# Patient Record
Sex: FEMALE | Race: WHITE | HISPANIC OR LATINO | Employment: OTHER | URBAN - METROPOLITAN AREA
[De-identification: names, ages, dates, MRNs, and addresses within clinical notes are randomized per-mention and may not be internally consistent; named-entity substitution may affect disease eponyms.]

---

## 2017-01-02 ENCOUNTER — APPOINTMENT (OUTPATIENT)
Dept: URGENT CARE | Age: 42
End: 2017-01-02
Payer: MEDICARE

## 2017-01-02 PROCEDURE — G0463 HOSPITAL OUTPT CLINIC VISIT: HCPCS

## 2017-01-02 PROCEDURE — 99213 OFFICE O/P EST LOW 20 MIN: CPT

## 2017-01-11 ENCOUNTER — ALLSCRIPTS OFFICE VISIT (OUTPATIENT)
Dept: OTHER | Facility: OTHER | Age: 42
End: 2017-01-11

## 2017-01-16 ENCOUNTER — APPOINTMENT (OUTPATIENT)
Dept: LAB | Facility: HOSPITAL | Age: 42
End: 2017-01-16
Payer: MEDICARE

## 2017-01-16 ENCOUNTER — HOSPITAL ENCOUNTER (OUTPATIENT)
Dept: NEUROLOGY | Facility: CLINIC | Age: 42
Discharge: HOME | End: 2017-01-16
Payer: MEDICARE

## 2017-01-16 ENCOUNTER — TRANSCRIBE ORDERS (OUTPATIENT)
Dept: LAB | Facility: HOSPITAL | Age: 42
End: 2017-01-16

## 2017-01-16 DIAGNOSIS — R53.0 NEOPLASTIC MALIGNANT RELATED FATIGUE: ICD-10-CM

## 2017-01-16 DIAGNOSIS — M54.12 BRACHIAL NEURITIS OR RADICULITIS NOS: ICD-10-CM

## 2017-01-16 DIAGNOSIS — E78.5 HYPERLIPIDEMIA, UNSPECIFIED HYPERLIPIDEMIA TYPE: ICD-10-CM

## 2017-01-16 DIAGNOSIS — E03.9 UNSPECIFIED HYPOTHYROIDISM: ICD-10-CM

## 2017-01-16 DIAGNOSIS — I10 ESSENTIAL HYPERTENSION, MALIGNANT: ICD-10-CM

## 2017-01-16 DIAGNOSIS — G56.03 BILATERAL CARPAL TUNNEL SYNDROME: ICD-10-CM

## 2017-01-16 DIAGNOSIS — R53.1 ASTHENIA: ICD-10-CM

## 2017-01-16 DIAGNOSIS — I10 ESSENTIAL HYPERTENSION, MALIGNANT: Primary | ICD-10-CM

## 2017-01-16 LAB
ALBUMIN SERPL BCP-MCNC: 3.3 G/DL (ref 3.5–5)
ALP SERPL-CCNC: 83 U/L (ref 46–116)
ALT SERPL W P-5'-P-CCNC: 43 U/L (ref 12–78)
ANION GAP SERPL CALCULATED.3IONS-SCNC: 7 MMOL/L (ref 4–13)
AST SERPL W P-5'-P-CCNC: 25 U/L (ref 5–45)
BASOPHILS # BLD AUTO: 0.01 THOUSANDS/ΜL (ref 0–0.1)
BASOPHILS NFR BLD AUTO: 0 % (ref 0–1)
BILIRUB SERPL-MCNC: 0.12 MG/DL (ref 0.2–1)
BUN SERPL-MCNC: 10 MG/DL (ref 5–25)
CALCIUM SERPL-MCNC: 8.2 MG/DL (ref 8.3–10.1)
CHLORIDE SERPL-SCNC: 109 MMOL/L (ref 100–108)
CHOLEST SERPL-MCNC: 167 MG/DL (ref 50–200)
CO2 SERPL-SCNC: 24 MMOL/L (ref 21–32)
CREAT SERPL-MCNC: 0.77 MG/DL (ref 0.6–1.3)
CRP SERPL QL: 7.4 MG/L
EOSINOPHIL # BLD AUTO: 0.12 THOUSAND/ΜL (ref 0–0.61)
EOSINOPHIL NFR BLD AUTO: 2 % (ref 0–6)
ERYTHROCYTE [DISTWIDTH] IN BLOOD BY AUTOMATED COUNT: 13.5 % (ref 11.6–15.1)
GFR SERPL CREATININE-BSD FRML MDRD: >60 ML/MIN/1.73SQ M
GLUCOSE SERPL-MCNC: 131 MG/DL (ref 65–140)
HCT VFR BLD AUTO: 36.3 % (ref 34.8–46.1)
HDLC SERPL-MCNC: 48 MG/DL (ref 40–60)
HGB BLD-MCNC: 12.2 G/DL (ref 11.5–15.4)
LDLC SERPL CALC-MCNC: 79 MG/DL (ref 0–100)
LYMPHOCYTES # BLD AUTO: 1.81 THOUSANDS/ΜL (ref 0.6–4.47)
LYMPHOCYTES NFR BLD AUTO: 26 % (ref 14–44)
MCH RBC QN AUTO: 29.3 PG (ref 26.8–34.3)
MCHC RBC AUTO-ENTMCNC: 33.6 G/DL (ref 31.4–37.4)
MCV RBC AUTO: 87 FL (ref 82–98)
MONOCYTES # BLD AUTO: 0.46 THOUSAND/ΜL (ref 0.17–1.22)
MONOCYTES NFR BLD AUTO: 7 % (ref 4–12)
NEUTROPHILS # BLD AUTO: 4.44 THOUSANDS/ΜL (ref 1.85–7.62)
NEUTS SEG NFR BLD AUTO: 65 % (ref 43–75)
NRBC BLD AUTO-RTO: 0 /100 WBCS
PLATELET # BLD AUTO: 275 THOUSANDS/UL (ref 149–390)
PMV BLD AUTO: 10.4 FL (ref 8.9–12.7)
POTASSIUM SERPL-SCNC: 3.8 MMOL/L (ref 3.5–5.3)
PROT SERPL-MCNC: 7.6 G/DL (ref 6.4–8.2)
RBC # BLD AUTO: 4.16 MILLION/UL (ref 3.81–5.12)
SODIUM SERPL-SCNC: 140 MMOL/L (ref 136–145)
T3FREE SERPL-MCNC: 2.38 PG/ML (ref 2.3–4.2)
T4 FREE SERPL-MCNC: 0.74 NG/DL (ref 0.76–1.46)
TRIGL SERPL-MCNC: 201 MG/DL
TSH SERPL DL<=0.05 MIU/L-ACNC: 3.02 UIU/ML (ref 0.36–3.74)
WBC # BLD AUTO: 6.86 THOUSAND/UL (ref 4.31–10.16)

## 2017-01-16 PROCEDURE — 84481 FREE ASSAY (FT-3): CPT

## 2017-01-16 PROCEDURE — 80061 LIPID PANEL: CPT

## 2017-01-16 PROCEDURE — 86618 LYME DISEASE ANTIBODY: CPT

## 2017-01-16 PROCEDURE — 86140 C-REACTIVE PROTEIN: CPT

## 2017-01-16 PROCEDURE — 95913 NRV CNDJ TEST 13/> STUDIES: CPT

## 2017-01-16 PROCEDURE — 36415 COLL VENOUS BLD VENIPUNCTURE: CPT

## 2017-01-16 PROCEDURE — 80053 COMPREHEN METABOLIC PANEL: CPT

## 2017-01-16 PROCEDURE — 95886 MUSC TEST DONE W/N TEST COMP: CPT

## 2017-01-16 PROCEDURE — 84439 ASSAY OF FREE THYROXINE: CPT

## 2017-01-16 PROCEDURE — 84443 ASSAY THYROID STIM HORMONE: CPT

## 2017-01-16 PROCEDURE — 85025 COMPLETE CBC W/AUTO DIFF WBC: CPT

## 2017-01-17 LAB
B BURGDOR IGG SER IA-ACNC: 0.23
B BURGDOR IGM SER IA-ACNC: 0.46

## 2017-01-20 ENCOUNTER — ALLSCRIPTS OFFICE VISIT (OUTPATIENT)
Dept: OTHER | Facility: OTHER | Age: 42
End: 2017-01-20

## 2017-02-01 ENCOUNTER — ALLSCRIPTS OFFICE VISIT (OUTPATIENT)
Dept: OTHER | Facility: OTHER | Age: 42
End: 2017-02-01

## 2017-02-02 ENCOUNTER — TRANSCRIBE ORDERS (OUTPATIENT)
Dept: LAB | Facility: HOSPITAL | Age: 42
End: 2017-02-02

## 2017-02-02 ENCOUNTER — APPOINTMENT (OUTPATIENT)
Dept: LAB | Facility: HOSPITAL | Age: 42
End: 2017-02-02
Payer: MEDICARE

## 2017-02-02 DIAGNOSIS — M35.01 KERATOCONJUNCTIVITIS SICCA (HCC): ICD-10-CM

## 2017-02-02 DIAGNOSIS — M35.01 KERATOCONJUNCTIVITIS SICCA (HCC): Primary | ICD-10-CM

## 2017-02-02 DIAGNOSIS — Z79.899 ENCOUNTER FOR LONG-TERM (CURRENT) USE OF OTHER MEDICATIONS: ICD-10-CM

## 2017-02-02 DIAGNOSIS — M79.7 RHEUMATISM, UNSPECIFIED AND FIBROSITIS: ICD-10-CM

## 2017-02-02 DIAGNOSIS — L93.1 SUBACUTE CUTANEOUS LUPUS ERYTHEMATOSUS: ICD-10-CM

## 2017-02-02 DIAGNOSIS — M79.0 RHEUMATISM, UNSPECIFIED AND FIBROSITIS: ICD-10-CM

## 2017-02-02 LAB
ANION GAP SERPL CALCULATED.3IONS-SCNC: 9 MMOL/L (ref 4–13)
BUN SERPL-MCNC: 10 MG/DL (ref 5–25)
C3 SERPL-MCNC: 157 MG/DL (ref 90–180)
C4 SERPL-MCNC: 33 MG/DL (ref 10–40)
CA-I BLD-SCNC: 1.18 MMOL/L (ref 1.12–1.32)
CALCIUM SERPL-MCNC: 9.1 MG/DL (ref 8.3–10.1)
CHLORIDE SERPL-SCNC: 106 MMOL/L (ref 100–108)
CK MB SERPL-MCNC: 1.1 % (ref 0–2.5)
CK MB SERPL-MCNC: 2.1 NG/ML (ref 0–5)
CK SERPL-CCNC: 192 U/L (ref 26–192)
CO2 SERPL-SCNC: 25 MMOL/L (ref 21–32)
CREAT SERPL-MCNC: 0.65 MG/DL (ref 0.6–1.3)
CRP SERPL QL: 9.6 MG/L
ERYTHROCYTE [SEDIMENTATION RATE] IN BLOOD: 33 MM/HOUR (ref 0–20)
GFR SERPL CREATININE-BSD FRML MDRD: >60 ML/MIN/1.73SQ M
GLUCOSE SERPL-MCNC: 95 MG/DL (ref 65–140)
POTASSIUM SERPL-SCNC: 3.6 MMOL/L (ref 3.5–5.3)
SODIUM SERPL-SCNC: 140 MMOL/L (ref 136–145)

## 2017-02-02 PROCEDURE — 83516 IMMUNOASSAY NONANTIBODY: CPT

## 2017-02-02 PROCEDURE — 86225 DNA ANTIBODY NATIVE: CPT

## 2017-02-02 PROCEDURE — 86430 RHEUMATOID FACTOR TEST QUAL: CPT

## 2017-02-02 PROCEDURE — 82595 ASSAY OF CRYOGLOBULIN: CPT

## 2017-02-02 PROCEDURE — 86200 CCP ANTIBODY: CPT

## 2017-02-02 PROCEDURE — 86376 MICROSOMAL ANTIBODY EACH: CPT

## 2017-02-02 PROCEDURE — 80048 BASIC METABOLIC PNL TOTAL CA: CPT

## 2017-02-02 PROCEDURE — 36415 COLL VENOUS BLD VENIPUNCTURE: CPT

## 2017-02-02 PROCEDURE — 82553 CREATINE MB FRACTION: CPT

## 2017-02-02 PROCEDURE — 86235 NUCLEAR ANTIGEN ANTIBODY: CPT

## 2017-02-02 PROCEDURE — 85652 RBC SED RATE AUTOMATED: CPT

## 2017-02-02 PROCEDURE — 86140 C-REACTIVE PROTEIN: CPT

## 2017-02-02 PROCEDURE — 85732 THROMBOPLASTIN TIME PARTIAL: CPT

## 2017-02-02 PROCEDURE — 82330 ASSAY OF CALCIUM: CPT

## 2017-02-02 PROCEDURE — 86146 BETA-2 GLYCOPROTEIN ANTIBODY: CPT

## 2017-02-02 PROCEDURE — 86160 COMPLEMENT ANTIGEN: CPT

## 2017-02-02 PROCEDURE — 86147 CARDIOLIPIN ANTIBODY EA IG: CPT

## 2017-02-02 PROCEDURE — 86800 THYROGLOBULIN ANTIBODY: CPT

## 2017-02-02 PROCEDURE — 85670 THROMBIN TIME PLASMA: CPT

## 2017-02-02 PROCEDURE — 86038 ANTINUCLEAR ANTIBODIES: CPT

## 2017-02-02 PROCEDURE — 85705 THROMBOPLASTIN INHIBITION: CPT

## 2017-02-02 PROCEDURE — 85613 RUSSELL VIPER VENOM DILUTED: CPT

## 2017-02-02 PROCEDURE — 82550 ASSAY OF CK (CPK): CPT

## 2017-02-03 LAB
CARDIOLIPIN IGA SER IA-ACNC: <9 APL U/ML (ref 0–11)
CARDIOLIPIN IGG SER IA-ACNC: <9 GPL U/ML (ref 0–14)
CARDIOLIPIN IGM SER IA-ACNC: <9 MPL U/ML (ref 0–12)
CENTROMERE B AB SER-ACNC: <0.2 AI (ref 0–0.9)
DSDNA AB SER-ACNC: 2 IU/ML (ref 0–9)
ENA RNP AB SER-ACNC: <0.2 AI (ref 0–0.9)
ENA SCL70 AB SER-ACNC: <0.2 AI (ref 0–0.9)
ENA SM AB SER-ACNC: <0.2 AI (ref 0–0.9)
ENA SS-A AB SER-ACNC: <0.2 AI (ref 0–0.9)
ENA SS-B AB SER-ACNC: <0.2 AI (ref 0–0.9)
RHEUMATOID FACT SER QL LA: NEGATIVE
RYE IGE QN: NEGATIVE
THYROGLOB AB SERPL-ACNC: <1 IU/ML (ref 0–0.9)
THYROPEROXIDASE AB SERPL-ACNC: 15 IU/ML (ref 0–34)
TTG IGA SER-ACNC: <2 U/ML (ref 0–3)
TTG IGG SER-ACNC: 3 U/ML (ref 0–5)

## 2017-02-04 LAB
B2 GLYCOPROT1 IGA SER-ACNC: <9 GPI IGA UNITS (ref 0–25)
B2 GLYCOPROT1 IGG SER-ACNC: <9 GPI IGG UNITS (ref 0–20)
B2 GLYCOPROT1 IGM SER-ACNC: <9 GPI IGM UNITS (ref 0–32)
CCP IGA+IGG SERPL IA-ACNC: 6 UNITS (ref 0–19)

## 2017-02-05 LAB
APTT SCREEN TO CONFIRM RATIO: 1.07 RATIO (ref 0–1.4)
CONFIRM APTT/NORMAL: 42.9 SEC (ref 0–55)
LA PPP-IMP: NORMAL
SCREEN APTT: 35.8 SEC (ref 0–40.6)
SCREEN DRVVT: 38.8 SEC (ref 0–44)
THROMBIN TIME: 16.8 SEC (ref 0–20.9)

## 2017-02-07 LAB — CRYOGLOB SER QL 1D COLD INC: POSITIVE

## 2017-02-10 ENCOUNTER — ALLSCRIPTS OFFICE VISIT (OUTPATIENT)
Dept: OTHER | Facility: OTHER | Age: 42
End: 2017-02-10

## 2017-02-24 ENCOUNTER — ALLSCRIPTS OFFICE VISIT (OUTPATIENT)
Dept: OTHER | Facility: OTHER | Age: 42
End: 2017-02-24

## 2017-03-07 ENCOUNTER — OFFICE VISIT (OUTPATIENT)
Dept: URGENT CARE | Age: 42
End: 2017-03-07
Payer: MEDICARE

## 2017-03-07 PROCEDURE — 99214 OFFICE O/P EST MOD 30 MIN: CPT | Performed by: FAMILY MEDICINE

## 2017-03-07 PROCEDURE — G0463 HOSPITAL OUTPT CLINIC VISIT: HCPCS | Performed by: FAMILY MEDICINE

## 2017-03-07 PROCEDURE — 99203 OFFICE O/P NEW LOW 30 MIN: CPT | Performed by: FAMILY MEDICINE

## 2017-03-10 ENCOUNTER — ALLSCRIPTS OFFICE VISIT (OUTPATIENT)
Dept: OTHER | Facility: OTHER | Age: 42
End: 2017-03-10

## 2017-03-10 ENCOUNTER — APPOINTMENT (OUTPATIENT)
Dept: LAB | Facility: HOSPITAL | Age: 42
End: 2017-03-10
Payer: MEDICARE

## 2017-03-10 DIAGNOSIS — N30.10 CHRONIC INTERSTITIAL CYSTITIS WITHOUT HEMATURIA: ICD-10-CM

## 2017-03-10 PROCEDURE — 88112 CYTOPATH CELL ENHANCE TECH: CPT

## 2017-03-14 ENCOUNTER — GENERIC CONVERSION - ENCOUNTER (OUTPATIENT)
Dept: OTHER | Facility: OTHER | Age: 42
End: 2017-03-14

## 2017-03-24 ENCOUNTER — ALLSCRIPTS OFFICE VISIT (OUTPATIENT)
Dept: OTHER | Facility: OTHER | Age: 42
End: 2017-03-24

## 2017-04-07 ENCOUNTER — ALLSCRIPTS OFFICE VISIT (OUTPATIENT)
Dept: OTHER | Facility: OTHER | Age: 42
End: 2017-04-07

## 2017-04-21 ENCOUNTER — ALLSCRIPTS OFFICE VISIT (OUTPATIENT)
Dept: OTHER | Facility: OTHER | Age: 42
End: 2017-04-21

## 2017-05-05 ENCOUNTER — ALLSCRIPTS OFFICE VISIT (OUTPATIENT)
Dept: OTHER | Facility: OTHER | Age: 42
End: 2017-05-05

## 2017-05-07 ENCOUNTER — OFFICE VISIT (OUTPATIENT)
Dept: URGENT CARE | Age: 42
End: 2017-05-07
Payer: MEDICARE

## 2017-05-07 ENCOUNTER — APPOINTMENT (OUTPATIENT)
Dept: LAB | Age: 42
End: 2017-05-07
Attending: PHYSICIAN ASSISTANT
Payer: MEDICARE

## 2017-05-07 ENCOUNTER — TRANSCRIBE ORDERS (OUTPATIENT)
Dept: URGENT CARE | Age: 42
End: 2017-05-07

## 2017-05-07 DIAGNOSIS — J02.9 ACUTE PHARYNGITIS: ICD-10-CM

## 2017-05-07 PROCEDURE — 87070 CULTURE OTHR SPECIMN AEROBIC: CPT

## 2017-05-07 PROCEDURE — 87147 CULTURE TYPE IMMUNOLOGIC: CPT

## 2017-05-07 PROCEDURE — 87430 STREP A AG IA: CPT | Performed by: FAMILY MEDICINE

## 2017-05-07 PROCEDURE — G0463 HOSPITAL OUTPT CLINIC VISIT: HCPCS

## 2017-05-07 PROCEDURE — 99213 OFFICE O/P EST LOW 20 MIN: CPT

## 2017-05-09 LAB — BACTERIA THROAT CULT: NORMAL

## 2017-05-21 ENCOUNTER — HOSPITAL ENCOUNTER (EMERGENCY)
Facility: HOSPITAL | Age: 42
Discharge: HOME | End: 2017-05-21
Attending: EMERGENCY MEDICINE
Payer: MEDICARE

## 2017-05-21 VITALS
WEIGHT: 195 LBS | BODY MASS INDEX: 32.49 KG/M2 | TEMPERATURE: 98.2 F | RESPIRATION RATE: 16 BRPM | HEIGHT: 65 IN | DIASTOLIC BLOOD PRESSURE: 81 MMHG | HEART RATE: 86 BPM | SYSTOLIC BLOOD PRESSURE: 141 MMHG | OXYGEN SATURATION: 100 %

## 2017-05-21 DIAGNOSIS — G43.909 MIGRAINE: Primary | ICD-10-CM

## 2017-05-21 PROCEDURE — 96365 THER/PROPH/DIAG IV INF INIT: CPT

## 2017-05-21 PROCEDURE — 96375 TX/PRO/DX INJ NEW DRUG ADDON: CPT

## 2017-05-21 PROCEDURE — 96361 HYDRATE IV INFUSION ADD-ON: CPT

## 2017-05-21 PROCEDURE — 99283 EMERGENCY DEPT VISIT LOW MDM: CPT

## 2017-05-21 RX ORDER — MAGNESIUM SULFATE HEPTAHYDRATE 40 MG/ML
2 INJECTION, SOLUTION INTRAVENOUS ONCE
Status: COMPLETED | OUTPATIENT
Start: 2017-05-21 | End: 2017-05-21

## 2017-05-21 RX ORDER — METOCLOPRAMIDE HYDROCHLORIDE 5 MG/ML
10 INJECTION INTRAMUSCULAR; INTRAVENOUS ONCE
Status: COMPLETED | OUTPATIENT
Start: 2017-05-21 | End: 2017-05-21

## 2017-05-21 RX ORDER — KETOROLAC TROMETHAMINE 30 MG/ML
30 INJECTION, SOLUTION INTRAMUSCULAR; INTRAVENOUS ONCE
Status: COMPLETED | OUTPATIENT
Start: 2017-05-21 | End: 2017-05-21

## 2017-05-21 RX ORDER — LORAZEPAM 2 MG/ML
0.5 INJECTION INTRAMUSCULAR ONCE
Status: COMPLETED | OUTPATIENT
Start: 2017-05-21 | End: 2017-05-21

## 2017-05-21 RX ORDER — PROCHLORPERAZINE MALEATE 5 MG/1
5 TABLET ORAL EVERY 6 HOURS PRN
Status: DISCONTINUED | OUTPATIENT
Start: 2017-05-21 | End: 2017-05-21 | Stop reason: HOSPADM

## 2017-05-21 RX ORDER — DIPHENHYDRAMINE HYDROCHLORIDE 50 MG/ML
50 INJECTION INTRAMUSCULAR; INTRAVENOUS ONCE
Status: COMPLETED | OUTPATIENT
Start: 2017-05-21 | End: 2017-05-21

## 2017-05-21 RX ORDER — DIVALPROEX SODIUM 125 MG/1
1000 CAPSULE, COATED PELLETS ORAL ONCE
Status: DISCONTINUED | OUTPATIENT
Start: 2017-05-21 | End: 2017-05-21

## 2017-05-21 RX ADMIN — DIPHENHYDRAMINE HYDROCHLORIDE 50 MG: 50 INJECTION, SOLUTION INTRAMUSCULAR; INTRAVENOUS at 10:45

## 2017-05-21 RX ADMIN — PROCHLORPERAZINE MALEATE 5 MG: 5 TABLET, FILM COATED ORAL at 12:08

## 2017-05-21 RX ADMIN — SODIUM CHLORIDE 1000 ML: 0.9 INJECTION, SOLUTION INTRAVENOUS at 10:40

## 2017-05-21 RX ADMIN — DEXAMETHASONE SODIUM PHOSPHATE 8 MG: 10 INJECTION, SOLUTION INTRAMUSCULAR; INTRAVENOUS at 11:54

## 2017-05-21 RX ADMIN — LORAZEPAM 0.5 MG: 2 INJECTION INTRAMUSCULAR; INTRAVENOUS at 11:53

## 2017-05-21 RX ADMIN — MAGNESIUM SULFATE HEPTAHYDRATE 2 G: 40 INJECTION, SOLUTION INTRAVENOUS at 12:42

## 2017-05-21 RX ADMIN — METOCLOPRAMIDE 10 MG: 5 INJECTION, SOLUTION INTRAMUSCULAR; INTRAVENOUS at 10:43

## 2017-05-21 RX ADMIN — KETOROLAC TROMETHAMINE 30 MG: 30 INJECTION, SOLUTION INTRAMUSCULAR at 10:44

## 2017-05-23 ENCOUNTER — ALLSCRIPTS OFFICE VISIT (OUTPATIENT)
Dept: OTHER | Facility: OTHER | Age: 42
End: 2017-05-23

## 2017-05-26 ENCOUNTER — GENERIC CONVERSION - ENCOUNTER (OUTPATIENT)
Dept: OTHER | Facility: OTHER | Age: 42
End: 2017-05-26

## 2017-05-28 ENCOUNTER — APPOINTMENT (EMERGENCY)
Dept: RADIOLOGY | Facility: HOSPITAL | Age: 42
End: 2017-05-28
Payer: COMMERCIAL

## 2017-05-28 ENCOUNTER — HOSPITAL ENCOUNTER (EMERGENCY)
Facility: HOSPITAL | Age: 42
Discharge: HOME | End: 2017-05-28
Attending: EMERGENCY MEDICINE | Admitting: EMERGENCY MEDICINE
Payer: COMMERCIAL

## 2017-05-28 VITALS
TEMPERATURE: 98 F | SYSTOLIC BLOOD PRESSURE: 153 MMHG | BODY MASS INDEX: 31.95 KG/M2 | OXYGEN SATURATION: 96 % | WEIGHT: 192 LBS | HEART RATE: 90 BPM | DIASTOLIC BLOOD PRESSURE: 93 MMHG | RESPIRATION RATE: 18 BRPM

## 2017-05-28 DIAGNOSIS — M62.830 BACK SPASM: ICD-10-CM

## 2017-05-28 DIAGNOSIS — M25.562 KNEE PAIN, LEFT: ICD-10-CM
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
## 2017-05-28 PROCEDURE — 72125 CT NECK SPINE W/O DYE: CPT

## 2017-05-28 PROCEDURE — 96372 THER/PROPH/DIAG INJ SC/IM: CPT

## 2017-05-28 PROCEDURE — 73564 X-RAY EXAM KNEE 4 OR MORE: CPT

## 2017-05-28 PROCEDURE — 99284 EMERGENCY DEPT VISIT MOD MDM: CPT

## 2017-05-28 RX ORDER — DIAZEPAM 2 MG/1
2 TABLET ORAL ONCE
Status: COMPLETED | OUTPATIENT
Start: 2017-05-28 | End: 2017-05-28

## 2017-05-28 RX ORDER — KETOROLAC TROMETHAMINE 30 MG/ML
15 INJECTION, SOLUTION INTRAMUSCULAR; INTRAVENOUS ONCE
Status: COMPLETED | OUTPATIENT
Start: 2017-05-28 | End: 2017-05-28

## 2017-05-28 RX ORDER — LEVOTHYROXINE SODIUM 88 UG/1
1 CAPSULE ORAL DAILY
COMMUNITY
End: 2019-10-18 | Stop reason: SDUPTHER

## 2017-05-28 RX ADMIN — DIAZEPAM 2 MG: 2 TABLET ORAL at 18:46

## 2017-05-28 RX ADMIN — KETOROLAC TROMETHAMINE 15 MG: 30 INJECTION, SOLUTION INTRAMUSCULAR at 18:48

## 2017-06-09 ENCOUNTER — LAB REQUISITION (OUTPATIENT)
Dept: LAB | Facility: HOSPITAL | Age: 42
End: 2017-06-09
Payer: MEDICARE

## 2017-06-09 ENCOUNTER — ALLSCRIPTS OFFICE VISIT (OUTPATIENT)
Dept: OTHER | Facility: OTHER | Age: 42
End: 2017-06-09

## 2017-06-09 DIAGNOSIS — B37.3 CANDIDIASIS OF VULVA AND VAGINA: ICD-10-CM

## 2017-06-09 DIAGNOSIS — N89.8 OTHER SPECIFIED NONINFLAMMATORY DISORDER OF VAGINA: ICD-10-CM

## 2017-06-09 PROCEDURE — 87070 CULTURE OTHR SPECIMN AEROBIC: CPT | Performed by: FAMILY MEDICINE

## 2017-06-11 LAB — BACTERIA GENITAL AEROBE CULT: NORMAL

## 2017-06-16 ENCOUNTER — ALLSCRIPTS OFFICE VISIT (OUTPATIENT)
Dept: OTHER | Facility: OTHER | Age: 42
End: 2017-06-16

## 2017-06-30 ENCOUNTER — TRANSCRIBE ORDERS (OUTPATIENT)
Dept: LAB | Facility: HOSPITAL | Age: 42
End: 2017-06-30

## 2017-06-30 ENCOUNTER — APPOINTMENT (OUTPATIENT)
Dept: LAB | Facility: HOSPITAL | Age: 42
End: 2017-06-30
Attending: OBSTETRICS & GYNECOLOGY
Payer: MEDICARE

## 2017-06-30 ENCOUNTER — ALLSCRIPTS OFFICE VISIT (OUTPATIENT)
Dept: OTHER | Facility: OTHER | Age: 42
End: 2017-06-30

## 2017-06-30 DIAGNOSIS — Z79.899 ENCOUNTER FOR LONG-TERM (CURRENT) USE OF OTHER MEDICATIONS: ICD-10-CM

## 2017-06-30 DIAGNOSIS — M35.9 DIFFUSE DISEASE OF CONNECTIVE TISSUE (HCC): ICD-10-CM

## 2017-06-30 DIAGNOSIS — M32.8 OTHER FORMS OF SYSTEMIC LUPUS ERYTHEMATOSUS, UNSPECIFIED ORGAN INVOLVEMENT STATUS (HCC): Primary | ICD-10-CM

## 2017-06-30 DIAGNOSIS — Z13.1 SCREENING FOR DIABETES MELLITUS: ICD-10-CM

## 2017-06-30 DIAGNOSIS — Z13.1 SCREENING FOR DIABETES MELLITUS: Primary | ICD-10-CM

## 2017-06-30 DIAGNOSIS — M32.8 OTHER FORMS OF SYSTEMIC LUPUS ERYTHEMATOSUS, UNSPECIFIED ORGAN INVOLVEMENT STATUS (HCC): ICD-10-CM

## 2017-06-30 LAB
ALBUMIN SERPL BCP-MCNC: 3.2 G/DL (ref 3.5–5)
ALP SERPL-CCNC: 72 U/L (ref 46–116)
ALT SERPL W P-5'-P-CCNC: 40 U/L (ref 12–78)
ANION GAP SERPL CALCULATED.3IONS-SCNC: 3 MMOL/L (ref 4–13)
AST SERPL W P-5'-P-CCNC: 17 U/L (ref 5–45)
BACTERIA UR QL AUTO: ABNORMAL /HPF
BASOPHILS # BLD AUTO: 0.02 THOUSANDS/ΜL (ref 0–0.1)
BASOPHILS NFR BLD AUTO: 0 % (ref 0–1)
BILIRUB SERPL-MCNC: 0.24 MG/DL (ref 0.2–1)
BILIRUB UR QL STRIP: NEGATIVE
BUN SERPL-MCNC: 12 MG/DL (ref 5–25)
CALCIUM SERPL-MCNC: 8.7 MG/DL (ref 8.3–10.1)
CHLORIDE SERPL-SCNC: 109 MMOL/L (ref 100–108)
CLARITY UR: CLEAR
CO2 SERPL-SCNC: 26 MMOL/L (ref 21–32)
COLOR UR: YELLOW
CREAT SERPL-MCNC: 0.63 MG/DL (ref 0.6–1.3)
CRP SERPL QL: 6.7 MG/L
EOSINOPHIL # BLD AUTO: 0.13 THOUSAND/ΜL (ref 0–0.61)
EOSINOPHIL NFR BLD AUTO: 2 % (ref 0–6)
ERYTHROCYTE [DISTWIDTH] IN BLOOD BY AUTOMATED COUNT: 13.3 % (ref 11.6–15.1)
ERYTHROCYTE [SEDIMENTATION RATE] IN BLOOD: 23 MM/HOUR (ref 0–20)
GFR SERPL CREATININE-BSD FRML MDRD: >60 ML/MIN/1.73SQ M
GLUCOSE P FAST SERPL-MCNC: 96 MG/DL (ref 65–99)
GLUCOSE UR STRIP-MCNC: NEGATIVE MG/DL
HCT VFR BLD AUTO: 36.4 % (ref 34.8–46.1)
HGB BLD-MCNC: 12.1 G/DL (ref 11.5–15.4)
HGB UR QL STRIP.AUTO: ABNORMAL
HYALINE CASTS #/AREA URNS LPF: ABNORMAL /LPF
KETONES UR STRIP-MCNC: NEGATIVE MG/DL
LEUKOCYTE ESTERASE UR QL STRIP: NEGATIVE
LYMPHOCYTES # BLD AUTO: 2.3 THOUSANDS/ΜL (ref 0.6–4.47)
LYMPHOCYTES NFR BLD AUTO: 32 % (ref 14–44)
MCH RBC QN AUTO: 28.7 PG (ref 26.8–34.3)
MCHC RBC AUTO-ENTMCNC: 33.2 G/DL (ref 31.4–37.4)
MCV RBC AUTO: 87 FL (ref 82–98)
MONOCYTES # BLD AUTO: 0.43 THOUSAND/ΜL (ref 0.17–1.22)
MONOCYTES NFR BLD AUTO: 6 % (ref 4–12)
NEUTROPHILS # BLD AUTO: 4.33 THOUSANDS/ΜL (ref 1.85–7.62)
NEUTS SEG NFR BLD AUTO: 60 % (ref 43–75)
NITRITE UR QL STRIP: NEGATIVE
NON-SQ EPI CELLS URNS QL MICRO: ABNORMAL /HPF
NRBC BLD AUTO-RTO: 0 /100 WBCS
PH UR STRIP.AUTO: 5.5 [PH] (ref 4.5–8)
PLATELET # BLD AUTO: 221 THOUSANDS/UL (ref 149–390)
PMV BLD AUTO: 10.4 FL (ref 8.9–12.7)
POTASSIUM SERPL-SCNC: 3.9 MMOL/L (ref 3.5–5.3)
PROT SERPL-MCNC: 7.1 G/DL (ref 6.4–8.2)
PROT UR STRIP-MCNC: NEGATIVE MG/DL
RBC # BLD AUTO: 4.21 MILLION/UL (ref 3.81–5.12)
RBC #/AREA URNS AUTO: ABNORMAL /HPF
SODIUM SERPL-SCNC: 138 MMOL/L (ref 136–145)
SP GR UR STRIP.AUTO: 1.02 (ref 1–1.03)
UROBILINOGEN UR QL STRIP.AUTO: 0.2 E.U./DL
WBC # BLD AUTO: 7.22 THOUSAND/UL (ref 4.31–10.16)
WBC #/AREA URNS AUTO: ABNORMAL /HPF

## 2017-06-30 PROCEDURE — 36415 COLL VENOUS BLD VENIPUNCTURE: CPT

## 2017-06-30 PROCEDURE — 85652 RBC SED RATE AUTOMATED: CPT

## 2017-06-30 PROCEDURE — 86140 C-REACTIVE PROTEIN: CPT

## 2017-06-30 PROCEDURE — 85025 COMPLETE CBC W/AUTO DIFF WBC: CPT

## 2017-06-30 PROCEDURE — 80053 COMPREHEN METABOLIC PANEL: CPT

## 2017-06-30 PROCEDURE — 81001 URINALYSIS AUTO W/SCOPE: CPT | Performed by: INTERNAL MEDICINE

## 2017-08-04 ENCOUNTER — ALLSCRIPTS OFFICE VISIT (OUTPATIENT)
Dept: OTHER | Facility: OTHER | Age: 42
End: 2017-08-04

## 2017-08-18 ENCOUNTER — ALLSCRIPTS OFFICE VISIT (OUTPATIENT)
Dept: OTHER | Facility: OTHER | Age: 42
End: 2017-08-18

## 2017-08-30 ENCOUNTER — ALLSCRIPTS OFFICE VISIT (OUTPATIENT)
Dept: OTHER | Facility: OTHER | Age: 42
End: 2017-08-30

## 2017-09-17 ENCOUNTER — OFFICE VISIT (OUTPATIENT)
Dept: URGENT CARE | Age: 42
End: 2017-09-17
Payer: MEDICARE

## 2017-09-17 PROCEDURE — 99213 OFFICE O/P EST LOW 20 MIN: CPT

## 2017-09-17 PROCEDURE — G0463 HOSPITAL OUTPT CLINIC VISIT: HCPCS

## 2017-10-05 ENCOUNTER — GENERIC CONVERSION - ENCOUNTER (OUTPATIENT)
Dept: OTHER | Facility: OTHER | Age: 42
End: 2017-10-05

## 2017-10-19 ENCOUNTER — GENERIC CONVERSION - ENCOUNTER (OUTPATIENT)
Dept: OTHER | Facility: OTHER | Age: 42
End: 2017-10-19

## 2017-10-25 ENCOUNTER — ALLSCRIPTS OFFICE VISIT (OUTPATIENT)
Dept: OTHER | Facility: OTHER | Age: 42
End: 2017-10-25

## 2017-10-25 NOTE — PROGRESS NOTES
Chief Complaint  She is here today for botox injections      Current Meds   1  Albuterol Sulfate (2 5 MG/3ML) 0 083% Inhalation Nebulization Solution; Therapy: (Recorded:11Jun2013) to Recorded   2  Allopurinol 100 MG Oral Tablet; Take 1 tablet daily; Therapy: (Recorded:10Jan2017) to Recorded   3  Azithromycin 250 MG Oral Tablet; TAKE 2 TABLETS ON DAY 1 THEN TAKE 1 TABLET A   DAY FOR 4 DAYS; Therapy: 78WAU6875 to (Last WL:03OYE4263)  Requested for: 66CYP5926 Ordered   4  Calcium 500 + D 500-125 MG-UNIT Oral Tablet; TAKE 1 TABLET DAILY; Therapy: (Recorded:10Jan2017) to Recorded   5  Cetirizine HCl - 10 MG Oral Tablet; TAKE 1 TABLET DAILY; Therapy: (Recorded:10Jan2017) to Recorded   6  Daily Multiple Vitamins Oral Tablet; TAKE 1 TABLET DAILY; Therapy: (Recorded:10Jan2017) to Recorded   7  Elmiron 100 MG Oral Capsule; TAKE 1 CAPSULE 3 TIMES DAILY BEFORE MEALS; Therapy: 34APX5935 to (Florentin Tomas)  Requested for: 42VEG0400; Last   QP:71VTJ9972 Ordered   8  Fluconazole 150 MG Oral Tablet; TABS PO X 1; Therapy: 07Apr2017 to (Emigdio Kaur)  Requested for: 38EID7444; Last   TX:68VTN2154 Ordered   9  Fluconazole 150 MG Oral Tablet; TAKE 1 TABLET NOW AND AGAIN IN 3 DAYS; Therapy: 24CIM9604 to (Last Rx:38Ryu3776)  Requested for: 01Jun2017 Ordered   10  Fluconazole 150 MG Oral Tablet; TAKE 1 TABLET Weekly; Therapy: 16ZAI1339 to (Evaluate:24Jun2017)  Requested for: 45Lly7481; Last    Rx:16Jun2017 Ordered   11  Ketorolac Tromethamine 10 MG Oral Tablet; 1 po tid x 3 days; Therapy: 06MYV2712 to (Last Rx:68Iiy6005)  Requested for: 19Myo8836 Ordered   12  Levothyroxine Sodium 88 MCG Oral Tablet; Therapy: (Recorded:10Mar2017) to Recorded   13  Lisinopril 20 MG Oral Tablet; take 1 tablet by mouth once daily; Therapy: 16KGC6635 to (Socorro Razo)  Requested for: 34BBF8879; Last    Rx:90Bwp7567 Ordered   14  Magnesium Gluconate 500 MG Oral Tablet; TAKE 1 TABLET DAILY;     Therapy: (Recorded:10Jan2017) to Recorded   15  Metoprolol Tartrate 100 MG Oral Tablet; Take 1 tablet twice daily; Therapy: (Recorded:10Jan2017) to Recorded   16  Montelukast Sodium 10 MG Oral Tablet; Take 1 tablet once daily as directed; Therapy: 03OIC0718 to (Kyra Collet)  Requested for: 12RGS2861; Last    Rx:99Fnt8816 Ordered   17  Omeprazole 40 MG Oral Capsule Delayed Release; take 1 capsule daily; Therapy: (Recorded:10Jan2017) to Recorded   18  Ondansetron 4 MG Oral Tablet Disintegrating; TAKE 1 TABLET Every 6 hours; Therapy: (Recorded:10Jan2017) to Recorded   19  Permethrin 5 % External Cream; MASSAGE INTO SKIN FROM HEAD TO SOLES OF    FEET  WASH OFF AFTER 8-14 HOURS  REPEAT IN 1 WEEK; Therapy: 71TQO1834 to (Last Rx:07Mar2017)  Requested for: 77LIO7472 Ordered   20  ProAir  (90 Base) MCG/ACT Inhalation Aerosol Solution; inhale 2 puffs every 4 to    6 hours if needed; Therapy: 99FSP1855 to (Evaluate:53Ako2584)  Requested for: 81Sty0730; Last    Rx:06Oct2013 Ordered   21  Prochlorperazine Maleate 10 MG Oral Tablet; 1 po q 6 to 8 hrs nausea/headache (Max 3    days per week); Therapy: 01RNS3526 to (Last Rx:08Zan6713)  Requested for: 28POU7528 Ordered   22  Topiramate 50 MG Oral Tablet; TAKE 3 TABLETS (150MG) BY MOUTH AT BEDTIME; Therapy: 39IKR8537 to (Evaluate:23Jun2017)  Requested for: 22Ocn3393; Last    Rx:13Tle2028; Status: ACTIVE - Renewal Denied Ordered   23  Vitamin D 1000 UNIT Oral Tablet; twice daily; Therapy: 33MQS7735 to Recorded    Allergies  1  Latex Gloves One Size MISC   2  Sulfa Drugs   3  Elavil TABS   4  Feminease Vaginal Cream   5  Sular TB24   6  Triptans   7  Tryptophan CAPS  8  Latex   9  Seasonal    Vitals   Recorded: 30Aug2017 03:08PM   Temperature 98 5 F   Heart Rate 80   Systolic 044   Diastolic 81     Procedure  Procedure: Headache botox injection  Indication: Chronic migraine headache  Risks, benefits and alternatives were discussed with the patient   We discussed possible complications, including infection,-- bleeding-- and-- allergic reaction  Written consent was obtained prior to the procedure  The site was prepped with an alcohol swab  Anesthesia: No anesthesia was needed  Procedure Note:   200 --Mml of Botox-A and    5 unit(s) was injected into the procerus muscle  5 unit(s) was injected into the  right  muscle  5 unit(s) was injected into the  left  muscle  10 unit(s) was injected into the  right frontalis muscle  10 unit(s) was injected into the  left frontalis muscle  20 unit(s) was injected into the  right temporalis muscle  20 unit(s) was injected into the  left temporalis muscle  15 unit(s) was injected into the  right occipitalis muscle  15 unit(s) was injected into the  left occipitalis muscle  10 unit(s) was injected into the  right cervical paraspinal muscle  10 unit(s) was injected into the  left cervical paraspinal muscle  15 unit(s) was injected into the  right trapezius muscle  15 unit(s) was injected into the  left trapezius muscle  A total of 155units were used  A total of 45units were discarded  Botox Lot:  Lot number: [id number] -- Expiration date: mar 2020  Patient Status:  the patient tolerated the procedure well  Complications:  there were no complications  100 units/ 2 cc saline x2      Assessment  1  Chronic migraine without aura (346 70) (G43 709)    Plan  Chronic migraine without aura    · Botox 100 UNIT Injection Solution Reconstituted   Rx By: Tavares Stevens; For: Chronic migraine without aura; Dose of 200 UNIT; Intramuscular; OLEG = N; Administered: 8/30/2017 3:23:00 PM   · Chemodenervation of muscles innervated by facial, trigeminal, c-spine, accessory  nerves - POC; Status:Need Information - Financial Authorization; Requested  for:[id number];    Perform: In Office; Due:38Hzw7735; Ordered; For:Chronic migraine without aura;  Ordered By:Pham Dozier;   · Follow-up visit in 3 months Evaluation and Treatment  Follow-up  Status: Complete   Done: 12XLP9497   Ordered; For: Chronic migraine without aura; Ordered By: Marielena Weston Performed:  Due: 72JZI7533; Last Updated By: Jerilyn Chappell; 8/31/2017 8:56:37 AM  Chronic migraine without aura, Classic migraine with aura    · Topiramate 50 MG Oral Tablet; TAKE 3 TABLETS (150MG) BY MOUTH AT  BEDTIME   Rx By: Marielena Weston; Dispense: 30 Days ; #:90 X 90 Tablet Bottle;  Refill: 3;For: Chronic migraine without aura, Classic migraine with aura; OLEG = N; Verified Transmission to [address]; Last Updated By: SystemHERCAMOSHOP; 8/30/2017 3:19:22 PM    Future Appointments    Date/Time Provider Specialty Site   10/25/2017 03:00 PM Sammie Aguirre, [facility] Neurology [address]   08/31/2017 03:30 PM [address] [place] [address], Physician Schedule  [facility]   09/21/2017 03:30 PM [address] [place] [address], Physician Schedule  [facility]   10/05/2017 03:30 PM [address] [place] [address], Physician Schedule   [facility]     Signatures   Electronically signed by : Key Logan MD; Aug 31 2017  9:21AM EST                       (Author)

## 2017-10-27 NOTE — PROGRESS NOTES
Assessment  1  Chronic migraine without aura (346 70) (G43 709)   2  Classic migraine with aura (346 00) (G43 109)   3  Right cervical radiculopathy (723 4) (M54 12)    Plan  Chronic migraine without aura    · Verapamil HCl - 40 MG Oral Tablet; 1 tablet a bedtime for 1 week and then 1 tablet  2x daily   Rx By: Mary Grace Reaves; Dispense: 90 Days ; #:180 Tablet; Refill: 3;For: Chronic migraine without aura; OLEG = N; Verified Transmission to [facility] #[id number] Last Updated By: System, [employer]; 10/25/2017 3:48:36 PM   · Chemodenervation of muscles innervated by facial, trigeminal, c-spine, accessory  nerves - POC; Status:Need Information - Financial Authorization; Requested  JYQ:[id number];    Perform: In Office; [phone]; Ordered; For:Chronic migraine without aura; Ordered By:June Rios;  Right cervical radiculopathy    · Follow-up visit in 6 months Evaluation and Treatment  Follow-up  Status: Complete   Done: 25Oct2017   Ordered; For: Right cervical radiculopathy; Ordered By: Mary Grace Reaves Performed:  Due: 62GQY0032; Last Updated By: Mini Dooley; 10/25/2017 3:48:26 PM    Discussion/Summary  Discussion Summary:   Pt reports that she is having migraines with weather changes or when it is hot outside  Verapamil will be started  She will take 40mg at bedtime for 1 week then 40mg 2x daily  Since starting botox, the patient reports greater than 7 days of migraine relief from baseline, correlated with headache diary, decreased abortive medication use and decreased ER visits  She will follow-up as scheduled for Botox and in 6 months  Headache [facility]:   The patient was counseled regarding;   Discussed side effects of all medications prescribed today to the patient in detail  Patient education was completed today and we also discussed precautions for rebound headaches  --    When patient has a moderate to severe headache, they should seek rest, initiate relaxation and apply cold compresses to the head     Also recommended to the patient :  1  Maintain regular sleep schedule -- 2  Limit over the counter medications  (No more than 3 times a week)  -- 3  Maintain headache diary  -- 4  Limit caffeine to 1-2 cups a day or less  -- 5  Avoid dietary trigger  (list given to the patient and reviewed with them)  -- 6  Patient is to have regular frequent meals to prevent headache onset  Chief Complaint  Chief Complaint Free Text Note Form: Patient presents for f/u migraine  History of Present Illness  HPI: We had the pleasure of evaluating Michelle Monsalve in neurological follow up today  As you know she is a 36year old right handed female  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  She has a PMH of palpitations and has had an ablation  She continues to see her cardiologist   starting botox, the patient reports greater than 7 days of migraine relief from baseline, correlated with headache diary, decreased abortive medication use and decreased ER visits  this decrease in migraines she still does get migraines with weather changes  She tried cyproheptadine but it caused significant dryness    family history of aneurysms â nohistory of migraine headaches - father and mother   is your current pain level â 8/10started at what age - 12 yoor injury prior to onset of headaches - noneoften do the headaches occur â mostly with weather changestime of the day do the headaches start - varieslong do the headaches last - it can last all dayyou ever headache free - rarelyare they located â face and frontal regionis the intensity of pain â 10/10 at max; 8/10 averagewarning prior to headache and how long do they last - noneyour usual headache - Throbbing, Poundingsymptoms: Decrease of appetite, nausea Photophobia, phonophobia, sensitivity to smell Problem with concentrationprefer to be alone and in a dark room, unable to workare worse if the patient: cough, sneeze, bending overtrigger: Fatigue, Stress/Tension, Weather change, lack of sleepyou current pregnant or planning on getting pregnant? Done with family planningtime of the year do headaches occur more frequently - with change of weatheryou seen someone else for headaches or pain - yesyou had trigger point injection performed and how often - no  you had Botox injection performed and how often - Yes  you had epidural injections or transforaminal injections performed - nomedications do you take or have you taken for your headaches? Lisinopril, Metoprolol, Topamax, cyclobenzaprine, labetalol, Seroquel, Tizanidine, carbamazepine, citalopram, duloxetine, venlafaxine, Lyrica, Propranolol, olanzapine- ineffective, cyproheptadineDexamethasone, Fioricet, Naproxen Promethazine, Kenalog, Percocet, tramadol, Toradol, narcoticsTreatments used in the past for headaches: None  PMH, PSH, SH, FH and ROS  Review of Systems  Neurological ROS:   HEENT: sinus problems-- and-- feeling congested  Cardiovascular:  no chest pain or pressure, no palpitations present, the heart rate was not rapid or irregular, no swelling in the arms or legs, no poor circulation  Respiratory:  no unusual or persistant cough, no shortness of breath with or without exertion  Gastrointestinal: nausea-- and-- vomiting  Genitourinary: incontinence,-- feelings of urinary urgency-- and-- increased frequency  Musculoskeletal: arthralgias,-- myalgias,-- head/neck/back pain-- and-- pain while walking  Integumentary  no masses, no rash, no skin lesions, no livedo reticularis  Psychiatric: anxiety,-- depression-- and-- mood swings  Endocrine  no unusual weight loss or gain, no excessive urination, no excessive thirst, no hair loss or gain, no hot or cold intolerance, no menstrual period change or irregularity, no loss of sexual ability or drive, no erection difficulty, no nipple discharge  Hematologic/Lymphatic:  no unusual bleeding, no tendency for easy bruising, no clotting skin or lumps  Neurological General: headache,-- increased sleepiness-- and-- snoring  Neurological Mental Status: confusion-- and-- memory problems  Neurological Cranial Nerves: vertigo or dizziness  Neurological Motor findings include: cramping(pre/post exercise)  Neurological Coordination:  no unsteadiness, no vertigo or dizziness, no clumsiness, no problems reaching for objects  Neurological Sensory: tingling--   no numbness, no pain, no tingling, does not fall when eyes closed or taking a shower  Neurological Gait:  no difficulty walking, not falling to one side, no sensation of being pushed, has not had falls  ROS Reviewed:   ROS reviewed  Active Problems  1  Acute conjunctivitis, left eye (372 00) (H10 32)   2  Acute Cutaneous Lupus Erythematosus (695 4)   3  Acute pharyngitis (462) (J02 9)   4  Allergic rhinitis (477 9) (J30 9)   5  Anemia, iron deficiency, inadequate dietary intake (280 1) (D50 8)   6  Asthma (493 90) (J45 909)   7  Asthmatic bronchitis with exacerbation (493 92) (J45 901)   8  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)   9  Bacterial vaginosis (616 10,041 9) (N76 0,B96 89)   10  Benign essential hypertension (401 1) (I10)   11  Benign paroxysmal positional vertigo (386 11) (H81 10)   12  Bipolar Disorder (296 00)   13  Candida vaginitis (112 1) (B37 3)   14  Chest pain (786 50) (R07 9)   15  Chronic migraine without aura (346 70) (G43 709)   16  Classic migraine with aura (346 00) (G43 109)   17  Deviated nasal septum (470) (J34 2)   18  Diabetes mellitus screening (V77 1) (Z13 1)   19  Diverticulitis of colon (562 11) (K57 32)   20  Esophageal reflux (530 81) (K21 9)   21  Exposure to scabies (V01 89) (Z20 89)   22  Fecal soiling (787 62) (R15 1)   23  Fibromyalgia (729 1) (M79 7)   24  Generalized anxiety disorder (300 02) (F41 1)   25  Interstitial cystitis (595 1) (N30 10)   26  Lateral epicondylitis of left elbow (726 32) (M77 12)   27   Lateral epicondylitis of right elbow (726 32) (M77 11)   28  Low back pain (724 2) (M54 5)   29  Medication overuse headache (339 3) (G44 40)   30  Need for prophylactic vaccination and inoculation against influenza (V04 81) (Z23)   31  Numbness and tingling (782 0) (R20 0,R20 2)   32  Pain in elbow joint (719 42) (M25 529)   33  Palpitations (785 1) (R00 2)   34  Pelvic pain in female (625 9) (R10 2)   35  Recurrent candidiasis of vagina (112 1) (B37 3)   36  Right cervical radiculopathy (723 4) (M54 12)   37  Schizoaffective disorder, bipolar type (295 70) (F25 0)   38  Sore throat (462) (J02 9)   39  Vaginal candidiasis (112 1) (B37 3)   40  Vaginal discharge (623 5) (N89 8)   41  Vaginal dryness (625 8) (N89 8)   42  Vaginal irritation (623 9) (N89 8)   43  Vulvovaginitis (616 10) (N76 0)    Past Medical History  1  History of Accelerated essential hypertension (401 0) (I10)   2  Acute sinusitis (461 9) (J01 90)   3  Acute upper respiratory infection (465 9) (J06 9)   4  History of Acute venous embolism and thrombosis of deep vessels of distal lower   extremity (453 42) (I82 4Z9)   5  History of Diabetes During Pregnancy   6  Diarrhea (787 91) (R19 7)   7  History of Elderly multigravida with antepartum condition or complication (651 08)   (O09 529)   8  History of Encounter for removal of sutures (V58 32) (Z48 02)   9  History of Genitourinary infection, candidal (112 2) (B37 49)   10  History of blurred vision (V12 49) (Z86 69)   11  History of diarrhea (V12 79) (Z87 898)   12  History of headache (V13 89) (Z87 898)   13  History of herpes zoster (V12 09) (Z86 19)   14  History of herpes zoster (V12 09) (Z86 19)   15  History of nausea and vomiting (V12 79) (Z87 898)   16  History of pregnancy (V13 29)   17  Need for prophylactic vaccination and inoculation against influenza (V04 81) (Z23)   18  History of Pregnancy Complicated By Diabetes Mellitus - Antepartum Condition Or Prior    Complicated Delivery (107 72)    Surgical History  1   History of Cholecystectomy Laparoscopic   2  History of Hysterectomy   3  History of Hysteroscopy With Endometrial Ablation   4  History of Partial Colectomy   5  History of Tubal Ligation    Family History  Mother    1  Family history of Emphysema   2  Family history of Systemic Lupus Erythematosus  Father    3  Family history of Benign Essential Hypertension   4  Family history of Coronary Artery Disease (V17 49)   5  Family history of Diabetes Mellitus (V18 0)  Maternal Aunt    6  Family history of Breast Cancer (V16 3)    Social History   · Caffeine use (V49 89) (F15 90)   · Denied: History of Drug use   · Four children   · Never A Smoker   · Never Drank Alcohol   · Social alcohol use (Z78 9)    Current Meds   1  Albuterol Sulfate (2 5 MG/3ML) 0 083% Inhalation Nebulization Solution; Therapy: (Recorded:25Oct2017) to Recorded   2  Allopurinol 100 MG Oral Tablet; Take 1 tablet daily; Therapy: (Recorded:10Jan2017) to Recorded   3  Calcium 500 + D 500-125 MG-UNIT Oral Tablet; TAKE 1 TABLET DAILY; Therapy: (Recorded:10Jan2017) to Recorded   4  Cetirizine HCl - 10 MG Oral Tablet; TAKE 1 TABLET DAILY; Therapy: (Recorded:10Jan2017) to Recorded   5  Daily Multiple Vitamins Oral Tablet; TAKE 1 TABLET DAILY; Therapy: (Recorded:10Jan2017) to Recorded   6  Elmiron 100 MG Oral Capsule; TAKE 1 CAPSULE 3 TIMES DAILY BEFORE MEALS; Therapy: 50ZMW1898 to (Carley Carson)  Requested for: 49GRI7628; Last   XP:24FBX9942 Ordered   7  Fluconazole 150 MG Oral Tablet; TABS PO X 1; Therapy: 07Apr2017 to (Mariah Morris)  Requested for: 09XYE4875; Last   QH:62GWR0838 Ordered   8  Fluconazole 150 MG Oral Tablet; TAKE 1 TABLET NOW AND AGAIN IN 3 DAYS; Therapy: 35WKM8772 to (Last Rx:44Orb3540)  Requested for: 01Jun2017 Ordered   9  Fluconazole 150 MG Oral Tablet; TAKE 1 TABLET Weekly; Therapy: 00CRS4607 to (Evaluate:14Oct2017)  Requested for: 56OYF6572; Last   Rx:06Oct2017 Ordered   10   Ketorolac Tromethamine 10 MG Oral Tablet; 1 po tid x 3 days; Therapy: 58MAL1651 to (Last Rx:2017)  Requested for: 58Gfk7871 Ordered   11  Levothyroxine Sodium 88 MCG Oral Tablet; Therapy: (Recorded:2017) to Recorded   12  Lisinopril 20 MG Oral Tablet; take 1 tablet by mouth once daily; Therapy: 71HLA4283 to (Pedro March)  Requested for: 16WNW8945; Last    Rx:2014 Ordered   13  Magnesium Gluconate 500 MG Oral Tablet; TAKE 1 TABLET DAILY; Therapy: (Recorded:2017) to Recorded   14  Metoprolol Tartrate 100 MG Oral Tablet; Take 1 tablet twice daily; Therapy: (Recorded:2017) to Recorded   15  Montelukast Sodium 10 MG Oral Tablet; Take 1 tablet once daily as directed; Therapy: 61SOW9258 to (Pedro March)  Requested for: 36SYC9078; Last    Rx:2014 Ordered   16  Ofloxacin 0 3 % Ophthalmic Solution; Instill 1-2 eyedrops in both eyes 4 times a day for    7-10 days; Therapy: 79Avn4557 to (Last Rx:87Xuv6900)  Requested for: 92Vvn6793 Ordered   17  Omeprazole 40 MG Oral Capsule Delayed Release; take 1 capsule daily; Therapy: (Recorded:2017) to Recorded   18  Ondansetron 4 MG Oral Tablet Disintegrating; TAKE 1 TABLET Every 6 hours; Therapy: (VNCPBBO95FRF4936) to Recorded   19  Permethrin 5 % External Cream; MASSAGE INTO SKIN FROM HEAD TO SOLES OF    FEET  WASH OFF AFTER 8-14 HOURS  REPEAT IN 1 WEEK; Therapy: 37MRS6835 to (Last Rx:2017)  Requested for: 52OJJ8561 Ordered   20  ProAir  (90 Base) MCG/ACT Inhalation Aerosol Solution; inhale 2 puffs every 4 to    6 hours if needed; Therapy: 19QVU1267 to (Evaluate:47Hpt0056)  Requested for: 31Ruy9722; Last    Rx:2013 Ordered   21  Prochlorperazine Maleate 10 MG Oral Tablet; 1 po q 6 to 8 hrs nausea/headache (Max 3    days per week); Therapy: 29WFG4503 to (Last Rx:79Dsv4788)  Requested for: 10BTB7412 Ordered   22  Topiramate 50 MG Oral Tablet; TAKE 3 TABLETS (150MG) BY MOUTH AT BEDTIME;     Therapy: 26JFQ6076 to (Evaluate:25Ncf4928)  Requested for: 34Dyn0516; Last    Rx:04Tyv3092 Ordered   23  Vitamin D 1000 UNIT Oral Tablet; twice daily; Therapy: 24SZN8883 to Recorded    Allergies  1  Latex Gloves One Size MISC   2  Sulfa Drugs   3  Elavil TABS   4  Feminease Vaginal Cream   5  Sular TB24   6  Triptans   7  Tryptophan CAPS  8  Latex   9  Seasonal    Vitals  Signs   Recorded: 12ARO3068 03:35PM   Heart Rate: 68  Systolic: 866, LUE, Sitting  Diastolic: 60, LUE, Sitting  Height: 5 ft 5 in  Weight: 190 lb 8 oz  BMI Calculated: 31 7  BSA Calculated: 1 94  O2 Saturation: 97    Physical Exam    Constitutional   General appearance: No acute distress, well appearing and well nourished  Eyes   Ophthalmoscopic examination: Vision is grossly normal  Gross visual field testing by confrontation shows no abnormalities  EOMI in both eyes  Conjunctivae clear  Eyelids normal palpebral fissures equal  Orbits exhibit normal position  No discharge from the eyes  PERRL  Musculoskeletal   Gait and station: Normal gait, stance and balance  Muscle strength: Normal strength throughout  Muscle tone: No atrophy, abnormal movements, flaccidity, cogwheeling or spasticity  Involuntary movements: None observed  Neurologic   Orientation to person, place, and time: Normal     Language: Names objects, able to repeat phrases and speaks spontaneously  Language: The evaluation was normal    2nd cranial nerve: Normal     3rd, 4th, and 6th cranial nerves: Normal     5th cranial nerve: Normal     7th cranial nerve: Normal     8th cranial nerve: Normal     9th cranial nerve: Normal     11th cranial nerve: Normal     12th cranial nerve: Normal     Sensation: Normal     Reflexes: Normal     Coordination: Normal        Attending Note  Collaborating Physician Note: Collaborating Note: I agree with the Advanced Practitioner note        Future Appointments    Date/Time Provider Specialty Site   12/04/2017 10:00 AM Rani Cotton MD Neurology  [facility]   11/02/2017 09:30 AM [facility], Physician Schedule  [address]     Signatures   Electronically signed by : Tracie Hernandez, [facility]; Oct 25 2017  3:59PM EST                       (Author)    Electronically signed by : Tita Mcallister MD; Oct 26 2017  8:51AM EST                       (Co-participant)

## 2017-12-04 ENCOUNTER — ALLSCRIPTS OFFICE VISIT (OUTPATIENT)
Dept: OTHER | Facility: OTHER | Age: 42
End: 2017-12-04

## 2017-12-08 ENCOUNTER — GENERIC CONVERSION - ENCOUNTER (OUTPATIENT)
Dept: OTHER | Facility: OTHER | Age: 42
End: 2017-12-08

## 2017-12-18 ENCOUNTER — GENERIC CONVERSION - ENCOUNTER (OUTPATIENT)
Dept: OTHER | Facility: OTHER | Age: 42
End: 2017-12-18

## 2017-12-18 DIAGNOSIS — M54.2 CERVICALGIA: ICD-10-CM

## 2017-12-20 ENCOUNTER — GENERIC CONVERSION - ENCOUNTER (OUTPATIENT)
Dept: NEUROLOGY | Facility: CLINIC | Age: 42
End: 2017-12-20

## 2018-01-04 ENCOUNTER — GENERIC CONVERSION - ENCOUNTER (OUTPATIENT)
Dept: OTHER | Facility: OTHER | Age: 43
End: 2018-01-04

## 2018-01-04 ENCOUNTER — APPOINTMENT (OUTPATIENT)
Dept: PHYSICAL THERAPY | Age: 43
End: 2018-01-04
Payer: MEDICARE

## 2018-01-04 DIAGNOSIS — M54.2 CERVICALGIA: ICD-10-CM

## 2018-01-04 PROCEDURE — 97163 PT EVAL HIGH COMPLEX 45 MIN: CPT

## 2018-01-04 PROCEDURE — G8979 MOBILITY GOAL STATUS: HCPCS

## 2018-01-04 PROCEDURE — G8978 MOBILITY CURRENT STATUS: HCPCS

## 2018-01-09 NOTE — PROCEDURES
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      Procedure:  1  Comprehensive electrophysiologic evaluation with left atrial pacing and recording  2  Programmed stimulation with drug infusion  3  3-D electro-anatomic mapping with CARTO  4  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        Surgeon: [address] [place] [address] Drive -none    Specimens - none    Estimated blood loss-10 mL    Findings-none    Complications none    Anesthesia- modified by anesthesiologist, local lidocaine by myself      Details of the procedure    Sheaths used  All  access was obtained under ultrasound guidance  Right femoral vein- 8F changed to SRO, 7F, 5F  Left femoral vein- 5F, 6F, 5F        Catheters used  HRA catheters- Kwesi  His catheter - [employer]  RV catheter - Kwesi  Coronary sinus catheter - [employer] kerr - FJ curve  Ablation catheter- Easy Steer, Non irrigated 4 mm, DF curve      Imaging systems used  1  Fluoroscopy   Right anterior oblique views were used whenever we needed to determine between anterior and posterior  Left anterior oblique views were used whenever we needed to determine between right and left  2  Electrotomy mapping - 3D electro-anatomic mapping was done by the CARTO  System  This was also used whenever catheter was moved  The His bundle was marked        Anticoagulation:  Heparin was used for anticoagulation to keep ACT in the therapeutic range   at the end of ablation 20 of protamine was used        Details of the ablation  The patient had been seen about one and half years back and thereafter recently  She was having multiple episodes of tachycardia, palpitation, lightheadedness  Vagal maneuvers were unable to terminate the tachycardia    She was seen on the day of the procedure  Appropriate consent was taken, all questions were answered  Patient was brought to the electrophysiologic laboratory  Appropriate draping and dressing was done  Proper time out was done    Sheaths were placed as described above under ultrasound guidance  Catheters were placed as described above  Heparin was given          Step 1 : Details of the electrophysiologic study  Pre-procedure - Baseline (ms)  AH-70  HV-50  QRS-86  QT-350  MO-138  Basal CL-740      Ventricular stimulation:  Decremental -VA wenckebach -300  Extra-stimuli -VERP -600/240  All atrial activation is concentric with retrograde stimulation  Retrograde RBBB block causes His and A to move out suggesting all VA conduction is win      Atrial stimulation:  Decremental - AV wenckebach - 390  Extra -stimuli - AVN ERP-600/320  AERP-600/230  AH jump - 600/360  Echo - 600/340          Step 2: Tachycardia and maneuvers :  Tachcardia induced by straight pacing from proximal CS - burst at 270 ms, with isoprenaline at 4 mcg/min  CL - 300 ms  QRS -90 ms  VA interval- 0 ms      Ventricular entrainment  1  Number of fully paced V beats to advance A -5  2  Response when you stop -VAHV  3  PPI-TCL -157 , >115  4   SA-VA- 103, >85    All taken together suggestive of AVNRT      Ablation :  A nonirrigated catheter, 4 mm tip, was used for the ablation  Temperature was kept over 50°  Power was varied between 28 W and 48 W  At all time impedance was monitored closely  The catheter was taken to the ventricle, brought back to the tricuspid annulus  We were at a position between level II and level III, from the his catheter and the floor of CS  The catheter was gently brought back all the way to the floor of the CS  During this junctional beats were seen - cycle length about 500 ms, with conduction to both atrium and ventricle      Post procedure testing:  Testing done at baseline  Testing done with isoprenaline 4 mcg/m  Testing done during isoprenaline withdrawal  Testing done at 30 minutes      At no time:  - There was sustained slow pathway conduction  - Reentry beat  - Induction of tachycardia        Post Procedure Testing :  With isoprenaline- 4mcg/min  AVN wenckebach - 250  AVNERP-400/300/210  AERP - 400/210        Testing at 30 min post ablation  AVN wenckebach -340  AVNERP - 550/280,  550/450/400/260, 500/280  AERP -400/200      CARTO Image        End of procedure :  Baseline measurements were taken  All catheters were removed  Protamine was given  All sheaths were removed and pressure held for hemostasis            Summary of Procedure : The patient came in with recurrent narrow complex tachycardia  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

## 2018-01-09 NOTE — RESULT NOTES
Verified Results  * MRI LUMBAR SPINE WO CONTRAST [id number] 08:07PM Efe Zabala Order Number: [id number]    - Patient Instructions: To schedule this appointment, please contact Central Scheduling at [phone]  Test Name Result Flag Reference   MRI LUMBAR SPINE [address] (Report)     This is a summary report  The complete report is available in the patient's medical record  If you cannot access the medical record, please contact the sending organization for a detailed fax or copy  MRI LUMBAR SPINE WITHOUT CONTRAST     INDICATION: Low back pain  COMPARISON: None  TECHNIQUE: Sagittal T1, sagittal T2, sagittal inversion recovery, axial T1 and axial T2, coronal T2       IMAGE QUALITY: Diagnostic     FINDINGS:   Counting reference: Lumbosacral junction  For the purposes of this report, L4-5 is considered the level of the iliac crest      ALIGNMENT: Normal alignment of the lumbar spine  No compression fracture  No spondylolysis or spondylolisthesis  No scoliosis  MARROW SIGNAL: Marrow signal is within normal limits without signs of an infiltrative process  No signs of acute fracture or significant marrow edema pattern  Vertebral body heights are maintained  DISTAL CORD AND CONUS: Normal size and signal within the distal cord and conus  The conus ends at the L1-L2 level  PARASPINAL SOFT TISSUES: Paraspinal soft tissues are unremarkable  SACRUM: Normal signal within the sacrum  No evidence of insufficiency or stress fracture  LOWER THORACIC DISC SPACES: Normal disc height and signal  No disc herniation, canal stenosis or foraminal narrowing  LUMBAR DISC SPACES:        L1-L2: No significant spinal canal stenosis  No right and no left neural foraminal stenosis  L2-L3: Bulging of the annulus  No significant spinal canal stenosis  No right and no left neural foraminal stenosis  L3-L4: Circumferential disc bulge   There is a right foraminal protrusion with signs of annular fissuring and contacting the exiting nerve root  No significant spinal canal stenosis  No right and mild left neural foraminal stenosis  L4-L5: Bilateral facet arthropathy  Bulging of the annulus  No significant spinal canal stenosis  No right and no left neural foraminal stenosis  L5-S1: Bilateral facet arthropathy  No significant spinal canal stenosis  No right and no left neural foraminal stenosis  IMPRESSION:     Minimal lumbar spondylosis as detailed level by level  Most significant for a right foraminal protrusion at L3-L4 contacting the exiting right nerve root and resulting in mild right-sided neural foraminal stenosis  No significant spinal canal stenosis         Workstation performed: [id number]     Signed by:   Collin Fortune MD   9/15/16       Plan  Low back pain, Numbness and tingling    · EMG TWO EXTREMITIES WITH OR W/O RELATED PARASPINAL AREAS; Status:Hold  For - Scheduling; Requested NSS:[id number];   TWO : RLE  ONE : LLE

## 2018-01-10 ENCOUNTER — APPOINTMENT (OUTPATIENT)
Dept: PHYSICAL THERAPY | Age: 43
End: 2018-01-10
Payer: MEDICARE

## 2018-01-10 PROCEDURE — 97750 PHYSICAL PERFORMANCE TEST: CPT

## 2018-01-12 ENCOUNTER — GENERIC CONVERSION - ENCOUNTER (OUTPATIENT)
Dept: OTHER | Facility: OTHER | Age: 43
End: 2018-01-12

## 2018-01-12 ENCOUNTER — ALLSCRIPTS OFFICE VISIT (OUTPATIENT)
Dept: OTHER | Facility: OTHER | Age: 43
End: 2018-01-12

## 2018-01-12 VITALS
DIASTOLIC BLOOD PRESSURE: 74 MMHG | HEART RATE: 72 BPM | HEIGHT: 65 IN | SYSTOLIC BLOOD PRESSURE: 110 MMHG | BODY MASS INDEX: 33.06 KG/M2 | WEIGHT: 198.44 LBS

## 2018-01-12 VITALS
HEIGHT: 65 IN | DIASTOLIC BLOOD PRESSURE: 80 MMHG | SYSTOLIC BLOOD PRESSURE: 132 MMHG | WEIGHT: 190.8 LBS | BODY MASS INDEX: 31.79 KG/M2

## 2018-01-12 VITALS
DIASTOLIC BLOOD PRESSURE: 74 MMHG | HEART RATE: 76 BPM | SYSTOLIC BLOOD PRESSURE: 119 MMHG | BODY MASS INDEX: 33.01 KG/M2 | WEIGHT: 198.38 LBS

## 2018-01-12 VITALS
BODY MASS INDEX: 32.4 KG/M2 | WEIGHT: 194.5 LBS | DIASTOLIC BLOOD PRESSURE: 76 MMHG | SYSTOLIC BLOOD PRESSURE: 128 MMHG | HEIGHT: 65 IN

## 2018-01-12 VITALS — TEMPERATURE: 98.5 F | SYSTOLIC BLOOD PRESSURE: 133 MMHG | HEART RATE: 80 BPM | DIASTOLIC BLOOD PRESSURE: 81 MMHG

## 2018-01-13 VITALS
DIASTOLIC BLOOD PRESSURE: 96 MMHG | WEIGHT: 196.38 LBS | SYSTOLIC BLOOD PRESSURE: 161 MMHG | BODY MASS INDEX: 32.68 KG/M2 | HEART RATE: 77 BPM

## 2018-01-13 VITALS
SYSTOLIC BLOOD PRESSURE: 135 MMHG | DIASTOLIC BLOOD PRESSURE: 92 MMHG | HEART RATE: 73 BPM | WEIGHT: 197.5 LBS | BODY MASS INDEX: 32.87 KG/M2

## 2018-01-13 VITALS
BODY MASS INDEX: 32.7 KG/M2 | WEIGHT: 196.5 LBS | DIASTOLIC BLOOD PRESSURE: 103 MMHG | HEART RATE: 91 BPM | SYSTOLIC BLOOD PRESSURE: 106 MMHG

## 2018-01-13 VITALS
WEIGHT: 193 LBS | HEART RATE: 95 BPM | DIASTOLIC BLOOD PRESSURE: 96 MMHG | BODY MASS INDEX: 32.12 KG/M2 | SYSTOLIC BLOOD PRESSURE: 165 MMHG

## 2018-01-13 VITALS
DIASTOLIC BLOOD PRESSURE: 74 MMHG | HEART RATE: 76 BPM | WEIGHT: 195 LBS | RESPIRATION RATE: 16 BRPM | HEIGHT: 65 IN | BODY MASS INDEX: 32.49 KG/M2 | SYSTOLIC BLOOD PRESSURE: 112 MMHG

## 2018-01-13 VITALS
WEIGHT: 196.38 LBS | HEART RATE: 80 BPM | DIASTOLIC BLOOD PRESSURE: 90 MMHG | SYSTOLIC BLOOD PRESSURE: 152 MMHG | BODY MASS INDEX: 32.68 KG/M2

## 2018-01-13 VITALS — TEMPERATURE: 97.9 F | SYSTOLIC BLOOD PRESSURE: 146 MMHG | DIASTOLIC BLOOD PRESSURE: 83 MMHG

## 2018-01-13 VITALS
SYSTOLIC BLOOD PRESSURE: 122 MMHG | HEART RATE: 81 BPM | DIASTOLIC BLOOD PRESSURE: 85 MMHG | BODY MASS INDEX: 31.87 KG/M2 | WEIGHT: 191.5 LBS

## 2018-01-14 VITALS — TEMPERATURE: 98.4 F | DIASTOLIC BLOOD PRESSURE: 78 MMHG | SYSTOLIC BLOOD PRESSURE: 118 MMHG

## 2018-01-14 VITALS
HEART RATE: 80 BPM | WEIGHT: 198.38 LBS | SYSTOLIC BLOOD PRESSURE: 123 MMHG | BODY MASS INDEX: 33.01 KG/M2 | DIASTOLIC BLOOD PRESSURE: 82 MMHG

## 2018-01-14 VITALS
SYSTOLIC BLOOD PRESSURE: 150 MMHG | HEART RATE: 94 BPM | BODY MASS INDEX: 32.16 KG/M2 | WEIGHT: 193.25 LBS | DIASTOLIC BLOOD PRESSURE: 105 MMHG

## 2018-01-14 VITALS
DIASTOLIC BLOOD PRESSURE: 60 MMHG | HEIGHT: 65 IN | BODY MASS INDEX: 31.74 KG/M2 | WEIGHT: 190.5 LBS | OXYGEN SATURATION: 97 % | SYSTOLIC BLOOD PRESSURE: 122 MMHG | HEART RATE: 68 BPM

## 2018-01-15 VITALS
DIASTOLIC BLOOD PRESSURE: 102 MMHG | SYSTOLIC BLOOD PRESSURE: 160 MMHG | HEART RATE: 98 BPM | WEIGHT: 192.25 LBS | BODY MASS INDEX: 31.99 KG/M2

## 2018-01-16 NOTE — RESULT NOTES
Verified Results  (1) URINE CYTOLOGY [id number] 07:51PM Desiree Marking Order Number: [id number]     Test Name Result Flag Reference   LAB AP CASE REPORT (Report)     Non-gynecologic Cytology             Case: [id number]                  Authorizing Provider: Monica Gasca DO    Collected:      03/10/2017 1951        Ordering Location:   [address] [place] [address]   Received:      03/10/2017 [address] [place] [address] Rd Laboratory                              Pathologist:      Cheryle Marques DO                               Specimen:  Urine, Catheter   LAB AP CYTO FINAL DIAGNOSIS (Report)     A  Urine, Catheter, :  Negative for high grade urothelial carcinoma ([address]) - see comment  Predominantly benign squamous cells, rare benign urothelial cells,   crystals and rare red blood cells  Satisfactory for evaluation  Comment: The above diagnostic category is from the recently published   book, [employer] [place] [facility] [employer] Urinary Cytology, and is in keeping   with the ongoing effort for utilization of standardized diagnostic   terminology in urine cytology  *    *[employer] [place] [employer] [facility] Urinary Cytology  Chelo Sinha; 2016  Interpretation performed at [facility] [place]    Electronically signed by Cheryle Marques DO on 3/14/2017 at 7:21 AM   LAB OSITO GROSS DESCRIPTION      A  Urine, Catheter, : 5ml  Yellow, clear, received in [facility]to[facility]yt   LAB AP NONGYN ADDITIONAL INFORMATION (Report)     [employer] FDA approved ,  and ThinPrep Imaging System are   utilized with strict adherence to the 's instruction manual to   prepare gynecologic and non-gynecologic cytology specimens for the   production of ThinPrep slides as well as for gynecologic ThinPrep imaging  These processes have been validated by our laboratory and/or by the         These tests were developed and their performance characteristics   determined by [facility] or [place]  They may not be cleared or approved by the [place]  Food and   Drug Administration  The FDA has determined that such clearance or   approval is not necessary  These tests are used for clinical purposes  They should not be regarded as investigational or for research  This   laboratory has been approved by CLIA 88, designated as a high-complexity   laboratory and is qualified to perform these tests

## 2018-01-18 ENCOUNTER — APPOINTMENT (OUTPATIENT)
Dept: PHYSICAL THERAPY | Age: 43
End: 2018-01-18
Payer: MEDICARE

## 2018-01-18 PROCEDURE — 97110 THERAPEUTIC EXERCISES: CPT

## 2018-01-18 PROCEDURE — 97140 MANUAL THERAPY 1/> REGIONS: CPT

## 2018-01-18 PROCEDURE — 97750 PHYSICAL PERFORMANCE TEST: CPT

## 2018-01-18 NOTE — PROGRESS NOTES
Assessment   1  Exposure to scabies (V01 89) (Z20 89)    Plan  Exposure to scabies    · Start: Permethrin 5 % External Cream; MASSAGE INTO SKIN FROM HEAD TO SOLES OF  FEET  [address] OFF AFTER 8-14 HOURS  REPEAT IN 1 WEEK    Discussion/Summary  Discussion Summary:   Follow up with pcp  take meds as discussed  repeat in one week if necessary   Medication Side Effects Reviewed: Possible side effects of new medications were reviewed with the patient/guardian today  Understands and agrees with treatment plan: The treatment plan was reviewed with the patient/guardian  The patient/guardian understands and agrees with the treatment plan   Counseling Documentation With Imm: The patient, patient's family was counseled regarding instructions for management, patient and family education, importance of compliance with treatment  Follow Up Instructions: Follow Up with your Primary Care Provider in 1-2 days  If your symptoms worsen, go to the nearest [facility] Emergency Department  Chief Complaint   1  Rash  Chief Complaint Free Text Note Form: c/o rash x 1 month - bumps that come and go  Children Dx with scabies today - to use Permethrin cream  Itching on arms, torso, axilla and legs  History of Present Illness  HPI: Pt has had rash for about one month, children dx with scabies and are being treated  pt has areas noted of the rash on b/l wrist, waist, bra strap and hair line on back of neck  Hospital Based Practices Required Assessment:   Pain Assessment   the patient states they have pain  The pain is located in the rash  The patient describes the pain as itch  (on a scale of 0 to 10, the patient rates the pain at 10 )   Abuse And Domestic Violence Screen    Yes, the patient is safe at home  The patient states no one is hurting them  Depression And Suicide Screen  No, the patient has not had thoughts of hurting themself  No, the patient has not felt depressed in the past 7 days     Prefered Language is English  Primary Language is  English  Rash: Apple Mccann presents with complaints of rash  Associated symptoms include pruritus, but no skin blistering, no cracking, no pain, no skin redness, no skin scaling, no skin swelling and no fever  Review of Systems  Focused-Female:   Constitutional: as noted in HPI    ENT: no ear ache, no loss of hearing, no nosebleeds or nasal discharge, no sore throat or hoarseness  Cardiovascular: no complaints of slow or fast heart rate, no chest pain, no palpitations, no leg claudication or lower extremity edema  Musculoskeletal: as noted in HPI  Integumentary: no complaints of skin rash or lesion, no itching or dry skin, no skin wounds  Neurological: as noted in HPI  ROS Reviewed:   ROS reviewed  Active Problems   1  Acute Cutaneous Lupus Erythematosus (695 4)  2  Allergic rhinitis (477 9) (J30 9)  3  Anemia, iron deficiency, inadequate dietary intake (280 1) (D50 8)  4  Asthma (493 90) (J45 909)  5  Asthmatic bronchitis with exacerbation (493 92) (J45 901)  6  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)  7  Bacterial vaginosis (616 10,041 9) (N76 0,B96 89)  8  Benign essential hypertension (401 1) (I10)  9  Benign paroxysmal positional vertigo (386 11) (H81 10)  10  Bipolar Disorder (296 00)  11  Candida vaginitis (112 1) (B37 3)  12  Chest pain (786 50) (R07 9)  13  Chronic migraine without aura (346 70) (G43 709)  14  Classic migraine with aura (346 00) (G43 109)  15  Deviated nasal septum (470) (J34 2)  16  Diverticulitis of colon (562 11) (K57 32)  17  Esophageal reflux (530 81) (K21 9)  18  Fecal soiling (787 62) (R15 1)  19  Fibromyalgia (729 1) (M79 7)  20  Generalized anxiety disorder (300 02) (F41 1)  21  Interstitial cystitis (595 1) (N30 10)  22  Lateral epicondylitis of left elbow (726 32) (M77 12)  23  Lateral epicondylitis of right elbow (726 32) (M77 11)  24  Low back pain (724 2) (M54 5)  25  Medication overuse headache (339 3) (G44 40)  26  Need for prophylactic vaccination and inoculation against influenza (V04 81) (Z23)  27  Numbness and tingling (782 0) (R20 0,R20 2)  28  Pain in elbow joint (719 42) (M25 529)  29  Palpitations (785 1) (R00 2)  30  Pelvic pain in female (625 9) (R10 2)  31  Right cervical radiculopathy (723 4) (M54 12)  32  Schizoaffective disorder, bipolar type (295 70) (F25 0)  33  Vaginal candidiasis (112 1) (B37 3)  34  Vaginal discharge (623 5) (N89 8)  35  Vaginal dryness (625 8) (N89 8)  36  Vaginal irritation (623 9) (N89 8)  37  Vulvovaginitis (616 10) (N76 0)    Past Medical History   1  History of Accelerated essential hypertension (401 0) (I10)  2  Acute sinusitis (461 9) (J01 90)  3  Acute upper respiratory infection (465 9) (J06 9)  4  History of Acute venous embolism and thrombosis of deep vessels of distal lower   extremity (453 42) (I82 4Z9)  5  History of Diabetes During Pregnancy  6  Diarrhea (787 91) (R19 7)  7  History of Elderly multigravida with antepartum condition or complication (195 44)   (O09 529)  8  History of Encounter for removal of sutures (V58 32) (Z48 02)  9  History of Genitourinary infection, candidal (112 2) (B37 49)  10  History of blurred vision (V12 49) (Z86 69)  11  History of diarrhea (V12 79) (Z87 898)  12  History of headache (V13 89) (Z87 898)  13  History of herpes zoster (V12 09) (Z86 19)  14  History of herpes zoster (V12 09) (Z86 19)  15  History of nausea and vomiting (V12 79) (Z87 898)  16  History of pregnancy (V13 29)  17  Need for prophylactic vaccination and inoculation against influenza (V04 81) (Z23)  18  History of Pregnancy Complicated By Diabetes Mellitus - Antepartum Condition Or Prior    Complicated Delivery (463 30)  Active Problems And Past Medical History Reviewed: The active problems and past medical history were reviewed and updated today  Family History  Mother   1  Family history of Emphysema  2  Family history of Systemic Lupus Erythematosus  Father   3  Family history of Benign Essential Hypertension  4  Family history of Coronary Artery Disease (V17 49)  5  Family history of Diabetes Mellitus (V18 0)  Maternal Aunt   6  Family history of Breast Cancer (V16 3)  Family History Reviewed: The family history was reviewed and updated today  Social History    · Caffeine use (V49 89) (F15 90)   · Denied: History of Drug use   · Four children   · Never A Smoker   · Never Drank Alcohol   · Social alcohol use (Z78 9)  Social History Reviewed: The social history was reviewed and updated today  The social history was reviewed and is unchanged  Surgical History   1  History of Cholecystectomy Laparoscopic  2  History of Hysterectomy  3  History of Hysteroscopy With Endometrial Ablation  4  History of Partial Colectomy  5  History of Tubal Ligation  Surgical History Reviewed: The surgical history was reviewed and updated today  Current Meds  1  Albuterol Sulfate (2 5 MG/3ML) 0 083% Inhalation Nebulization Solution; Therapy: (Recorded:11Jun2013) to Recorded  2  Allopurinol 100 MG Oral Tablet; Take 1 tablet daily; Therapy: (Recorded:10Jan2017) to Recorded  3  Calcium 500 + D 500-125 MG-UNIT Oral Tablet; TAKE 1 TABLET DAILY; Therapy: (Recorded:10Jan2017) to Recorded  4  Cetirizine HCl - 10 MG Oral Tablet; TAKE 1 TABLET DAILY; Therapy: (Recorded:10Jan2017) to Recorded  5  Daily Multiple Vitamins Oral Tablet; TAKE 1 TABLET DAILY; Therapy: (Recorded:10Jan2017) to Recorded  6  Elmiron 100 MG Oral Capsule; TAKE 1 CAPSULE 3 TIMES DAILY BEFORE MEALS; Therapy: 98GGK7042 to (Janice Dang)  Requested for: 75RNG9831; Last   Rx:04Nov2016 Ordered  7  Ketorolac Tromethamine 10 MG Oral Tablet; 1 po tid x 3 days; Therapy: 07OYH4529 to (Last Rx:01Eer2850)  Requested for: 03Mpv3291 Ordered  8  Levothyroxine Sodium 100 MCG Oral Tablet; TAKE 1 TABLET DAILY; Therapy: (Recorded:10Jan2017) to Recorded  9   Lisinopril 20 MG Oral Tablet; take 1 tablet by mouth once daily; Therapy: 17BUW6976 to (Haroldo Mary)  Requested for: 09JFM2072; Last   Rx:45Waz4126 Ordered  10  Magnesium Gluconate 500 MG Oral Tablet; TAKE 1 TABLET DAILY; Therapy: (Recorded:10Jan2017) to Recorded  11  Metoprolol Tartrate 100 MG Oral Tablet; Take 1 tablet twice daily; Therapy: (Recorded:10Jan2017) to Recorded  12  Montelukast Sodium 10 MG Oral Tablet; Take 1 tablet once daily as directed; Therapy: 80VKS0381 to (Haroldo Mary)  Requested for: 98DSB5002; Last    Rx:30Sep2014 Ordered  13  Omeprazole 40 MG Oral Capsule Delayed Release; take 1 capsule daily; Therapy: (Recorded:10Jan2017) to Recorded  14  Ondansetron 4 MG Oral Tablet Dispersible; TAKE 1 TABLET Every 6 hours; Therapy: (Recorded:10Jan2017) to Recorded  15  ProAir  (90 Base) MCG/ACT Inhalation Aerosol Solution; inhale 2 puffs every 4 to    6 hours if needed; Therapy: 75UMM3406 to (Evaluate:64Nty1597)  Requested for: 66Bcz2876; Last    Rx:30Rlb5627 Ordered  16  Topiramate 50 MG Oral Tablet; TAKE 3 TABLETS (150MG) BY MOUTH AT BEDTIME; Therapy: 16UNX5559 to (Evaluate:23Jun2017)  Requested for: 31Eyf1227; Last    Rx:88Liq1280 Ordered  17  Vitamin D 1000 UNIT Oral Tablet; twice daily; Therapy: 97JBF3876 to Recorded  Medication List Reviewed: The medication list was reviewed and updated today  Allergies   1  Latex Gloves One Size MISC  2  Sulfa Drugs  3  Elavil TABS  4  Feminease Vaginal Cream  5  Sular TB24  6  Triptans  7  Tryptophan CAPS   8  Latex  9  Seasonal    Vitals  Signs   Recorded: 50SIC7327 08:14PM   Temperature: 98 2 F, Oral  Heart Rate: 91  Pulse Quality: Regular  Respiration: 18  Systolic: 155, RUE, Sitting  Diastolic: 74, RUE, Sitting  Height: 5 ft 5 in  Weight: 195 lb   BMI Calculated: 32 45  BSA Calculated: 1 96  Pain Scale: 10    Physical Exam    Constitutional   General appearance: No acute distress, well appearing and well nourished      Eyes   Conjunctiva and lids: No swelling, erythema or discharge  Pulmonary   Respiratory effort: No increased work of breathing or signs of respiratory distress  Auscultation of lungs: Clear to auscultation  Cardiovascular   Auscultation of heart: Normal rate and rhythm, normal S1 and S2, without murmurs  Musculoskeletal   Gait and station: Normal     Digits and nails: Normal without clubbing or cyanosis  Inspection/palpation of joints, bones, and muscles: Normal     Skin   Skin and subcutaneous tissue: Abnormal   rash noted, red papules, burrowing noted in the wrists b/l  Neurologic   Sensation: No sensory loss  Psychiatric   Orientation to person, place, and time: Normal     Mood and affect: Normal        Message  Return to work or school:   Shahida Schultz is under my professional care  She was seen in my office on 3/7/17   She is able to return to work on  3/9/17            Future Appointments    Date/Time Provider Specialty Site   05/04/2017 05:00 PM Aldo Zendejas MD Neurology [place] [address]   03/10/2017 09:30 AM [facility], Physician Schedule  [address]     Signatures   Electronically signed by :  MICHELLE Dempsey; Mar  7 2017  9:03PM EST                       (Author)    Electronically signed by : Sen Grover DO; Mar  9 2017  4:01PM EST                       (Co-author)

## 2018-01-18 NOTE — PROGRESS NOTES
Assessment    1  Acute pharyngitis (462) (J02 9)    Plan  Acute pharyngitis    · Azithromycin 250 MG Oral Tablet; TAKE 2 TABLETS ON DAY 1 THEN TAKE 1  TABLET A DAY FOR 4 DAYS  Sore throat    · Rapid StrepA- POC; Source:Throat; Status:Resulted - Requires Verification,Retrospective  By Protocol Authorization;   Done: 43BCK0613 50:67VV    Discussion/Summary  Discussion Summary:   1  acute pharyngitis-likely strep since family has had it  Rapid strep was negative  Will sent out throat culture  Start azithromycin and take as directed  Medication Side Effects Reviewed: Possible side effects of new medications were reviewed with the patient/guardian today  Understands and agrees with treatment plan: The treatment plan was reviewed with the patient/guardian  The patient/guardian understands and agrees with the treatment plan   Follow Up Instructions: Follow Up with your Primary Care Provider in 5-7 days  If your symptoms worsen, go to the nearest [place] [address] Emergency Department  Chief Complaint    1  Sore Throat  Chief Complaint Free Text Note Form: pt reports a sore throat for 3 days   (states her son is on his 2nd antibiotic for strep throat)      History of Present Illness  HPI: 40-year-old female with sore throat for the last 3 days  Patient states that her glands hurt in her and she is just not feeling well  Denies any runny stuffy nose or cough  Denies any fever or chills  Hospital Based Practices Required Assessment:   Pain Assessment   the patient states they have pain  The pain is located in the sore throat  The patient describes the pain as aching  (on a scale of 0 to 10, the patient rates the pain at 7 )   Abuse And Domestic Violence Screen    Yes, the patient is safe at home  The patient states no one is hurting them  Depression And Suicide Screen  No, the patient has not had thoughts of hurting themself  No, the patient has not felt depressed in the past 7 days     Prefered Language is English  Primary Language is  English  Sore Throat: JAVIER SKINNER presents with complaints of sore throat  Associated symptoms include dysphagia, swollen glands and myalgias, but no odynophagia, no nasal congestion, no postnasal drainage, no drooling, no stridor, no fever, no chills, no headache, no hoarseness, no neck stiffness, no ear pain, no facial pain, no nausea, no vomiting, no cough and no rash  Review of Systems  Focused-Female:   Constitutional: No fever, no chills, feels well, no tiredness, no recent weight gain or loss  ENT: as noted in HPI  Cardiovascular: no complaints of slow or fast heart rate, no chest pain, no palpitations, no leg claudication or lower extremity edema  Respiratory: no complaints of shortness of breath, no wheezing, no dyspnea on exertion, no orthopnea or PND  Breasts: no complaints of breast pain, breast lump or nipple discharge  Gastrointestinal: no complaints of abdominal pain, no constipation, no nausea or diarrhea, no vomiting, no bloody stools  Genitourinary: no complaints of dysuria, no incontinence, no pelvic pain, no dysmenorrhea, no vaginal discharge or abnormal vaginal bleeding  ROS Reviewed:   ROS reviewed  Active Problems    1  Acute Cutaneous Lupus Erythematosus (695 4)   2  Allergic rhinitis (477 9) (J30 9)   3  Anemia, iron deficiency, inadequate dietary intake (280 1) (D50 8)   4  Asthma (493 90) (J45 909)   5  Asthmatic bronchitis with exacerbation (493 92) (J45 901)   6  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)   7  Bacterial vaginosis (616 10,041 9) (N76 0,B96 89)   8  Benign essential hypertension (401 1) (I10)   9  Benign paroxysmal positional vertigo (386 11) (H81 10)   10  Bipolar Disorder (296 00)   11  Candida vaginitis (112 1) (B37 3)   12  Chest pain (786 50) (R07 9)   13  Chronic migraine without aura (346 70) (G43 709)   14  Classic migraine with aura (346 00) (G43 109)   15  Deviated nasal septum (470) (J34 2)   16  Diverticulitis of colon (562 11) (K57 32)   17  Esophageal reflux (530 81) (K21 9)   18  Exposure to scabies (V01 89) (Z20 89)   19  Fecal soiling (787 62) (R15 1)   20  Fibromyalgia (729 1) (M79 7)   21  Generalized anxiety disorder (300 02) (F41 1)   22  Interstitial cystitis (595 1) (N30 10)   23  Lateral epicondylitis of left elbow (726 32) (M77 12)   24  Lateral epicondylitis of right elbow (726 32) (M77 11)   25  Low back pain (724 2) (M54 5)   26  Medication overuse headache (339 3) (G44 40)   27  Need for prophylactic vaccination and inoculation against influenza (V04 81) (Z23)   28  Numbness and tingling (782 0) (R20 0,R20 2)   29  Pain in elbow joint (719 42) (M25 529)   30  Palpitations (785 1) (R00 2)   31  Pelvic pain in female (625 9) (R10 2)   32  Right cervical radiculopathy (723 4) (M54 12)   33  Schizoaffective disorder, bipolar type (295 70) (F25 0)   34  Vaginal candidiasis (112 1) (B37 3)   35  Vaginal discharge (623 5) (N89 8)   36  Vaginal dryness (625 8) (N89 8)   37  Vaginal irritation (623 9) (N89 8)   38  Vulvovaginitis (616 10) (N76 0)    Past Medical History    1  History of Accelerated essential hypertension (401 0) (I10)   2  Acute sinusitis (461 9) (J01 90)   3  Acute upper respiratory infection (465 9) (J06 9)   4  History of Acute venous embolism and thrombosis of deep vessels of distal lower   extremity (453 42) (I82 4Z9)   5  History of Diabetes During Pregnancy   6  Diarrhea (787 91) (R19 7)   7  History of Elderly multigravida with antepartum condition or complication (282 93)   (O09 529)   8  History of Encounter for removal of sutures (V58 32) (Z48 02)   9  History of Genitourinary infection, candidal (112 2) (B37 49)   10  History of blurred vision (V12 49) (Z86 69)   11  History of diarrhea (V12 79) (Z87 898)   12  History of headache (V13 89) (Z87 898)   13  History of herpes zoster (V12 09) (Z86 19)   14  History of herpes zoster (V12 09) (Z86 19)   15   History of nausea and vomiting (V12 79) (Z87 898)   16  History of pregnancy (V13 29)   17  Need for prophylactic vaccination and inoculation against influenza (V04 81) (Z23)   18  History of Pregnancy Complicated By Diabetes Mellitus - Antepartum Condition Or Prior    Complicated Delivery (324 39)  Active Problems And Past Medical History Reviewed: The active problems and past medical history were reviewed and updated today  Family History  Mother    1  Family history of Emphysema   2  Family history of Systemic Lupus Erythematosus  Father    3  Family history of Benign Essential Hypertension   4  Family history of Coronary Artery Disease (V17 49)   5  Family history of Diabetes Mellitus (V18 0)  Maternal Aunt    6  Family history of Breast Cancer (V16 3)  Family History Reviewed: The family history was reviewed and updated today  Social History    · Caffeine use (V49 89) (F15 90)   · Denied: History of Drug use   · Four children   · Never A Smoker   · Never Drank Alcohol   · Social alcohol use (Z78 9)  Social History Reviewed: The social history was reviewed and updated today  The social history was reviewed and is unchanged  Surgical History    1  History of Cholecystectomy Laparoscopic   2  History of Hysterectomy   3  History of Hysteroscopy With Endometrial Ablation   4  History of Partial Colectomy   5  History of Tubal Ligation  Surgical History Reviewed: The surgical history was reviewed and updated today  Current Meds   1  Albuterol Sulfate (2 5 MG/3ML) 0 083% Inhalation Nebulization Solution; Therapy: (Recorded:11Jun2013) to Recorded   2  Allopurinol 100 MG Oral Tablet; Take 1 tablet daily; Therapy: (Recorded:10Jan2017) to Recorded   3  Calcium 500 + D 500-125 MG-UNIT Oral Tablet; TAKE 1 TABLET DAILY; Therapy: (Recorded:10Jan2017) to Recorded   4  Cetirizine HCl - 10 MG Oral Tablet; TAKE 1 TABLET DAILY; Therapy: (Recorded:10Jan2017) to Recorded   5   Daily Multiple Vitamins Oral Tablet; TAKE 1 TABLET DAILY; Therapy: (Recorded:10Jan2017) to Recorded   6  Elmiron 100 MG Oral Capsule; TAKE 1 CAPSULE 3 TIMES DAILY BEFORE MEALS; Therapy: 72JWB8300 to (Shaheed Valenzuela)  Requested for: 42OKI7780; Last   IL:91SRR1997 Ordered   7  Fluconazole 150 MG Oral Tablet; TABS PO X 1; Therapy: 07Apr2017 to (Albin Cole)  Requested for: 58WKU2298; Last   OJ:15ITW3337 Ordered   8  Ketorolac Tromethamine 10 MG Oral Tablet; 1 po tid x 3 days; Therapy: 89TEH7244 to (Last Rx:43Bsm0261)  Requested for: 87Sde5689 Ordered   9  Levothyroxine Sodium 88 MCG Oral Tablet; Therapy: (Recorded:10Mar2017) to Recorded   10  Lisinopril 20 MG Oral Tablet; take 1 tablet by mouth once daily; Therapy: 95GVA3426 to (Emily Prophet)  Requested for: 47ZRQ4823; Last    Rx:96Anq8610 Ordered   11  Magnesium Gluconate 500 MG Oral Tablet; TAKE 1 TABLET DAILY; Therapy: (Recorded:10Jan2017) to Recorded   12  Metoprolol Tartrate 100 MG Oral Tablet; Take 1 tablet twice daily; Therapy: (Recorded:10Jan2017) to Recorded   13  Montelukast Sodium 10 MG Oral Tablet; Take 1 tablet once daily as directed; Therapy: 71KKB6826 to (Emily Prophet)  Requested for: 83DZA7580; Last    Rx:10Jej2968 Ordered   14  Omeprazole 40 MG Oral Capsule Delayed Release; take 1 capsule daily; Therapy: (Recorded:10Jan2017) to Recorded   15  Ondansetron 4 MG Oral Tablet Dispersible; TAKE 1 TABLET Every 6 hours; Therapy: (Recorded:10Jan2017) to Recorded   16  Permethrin 5 % External Cream; MASSAGE INTO SKIN FROM HEAD TO SOLES OF    FEET  WASH OFF AFTER 8-14 HOURS  REPEAT IN 1 WEEK; Therapy: 35YWC4547 to (Last Rx:07Mar2017)  Requested for: 54TRS1650 Ordered   17  ProAir  (90 Base) MCG/ACT Inhalation Aerosol Solution; inhale 2 puffs every 4 to    6 hours if needed; Therapy: 09PZL6354 to (Evaluate:30Nev9375)  Requested for: 46Xko9426; Last    Rx:06Oct2013 Ordered   18   Topiramate 50 MG Oral Tablet; TAKE 3 TABLETS (150MG) BY MOUTH AT BEDTIME; Therapy: 86VTC0891 to (Evaluate:23Jun2017)  Requested for: 10Sbd6501; Last    Rx:99Dae1663 Ordered   19  Vitamin D 1000 UNIT Oral Tablet; twice daily; Therapy: 85FJR9458 to Recorded  Medication List Reviewed: The medication list was reviewed and updated today  Allergies    1  Latex Gloves One Size MISC   2  Sulfa Drugs   3  Elavil TABS   4  Feminease Vaginal Cream   5  Sular TB24   6  Triptans   7  Tryptophan CAPS    8  Latex   9  Seasonal    Vitals  Signs   Recorded: 65ZZJ2278 09:03AM   Temperature: 97 2 F, Tympanic  Heart Rate: 92, L Radial  Pulse Quality: Regular, L Radial  Respiration Quality: Normal  Respiration: 18  Systolic: 542, LUE, Sitting  Diastolic: 80, LUE, Sitting  Height: 5 ft 5 in  Weight: 185 lb   BMI Calculated: 30 79  BSA Calculated: 1 91  O2 Saturation: 98, RA  LMP: hysterectomy 2015  Pain Scale: 7/10    Physical Exam    Constitutional   General appearance: No acute distress, well appearing and well nourished  Ears, Nose, Mouth, and Throat   External inspection of ears and nose: Normal     Otoscopic examination: Tympanic membranes translucent with normal light reflex  Canals patent without erythema  Nasal mucosa, septum, and turbinates: Normal without edema or erythema  Oropharynx: Abnormal   The posterior pharynx was erythematous, but did not have an exudate  Pulmonary   Respiratory effort: No increased work of breathing or signs of respiratory distress  Auscultation of lungs: Clear to auscultation  Cardiovascular   Auscultation of heart: Normal rate and rhythm, normal S1 and S2, without murmurs  Lymphatic   Palpation of lymph nodes in neck: Abnormal   bilateral anterior cervical node enlargement        Results/Data  Rapid Loralie Gold POC 36YLW2781 09:08AM [employer]     Test Name Result Flag Reference   Rapid Strep Negative                     Future Appointments    Date/Time Provider Specialty Site   05/23/2017 08:30 AM Genaro Melo MD Neurology [facility]  [place]   05/19/2017 09:45 AM [address] [facility], Physician Schedule  [address]     Signatures   Electronically signed by : Macie Archibald, [facility]; May  7 2017  9:31AM EST                       (Author)    Electronically signed by : Casey Acevedo DO; May  7 2017  9:31AM EST                       (Co-author)

## 2018-01-18 NOTE — MISCELLANEOUS
Message  Return to work or school:   Catalina Padron is under my professional care  She was seen in my office on 3/7/17   She is able to return to work on  3/9/17            Future Appointments    Signatures   Electronically signed by : MICHELLE Bullock; Mar  7 2017  9:03PM EST                       (Author)    Electronically signed by :  MICHELLE Bullock; Mar  7 2017  9:04PM EST

## 2018-01-22 VITALS — DIASTOLIC BLOOD PRESSURE: 66 MMHG | TEMPERATURE: 98.9 F | SYSTOLIC BLOOD PRESSURE: 132 MMHG

## 2018-01-22 VITALS
WEIGHT: 191.25 LBS | DIASTOLIC BLOOD PRESSURE: 93 MMHG | HEIGHT: 65 IN | BODY MASS INDEX: 31.86 KG/M2 | SYSTOLIC BLOOD PRESSURE: 158 MMHG

## 2018-01-22 VITALS
BODY MASS INDEX: 31.62 KG/M2 | WEIGHT: 190 LBS | HEART RATE: 80 BPM | DIASTOLIC BLOOD PRESSURE: 83 MMHG | SYSTOLIC BLOOD PRESSURE: 124 MMHG

## 2018-01-22 VITALS
WEIGHT: 193.13 LBS | DIASTOLIC BLOOD PRESSURE: 100 MMHG | BODY MASS INDEX: 32.14 KG/M2 | HEART RATE: 98 BPM | SYSTOLIC BLOOD PRESSURE: 148 MMHG

## 2018-01-23 ENCOUNTER — APPOINTMENT (OUTPATIENT)
Dept: PHYSICAL THERAPY | Age: 43
End: 2018-01-23
Payer: MEDICARE

## 2018-01-23 PROCEDURE — 97140 MANUAL THERAPY 1/> REGIONS: CPT

## 2018-01-23 PROCEDURE — 97530 THERAPEUTIC ACTIVITIES: CPT

## 2018-01-24 VITALS
SYSTOLIC BLOOD PRESSURE: 154 MMHG | DIASTOLIC BLOOD PRESSURE: 104 MMHG | WEIGHT: 190.25 LBS | HEART RATE: 85 BPM | BODY MASS INDEX: 31.66 KG/M2

## 2018-01-24 VITALS — BODY MASS INDEX: 31.78 KG/M2 | DIASTOLIC BLOOD PRESSURE: 87 MMHG | SYSTOLIC BLOOD PRESSURE: 140 MMHG | WEIGHT: 191 LBS

## 2018-01-24 VITALS
SYSTOLIC BLOOD PRESSURE: 123 MMHG | WEIGHT: 190.19 LBS | BODY MASS INDEX: 31.69 KG/M2 | DIASTOLIC BLOOD PRESSURE: 73 MMHG | HEIGHT: 65 IN | HEART RATE: 74 BPM

## 2018-01-24 NOTE — PROGRESS NOTES
Assessment   1  Interstitial cystitis (595 1) (N30 10)    Discussion/Summary   Discussion Summary:    41 yo F with IC  BI tolerated well  Advised to avoid acidic drinks  RTC in 2 weeks  Counseling Documentation With Imm: The patient was counseled regarding instructions for management,-- impressions  Chief Complaint   Chief Complaint Free Text Note Form: pt is here for BI      History of Present Illness   HPI: 35 yo F with Hx of Interstitial cystitis who presents for bladder instillation  Review of Systems    Female ROS: pelvic pain,-- dysuria-- and-- bladder pain  Active Problems   1  Acute conjunctivitis, left eye (372 00) (H10 32)   2  Acute Cutaneous Lupus Erythematosus (695 4)   3  Acute pharyngitis (462) (J02 9)   4  Allergic rhinitis (477 9) (J30 9)   5  Anemia, iron deficiency, inadequate dietary intake (280 1) (D50 8)   6  Asthma (493 90) (J45 909)   7  Asthmatic bronchitis with exacerbation (493 92) (J45 901)   8  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)   9  Bacterial vaginosis (616 10,041 9) (N76 0,B96 89)   10  Benign essential hypertension (401 1) (I10)   11  Benign paroxysmal positional vertigo (386 11) (H81 10)   12  Bipolar Disorder (296 00)   13  Candida vaginitis (112 1) (B37 3)   14  Cervicalgia (723 1) (M54 2)   15  Chest pain (786 50) (R07 9)   16  Chronic migraine without aura (346 70) (G43 709)   17  Classic migraine with aura (346 00) (G43 109)   18  Deviated nasal septum (470) (J34 2)   19  Diabetes mellitus screening (V77 1) (Z13 1)   20  Diverticulitis of colon (562 11) (K57 32)   21  Esophageal reflux (530 81) (K21 9)   22  Exposure to scabies (V01 89) (Z20 89)   23  Fecal soiling (787 62) (R15 1)   24  Fibromyalgia (729 1) (M79 7)   25  Generalized anxiety disorder (300 02) (F41 1)   26  Interstitial cystitis (595 1) (N30 10)   27  Lateral epicondylitis of left elbow (726 32) (M77 12)   28  Lateral epicondylitis of right elbow (726 32) (M70 11)   29   Low back pain (724 2) (M54 5)   30  Medication overuse headache (339 3) (G44 40)   31  Need for prophylactic vaccination and inoculation against influenza (V04 81) (Z23)   32  Numbness and tingling (782 0) (R20 0,R20 2)   33  Pain in elbow joint (719 42) (M25 529)   34  Palpitations (785 1) (R00 2)   35  Pelvic pain in female (625 9) (R10 2)   36  Recurrent candidiasis of vagina (112 1) (B37 3)   37  Right cervical radiculopathy (723 4) (M54 12)   38  Schizoaffective disorder, bipolar type (295 70) (F25 0)   39  Sore throat (462) (J02 9)   40  Vaginal candidiasis (112 1) (B37 3)   41  Vaginal discharge (623 5) (N89 8)   42  Vaginal dryness (625 8) (N89 8)   43  Vaginal irritation (623 9) (N89 8)   44  Vulvovaginitis (616 10) (N76 0)    Past Medical History   1  History of Accelerated essential hypertension (401 0) (I10)   2  Acute sinusitis (461 9) (J01 90)   3  Acute upper respiratory infection (465 9) (J06 9)   4  History of Acute venous embolism and thrombosis of deep vessels of distal lower     extremity (453 42) (I82 4Z9)   5  History of Diabetes During Pregnancy   6  Diarrhea (787 91) (R19 7)   7  History of Elderly multigravida with antepartum condition or complication (323 71)     (O09 529)   8  History of Encounter for removal of sutures (V58 32) (Z48 02)   9  History of Genitourinary infection, candidal (112 2) (B37 49)   10  History of blurred vision (V12 49) (Z86 69)   11  History of diarrhea (V12 79) (Z87 898)   12  History of headache (V13 89) (Z87 898)   13  History of herpes zoster (V12 09) (Z86 19)   14  History of herpes zoster (V12 09) (Z86 19)   15  History of nausea and vomiting (V12 79) (Z87 898)   16  History of pregnancy (V13 29)   17  Need for prophylactic vaccination and inoculation against influenza (V04 81) (Z23)   18  History of Pregnancy Complicated By Diabetes Mellitus - Antepartum Condition Or Prior      Complicated Delivery (827 01)  Active Problems And Past Medical History Reviewed:     The active problems and past medical history were reviewed and updated today  Surgical History   1  History of Cholecystectomy Laparoscopic   2  History of Hysterectomy   3  History of Hysteroscopy With Endometrial Ablation   4  History of Partial Colectomy   5  History of Tubal Ligation  Surgical History Reviewed: The surgical history was reviewed and updated today  Family History   Mother    1  Family history of Emphysema   2  Family history of Systemic Lupus Erythematosus  Father    3  Family history of Benign Essential Hypertension   4  Family history of Coronary Artery Disease (V17 49)   5  Family history of Diabetes Mellitus (V18 0)  Maternal Aunt    6  Family history of Breast Cancer (V16 3)  Family History Reviewed: The family history was reviewed and updated today  Social History    · Caffeine use (V49 89) (F15 90)   · Denied: History of Drug use   · Four children   · Never A Smoker   · Never Drank Alcohol   · Social alcohol use (Z78 9)  Social History Reviewed: The social history was reviewed and updated today  Current Meds    1  Albuterol Sulfate (2 5 MG/3ML) 0 083% Inhalation Nebulization Solution; Therapy: (Recorded:25Oct2017) to Recorded   2  Calcium 500 + D 500-125 MG-UNIT Oral Tablet; TAKE 1 TABLET DAILY; Therapy: (Recorded:10Jan2017) to Recorded   3  Cetirizine HCl - 10 MG Oral Tablet; TAKE 1 TABLET DAILY; Therapy: (Recorded:10Jan2017) to Recorded   4  Daily Multiple Vitamins Oral Tablet; TAKE 1 TABLET DAILY; Therapy: (Recorded:10Jan2017) to Recorded   5  Elmiron 100 MG Oral Capsule; TAKE 1 CAPSULE 3 TIMES DAILY BEFORE MEALS; Therapy: 85ODF5700 to (Hellen Leak)  Requested for: 39WZF7095; Last     XW:66NYI5920 Ordered   6  Levothyroxine Sodium 88 MCG Oral Tablet; Therapy: (Recorded:10Mar2017) to Recorded   7  Lisinopril 30 MG Oral Tablet; TAKE 1 TABLET DAILY;      Therapy: 48ZPN8383 to (Evaluate:38Ndq4890)  Requested for: 81ORO8469; Last     Rx:11Rst2499 Ordered   8  Metoprolol Tartrate 100 MG Oral Tablet; Take 1 tablet twice daily; Therapy: (Recorded:10Jan2017) to Recorded   9  Omeprazole 40 MG Oral Capsule Delayed Release; take 1 capsule daily; Therapy: (Recorded:10Jan2017) to Recorded   10  ProAir  (90 Base) MCG/ACT Inhalation Aerosol Solution; inhale 2 puffs every 4 to      6 hours if needed; Therapy: 70QJX3036 to (Evaluate:96Qmz5886)  Requested for: 34Mfq6164; Last      Rx:06Oct2013 Ordered   11  Prochlorperazine Maleate 10 MG Oral Tablet; 1 po q 6 to 8 hrs nausea/headache (Max 3      days per week); Therapy: 36PDI6726 to (Last Rx:72Qwo5157)  Requested for: 41FPA4569 Ordered   12  TiZANidine HCl - 2 MG Oral Tablet; one at bedtime for five days then two after that; Therapy: 07JJK4686 to (Last Rx:19Enh3879)  Requested for: 58VPD1112 Ordered   13  Topamax 50 MG Oral Tablet; TAKE 2 TABLETS  in and two tabs in pm;      Therapy: 79LSE1208 to (Last Rx:53Eie0558)  Requested for: 88TFE7403 Ordered   14  Topiramate 50 MG Oral Tablet; take 3 tablets at bedtime; Therapy: 27YCU0339 to )  Requested for: 97Vzl0847; Last      Rx:22Wou1284 Ordered   15  Verapamil HCl - 40 MG Oral Tablet; 1 tablet a bedtime for 1 week and then 1 tablet 2x      daily; Therapy: 31OMV4580 to [id number])  Requested for: 25Oct2017; Last      JJ:71TIP3640 Ordered   16  Vitamin D 1000 UNIT Oral Tablet; twice daily; Therapy: 01KFQ6857 to Recorded  Medication List Reviewed: The medication list was reviewed and updated today  Allergies   1  Latex Gloves One Size MISC   2  Sulfa Drugs   3  Elavil TABS   4  Feminease Vaginal Cream   5  Sular TB24   6  Triptans   7  Tryptophan CAPS  8  Latex   9   Seasonal    Vitals   Vital Signs    Recorded: 83YZS3431 46:32OD   Systolic 116   Diastolic 87   Weight 514 lb    BMI Calculated 31 78   BSA Calculated 1 94     Physical Exam        Constitutional      General appearance: No acute distress, well appearing and well nourished  Pulmonary      Respiratory effort: No increased work of breathing or signs of respiratory distress  Genitourinary      External genitalia: Normal and no lesions appreciated  Urethra: Normal        Urethral meatus: Normal        Anus, perineum, and rectum: Normal sphincter tone, no masses, and no prolapse  Skin      Skin and subcutaneous tissue: Normal skin turgor and no rashes  Psychiatric      Orientation to person, place, and time: Normal        Mood and affect: Normal        Procedure   Bladder instillation was performed  Pt was positioned supine in stirrups and the urethral meatus was prepped with Betadine  The catheter was inserted and urine drained  A solution of 2% Lidocaine (8cc), 8 4% Sodium Bicarbonate (3cc) and Heparin Sodium 10,000 u/cc (2cc) was instilled  The catheter was withdrawn and the patient was instructed not to void for at least one hour  Pt tolerated procedure well  Attending Note   Attending Note: I agree with the Resident's note  Signatures    Electronically signed by : CARMITA Tarango ; Jan 12 2018 10:25AM EST                       (Author)     Electronically signed by :  Regina Ruvalcaba MD; Jan 23 2018  4:37PM EST                       (Acknowledgement)

## 2018-01-25 ENCOUNTER — OFFICE VISIT (OUTPATIENT)
Dept: PHYSICAL THERAPY | Age: 43
End: 2018-01-25
Payer: MEDICARE

## 2018-01-25 DIAGNOSIS — R42 VERTIGO: Primary | ICD-10-CM

## 2018-01-25 PROCEDURE — 97140 MANUAL THERAPY 1/> REGIONS: CPT

## 2018-01-25 PROCEDURE — 97112 NEUROMUSCULAR REEDUCATION: CPT

## 2018-01-25 PROCEDURE — 97110 THERAPEUTIC EXERCISES: CPT

## 2018-01-25 NOTE — PROGRESS NOTES
Daily Note     Today's date: 2018  Patient name: Guy Brink  : 1975  MRN: [id number]   Referring provider: Dolly Teran MD   Dx:   Encounter Diagnosis   Name Primary?  Vertigo Yes                  Subjective: Pt  Without falls with ankle swaying today  T-mill did cause some dizziness  B/l cervical tiughtness      Objective: See treatment diary below      Assessment: Tolerated treatment well  Patient could benefit from continued PT      Plan: Continue per plan of care       Precautions: falls    Daily Treatment Diary       Precautions loss of balance    Specialty Daily Treatment Diary     Manual  18       KATHIA Levi 15 b/l cervical       Thoracic mobs                            Exercise Diary  18       TM eye head 10 min       squats 3 x 10       Eye head exer HEP       Tandem stance b/l 20sec x 5       Ankle swaying yes       biodex limits of stability X 3                                                                                                                           Modalities

## 2018-01-30 ENCOUNTER — APPOINTMENT (OUTPATIENT)
Dept: PHYSICAL THERAPY | Age: 43
End: 2018-01-30
Payer: MEDICARE

## 2018-02-01 ENCOUNTER — OFFICE VISIT (OUTPATIENT)
Dept: PHYSICAL THERAPY | Age: 43
End: 2018-02-01
Payer: MEDICARE

## 2018-02-01 DIAGNOSIS — R42 VERTIGO: Primary | ICD-10-CM

## 2018-02-01 PROCEDURE — 97140 MANUAL THERAPY 1/> REGIONS: CPT

## 2018-02-01 PROCEDURE — 97110 THERAPEUTIC EXERCISES: CPT

## 2018-02-01 PROCEDURE — 97530 THERAPEUTIC ACTIVITIES: CPT

## 2018-02-01 NOTE — PROGRESS NOTES
Daily Note     Today's date: 2018  Patient name: Mj Juarez  : 1975  MRN: [id number]  Referring provider: Ani Muñoz MD  Dx:   Encounter Diagnosis   Name Primary?  Vertigo Yes                  Subjective: Pt  Does not do eye head exercises daily  Continues with dizziness and difficulty with eye focus  Does not lose balance with eyes closed activities  Objective: See treatment diary below      Assessment: Tolerated treatment well  Patient exhibited good technique with therapeutic exercises      Plan: Continue per plan of care           Manual  18      SOR  15min      [place] 15 b/l cervical       Thoracic mobs                            Exercise Diary  18      TM eye head 10 min 10 min      squats 3 x 10 3 x 10      Eye head exer HEP HEP      Tandem stance b/l 20sec x 5 20sec x 5      Ankle swaying yes yes      biodex limits of stability X 3 NT                                                                                                                          Modalities  18      MHP  10 min

## 2018-02-05 ENCOUNTER — OFFICE VISIT (OUTPATIENT)
Dept: OBGYN CLINIC | Facility: CLINIC | Age: 43
End: 2018-02-05
Payer: MEDICARE

## 2018-02-05 VITALS
HEART RATE: 71 BPM | SYSTOLIC BLOOD PRESSURE: 123 MMHG | BODY MASS INDEX: 30.96 KG/M2 | WEIGHT: 185.8 LBS | HEIGHT: 65 IN | DIASTOLIC BLOOD PRESSURE: 83 MMHG

## 2018-02-05 DIAGNOSIS — B37.3 VAGINAL YEAST INFECTION: Primary | ICD-10-CM

## 2018-02-05 LAB
BV WHIFF TEST VAG QL: ABNORMAL
CLUE CELLS SPEC QL WET PREP: NEGATIVE
PH SMN: 5 [PH]
SL AMB POCT WET MOUNT: ABNORMAL
T VAGINALIS VAG QL WET PREP: ABNORMAL
YEAST VAG QL WET PREP: ABNORMAL

## 2018-02-05 PROCEDURE — 99213 OFFICE O/P EST LOW 20 MIN: CPT | Performed by: NURSE PRACTITIONER

## 2018-02-05 RX ORDER — FLUCONAZOLE 150 MG/1
TABLET ORAL
Qty: 2 TABLET | Refills: 0 | Status: SHIPPED | OUTPATIENT
Start: 2018-02-05 | End: 2018-02-08

## 2018-02-05 NOTE — PROGRESS NOTES
Assessment/Plan:      Diagnoses and all orders for this visit:    Vaginal yeast infection  -     fluconazole (DIFLUCAN) 150 mg tablet; take 1 pill once and repeat in 3 days      patient to return to office this Friday for her bladder instillation for her interstitial cystitis  Current symptoms to be reviewed at that time    Subjective:     Patient ID: Jena Burnham is a 36 y o  female [address] [facility] here with complaints of external irritation in her genitals  She denies any abnormal vaginal discharge  Symptoms started about 1 week ago and she reports that she had sex a couple days ago and that the symptoms worse  Patient is with the same partner times 10 years Patient has history of yeast infection and was last treated in November  Patient denies any new product use and states she is just very sensitive to everything  Patient also has a history of interstitial cystitis and receives bladder instillation she is due to return this Friday for a treatment  Patient has history of a hysterectomy for DUB  She reports feeling ovarian pain monthly similar to when she used to get her periods  Currently will feel some pain in her right and left lower quadrants  She declines cultures for Chlamydia and gonorrhea  HPI    Review of Systems   Genitourinary: Positive for pelvic pain  Negative for decreased urine volume, difficulty urinating, dyspareunia, flank pain, genital sores, urgency and vaginal discharge  Objective:     Physical Exam   Genitourinary: Vaginal discharge found  Genitourinary Comments: small white creamy discharge, very mild erythema on genitalia  uterus-surgically absent    Adnexa-no masses, mild tenderness with exam

## 2018-02-06 ENCOUNTER — OFFICE VISIT (OUTPATIENT)
Dept: PHYSICAL THERAPY | Age: 43
End: 2018-02-06
Payer: MEDICARE

## 2018-02-06 ENCOUNTER — APPOINTMENT (OUTPATIENT)
Dept: PHYSICAL THERAPY | Age: 43
End: 2018-02-06
Payer: MEDICARE

## 2018-02-06 DIAGNOSIS — R26.9 ABNORMALITY OF GAIT: Primary | ICD-10-CM

## 2018-02-06 DIAGNOSIS — R42 VERTIGO: ICD-10-CM

## 2018-02-06 PROCEDURE — G8979 MOBILITY GOAL STATUS: HCPCS

## 2018-02-06 PROCEDURE — 97750 PHYSICAL PERFORMANCE TEST: CPT

## 2018-02-06 PROCEDURE — 97140 MANUAL THERAPY 1/> REGIONS: CPT

## 2018-02-06 PROCEDURE — G8978 MOBILITY CURRENT STATUS: HCPCS

## 2018-02-06 NOTE — PROGRESS NOTES
PT Re-Evaluation     Today's date: 2018  Patient name: Zahida Doherty  : 1975  MRN: [id number]  Referring provider: Sheri Cedillo MD  Dx:   Encounter Diagnoses   Name Primary?  Abnormality of gait Yes    Vertigo                   Assessment  Impairments: pain with function  Functional limitations: decreased tolerance to close visual activities with + central processing deficits still noted, , decreased tolerance to quick visual changes  Assessment details: The pt still presents with central processing deficits with convergence at 12 inches unchanged  The pt has responded to performance of eye head exer improving gaze stabilization at 18 inches distance  PT does not wear her current glasses consistently for close activities    Recommend Visual assessment and training by Dr Amelia Vee at [facility] at this time  PT to continue with cervical rom and pain reduction and headache reduction in addition to balance training and recommend addition of low back assessment and treatment as this is affecting the pt's gait    Pt now to be treated once a week due to duties as home further compromising her ability to engage in treatment  Strong encouragement to perform home exer program 2 x a day to continue to make further progress recommended  Barriers to therapy: improving focus /attention to task noted but still deficit observed  Understanding of Dx/Px/POC: good   Prognosis: good    Goals  stg no loss of balance with quick turns with gait and visual changes   In two weeks   Consistent use of glasses with close visual tasks in two weeks   ltg achieve non antalgic gait in 4 weeks   Reduction of vertigo by 80 percent in 4 weeks     Plan  Patient would benefit from: skilled PT  Referral necessary: [facility] therapy interventions: manual therapy, massage, joint mobilization, balance, neuromuscular re-education, stretching, strengthening and therapeutic exercise  Frequency: 1x week  Duration in weeks: 4  Treatment plan discussed with: patient     Pt making steady progress with improved balance and some progress with eye head exercises  Headaches are reported as less severe but still occurring with same frequency  Recommend to continue PT with addition of treatment for new onset low back pain ( exacerbation noted end of last week - pt with antalgic gait noted today ) VoR and vor cancellation at 18 inch distance negative at 12" + for vertigo, fukuda step testing improved eyes open 4inch backward deviation , eyes closed 1 foot backward deviation  Sharpened romberg 30 sec, , all improved , biodex balance testing all improved since I eval and no falls  ( pt could not perform limits of stability testing 4 weeks ago due to visual disturbance - now able to perform   Subjective  " I do see that my balance has improved  "    Objective     Static Posture     Head  Forward      Active Range of Motion   Cervical/Thoracic Spine   Cervical    Subcranial retraction: WFL   Flexion: 3 (chin to sternal notch in cm) degrees   Extension: 16 degrees   Left lateral flexion: 10 degrees   Right lateral flexion: 10 degrees   Left rotation: 13 (chin to acromion) degrees with pain  Right rotation: 13 degrees     Additional Active Range of Motion Details  Significant improvement in left cervical rotation and sidebending noted, decrease myofascial restriction in suboccipital region and left upper trap and periscapular region,  Headaches reported  As less severe but still present at a level 6 at worst     Ambulation     Ambulation: Level Surfaces   Ambulation without assistive device: independent    Additional Level Surfaces Ambulation Details  Tug 8 seconds, tandem gait independent 10 feet,       Precautions: hx of migraines , new exacerbation of low back pain reported today     Daily Treatment Diary     Manual  2/6/18            Suboccipital release  15 min             Sphenoid and frontalis release 15 min             graston c spine 15min Exercise Diary  2/6/18            biodex balance testing  Perf 15 min             Ball pass activit             Eye head exer             squats             theraband sh ext and row standing                                                                                                                                                                                                                    Modalities  2/6/18            Mhprn c spine

## 2018-02-08 ENCOUNTER — APPOINTMENT (OUTPATIENT)
Dept: PHYSICAL THERAPY | Age: 43
End: 2018-02-08
Payer: MEDICARE

## 2018-02-15 ENCOUNTER — OFFICE VISIT (OUTPATIENT)
Dept: OBGYN CLINIC | Facility: CLINIC | Age: 43
End: 2018-02-15
Payer: MEDICARE

## 2018-02-15 VITALS — WEIGHT: 187 LBS | BODY MASS INDEX: 31.12 KG/M2 | DIASTOLIC BLOOD PRESSURE: 73 MMHG | SYSTOLIC BLOOD PRESSURE: 115 MMHG

## 2018-02-15 DIAGNOSIS — Z76.0 ENCOUNTER FOR MEDICATION REFILL: Primary | ICD-10-CM

## 2018-02-15 DIAGNOSIS — B37.3 VAGINAL CANDIDIASIS: ICD-10-CM

## 2018-02-15 DIAGNOSIS — N89.8 VAGINAL DRYNESS: ICD-10-CM

## 2018-02-15 DIAGNOSIS — N30.91 HEMORRHAGIC CYSTITIS: ICD-10-CM

## 2018-02-15 PROCEDURE — 99215 OFFICE O/P EST HI 40 MIN: CPT | Performed by: OBSTETRICS & GYNECOLOGY

## 2018-02-15 RX ORDER — FLUCONAZOLE 150 MG/1
150 TABLET ORAL ONCE
Qty: 1 TABLET | Refills: 0 | Status: SHIPPED | OUTPATIENT
Start: 2018-02-15 | End: 2018-02-15

## 2018-02-16 NOTE — PROGRESS NOTES
Bladder instillation     Date/Time 2/15/2018 4:30 PM     Performed by  Ramses Robles by Neysa Cushing       Consent: Verbal consent obtained  Risks and benefits: risks, benefits and alternatives were discussed  Consent given by: patient  Patient understanding: patient states understanding of the procedure being performed  Patient consent: the patient's understanding of the procedure matches consent given  Procedure consent: procedure consent matches procedure scheduled  Patient identity confirmed: verbally with patient  Time out: Immediately prior to procedure a "time out" was called to verify the correct patient, procedure, equipment, support staff and site/side marked as required  Preparation: Patient was prepped and draped in the usual sterile fashion  Site preparation: Betadine    Local anesthesia used: no     Anesthesia   Local anesthesia used: no     Sedation   Patient sedated: no      Specimen: no    Culture: no    Patient tolerance: Patient tolerated the procedure well with no immediate complications      Patient Transportation: confirmed    Comments: 30 mL of urine evacuated  Patient tolerated the procedure well with no complaints  Subjective    Karo Cardona is a 36 y o  female  Chief Complaint   Patient presents with    Procedure     Pt here for bladder install  Needs refills on Elmiron, a vaginal cream that starts with a "P", pt says she was prescribed this for her vaginal atrophy  Pt says she still has vaginal itching, burning, denies discharge  Took diflucan recently for yeast infection  Review of Systems   Constitutional: Negative  Eyes: Negative  Respiratory: Negative  Cardiovascular: Negative  Gastrointestinal: Negative  Genitourinary: Positive for difficulty urinating and vaginal discharge  Negative for hematuria, pelvic pain, vaginal bleeding and vaginal pain  C/o vaginal dryness   Neurological: Negative  Psychiatric/Behavioral: Negative  Objective    Vitals:    02/15/18 1617   BP: 115/73   BP Location: Right arm   Patient Position: Sitting   Weight: 84 8 kg (187 lb)       Physical Exam   Constitutional: She is oriented to person, place, and time  She appears well-developed and well-nourished  HENT:   Head: Normocephalic and atraumatic  Eyes: Right eye exhibits no discharge  Left eye exhibits no discharge  No scleral icterus  Cardiovascular: Normal rate and regular rhythm  Pulmonary/Chest: Effort normal and breath sounds normal    Abdominal: Soft  Bowel sounds are normal  There is no tenderness  Genitourinary: Vagina normal  No vaginal discharge found  Musculoskeletal: She exhibits no edema or deformity  Neurological: She is alert and oriented to person, place, and time  Skin: Skin is warm and dry  No pallor  Psychiatric: She has a normal mood and affect  Her behavior is normal        A/P      Hemorrhagic Cystitis  · Bladder instillation performed as documented above  · Return in 2 weeks for next instillation procedure    Vaginal Dryness  · Patient requests vaginal estrogen  · Advised patient that her last set of labs were too long ago and we will have to recheck her TSH, LH, [facility] prior to prescribing  · Patient is agreeable with this plan and lab requisitions were provided  · Patient will call next Thursday afternoon 2/22 while Dr Waldemar Graff is in office to discuss lab results and follow up  Vaginal Candidiasis  · Patient seen in office 2/5/18 and prescribed 2-pill regimen of fluconazole  · Patient states that her symptoms are not completely resolved  · Third dose of fluconazole 150 mg PO x1 tab given  · Patient instructed to follow up if symptoms persist or worsen  Medication Refill  · Patient requests Elmiron refilled  · Prescription with refills sent electronically to patient's preferred pharmacy      True Patch, DO  2/15/2018

## 2018-02-21 ENCOUNTER — APPOINTMENT (OUTPATIENT)
Dept: LAB | Facility: HOSPITAL | Age: 43
End: 2018-02-21
Payer: MEDICARE

## 2018-02-21 DIAGNOSIS — N89.8 VAGINAL DRYNESS: ICD-10-CM

## 2018-02-21 LAB
FSH SERPL-ACNC: 2.2 MIU/ML
LH SERPL-ACNC: 1.1 MIU/ML
TSH SERPL DL<=0.05 MIU/L-ACNC: 1.7 UIU/ML (ref 0.36–3.74)

## 2018-02-21 PROCEDURE — 36415 COLL VENOUS BLD VENIPUNCTURE: CPT

## 2018-02-21 PROCEDURE — 83001 ASSAY OF GONADOTROPIN (FSH): CPT

## 2018-02-21 PROCEDURE — 83002 ASSAY OF GONADOTROPIN (LH): CPT

## 2018-02-21 PROCEDURE — 84443 ASSAY THYROID STIM HORMONE: CPT

## 2018-02-22 ENCOUNTER — OFFICE VISIT (OUTPATIENT)
Dept: PHYSICAL THERAPY | Age: 43
End: 2018-02-22
Payer: MEDICARE

## 2018-02-22 DIAGNOSIS — R42 VERTIGO: Primary | ICD-10-CM

## 2018-02-22 DIAGNOSIS — R26.9 ABNORMALITY OF GAIT: ICD-10-CM

## 2018-02-22 PROCEDURE — G8978 MOBILITY CURRENT STATUS: HCPCS

## 2018-02-22 PROCEDURE — 97530 THERAPEUTIC ACTIVITIES: CPT

## 2018-02-22 PROCEDURE — G8979 MOBILITY GOAL STATUS: HCPCS

## 2018-02-22 PROCEDURE — 97140 MANUAL THERAPY 1/> REGIONS: CPT

## 2018-02-22 PROCEDURE — 97750 PHYSICAL PERFORMANCE TEST: CPT

## 2018-02-23 ENCOUNTER — DOCUMENTATION (OUTPATIENT)
Dept: NEUROLOGY | Facility: CLINIC | Age: 43
End: 2018-02-23

## 2018-02-23 NOTE — PROGRESS NOTES
PT Re-Evaluation     Today's date: 2018  Patient name: Melvi Jordan  : 1975  MRN: [id number]  Referring provider: Aldo Zendejas MD  Dx:   Encounter Diagnosis     ICD-10-CM    1  Vertigo R42    2  Abnormality of gait R26 9                   Assessment  Impairments: abnormal or restricted ROM, activity intolerance and impaired balance  Other impairment: + central processing deficits with + visual disturbance , ( pt with decreased follow through with home exer programming  and contact of vision specialist)    Assessment details: The pt has made steady progress with improved balance now able to perform tandem stance and sharpened romberg for 30 sec, fukuda step test eyes open 1 inch backward step still WFL's,  Eyes closed 2 inch deviation forward also WFL's,  No loss of balance with quick turns with gait  PT reports still having headaches but not as severe and less frequent  Pt requires redirection to task and does not consistently perform her home exer program   Pt still with convergence at 11 inches  Pt given Dr Leslye Kinney number Dr Rosendo Ferraro for [facility] vision specialist as pt does better with her eyeglasses on but does not wear them   PT believes with formal speciality test vision and processing deficits can be addressed by vision therapy  ( the pt has made progress with current eye head exer programming but requires more specific testing as pt does have hx of ADHD   R/o visual, cognitive deficits  And eyeglass prescription modification   Recommend to continue PT for 3-4 more weeks then d/c to home exer program    Understanding of Dx/Px/POC: good   Prognosis: good    Goals  stg   Pt able to tolerate 30 min aerobic exer in two weeks       [place] in home exer program in two weeks   ltg  Pt has contacted vision therapist and begun additional testing and training in 3-4 weeks   ( Dr Rosendo Ferraro - [facility] )  Reduction in vertigo by 80 percent in 4 weeks     Plan  Patient would benefit from: PT eval and skilled PT  Referral necessary: Yes  Planned therapy interventions: manual therapy, joint mobilization, neuromuscular re-education, stretching, therapeutic activities, home exercise program and therapeutic exercise  Other planned therapy interventions: vestibular rehabilitation, habituation exer, eye head exer,  possible fitting for new glasses due to visual disturbance  Frequency: 1x week  Duration in weeks: 4  Treatment plan discussed with: patient        Subjective Evaluation    Pain  Current pain ratin  At best pain ratin  At worst pain ratin  Location: headaches , low back pain   Quality: pulling, pressure, sharp and tight  Relieving factors: heat and ice  Progression: improved          Objective     Active Range of Motion   Cervical/Thoracic Spine   Cervical    Flexion: WFL  Extension: WFL  Left lateral flexion: WFL  Right lateral flexion: WFL  Left rotation: WF  Right rotation: [facility]    Additional Active Range of Motion Details  Bilateral upper trap tightness and suboccipital tightness decreased with graston technique and cervical stretching     Functional Assessment     Comments  Rolling - sidelying to sit slight light headedness intermittent, yaz hallpike no nystagmus noted    Pt with + visual disturbance and central processing deficits, HTT -          Precautions: none    Daily Treatment Diary     Manual              graston c spine 15 min             Muscle energy techniques right hip ext and left adduction 15 min             perf testing reeval 15 min             Joint mobs c spine                              Exercise Diary              squats 3/10            Ball pass activities  Back to back, overhead and trunk flexion, ricardo wheel, ball toss to self with walking and right left in hallway 30 min there act 1on 1            Trampoline ball toss 20 reps             Ball toss in hallway 100ft x 2            biodex balance test and training maze and limits of stability Modalities  2/22

## 2018-03-01 ENCOUNTER — OFFICE VISIT (OUTPATIENT)
Dept: PHYSICAL THERAPY | Age: 43
End: 2018-03-01
Payer: MEDICARE

## 2018-03-01 ENCOUNTER — APPOINTMENT (OUTPATIENT)
Dept: PHYSICAL THERAPY | Age: 43
End: 2018-03-01
Payer: MEDICARE

## 2018-03-01 DIAGNOSIS — R26.9 ABNORMALITY OF GAIT: Primary | ICD-10-CM

## 2018-03-01 PROCEDURE — 97140 MANUAL THERAPY 1/> REGIONS: CPT

## 2018-03-01 PROCEDURE — G8978 MOBILITY CURRENT STATUS: HCPCS

## 2018-03-01 PROCEDURE — 97530 THERAPEUTIC ACTIVITIES: CPT

## 2018-03-01 PROCEDURE — G8979 MOBILITY GOAL STATUS: HCPCS

## 2018-03-02 NOTE — PROGRESS NOTES
Daily Note     Today's date: 3/1/2018  Patient name: Ashia Olson  : 1975  MRN: [id number]  Referring provider: Esa Saldaña MD  Dx:   Encounter Diagnosis     ICD-10-CM    1  Abnormality of gait R26 9                   Subjective: "i'm still waiting to here back from the vision specialist to schedule an appt "      Objective: See treatment diary below  Manual  2/22  3/1                   graston c spine 15 min   15                   Muscle energy techniques right hip ext and left adduction 15 min                      perf testing reeval 15 min                      Joint mobs c spine    15                                                 Exercise Diary  2/22  3/1                   squats 3/10                     Ball pass activities  Back to back, overhead and trunk flexion, ricardo wheel, ball toss to self with walking and right left in hallway 30 min there act 1on 1                     Trampoline ball toss 20 reps                      Ball toss in hallway 100ft x 2                     biodex balance test and training maze and limits of stability    15 min                                                                                                                                                                                                                                                                                                                                                                                                 Modalities                                                                                                         Assessment: Tolerated treatment well  Patient would benefit from continued PT Pt to see Dr Jorgito Bernard next week   Pt to request notes be sent by   To Dr Ede Arreaga vision specialist        Plan: Progress treatment as tolerated

## 2018-03-05 ENCOUNTER — PROCEDURE VISIT (OUTPATIENT)
Dept: NEUROLOGY | Facility: CLINIC | Age: 43
End: 2018-03-05
Payer: MEDICARE

## 2018-03-05 VITALS — DIASTOLIC BLOOD PRESSURE: 83 MMHG | HEART RATE: 68 BPM | SYSTOLIC BLOOD PRESSURE: 145 MMHG | TEMPERATURE: 98.6 F

## 2018-03-05 DIAGNOSIS — G43.709 CHRONIC MIGRAINE WITHOUT AURA WITHOUT STATUS MIGRAINOSUS, NOT INTRACTABLE: Primary | ICD-10-CM

## 2018-03-05 PROCEDURE — 64615 CHEMODENERV MUSC MIGRAINE: CPT | Performed by: PSYCHIATRY & NEUROLOGY

## 2018-03-05 NOTE — PROGRESS NOTES
Chemodenervation  Date/Time: 3/5/2018 12:39 PM  Performed by: Elizabeth Montiel  Authorized by: William Palacio details:     Prepped With: Alcohol    Anesthesia (see MAR for exact dosages): Anesthesia method:  None  Procedure details:     Position:  Upright  Botox:     Botox Type:  Type A    Brand:  Botox    mL's of Botulinum Toxin:  200    Final Concentration per CC:  200 units    Needle Gauge:  30 G 2 5 inch  Procedures:     Botox Procedures: chronic headache      Indications: migraines    Injection Location:     Head / Face:  L superior cervical paraspinal, R superior cervical paraspinal, L , R , L frontalis, R frontalis, L medial occipitalis, R medial occipitalis, procerus, R temporalis, L temporalis, R superior trapezius and L superior trapezius    L  injection amount:  5 unit(s)    R  injection amount:  5 unit(s)    L lateral frontalis:  5 unit(s)    R lateral frontalis:  5 unit(s)    L medial frontalis:  5 unit(s)    R medial frontalis:  5 unit(s)    L temporalis injection amount:  20 unit(s)    R temporalis injection amount:  20 unit(s)    Procerus injection amount:  5 unit(s)    L medial occipitalis injection amount:  15 unit(s)    R medial occipitalis injection amount:  15 unit(s)    L superior cervical paraspinal injection amount:  10 unit(s)    R superior cervical paraspinal injection amount:  10 unit(s)    L superior trapezius injection amount:  15 unit(s)    R superior trapezius injection amount:  15 unit(s)  Total Units:     Total units used:  200    Total units discarded:  0  Post-procedure details:     Chemodenervation:  Chronic migraine    Patient tolerance of procedure: Tolerated well, no immediate complications  Comments:       5 units in the orbicularis oculi bilaterally and 35 units given in the frontalis , which was medically necessary

## 2018-03-06 ENCOUNTER — APPOINTMENT (OUTPATIENT)
Dept: PHYSICAL THERAPY | Age: 43
End: 2018-03-06
Payer: MEDICARE

## 2018-03-08 ENCOUNTER — OFFICE VISIT (OUTPATIENT)
Dept: PHYSICAL THERAPY | Age: 43
End: 2018-03-08
Payer: MEDICARE

## 2018-03-08 ENCOUNTER — APPOINTMENT (OUTPATIENT)
Dept: PHYSICAL THERAPY | Age: 43
End: 2018-03-08
Payer: MEDICARE

## 2018-03-14 ENCOUNTER — OFFICE VISIT (OUTPATIENT)
Dept: URGENT CARE | Age: 43
End: 2018-03-14
Payer: MEDICARE

## 2018-03-14 VITALS
SYSTOLIC BLOOD PRESSURE: 121 MMHG | OXYGEN SATURATION: 98 % | TEMPERATURE: 97.6 F | WEIGHT: 190 LBS | BODY MASS INDEX: 31.65 KG/M2 | RESPIRATION RATE: 20 BRPM | DIASTOLIC BLOOD PRESSURE: 68 MMHG | HEIGHT: 65 IN | HEART RATE: 73 BPM

## 2018-03-14 DIAGNOSIS — B37.3 VAGINAL CANDIDIASIS: ICD-10-CM

## 2018-03-14 DIAGNOSIS — J20.9 ACUTE BRONCHITIS, UNSPECIFIED ORGANISM: Primary | ICD-10-CM

## 2018-03-14 PROCEDURE — G0463 HOSPITAL OUTPT CLINIC VISIT: HCPCS | Performed by: PHYSICIAN ASSISTANT

## 2018-03-14 PROCEDURE — 99213 OFFICE O/P EST LOW 20 MIN: CPT | Performed by: PHYSICIAN ASSISTANT

## 2018-03-14 RX ORDER — FLUTICASONE PROPIONATE 110 UG/1
1 AEROSOL, METERED RESPIRATORY (INHALATION) AS NEEDED
COMMUNITY

## 2018-03-14 RX ORDER — AMLODIPINE BESYLATE 2.5 MG/1
TABLET ORAL
COMMUNITY
Start: 2018-02-23 | End: 2020-02-18

## 2018-03-14 RX ORDER — CYCLOSPORINE 0.5 MG/ML
1 EMULSION OPHTHALMIC DAILY
COMMUNITY
Start: 2018-02-06 | End: 2020-09-24

## 2018-03-14 RX ORDER — BUTALBITAL/ASPIRIN/CAFFEINE 50-325-40
CAPSULE ORAL
COMMUNITY
Start: 2018-02-23 | End: 2018-09-24

## 2018-03-14 RX ORDER — AZITHROMYCIN 250 MG/1
TABLET, FILM COATED ORAL
Qty: 6 TABLET | Refills: 0 | Status: SHIPPED | OUTPATIENT
Start: 2018-03-14 | End: 2018-03-19

## 2018-03-14 RX ORDER — FLUCONAZOLE 150 MG/1
150 TABLET ORAL ONCE
Qty: 2 TABLET | Refills: 0 | Status: SHIPPED | OUTPATIENT
Start: 2018-03-14 | End: 2018-03-14

## 2018-03-14 RX ORDER — CLONAZEPAM 1 MG/1
1 TABLET ORAL 2 TIMES DAILY
Refills: 0 | COMMUNITY
Start: 2018-02-11 | End: 2018-03-14

## 2018-03-14 NOTE — PATIENT INSTRUCTIONS
Take Marcela Zhang as directed  probiotics daily  Push fluids/rest  Continue using your flovent and albuterol as needed  Follow up with your PCP in 3-5 days  ER if worse  Acute Bronchitis   WHAT YOU NEED TO KNOW:   Acute bronchitis is swelling and irritation in the air passages of your lungs  This irritation may cause you to cough or have other breathing problems  Acute bronchitis often starts because of another illness, such as a cold or the flu  The illness spreads from your nose and throat to your windpipe and airways  Bronchitis is often called a chest cold  Acute bronchitis lasts about 3 to 6 weeks and is usually not a serious illness  Your cough can last for several weeks  DISCHARGE INSTRUCTIONS:   Return to the emergency department if:   · You cough up blood  · Your lips or fingernails turn blue  · You feel like you are not getting enough air when you breathe  Contact your healthcare provider if:   · You have a fever  · Your breathing problems do not go away or get worse  · Your cough does not get better within 4 weeks  · You have questions or concerns about your condition or care  Self-care:   · Get more rest   Rest helps your body to heal  Slowly start to do more each day  Rest when you feel it is needed  · Avoid irritants in the air  Avoid chemicals, fumes, and dust  Wear a face mask if you must work around dust or fumes  Stay inside on days when air pollution levels are high  If you have allergies, stay inside when pollen counts are high  Do not use aerosol products, such as spray-on deodorant, bug spray, and hair spray  · Do not smoke or be around others who smoke  Nicotine and other chemicals in cigarettes and cigars damages the cilia that move mucus out of your lungs  Ask your healthcare provider for information if you currently smoke and need help to quit  E-cigarettes or smokeless tobacco still contain nicotine  Talk to your healthcare provider before you use these products  · Drink liquids as directed  Liquids help keep your air passages moist and help you cough up mucus  You may need to drink more liquids when you have acute bronchitis  Ask how much liquid to drink each day and which liquids are best for you  · Use a humidifier or vaporizer  Use a cool mist humidifier or a vaporizer to increase air moisture in your home  This may make it easier for you to breathe and help decrease your cough  Decrease risk for acute bronchitis:   · Get the vaccinations you need  Ask your healthcare provider if you should get vaccinated against the flu or pneumonia  · Prevent the spread of germs  You can decrease your risk of acute bronchitis and other illnesses by doing the following:     [facility] your hands often with soap and water  Carry germ-killing hand lotion or gel with you  You can use the lotion or gel to clean your hands when soap and water are not available  ¨ Do not touch your eyes, nose, or mouth unless you have washed your hands first     ¨ Always cover your mouth when you cough to prevent the spread of germs  It is best to cough into a tissue or your shirt sleeve instead of into your hand  Ask those around you cover their mouths when they cough  ¨ Try to avoid people who have a cold or the flu  If you are sick, stay away from others as much as possible  Medicines: Your healthcare provider may  give you any of the following:  · Ibuprofen or acetaminophen  are medicines that help lower your fever  They are available without a doctor's order  Ask your healthcare provider which medicine is right for you  Ask how much to take and how often to take it  Follow directions  These medicines can cause stomach bleeding if not taken correctly  Ibuprofen can cause kidney damage  Do not take ibuprofen if you have kidney disease, an ulcer, or allergies to aspirin  Acetaminophen can cause liver damage  Do not take more than 4,000 milligrams in 24 hours       · Decongestants  help loosen mucus in your lungs and make it easier to cough up  This can help you breathe easier  · Cough suppressants  decrease your urge to cough  If your cough produces mucus, do not take a cough suppressant unless your healthcare provider tells you to  Your healthcare provider may suggest that you take a cough suppressant at night so you can rest     · Inhalers  may be given  Your healthcare provider may give you one or more inhalers to help you breathe easier and cough less  An inhaler gives your medicine to open your airways  Ask your healthcare provider to show you how to use your inhaler correctly  · Take your medicine as directed  Contact your healthcare provider if you think your medicine is not helping or if you have side effects  Tell him of her if you are allergic to any medicine  Keep a list of the medicines, vitamins, and herbs you take  Include the amounts, and when and why you take them  Bring the list or the pill bottles to follow-up visits  Carry your medicine list with you in case of an emergency  Follow up with your healthcare provider as directed:  Write down questions you have so you will remember to ask them during your follow-up visits  © 2017 [employer]  Information is for End User's use only and may not be sold, redistributed or otherwise used for commercial purposes  All illustrations and images included in CareNotes® are the copyrighted property of [employer]  or Sammy Crawley  The above information is an  only  It is not intended as medical advice for individual conditions or treatments  Talk to your doctor, nurse or pharmacist before following any medical regimen to see if it is safe and effective for you

## 2018-03-14 NOTE — PROGRESS NOTES
[facility]        NAME: Rohini Aranda is a 36 y o  female  : 1975    MRN: [id number]  DATE: 2018  TIME: 10:21 AM    Assessment and Plan   Acute bronchitis, unspecified organism [J20 9]  1  Acute bronchitis, unspecified organism  azithromycin (ZITHROMAX) 250 mg tablet   2  Vaginal candidiasis  fluconazole (DIFLUCAN) 150 mg tablet         Patient Instructions     Take Karel Arbour as directed  probiotics daily  Push fluids/rest  Continue using your flovent and albuterol as needed  Follow up with your PCP in 3-5 days  ER if worse  Chief Complaint     Chief Complaint   Patient presents with    Cold Like Symptoms     x 3-4 days         History of Present Illness       37 y/o asthmatic female c/o 4 days of a productive cough, wheezing and chest discomfort  Denies fevers  States her children all have similar symptoms and all have been treated w/ Zithromax  She has been using her flovent BID and using albuterol neb or inhaler prn  Pt also requests diflucan for yeast infection after taking abx  Review of Systems   Review of Systems   Constitutional: Negative  HENT: Negative  Respiratory: Positive for cough, chest tightness, shortness of breath and wheezing  Cardiovascular: Negative for chest pain  All other systems reviewed and are negative          Current Medications       Current Outpatient Prescriptions:     fluticasone (FLOVENT HFA) 110 MCG/ACT inhaler, Inhale 1 puff 2 (two) times a day, Disp: , Rfl:     amLODIPine (NORVASC) 2 5 mg tablet, , Disp: , Rfl:     azithromycin (ZITHROMAX) 250 mg tablet, Take two po simultaneously day one, then one po days 2-5, Disp: 6 tablet, Rfl: 0    butalbital-acetaminophen-caffeine-codeine (FIORICET WITH CODEINE) -80-30 MG per capsule, , Disp: , Rfl:     Cetirizine HCl (ZYRTEC ALLERGY) 10 MG CAPS, Take 10 mg by mouth daily  , Disp: , Rfl:     fluconazole (DIFLUCAN) 150 mg tablet, Take 1 tablet (150 mg total) by mouth once for 1 dose, Disp: 2 tablet, Rfl: 0    Levothyroxine Sodium 88 MCG CAPS, Take 1 capsule by mouth daily, Disp: , Rfl:     LISINOPRIL PO, Take 20 mg by mouth daily  , Disp: , Rfl:     METOPROLOL TARTRATE PO, 100 mg 2 (two) times a day  , Disp: , Rfl:     OMEPRAZOLE PO, Take 40 mg by mouth daily as needed  , Disp: , Rfl:     pentosan polysulfate (ELMIRON) 100 mg capsule, Take 1 capsule (100 mg total) by mouth 3 (three) times a day before meals, Disp: 90 capsule, Rfl: 2    RESTASIS 0 05 % ophthalmic emulsion, , Disp: , Rfl:     Topiramate (TOPAMAX PO), Take 150 mg by mouth daily at bedtime, Disp: , Rfl:     Current Allergies     Allergies as of 03/14/2018 - Reviewed 03/14/2018   Allergen Reaction Noted    Shellfish-derived products Anaphylaxis and Hives 02/05/2018    Triptans Shortness Of Breath and Palpitations 10/17/2012    Latex Swelling and Rash 07/26/2012    Sulfa antibiotics Swelling and Rash 10/05/2015            The following portions of the patient's history were reviewed and updated as appropriate: allergies, current medications, past family history, past medical history, past social history, past surgical history and problem list    Past Medical History:   Diagnosis Date    Bipolar 1 disorder ([facility] [address] )     Disease of thyroid gland     Fibromyalgia     Gout     Hyperlipidemia     Hypertension     Lupus     Migraine      Past Surgical History:   Procedure Laterality Date    HYSTERECTOMY             Objective   /68   Pulse 73   Temp 97 6 °F (36 4 °C) (Temporal)   Resp 20   Ht 5' 5" (1 651 m)   Wt 86 2 kg (190 lb)   SpO2 98%   BMI 31 62 kg/m²        Physical Exam     Physical Exam   Constitutional: She is oriented to person, place, and time  She appears well-developed and well-nourished  HENT:   Head: Normocephalic and atraumatic     Right Ear: Tympanic membrane and external ear normal    Left Ear: Tympanic membrane and external ear normal    Nose: Nose normal    Mouth/Throat: Oropharynx is clear and moist    Neck: Neck supple  Cardiovascular: Normal rate and regular rhythm  Pulmonary/Chest: Breath sounds normal  She has no wheezes  Wet cough noted on exam   Neurological: She is alert and oriented to person, place, and time  Psychiatric: She has a normal mood and affect  Her behavior is normal    Nursing note and vitals reviewed

## 2018-03-15 ENCOUNTER — APPOINTMENT (OUTPATIENT)
Dept: PHYSICAL THERAPY | Age: 43
End: 2018-03-15
Payer: MEDICARE

## 2018-03-18 ENCOUNTER — OFFICE VISIT (OUTPATIENT)
Dept: URGENT CARE | Age: 43
End: 2018-03-18
Payer: MEDICARE

## 2018-03-18 VITALS
DIASTOLIC BLOOD PRESSURE: 68 MMHG | WEIGHT: 190 LBS | TEMPERATURE: 98.2 F | RESPIRATION RATE: 16 BRPM | HEIGHT: 65 IN | SYSTOLIC BLOOD PRESSURE: 110 MMHG | BODY MASS INDEX: 31.65 KG/M2 | OXYGEN SATURATION: 98 % | HEART RATE: 80 BPM

## 2018-03-18 DIAGNOSIS — J20.9 ACUTE BRONCHITIS, UNSPECIFIED ORGANISM: Primary | ICD-10-CM

## 2018-03-18 PROCEDURE — G0463 HOSPITAL OUTPT CLINIC VISIT: HCPCS | Performed by: PHYSICIAN ASSISTANT

## 2018-03-18 PROCEDURE — 99213 OFFICE O/P EST LOW 20 MIN: CPT | Performed by: PHYSICIAN ASSISTANT

## 2018-03-18 RX ORDER — PREDNISONE 50 MG/1
50 TABLET ORAL DAILY
Qty: 5 TABLET | Refills: 0 | Status: SHIPPED | OUTPATIENT
Start: 2018-03-18 | End: 2018-03-23

## 2018-03-18 NOTE — PROGRESS NOTES
[facility]        NAME: Karo Cardona is a 36 y o  female  : 1975    MRN: [id number]  DATE: 2018  TIME: 5:08 PM    Assessment and Plan   Acute bronchitis, unspecified organism [J20 9]  1  Acute bronchitis, unspecified organism  predniSONE 50 mg tablet    dextromethorphan-guaifenesin (MUCINEX DM)  MG per 12 hr tablet         Patient Instructions       Follow up with PCP in 3-5 days  Proceed to  ER if symptoms worsen  Chief Complaint     Chief Complaint   Patient presents with    Headache     sinuses   finished Zpak         History of Present Illness       Cough   This is a new problem  Episode onset: 1 week  The problem has been unchanged  The problem occurs every few minutes  The cough is non-productive  Associated symptoms include nasal congestion and rhinorrhea  Pertinent negatives include no chest pain, chills, ear congestion, ear pain, fever, headaches, heartburn, hemoptysis, myalgias, postnasal drip, rash, sore throat, shortness of breath, sweats, weight loss or wheezing  Nothing aggravates the symptoms  She has tried a beta-agonist inhaler (z-pack) for the symptoms  The treatment provided mild relief  Her past medical history is significant for asthma  There is no history of bronchiectasis, bronchitis, COPD, emphysema, environmental allergies or pneumonia  Review of Systems   Review of Systems   Constitutional: Negative for chills, fever and weight loss  HENT: Positive for rhinorrhea  Negative for ear pain, postnasal drip and sore throat  Respiratory: Positive for cough  Negative for apnea, hemoptysis, choking, chest tightness, shortness of breath, wheezing and stridor  Cardiovascular: Negative for chest pain  Gastrointestinal: Negative for heartburn  Musculoskeletal: Negative for myalgias  Skin: Negative for rash  Allergic/Immunologic: Negative for environmental allergies  Neurological: Negative for headaches           Current Medications Current Outpatient Prescriptions:     amLODIPine (NORVASC) 2 5 mg tablet, , Disp: , Rfl:     azithromycin (ZITHROMAX) 250 mg tablet, Take two po simultaneously day one, then one po days 2-5, Disp: 6 tablet, Rfl: 0    butalbital-acetaminophen-caffeine-codeine (FIORICET WITH CODEINE) -24-30 MG per capsule, , Disp: , Rfl:     Cetirizine HCl (ZYRTEC ALLERGY) 10 MG CAPS, Take 10 mg by mouth daily  , Disp: , Rfl:     dextromethorphan-guaifenesin (MUCINEX DM)  MG per 12 hr tablet, Take 1 tablet by mouth every 12 (twelve) hours for 10 days, Disp: 20 tablet, Rfl: 0    fluticasone (FLOVENT HFA) 110 MCG/ACT inhaler, Inhale 1 puff 2 (two) times a day, Disp: , Rfl:     Levothyroxine Sodium 88 MCG CAPS, Take 1 capsule by mouth daily, Disp: , Rfl:     LISINOPRIL PO, Take 20 mg by mouth daily  , Disp: , Rfl:     METOPROLOL TARTRATE PO, 100 mg 2 (two) times a day  , Disp: , Rfl:     OMEPRAZOLE PO, Take 40 mg by mouth daily as needed  , Disp: , Rfl:     pentosan polysulfate (ELMIRON) 100 mg capsule, Take 1 capsule (100 mg total) by mouth 3 (three) times a day before meals, Disp: 90 capsule, Rfl: 2    predniSONE 50 mg tablet, Take 1 tablet (50 mg total) by mouth daily for 5 days, Disp: 5 tablet, Rfl: 0    RESTASIS 0 05 % ophthalmic emulsion, , Disp: , Rfl:     Topiramate (TOPAMAX PO), Take 150 mg by mouth daily at bedtime, Disp: , Rfl:     Current Allergies     Allergies as of 03/18/2018 - Reviewed 03/18/2018   Allergen Reaction Noted    Shellfish-derived products Anaphylaxis and Hives 02/05/2018    Triptans Shortness Of Breath and Palpitations 10/17/2012    Latex Swelling and Rash 07/26/2012    Sulfa antibiotics Swelling and Rash 10/05/2015            The following portions of the patient's history were reviewed and updated as appropriate: allergies, current medications, past family history, past medical history, past social history, past surgical history and problem list      Past Medical History: Diagnosis Date    Bipolar 1 disorder ([address] )     Disease of thyroid gland     Fibromyalgia     Gout     Hyperlipidemia     Hypertension     Lupus     Migraine        Past Surgical History:   Procedure Laterality Date    HYSTERECTOMY         No family history on file  Medications have been verified  Objective   /68 (BP Location: Right arm, Patient Position: Sitting, Cuff Size: Standard)   Pulse 80   Temp 98 2 °F (36 8 °C) (Temporal)   Resp 16   Ht 5' 5" (1 651 m)   Wt 86 2 kg (190 lb)   SpO2 98%   BMI 31 62 kg/m²        Physical Exam     Physical Exam   Constitutional: She appears well-developed and well-nourished  HENT:   Head: Normocephalic  Right Ear: Hearing, tympanic membrane, external ear and ear canal normal    Left Ear: Hearing, tympanic membrane, external ear and ear canal normal    Nose: Mucosal edema and rhinorrhea present  Mouth/Throat: Posterior oropharyngeal erythema present  No oropharyngeal exudate or posterior oropharyngeal edema  Cardiovascular: Normal rate, regular rhythm, normal heart sounds and intact distal pulses  Exam reveals no gallop and no friction rub  No murmur heard  Pulmonary/Chest: Effort normal  No respiratory distress  She has wheezes (scattered)  She has no rales  She exhibits no tenderness  Abdominal: Soft  Bowel sounds are normal  She exhibits no distension and no mass  There is no tenderness  There is no rebound and no guarding

## 2018-04-06 ENCOUNTER — OFFICE VISIT (OUTPATIENT)
Dept: OBGYN CLINIC | Facility: CLINIC | Age: 43
End: 2018-04-06
Payer: MEDICARE

## 2018-04-06 VITALS
SYSTOLIC BLOOD PRESSURE: 121 MMHG | WEIGHT: 189.4 LBS | HEART RATE: 80 BPM | DIASTOLIC BLOOD PRESSURE: 88 MMHG | BODY MASS INDEX: 31.52 KG/M2

## 2018-04-06 DIAGNOSIS — N30.10 IC (INTERSTITIAL CYSTITIS): ICD-10-CM

## 2018-04-06 DIAGNOSIS — B37.9 YEAST INFECTION: Primary | ICD-10-CM

## 2018-04-06 PROCEDURE — 99214 OFFICE O/P EST MOD 30 MIN: CPT | Performed by: OBSTETRICS & GYNECOLOGY

## 2018-04-06 RX ORDER — FLUCONAZOLE 150 MG/1
150 TABLET ORAL
Qty: 1 TABLET | Refills: 0 | Status: SHIPPED | OUTPATIENT
Start: 2018-04-06 | End: 2018-04-11

## 2018-04-06 NOTE — PROGRESS NOTES
Assessment/Plan:   1  Bladder instillation  -> Secondary to interstitial cystitis, patient will need to obtain another cycle and 2 weeks   -> Patient tolerated procedure well    2  Yeast infection  -> Patient was started on fluconazole Q 48 hours for 3 doses    Return to the office in 2 weeks     Subjective:     Patient ID: Annabelle Sanchez is a 36 y o  female  HPI  39-year-old female presents to the office for a bladder instillation today  The patient states that she just was taking azithromycin for an acute bronchitis  She states that she has had vaginal discharge and feels that could be another yeast infection secondary to the antibiotics  The patient denies any other complaints at this time  Review of Systems   Cardiovascular: Negative  Gastrointestinal: Negative  Genitourinary: Positive for vaginal discharge and vaginal pain  Musculoskeletal: Negative  Skin: Negative  Psychiatric/Behavioral: Negative  All other systems reviewed and are negative  Objective:     Physical Exam   Constitutional: She is oriented to person, place, and time  She appears well-developed and well-nourished  HENT:   Head: Normocephalic  Abdominal: Soft  Bowel sounds are normal  She exhibits no distension  There is no tenderness  There is no rebound and no guarding  Musculoskeletal: Normal range of motion  Neurological: She is alert and oriented to person, place, and time  Skin: Skin is warm and dry  Psychiatric: She has a normal mood and affect  Vaginal exam:   KOH not obtained given that the patient had gross white thick discharge  Presenting like her normal presentation of a yeast infection   Procedure:  Bladder instillation:  Patient tolerated procedure well doctor form for form the whole procedure

## 2018-04-12 DIAGNOSIS — G43.709 CHRONIC MIGRAINE WITHOUT AURA WITHOUT STATUS MIGRAINOSUS, NOT INTRACTABLE: Primary | ICD-10-CM

## 2018-04-12 RX ORDER — TOPIRAMATE 50 MG/1
TABLET, FILM COATED ORAL
Qty: 120 TABLET | Refills: 1 | Status: SHIPPED | OUTPATIENT
Start: 2018-04-12 | End: 2018-05-14 | Stop reason: ALTCHOICE

## 2018-04-18 DIAGNOSIS — M54.2 CERVICALGIA: Primary | ICD-10-CM

## 2018-04-18 RX ORDER — TIZANIDINE 2 MG/1
TABLET ORAL
Qty: 60 TABLET | Refills: 1 | Status: SHIPPED | OUTPATIENT
Start: 2018-04-18 | End: 2018-06-22

## 2018-04-19 ENCOUNTER — OFFICE VISIT (OUTPATIENT)
Dept: OBGYN CLINIC | Facility: CLINIC | Age: 43
End: 2018-04-19
Payer: MEDICARE

## 2018-04-19 VITALS
BODY MASS INDEX: 31.56 KG/M2 | HEIGHT: 65 IN | DIASTOLIC BLOOD PRESSURE: 108 MMHG | SYSTOLIC BLOOD PRESSURE: 158 MMHG | HEART RATE: 88 BPM | WEIGHT: 189.4 LBS

## 2018-04-19 DIAGNOSIS — B37.3 VAGINAL CANDIDIASIS: Primary | ICD-10-CM

## 2018-04-19 PROCEDURE — 99213 OFFICE O/P EST LOW 20 MIN: CPT | Performed by: OBSTETRICS & GYNECOLOGY

## 2018-04-19 RX ORDER — FLUCONAZOLE 150 MG/1
150 TABLET ORAL
Qty: 3 TABLET | Refills: 0 | Status: SHIPPED | OUTPATIENT
Start: 2018-04-19 | End: 2018-04-24

## 2018-04-20 PROBLEM — B37.31 VAGINAL CANDIDIASIS: Status: ACTIVE | Noted: 2018-04-20

## 2018-04-20 PROBLEM — B37.3 VAGINAL CANDIDIASIS: Status: ACTIVE | Noted: 2018-04-20

## 2018-04-20 PROBLEM — N30.10 INTERSTITIAL CYSTITIS: Status: ACTIVE | Noted: 2018-04-20

## 2018-04-20 NOTE — PROGRESS NOTES
Bladder Instillation    Date/Time: 21918    Verbal consent obtained, risks, benefits and alternatives discussed, pt stated understanding of procedure, site prepared with betadine, no anesthesia, no sedation, pt drained 10cc of urine, tolerated procedure well, no complications      Assessment/Plan:    Interstitial Cystitis  - Bladder Instillation performed  - Return in 2 weeks for next instillation     Persistent Vaginal Dryness/Discharge with hx of vaginal candidiasis  - ordered diflucan to use for hx of yeast infection if she needed, however advised to use allergy special non-bacterial soap, advised not to use her antibacterial soap that she currently uses -- her symptoms likely 2/2 the soap she uses  -call if symptoms worsen      Subjective: bladder instillation       Patient ID: Ricky Payton is a 36 y o  female  Pt here for bladder instillation for her interstitial cystitis  Pt found some improvement with the last procedure  Pt also reports some persistent vaginal discharge and dryness, she reports she uses antibacterial soap in the genital area  Hx of yeast infection  Review of Systems   Constitutional: Negative  Respiratory: Negative  Objective:      BP (!) 158/108 (BP Location: Left arm, Patient Position: Sitting, Cuff Size: Standard)   Pulse 88   Ht 5' 5" (1 651 m)   Wt 85 9 kg (189 lb 6 4 oz)   BMI 31 52 kg/m²          Physical Exam   Constitutional: No distress  HENT:   Head: Normocephalic and atraumatic  Right Ear: External ear normal    Left Ear: External ear normal    Neck: Neck supple  Cardiovascular: Normal rate, regular rhythm, normal heart sounds and intact distal pulses  No murmur heard  Pulmonary/Chest: Effort normal and breath sounds normal  No respiratory distress  She has no wheezes  She has no rales  Abdominal: Soft  There is no tenderness  Musculoskeletal: She exhibits no edema, tenderness or deformity  Neurological: She is alert     Skin: Skin is warm and dry  She is not diaphoretic  Psychiatric: She has a normal mood and affect

## 2018-05-14 ENCOUNTER — OFFICE VISIT (OUTPATIENT)
Dept: NEUROLOGY | Facility: CLINIC | Age: 43
End: 2018-05-14
Payer: MEDICARE

## 2018-05-14 VITALS
WEIGHT: 186.3 LBS | DIASTOLIC BLOOD PRESSURE: 88 MMHG | SYSTOLIC BLOOD PRESSURE: 132 MMHG | HEIGHT: 65 IN | HEART RATE: 73 BPM | BODY MASS INDEX: 31.04 KG/M2

## 2018-05-14 DIAGNOSIS — G43.709 CHRONIC MIGRAINE WITHOUT AURA WITHOUT STATUS MIGRAINOSUS, NOT INTRACTABLE: Primary | ICD-10-CM

## 2018-05-14 PROCEDURE — 99214 OFFICE O/P EST MOD 30 MIN: CPT | Performed by: PSYCHIATRY & NEUROLOGY

## 2018-05-14 RX ORDER — PROPRANOLOL HYDROCHLORIDE 20 MG/1
160 TABLET ORAL DAILY
COMMUNITY
End: 2019-05-22 | Stop reason: SDUPTHER

## 2018-05-14 RX ORDER — VENLAFAXINE HYDROCHLORIDE 150 MG/1
150 CAPSULE, EXTENDED RELEASE ORAL DAILY
COMMUNITY
End: 2018-09-24

## 2018-05-14 RX ORDER — DIVALPROEX SODIUM 250 MG/1
TABLET, EXTENDED RELEASE ORAL
Qty: 60 TABLET | Refills: 6 | Status: SHIPPED | OUTPATIENT
Start: 2018-05-14 | End: 2018-07-20

## 2018-05-14 RX ORDER — TOPIRAMATE 100 MG/1
TABLET, FILM COATED ORAL
Qty: 90 TABLET | Refills: 6 | Status: SHIPPED | OUTPATIENT
Start: 2018-05-14 | End: 2018-09-27 | Stop reason: SDUPTHER

## 2018-05-14 NOTE — PROGRESS NOTES
Patient ID: Joo Pinto is a 36 y o  female  Assessment/Plan:     Problem List Items Addressed This Visit        Cardiovascular and Mediastinum    Chronic migraine without aura without status migrainosus, not intractable - Primary    Relevant Medications    venlafaxine (EFFEXOR XR) 150 mg 24 hr capsule    propranolol (INDERAL) 20 mg tablet    topiramate (TOPAMAX) 100 mg tablet    divalproex sodium (DEPAKOTE ER) 250 mg 24 hr tablet          Preventive therapy for headaches:  -  Continue venlafaxine 150 milligram once a day  -  Will increase her Topamax to 100 milligrams in the morning and 200 milligrams at bedtime now  -  Will add Depakote 250 milligrams for 3 days then 250 milligrams twice a day after that  -  Using tizanidine 2 milligrams daily for neck  -  Continue Botox for now   Abortive therapy:  -   She is still using Motrin and Tylenol at least 3 to 4 times a week  Goal is to decrease the frequency of abortive medication    Subjective:  HPI  We had the pleasure of evaluating Maranda Edgar in neurological follow up today  As you know she is a 36year old right handed female  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  She has a PMH of palpitations and has had an ablation  She continues to see her cardiologist     Chronic Migraine headaches:   PREVENTIVE used: Lisinopril, Metoprolol, Topamax, cyclobenzaprine, labetalol, Seroquel, Tizanidine, carbamazepine, citalopram, duloxetine, venlafaxine, Lyrica, Propranolol, olanzapine- ineffective, cyproheptadine  ABORTIVE used: Dexamethasone, Fioricet, Naproxen Promethazine, Kenalog, Percocet, tramadol, Toradol, narcotics  - Headache trigger: Fatigue, Stress/Tension, Weather change, lack of sleep  - Aura - none    Since starting botox, the patient reports greater than 7 days of migraine relief from baseline, correlated with headache diary, decreased abortive medication use and decreased ER visits      How often do the headaches occur - 3-4 a week and they are more responsive to motrin and tylenol as this will abort the headache  Has a headache of 6/10 now  What time of the day do the headaches start - either wakes up with them or states it is mid day  How long do the headaches last - all day  Where are they located - face and frontal region and also has a lot of neck pain with this  What is the intensity of pain - 10/10 at max  Describe your usual headache - Throbbing, Pounding  Associated symptoms:   - Decrease of appetite, nausea   - Photophobia, phonophobia, sensitivity to smell   - Problem with concentration  - prefer to be alone and in a dark room, unable to work       Reviewed ROS  The following portions of the patient's history were reviewed and updated as appropriate: allergies, current medications, past family history, past medical history, past social history, past surgical history and problem list          Objective:    Blood pressure 132/88, pulse 73, height 5' 5" (1 651 m), weight 84 5 kg (186 lb 4 8 oz), not currently breastfeeding  Physical Exam   Constitutional: She appears well-developed  Eyes: EOM are normal  Pupils are equal, round, and reactive to light  Neck: Normal range of motion  Neck supple  Cardiovascular: Normal rate  Pulmonary/Chest: Effort normal    Abdominal: Soft  Musculoskeletal: Normal range of motion  Neurological: She has normal strength and normal reflexes  Gait and coordination normal    Skin: Skin is warm  Psychiatric: She has a normal mood and affect  Her speech is normal        Neurological Exam    Mental Status  The patient is alert  Her speech is normal  Her language is fluent with no aphasia  She has normal attention span and concentration       Cranial Nerves    CN II: The patient's visual acuity and visual fields are normal   CN III, IV, VI: The patient's pupils are equally round and reactive to light and ocular movements are normal   CN V: The patient has normal facial sensation  CN VII:  The patient has symmetric facial movement  CN VIII:  The patient's hearing is normal   CN IX, X: The patient has symmetric palate movement and normal gag reflex  CN XI: The patient's shoulder shrug strength is normal   CN XII: The patient's tongue is midline without atrophy or fasciculations  Motor  The patient has normal muscle bulk throughout  Her overall muscle tone is normal throughout  Her strength is 5/5 throughout all four extremities  Sensory  The patient's sensation is normal in all four extremities  Reflexes  Deep tendon reflexes are 2+ and symmetric in all four extremities with downgoing toes bilaterally  Gait and Coordination  The patient has normal gait and station and normal casual, toe, heel, and tandem gait  She has normal coordination bilaterally  ROS:    Review of Systems   Constitutional: Negative  Negative for appetite change and fever  HENT: Positive for congestion, sinus pain and sinus pressure  Negative for hearing loss, tinnitus, trouble swallowing and voice change  Eyes: Positive for visual disturbance  Negative for photophobia and pain  Respiratory: Positive for shortness of breath  Cardiovascular: Negative  Negative for palpitations  Gastrointestinal: Positive for nausea  Negative for vomiting  Endocrine: Negative  Negative for cold intolerance and heat intolerance  Genitourinary: Negative  Negative for dysuria, frequency and urgency  Musculoskeletal: Positive for arthralgias, back pain, myalgias, neck pain and neck stiffness  Skin: Negative  Negative for rash  Neurological: Positive for headaches  Negative for dizziness, tremors, seizures, syncope, facial asymmetry, speech difficulty, weakness, light-headedness and numbness  Hematological: Negative  Does not bruise/bleed easily  Psychiatric/Behavioral: Positive for confusion  Negative for hallucinations and sleep disturbance

## 2018-05-14 NOTE — PATIENT INSTRUCTIONS
Preventive therapy for headaches:  -  Continue venlafaxine 150 milligram once a day  -  Will increase her Topamax to 100 milligrams in the morning and 200 milligrams at bedtime now  -  Will add Depakote 250 milligrams for 3 days then 250 milligrams twice a day after that  -  Using tizanidine 2 milligrams daily for neck  -  Continue Botox for now   Abortive therapy:  -   She is still using Motrin and Tylenol at least 3 to 4 times a week    Goal is to decrease the frequency of abortive medication

## 2018-05-17 ENCOUNTER — OFFICE VISIT (OUTPATIENT)
Dept: OBGYN CLINIC | Facility: CLINIC | Age: 43
End: 2018-05-17
Payer: MEDICARE

## 2018-05-17 VITALS
SYSTOLIC BLOOD PRESSURE: 119 MMHG | DIASTOLIC BLOOD PRESSURE: 79 MMHG | HEIGHT: 65 IN | WEIGHT: 187 LBS | BODY MASS INDEX: 31.16 KG/M2 | HEART RATE: 73 BPM

## 2018-05-17 DIAGNOSIS — N30.10 INTERSTITIAL CYSTITIS: ICD-10-CM

## 2018-05-17 PROCEDURE — 51700 IRRIGATION OF BLADDER: CPT | Performed by: OBSTETRICS & GYNECOLOGY

## 2018-05-17 NOTE — PROGRESS NOTES
The patient was asked to empty her bladder to the extent possible prior to disrobing  Sterile technique employed  Patient and procedure were  Identified and patient was prepped  Straight catheter placed  The bladder was drained of 10 cc of urine  A preparation of a mixture of heparin sodium 10,000u/cc (2cc), lidocaine 2% (8cc), 8 4% sodium bicarbonate (3cc) was instilled  The catheter was removed and the patient was instructed not to void for at least 1 hour  NOTE:  ** (EHR System may automatically enter that 1 vial BCG was used when it was not  Please see above written detail for instillation components  NO BCG USED)    Some portions of this record may have been generated with voice recognition software  There may be translation, syntax, or grammatical errors  Occasional wrong word or "sound-a-like" substitutions may have occurred due to the inherent limitations of the voice recognition software  Read the chart carefully and recognize, using context, where substations may have occurred   If you have any questions, please contact the dictating provider for clarification or correction, as needed

## 2018-06-01 ENCOUNTER — OFFICE VISIT (OUTPATIENT)
Dept: OBGYN CLINIC | Facility: CLINIC | Age: 43
End: 2018-06-01
Payer: MEDICARE

## 2018-06-01 VITALS
DIASTOLIC BLOOD PRESSURE: 91 MMHG | HEIGHT: 65 IN | HEART RATE: 69 BPM | BODY MASS INDEX: 30.56 KG/M2 | SYSTOLIC BLOOD PRESSURE: 145 MMHG | WEIGHT: 183.4 LBS

## 2018-06-01 DIAGNOSIS — N30.10 INTERSTITIAL CYSTITIS: Primary | ICD-10-CM

## 2018-06-01 PROCEDURE — 51700 IRRIGATION OF BLADDER: CPT | Performed by: OBSTETRICS & GYNECOLOGY

## 2018-06-01 NOTE — PROGRESS NOTES
The patient was asked to empty her bladder to the extent possible prior to disrobing  Sterile technique employed  Patient  and procedure were identified and patient was prepped  Straight catheter placed  The bladder was drained of 3 cc of urine  A preparation of a mixture of heparin sodium 10,000u/cc (2cc), lidocaine 2% (8cc), 8 4% sodium bicarbonate (3cc) was instilled  The catheter was removed and the patient was instructed not to void for at least 1 hour  NOTE: No BCG used  Pt desires to return in 1 week

## 2018-06-05 NOTE — PROGRESS NOTES
Chemodenervation  Date/Time: 6/6/2018 11:50 AM  Performed by: Jeanine Ramey  Authorized by: Brian Elias details:     Prepped With: Alcohol    Anesthesia (see MAR for exact dosages): Anesthesia method:  None  Procedure details:     Position:  Upright  Botox:     Botox Type:  Type A    Brand:  Botox    Final Concentration per CC:  200 units    Needle Gauge:  30 G 2 5 inch  Procedures:     Botox Procedures: chronic headache      Indications: migraines    Injection Location:     Head / Face:  L superior cervical paraspinal, R superior cervical paraspinal, L , R , L frontalis, R frontalis, L medial occipitalis, R medial occipitalis, procerus, R temporalis, L temporalis, R superior trapezius and L superior trapezius    L  injection amount:  5 unit(s)    R  injection amount:  5 unit(s)    L lateral frontalis:  5 unit(s)    R lateral frontalis:  5 unit(s)    L medial frontalis:  5 unit(s)    R medial frontalis:  5 unit(s)    L temporalis injection amount:  20 unit(s)    R temporalis injection amount:  20 unit(s)    Procerus injection amount:  5 unit(s)    L medial occipitalis injection amount:  15 unit(s)    R medial occipitalis injection amount:  15 unit(s)    L superior cervical paraspinal injection amount:  10 unit(s)    R superior cervical paraspinal injection amount:  10 unit(s)    L superior trapezius injection amount:  15 unit(s)    R superior trapezius injection amount:  15 unit(s)  Post-procedure details:     Chemodenervation:  Chronic migraine    Facial Nerve Location[de-identified]  Bilateral facial nerve    Patient tolerance of procedure:   Tolerated well, no immediate complications       5 units in the orbicularis oculi bilaterally     35 units given in the frontalis     which was medically necessary

## 2018-06-06 ENCOUNTER — PROCEDURE VISIT (OUTPATIENT)
Dept: NEUROLOGY | Facility: CLINIC | Age: 43
End: 2018-06-06
Payer: MEDICARE

## 2018-06-06 VITALS — TEMPERATURE: 98.8 F | SYSTOLIC BLOOD PRESSURE: 127 MMHG | HEART RATE: 71 BPM | DIASTOLIC BLOOD PRESSURE: 78 MMHG

## 2018-06-06 DIAGNOSIS — G43.709 CHRONIC MIGRAINE WITHOUT AURA WITHOUT STATUS MIGRAINOSUS, NOT INTRACTABLE: Primary | ICD-10-CM

## 2018-06-06 PROCEDURE — 64615 CHEMODENERV MUSC MIGRAINE: CPT | Performed by: PSYCHIATRY & NEUROLOGY

## 2018-06-06 RX ORDER — DEXAMETHASONE 2 MG/1
TABLET ORAL
Qty: 5 TABLET | Refills: 0 | Status: SHIPPED | OUTPATIENT
Start: 2018-06-06 | End: 2018-07-18 | Stop reason: SDUPTHER

## 2018-06-13 DIAGNOSIS — G43.709 CHRONIC MIGRAINE WITHOUT AURA WITHOUT STATUS MIGRAINOSUS, NOT INTRACTABLE: ICD-10-CM

## 2018-06-13 RX ORDER — TOPIRAMATE 50 MG/1
TABLET, FILM COATED ORAL
Qty: 120 TABLET | Refills: 1 | Status: SHIPPED | OUTPATIENT
Start: 2018-06-13 | End: 2018-06-22

## 2018-06-22 ENCOUNTER — OFFICE VISIT (OUTPATIENT)
Dept: OBGYN CLINIC | Facility: CLINIC | Age: 43
End: 2018-06-22
Payer: MEDICARE

## 2018-06-22 VITALS
SYSTOLIC BLOOD PRESSURE: 145 MMHG | HEART RATE: 78 BPM | BODY MASS INDEX: 30.79 KG/M2 | WEIGHT: 182.2 LBS | DIASTOLIC BLOOD PRESSURE: 95 MMHG

## 2018-06-22 DIAGNOSIS — N30.10 CHRONIC INTERSTITIAL CYSTITIS WITHOUT HEMATURIA: Primary | ICD-10-CM

## 2018-06-22 PROCEDURE — 51700 IRRIGATION OF BLADDER: CPT | Performed by: OBSTETRICS & GYNECOLOGY

## 2018-06-22 NOTE — PROGRESS NOTES
The patient was asked to empty her bladder to the extent possible prior to disrobing  Sterile technique employed  Patient  and procedure were  Identified and patient was prepped  Straight catheter placed  The bladder was drained of  of urine  A preparation of a mixture of heparin sodium 10,000u/cc (2cc), lidocaine 2% (8cc), 8 4% sodium bicarbonate (3cc) was instilled  The catheter was removed and the patient was instructed not to void for at least 1 hour  NOTE:  ** (EHR System may automatically enter that 1 vial BCG was used when it was not  Please see above written detail for instillation components  NO BCG USED)    Some portions of this record may have been generated with voice recognition software  There may be translation, syntax, or grammatical errors  Occasional wrong word or "sound-a-like" substitutions may have occurred due to the inherent limitations of the voice recognition software  Read the chart carefully and recognize, using context, where substations may have occurred   If you have any questions, please contact the dictating provider for clarification or correction, as needed

## 2018-06-22 NOTE — PATIENT INSTRUCTIONS
Follow up per routine  Please attempt not to void for 1 hour if possible  Please feel free to call us with any questions or concerns

## 2018-07-18 ENCOUNTER — TELEPHONE (OUTPATIENT)
Dept: NEUROLOGY | Facility: CLINIC | Age: 43
End: 2018-07-18

## 2018-07-18 DIAGNOSIS — G43.709 CHRONIC MIGRAINE WITHOUT AURA WITHOUT STATUS MIGRAINOSUS, NOT INTRACTABLE: ICD-10-CM

## 2018-07-18 NOTE — TELEPHONE ENCOUNTER
Patient states she's had a migraine for 2 days, and says depakote doesn't help  Pain 8/10      When did migraine start? 2 days ago  Pain scale: 8  Associated symptoms:nausea, photophobia  Precipitating factors: no particular thing  Alleviating factors: nothing  What medications have you tried for this migraine headache? unable to obtain relief with OTC medications   Informed patient that we do not recommend they take any triptan or OTC med more than 3 days in any 1 week due to medication over use headache and CVA risk with overuse; verbalized understanding     Current migraine medications are confirmed as:  topamax 100 mg TID  depakote 250 mg BID    Medications tried in the past? Patient requesting decadron

## 2018-07-19 RX ORDER — DEXAMETHASONE 2 MG/1
TABLET ORAL
Qty: 3 TABLET | Refills: 0 | Status: SHIPPED | OUTPATIENT
Start: 2018-07-19 | End: 2018-09-24

## 2018-07-20 ENCOUNTER — PROCEDURE VISIT (OUTPATIENT)
Dept: OBGYN CLINIC | Facility: CLINIC | Age: 43
End: 2018-07-20
Payer: MEDICARE

## 2018-07-20 VITALS
SYSTOLIC BLOOD PRESSURE: 130 MMHG | HEART RATE: 80 BPM | HEIGHT: 65 IN | BODY MASS INDEX: 30.06 KG/M2 | DIASTOLIC BLOOD PRESSURE: 80 MMHG | WEIGHT: 180.4 LBS

## 2018-07-20 DIAGNOSIS — N30.10 INTERSTITIAL CYSTITIS: ICD-10-CM

## 2018-07-20 DIAGNOSIS — B37.3 VAGINAL CANDIDIASIS: Primary | ICD-10-CM

## 2018-07-20 PROCEDURE — 99213 OFFICE O/P EST LOW 20 MIN: CPT | Performed by: OBSTETRICS & GYNECOLOGY

## 2018-07-20 RX ORDER — FLUCONAZOLE 150 MG/1
150 TABLET ORAL DAILY
Qty: 4 TABLET | Refills: 0 | Status: SHIPPED | OUTPATIENT
Start: 2018-07-20 | End: 2018-07-24

## 2018-07-20 NOTE — PROGRESS NOTES
Bladder instillation procedure: The patient was asked to empty her bladder to the extent possible prior to disrobing      Sterile technique employed  Patient  and procedure were  Identified and patient was prepped  Straight catheter placed  The bladder was drained of  of urine  A preparation of a mixture of heparin sodium 10,000u/cc (2cc), lidocaine 2% (8cc), 8 4% sodium bicarbonate (3cc) was instilled  The catheter was removed and the patient was instructed not to void for at least 1 hour  Procedure done by Dr Ayana Lara  Discussed with patient to schedule annual gyn visit  Patient was complaining of vaginal discharge  Prescribed Diflucan

## 2018-07-21 ENCOUNTER — HOSPITAL ENCOUNTER (EMERGENCY)
Facility: HOSPITAL | Age: 43
Discharge: HOME | End: 2018-07-22
Attending: EMERGENCY MEDICINE | Admitting: EMERGENCY MEDICINE
Payer: MEDICARE

## 2018-07-21 DIAGNOSIS — R11.0 NAUSEA: ICD-10-CM

## 2018-07-21 DIAGNOSIS — G43.909 MIGRAINE: Primary | ICD-10-CM

## 2018-07-21 PROCEDURE — 96365 THER/PROPH/DIAG IV INF INIT: CPT

## 2018-07-21 PROCEDURE — 96375 TX/PRO/DX INJ NEW DRUG ADDON: CPT

## 2018-07-21 RX ORDER — MAGNESIUM SULFATE HEPTAHYDRATE 40 MG/ML
2 INJECTION, SOLUTION INTRAVENOUS ONCE
Status: COMPLETED | OUTPATIENT
Start: 2018-07-21 | End: 2018-07-22

## 2018-07-21 RX ORDER — METOCLOPRAMIDE HYDROCHLORIDE 5 MG/ML
10 INJECTION INTRAMUSCULAR; INTRAVENOUS ONCE
Status: COMPLETED | OUTPATIENT
Start: 2018-07-21 | End: 2018-07-21

## 2018-07-21 RX ORDER — KETOROLAC TROMETHAMINE 30 MG/ML
30 INJECTION, SOLUTION INTRAMUSCULAR; INTRAVENOUS ONCE
Status: COMPLETED | OUTPATIENT
Start: 2018-07-21 | End: 2018-07-21

## 2018-07-21 RX ORDER — DIPHENHYDRAMINE HYDROCHLORIDE 50 MG/ML
25 INJECTION INTRAMUSCULAR; INTRAVENOUS ONCE
Status: COMPLETED | OUTPATIENT
Start: 2018-07-21 | End: 2018-07-21

## 2018-07-21 RX ADMIN — METOCLOPRAMIDE 10 MG: 5 INJECTION, SOLUTION INTRAMUSCULAR; INTRAVENOUS at 23:38

## 2018-07-21 RX ADMIN — MAGNESIUM SULFATE HEPTAHYDRATE 2 G: 40 INJECTION, SOLUTION INTRAVENOUS at 23:44

## 2018-07-21 RX ADMIN — DIPHENHYDRAMINE HYDROCHLORIDE 25 MG: 50 INJECTION, SOLUTION INTRAMUSCULAR; INTRAVENOUS at 23:35

## 2018-07-21 RX ADMIN — KETOROLAC TROMETHAMINE 30 MG: 30 INJECTION, SOLUTION INTRAMUSCULAR at 23:36

## 2018-07-21 RX ADMIN — SODIUM CHLORIDE 1000 ML: 0.9 INJECTION, SOLUTION INTRAVENOUS at 23:15

## 2018-07-22 VITALS
SYSTOLIC BLOOD PRESSURE: 110 MMHG | TEMPERATURE: 98.4 F | DIASTOLIC BLOOD PRESSURE: 62 MMHG | OXYGEN SATURATION: 97 % | RESPIRATION RATE: 14 BRPM | HEART RATE: 88 BPM

## 2018-07-22 PROCEDURE — 99283 EMERGENCY DEPT VISIT LOW MDM: CPT

## 2018-07-22 RX ORDER — METOCLOPRAMIDE 10 MG/1
10 TABLET ORAL EVERY 6 HOURS
Qty: 12 TABLET | Refills: 0 | Status: SHIPPED | OUTPATIENT
Start: 2018-07-22 | End: 2018-07-25

## 2018-07-22 NOTE — ED PROVIDER NOTES
History  Chief Complaint   Patient presents with    Migraine     Pt w hx of migraines c/o migraine all day with nausea and vomiting  HPI  Patient is a 39year old female pmh HTN, BPPV, chronic migraines presenting with 3 days of headache  Patient states that she had gradual onset headache with photophobia, nausea, and vomiting starting three days ago  She took dexamethasone provided by her neurologist for abortive therapy which did not diminish her headache  She states that her headache acutely worsened last night and that she has not been able to tolerate anything orally since that time  She states that this headache feels similar to previous migraines  She has had accompanying dizziness worsened by head movement  She denies fever, chills, neck stiffness  Patient states that triptan medications cause her palpitations  Prior to Admission Medications   Prescriptions Last Dose Informant Patient Reported? Taking? Cetirizine HCl (ZYRTEC ALLERGY) 10 MG CAPS   Yes Yes   Sig: Take 10 mg by mouth daily     LISINOPRIL PO   Yes Yes   Sig: Take 20 mg by mouth daily     Levothyroxine Sodium 88 MCG CAPS   Yes Yes   Sig: Take 1 capsule by mouth daily   METOPROLOL TARTRATE PO   Yes Yes   Si mg 2 (two) times a day     Multiple Vitamins-Minerals (MULTIVITAMIN ADULT PO)  Self Yes Yes   Sig: Take 1 tablet by mouth daily   Probiotic Product (PROBIOTIC DAILY PO)  Self Yes Yes   Sig: Take 1 tablet by mouth daily   RESTASIS 0 05 % ophthalmic emulsion   Yes Yes   amLODIPine (NORVASC) 2 5 mg tablet   Yes Yes   butalbital-acetaminophen-caffeine-codeine (FIORICET WITH CODEINE) -26-30 MG per capsule   Yes Yes   dexamethasone (DECADRON) 2 mg tablet   No Yes   Sig: Take 1 tablet in am for 3 days for headache     fluconazole (DIFLUCAN) 150 mg tablet   No Yes   Sig: Take 1 tablet (150 mg total) by mouth daily for 4 days   fluticasone (FLOVENT HFA) 110 MCG/ACT inhaler   Yes Yes   Sig: Inhale 1 puff 2 (two) times a day pentosan polysulfate (ELMIRON) 100 mg capsule   No Yes   Sig: Take 1 capsule (100 mg total) by mouth 3 (three) times a day before meals   propranolol (INDERAL) 20 mg tablet   Yes Yes   Sig: Take 20 mg by mouth every 12 (twelve) hours   topiramate (TOPAMAX) 100 mg tablet   No Yes   Sig: One in am and two at bedtime daily   venlafaxine (EFFEXOR XR) 150 mg 24 hr capsule   Yes Yes   Sig: Take 150 mg by mouth daily      Facility-Administered Medications: None       Past Medical History:   Diagnosis Date    Bipolar 1 disorder ([facility] [address] )     Disease of thyroid gland     Fibromyalgia     Gout     Hyperlipidemia     Hypertension     Lupus     Migraine        Past Surgical History:   Procedure Laterality Date    HYSTERECTOMY         Family History   Problem Relation Age of Onset    Lupus Mother     Osteoporosis Mother     Hypotension Mother     Hypertension Father     Diabetes Father     Heart disease Father     Diabetes Sister     Schizophrenia Sister     Diabetes Maternal Grandmother     Rheum arthritis Maternal Grandmother     Hypertension Maternal Grandmother     Breast cancer Maternal Aunt      I have reviewed and agree with the history as documented  Social History   Substance Use Topics    Smoking status: Former Smoker    Smokeless tobacco: Never Used    Alcohol use No        Review of Systems   Constitutional: Negative for activity change, appetite change, chills, fatigue and fever  HENT: Negative for ear discharge, ear pain, facial swelling, hearing loss, sinus pressure and sore throat  Eyes: Positive for photophobia  Negative for pain and visual disturbance  Respiratory: Negative for cough, chest tightness, shortness of breath and wheezing  Cardiovascular: Negative for chest pain  Gastrointestinal: Positive for nausea and vomiting  Negative for abdominal pain, constipation and diarrhea  Genitourinary: Negative for dysuria and flank pain     Musculoskeletal: Negative for back pain, neck pain and neck stiffness  Skin: Negative for rash  Neurological: Positive for dizziness and headaches  Negative for seizures, syncope, speech difficulty, light-headedness and numbness  Physical Exam  ED Triage Vitals   Temperature Pulse Respirations Blood Pressure SpO2   07/21/18 2202 07/21/18 2202 07/21/18 2202 07/21/18 2202 07/21/18 2202   98 4 °F (36 9 °C) 85 14 147/74 99 %      Temp Source Heart Rate Source Patient Position - Orthostatic VS BP Location FiO2 (%)   07/21/18 2202 07/21/18 2202 -- 07/21/18 2202 --   Oral Monitor  Right arm       Pain Score       07/22/18 0030       5           Orthostatic Vital Signs  Vitals:    07/21/18 2202 07/22/18 0030   BP: 147/74 110/62   Pulse: 85 88       Physical Exam   Constitutional: She is oriented to person, place, and time  She appears well-developed and well-nourished  No distress  HENT:   Head: Normocephalic and atraumatic  Right Ear: External ear normal    Left Ear: External ear normal    Nose: Nose normal    Mouth/Throat: Oropharynx is clear and moist  No oropharyngeal exudate  Eyes: Pupils are equal, round, and reactive to light  Neck: Normal range of motion  Cardiovascular: Normal rate, regular rhythm and normal heart sounds  Exam reveals no gallop and no friction rub  No murmur heard  Pulmonary/Chest: Effort normal and breath sounds normal  No respiratory distress  She has no wheezes  She exhibits no tenderness  Abdominal: Soft  Bowel sounds are normal  She exhibits no distension and no mass  There is no tenderness  There is no guarding  Musculoskeletal: Normal range of motion  She exhibits no edema or deformity  Lymphadenopathy:     She has no cervical adenopathy  Neurological: She is alert and oriented to person, place, and time  She has normal strength  No cranial nerve deficit or sensory deficit  She displays a negative Romberg sign  Coordination and gait normal    Skin: Skin is warm and dry   Capillary refill takes less than 2 seconds  She is not diaphoretic  Psychiatric: She has a normal mood and affect  Nursing note and vitals reviewed  ED Medications  Medications   diphenhydrAMINE (BENADRYL) injection 25 mg (25 mg Intravenous Given 7/21/18 2335)   metoclopramide (REGLAN) injection 10 mg (10 mg Intravenous Given 7/21/18 2338)   ketorolac (TORADOL) injection 30 mg (30 mg Intravenous Given 7/21/18 2336)   magnesium sulfate 2 g/50 mL IVPB (premix) 2 g (0 g Intravenous Stopped 7/22/18 0044)   sodium chloride 0 9 % bolus 1,000 mL (0 mL Intravenous Stopped 7/22/18 0044)       Diagnostic Studies  Results Reviewed     None                 No orders to display         Procedures  Procedures      Phone Consults  ED Phone Contact    ED Course                               MDM  Number of Diagnoses or Management Options  Migraine:   Nausea:   Diagnosis management comments: 39year old female with pmh HTN and chronic migraines presenting with a headache for past 3 days  Headache is not sudden or maximal in onset, no red flags for [facility]  Patient afebrile, non-toxic, not concerning for meningitis  Patient's neurological exam was non-focal  Based on the patient's history and physical, I believe this headache is consistent with a patient's previous migraines  She was given a liter of fluid given her inability to tolerate fluids by mouth and given a migraine cocktail of benadryl, toradol, mag sulfate, and reglan  Patient stated that her headache was still present but improved and stated she was no longer nauseous  She was discharged to home with a prescription for reglan and return precautions       CritCare Time    Disposition  Final diagnoses:   Migraine   Nausea     Time reflects when diagnosis was documented in both MDM as applicable and the Disposition within this note     Time User Action Codes Description Comment    7/22/2018 12:47 AM Deandre Parsons [G43 [id number]] Migraine     7/22/2018 12:47 AM Deandre Parsons [R11 [id number]] Nausea       ED Disposition     ED Disposition Condition Comment    Discharge  Adamariscarmelita Ambriz discharge to home/self care      Condition at discharge: Good        Follow-up Information     Follow up With Specialties Details Why Contact Info Additional Information    Ana Maria Erazo MD Internal Medicine  As needed [phone]  [place]  [place] [address] Emergency Department Emergency Medicine  As needed [address]  [facility] ED, [address], [place], [address] [facility], [address]          Discharge Medication List as of 7/22/2018 12:55 AM      START taking these medications    Details   metoclopramide (REGLAN) 10 mg tablet Take 1 tablet (10 mg total) by mouth every 6 (six) hours for 3 days, Starting Sun 7/22/2018, Until Wed 7/25/2018, Normal         CONTINUE these medications which have NOT CHANGED    Details   amLODIPine (NORVASC) 2 5 mg tablet Starting Fri 2/23/2018, Historical Med      butalbital-acetaminophen-caffeine-codeine (FIORICET WITH CODEINE) -15-30 MG per capsule Starting Fri 2/23/2018, Historical Med      Cetirizine HCl (ZYRTEC ALLERGY) 10 MG CAPS Take 10 mg by mouth daily  , Until Discontinued, Historical Med      dexamethasone (DECADRON) 2 mg tablet Take 1 tablet in am for 3 days for headache , Normal      fluconazole (DIFLUCAN) 150 mg tablet Take 1 tablet (150 mg total) by mouth daily for 4 days, Starting Fri 7/20/2018, Until Tue 7/24/2018, Normal      fluticasone (FLOVENT HFA) 110 MCG/ACT inhaler Inhale 1 puff 2 (two) times a day, Historical Med      Levothyroxine Sodium 88 MCG CAPS Take 1 capsule by mouth daily, Until Discontinued, Historical Med      LISINOPRIL PO Take 20 mg by mouth daily  , Until Discontinued, Historical Med      METOPROLOL TARTRATE  mg 2 (two) times a day  , Until Discontinued, Historical Med      Multiple Vitamins-Minerals (MULTIVITAMIN ADULT PO) Take 1 tablet by mouth daily, Historical Med      pentosan polysulfate (ELMIRON) 100 mg capsule Take 1 capsule (100 mg total) by mouth 3 (three) times a day before meals, Starting Thu 2/15/2018, Normal      Probiotic Product (PROBIOTIC DAILY PO) Take 1 tablet by mouth daily, Historical Med      propranolol (INDERAL) 20 mg tablet Take 20 mg by mouth every 12 (twelve) hours, Historical Med      RESTASIS 0 05 % ophthalmic emulsion Starting Tue 2/6/2018, Historical Med      topiramate (TOPAMAX) 100 mg tablet One in am and two at bedtime daily, Normal      venlafaxine (EFFEXOR XR) 150 mg 24 hr capsule Take 150 mg by mouth daily, Historical Med           No discharge procedures on file  ED Provider  Attending physically available and evaluated Alden Herr I managed the patient along with the ED Attending      Electronically Signed by         Joshua Navas MD  07/22/18 7485

## 2018-07-23 ENCOUNTER — APPOINTMENT (EMERGENCY)
Dept: RADIOLOGY | Facility: HOSPITAL | Age: 43
End: 2018-07-23
Payer: MEDICARE

## 2018-07-23 ENCOUNTER — HOSPITAL ENCOUNTER (EMERGENCY)
Facility: HOSPITAL | Age: 43
Discharge: HOME | End: 2018-07-23
Attending: EMERGENCY MEDICINE
Payer: MEDICARE

## 2018-07-23 VITALS
DIASTOLIC BLOOD PRESSURE: 91 MMHG | SYSTOLIC BLOOD PRESSURE: 165 MMHG | HEART RATE: 92 BPM | RESPIRATION RATE: 18 BRPM | TEMPERATURE: 98.7 F | WEIGHT: 180.78 LBS | BODY MASS INDEX: 30.08 KG/M2 | OXYGEN SATURATION: 99 %

## 2018-07-23 DIAGNOSIS — G43.909 MIGRAINE HEADACHE: Primary | ICD-10-CM

## 2018-07-23 LAB
ALBUMIN SERPL BCP-MCNC: 3.6 G/DL (ref 3.5–5)
ALP SERPL-CCNC: 76 U/L (ref 46–116)
ALT SERPL W P-5'-P-CCNC: 38 U/L (ref 12–78)
ANION GAP SERPL CALCULATED.3IONS-SCNC: 6 MMOL/L (ref 4–13)
AST SERPL W P-5'-P-CCNC: 17 U/L (ref 5–45)
BASOPHILS # BLD AUTO: 0.02 THOUSANDS/ΜL (ref 0–0.1)
BASOPHILS NFR BLD AUTO: 0 % (ref 0–1)
BILIRUB SERPL-MCNC: 0.26 MG/DL (ref 0.2–1)
BUN SERPL-MCNC: 7 MG/DL (ref 5–25)
CALCIUM SERPL-MCNC: 8.8 MG/DL (ref 8.3–10.1)
CHLORIDE SERPL-SCNC: 107 MMOL/L (ref 100–108)
CO2 SERPL-SCNC: 28 MMOL/L (ref 21–32)
CREAT SERPL-MCNC: 0.62 MG/DL (ref 0.6–1.3)
EOSINOPHIL # BLD AUTO: 0.05 THOUSAND/ΜL (ref 0–0.61)
EOSINOPHIL NFR BLD AUTO: 1 % (ref 0–6)
ERYTHROCYTE [DISTWIDTH] IN BLOOD BY AUTOMATED COUNT: 12.3 % (ref 11.6–15.1)
GFR SERPL CREATININE-BSD FRML MDRD: 110 ML/MIN/1.73SQ M
GLUCOSE SERPL-MCNC: 99 MG/DL (ref 65–140)
HCT VFR BLD AUTO: 37.1 % (ref 34.8–46.1)
HGB BLD-MCNC: 12.3 G/DL (ref 11.5–15.4)
IMM GRANULOCYTES # BLD AUTO: 0.03 THOUSAND/UL (ref 0–0.2)
IMM GRANULOCYTES NFR BLD AUTO: 0 % (ref 0–2)
LYMPHOCYTES # BLD AUTO: 1.82 THOUSANDS/ΜL (ref 0.6–4.47)
LYMPHOCYTES NFR BLD AUTO: 22 % (ref 14–44)
MCH RBC QN AUTO: 29.2 PG (ref 26.8–34.3)
MCHC RBC AUTO-ENTMCNC: 33.2 G/DL (ref 31.4–37.4)
MCV RBC AUTO: 88 FL (ref 82–98)
MONOCYTES # BLD AUTO: 0.6 THOUSAND/ΜL (ref 0.17–1.22)
MONOCYTES NFR BLD AUTO: 7 % (ref 4–12)
NEUTROPHILS # BLD AUTO: 5.66 THOUSANDS/ΜL (ref 1.85–7.62)
NEUTS SEG NFR BLD AUTO: 70 % (ref 43–75)
NRBC BLD AUTO-RTO: 0 /100 WBCS
PLATELET # BLD AUTO: 227 THOUSANDS/UL (ref 149–390)
PMV BLD AUTO: 9.9 FL (ref 8.9–12.7)
POTASSIUM SERPL-SCNC: 3.4 MMOL/L (ref 3.5–5.3)
PROT SERPL-MCNC: 7.9 G/DL (ref 6.4–8.2)
RBC # BLD AUTO: 4.21 MILLION/UL (ref 3.81–5.12)
SODIUM SERPL-SCNC: 141 MMOL/L (ref 136–145)
WBC # BLD AUTO: 8.18 THOUSAND/UL (ref 4.31–10.16)

## 2018-07-23 PROCEDURE — 96372 THER/PROPH/DIAG INJ SC/IM: CPT

## 2018-07-23 PROCEDURE — 80053 COMPREHEN METABOLIC PANEL: CPT | Performed by: EMERGENCY MEDICINE

## 2018-07-23 PROCEDURE — 96375 TX/PRO/DX INJ NEW DRUG ADDON: CPT

## 2018-07-23 PROCEDURE — 70450 CT HEAD/BRAIN W/O DYE: CPT

## 2018-07-23 PROCEDURE — 96365 THER/PROPH/DIAG IV INF INIT: CPT

## 2018-07-23 PROCEDURE — 96361 HYDRATE IV INFUSION ADD-ON: CPT

## 2018-07-23 PROCEDURE — 36415 COLL VENOUS BLD VENIPUNCTURE: CPT | Performed by: EMERGENCY MEDICINE

## 2018-07-23 PROCEDURE — 99284 EMERGENCY DEPT VISIT MOD MDM: CPT

## 2018-07-23 PROCEDURE — 85025 COMPLETE CBC W/AUTO DIFF WBC: CPT | Performed by: EMERGENCY MEDICINE

## 2018-07-23 RX ORDER — HALOPERIDOL 5 MG/ML
5 INJECTION INTRAMUSCULAR ONCE
Status: COMPLETED | OUTPATIENT
Start: 2018-07-23 | End: 2018-07-23

## 2018-07-23 RX ORDER — KETOROLAC TROMETHAMINE 30 MG/ML
15 INJECTION, SOLUTION INTRAMUSCULAR; INTRAVENOUS ONCE
Status: COMPLETED | OUTPATIENT
Start: 2018-07-23 | End: 2018-07-23

## 2018-07-23 RX ORDER — MAGNESIUM SULFATE HEPTAHYDRATE 40 MG/ML
2 INJECTION, SOLUTION INTRAVENOUS ONCE
Status: COMPLETED | OUTPATIENT
Start: 2018-07-23 | End: 2018-07-23

## 2018-07-23 RX ORDER — METOCLOPRAMIDE HYDROCHLORIDE 5 MG/ML
10 INJECTION INTRAMUSCULAR; INTRAVENOUS ONCE
Status: COMPLETED | OUTPATIENT
Start: 2018-07-23 | End: 2018-07-23

## 2018-07-23 RX ORDER — DIHYDROERGOTAMINE MESYLATE 1 MG/ML
1 INJECTION, SOLUTION INTRAMUSCULAR; INTRAVENOUS; SUBCUTANEOUS ONCE
Status: DISCONTINUED | OUTPATIENT
Start: 2018-07-23 | End: 2018-07-24 | Stop reason: HOSPADM

## 2018-07-23 RX ORDER — ONDANSETRON 2 MG/ML
4 INJECTION INTRAMUSCULAR; INTRAVENOUS ONCE
Status: COMPLETED | OUTPATIENT
Start: 2018-07-23 | End: 2018-07-23

## 2018-07-23 RX ADMIN — MAGNESIUM SULFATE HEPTAHYDRATE 2 G: 40 INJECTION, SOLUTION INTRAVENOUS at 21:11

## 2018-07-23 RX ADMIN — SODIUM CHLORIDE 1000 ML: 0.9 INJECTION, SOLUTION INTRAVENOUS at 21:00

## 2018-07-23 RX ADMIN — ONDANSETRON 4 MG: 2 INJECTION, SOLUTION INTRAMUSCULAR; INTRAVENOUS at 23:00

## 2018-07-23 RX ADMIN — METOCLOPRAMIDE 10 MG: 5 INJECTION, SOLUTION INTRAMUSCULAR; INTRAVENOUS at 21:04

## 2018-07-23 RX ADMIN — HALOPERIDOL LACTATE 5 MG: 5 INJECTION INTRAMUSCULAR at 21:06

## 2018-07-23 RX ADMIN — KETOROLAC TROMETHAMINE 15 MG: 30 INJECTION, SOLUTION INTRAMUSCULAR at 21:03

## 2018-07-23 NOTE — TELEPHONE ENCOUNTER
Pt states she was in the ER over the weekend d/t migraines w/ photophobia, n/v  Still c/o headache, nauseas, "headache not going away"  Was given a cocktail in the ER, then discharged  Pt states she stopped taking topamax and depakote over a week ago bec it's "bothering my stomach"  Only takes reglan 10 mg for nausea  Decadron 2 mg x3 days did not helped  She states that she could not keep anything down, can only tolerate crackers and coconut water   Pls advise      [phone]

## 2018-07-23 NOTE — TELEPHONE ENCOUNTER
Patient told the emergency room she was still taking Topamax and Depakote  In addition, on a phone message from 07/18 to our office she stated she was still taking these medications  If she did not wean down these medications she needs to restart them as this can cause significant side effects and could be causing her headaches  However, if she cannot keep anything down she should proceed back to the emergency room

## 2018-07-23 NOTE — ED ATTENDING ATTESTATION
Lori Alcala MD, saw and evaluated the patient  I have discussed the patient with the resident/non-physician practitioner and agree with the resident's/non-physician practitioner's findings, Plan of Care, and MDM as documented in the resident's/non-physician practitioner's note, except where noted  All available labs and Radiology studies were reviewed  At this point I agree with the current assessment done in the Emergency Department  I have conducted an independent evaluation of this patient including a focused history and a physical exam     44-year-old female, history of migraine headache, presenting to the emergency department for evaluation of gradual onset headache which started 3 days ago that the patient states is similar to previous migraine headaches  Patient reports photophobia  Positive nausea  Ten systems reviewed negative except as noted in the history of present illness  The patient is resting comfortably on a stretcher in no acute respiratory distress  The patient appears nontoxic  HEENT reveals moist mucous membranes  Head is normocephalic and atraumatic  Conjunctiva and sclera are normal  Neck is nontender and supple with full range of motion to flexion, extension, lateral rotation  No meningismus appreciated  No masses are appreciated  Lungs are clear to auscultation bilaterally without any wheezes, rales or rhonchi  Heart is regular rate and rhythm without any murmurs, rubs or gallops  Abdomen is soft and nontender without any rebound or guarding  Extremities appear grossly normal without any significant arthropathy  Patient is awake, alert, and oriented x3  The patient has normal interaction  GCS is 15  Cranial nerves 2-12 are intact  Motor is 5 out of 5 bilateral upper lower extremities  Sensation intact  Gait is stable  44-year-old female presenting to the emergency department for evaluation of migraine headache  Patient will be medicated and reassessed

## 2018-07-24 NOTE — ED PROVIDER NOTES
History  Chief Complaint   Patient presents with    Headache     headache for 1 week, seen here on Saturday for same no relief at home, light and noise sensitivity, headache persists     This is a 19-year-old female past medical history of migraines presents for evaluation of intractable migraine x1 week  Patient states that she  Started with headache approximately 1 week ago, it started gradually and involved her entire head, felt like pressure  She states that the pressure is mostly centered behind her eyes  It is  Associated with photophobia and phonophobia  This is typical of her regular migraines  She all further denies any numbness, tingling, weakness, fevers, chills  She states that she called her neurologist, Dr Zander Gonzáles  And was given a prescription for dexamethasone for 3 days which she took  She also is chronically on Topamax for her migraines which she has been unable to take secondary to nausea  Otherwise she was using Tylenol Motrin without any relief  She was also seen in the emergency department 2 days ago for this headache and that time was given a migraine cocktail  She states that the headache did not get any better  However the nausea mildly improved  She states that at this point the headache is interfering with her life  Which is with prompted her to come back  She has had multiple evaluations and treatments for migraine including Depakote, Topamax, multiple Botox injections though continues to have multiple headache days a month  No recent CTs or MRIs  Of the brain though she has had those evaluations in the past   Of note but 2 weeks ago her daughter was diagnosed with enterovirus meningitis  Prior to Admission Medications   Prescriptions Last Dose Informant Patient Reported? Taking?    Cetirizine HCl (ZYRTEC ALLERGY) 10 MG CAPS   Yes Yes   Sig: Take 10 mg by mouth daily     LISINOPRIL PO   Yes Yes   Sig: Take 20 mg by mouth daily     Levothyroxine Sodium 88 MCG CAPS Yes Yes   Sig: Take 1 capsule by mouth daily   METOPROLOL TARTRATE PO   Yes Yes   Si mg 2 (two) times a day     Multiple Vitamins-Minerals (MULTIVITAMIN ADULT PO)  Self Yes Yes   Sig: Take 1 tablet by mouth daily   Probiotic Product (PROBIOTIC DAILY PO)  Self Yes Yes   Sig: Take 1 tablet by mouth daily   RESTASIS 0 05 % ophthalmic emulsion   Yes Yes   amLODIPine (NORVASC) 2 5 mg tablet   Yes Yes   butalbital-acetaminophen-caffeine-codeine (FIORICET WITH CODEINE) -83-30 MG per capsule   Yes Yes   dexamethasone (DECADRON) 2 mg tablet   No Yes   Sig: Take 1 tablet in am for 3 days for headache     fluconazole (DIFLUCAN) 150 mg tablet   No Yes   Sig: Take 1 tablet (150 mg total) by mouth daily for 4 days   fluticasone (FLOVENT HFA) 110 MCG/ACT inhaler   Yes Yes   Sig: Inhale 1 puff 2 (two) times a day   metoclopramide (REGLAN) 10 mg tablet   No Yes   Sig: Take 1 tablet (10 mg total) by mouth every 6 (six) hours for 3 days   pentosan polysulfate (ELMIRON) 100 mg capsule   No Yes   Sig: Take 1 capsule (100 mg total) by mouth 3 (three) times a day before meals   propranolol (INDERAL) 20 mg tablet   Yes Yes   Sig: Take 20 mg by mouth every 12 (twelve) hours   topiramate (TOPAMAX) 100 mg tablet   No Yes   Sig: One in am and two at bedtime daily   venlafaxine (EFFEXOR XR) 150 mg 24 hr capsule   Yes Yes   Sig: Take 150 mg by mouth daily      Facility-Administered Medications: None       Past Medical History:   Diagnosis Date    Bipolar 1 disorder ([facility] )     Disease of thyroid gland     Fibromyalgia     Gout     Hyperlipidemia     Hypertension     Lupus     Migraine        Past Surgical History:   Procedure Laterality Date    HYSTERECTOMY         Family History   Problem Relation Age of Onset    Lupus Mother     Osteoporosis Mother     Hypotension Mother     Hypertension Father     Diabetes Father     Heart disease Father     Diabetes Sister     Schizophrenia Sister     Diabetes Maternal Grandmother  Rheum arthritis Maternal Grandmother     Hypertension Maternal Grandmother     Breast cancer Maternal Aunt      I have reviewed and agree with the history as documented  Social History   Substance Use Topics    Smoking status: Former Smoker    Smokeless tobacco: Never Used    Alcohol use No        Review of Systems   Constitutional: Negative for appetite change and fever  HENT: Negative for rhinorrhea and sore throat  Eyes: Positive for photophobia  Negative for visual disturbance  Respiratory: Negative for cough, chest tightness and wheezing  Cardiovascular: Negative for chest pain, palpitations and leg swelling  Gastrointestinal: Negative for abdominal distention, abdominal pain, blood in stool, constipation and diarrhea  Genitourinary: Negative for dysuria, flank pain, frequency, hematuria and urgency  Musculoskeletal: Negative for back pain  Skin: Negative for rash  Neurological: Positive for headaches  Negative for dizziness and weakness  All other systems reviewed and are negative  Physical Exam  ED Triage Vitals   Temperature Pulse Respirations Blood Pressure SpO2   07/23/18 1854 07/23/18 1854 07/23/18 1854 07/23/18 1854 07/23/18 1854   98 7 °F (37 1 °C) 83 20 (!) 181/86 100 %      Temp Source Heart Rate Source Patient Position - Orthostatic VS BP Location FiO2 (%)   07/23/18 1854 07/23/18 1854 07/23/18 1854 07/23/18 1854 --   Tympanic Monitor Sitting Right arm       Pain Score       07/23/18 2010       Worst Possible Pain           Orthostatic Vital Signs  Vitals:    07/23/18 1854 07/23/18 2010 07/23/18 2152   BP: (!) 181/86 164/85 165/91   Pulse: 83 84 92   Patient Position - Orthostatic VS: Sitting Lying Lying       Physical Exam   Constitutional: She is oriented to person, place, and time  She appears well-developed and well-nourished  HENT:   Head: Normocephalic and atraumatic  Eyes: EOM are normal  Pupils are equal, round, and reactive to light     Neck: Normal range of motion  Neck supple  Cardiovascular: Normal rate and regular rhythm  Exam reveals no gallop and no friction rub  No murmur heard  Pulmonary/Chest: Effort normal  She has no wheezes  She has no rales  She exhibits no tenderness  Abdominal: Soft  She exhibits no distension and no mass  There is no rebound and no guarding  Neurological: She is alert and oriented to person, place, and time  No cranial nerve deficit  Nonfocal neurologic exam including normal gait, visual fields full by confrontation, no dysmetria no focal weakness   Skin: Skin is warm and dry  Psychiatric: She has a normal mood and affect  Nursing note and vitals reviewed        ED Medications  Medications   dihydroergotamine (DHE) injection 1 mg (1 mg Intravenous Not Given 7/23/18 2258)   valproate (DEPACON) 1,000 mg in sodium chloride 0 9 % 50 mL IVPB (1,000 mg Intravenous Not Given 7/23/18 2258)   sodium chloride 0 9 % bolus 1,000 mL (1,000 mL Intravenous Not Given 7/23/18 2258)   sodium chloride 0 9 % bolus 1,000 mL (0 mL Intravenous Stopped 7/23/18 2300)   metoclopramide (REGLAN) injection 10 mg (10 mg Intravenous Given 7/23/18 2104)   ketorolac (TORADOL) injection 15 mg (15 mg Intravenous Given 7/23/18 2103)   magnesium sulfate 2 g/50 mL IVPB (premix) 2 g (0 g Intravenous Stopped 7/23/18 2226)   haloperidol lactate (HALDOL) injection 5 mg (5 mg Intramuscular Given 7/23/18 2106)   ondansetron (ZOFRAN) injection 4 mg (4 mg Intravenous Given 7/23/18 2300)       Diagnostic Studies  Results Reviewed     Procedure Component Value Units Date/Time    Comprehensive metabolic panel [[id number]]  (Abnormal) Collected:  07/23/18 2059    Lab Status:  Final result Specimen:  Blood from Arm, Right Updated:  07/23/18 2126     Sodium 141 mmol/L      Potassium 3 4 (L) mmol/L      Chloride 107 mmol/L      CO2 28 mmol/L      Anion Gap 6 mmol/L      BUN 7 mg/dL      Creatinine 0 62 mg/dL      Glucose 99 mg/dL      Calcium 8 8 mg/dL      AST 17 U/L      ALT 38 U/L      Alkaline Phosphatase 76 U/L      Total Protein 7 9 g/dL      Albumin 3 6 g/dL      Total Bilirubin 0 26 mg/dL      eGFR 110 ml/min/1 73sq m     Narrative:         National Kidney Disease Education Program recommendations are as follows:  GFR calculation is accurate only with a steady state creatinine  Chronic Kidney disease less than 60 ml/min/1 73 sq  meters  Kidney failure less than 15 ml/min/1 73 sq  meters  CBC and differential [[id number]] Collected:  07/23/18 2059    Lab Status:  Final result Specimen:  Blood from Arm, Right Updated:  07/23/18 2110     WBC 8 18 Thousand/uL      RBC 4 21 Million/uL      Hemoglobin 12 3 g/dL      Hematocrit 37 1 %      MCV 88 fL      MCH 29 2 pg      MCHC 33 2 g/dL      RDW 12 3 %      MPV 9 9 fL      Platelets 358 Thousands/uL      nRBC 0 /100 WBCs      Neutrophils Relative 70 %      Immat GRANS % 0 %      Lymphocytes Relative 22 %      Monocytes Relative 7 %      Eosinophils Relative 1 %      Basophils Relative 0 %      Neutrophils Absolute 5 66 Thousands/µL      Immature Grans Absolute 0 03 Thousand/uL      Lymphocytes Absolute 1 82 Thousands/µL      Monocytes Absolute 0 60 Thousand/µL      Eosinophils Absolute 0 05 Thousand/µL      Basophils Absolute 0 02 Thousands/µL                  CT head without contrast   Final Result by Rylee Mariee MD (07/23 2230)      No acute intracranial abnormality                    Workstation performed: [id number]               Procedures  Procedures      Phone Consults  ED Phone Contact    ED Course  ED Course as of Jul 24 0004 Mon Jul 23, 2018 2222  On evaluation patient states that her headache is mildly improved, it is now 6 out of 10  but continues to be localized in her posterior head    2241  Spoke to Neurology, Dr Aaron Beltran,  will give patient 1 mg of DHE over 40 minutes, 1g  of Depakote, Zofran and patient will follow up Neurology for further care    [phone]  Patient updated, states she does not want to stay for further treatment at this time, will follow up with Dr Daysi Crockett  as an outpatient, will return if symptoms worsen                                [facility]  Number of Diagnoses or Management Options  Diagnosis management comments:  41-year-old female presents for evaluation of headache, intractable x1 week  Patient has received multiple headache medications and was evaluated by her neurologist without any relief  Will obtain CT head, lab work, will treat symptomatically and discussed with the patient he has ability of doing an LP for further evaluation    CritCare Time    Disposition  Final diagnoses:   Migraine headache     Time reflects when diagnosis was documented in both MDM as applicable and the Disposition within this note     Time User Action Codes Description Comment    7/23/2018 10:50 PM Gerardo Payton Add [G43 [id number]] Migraine headache       ED Disposition     ED Disposition Condition Comment    Discharge  Adamaris Dover discharge to home/self care      Condition at discharge: Stable        Follow-up Information     Follow up With Specialties Details Why [address] [facility] Emergency Department Emergency Medicine  If symptoms worsen [address]  [phone]  ED, [address], [place], [address] [facility], MD Rj Internal Medicine  As needed [id number]  [place]  [place] [address]       Tami Chang MD Neurology In 1 day  [facility] [address] [place] [address] Rd  [phone]             Discharge Medication List as of 7/23/2018 10:51 PM      CONTINUE these medications which have NOT CHANGED    Details   amLODIPine (NORVASC) 2 5 mg tablet Starting Fri 2/23/2018, Historical Med      butalbital-acetaminophen-caffeine-codeine (FIORICET WITH CODEINE) -59-30 MG per capsule Starting Fri 2/23/2018, Historical Med      Cetirizine HCl (ZYRTEC ALLERGY) 10 MG CAPS Take 10 mg by mouth daily  , Until Discontinued, Historical Med      dexamethasone (DECADRON) 2 mg tablet Take 1 tablet in am for 3 days for headache , Normal      fluconazole (DIFLUCAN) 150 mg tablet Take 1 tablet (150 mg total) by mouth daily for 4 days, Starting Fri 7/20/2018, Until Tue 7/24/2018, Normal      fluticasone (FLOVENT HFA) 110 MCG/ACT inhaler Inhale 1 puff 2 (two) times a day, Historical Med      Levothyroxine Sodium 88 MCG CAPS Take 1 capsule by mouth daily, Until Discontinued, Historical Med      LISINOPRIL PO Take 20 mg by mouth daily  , Until Discontinued, Historical Med      metoclopramide (REGLAN) 10 mg tablet Take 1 tablet (10 mg total) by mouth every 6 (six) hours for 3 days, Starting Sun 7/22/2018, Until Wed 7/25/2018, Normal      METOPROLOL TARTRATE  mg 2 (two) times a day  , Until Discontinued, Historical Med      Multiple Vitamins-Minerals (MULTIVITAMIN ADULT PO) Take 1 tablet by mouth daily, Historical Med      pentosan polysulfate (ELMIRON) 100 mg capsule Take 1 capsule (100 mg total) by mouth 3 (three) times a day before meals, Starting Thu 2/15/2018, Normal      Probiotic Product (PROBIOTIC DAILY PO) Take 1 tablet by mouth daily, Historical Med      propranolol (INDERAL) 20 mg tablet Take 20 mg by mouth every 12 (twelve) hours, Historical Med      RESTASIS 0 05 % ophthalmic emulsion Starting Tue 2/6/2018, Historical Med      topiramate (TOPAMAX) 100 mg tablet One in am and two at bedtime daily, Normal      venlafaxine (EFFEXOR XR) 150 mg 24 hr capsule Take 150 mg by mouth daily, Historical Med           No discharge procedures on file  ED Provider  Attending physically available and evaluated Malenacassie Shepherdnimesh DIAZ managed the patient along with the ED Attending      Electronically Signed by         Solo Tilley MD  07/24/18 1322

## 2018-07-24 NOTE — DISCHARGE INSTRUCTIONS
Please follow-up with Neurology in the next 2 days for further care, your case was discussed with Chelsea Ball  And she is aware of you being in the emergency department, please call the office in the morning to obtain a follow-up visit  If symptoms worsen please return to the emergency department  Migraine Headache   AMBULATORY CARE:   A migraine headache  is a severe headache  The pain can be so severe that it interferes with your daily activities  A migraine can last a few hours up to several days  The exact cause of migraines is not known  Common triggers for a migraine include the following:   · Stress, eye strain, oversleeping, or not getting enough sleep    · Hormone changes in women from birth control pills, pregnancy, menopause, or during a monthly period    · Skipping meals, going too long without eating, or not drinking enough liquids    · Certain foods or drinks such as chocolate, hard cheese, red wine, or drinks that contain caffeine    · Foods that contain gluten, nitrates, MSG, or artificial sweeteners    · Sunlight, bright or flashing lights, loud noises, smoke, or strong smells    · Heat, humidity, or changes in the weather  Common warning signs include the following:  Warning signs usually start 15 to 60 minutes before the headache:  · Visual changes (auras), such as blurred vision, temporary blind or bright spots, lines, or hallucinations    · Unusual tiredness or frequent yawning    · Tingling in an arm or leg  Seek care immediately if:   · You have a headache that seems different or much worse than your usual migraine headache  · You have a severe headache with a fever or a stiff neck  · You have new problems with speech, vision, balance, or movement  · You feel like you are going to faint, you become confused, or you have a seizure  Contact your healthcare provider or neurologist if:   · You have a fever  · Your migraines interfere with your daily activities       · Your medicines or treatments stop working  · You have questions about your condition or care  Treatment:  Migraines cannot be cured  The goal of treatment is to reduce your symptoms  Take medicine as soon as you feel a migraine begin  · Prescription pain medicine  may be given  Do not wait until the pain is severe before you take your medicine  · Migraine medicines  are used to help prevent a migraine or stop it once it starts  · Antinausea medicine  may be given to calm your stomach and to help prevent vomiting  This medicine can also help relieve pain  Manage your symptoms:   · Rest in a dark, quiet room  This will help decrease your pain  Sleep may also help relieve the pain  · Apply ice to decrease pain  Use an ice pack, or put crushed ice in a plastic bag  Cover the ice pack with a towel and place it on your head where it hurts for 15 to 20 minutes every hour  · Apply heat to decrease pain and muscle spasms  Use a small towel dampened with warm water or a heating pad, or sit in a warm bath  Apply heat on the area for 20 to 30 minutes every 2 hours  You may alternate heat and ice  · Keep a migraine record  Write down when your migraines start and stop  Include your symptoms and what you were doing when a migraine began  Record what you ate or drank for 24 hours before the migraine started  Keep track of what you did to treat your migraine and if it worked  Follow up with your healthcare provider as directed:  Bring your migraine record with you  Write down your questions so you remember to ask them during your visits  Prevent another migraine headache:   · Do not smoke  Nicotine and other chemicals in cigarettes and cigars can trigger a migraine and also cause lung damage  Ask your healthcare provider for information if you currently smoke and need help to quit  E-cigarettes or smokeless tobacco still contain nicotine  Talk to your healthcare provider before you use these products       · Do not drink alcohol  Alcohol can trigger a migraine  It can also interfere with the medicines used to treat your migraine  · Exercise regularly  Ask about the best exercise plan for you  · Manage stress  Stress may trigger a migraine  Learn new ways to relax, such as deep breathing  · Follow a sleep schedule  Go to bed and get up at the same time each day  · Eat a variety of healthy foods  Healthy foods include fruit, vegetables, whole-grain breads, low-fat dairy products, beans, lean meats, and fish  Do not have foods or drinks that trigger your migraines  © 2017 [facility] Information is for End User's use only and may not be sold, redistributed or otherwise used for commercial purposes  All illustrations and images included in CareNotes® are the copyrighted property of A D A M , Inc  or Sammy Crawley  The above information is an  only  It is not intended as medical advice for individual conditions or treatments  Talk to your doctor, nurse or pharmacist before following any medical regimen to see if it is safe and effective for you

## 2018-07-24 NOTE — TELEPHONE ENCOUNTER
Patient called back and states she was in the ER last night and saw Dr Vashti Fan   She wants to know if Dr Vashti Fan wants to see patient for a f/u sooner than her sched visit with Radha Lopez 9/12

## 2018-07-24 NOTE — ED NOTES
Patient transported to [facility] Jabari Morfin, [facility], RN  07/23/18 1753

## 2018-07-24 NOTE — TELEPHONE ENCOUNTER
Yes would love to see her sooner if possible  Is she doing better after the treatment last night?  chris

## 2018-07-25 NOTE — TELEPHONE ENCOUNTER
Called and Left a message on pt's answering machine for a call back  Dr Neeraj Matamoros, no open slots prior 9/12  Is there a date/time you'd like to offer?     Thanks

## 2018-07-25 NOTE — TELEPHONE ENCOUNTER
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

## 2018-07-30 NOTE — TELEPHONE ENCOUNTER
Pt never called back    Would you like me to try to call pt again? ? And ask if she wants to be admitted still ?   Please advise  Thanks  Link

## 2018-08-03 ENCOUNTER — OFFICE VISIT (OUTPATIENT)
Dept: OBGYN CLINIC | Facility: CLINIC | Age: 43
End: 2018-08-03
Payer: MEDICARE

## 2018-08-03 VITALS
HEART RATE: 74 BPM | SYSTOLIC BLOOD PRESSURE: 121 MMHG | WEIGHT: 187.4 LBS | DIASTOLIC BLOOD PRESSURE: 77 MMHG | HEIGHT: 65 IN | BODY MASS INDEX: 31.22 KG/M2

## 2018-08-03 DIAGNOSIS — E78.5 HYPERLIPIDEMIA, UNSPECIFIED HYPERLIPIDEMIA TYPE: ICD-10-CM

## 2018-08-03 DIAGNOSIS — Z12.39 SCREENING FOR BREAST CANCER: ICD-10-CM

## 2018-08-03 DIAGNOSIS — Z01.419 ENCOUNTER FOR GYNECOLOGICAL EXAMINATION WITHOUT ABNORMAL FINDING: Primary | ICD-10-CM

## 2018-08-03 DIAGNOSIS — E07.9 DISEASE OF THYROID GLAND: ICD-10-CM

## 2018-08-03 DIAGNOSIS — Z13.6 SCREENING FOR CARDIOVASCULAR CONDITION: ICD-10-CM

## 2018-08-03 PROCEDURE — G0101 CA SCREEN;PELVIC/BREAST EXAM: HCPCS | Performed by: OBSTETRICS & GYNECOLOGY

## 2018-08-03 NOTE — PROGRESS NOTES
Richard Yañez 1975 female MRN: [id number]    Family Medicine Annual GYN Visit    ASSESSMENT/PLAN  Problem List Items Addressed This Visit        Endocrine    Disease of thyroid gland    Relevant Orders    CBC and differential    Lipid panel    Comprehensive metabolic panel    HEMOGLOBIN A1C W/ EAG ESTIMATION    TSH, 3rd generation with T4 reflex       Other    Encounter for gynecological examination - Primary    Screening for breast cancer    Relevant Orders    Mammo screening bilateral w cad    Hyperlipidemia    Relevant Orders    Lipid panel    Comprehensive metabolic panel    HEMOGLOBIN A1C W/ EAG ESTIMATION      Other Visit Diagnoses     Screening for cardiovascular condition        Relevant Orders    Lipid panel    Comprehensive metabolic panel    HEMOGLOBIN A1C W/ EAG ESTIMATION          F/U in 1 year for annual GYN exam      Future Appointments  Date Time Provider Ernestina Hernandez   9/12/2018 11:00 AM Kristi Hill PA-C [facility]   11/14/2018 9:45 AM Kristi Hill PA-C [facility]          SUBJECTIVE  CC: annual GYN exam    HPI:  Richard Yañez is a 39 y o  female who presents for annual GYN exam   H/O hysterectomy before for AUB (?), and abnormal PAP smear before hysterectomy, unsure about last PAP smear and when the last PAP smear was taken  Denies VB , abd pain, and fatigue  Still anxious and nervous (?), f/u w/ her Psychiatrist   Denies ST  Gynecologic History  OB History     No data available        No LMP recorded  Patient has had a hysterectomy  Contraception: none  Last Pap: unsure  Results were: abnormal, per pt, taken before hysterectomy, and unable to provide more information  Last mammogram: none  Review of Systems   Constitutional: Negative for fatigue  Genitourinary: Negative for pelvic pain and vaginal bleeding  Psychiatric/Behavioral: Negative for suicidal ideas  The patient is nervous/anxious          Historical Information   The patient history was reviewed as follows:    Past Medical History:   Diagnosis Date    Bipolar 1 disorder ([facility] [address] )     Disease of thyroid gland     Fibromyalgia     Gout     Hyperlipidemia     Hypertension     Lupus     Migraine      Past Surgical History:   Procedure Laterality Date    HYSTERECTOMY       Family History   Problem Relation Age of Onset    Lupus Mother     Osteoporosis Mother     Hypotension Mother     Hypertension Father     Diabetes Father     Heart disease Father     Diabetes Sister     Schizophrenia Sister     Diabetes Maternal Grandmother     Rheum arthritis Maternal Grandmother     Hypertension Maternal Grandmother     Breast cancer Maternal Aunt       Social History       Medications:     Current Outpatient Prescriptions:     amLODIPine (NORVASC) 2 5 mg tablet, , Disp: , Rfl:     butalbital-acetaminophen-caffeine-codeine (FIORICET WITH CODEINE) -91-30 MG per capsule, , Disp: , Rfl:     Cetirizine HCl (ZYRTEC ALLERGY) 10 MG CAPS, Take 10 mg by mouth daily  , Disp: , Rfl:     fluticasone (FLOVENT HFA) 110 MCG/ACT inhaler, Inhale 1 puff 2 (two) times a day, Disp: , Rfl:     Levothyroxine Sodium 88 MCG CAPS, Take 1 capsule by mouth daily, Disp: , Rfl:     LISINOPRIL PO, Take 30 mg by mouth daily  , Disp: , Rfl:     METOPROLOL TARTRATE PO, 100 mg 2 (two) times a day  , Disp: , Rfl:     Multiple Vitamins-Minerals (MULTIVITAMIN ADULT PO), Take 1 tablet by mouth daily, Disp: , Rfl:     pentosan polysulfate (ELMIRON) 100 mg capsule, Take 1 capsule (100 mg total) by mouth 3 (three) times a day before meals, Disp: 90 capsule, Rfl: 2    Probiotic Product (PROBIOTIC DAILY PO), Take 1 tablet by mouth daily, Disp: , Rfl:     propranolol (INDERAL) 20 mg tablet, Take 20 mg by mouth every 12 (twelve) hours, Disp: , Rfl:     RESTASIS 0 05 % ophthalmic emulsion, , Disp: , Rfl:     dexamethasone (DECADRON) 2 mg tablet, Take 1 tablet in am for 3 days for headache , Disp: 3 tablet, Rfl: 0   topiramate (TOPAMAX) 100 mg tablet, One in am and two at bedtime daily, Disp: 90 tablet, Rfl: 6    venlafaxine (EFFEXOR XR) 150 mg 24 hr capsule, Take 150 mg by mouth daily, Disp: , Rfl:     Allergies   Allergen Reactions    Shellfish-Derived Products Anaphylaxis and Hives    Triptans Shortness Of Breath and Palpitations    Latex Swelling and Rash     Other reaction(s): Hives/Skin Rash    Sulfa Antibiotics Swelling and Rash     Other reaction(s): Hives/Skin Rash    Monistat [Miconazole] Swelling       OBJECTIVE  Vitals:   Vitals:    08/03/18 1013   BP: 121/77   BP Location: Right arm   Patient Position: Sitting   Cuff Size: Standard   Pulse: 74   Weight: 85 kg (187 lb 6 4 oz)   Height: 5' 5" (1 651 m)         Physical Exam   Constitutional: She appears well-developed and well-nourished  obese   Neck: Normal range of motion  Neck supple  No thyromegaly present  Cardiovascular: Normal rate, regular rhythm and normal heart sounds  No murmur heard  Pulmonary/Chest: Effort normal and breath sounds normal  No respiratory distress  She has no wheezes  She has no rales  Abdominal: Soft  Bowel sounds are normal  She exhibits no distension (obese waist)  There is no tenderness  There is no rebound and no guarding  Genitourinary:   Genitourinary Comments: Hysterectomy, no cervix, scant clear discharge, no lesions or polyps  Not perform PAP smear  Manual exam: no lesions noticed    Breast exam: no mass or lesions, no discharge, no lymph nodes noticed   Musculoskeletal: Normal range of motion  She exhibits no edema  Lymphadenopathy:     She has no cervical adenopathy  Neurological: She is alert  Skin: Skin is warm  No rash noted  No erythema  Psychiatric:   Nervous/ anxious      The attending, Dr Shailesh Samuels, performed physical exam and presented in the room during the visit      Rocío Gregorio MD, PGY-2  [facility]   8/3/2018

## 2018-08-10 DIAGNOSIS — G43.709 CHRONIC MIGRAINE WITHOUT AURA WITHOUT STATUS MIGRAINOSUS, NOT INTRACTABLE: ICD-10-CM

## 2018-08-10 RX ORDER — TOPIRAMATE 50 MG/1
TABLET, FILM COATED ORAL
Qty: 120 TABLET | Refills: 1 | Status: SHIPPED | OUTPATIENT
Start: 2018-08-10 | End: 2018-09-27

## 2018-08-15 NOTE — ED ATTENDING ATTESTATION
Yenni Horner MD, saw and evaluated the patient  I have discussed the patient with the resident/non-physician practitioner and agree with the resident's/non-physician practitioner's findings, Plan of Care, and MDM as documented in the resident's/non-physician practitioner's note, except where noted  All available labs and Radiology studies were reviewed  At this point I agree with the current assessment done in the Emergency Department  I have conducted an independent evaluation of this patient a history and physical is as follows:    Patient presents with 1 week of HA  Patient has hx of migraines and sees neurology  Pateint was seen in ED several days ago for same symptoms  Patient has also seen neurology during this time and was started on Decadron  No improvement in symtpoms  EXam: AAOX3, mild distress ue to HA, RRR, CTA, S/NT/ND, no motor/sensory deficits  A/P: HA  Hx of migraines  Will check labs, CT head, and treat migraine   Will discuss with patient's neurologist     [address] [place] [address] Time    Procedures

## 2018-09-10 ENCOUNTER — OFFICE VISIT (OUTPATIENT)
Dept: OBGYN CLINIC | Facility: CLINIC | Age: 43
End: 2018-09-10
Payer: MEDICARE

## 2018-09-10 VITALS
WEIGHT: 205 LBS | HEART RATE: 73 BPM | SYSTOLIC BLOOD PRESSURE: 133 MMHG | DIASTOLIC BLOOD PRESSURE: 82 MMHG | HEIGHT: 65 IN | BODY MASS INDEX: 34.16 KG/M2

## 2018-09-10 DIAGNOSIS — B37.3 YEAST INFECTION OF THE VAGINA: Primary | ICD-10-CM

## 2018-09-10 PROBLEM — B37.31 YEAST INFECTION OF THE VAGINA: Status: ACTIVE | Noted: 2018-09-10

## 2018-09-10 PROCEDURE — 99213 OFFICE O/P EST LOW 20 MIN: CPT | Performed by: NURSE PRACTITIONER

## 2018-09-10 PROCEDURE — 87210 SMEAR WET MOUNT SALINE/INK: CPT | Performed by: NURSE PRACTITIONER

## 2018-09-10 RX ORDER — FLUCONAZOLE 150 MG/1
150 TABLET ORAL
Qty: 3 TABLET | Refills: 0 | Status: SHIPPED | OUTPATIENT
Start: 2018-09-10 | End: 2018-09-17

## 2018-09-10 NOTE — PATIENT INSTRUCTIONS
Patient to make follow-up appointment Dr Jerry Dakin for her interstitial cystitis  Review for patient to call if her yeast infection symptoms are not improved or resolved

## 2018-09-10 NOTE — PROGRESS NOTES
Assessment/Plan:       Diagnoses and all orders for this visit:    Yeast infection of the vagina  -     fluconazole (DIFLUCAN) 150 mg tablet; Take 1 tablet (150 mg total) by mouth every 3 (three) days for 3 doses  -     POCT wet mount          Subjective:      Patient ID: Ronnell Ho is a 39 y o  female  HPI 40 yo  here with c/o vaginal and labial irritation, has had symptoms since yesterday  She is on antibiotics for 2 root canals for 10 days  Patient has same partner times 10 years, declines cultures for Chlamydia and gonorrhea  Last sexual encounter was 1 week ago  Patient has history of yeast infection  Patient also has history of interstitial cystitis and has received bladder instillations in past, she states she has been busy is not return to office for her instillations but states she will schedule appointment  Patient has history of hysterectomy for DUB  Patient had her annual gyn exam in 2018  Patient needs reprint of mammogram order and labs that were ordered at her gyn exam     The following portions of the patient's history were reviewed and updated as appropriate: allergies, current medications, past family history, past medical history, past social history, past surgical history and problem list     Review of Systems   Constitutional: Negative  Respiratory: Negative  Cardiovascular: Negative  Genitourinary: Positive for vaginal discharge  Negative for difficulty urinating, dysuria and pelvic pain  Objective:      /82 (BP Location: Right arm, Patient Position: Sitting, Cuff Size: Large)   Pulse 73   Ht 5' 5" (1 651 m)   Wt 93 kg (205 lb)   LMP  (LMP Unknown)   BMI 34 11 kg/m²          Physical Exam   Constitutional: She appears well-nourished  Cardiovascular: Normal rate, regular rhythm and normal heart sounds  Pulmonary/Chest: Effort normal and breath sounds normal    Abdominal: Soft  There is no tenderness     Psychiatric: She has a normal mood and affect   Her behavior is normal      external genitalia-positive erythema, no lesions  Vagina-thick white discharge  Cervix-surgically absent  Uterus surgically absent  Adnexa-no masses, nontender    Wet mount/KOH positive for yeast, negative for BV ,negative for trich

## 2018-09-11 ENCOUNTER — APPOINTMENT (OUTPATIENT)
Dept: LAB | Facility: HOSPITAL | Age: 43
End: 2018-09-11
Payer: MEDICARE

## 2018-09-11 DIAGNOSIS — E07.9 DISEASE OF THYROID GLAND: ICD-10-CM

## 2018-09-11 DIAGNOSIS — Z13.6 SCREENING FOR CARDIOVASCULAR CONDITION: ICD-10-CM

## 2018-09-11 DIAGNOSIS — E78.5 HYPERLIPIDEMIA, UNSPECIFIED HYPERLIPIDEMIA TYPE: ICD-10-CM

## 2018-09-11 LAB
ALBUMIN SERPL BCP-MCNC: 3.3 G/DL (ref 3.5–5)
ALP SERPL-CCNC: 59 U/L (ref 46–116)
ALT SERPL W P-5'-P-CCNC: 27 U/L (ref 12–78)
ANION GAP SERPL CALCULATED.3IONS-SCNC: 7 MMOL/L (ref 4–13)
AST SERPL W P-5'-P-CCNC: 15 U/L (ref 5–45)
BASOPHILS # BLD AUTO: 0.03 THOUSANDS/ΜL (ref 0–0.1)
BASOPHILS NFR BLD AUTO: 1 % (ref 0–1)
BILIRUB SERPL-MCNC: 0.37 MG/DL (ref 0.2–1)
BUN SERPL-MCNC: 14 MG/DL (ref 5–25)
CALCIUM SERPL-MCNC: 8.3 MG/DL (ref 8.3–10.1)
CHLORIDE SERPL-SCNC: 106 MMOL/L (ref 100–108)
CHOLEST SERPL-MCNC: 131 MG/DL (ref 50–200)
CO2 SERPL-SCNC: 24 MMOL/L (ref 21–32)
CREAT SERPL-MCNC: 0.66 MG/DL (ref 0.6–1.3)
EOSINOPHIL # BLD AUTO: 0.13 THOUSAND/ΜL (ref 0–0.61)
EOSINOPHIL NFR BLD AUTO: 2 % (ref 0–6)
ERYTHROCYTE [DISTWIDTH] IN BLOOD BY AUTOMATED COUNT: 12.5 % (ref 11.6–15.1)
EST. AVERAGE GLUCOSE BLD GHB EST-MCNC: 108 MG/DL
GFR SERPL CREATININE-BSD FRML MDRD: 108 ML/MIN/1.73SQ M
GLUCOSE P FAST SERPL-MCNC: 104 MG/DL (ref 65–99)
HBA1C MFR BLD: 5.4 % (ref 4.2–6.3)
HCT VFR BLD AUTO: 34.8 % (ref 34.8–46.1)
HDLC SERPL-MCNC: 45 MG/DL (ref 40–60)
HGB BLD-MCNC: 11.3 G/DL (ref 11.5–15.4)
IMM GRANULOCYTES # BLD AUTO: 0.03 THOUSAND/UL (ref 0–0.2)
IMM GRANULOCYTES NFR BLD AUTO: 1 % (ref 0–2)
LDLC SERPL CALC-MCNC: 73 MG/DL (ref 0–100)
LYMPHOCYTES # BLD AUTO: 1.47 THOUSANDS/ΜL (ref 0.6–4.47)
LYMPHOCYTES NFR BLD AUTO: 25 % (ref 14–44)
MCH RBC QN AUTO: 28.9 PG (ref 26.8–34.3)
MCHC RBC AUTO-ENTMCNC: 32.5 G/DL (ref 31.4–37.4)
MCV RBC AUTO: 89 FL (ref 82–98)
MONOCYTES # BLD AUTO: 0.4 THOUSAND/ΜL (ref 0.17–1.22)
MONOCYTES NFR BLD AUTO: 7 % (ref 4–12)
NEUTROPHILS # BLD AUTO: 3.82 THOUSANDS/ΜL (ref 1.85–7.62)
NEUTS SEG NFR BLD AUTO: 64 % (ref 43–75)
NONHDLC SERPL-MCNC: 86 MG/DL
NRBC BLD AUTO-RTO: 0 /100 WBCS
PLATELET # BLD AUTO: 241 THOUSANDS/UL (ref 149–390)
PMV BLD AUTO: 10.5 FL (ref 8.9–12.7)
POTASSIUM SERPL-SCNC: 3.7 MMOL/L (ref 3.5–5.3)
PROT SERPL-MCNC: 7.4 G/DL (ref 6.4–8.2)
RBC # BLD AUTO: 3.91 MILLION/UL (ref 3.81–5.12)
SODIUM SERPL-SCNC: 137 MMOL/L (ref 136–145)
TRIGL SERPL-MCNC: 65 MG/DL
TSH SERPL DL<=0.05 MIU/L-ACNC: 2.61 UIU/ML (ref 0.36–3.74)
WBC # BLD AUTO: 5.88 THOUSAND/UL (ref 4.31–10.16)

## 2018-09-11 PROCEDURE — 80061 LIPID PANEL: CPT

## 2018-09-11 PROCEDURE — 36415 COLL VENOUS BLD VENIPUNCTURE: CPT

## 2018-09-11 PROCEDURE — 80053 COMPREHEN METABOLIC PANEL: CPT

## 2018-09-11 PROCEDURE — 83036 HEMOGLOBIN GLYCOSYLATED A1C: CPT

## 2018-09-11 PROCEDURE — 84443 ASSAY THYROID STIM HORMONE: CPT

## 2018-09-11 PROCEDURE — 85025 COMPLETE CBC W/AUTO DIFF WBC: CPT

## 2018-09-24 ENCOUNTER — OFFICE VISIT (OUTPATIENT)
Dept: OBGYN CLINIC | Facility: CLINIC | Age: 43
End: 2018-09-24
Payer: MEDICARE

## 2018-09-24 VITALS
HEIGHT: 65 IN | BODY MASS INDEX: 34.09 KG/M2 | SYSTOLIC BLOOD PRESSURE: 160 MMHG | HEART RATE: 90 BPM | WEIGHT: 204.6 LBS | DIASTOLIC BLOOD PRESSURE: 90 MMHG

## 2018-09-24 DIAGNOSIS — N89.8 VAGINAL DISCHARGE: Primary | ICD-10-CM

## 2018-09-24 LAB
BV WHIFF TEST VAG QL: NORMAL
CLUE CELLS SPEC QL WET PREP: NEGATIVE
PH SMN: 4 [PH]
SL AMB POCT WET MOUNT: NORMAL
T VAGINALIS VAG QL WET PREP: NORMAL
YEAST VAG QL WET PREP: NORMAL

## 2018-09-24 PROCEDURE — 87070 CULTURE OTHR SPECIMN AEROBIC: CPT | Performed by: NURSE PRACTITIONER

## 2018-09-24 PROCEDURE — 87210 SMEAR WET MOUNT SALINE/INK: CPT | Performed by: NURSE PRACTITIONER

## 2018-09-24 PROCEDURE — 99213 OFFICE O/P EST LOW 20 MIN: CPT | Performed by: NURSE PRACTITIONER

## 2018-09-24 RX ORDER — IBUPROFEN 800 MG/1
800 TABLET ORAL AS NEEDED
COMMUNITY
Start: 2018-09-06

## 2018-09-24 RX ORDER — METOPROLOL TARTRATE 100 MG/1
TABLET ORAL
COMMUNITY
Start: 2018-08-31 | End: 2018-09-24

## 2018-09-24 RX ORDER — AMOXICILLIN AND CLAVULANATE POTASSIUM 875; 125 MG/1; MG/1
TABLET, FILM COATED ORAL
COMMUNITY
Start: 2018-09-06 | End: 2020-02-18

## 2018-09-24 RX ORDER — FLUCONAZOLE 150 MG/1
150 TABLET ORAL ONCE
Qty: 1 TABLET | Refills: 0 | Status: SHIPPED | OUTPATIENT
Start: 2018-09-24 | End: 2018-09-24

## 2018-09-24 NOTE — PROGRESS NOTES
Assessment/Plan:     await  comprehensive culture   patient is still having symptoms but states are improving, will treat with fluconazole 150 mg 1 pill once with no refill    patient to keep appointment Dr Judith Dooley this week   recommended for patient to go home and take her blood pressure medication ASAP     Diagnoses and all orders for this visit:    Vaginal discharge  -     POCT wet mount  -     Genital Comprehensive Culture    Other orders  -     amoxicillin-clavulanate (AUGMENTIN) 875-125 mg per tablet;   -     ibuprofen (MOTRIN) 800 mg tablet;   -     Discontinue: metoprolol tartrate (LOPRESSOR) 100 mg tablet;           Subjective:      Patient ID: Hilda Medellin is a 39 y o  female  HPI 27-year-old  here with complaints of vaginal discharge and itching  Patient was seen 09/10/2018 at that time she was treated for yeast infection because she was on antibiotics for 2  root canals on the previous 10 days  Patient reports her symptoms have improved but still has some discharge and itching  Patient reports she was in [facility] since last visit and self-treated with a Diflucan that was over-the-counter  Patient reports she is to start amoxicillin again for her dental issues  Patient has history of yeast infection , she also has history of interstitial cystitis and has received bladder instillations in the past   She has not had instillations recently because she has been busy and at last visit she said she would make an appointment  She states she does take a daily probiotic, she is unable to eat yogurt  The patient has history of hysterectomy for DUB and she had her annual gyn exam in 2018   patient's blood pressure is elevated,  She states she did not take her medication today and also she is upset with her son  Recheck blood pressure 160/90 at end of visit        The following portions of the patient's history were reviewed and updated as appropriate: allergies, current medications, past family history, past medical history, past social history, past surgical history and problem list     Review of Systems   Respiratory: Negative  Cardiovascular: Negative  Gastrointestinal: Negative  Genitourinary: Positive for vaginal discharge  Negative for frequency  Skin: Negative  Neurological: Positive for headaches  Psychiatric/Behavioral: Negative  Objective:      BP (!) 180/106 (BP Location: Right arm, Patient Position: Sitting, Cuff Size: Large)   Pulse 90   Ht 5' 5" (1 651 m)   Wt 92 8 kg (204 lb 9 6 oz)   LMP  (LMP Unknown)   BMI 34 05 kg/m²          Physical Exam   Constitutional: She appears well-nourished  Cardiovascular: Normal rate, regular rhythm and normal heart sounds  Pulmonary/Chest: Effort normal and breath sounds normal    Abdominal: Soft  There is no tenderness  External genitalia- left labia minora very small area of erythema, no lesions   vagina- small a white discharge   cervix surgically absent   uterus surgically absent   adnexa- no masses nontender     Wet mount KOH- negative for yeast negative for BV negative for trich    PH 4 0

## 2018-09-24 NOTE — PATIENT INSTRUCTIONS
Recommended to go home and take her blood pressure medication ASAP, call her PCP as needed    Await comprehensive culture results   patient is still having symptoms but states have been improving   fluconazole 150 mg 1 pill once, no refills   keep appointment this week with Dr Jerry Dakin

## 2018-09-24 NOTE — PROGRESS NOTES
Chemodenervation  Date/Time: 9/27/2018 10:35 AM  Performed by: Demian Babcock by: Savanna Blend details:     Preparation: Patient was prepped and draped in usual sterile fashion      Prepped With: Alcohol    Anesthesia (see MAR for exact dosages): Anesthesia method:  None  Procedure details:     Position:  Upright  Botox:     Botox Type:  Type A    Brand:  Botox    mL's of Botulinum Toxin:  200    Final Concentration per CC:  200 units    Needle Gauge:  30 G 2 5 inch  Procedures:     Botox Procedures: chronic headache      Indications: migraines    Injection Location:     Head / Face:  L superior cervical paraspinal, R superior cervical paraspinal, L , R , L frontalis, R frontalis, procerus, R medial occipitalis, L medial occipitalis, L temporalis, R temporalis, L superior trapezius and R superior trapezius    L  injection amount:  5 unit(s)    R  injection amount:  5 unit(s)    L medial frontalis:  10 unit(s)    R medial frontalis:  10 unit(s)    L temporalis injection amount:  20 unit(s)    R temporalis injection amount:  20 unit(s)    Procerus injection amount:  5 unit(s)    L medial occipitalis injection amount:  15 unit(s)    R medial occipitalis injection amount:  15 unit(s)    L superior cervical paraspinal injection amount:  10 unit(s)    R superior cervical paraspinal injection amount:  10 unit(s)    L superior trapezius injection amount:  15 unit(s)    R superior trapezius injection amount:  15 unit(s)  Total Units:     Total units used:  200    Total units discarded:  0  Post-procedure details:     Chemodenervation:  Chronic migraine    Facial Nerve Location[de-identified]  Bilateral facial nerve    Patient tolerance of procedure:   Tolerated well, no immediate complications  Comments:      5 units in the orbicularis oculi bilaterally  35 units given in the frontalis  All medically necessary

## 2018-09-26 LAB — BACTERIA GENITAL AEROBE CULT: NORMAL

## 2018-09-27 ENCOUNTER — PROCEDURE VISIT (OUTPATIENT)
Dept: NEUROLOGY | Facility: CLINIC | Age: 43
End: 2018-09-27
Payer: MEDICARE

## 2018-09-27 VITALS — SYSTOLIC BLOOD PRESSURE: 164 MMHG | TEMPERATURE: 98.6 F | DIASTOLIC BLOOD PRESSURE: 86 MMHG | HEART RATE: 82 BPM

## 2018-09-27 DIAGNOSIS — G43.709 CHRONIC MIGRAINE WITHOUT AURA WITHOUT STATUS MIGRAINOSUS, NOT INTRACTABLE: Primary | ICD-10-CM

## 2018-09-27 PROCEDURE — 64615 CHEMODENERV MUSC MIGRAINE: CPT | Performed by: PHYSICIAN ASSISTANT

## 2018-09-27 RX ORDER — TOPIRAMATE 100 MG/1
TABLET, FILM COATED ORAL
Qty: 270 TABLET | Refills: 1 | Status: SHIPPED | OUTPATIENT
Start: 2018-09-27 | End: 2019-03-04

## 2018-09-28 ENCOUNTER — OFFICE VISIT (OUTPATIENT)
Dept: OBGYN CLINIC | Facility: CLINIC | Age: 43
End: 2018-09-28
Payer: MEDICARE

## 2018-09-28 VITALS
HEIGHT: 65 IN | WEIGHT: 205 LBS | SYSTOLIC BLOOD PRESSURE: 133 MMHG | BODY MASS INDEX: 34.16 KG/M2 | HEART RATE: 65 BPM | DIASTOLIC BLOOD PRESSURE: 84 MMHG

## 2018-09-28 DIAGNOSIS — N30.10 INTERSTITIAL CYSTITIS: Primary | ICD-10-CM

## 2018-09-28 PROCEDURE — 51700 IRRIGATION OF BLADDER: CPT | Performed by: OBSTETRICS & GYNECOLOGY

## 2018-09-28 NOTE — PROGRESS NOTES
Procedures Only Visit  The urethra was cleaned with Betadine x3 and a straight cath placed  Bladder drained of urine  Instillation of 20,000 U of heparin in 11cc of buffered lidocaine was instilled  Catheter was removed without complications  /84 (BP Location: Left arm, Patient Position: Sitting, Cuff Size: Standard)   Pulse 65   Ht 5' 5" (1 651 m)   Wt 93 kg (205 lb)   LMP  (LMP Unknown)   BMI 34 11 kg/m²     A/P:   Interstitial Cystitis  -Bladder instillation as above    Recurrent Vaginal Candidiasis  Pt has had recurrent infections, repeated Diflucan    -ID curbsided by Dr Randell Rivera, will consider referral to ID, appreciate their recs

## 2018-10-12 DIAGNOSIS — G43.709 CHRONIC MIGRAINE WITHOUT AURA WITHOUT STATUS MIGRAINOSUS, NOT INTRACTABLE: ICD-10-CM

## 2018-10-12 RX ORDER — TOPIRAMATE 50 MG/1
TABLET, FILM COATED ORAL
Qty: 120 TABLET | Refills: 1 | Status: SHIPPED | OUTPATIENT
Start: 2018-10-12 | End: 2018-12-09 | Stop reason: SDUPTHER

## 2018-10-22 ENCOUNTER — TELEPHONE (OUTPATIENT)
Dept: NEUROLOGY | Facility: CLINIC | Age: 43
End: 2018-10-22

## 2018-12-09 DIAGNOSIS — G43.709 CHRONIC MIGRAINE WITHOUT AURA WITHOUT STATUS MIGRAINOSUS, NOT INTRACTABLE: ICD-10-CM

## 2018-12-10 RX ORDER — TOPIRAMATE 50 MG/1
TABLET, FILM COATED ORAL
Qty: 120 TABLET | Refills: 1 | Status: SHIPPED | OUTPATIENT
Start: 2018-12-10 | End: 2019-02-07 | Stop reason: SDUPTHER

## 2019-01-02 RX ORDER — SUMATRIPTAN 50 MG/1
TABLET, FILM COATED ORAL
COMMUNITY
Start: 2018-12-07 | End: 2019-03-04

## 2019-01-02 RX ORDER — CLONAZEPAM 1 MG/1
1 TABLET ORAL 2 TIMES DAILY
Refills: 0 | COMMUNITY
Start: 2018-12-15

## 2019-01-03 ENCOUNTER — PROCEDURE VISIT (OUTPATIENT)
Dept: NEUROLOGY | Facility: CLINIC | Age: 44
End: 2019-01-03
Payer: MEDICARE

## 2019-01-03 VITALS — SYSTOLIC BLOOD PRESSURE: 104 MMHG | DIASTOLIC BLOOD PRESSURE: 62 MMHG | HEART RATE: 76 BPM | TEMPERATURE: 98.1 F

## 2019-01-03 DIAGNOSIS — G43.709 CHRONIC MIGRAINE WITHOUT AURA WITHOUT STATUS MIGRAINOSUS, NOT INTRACTABLE: Primary | ICD-10-CM

## 2019-01-03 PROCEDURE — 64615 CHEMODENERV MUSC MIGRAINE: CPT | Performed by: PHYSICIAN ASSISTANT

## 2019-01-03 NOTE — PROGRESS NOTES
Chemodenervation  Date/Time: 1/3/2019 10:22 AM  Performed by: Michael Stokes  Authorized by: Darin Loya details:     Preparation: Patient was prepped and draped in usual sterile fashion      Prepped With: Alcohol    Anesthesia (see MAR for exact dosages): Anesthesia method:  None  Procedure details:     Position:  Upright  Botox:     Botox Type:  Type A    Brand:  Botox    mL's of Botulinum Toxin:  200    Final Concentration per CC:  200 units    Needle Gauge:  30 G 2 5 inch  Procedures:     Botox Procedures: chronic headache      Indications: migraines    Injection Location:     Head / Face:  L superior cervical paraspinal, R superior cervical paraspinal, L , R , L frontalis, R frontalis, procerus, R medial occipitalis, L medial occipitalis, L temporalis, R temporalis, L superior trapezius and R superior trapezius    L  injection amount:  5 unit(s)    R  injection amount:  5 unit(s)    L medial frontalis:  10 unit(s)    R medial frontalis:  10 unit(s)    L temporalis injection amount:  20 unit(s)    R temporalis injection amount:  20 unit(s)    Procerus injection amount:  5 unit(s)    L medial occipitalis injection amount:  15 unit(s)    R medial occipitalis injection amount:  15 unit(s)    L superior cervical paraspinal injection amount:  10 unit(s)    R superior cervical paraspinal injection amount:  10 unit(s)    L superior trapezius injection amount:  15 unit(s)    R superior trapezius injection amount:  15 unit(s)  Total Units:     Total units used:  200    Total units discarded:  0  Post-procedure details:     Chemodenervation:  Chronic migraine    Facial Nerve Location[de-identified]  Bilateral facial nerve    Patient tolerance of procedure:   Tolerated well, no immediate complications  Comments:      5 units in the orbicularis oculi bilaterally  35 units given in the frontalis  All medically necessary

## 2019-01-24 ENCOUNTER — TRANSCRIBE ORDERS (OUTPATIENT)
Dept: LAB | Facility: CLINIC | Age: 44
End: 2019-01-24

## 2019-01-24 ENCOUNTER — APPOINTMENT (OUTPATIENT)
Dept: LAB | Facility: CLINIC | Age: 44
End: 2019-01-24
Payer: MEDICARE

## 2019-01-24 DIAGNOSIS — F31.9 DEPRESSED BIPOLAR I DISORDER (HCC): ICD-10-CM

## 2019-01-24 DIAGNOSIS — F31.9 DEPRESSED BIPOLAR I DISORDER (HCC): Primary | ICD-10-CM

## 2019-01-24 LAB
ALBUMIN SERPL BCP-MCNC: 3.5 G/DL (ref 3.5–5)
ALP SERPL-CCNC: 92 U/L (ref 46–116)
ALT SERPL W P-5'-P-CCNC: 68 U/L (ref 12–78)
AST SERPL W P-5'-P-CCNC: 25 U/L (ref 5–45)
BASOPHILS # BLD AUTO: 0.03 THOUSANDS/ΜL (ref 0–0.1)
BASOPHILS NFR BLD AUTO: 1 % (ref 0–1)
BILIRUB DIRECT SERPL-MCNC: 0.06 MG/DL (ref 0–0.2)
BILIRUB SERPL-MCNC: 0.2 MG/DL (ref 0.2–1)
CHOLEST SERPL-MCNC: 141 MG/DL (ref 50–200)
EOSINOPHIL # BLD AUTO: 0.14 THOUSAND/ΜL (ref 0–0.61)
EOSINOPHIL NFR BLD AUTO: 2 % (ref 0–6)
ERYTHROCYTE [DISTWIDTH] IN BLOOD BY AUTOMATED COUNT: 12.2 % (ref 11.6–15.1)
EST. AVERAGE GLUCOSE BLD GHB EST-MCNC: 126 MG/DL
GLUCOSE P FAST SERPL-MCNC: 135 MG/DL (ref 65–99)
HBA1C MFR BLD: 6 % (ref 4.2–6.3)
HCT VFR BLD AUTO: 40.7 % (ref 34.8–46.1)
HDLC SERPL-MCNC: 42 MG/DL (ref 40–60)
HGB BLD-MCNC: 13.4 G/DL (ref 11.5–15.4)
IMM GRANULOCYTES # BLD AUTO: 0.02 THOUSAND/UL (ref 0–0.2)
IMM GRANULOCYTES NFR BLD AUTO: 0 % (ref 0–2)
LDLC SERPL CALC-MCNC: 87 MG/DL (ref 0–100)
LYMPHOCYTES # BLD AUTO: 1.49 THOUSANDS/ΜL (ref 0.6–4.47)
LYMPHOCYTES NFR BLD AUTO: 24 % (ref 14–44)
MCH RBC QN AUTO: 28.6 PG (ref 26.8–34.3)
MCHC RBC AUTO-ENTMCNC: 32.9 G/DL (ref 31.4–37.4)
MCV RBC AUTO: 87 FL (ref 82–98)
MONOCYTES # BLD AUTO: 0.5 THOUSAND/ΜL (ref 0.17–1.22)
MONOCYTES NFR BLD AUTO: 8 % (ref 4–12)
NEUTROPHILS # BLD AUTO: 4.09 THOUSANDS/ΜL (ref 1.85–7.62)
NEUTS SEG NFR BLD AUTO: 65 % (ref 43–75)
NONHDLC SERPL-MCNC: 99 MG/DL
NRBC BLD AUTO-RTO: 0 /100 WBCS
PLATELET # BLD AUTO: 314 THOUSANDS/UL (ref 149–390)
PMV BLD AUTO: 10 FL (ref 8.9–12.7)
PROLACTIN SERPL-MCNC: 10.1 NG/ML
PROT SERPL-MCNC: 7.9 G/DL (ref 6.4–8.2)
RBC # BLD AUTO: 4.69 MILLION/UL (ref 3.81–5.12)
T3 SERPL-MCNC: 0.8 NG/ML (ref 0.6–1.8)
T4 SERPL-MCNC: 10.2 UG/DL (ref 4.7–13.3)
TRIGL SERPL-MCNC: 61 MG/DL
TSH SERPL DL<=0.05 MIU/L-ACNC: 0.45 UIU/ML (ref 0.36–3.74)
WBC # BLD AUTO: 6.27 THOUSAND/UL (ref 4.31–10.16)

## 2019-01-24 PROCEDURE — 84443 ASSAY THYROID STIM HORMONE: CPT

## 2019-01-24 PROCEDURE — 83036 HEMOGLOBIN GLYCOSYLATED A1C: CPT

## 2019-01-24 PROCEDURE — 80061 LIPID PANEL: CPT

## 2019-01-24 PROCEDURE — 84480 ASSAY TRIIODOTHYRONINE (T3): CPT

## 2019-01-24 PROCEDURE — 84436 ASSAY OF TOTAL THYROXINE: CPT

## 2019-01-24 PROCEDURE — 85025 COMPLETE CBC W/AUTO DIFF WBC: CPT

## 2019-01-24 PROCEDURE — 36415 COLL VENOUS BLD VENIPUNCTURE: CPT

## 2019-01-24 PROCEDURE — 84146 ASSAY OF PROLACTIN: CPT

## 2019-01-24 PROCEDURE — 80076 HEPATIC FUNCTION PANEL: CPT

## 2019-01-24 PROCEDURE — 82947 ASSAY GLUCOSE BLOOD QUANT: CPT

## 2019-02-07 DIAGNOSIS — G43.709 CHRONIC MIGRAINE WITHOUT AURA WITHOUT STATUS MIGRAINOSUS, NOT INTRACTABLE: ICD-10-CM

## 2019-02-07 RX ORDER — TOPIRAMATE 50 MG/1
TABLET, FILM COATED ORAL
Qty: 120 TABLET | Refills: 1 | Status: SHIPPED | OUTPATIENT
Start: 2019-02-07 | End: 2019-03-04

## 2019-02-11 ENCOUNTER — TELEPHONE (OUTPATIENT)
Dept: OBGYN CLINIC | Facility: CLINIC | Age: 44
End: 2019-02-11

## 2019-02-11 NOTE — TELEPHONE ENCOUNTER
Friendly reminder regarding incomplete mammogram   Message left for patient with number to central scheduling

## 2019-02-20 ENCOUNTER — HOSPITAL ENCOUNTER (OUTPATIENT)
Dept: RADIOLOGY | Facility: HOSPITAL | Age: 44
Discharge: HOME | End: 2019-02-20
Payer: MEDICARE

## 2019-02-20 VITALS — WEIGHT: 205 LBS | BODY MASS INDEX: 34.16 KG/M2 | HEIGHT: 65 IN

## 2019-02-20 DIAGNOSIS — Z12.39 SCREENING FOR BREAST CANCER: ICD-10-CM

## 2019-02-20 PROCEDURE — 77067 SCR MAMMO BI INCL CAD: CPT

## 2019-02-25 ENCOUNTER — OFFICE VISIT (OUTPATIENT)
Dept: OBGYN CLINIC | Facility: CLINIC | Age: 44
End: 2019-02-25

## 2019-02-25 VITALS
BODY MASS INDEX: 35.99 KG/M2 | SYSTOLIC BLOOD PRESSURE: 114 MMHG | HEIGHT: 65 IN | DIASTOLIC BLOOD PRESSURE: 78 MMHG | HEART RATE: 75 BPM | WEIGHT: 216 LBS

## 2019-02-25 DIAGNOSIS — N89.8 VAGINAL ODOR: ICD-10-CM

## 2019-02-25 DIAGNOSIS — R10.2 PELVIC PAIN: ICD-10-CM

## 2019-02-25 DIAGNOSIS — N30.10 INTERSTITIAL CYSTITIS: ICD-10-CM

## 2019-02-25 DIAGNOSIS — R31.9 HEMATURIA, UNSPECIFIED TYPE: Primary | ICD-10-CM

## 2019-02-25 LAB
BV WHIFF TEST VAG QL: NORMAL
CLUE CELLS SPEC QL WET PREP: NEGATIVE
PH SMN: 4 [PH]
SL AMB  POCT GLUCOSE, UA: NEGATIVE
SL AMB LEUKOCYTE ESTERASE,UA: NEGATIVE
SL AMB POCT BILIRUBIN,UA: NEGATIVE
SL AMB POCT BLOOD,UA: ABNORMAL
SL AMB POCT CLARITY,UA: ABNORMAL
SL AMB POCT COLOR,UA: YELLOW
SL AMB POCT KETONES,UA: NEGATIVE
SL AMB POCT NITRITE,UA: NEGATIVE
SL AMB POCT PH,UA: 6
SL AMB POCT SPECIFIC GRAVITY,UA: 1.02
SL AMB POCT URINE PROTEIN: NEGATIVE
SL AMB POCT UROBILINOGEN: 0.2
SL AMB POCT WET MOUNT: NORMAL
T VAGINALIS VAG QL WET PREP: NORMAL
YEAST VAG QL WET PREP: NORMAL

## 2019-02-25 PROCEDURE — 99213 OFFICE O/P EST LOW 20 MIN: CPT | Performed by: NURSE PRACTITIONER

## 2019-02-25 PROCEDURE — 87086 URINE CULTURE/COLONY COUNT: CPT | Performed by: NURSE PRACTITIONER

## 2019-02-25 PROCEDURE — 81002 URINALYSIS NONAUTO W/O SCOPE: CPT | Performed by: NURSE PRACTITIONER

## 2019-02-25 PROCEDURE — 87210 SMEAR WET MOUNT SALINE/INK: CPT | Performed by: NURSE PRACTITIONER

## 2019-02-25 RX ORDER — NITROFURANTOIN 25; 75 MG/1; MG/1
100 CAPSULE ORAL 2 TIMES DAILY
Qty: 14 CAPSULE | Refills: 0 | Status: SHIPPED | OUTPATIENT
Start: 2019-02-25 | End: 2019-03-04

## 2019-02-25 NOTE — PROGRESS NOTES
Assessment/Plan:    Patient reassured wet mount KOH all negative  Will treat for UTI- Nitrofurantoin 100 mg b i d  X7 days sent to pharmacy  I will call results of urine culture to patient  Referral to Urogynecology for interstitial cystitis given the patient  Annual gyn exam due 2019     Diagnoses and all orders for this visit:    Hematuria, unspecified type  -     POCT urine dip  -     Urine culture  -     nitrofurantoin (MACROBID) 100 mg capsule; Take 1 capsule (100 mg total) by mouth 2 (two) times a day for 7 days    Vaginal odor  -     POCT wet mount    Pelvic pain  -     POCT urine dip  -     Urine culture    Interstitial cystitis  -     Ambulatory referral to Urogynecology; Future          Subjective:      Patient ID: Servando Garcia is a 39 y o  female  HPI 69-year-old  here with complaints of vaginal odor,  and clear discharge for the past 1-2 days  Also reports discomfort in the vagina  Also reports urinary urgency,  Frequency  since 2018  She reports pelvic pain in midline area  Declines cultures for GC/CT  Patient with history IC,  her last bladder instillation was 2018  She reports she is too busy to get her instillation but she would like to restart  Patient with history of hysterectomy for DUB  Patient's last annual was 2018    The following portions of the patient's history were reviewed and updated as appropriate: allergies, current medications, past family history, past medical history, past social history, past surgical history and problem list     Review of Systems   Respiratory: Negative  Cardiovascular: Negative  Genitourinary: Positive for dysuria, frequency, pelvic pain, urgency and vaginal discharge  Negative for flank pain           Objective:      /78 (BP Location: Left arm, Patient Position: Sitting, Cuff Size: Adult)   Pulse 75   Ht 5' 5" (1 651 m)   Wt 98 kg (216 lb)   LMP  (LMP Unknown)   BMI 35 94 kg/m²          Physical Exam Cardiovascular: Normal rate, regular rhythm and normal heart sounds  Pulmonary/Chest: Effort normal and breath sounds normal    Abdominal: There is tenderness  Suprapubically   Skin: Skin is warm and dry       external genitalia-no erythema no lesions  Vagina-no discharge  Cervix-surgically absent  Uterus-surgically absent  Adnexa-no masses nontender    Wet mount KOH-all negative    Urine dip with positive for blood

## 2019-02-25 NOTE — PATIENT INSTRUCTIONS
Referral for urogynecology for interstitial cystitis given to patient, patient to schedule appointment  I will call results for urine C&S to patient, reviewed to take nitrofurantoin 100 mg b i d  X7 days

## 2019-02-26 LAB — BACTERIA UR CULT: NORMAL

## 2019-02-28 ENCOUNTER — TELEPHONE (OUTPATIENT)
Dept: OBGYN CLINIC | Facility: CLINIC | Age: 44
End: 2019-02-28

## 2019-03-01 NOTE — PROGRESS NOTES
[facility]  PATIENT:  Mj Juarez  MRN:  [id number]  :  1975  DATE OF SERVICE:  3/4/2019      Assessment/Plan:     Chronic migraine without aura without status migrainosus, not intractable  Preventive therapy for headaches:  -  Continue venlafaxine 150 milligram once a day  -  Will increase her Topamax to 100 milligrams in the morning and 200 milligrams at bedtime now  -  Continue Botox for now   Abortive therapy:  -   She is still using Motrin and Tylenol at least 3 to 4 times a week  Goal is to decrease the frequency of abortive medication      For 5 days  Decadron 2 mg in am  Depakote 250 mg 2 tabs for 3 nights and 1 tab for 2 night  No motrin, tylenol, ibuprofen    Call if your headaches return and we may restart the depakote at bedtime    Toradol 60 mg and Compazine 10 mg IM given today for migraine of 7/10    Other specified anxiety disorders  Continue to work with psychiatrist to decrease anxiety  May need to increase or adjust medications  Problem List Items Addressed This Visit        Cardiovascular and Mediastinum    Chronic migraine without aura without status migrainosus, not intractable     Preventive therapy for headaches:  -  Continue venlafaxine 150 milligram once a day  -  Will increase her Topamax to 100 milligrams in the morning and 200 milligrams at bedtime now  -  Continue Botox for now   Abortive therapy:  -   She is still using Motrin and Tylenol at least 3 to 4 times a week    Goal is to decrease the frequency of abortive medication      For 5 days  Decadron 2 mg in am  Depakote 250 mg 2 tabs for 3 nights and 1 tab for 2 night  No motrin, tylenol, ibuprofen    Call if your headaches return and we may restart the depakote at bedtime    Toradol 60 mg and Compazine 10 mg IM given today for migraine of 7/10         Relevant Medications    topiramate (TOPAMAX) 100 mg tablet    ketorolac (TORADOL) 60 mg/2 mL IM injection 60 mg    prochlorperazine edisylate (COMPAZINE) injection 10 mg    dexamethasone (DECADRON) 2 mg tablet    divalproex sodium (DEPAKOTE ER) 250 mg 24 hr tablet       Other    Other specified anxiety disorders - Primary     Continue to work with psychiatrist to decrease anxiety  May need to increase or adjust medications  Relevant Medications    prochlorperazine edisylate (COMPAZINE) injection 10 mg              History of Present Illness: We had the pleasure of evaluating Adamaris Pedroza in neurological follow up  today for headaches  As you know,  she is a 39 y o  right handedfemale  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 none   Headache are worse if the patient: cough, sneeze, bending over  Aura - none     Since starting botox, the patient reports greater than 7 days of migraine relief from baseline, correlated with headache diary, decreased abortive medication use and decreased ER visits  Any family history of migraines? Yes, mother and father  Any family history of aneurysms? No    Current pain: 7/10  How often do the headaches occur - 3-4 a week and they are more responsive to motrin and tylenol as this will abort the headache     What time of the day do the headaches start - either wakes up with them or states it is mid day  How long do the headaches last - all day  Where are they located - face and frontal region and also has a lot of neck pain with this  What is the intensity of pain - 10/10 at max  Describe your usual headache - Throbbing, Pounding  Associated symptoms:   -       Decrease of appetite, nausea   -       Photophobia, phonophobia, sensitivity to smell   -       Problem with concentration  -       prefer to be alone and in a dark room, unable to work    Number of days missed per month because of headaches:  Work (or school) days: 0  Social or Family activities: a lot    What time of the year do headaches occur more frequently? Change of weather  Have you seen someone else for headaches or pain? Yes  Have you had trigger point injection performed and how often? No  Have you had Botox injection performed and how often? Yes   Have you had epidural injections or transforaminal injections performed? No    Have you used CBD or THC for your headaches and how often? No  Are you current pregnant or planning on getting pregnant? No, done with family planning  Have you ever had any Brain imaging? yes     7/23/2018 CT head  No acute abnormalities     I personally reviewed these images        Past Medical History:   Diagnosis Date    Bipolar 1 disorder ([facility] [address] )     Disease of thyroid gland     Fibromyalgia     Gout     Hyperlipidemia     Hypertension     Lupus     Migraine        Patient Active Problem List   Diagnosis    Palpitations    AVNRT (AV win re-entry tachycardia) (HCC)    Chronic migraine without aura without status migrainosus, not intractable    Interstitial cystitis    Vaginal candidiasis    Encounter for gynecological examination    Screening for breast cancer    Hyperlipidemia    Disease of thyroid gland    Yeast infection of the vagina    Vaginal discharge    Other specified anxiety disorders       Medications:      Current Outpatient Medications   Medication Sig Dispense Refill    Cetirizine HCl (ZYRTEC ALLERGY) 10 MG CAPS Take 10 mg by mouth daily        clonazePAM (KlonoPIN) 1 mg tablet 1 mg daily   0    fluticasone (FLOVENT HFA) 110 MCG/ACT inhaler Inhale 1 puff as needed       hydroxychloroquine (PLAQUENIL) 200 mg tablet       LATUDA 80 MG tablet Take 80 mg by mouth daily  0    Levothyroxine Sodium 88 MCG CAPS Take 1 capsule by mouth daily      LISINOPRIL PO Take 10 mg by mouth daily       Multiple Vitamins-Minerals (MULTIVITAMIN ADULT PO) Take 1 tablet by mouth daily      omeprazole (PriLOSEC) 20 mg delayed release capsule 1 capsule as needed       oxyCODONE (ROXICODONE) 15 mg immediate release tablet as needed       PROAIR  (90 Base) MCG/ACT inhaler as needed       propranolol (INDERAL) 20 mg tablet Take 160 mg by mouth daily       valACYclovir (VALTREX) 1,000 mg tablet 1 tablet as needed       venlafaxine (EFFEXOR-XR) 150 mg 24 hr capsule 150 mg 2 (two) times a day  0    amLODIPine (NORVASC) 2 5 mg tablet       amLODIPine (NORVASC) 5 mg tablet       amoxicillin-clavulanate (AUGMENTIN) 875-125 mg per tablet       dexamethasone (DECADRON) 2 mg tablet Take 1 tablet (2 mg total) by mouth daily with breakfast 5 tablet 0    divalproex sodium (DEPAKOTE ER) 250 mg 24 hr tablet Take 1 tablet (250 mg total) by mouth daily at bedtime 2 tablets at bedtime for 3 days then 1 tablet at bedtime for 2 days 8 tablet 0    ibuprofen (MOTRIN) 800 mg tablet       METOPROLOL TARTRATE  mg 2 (two) times a day        nitrofurantoin (MACROBID) 100 mg capsule Take 1 capsule (100 mg total) by mouth 2 (two) times a day for 7 days (Patient not taking: Reported on 3/4/2019) 14 capsule 0    Probiotic Product (PROBIOTIC DAILY PO) Take 1 tablet by mouth daily      RESTASIS 0 05 % ophthalmic emulsion       topiramate (TOPAMAX) 100 mg tablet One in am and two at bedtime daily 270 tablet 1     Current Facility-Administered Medications   Medication Dose Route Frequency Provider Last Rate Last Dose    ketorolac (TORADOL) 60 mg/2 mL IM injection 60 mg  60 mg Intramuscular Once Guerline Bryant PA-C  prochlorperazine edisylate (COMPAZINE) injection 10 mg  10 mg Intramuscular Once Raj Damon PA-C            Allergies: Allergies   Allergen Reactions    Pregabalin      Other reaction(s): blurred vision    Shellfish-Derived Products Anaphylaxis and Hives    Sumatriptan      Other reaction(s):  Altered Heart Rate    Triptans Shortness Of Breath and Palpitations    Latex Swelling and Rash     Other reaction(s): Hives/Skin Rash  Other reaction(s): Hives/Skin Rash    Sulfa Antibiotics Swelling and Rash     Other reaction(s): Hives/Skin Rash  Other reaction(s): Hives/Skin Rash    Monistat [Miconazole] Swelling       Family History:     Family History   Problem Relation Age of Onset    Lupus Mother     Osteoporosis Mother     Hypotension Mother     Hypertension Father     Diabetes Father     Heart disease Father     Diabetes Sister     Schizophrenia Sister     Diabetes Maternal Grandmother     Rheum arthritis Maternal Grandmother     Hypertension Maternal Grandmother     Breast cancer Maternal Aunt 36       Social History:     Social History     Socioeconomic History    Marital status: Single     Spouse name: Not on file    Number of children: Not on file    Years of education: Not on file    Highest education level: Not on file   Occupational History    Not on file   Social Needs    Financial resource strain: Not on file    Food insecurity:     Worry: Not on file     Inability: Not on file    Transportation needs:     Medical: Not on file     Non-medical: Not on file   Tobacco Use    Smoking status: Former Smoker    Smokeless tobacco: Never Used    Tobacco comment: quit at 24years old   Substance and Sexual Activity    Alcohol use: No    Drug use: No    Sexual activity: Yes     Partners: Male     Birth control/protection: Female Sterilization   Lifestyle    Physical activity:     Days per week: Not on file     Minutes per session: Not on file    Stress: Not on file Relationships    Social connections:     Talks on phone: Not on file     Gets together: Not on file     Attends Orthodoxy service: Not on file     Active member of club or organization: Not on file     Attends meetings of clubs or organizations: Not on file     Relationship status: Not on file    Intimate partner violence:     Fear of current or ex partner: Not on file     Emotionally abused: Not on file     Physically abused: Not on file     Forced sexual activity: Not on file   Other Topics Concern    Not on file   Social History Narrative    Not on file         Objective:   Physical Exam:                                                                   Vitals:            /72 (BP Location: Left arm, Patient Position: Sitting, Cuff Size: Extra-Large)   Pulse 84   Ht 5' 5" (1 651 m)   Wt 98 kg (216 lb)   LMP  (LMP Unknown)   BMI 35 94 kg/m²   BP Readings from Last 3 Encounters:   03/04/19 126/72   02/25/19 114/78   01/03/19 104/62     Pulse Readings from Last 3 Encounters:   03/04/19 84   02/25/19 75   01/03/19 76                  CONSTITUTIONAL: Well developed, well nourished, well groomed  No dysmorphic features  Eyes:  PERRLA, EOM normal      Neck:  Normal ROM, neck supple  HEENT:  Normocephalic atraumatic  No meningismus  Oropharynx is clear and moist  No oral mucosal lesions  Chest:  Respirations regular and unlabored  Cardiovascular:  Distal extremities warm without palpable edema or tenderness, no observed significant swelling  Musculoskeletal:  Full range of motion  (see below under neurologic exam for evaluation of motor function and gait)   Skin:  warm and dry   Psychiatric:  Normal behavior and appropriate affect      SKULL AND SPINE  ROM: Full range of motion  Tenderness: No focal tenderness    MENTAL STATUS  Orientation: Alert and oriented x 3  Fund of knowledge: Intact  CRANIAL NERVES  II: PERRL  Visual fields full  III, IV, VI: Extraocular movements intact  No nystagmus  V: Facial sensation normal V1-V3  VII: Facial movements normal and symmetric  VIII: Intact hearing bilaterally  IX, X: Palate elevation normal and symmetric  XI: Intact trapezius, SCM strength  XII: Tongue protrudes to the midline    MOTOR (Upper and lower extremities)   Bulk/tone/abnormal movement: Normal muscle bulk and tone  Strength: Strength 5/5 throughout  COORDINATION   Station/Gait: Normal baseline gait  Normal tandem gait  SENSORY  Romberg sign absent  Reflexes:  deep tendon reflexes are 2+/4 throughout, flexor response bilaterally         Review of Systems:   Review of Systems   Constitutional: Negative  HENT: Negative  Eyes: Negative  Respiratory: Negative  Cardiovascular: Negative  Gastrointestinal: Negative  Endocrine: Negative  Genitourinary: Negative  Musculoskeletal: Positive for neck pain  Skin: Negative  Allergic/Immunologic: Negative  Neurological: Positive for headaches (pressure )  Hematological: Negative  Psychiatric/Behavioral: The patient is nervous/anxious  I personally reviewed and updated the ROS that was entered by the medical assistant       I have spent 25 minutes with Patient  today in which greater than 50% of this time was spent in counseling/coordination of care regarding Prognosis, Risks and benefits of tx options, Intructions for management, Importance of tx compliance and Risk factor reductions        Author:  Bridger Bernal PA-C 3/4/2019 10:54 AM

## 2019-03-04 ENCOUNTER — OFFICE VISIT (OUTPATIENT)
Dept: NEUROLOGY | Facility: CLINIC | Age: 44
End: 2019-03-04
Payer: MEDICARE

## 2019-03-04 VITALS
HEART RATE: 84 BPM | SYSTOLIC BLOOD PRESSURE: 126 MMHG | DIASTOLIC BLOOD PRESSURE: 72 MMHG | BODY MASS INDEX: 35.99 KG/M2 | WEIGHT: 216 LBS | HEIGHT: 65 IN

## 2019-03-04 DIAGNOSIS — F41.8 OTHER SPECIFIED ANXIETY DISORDERS: Primary | ICD-10-CM

## 2019-03-04 DIAGNOSIS — G43.709 CHRONIC MIGRAINE WITHOUT AURA WITHOUT STATUS MIGRAINOSUS, NOT INTRACTABLE: ICD-10-CM

## 2019-03-04 PROCEDURE — 99214 OFFICE O/P EST MOD 30 MIN: CPT | Performed by: PHYSICIAN ASSISTANT

## 2019-03-04 PROCEDURE — 96372 THER/PROPH/DIAG INJ SC/IM: CPT | Performed by: PHYSICIAN ASSISTANT

## 2019-03-04 RX ORDER — DIVALPROEX SODIUM 250 MG/1
250 TABLET, EXTENDED RELEASE ORAL
Qty: 8 TABLET | Refills: 0 | Status: SHIPPED | OUTPATIENT
Start: 2019-03-04 | End: 2019-07-16 | Stop reason: SDUPTHER

## 2019-03-04 RX ORDER — TOPIRAMATE 100 MG/1
TABLET, FILM COATED ORAL
Qty: 270 TABLET | Refills: 1 | Status: SHIPPED | OUTPATIENT
Start: 2019-03-04 | End: 2019-08-28 | Stop reason: SDUPTHER

## 2019-03-04 RX ORDER — KETOROLAC TROMETHAMINE 30 MG/ML
60 INJECTION, SOLUTION INTRAMUSCULAR; INTRAVENOUS ONCE
Status: COMPLETED | OUTPATIENT
Start: 2019-03-04 | End: 2019-03-04

## 2019-03-04 RX ORDER — VALACYCLOVIR HYDROCHLORIDE 1 G/1
1 TABLET, FILM COATED ORAL AS NEEDED
COMMUNITY
Start: 2015-05-07

## 2019-03-04 RX ORDER — OMEPRAZOLE 20 MG/1
1 CAPSULE, DELAYED RELEASE ORAL AS NEEDED
COMMUNITY
Start: 2015-05-07 | End: 2020-09-24

## 2019-03-04 RX ORDER — DEXAMETHASONE 2 MG/1
2 TABLET ORAL
Qty: 5 TABLET | Refills: 0 | Status: SHIPPED | OUTPATIENT
Start: 2019-03-04 | End: 2019-07-16 | Stop reason: SDUPTHER

## 2019-03-04 RX ADMIN — KETOROLAC TROMETHAMINE 60 MG: 30 INJECTION, SOLUTION INTRAMUSCULAR; INTRAVENOUS at 11:04

## 2019-03-04 NOTE — PATIENT INSTRUCTIONS
Preventive therapy for headaches:  -  Continue venlafaxine 150 milligram once a day  -  Will increase her Topamax to 100 milligrams in the morning and 200 milligrams at bedtime now  -  Continue Botox for now   Abortive therapy:  -   She is still using Motrin and Tylenol at least 3 to 4 times a week    Goal is to decrease the frequency of abortive medication      For 5 days  Decadron 2 mg in am  Depakote 250 mg 2 tabs for 3 nights and 1 tab for 2 night  No motrin, tylenol, ibuprofen    Call if your headaches return and we may restart the depakote at bedtime

## 2019-03-04 NOTE — ASSESSMENT & PLAN NOTE
Preventive therapy for headaches:  -  Continue venlafaxine 150 milligram once a day  -  Will increase her Topamax to 100 milligrams in the morning and 200 milligrams at bedtime now  -  Continue Botox for now   Abortive therapy:  -   She is still using Motrin and Tylenol at least 3 to 4 times a week    Goal is to decrease the frequency of abortive medication      For 5 days  Decadron 2 mg in am  Depakote 250 mg 2 tabs for 3 nights and 1 tab for 2 night  No motrin, tylenol, ibuprofen    Call if your headaches return and we may restart the depakote at bedtime    Toradol 60 mg and Compazine 10 mg IM given today for migraine of 7/10

## 2019-04-01 ENCOUNTER — HOSPITAL ENCOUNTER (EMERGENCY)
Facility: HOSPITAL | Age: 44
Discharge: HOME | End: 2019-04-01
Attending: EMERGENCY MEDICINE | Admitting: EMERGENCY MEDICINE
Payer: MEDICARE

## 2019-04-01 ENCOUNTER — APPOINTMENT (EMERGENCY)
Dept: RADIOLOGY | Facility: HOSPITAL | Age: 44
End: 2019-04-01
Payer: MEDICARE

## 2019-04-01 VITALS
DIASTOLIC BLOOD PRESSURE: 83 MMHG | WEIGHT: 217.6 LBS | TEMPERATURE: 98.3 F | BODY MASS INDEX: 36.25 KG/M2 | HEART RATE: 85 BPM | SYSTOLIC BLOOD PRESSURE: 141 MMHG | RESPIRATION RATE: 14 BRPM | OXYGEN SATURATION: 97 % | HEIGHT: 65 IN

## 2019-04-01 DIAGNOSIS — R11.0 NAUSEA: ICD-10-CM

## 2019-04-01 DIAGNOSIS — N20.0 KIDNEY STONE: Primary | ICD-10-CM

## 2019-04-01 DIAGNOSIS — R10.9 ABDOMINAL PAIN: ICD-10-CM

## 2019-04-01 LAB
ALBUMIN SERPL BCP-MCNC: 3.2 G/DL (ref 3.5–5)
ALP SERPL-CCNC: 61 U/L (ref 46–116)
ALT SERPL W P-5'-P-CCNC: 23 U/L (ref 12–78)
ANION GAP SERPL CALCULATED.3IONS-SCNC: 2 MMOL/L (ref 4–13)
AST SERPL W P-5'-P-CCNC: 12 U/L (ref 5–45)
BACTERIA UR QL AUTO: ABNORMAL /HPF
BASOPHILS # BLD AUTO: 0.03 THOUSANDS/ΜL (ref 0–0.1)
BASOPHILS NFR BLD AUTO: 0 % (ref 0–1)
BILIRUB SERPL-MCNC: 0.23 MG/DL (ref 0.2–1)
BILIRUB UR QL STRIP: NEGATIVE
BUN SERPL-MCNC: 10 MG/DL (ref 5–25)
CALCIUM SERPL-MCNC: 8.5 MG/DL (ref 8.3–10.1)
CHLORIDE SERPL-SCNC: 110 MMOL/L (ref 100–108)
CLARITY UR: CLEAR
CO2 SERPL-SCNC: 26 MMOL/L (ref 21–32)
COLOR UR: YELLOW
COLOR, POC: NORMAL
CREAT SERPL-MCNC: 0.79 MG/DL (ref 0.6–1.3)
EOSINOPHIL # BLD AUTO: 0.14 THOUSAND/ΜL (ref 0–0.61)
EOSINOPHIL NFR BLD AUTO: 2 % (ref 0–6)
ERYTHROCYTE [DISTWIDTH] IN BLOOD BY AUTOMATED COUNT: 13.2 % (ref 11.6–15.1)
GFR SERPL CREATININE-BSD FRML MDRD: 91 ML/MIN/1.73SQ M
GLUCOSE SERPL-MCNC: 124 MG/DL (ref 65–140)
GLUCOSE UR STRIP-MCNC: NEGATIVE MG/DL
HCT VFR BLD AUTO: 36.3 % (ref 34.8–46.1)
HGB BLD-MCNC: 11.8 G/DL (ref 11.5–15.4)
HGB UR QL STRIP.AUTO: ABNORMAL
HYALINE CASTS #/AREA URNS LPF: ABNORMAL /LPF
IMM GRANULOCYTES # BLD AUTO: 0.06 THOUSAND/UL (ref 0–0.2)
IMM GRANULOCYTES NFR BLD AUTO: 1 % (ref 0–2)
KETONES UR STRIP-MCNC: NEGATIVE MG/DL
LEUKOCYTE ESTERASE UR QL STRIP: NEGATIVE
LIPASE SERPL-CCNC: 142 U/L (ref 73–393)
LYMPHOCYTES # BLD AUTO: 2.13 THOUSANDS/ΜL (ref 0.6–4.47)
LYMPHOCYTES NFR BLD AUTO: 25 % (ref 14–44)
MCH RBC QN AUTO: 28.6 PG (ref 26.8–34.3)
MCHC RBC AUTO-ENTMCNC: 32.5 G/DL (ref 31.4–37.4)
MCV RBC AUTO: 88 FL (ref 82–98)
MONOCYTES # BLD AUTO: 0.54 THOUSAND/ΜL (ref 0.17–1.22)
MONOCYTES NFR BLD AUTO: 6 % (ref 4–12)
NEUTROPHILS # BLD AUTO: 5.51 THOUSANDS/ΜL (ref 1.85–7.62)
NEUTS SEG NFR BLD AUTO: 66 % (ref 43–75)
NITRITE UR QL STRIP: NEGATIVE
NON-SQ EPI CELLS URNS QL MICRO: ABNORMAL /HPF
NRBC BLD AUTO-RTO: 0 /100 WBCS
PH UR STRIP.AUTO: 5.5 [PH] (ref 4.5–8)
PLATELET # BLD AUTO: 236 THOUSANDS/UL (ref 149–390)
PMV BLD AUTO: 10.3 FL (ref 8.9–12.7)
POTASSIUM SERPL-SCNC: 4.4 MMOL/L (ref 3.5–5.3)
PROT SERPL-MCNC: 7.1 G/DL (ref 6.4–8.2)
PROT UR STRIP-MCNC: NEGATIVE MG/DL
RBC # BLD AUTO: 4.13 MILLION/UL (ref 3.81–5.12)
RBC #/AREA URNS AUTO: ABNORMAL /HPF
SODIUM SERPL-SCNC: 138 MMOL/L (ref 136–145)
SP GR UR STRIP.AUTO: 1.01 (ref 1–1.03)
UROBILINOGEN UR QL STRIP.AUTO: 0.2 E.U./DL
WBC # BLD AUTO: 8.41 THOUSAND/UL (ref 4.31–10.16)
WBC #/AREA URNS AUTO: ABNORMAL /HPF

## 2019-04-01 PROCEDURE — 96374 THER/PROPH/DIAG INJ IV PUSH: CPT

## 2019-04-01 PROCEDURE — 83690 ASSAY OF LIPASE: CPT | Performed by: EMERGENCY MEDICINE

## 2019-04-01 PROCEDURE — 80053 COMPREHEN METABOLIC PANEL: CPT | Performed by: EMERGENCY MEDICINE

## 2019-04-01 PROCEDURE — 36415 COLL VENOUS BLD VENIPUNCTURE: CPT | Performed by: EMERGENCY MEDICINE

## 2019-04-01 PROCEDURE — 96375 TX/PRO/DX INJ NEW DRUG ADDON: CPT

## 2019-04-01 PROCEDURE — 81001 URINALYSIS AUTO W/SCOPE: CPT

## 2019-04-01 PROCEDURE — 99284 EMERGENCY DEPT VISIT MOD MDM: CPT

## 2019-04-01 PROCEDURE — 74177 CT ABD & PELVIS W/CONTRAST: CPT

## 2019-04-01 PROCEDURE — 99284 EMERGENCY DEPT VISIT MOD MDM: CPT | Performed by: EMERGENCY MEDICINE

## 2019-04-01 PROCEDURE — 85025 COMPLETE CBC W/AUTO DIFF WBC: CPT | Performed by: EMERGENCY MEDICINE

## 2019-04-01 RX ORDER — ONDANSETRON 2 MG/ML
4 INJECTION INTRAMUSCULAR; INTRAVENOUS ONCE
Status: COMPLETED | OUTPATIENT
Start: 2019-04-01 | End: 2019-04-01

## 2019-04-01 RX ORDER — OXYCODONE HYDROCHLORIDE 5 MG/1
5 TABLET ORAL EVERY 4 HOURS PRN
Qty: 12 TABLET | Refills: 0 | Status: SHIPPED | OUTPATIENT
Start: 2019-04-01 | End: 2019-04-04

## 2019-04-01 RX ORDER — ONDANSETRON 4 MG/1
4 TABLET, ORALLY DISINTEGRATING ORAL EVERY 6 HOURS PRN
Qty: 20 TABLET | Refills: 0 | Status: SHIPPED | OUTPATIENT
Start: 2019-04-01 | End: 2022-02-05

## 2019-04-01 RX ORDER — KETOROLAC TROMETHAMINE 30 MG/ML
15 INJECTION, SOLUTION INTRAMUSCULAR; INTRAVENOUS ONCE
Status: COMPLETED | OUTPATIENT
Start: 2019-04-01 | End: 2019-04-01

## 2019-04-01 RX ORDER — MORPHINE SULFATE 10 MG/ML
6 INJECTION, SOLUTION INTRAMUSCULAR; INTRAVENOUS ONCE
Status: COMPLETED | OUTPATIENT
Start: 2019-04-01 | End: 2019-04-01

## 2019-04-01 RX ORDER — IBUPROFEN 600 MG/1
600 TABLET ORAL EVERY 6 HOURS PRN
Qty: 30 TABLET | Refills: 0 | Status: SHIPPED | OUTPATIENT
Start: 2019-04-01

## 2019-04-01 RX ADMIN — IOHEXOL 100 ML: 350 INJECTION, SOLUTION INTRAVENOUS at 05:18

## 2019-04-01 RX ADMIN — MORPHINE SULFATE 6 MG: 10 INJECTION INTRAVENOUS at 05:51

## 2019-04-01 RX ADMIN — KETOROLAC TROMETHAMINE 15 MG: 30 INJECTION, SOLUTION INTRAMUSCULAR; INTRAVENOUS at 04:42

## 2019-04-01 RX ADMIN — ONDANSETRON 4 MG: 2 INJECTION INTRAMUSCULAR; INTRAVENOUS at 04:42

## 2019-04-05 ENCOUNTER — PROCEDURE VISIT (OUTPATIENT)
Dept: NEUROLOGY | Facility: CLINIC | Age: 44
End: 2019-04-05
Payer: MEDICARE

## 2019-04-05 VITALS — DIASTOLIC BLOOD PRESSURE: 78 MMHG | SYSTOLIC BLOOD PRESSURE: 136 MMHG | TEMPERATURE: 98 F

## 2019-04-05 DIAGNOSIS — G43.709 CHRONIC MIGRAINE WITHOUT AURA WITHOUT STATUS MIGRAINOSUS, NOT INTRACTABLE: Primary | ICD-10-CM

## 2019-04-05 PROCEDURE — 64615 CHEMODENERV MUSC MIGRAINE: CPT | Performed by: PHYSICIAN ASSISTANT

## 2019-04-14 DIAGNOSIS — G43.709 CHRONIC MIGRAINE WITHOUT AURA WITHOUT STATUS MIGRAINOSUS, NOT INTRACTABLE: ICD-10-CM

## 2019-04-15 RX ORDER — TOPIRAMATE 50 MG/1
TABLET, FILM COATED ORAL
Qty: 120 TABLET | Refills: 1 | Status: SHIPPED | OUTPATIENT
Start: 2019-04-15 | End: 2019-06-26 | Stop reason: SDUPTHER

## 2019-05-20 RX ORDER — TAMSULOSIN HYDROCHLORIDE 0.4 MG/1
CAPSULE ORAL
COMMUNITY
Start: 2019-04-03 | End: 2020-09-24

## 2019-05-20 RX ORDER — PROPRANOLOL HCL 60 MG
60 CAPSULE, EXTENDED RELEASE 24HR ORAL
COMMUNITY
Start: 2019-03-31 | End: 2022-03-07

## 2019-05-20 RX ORDER — LEVOTHYROXINE SODIUM 88 UG/1
88 TABLET ORAL DAILY
COMMUNITY
Start: 2019-03-31

## 2019-05-22 ENCOUNTER — OFFICE VISIT (OUTPATIENT)
Dept: NEUROLOGY | Facility: CLINIC | Age: 44
End: 2019-05-22
Payer: MEDICARE

## 2019-05-22 VITALS
HEART RATE: 77 BPM | SYSTOLIC BLOOD PRESSURE: 155 MMHG | HEIGHT: 65 IN | DIASTOLIC BLOOD PRESSURE: 82 MMHG | WEIGHT: 212.9 LBS | BODY MASS INDEX: 35.47 KG/M2

## 2019-05-22 DIAGNOSIS — F41.8 OTHER SPECIFIED ANXIETY DISORDERS: ICD-10-CM

## 2019-05-22 DIAGNOSIS — G43.709 CHRONIC MIGRAINE WITHOUT AURA WITHOUT STATUS MIGRAINOSUS, NOT INTRACTABLE: Primary | ICD-10-CM

## 2019-05-22 PROCEDURE — 99214 OFFICE O/P EST MOD 30 MIN: CPT | Performed by: PHYSICIAN ASSISTANT

## 2019-05-22 RX ORDER — OMEPRAZOLE 40 MG/1
40 CAPSULE, DELAYED RELEASE ORAL AS NEEDED
Refills: 0 | COMMUNITY
Start: 2019-05-08

## 2019-05-22 RX ORDER — ARIPIPRAZOLE 5 MG/1
5 TABLET ORAL DAILY
Refills: 0 | COMMUNITY
Start: 2019-04-29 | End: 2020-02-18

## 2019-05-22 RX ORDER — PROPRANOLOL HYDROCHLORIDE 160 MG/1
160 CAPSULE, EXTENDED RELEASE ORAL SEE ADMIN INSTRUCTIONS
Refills: 0 | COMMUNITY
Start: 2019-04-16

## 2019-05-22 RX ORDER — CYPROHEPTADINE HYDROCHLORIDE 4 MG/1
4 TABLET ORAL
Qty: 30 TABLET | Refills: 1 | Status: SHIPPED | OUTPATIENT
Start: 2019-05-22 | End: 2020-02-18 | Stop reason: SDUPTHER

## 2019-06-25 ENCOUNTER — DOCUMENTATION (OUTPATIENT)
Dept: NEUROLOGY | Facility: CLINIC | Age: 44
End: 2019-06-25

## 2019-06-26 DIAGNOSIS — G43.709 CHRONIC MIGRAINE WITHOUT AURA WITHOUT STATUS MIGRAINOSUS, NOT INTRACTABLE: ICD-10-CM

## 2019-06-26 RX ORDER — TOPIRAMATE 50 MG/1
TABLET, FILM COATED ORAL
Qty: 120 TABLET | Refills: 1 | Status: SHIPPED | OUTPATIENT
Start: 2019-06-26 | End: 2020-02-18

## 2019-07-16 ENCOUNTER — PROCEDURE VISIT (OUTPATIENT)
Dept: NEUROLOGY | Facility: CLINIC | Age: 44
End: 2019-07-16
Payer: MEDICARE

## 2019-07-16 VITALS — SYSTOLIC BLOOD PRESSURE: 171 MMHG | DIASTOLIC BLOOD PRESSURE: 84 MMHG | TEMPERATURE: 98.9 F | HEART RATE: 83 BPM

## 2019-07-16 DIAGNOSIS — G43.709 CHRONIC MIGRAINE WITHOUT AURA WITHOUT STATUS MIGRAINOSUS, NOT INTRACTABLE: Primary | ICD-10-CM

## 2019-07-16 PROCEDURE — 64615 CHEMODENERV MUSC MIGRAINE: CPT | Performed by: PHYSICIAN ASSISTANT

## 2019-07-16 RX ORDER — DIVALPROEX SODIUM 250 MG/1
250 TABLET, EXTENDED RELEASE ORAL
Qty: 8 TABLET | Refills: 0 | Status: SHIPPED | OUTPATIENT
Start: 2019-07-16 | End: 2019-12-11 | Stop reason: SDUPTHER

## 2019-07-16 RX ORDER — DEXAMETHASONE 2 MG/1
2 TABLET ORAL
Qty: 5 TABLET | Refills: 0 | Status: SHIPPED | OUTPATIENT
Start: 2019-07-16 | End: 2019-12-11 | Stop reason: SDUPTHER

## 2019-07-16 NOTE — PROGRESS NOTES
Chemodenervation  Date/Time: 7/16/2019 10:50 AM  Performed by: Sobeida Murrell PA-C  Authorized by: Sobeida Murrell PA-C     Pre-procedure details:     Prepped With: Alcohol    Anesthesia (see MAR for exact dosages): Anesthesia method:  None  Procedure details:     Position:  Upright  Botox:     Botox Type:  Type A    Brand:  Botox    mL's of Botulinum Toxin:  200    Final Concentration per CC:  200 units    Needle Gauge:  30 G 2 5 inch  Procedures:     Botox Procedures: chronic headache      Indications: migraines    Injection Location:     Head / Face:  L superior cervical paraspinal, R superior cervical paraspinal, L , R , L frontalis, R frontalis, L medial occipitalis, R medial occipitalis, procerus, R temporalis, L temporalis, R superior trapezius and L superior trapezius    L  injection amount:  5 unit(s)    R  injection amount:  5 unit(s)    L lateral frontalis:  5 unit(s)    R lateral frontalis:  5 unit(s)    L medial frontalis:  5 unit(s)    R medial frontalis:  5 unit(s)    L temporalis injection amount:  20 unit(s)    R temporalis injection amount:  20 unit(s)    Procerus injection amount:  5 unit(s)    L medial occipitalis injection amount:  15 unit(s)    R medial occipitalis injection amount:  15 unit(s)    L superior cervical paraspinal injection amount:  10 unit(s)    R superior cervical paraspinal injection amount:  10 unit(s)    L superior trapezius injection amount:  15 unit(s)    R superior trapezius injection amount:  15 unit(s)  Total Units:     Total units used:  200    Total units discarded:  0  Post-procedure details:     Chemodenervation:  Chronic migraine    Facial Nerve Location[de-identified]  Bilateral facial nerve    Patient tolerance of procedure:   Tolerated well, no immediate complications  Comments:      5 units orbicularis oculi bilaterally  35 units frontalis  All medically necessary

## 2019-08-15 ENCOUNTER — OFFICE VISIT (OUTPATIENT)
Dept: OBGYN CLINIC | Facility: CLINIC | Age: 44
End: 2019-08-15

## 2019-08-15 VITALS
SYSTOLIC BLOOD PRESSURE: 118 MMHG | DIASTOLIC BLOOD PRESSURE: 76 MMHG | WEIGHT: 206 LBS | BODY MASS INDEX: 34.28 KG/M2 | HEART RATE: 75 BPM

## 2019-08-15 DIAGNOSIS — N89.8 VAGINAL DRYNESS: Primary | ICD-10-CM

## 2019-08-15 PROCEDURE — 99213 OFFICE O/P EST LOW 20 MIN: CPT | Performed by: OBSTETRICS & GYNECOLOGY

## 2019-08-16 NOTE — PROGRESS NOTES
Assessment/Plan:    No problem-specific Assessment & Plan notes found for this encounter  Diagnoses and all orders for this visit:    Vaginal dryness  Comments:  advised patient to buy replens vaginal mosturizer OTC          Subjective:      Patient ID: Selvin Gaston is a 38 y o  female  All Garcia is a 54-year-old  female with a past medical history of thyroid disease, hypertension, migraine, bipolar disorder, vaginal candidiasis  Who presents today complaining no vaginal and vulvar irritation  Patient believes she has some sort of infection  She denies any noticeable discharge, or any foul smell  Patient says she has been treated in the past for candidiasis is repeated  Patient endorses dyspareunia and vaginal dryness  The following portions of the patient's history were reviewed and updated as appropriate: problem list     Review of Systems   Constitutional: Negative  Respiratory: Negative  Cardiovascular: Negative  Genitourinary: Positive for dyspareunia  Negative for dysuria, genital sores, pelvic pain, vaginal bleeding, vaginal discharge and vaginal pain  Objective:      /76 (BP Location: Left arm, Patient Position: Sitting, Cuff Size: Adult)   Pulse 75   Wt 93 4 kg (206 lb)   LMP  (LMP Unknown)   BMI 34 28 kg/m²          Physical Exam   Constitutional: She appears well-nourished  Mildly distressed   Cardiovascular: Normal rate, regular rhythm and normal heart sounds  Exam reveals no gallop and no friction rub  No murmur heard  Pulmonary/Chest: Effort normal and breath sounds normal  She has no wheezes  She has no rales  She exhibits no tenderness  Genitourinary: Vagina normal  No vaginal discharge found  Genitourinary Comments: Dry vagina noted on speculum examination, no erythema noted   Vitals reviewed

## 2019-08-22 ENCOUNTER — OFFICE VISIT (OUTPATIENT)
Dept: OBGYN CLINIC | Facility: CLINIC | Age: 44
End: 2019-08-22

## 2019-08-22 VITALS
BODY MASS INDEX: 34.28 KG/M2 | HEART RATE: 75 BPM | SYSTOLIC BLOOD PRESSURE: 106 MMHG | WEIGHT: 206 LBS | DIASTOLIC BLOOD PRESSURE: 73 MMHG

## 2019-08-22 DIAGNOSIS — N89.8 VAGINAL DRYNESS: Primary | ICD-10-CM

## 2019-08-22 PROCEDURE — 99213 OFFICE O/P EST LOW 20 MIN: CPT | Performed by: OBSTETRICS & GYNECOLOGY

## 2019-08-22 NOTE — PROGRESS NOTES
Subjective:   Subjective       Mortimer Hammers is a 38 y o  female who presents with concern for vaginal dryness  Pt was evaluated one week ago by a family medicine resident and was told that he would not prescribe her any vaginal estrogens because she would need to see a gynecologist for that  Pt was very upset  Pt reports vaginal dryness for several years  She was evaluated by Dr Chris Mix years ago and received Premarin cream  She reports that the applicator caused irritation, so she did not use premarin consistently and eventually stopped taking it  She would like more cream, but a different kind than Premarin  Menstrual History:  OB History        4    Para   4    Term   4            AB        Living   4       SAB        TAB        Ectopic        Multiple        Live Births   4                  The following portions of the patient's history were reviewed and updated as appropriate: allergies, current medications, past family history, past medical history, past social history, past surgical history and problem list     Review of Systems  Pertinent items are noted in HPI  Objective     /73 (BP Location: Left arm, Patient Position: Sitting, Cuff Size: Adult)   Pulse 75   Wt 93 4 kg (206 lb)   LMP  (LMP Unknown)   BMI 34 28 kg/m²     Gen: AaOx3, NAD, pleasant, cooperative  Card: RRR, no murmurs, rubs, or gallops  Pulm: CTAB, no wheezes, rales, or rhonchi  Good inspiratory effort  Abd: Soft, nontender, nondistended  No guarding or rebound tenderness  : External genitalia grossly normal in appearance  External genitalia did not appear particularly atrophic  Vaginal epithelium pink  Small amount of vaginal discharge visible within vault  Extremities: No edema, nontender, Negative Ace's bilaterally    Assessment/Plan  Radha Foss is a 36yo P4 presenting with concern for vaginal dryness      Vaginal dryness:  - Pt previously recommended to use barrier creams; has not tried  - Recommended either barrier creams or Premarin  Discussed with pt to NOT use the applicator provided with the Premarin packaging  Recommended using the tip of her finger to apply a blueberry sized amount of cream to the inside of the vagina before bed  Discussed can use three times per week for 2 weeks, then to go to twice per week   Pt in agreement  - Discussed the theoretical increase in thromboembolic events with estrogen medications  - Pt to return as needed    Discussed with Dr Navneet Hairston DO  OB/GYN, PGY4  8/22/2019, 3:01 PM

## 2019-08-27 ENCOUNTER — OFFICE VISIT (OUTPATIENT)
Dept: OBGYN CLINIC | Facility: CLINIC | Age: 44
End: 2019-08-27

## 2019-08-27 VITALS
HEIGHT: 65 IN | DIASTOLIC BLOOD PRESSURE: 89 MMHG | HEART RATE: 83 BPM | BODY MASS INDEX: 33.99 KG/M2 | WEIGHT: 204 LBS | SYSTOLIC BLOOD PRESSURE: 137 MMHG

## 2019-08-27 DIAGNOSIS — B37.3 YEAST INFECTION OF THE VAGINA: ICD-10-CM

## 2019-08-27 DIAGNOSIS — R35.0 URINE FREQUENCY: ICD-10-CM

## 2019-08-27 DIAGNOSIS — N89.8 VAGINAL DISCHARGE: Primary | ICD-10-CM

## 2019-08-27 DIAGNOSIS — R10.2 PELVIC PAIN: ICD-10-CM

## 2019-08-27 LAB
BV WHIFF TEST VAG QL: ABNORMAL
CLUE CELLS SPEC QL WET PREP: NEGATIVE
PH SMN: 4 [PH]
SL AMB  POCT GLUCOSE, UA: NEGATIVE
SL AMB LEUKOCYTE ESTERASE,UA: NEGATIVE
SL AMB POCT BILIRUBIN,UA: NEGATIVE
SL AMB POCT BLOOD,UA: ABNORMAL
SL AMB POCT CLARITY,UA: CLEAR
SL AMB POCT COLOR,UA: YELLOW
SL AMB POCT KETONES,UA: NEGATIVE
SL AMB POCT NITRITE,UA: NEGATIVE
SL AMB POCT PH,UA: 6
SL AMB POCT SPECIFIC GRAVITY,UA: 1.03
SL AMB POCT URINE PROTEIN: NEGATIVE
SL AMB POCT UROBILINOGEN: NEGATIVE
SL AMB POCT WET MOUNT: ABNORMAL
T VAGINALIS VAG QL WET PREP: ABNORMAL
YEAST VAG QL WET PREP: ABNORMAL

## 2019-08-27 PROCEDURE — 99213 OFFICE O/P EST LOW 20 MIN: CPT | Performed by: NURSE PRACTITIONER

## 2019-08-27 PROCEDURE — 81002 URINALYSIS NONAUTO W/O SCOPE: CPT | Performed by: NURSE PRACTITIONER

## 2019-08-27 PROCEDURE — 87086 URINE CULTURE/COLONY COUNT: CPT | Performed by: NURSE PRACTITIONER

## 2019-08-27 PROCEDURE — 87210 SMEAR WET MOUNT SALINE/INK: CPT | Performed by: NURSE PRACTITIONER

## 2019-08-27 RX ORDER — FLUCONAZOLE 150 MG/1
TABLET ORAL
Qty: 2 TABLET | Refills: 0 | Status: SHIPPED | OUTPATIENT
Start: 2019-08-27 | End: 2019-08-30

## 2019-08-27 RX ORDER — NITROFURANTOIN 25; 75 MG/1; MG/1
100 CAPSULE ORAL 2 TIMES DAILY
Qty: 14 CAPSULE | Refills: 0 | Status: SHIPPED | OUTPATIENT
Start: 2019-08-27 | End: 2019-09-03

## 2019-08-27 NOTE — PROGRESS NOTES
Assessment/Plan:       Diagnoses and all orders for this visit:    Vaginal discharge  -     POCT wet mount  -     fluconazole (DIFLUCAN) 150 mg tablet; One pill and repeat in 3 days    Pelvic pain  -     POCT urine dip  -     Urine culture  Pt to call urogyn for IC for follow up  Urine frequency  -     POCT urine dip  -     nitrofurantoin (MACROBID) 100 mg capsule; Take 1 capsule (100 mg total) by mouth 2 (two) times a day for 7 days  Call if symptoms not improved  Yeast infection of the vagina  -     fluconazole (DIFLUCAN) 150 mg tablet; One pill and repeat in 3 days  Call if symptoms not improved        Subjective:      Patient ID: Sebastián Alexander is a 38 y o  female  HPI  16-year-old  004 here with thick white vaginal dsch, pelvic pain with urination and pressure, and urgency, pt  was recently seen on 2019 for vaginal dryness and prescribed premarin cream, but she did not start yet  Pt reports she feel like she has a UTI and not her IC pain  Pt also reports vaginal itching after using replens  Pt with hx of yeast infections  Patient with history of thyroid disease, hypertension, migraine, bipolar disease, vaginal yeast,  And IC  Her last bladder instillation was 2018  Pt was referred to urogyn for her IC but she did not make appt  Yet  Patient with history of hysterectomy for DUB  The following portions of the patient's history were reviewed and updated as appropriate: allergies, current medications, past family history, past medical history, past social history, past surgical history and problem list     Review of Systems   Constitutional: Negative  Respiratory: Negative  Cardiovascular: Negative  Gastrointestinal: Negative  Genitourinary: Positive for dysuria, frequency, pelvic pain, urgency and vaginal discharge  Negative for hematuria  Neurological: Negative  Psychiatric/Behavioral: Negative            Objective:      LMP  (LMP Unknown)          Physical Exam Constitutional: She appears well-nourished  Cardiovascular: Normal rate, regular rhythm and normal heart sounds  Pulmonary/Chest: Effort normal and breath sounds normal    Abdominal: Soft  There is tenderness  Tender suprapubic area   Skin: Skin is warm and dry  Psychiatric: She has a normal mood and affect   Her behavior is normal      external genitalia- slight erythema  Vagina- moderate amount thick curdy dsch  Cervix- surgically absent  Uterus- surgically absent  Adnexa- no masses, NT    Urine dip - moderate blood      Wet mount KOH- positive for yeast negative for BV negative for trich

## 2019-08-28 ENCOUNTER — OFFICE VISIT (OUTPATIENT)
Dept: DERMATOLOGY | Facility: CLINIC | Age: 44
End: 2019-08-28
Payer: MEDICARE

## 2019-08-28 VITALS — HEIGHT: 65 IN | BODY MASS INDEX: 34.19 KG/M2 | WEIGHT: 205.2 LBS

## 2019-08-28 DIAGNOSIS — L82.0 INFLAMED SEBORRHEIC KERATOSIS: Primary | ICD-10-CM

## 2019-08-28 DIAGNOSIS — G43.709 CHRONIC MIGRAINE WITHOUT AURA WITHOUT STATUS MIGRAINOSUS, NOT INTRACTABLE: ICD-10-CM

## 2019-08-28 DIAGNOSIS — L73.8 SEBACEOUS HYPERPLASIA OF FACE: ICD-10-CM

## 2019-08-28 LAB — BACTERIA UR CULT: NORMAL

## 2019-08-28 PROCEDURE — 17110 DESTRUCTION B9 LES UP TO 14: CPT | Performed by: DERMATOLOGY

## 2019-08-28 PROCEDURE — 99204 OFFICE O/P NEW MOD 45 MIN: CPT | Performed by: DERMATOLOGY

## 2019-08-28 RX ORDER — TOPIRAMATE 100 MG/1
TABLET, FILM COATED ORAL
Qty: 270 TABLET | Refills: 3 | Status: SHIPPED | OUTPATIENT
Start: 2019-08-28 | End: 2020-06-02 | Stop reason: SDUPTHER

## 2019-08-28 NOTE — PATIENT INSTRUCTIONS
SEBORRHEIC KERATOSIS; NON-INFLAMED    Seborrheic Keratosis  A seborrheic keratosis is a harmless warty spot that appears during adult life as a common sign of skin aging  Seborrheic keratoses can arise on any area of skin, covered or uncovered, with the usual exception of the palms and soles  They do not arise from mucous membranes  Seborrheic keratoses can have highly variable appearance  Seborrheic keratoses are extremely common  It has been estimated that over 90% of adults over the age of 61 years have one or more of them  They occur in males and females of all races, typically beginning to erupt in the 35s or 45s  They are uncommon under the age of 21 years  The precise cause of seborrhoeic keratoses is not known  Seborrhoeic keratoses are considered degenerative in nature  As time goes by, seborrheic keratoses tend to become more numerous  Some people inherit a tendency to develop a very large number of them; some people may have hundreds of them  The name "seborrheic keratosis" is misleading, because these lesions are not limited to a seborrhoeic distribution (scalp, mid-face, chest, upper back), nor are they formed from sebaceous glands, nor are they associated with sebum -- which is greasy  Seborrheic keratosis may also be called "SK," "Seb K," "basal cell papilloma," "senile wart," or "barnacle "      Researchers have noted:   Eruptive seborrhoeic keratoses can follow sunburn or dermatitis   Skin friction may be the reason they appear in body folds   Viral cause (e g , human papillomavirus) seems unlikely   Stable and clonal mutations or activation of FRFR3, PIK3CA, ORLY, AKT1 and EGFR genes are found in seborrhoeic keratoses   Seborrhoeic keratosis can arise from solar lentigo   FRFR3 mutations also arise in solar lentigines   These mutations are associated with increased age and location on the head and neck, suggesting a role of ultraviolet radiation in these lesions   Seborrheic keratoses do not harbour tumour suppressor gene mutations   Epidermal growth factor receptor inhibitors, which are used to treat some cancers, often result in an increase in verrucal (warty) keratoses  There is no easy way to remove multiple lesions on a single occasion  Unless a specific lesion is "inflamed" and is causing pain or stinging/burning or is bleeding, most insurance companies do not authorize treatment  SEBACEOUS HYPERPLASIA    Sebaceous Hyperplasia  Sebaceous hyperplasia is a common, benign condition of enlarged oil secreting (sebaceous) glands commonly found on the forehead and cheeks of middle-aged and elderly patients  They normally appear as small yellow bumps up to 3mm in diameter that can be single or multiple  The bumps on the face often display a centrall dell  Occasionally, these bumps can occur on the chest, areola, mouth, and genitals  Rarely, they can grow to take a giant form, or be arranged linearly  Causes of sebaceous hyperplasia  Sebaceous hyperplasic is a form of benign hair follicle tumor and can often be confused with basal cell carcinoma  It can be more prevalent in immunosuppressed patients such as those undergoing organ transplantation  In the rare Sawyer-Paterson syndrome, sebaceous hyperplasia occurs in association with internal cancers  Lesions of sebaceous hyperplasia are benign, with no known potential for malignant transformation, but they may be associated with nonmelanoma skin cancer in transplantation patients  How we do diagnose sebaceous hyperplasia? Your dermatologist may take a closer look at the bumps with a device called a dermatoscope  Common features include a central hair follicle surrounded by yellowish lobules with prominent blood vessels  What is the treatment of sebaceous hyperplasia?    Since sebaceous hyperplasia is benign with no known potential for transformation into cancer, treatment is mostly for cosmetic reasons or if the lesions become irritated   Options include   - Light electrocautery or laser vaporization  - Oral isotrentinoin is effective for extensive or disfiguring spots, but do not prevent recurrence   - Antiandrogens may be used in females to decrease the size and improve overall appearance of bumps

## 2019-08-28 NOTE — PATIENT INSTRUCTIONS
Please call urogyn for IC follow up  Will call results of urine culture  Call if symptoms not improved

## 2019-08-28 NOTE — TELEPHONE ENCOUNTER
Medication refill check list    Correct patient? yes   Correct medication name, dose, and pill size? yes   Correct provider? yes   Last and Next appt  scheduled? Yes, last date 5/22/2019 & next date 9/20/2019   Right pharmacy listed? yes   Correct quantity for 30 or 90 days? yes   Is the patient out of refills? When was it last prescribed? Yes, last date 6/26/2019   Directions match what the patient says they are taking?  yes   Enough refills? (none for controlled substances, 1 year for routine medications) yes

## 2019-08-28 NOTE — PROGRESS NOTES
[address] [facility] Note     Patient Name: Bethel Schwarz  Encounter Date: 8/28/2019    Today's Chief Concerns:  Patrick Dasha Concern #1:  Warts on legs   Concern #2:  Skin tags   Concern #3:  Spots on face    Past Medical History:  Have you ever had or currently have any of the following medical conditions or treatments? · HIV/AIDS: No  · Hepatitis B: No  · Hepatitis C: No   · Diabetes: No  · Tuberculosis: No  · Biologic Therapy/Chemotherapy: No  · Organ or Bone Marrow Transplantation: No  · Radiation Treatment: No  · Cancer (If Yes, which types)- No      Have you ever had any of the following skin conditions? · Melanoma? (If Yes, please provide more detail)- No  · Basal Cell Carcinoma: No  · Squamous Cell Carcinoma: No  · Sebaceous Cell Carcinoma: No  · Merkel Cell Carcinoma: No  · Angiosarcoma: No  · Blistering Sunburns: YES  · Eczema: YES  · Psoriasis: No    Social History:    What is your current Smoking Status? Non smoker    What is/was your primary occupation? "Nothing"    What are your hobbies/past-times? Kids    Family history:  Do any of your "first degree relatives" (parent, brother, sister, or child) have any of the following conditions? · Melanoma? (If Yes, which relatives?) No  · Eczema: YES  · Asthma: YES  · Hay Fever/Seasonal Allergies: YES  · Psoriasis: YES  · Arthritis: YES, daughter  · Thyroid Problems: YES  · Lupus/Connective Tissue Disease: YES, mother  · Diabetes: YES, father  · Stroke: YES, father  · Blood Clots: No  · IBD/Crohn's/Ulcerative Colitis: No  · Vitiligo: No  · Scarring/Keloids: YES  · Severe Acne: YES  · Pancreatic Cancer: No  · Other known Skin Condition? If Yes, what condition and which relatives?   YES, alopecia in family     Current Medications:    Current Outpatient Medications:     ARIPiprazole (ABILIFY) 5 mg tablet, Take 5 mg by mouth daily, Disp: , Rfl: 0    Cetirizine HCl (ZYRTEC ALLERGY) 10 MG CAPS, Take 10 mg by mouth daily  , Disp: , Rfl:     clonazePAM (KlonoPIN) 1 mg tablet, 1 mg daily , Disp: , Rfl: 0    conjugated estrogens (PREMARIN) vaginal cream, Insert 0 5 g into the vagina 3 (three) times a week, Disp: 30 g, Rfl: 1    cyproheptadine (PERIACTIN) 4 mg tablet, Take 1 tablet (4 mg total) by mouth daily at bedtime as needed for allergies, Disp: 30 tablet, Rfl: 1    fluconazole (DIFLUCAN) 150 mg tablet, One pill and repeat in 3 days, Disp: 2 tablet, Rfl: 0    fluticasone (FLOVENT HFA) 110 MCG/ACT inhaler, Inhale 1 puff as needed , Disp: , Rfl:     levothyroxine 88 mcg tablet, , Disp: , Rfl:     LISINOPRIL PO, Take 10 mg by mouth daily , Disp: , Rfl:     omeprazole (PriLOSEC) 20 mg delayed release capsule, 1 capsule as needed , Disp: , Rfl:     omeprazole (PriLOSEC) 40 MG capsule, Take 40 mg by mouth daily at bedtime, Disp: , Rfl: 0    ondansetron (ZOFRAN-ODT) 4 mg disintegrating tablet, Take 1 tablet (4 mg total) by mouth every 6 (six) hours as needed for nausea, Disp: 20 tablet, Rfl: 0    oxyCODONE (ROXICODONE) 15 mg immediate release tablet, as needed , Disp: , Rfl:     PROAIR  (90 Base) MCG/ACT inhaler, as needed , Disp: , Rfl:     Probiotic Product (PROBIOTIC DAILY PO), Take 1 tablet by mouth daily, Disp: , Rfl:     propranolol (INDERAL LA) 60 mg 24 hr capsule, 60 mg 3 (three) times a day , Disp: , Rfl:     RESTASIS 0 05 % ophthalmic emulsion, , Disp: , Rfl:     tamsulosin (FLOMAX) 0 4 mg, , Disp: , Rfl:     valACYclovir (VALTREX) 1,000 mg tablet, 1 tablet as needed , Disp: , Rfl:     venlafaxine (EFFEXOR-XR) 150 mg 24 hr capsule, 150 mg 2 (two) times a day, Disp: , Rfl: 0    amLODIPine (NORVASC) 2 5 mg tablet, , Disp: , Rfl:     amLODIPine (NORVASC) 5 mg tablet, , Disp: , Rfl:     amoxicillin-clavulanate (AUGMENTIN) 875-125 mg per tablet, , Disp: , Rfl:     dexamethasone (DECADRON) 2 mg tablet, Take 1 tablet (2 mg total) by mouth daily with breakfast (Patient not taking: Reported on 8/28/2019), Disp: 5 tablet, Rfl: 0   divalproex sodium (DEPAKOTE ER) 250 mg 24 hr tablet, Take 1 tablet (250 mg total) by mouth daily at bedtime 2 tablets at bedtime for 3 days then 1 tablet at bedtime for 2 days (Patient not taking: Reported on 8/28/2019), Disp: 8 tablet, Rfl: 0    hydrocortisone 2 5 % cream, Apply to times a day to face for 5 days in a row  Do not put on eyes  , Disp: 30 g, Rfl: 1    hydroxychloroquine (PLAQUENIL) 200 mg tablet, , Disp: , Rfl:     ibuprofen (MOTRIN) 600 mg tablet, Take 1 tablet (600 mg total) by mouth every 6 (six) hours as needed for moderate pain (Patient not taking: Reported on 8/28/2019), Disp: 30 tablet, Rfl: 0    ibuprofen (MOTRIN) 800 mg tablet, , Disp: , Rfl:     LATUDA 80 MG tablet, Take 80 mg by mouth daily, Disp: , Rfl: 0    Levothyroxine Sodium 88 MCG CAPS, Take 1 capsule by mouth daily, Disp: , Rfl:     METOPROLOL TARTRATE PO, 100 mg 2 (two) times a day  , Disp: , Rfl:     Multiple Vitamins-Minerals (MULTIVITAMIN ADULT PO), Take 1 tablet by mouth daily, Disp: , Rfl:     nitrofurantoin (MACROBID) 100 mg capsule, Take 1 capsule (100 mg total) by mouth 2 (two) times a day for 7 days (Patient not taking: Reported on 8/28/2019), Disp: 14 capsule, Rfl: 0    propranolol (INDERAL LA) 160 mg, , Disp: , Rfl: 0    topiramate (TOPAMAX) 100 mg tablet, One in am and two at bedtime daily, Disp: 270 tablet, Rfl: 3    topiramate (TOPAMAX) 50 MG tablet, take 2 tablets by mouth twice a day IN [facility] AND EVENING, Disp: 120 tablet, Rfl: 1    Specific Alerts:    Have you been seen by a [facility] D[facility]atologist in the last 3 years? No    Are you pregnant or planning to become pregnant? No    Are you currently or planning to be nursing or breast feeding? No    Allergies   Allergen Reactions    Other     Pregabalin      Other reaction(s): blurred vision    Shellfish-Derived Products Anaphylaxis and Hives    Sumatriptan      Other reaction(s):  Altered Heart Rate    Triptans Shortness Of Breath and Palpitations    Latex Swelling and Rash     Other reaction(s): Hives/Skin Rash  Other reaction(s): Hives/Skin Rash    Sulfa Antibiotics Swelling and Rash     Other reaction(s): Hives/Skin Rash  Other reaction(s): Hives/Skin Rash    Monistat [Miconazole] Swelling    Shrimp Extract Allergy Skin Test        May we call your Preferred Phone number to discuss your specific medical information? YES    May we leave a detailed message that includes your specific medical information? YES    Have you traveled outside of the [facility] in the past 3 months? No    Do you currently have a pacemaker or defibrillator? No    Do you have any artificial heart valves, joints, plates, screws, rods, stents, pins, etc? No   - If Yes, were any placed within the last 2 years? Do you require any medications prior to a surgical procedure? No   - If Yes, for which procedure? n/a   - If Yes, what medications to you require? n/a    Are you taking any medications that cause you to bleed more easily ("blood thinners") No    Have you ever experienced a rapid heartbeat with epinephrine? YES    Have you ever been treated with "gold" (gold sodium thiomalate) therapy? No    [facility] can help with wrinkles, "laugh lines," facial volume loss, "double chin," "love handles," age spots, and more  Are you interested in learning today about some of the skin enhancement procedures that we offer? (If Yes, please provide more detail) No    Review of Systems:  Have you recently had or currently have any of the following?     · Fever or chills: No  · Night Sweats: No  · Headaches: No  · Weight Gain: {No  · Weight Loss: No  · Blurry Vision: No  · Nausea: No  · Vomiting: No  · Diarrhea: No  · Blood in Stool: No  · Abdominal Pain: No  · Itchy Skin: No  · Painful Joints: No  · Swollen Joints: No  · Muscle Pain: No  · Irregular Mole: YES  · Sun Burn: No  · Dry Skin: No  · Skin Color Changes: No  · Scar or Keloid: No  · Cold Sores/Fever Blisters: No  · Bacterial Infections/MRSA: No  · Anxiety: YES  · Depression: YES  · Suicidal or Homicidal Thoughts: No      PHYSICAL EXAM:      Was a chaperone (Derm Clinical Assistant) present for the entirety of the Physical Exam? YES    Did the Dermatology Team specifically ask and  the patient on the importance of a Full Skin Exam to be sure that nothing is missed clinically?  YES    Did the patient request or accept a Full Skin Exam?  YES    Did the patient specifically refuse to have the areas "under-the-bra" examined by the Dermatologist? No    Did the patient specifically refuse to have the areas "under-the-underwear" examined by the Dermatologist? No      CONSTITUTIONAL:   Vitals:    08/28/19 0954   Weight: 93 1 kg (205 lb 3 2 oz)   Height: 5' 5" (1 651 m)         PSYCH: Normal mood and affect  EYES: Normal conjunctiva  ENT: Normal lips and oral mucosa  CARDIOVASCULAR: No edema  RESPIRATORY: Normal respirations  HEME/LYMPH/IMMUNO:  No regional lymphadenopathy except as noted below in [address] (SKIN)  Hair, Scalp, Ears, Face Normal except as noted below in Assessment   Neck, Cervical Chain Nodes Normal except as noted below in Assessment   Right Arm/Hand/Fingers Normal except as noted below in Assessment   Left Arm/Hand/Fingers Normal except as noted below in Assessment   Chest/Breasts/Axillae Viewed areas Normal except as noted below in Assessment   Abdomen, Umbilicus Normal except as noted below in Assessment   Back/Spine Normal except as noted below in Assessment   Groin/Genitalia/Buttocks Viewed areas Normal except as noted below in Assessment   Right Leg, Foot, Toes Normal except as noted below in Assessment   Left Leg, Foot, Toes Normal except as noted below in Assessment        ASSESSMENT AND PLAN BY DIAGNOSIS:    History of Present Condition:     Duration:  How long has this been an issue for you?    o  Months   Location Affected:  Where on the body is this affecting you? o  Left lower leg, two in outer ankle one behind leg   Quality:  Is there any bleeding, pain, itch, burning/irritation, or redness associated with the skin lesion?    o  Irritation   Severity:  Describe any bleeding, pain, itch, burning/irritation, or redness on a scale of 1 to 10 (with 10 being the worst)  o  6   Timing:  Does this condition seem to be there pretty constantly or do you notice it more at specific times throughout the day?    o  Constantly there   Context:  Have you ever noticed that this condition seems to be associated with specific activities you do?    o  Denies   Modifying Factors:    o Anything that seems to make the condition worse?    -  Denies  o What have you tried to do to make the condition better? -  Have not tried anything yet   Associated Signs and Symptoms:  Does this skin lesion seem to be associated with any of the following:  o  DERM ASSOCIATED SIGNS AND SYMPTOMS: Itching and Scratching     1  SEBORRHEIC KERATOSIS; INFLAMED    Physical Exam:   Anatomic Location Affected:  Bilateral legs, back   Morphological Description:  Flat and raised, waxy, smooth to warty textured, yellow to brownish-grey to dark brown to blackish, discrete, "stuck-on" appearing papules   Pertinent Positives:   Pertinent Negatives: No regional lymphadenopathy    Additional History of Present Condition:  Patient reports new bumps on the skin  +itch, burn, pain, but no bleeding or ulceration  Present constantly; nothing seems to make it worse or better  No prior treatment  Assessment and Plan:  Based on a thorough discussion of this condition and the management approach to it (including a comprehensive discussion of the known risks, side effects and potential benefits of treatment), the patient (family) agrees to implement the following specific plan:   Pt has multiple areas of irritated seborrheic keratosis    Discussed treatment options  Going to treat with Cryotherapy  Seborrheic Keratosis  A seborrheic keratosis is a harmless warty spot that appears during adult life as a common sign of skin aging  Seborrheic keratoses can arise on any area of skin, covered or uncovered, with the usual exception of the palms and soles  They do not arise from mucous membranes  Seborrheic keratoses can have highly variable appearance  Seborrheic keratoses are extremely common  It has been estimated that over 90% of adults over the age of 61 years have one or more of them  They occur in males and females of all races, typically beginning to erupt in the 35s or 45s  They are uncommon under the age of 21 years  The precise cause of seborrhoeic keratoses is not known  Seborrhoeic keratoses are considered degenerative in nature  As time goes by, seborrheic keratoses tend to become more numerous  Some people inherit a tendency to develop a very large number of them; some people may have hundreds of them  The name "seborrheic keratosis" is misleading, because these lesions are not limited to a seborrhoeic distribution (scalp, mid-face, chest, upper back), nor are they formed from sebaceous glands, nor are they associated with sebum -- which is greasy  Seborrheic keratosis may also be called "SK," "Seb K," "basal cell papilloma," "senile wart," or "barnacle "      Researchers have noted:   Eruptive seborrhoeic keratoses can follow sunburn or dermatitis   Skin friction may be the reason they appear in body folds   Viral cause (e g , human papillomavirus) seems unlikely   Stable and clonal mutations or activation of FRFR3, PIK3CA, ORLY, AKT1 and EGFR genes are found in seborrhoeic keratoses   Seborrhoeic keratosis can arise from solar lentigo   FRFR3 mutations also arise in solar lentigines   These mutations are associated with increased age and location on the head and neck, suggesting a role of ultraviolet radiation in these lesions   Seborrheic keratoses do not harbour tumour suppressor gene mutations   Epidermal growth factor receptor inhibitors, which are used to treat some cancers, often result in an increase in verrucal (warty) keratoses  There is no easy way to remove multiple lesions on a single occasion  Unless a specific lesion is "inflamed" and is causing pain or stinging/burning or is bleeding, most insurance companies do not authorize treatment  PROCEDURE:  DESTRUCTION OF BENIGN LESIONS  After a thorough discussion of treatment options and risk/benefits/alternatives (including but not limited to local pain, scarring, dyspigmentation, blistering, and possible superinfection), verbal and written consent were obtained and the aforementioned lesions were treated on with cryotherapy using liquid nitrogen x 1 cycle for 5-10 seconds   TOTAL NUMBER of 6 lesions were treated today on the ANATOMIC LOCATION:  Bilateral insteps x2, bilateral axilla x2, Left lower abdomen x2   The patient tolerated the procedure well, and after-care instructions were provided  2  SEBACEOUS HYPERPLASIA    Physical Exam:   Anatomic Location Affected:  Left cheek   Morphological Description:  Fleshy yellowish papule with slight central dell   Pertinent Positives:   Pertinent Negatives: No regional lymphadenopathy    Additional History of Present Condition:  Is bothersome to patient    Assessment and Plan:  Based on a thorough discussion of this condition and the management approach to it (including a comprehensive discussion of the known risks, side effects and potential benefits of treatment), the patient (family) agrees to implement the following specific plan:   Discussed treatment options, prescribed Hydrocortisone 2 5% cream to be applied two times a day for 5 days      Sebaceous Hyperplasia  Sebaceous hyperplasia is a common, benign condition of enlarged oil secreting (sebaceous) glands commonly found on the forehead and cheeks of middle-aged and elderly patients  They normally appear as small yellow bumps up to 3mm in diameter that can be single or multiple  The bumps on the face often display a centrall dell  Occasionally, these bumps can occur on the chest, areola, mouth, and genitals  Rarely, they can grow to take a giant form, or be arranged linearly  Causes of sebaceous hyperplasia  Sebaceous hyperplasic is a form of benign hair follicle tumor and can often be confused with basal cell carcinoma  It can be more prevalent in immunosuppressed patients such as those undergoing organ transplantation  In the rare Sawyer-Orleans syndrome, sebaceous hyperplasia occurs in association with internal cancers  Lesions of sebaceous hyperplasia are benign, with no known potential for malignant transformation, but they may be associated with nonmelanoma skin cancer in transplantation patients  How we do diagnose sebaceous hyperplasia? Your dermatologist may take a closer look at the bumps with a device called a dermatoscope  Common features include a central hair follicle surrounded by yellowish lobules with prominent blood vessels  What is the treatment of sebaceous hyperplasia? Since sebaceous hyperplasia is benign with no known potential for transformation into cancer, treatment is mostly for cosmetic reasons or if the lesions become irritated  Options include   - Light electrocautery or laser vaporization  - Oral isotrentinoin is effective for extensive or disfiguring spots, but do not prevent recurrence   - Antiandrogens may be used in females to decrease the size and improve overall appearance of bumps     3  MALAR RASH CONSISTENT WITH PRIOR DIAGNOSIS OF LUPUS    Physical Exam:   Anatomic Location Affected:  Bilateral cheeks   Morphological Description:  Erythematous butterfly rash   Pertinent Positives:   Pertinent Negatives: Additional History of Present Condition:  History of lupus    Followed by internist   Patient does not want a referral to Rheumatology  Practices good sun protection  Assessment and Plan:  Based on a thorough discussion of this condition and the management approach to it (including a comprehensive discussion of the known risks, side effects and potential benefits of treatment), the patient (family) agrees to implement the following specific plan:   Slightly visible erythematous rash on face  Hydrocortisone 2 5% cream will help calm area down  Use a moisturizer + sunscreen "combo" product such as Neutrogena Daily Defense SPF 50+ or CeraVe AM at least three times a day          Scribe Attestation    I,:   Desirae Jimenez RN am acting as a scribe while in the presence of the attending physician :        I,:   Jarad Cordova MD personally performed the services described in this documentation    as scribed in my presence :

## 2019-08-29 ENCOUNTER — TELEPHONE (OUTPATIENT)
Dept: OBGYN CLINIC | Facility: CLINIC | Age: 44
End: 2019-08-29

## 2019-08-30 ENCOUNTER — TELEPHONE (OUTPATIENT)
Dept: DERMATOLOGY | Facility: CLINIC | Age: 44
End: 2019-08-30

## 2019-08-30 ENCOUNTER — TELEPHONE (OUTPATIENT)
Dept: OBGYN CLINIC | Facility: CLINIC | Age: 44
End: 2019-08-30

## 2019-08-30 NOTE — TELEPHONE ENCOUNTER
----- Message from Romie Dunne, [address] sent at 8/28/2019 10:15 PM EDT -----  Please call Omega Tierney and let her know that her urine culture was negative and symptoms are more from her IC     Thanks

## 2019-08-30 NOTE — TELEPHONE ENCOUNTER
Follow Up Call     Pt saw: Terri Old Were you prescribed any medications:Yes     o If YES: did you pick them up at the pharmacy? yes     Did we do a biopsy? No  o If YES: how does the biopsy site look? N/A     Would you recommend your family and friends to [facility]?  Yes     How satisfied were you with your visit on a scale of 1-10: 10      Patient states " she really liked him"    Patient to call the office if she has any questions or concerns

## 2019-09-27 ENCOUNTER — DOCUMENTATION (OUTPATIENT)
Dept: NEUROLOGY | Facility: CLINIC | Age: 44
End: 2019-09-27

## 2019-09-27 NOTE — PROGRESS NOTES
Type Date User Summary Attachment   General 09/25/2019 11:21  [address] coordination  -   Note    Botox- no authorization needed   Please use our stock      Thank you,     Fransisca Yan

## 2019-10-16 ENCOUNTER — TELEPHONE (OUTPATIENT)
Dept: NEUROLOGY | Facility: CLINIC | Age: 44
End: 2019-10-16

## 2019-10-16 NOTE — TELEPHONE ENCOUNTER
Called patient back and she stated she no longer would like to do Botox  Would rather try Aimovig  Scheduled follow up with Lyndsey Jackson on Friday 10/18/19 at 230 pm in [facility]  Patient confirmed

## 2019-10-17 RX ORDER — PHENTERMINE HYDROCHLORIDE 30 MG/1
30 CAPSULE ORAL DAILY
Refills: 0 | COMMUNITY
Start: 2019-08-03 | End: 2020-02-18

## 2019-10-17 NOTE — PROGRESS NOTES
[facility]  PATIENT:  Christine Cuadra  MRN:  [id number]  :  1975  DATE OF SERVICE:  10/18/2019      Assessment/Plan:     Chronic migraine without aura without status migrainosus, not intractable   Preventive therapy for headaches:  -  Continue venlafaxine 150 milligram once a day  -  Continue Topamax to 100 milligrams in the morning and 200 milligrams at bedtime now  -  Start Aimovig 140 every 30 days  - Use cyproheptadine 4 mg at needed at bedtime for weather and sinus headaches   Abortive therapy:  -   She is still using Motrin and Tylenol at least 3 to 4 times a week   Goal is to decrease the frequency of abortive medication      Full incontinence of feces  Will proceed with MRI of lumbar spine as patient has full incontinence of feces  Referral to colon and rectal surgery for further evaluation           Problem List Items Addressed This Visit        Cardiovascular and Mediastinum    Chronic migraine without aura without status migrainosus, not intractable      Preventive therapy for headaches:  -  Continue venlafaxine 150 milligram once a day  -  Continue Topamax to 100 milligrams in the morning and 200 milligrams at bedtime now  -  Start Aimovig 140 every 30 days  - Use cyproheptadine 4 mg at needed at bedtime for weather and sinus headaches   Abortive therapy:  -   She is still using Motrin and Tylenol at least 3 to 4 times a week   Goal is to decrease the frequency of abortive medication           Relevant Medications    Erenumab-aooe 140 MG/ML SOAJ       Other    Full incontinence of feces - Primary     Will proceed with MRI of lumbar spine as patient has full incontinence of feces  Referral to colon and rectal surgery for further evaluation           Relevant Orders    Ambulatory referral to Colorectal Surgery    MRI lumbar spine with and without contrast    BUN    Creatinine, serum              History of Present Illness:    We had the pleasure of evaluating Adamaris HUERTAS Tyron Radha in neurological follow up  today for headaches  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  She has a PMH of palpitations and has had an ablation  She continues to see her cardiologist     Patient states that she fell down a flight of stairs in early 2000s  Went to hospital, unsure if went via ambulance  Patient states she was out of work for about a month  States had significant pain, bruising and was limping  Went to PT, chiropractor after this  She states went back to work as   Thinks her issues with bowel incontinence began a few months after the fall  First instance happened at work where she thought that she was going to pass gas and soiled herself  Also began to have incontinence during intercourse  For the past 2-3 years has had very little control  States she doesn't go out unless she hasn't eaten for 24 hours  She can drink fluids but if she eats she needs a toilet  Patient varies from constipation to "explosion "  Patient has seen Dr Maricruz Medeiros for gastroenterology but was over 6 years ago  Patient states she is unable to stop it once starts and has a full bowel movement amount    As teenager had anal warts and went to the OR 2 times for removal and "burned them"  Patient had a colon resection for diverticulitis in 2001    Was not diverted        QTc 12/23/2016 456 ms    Chronic Migraine headaches:   PREVENTIVE   Lisinopril, Metoprolol, Topamax, cyclobenzaprine, labetalol, Seroquel, Tizanidine, carbamazepine, citalopram, duloxetine, venlafaxine, Lyrica, Propranolol, olanzapine- ineffective, cyproheptadine, Botox  ABORTIVE:   Dexamethasone, Fioricet, Naproxen Promethazine, Kenalog, Percocet, tramadol, Toradol, narcotics  Headache trigger: Fatigue, Stress/Tension, Weather change, lack of sleep  Alternative therapies used in the past for headaches? none   Headache are worse if the patient: cough, sneeze, bending over  Aura - none     Any family history of migraines? Yes, mother and father  Any family history of aneurysms? No     Current pain: 8/10  How often do the headaches occur   almost daily  What time of the day do the headaches start  either wakes up with them or states it is mid day  How long do the headaches last  all day  Where are they located  face and frontal region and also has a lot of neck pain with this  What is the intensity of pain  10/10 at max  Describe your usual headache - Throbbing, Pounding  Associated symptoms:   -       Decrease of appetite, nausea   -       Photophobia, phonophobia, sensitivity to smell   -       Problem with concentration  -       prefer to be alone and in a dark room, unable to work     Number of days missed per month because of headaches:  Work (or school) days: NA  Social or Family activities: a lot     What time of the year do headaches occur more frequently?   Change of weather  Have you seen someone else for headaches or pain? Yes  Have you had trigger point injection performed and how often? No  Have you had Botox injection performed and how often? Yes   Have you had epidural injections or transforaminal injections performed? No     Have you used CBD or THC for your headaches and how often? No  Are you current pregnant or planning on getting pregnant? No, done with family planning  Have you ever had any Brain imaging? yes      7/23/2018 CT head  No acute abnormalities      I personally reviewed these images    - Reviewed old notes from physician seen in the past- see above HPI for summary of previous encounters       Past Medical History:   Diagnosis Date    Bipolar 1 disorder ([facility] )     Disease of thyroid gland     Fibromyalgia     Gout     Hyperlipidemia     Hypertension     Lupus ([facility] )     Migraine        Patient Active Problem List   Diagnosis    Palpitations    AVNRT (AV win re-entry tachycardia) (HCC)    Chronic migraine without aura without status migrainosus, not intractable    Interstitial cystitis    Vaginal candidiasis    Encounter for gynecological examination    Screening for breast cancer    Hyperlipidemia    Disease of thyroid gland    Yeast infection of the vagina    Vaginal discharge    Other specified anxiety disorders    Vaginal dryness    Pelvic pain    Urine frequency    Full incontinence of feces       Medications:      Current Outpatient Medications   Medication Sig Dispense Refill    Cetirizine HCl (ZYRTEC ALLERGY) 10 MG CAPS Take 10 mg by mouth daily        fluticasone (FLOVENT HFA) 110 MCG/ACT inhaler Inhale 1 puff as needed       hydrocortisone 2 5 % cream Apply to times a day to face for 5 days in a row  Do not put on eyes  (Patient taking differently: Apply 1 application topically as needed Apply to times a day to face for 5 days in a row  Do not put on eyes  ) 30 g 1    ibuprofen (MOTRIN) 600 mg tablet Take 1 tablet (600 mg total) by mouth every 6 (six) hours as needed for moderate pain 30 tablet 0    ibuprofen (MOTRIN) 800 mg tablet Take 800 mg by mouth as needed       levothyroxine 88 mcg tablet Take 88 mcg by mouth daily       LISINOPRIL PO Take 20 mg by mouth daily       Multiple Vitamins-Minerals (MULTIVITAMIN ADULT PO) Take 1 tablet by mouth daily      omeprazole (PriLOSEC) 20 mg delayed release capsule Take 1 capsule by mouth as needed       omeprazole (PriLOSEC) 40 MG capsule Take 40 mg by mouth as needed   0    ondansetron (ZOFRAN-ODT) 4 mg disintegrating tablet Take 1 tablet (4 mg total) by mouth every 6 (six) hours as needed for nausea 20 tablet 0    oxyCODONE (ROXICODONE) 15 mg immediate release tablet Take 15 mg by mouth as needed       phentermine 30 MG capsule Take 30 mg by mouth daily  0    PROAIR  (90 Base) MCG/ACT inhaler Inhale 1-2 puffs as needed       Probiotic Product (PROBIOTIC DAILY PO) Take 1 tablet by mouth daily      propranolol (INDERAL LA) 160 mg Take 160 mg by mouth daily at bedtime   0    propranolol (INDERAL LA) 60 mg 24 hr capsule Take 60 mg by mouth daily at bedtime       RESTASIS 0 05 % ophthalmic emulsion Administer 1 drop to both eyes daily       topiramate (TOPAMAX) 100 mg tablet One in am and two at bedtime daily (Patient taking differently: Take 100-200 mg by mouth 2 (two) times a day One in am and two at bedtime daily) 270 tablet 3    valACYclovir (VALTREX) 1,000 mg tablet 1 tablet as needed       amLODIPine (NORVASC) 2 5 mg tablet       amLODIPine (NORVASC) 5 mg tablet       amoxicillin-clavulanate (AUGMENTIN) 875-125 mg per tablet       ARIPiprazole (ABILIFY) 5 mg tablet Take 5 mg by mouth daily  0    clonazePAM (KlonoPIN) 1 mg tablet 1 mg daily   0    conjugated estrogens (PREMARIN) vaginal cream Insert 0 5 g into the vagina 3 (three) times a week (Patient not taking: Reported on 10/18/2019) 30 g 1    cyproheptadine (PERIACTIN) 4 mg tablet Take 1 tablet (4 mg total) by mouth daily at bedtime as needed for allergies (Patient not taking: Reported on 10/18/2019) 30 tablet 1    dexamethasone (DECADRON) 2 mg tablet Take 1 tablet (2 mg total) by mouth daily with breakfast (Patient not taking: Reported on 8/28/2019) 5 tablet 0    divalproex sodium (DEPAKOTE ER) 250 mg 24 hr tablet Take 1 tablet (250 mg total) by mouth daily at bedtime 2 tablets at bedtime for 3 days then 1 tablet at bedtime for 2 days (Patient not taking: Reported on 8/28/2019) 8 tablet 0    Erenumab-aooe 140 MG/ML SOAJ Inject 140 mg under the skin every 30 (thirty) days 1 pen 11    hydroxychloroquine (PLAQUENIL) 200 mg tablet       LATUDA 80 MG tablet Take 80 mg by mouth daily  0    METOPROLOL TARTRATE  mg 2 (two) times a day        tamsulosin (FLOMAX) 0 4 mg       topiramate (TOPAMAX) 50 MG tablet take 2 tablets by mouth twice a day IN THE [facility] AND EVENING (Patient not taking: Reported on 10/18/2019) 120 tablet 1    venlafaxine (EFFEXOR-XR) 150 mg 24 hr capsule 150 mg 2 (two) times a day  0     No current facility-administered medications for this visit  Allergies: Allergies   Allergen Reactions    Other     Pregabalin      Other reaction(s): blurred vision    Shellfish-Derived Products Anaphylaxis and Hives    Sumatriptan      Other reaction(s):  Altered Heart Rate    Triptans Shortness Of Breath and Palpitations    Latex Swelling and Rash     Other reaction(s): Hives/Skin Rash  Other reaction(s): Hives/Skin Rash    Sulfa Antibiotics Swelling and Rash     Other reaction(s): Hives/Skin Rash  Other reaction(s): Hives/Skin Rash    Monistat [Miconazole] Swelling    Shrimp Extract Allergy Skin Test        Family History:     Family History   Problem Relation Age of Onset    Lupus Mother     Osteoporosis Mother     Hypotension Mother     Hypertension Father     Diabetes Father     Heart disease Father     Diabetes Sister     Schizophrenia Sister     Diabetes Maternal Grandmother     Rheum arthritis Maternal Grandmother     Hypertension Maternal Grandmother     Breast cancer Maternal Aunt 36       Social History:     Social History     Socioeconomic History    Marital status: Single     Spouse name: Not on file    Number of children: Not on file    Years of education: Not on file    Highest education level: Not on file   Occupational History    Not on file   Social Needs    Financial resource strain: Not on file    Food insecurity:     Worry: Not on file     Inability: Not on file    Transportation needs:     Medical: Not on file     Non-medical: Not on file   Tobacco Use    Smoking status: Former Smoker    Smokeless tobacco: Never Used    Tobacco comment: quit at 24years old   Substance and Sexual Activity    Alcohol use: No    Drug use: No    Sexual activity: Yes     Partners: Male     Birth control/protection: Female Sterilization   Lifestyle    Physical activity:     Days per week: Not on file     Minutes per session: Not on file    Stress: Not on file   Relationships  Social connections:     Talks on phone: Not on file     Gets together: Not on file     Attends Jainism service: Not on file     Active member of club or organization: Not on file     Attends meetings of clubs or organizations: Not on file     Relationship status: Not on file    Intimate partner violence:     Fear of current or ex partner: Not on file     Emotionally abused: Not on file     Physically abused: Not on file     Forced sexual activity: Not on file   Other Topics Concern    Not on file   Social History Narrative    Not on file         Objective:   Physical Exam:                                                                   Vitals:            /86 (BP Location: Left arm, Patient Position: Sitting, Cuff Size: Standard)   Pulse 77   Ht 5' 5" (1 651 m)   Wt 88 5 kg (195 lb)   LMP  (LMP Unknown)   BMI 32 45 kg/m²   BP Readings from Last 3 Encounters:   10/18/19 144/86   08/27/19 137/89   08/22/19 106/73     Pulse Readings from Last 3 Encounters:   10/18/19 77   08/27/19 83   08/22/19 75                CONSTITUTIONAL: Well developed, well nourished, well groomed  No dysmorphic features  Eyes:  PERRLA, EOM normal      Neck:  Normal ROM, neck supple  HEENT:  Normocephalic atraumatic  No meningismus  Oropharynx is clear and moist  No oral mucosal lesions  Chest:  Respirations regular and unlabored  Cardiovascular:  Distal extremities warm without palpable edema or tenderness, no observed significant swelling  Musculoskeletal:  Full range of motion  (see below under neurologic exam for evaluation of motor function and gait)   Skin:  warm and dry   Psychiatric:  Normal behavior and appropriate affect        SKULL AND SPINE  ROM: Full range of motion  Tenderness: No focal tenderness    MENTAL STATUS  Orientation: Alert and oriented x 3  Fund of knowledge: Intact  CRANIAL NERVES  II: PERRL  Visual fields full  III, IV, VI: Extraocular movements intact    No nystagmus  V: Facial sensation normal V1-V3  VII: Facial movements normal and symmetric  VIII: Intact hearing bilaterally  IX, X: Palate elevation normal and symmetric  XI: Intact trapezius, SCM strength  XII: Tongue protrudes to the midline    MOTOR (Upper and lower extremities)   Bulk/tone/abnormal movement: Normal muscle bulk and tone  Strength: Strength 5/5 throughout  COORDINATION   Station/Gait: Normal baseline gait  Normal tandem gait  Sensory:  vibratory and light touch equal and intact    Reflexes:  deep tendon reflexes are 2+/4 throughout, flexor response bilaterally         Review of Systems:   Review of Systems   Constitutional: Negative  HENT: Negative  Eyes: Positive for photophobia  Respiratory: Negative  Cardiovascular: Negative  Gastrointestinal: Negative  Endocrine: Negative  Genitourinary: Negative  Musculoskeletal: Positive for back pain  Skin: Negative  Allergic/Immunologic: Negative  Neurological: Positive for headaches  Hematological: Negative  Psychiatric/Behavioral: Positive for decreased concentration and sleep disturbance (Trouble falling and staying asleep )  The patient is nervous/anxious  I personally reviewed and updated the ROS that was entered by the medical assistant       I have spent 60 minutes with Patient  today in which greater than 50% of this time was spent in counseling/coordination of care regarding Prognosis, Risks and benefits of tx options, Intructions for management, Patient and family education, Importance of tx compliance and Risk factor reductions        Author:  Liz Valdez PA-C 10/18/2019 3:38 PM

## 2019-10-18 ENCOUNTER — OFFICE VISIT (OUTPATIENT)
Dept: NEUROLOGY | Facility: CLINIC | Age: 44
End: 2019-10-18
Payer: MEDICARE

## 2019-10-18 VITALS
DIASTOLIC BLOOD PRESSURE: 86 MMHG | HEART RATE: 77 BPM | HEIGHT: 65 IN | BODY MASS INDEX: 32.49 KG/M2 | WEIGHT: 195 LBS | SYSTOLIC BLOOD PRESSURE: 144 MMHG

## 2019-10-18 DIAGNOSIS — R15.9 FULL INCONTINENCE OF FECES: Primary | ICD-10-CM

## 2019-10-18 DIAGNOSIS — G43.709 CHRONIC MIGRAINE WITHOUT AURA WITHOUT STATUS MIGRAINOSUS, NOT INTRACTABLE: ICD-10-CM

## 2019-10-18 PROCEDURE — 99215 OFFICE O/P EST HI 40 MIN: CPT | Performed by: PHYSICIAN ASSISTANT

## 2019-10-18 NOTE — PATIENT INSTRUCTIONS
[email or web address]    Referral to colon and rectal surgery  MRI of lumbar spine    aimovg 1 pen every 30 days

## 2019-10-18 NOTE — ASSESSMENT & PLAN NOTE
Will proceed with MRI of lumbar spine as patient has full incontinence of feces      Referral to colon and rectal surgery for further evaluation

## 2019-10-18 NOTE — ASSESSMENT & PLAN NOTE
Preventive therapy for headaches:  -  Continue venlafaxine 150 milligram once a day  -  Continue Topamax to 100 milligrams in the morning and 200 milligrams at bedtime now  -  Start Aimovig 140 every 30 days  - Use cyproheptadine 4 mg at needed at bedtime for weather and sinus headaches   Abortive therapy:  -   She is still using Motrin and Tylenol at least 3 to 4 times a week   Goal is to decrease the frequency of abortive medication

## 2019-11-01 ENCOUNTER — TELEPHONE (OUTPATIENT)
Dept: NEUROLOGY | Facility: CLINIC | Age: 44
End: 2019-11-01

## 2019-11-01 NOTE — TELEPHONE ENCOUNTER
Pt calling in stating she was made aware that her pharmacy is stating that Aimovig needs a PA  Call to [facility]  Given the following information:     Madonna Flaco [id number]  [id number]  Group [id number]  ID [id number]    PA submitted on CMM, awaiting determination         Patient's call back number is [phone]

## 2019-11-01 NOTE — TELEPHONE ENCOUNTER
Pt called in to f/u regarding this  Made her aware that PA was submitted and typically there is up to a 72 business hour turn around time  Pt questioning why this was not done earlier, I did make her aware that this was completed as soon as we were notified  Will await determination

## 2019-11-05 NOTE — TELEPHONE ENCOUNTER
Fax received  [facility] approved through 2/1/20  Detailed message left for patient to make her aware of above  Informed to call office back if questions

## 2019-11-07 ENCOUNTER — APPOINTMENT (OUTPATIENT)
Dept: LAB | Facility: CLINIC | Age: 44
End: 2019-11-07
Payer: MEDICARE

## 2019-11-07 ENCOUNTER — TRANSCRIBE ORDERS (OUTPATIENT)
Dept: LAB | Facility: CLINIC | Age: 44
End: 2019-11-07

## 2019-11-07 DIAGNOSIS — R15.9 FULL INCONTINENCE OF FECES: ICD-10-CM

## 2019-11-07 LAB
BUN SERPL-MCNC: 11 MG/DL (ref 5–25)
CREAT SERPL-MCNC: 0.72 MG/DL (ref 0.6–1.3)
GFR SERPL CREATININE-BSD FRML MDRD: 101 ML/MIN/1.73SQ M

## 2019-11-07 PROCEDURE — 36415 COLL VENOUS BLD VENIPUNCTURE: CPT

## 2019-11-07 PROCEDURE — 82565 ASSAY OF CREATININE: CPT

## 2019-11-07 PROCEDURE — 84520 ASSAY OF UREA NITROGEN: CPT

## 2019-11-08 ENCOUNTER — HOSPITAL ENCOUNTER (OUTPATIENT)
Dept: RADIOLOGY | Age: 44
Discharge: HOME | End: 2019-11-08
Payer: MEDICARE

## 2019-11-08 DIAGNOSIS — R15.9 FULL INCONTINENCE OF FECES: ICD-10-CM

## 2019-11-08 PROCEDURE — 72158 MRI LUMBAR SPINE W/O & W/DYE: CPT

## 2019-11-08 PROCEDURE — A9585 GADOBUTROL INJECTION: HCPCS | Performed by: PHYSICIAN ASSISTANT

## 2019-11-08 RX ADMIN — GADOBUTROL 8 ML: 604.72 INJECTION INTRAVENOUS at 12:27

## 2019-11-19 ENCOUNTER — TRANSCRIBE ORDERS (OUTPATIENT)
Dept: LAB | Facility: CLINIC | Age: 44
End: 2019-11-19

## 2019-11-19 DIAGNOSIS — F31.30 BIPOLAR I DISORDER, MOST RECENT EPISODE DEPRESSED (HCC): Primary | ICD-10-CM

## 2019-11-20 ENCOUNTER — APPOINTMENT (OUTPATIENT)
Dept: LAB | Facility: CLINIC | Age: 44
End: 2019-11-20
Payer: MEDICARE

## 2019-11-20 DIAGNOSIS — F31.30 BIPOLAR I DISORDER, MOST RECENT EPISODE DEPRESSED (HCC): ICD-10-CM

## 2019-11-20 LAB
ALBUMIN SERPL BCP-MCNC: 3.5 G/DL (ref 3.5–5)
ALP SERPL-CCNC: 74 U/L (ref 46–116)
ALT SERPL W P-5'-P-CCNC: 44 U/L (ref 12–78)
ANION GAP SERPL CALCULATED.3IONS-SCNC: 10 MMOL/L (ref 4–13)
AST SERPL W P-5'-P-CCNC: 28 U/L (ref 5–45)
BACTERIA UR QL AUTO: ABNORMAL /HPF
BILIRUB SERPL-MCNC: 0.3 MG/DL (ref 0.2–1)
BILIRUB UR QL STRIP: NEGATIVE
BUN SERPL-MCNC: 15 MG/DL (ref 5–25)
CALCIUM SERPL-MCNC: 9.4 MG/DL (ref 8.3–10.1)
CHLORIDE SERPL-SCNC: 103 MMOL/L (ref 100–108)
CHOLEST SERPL-MCNC: 162 MG/DL (ref 50–200)
CLARITY UR: CLEAR
CO2 SERPL-SCNC: 26 MMOL/L (ref 21–32)
COLOR UR: YELLOW
CREAT SERPL-MCNC: 0.67 MG/DL (ref 0.6–1.3)
ERYTHROCYTE [DISTWIDTH] IN BLOOD BY AUTOMATED COUNT: 13.2 % (ref 11.6–15.1)
EST. AVERAGE GLUCOSE BLD GHB EST-MCNC: 114 MG/DL
FOLATE SERPL-MCNC: 9 NG/ML (ref 3.1–17.5)
GFR SERPL CREATININE-BSD FRML MDRD: 106 ML/MIN/1.73SQ M
GLUCOSE P FAST SERPL-MCNC: 100 MG/DL (ref 65–99)
GLUCOSE UR STRIP-MCNC: NEGATIVE MG/DL
HBA1C MFR BLD: 5.6 % (ref 4.2–6.3)
HCT VFR BLD AUTO: 38.1 % (ref 34.8–46.1)
HDLC SERPL-MCNC: 46 MG/DL
HGB BLD-MCNC: 12.3 G/DL (ref 11.5–15.4)
HGB UR QL STRIP.AUTO: ABNORMAL
KETONES UR STRIP-MCNC: NEGATIVE MG/DL
LDLC SERPL CALC-MCNC: 98 MG/DL (ref 0–100)
LEUKOCYTE ESTERASE UR QL STRIP: NEGATIVE
MCH RBC QN AUTO: 28.7 PG (ref 26.8–34.3)
MCHC RBC AUTO-ENTMCNC: 32.3 G/DL (ref 31.4–37.4)
MCV RBC AUTO: 89 FL (ref 82–98)
NITRITE UR QL STRIP: NEGATIVE
NON-SQ EPI CELLS URNS QL MICRO: ABNORMAL /HPF
NONHDLC SERPL-MCNC: 116 MG/DL
PH UR STRIP.AUTO: 5.5 [PH]
PLATELET # BLD AUTO: 279 THOUSANDS/UL (ref 149–390)
PMV BLD AUTO: 10 FL (ref 8.9–12.7)
POTASSIUM SERPL-SCNC: 4.3 MMOL/L (ref 3.5–5.3)
PROT SERPL-MCNC: 7.5 G/DL (ref 6.4–8.2)
PROT UR STRIP-MCNC: NEGATIVE MG/DL
RBC # BLD AUTO: 4.29 MILLION/UL (ref 3.81–5.12)
RBC #/AREA URNS AUTO: ABNORMAL /HPF
SODIUM SERPL-SCNC: 139 MMOL/L (ref 136–145)
SP GR UR STRIP.AUTO: <=1.005 (ref 1–1.03)
TRIGL SERPL-MCNC: 90 MG/DL
TSH SERPL DL<=0.05 MIU/L-ACNC: 1.04 UIU/ML (ref 0.36–3.74)
UROBILINOGEN UR QL STRIP.AUTO: 0.2 E.U./DL
VIT B12 SERPL-MCNC: 370 PG/ML (ref 100–900)
WBC # BLD AUTO: 7.79 THOUSAND/UL (ref 4.31–10.16)
WBC #/AREA URNS AUTO: ABNORMAL /HPF

## 2019-11-20 PROCEDURE — 85027 COMPLETE CBC AUTOMATED: CPT

## 2019-11-20 PROCEDURE — 80053 COMPREHEN METABOLIC PANEL: CPT

## 2019-11-20 PROCEDURE — 81001 URINALYSIS AUTO W/SCOPE: CPT | Performed by: PSYCHIATRY & NEUROLOGY

## 2019-11-20 PROCEDURE — 80061 LIPID PANEL: CPT

## 2019-11-20 PROCEDURE — 36415 COLL VENOUS BLD VENIPUNCTURE: CPT

## 2019-11-20 PROCEDURE — 80307 DRUG TEST PRSMV CHEM ANLYZR: CPT | Performed by: PSYCHIATRY & NEUROLOGY

## 2019-11-20 PROCEDURE — 82607 VITAMIN B-12: CPT

## 2019-11-20 PROCEDURE — 83036 HEMOGLOBIN GLYCOSYLATED A1C: CPT

## 2019-11-20 PROCEDURE — 84443 ASSAY THYROID STIM HORMONE: CPT

## 2019-11-20 PROCEDURE — 82746 ASSAY OF FOLIC ACID SERUM: CPT

## 2019-11-21 LAB
AMPHETAMINES UR QL SCN: NEGATIVE NG/ML
BARBITURATES UR QL SCN: NEGATIVE NG/ML
BENZODIAZ UR QL: NEGATIVE NG/ML
BZE UR QL: NEGATIVE NG/ML
CANNABINOIDS UR QL SCN: NEGATIVE NG/ML
METHADONE UR QL SCN: NEGATIVE NG/ML
OPIATES UR QL: NEGATIVE NG/ML
PCP UR QL: NEGATIVE NG/ML
PROPOXYPH UR QL SCN: NEGATIVE NG/ML

## 2019-12-11 ENCOUNTER — TELEPHONE (OUTPATIENT)
Dept: NEUROLOGY | Facility: CLINIC | Age: 44
End: 2019-12-11

## 2019-12-11 DIAGNOSIS — G43.709 CHRONIC MIGRAINE WITHOUT AURA WITHOUT STATUS MIGRAINOSUS, NOT INTRACTABLE: ICD-10-CM

## 2019-12-11 RX ORDER — DIVALPROEX SODIUM 250 MG/1
250 TABLET, EXTENDED RELEASE ORAL
Qty: 8 TABLET | Refills: 0 | Status: SHIPPED | OUTPATIENT
Start: 2019-12-11 | End: 2020-06-02 | Stop reason: SDUPTHER

## 2019-12-11 RX ORDER — DEXAMETHASONE 2 MG/1
2 TABLET ORAL
Qty: 5 TABLET | Refills: 0 | Status: SHIPPED | OUTPATIENT
Start: 2019-12-11 | End: 2020-06-02 | Stop reason: SDUPTHER

## 2019-12-11 RX ORDER — PROCHLORPERAZINE MALEATE 10 MG
10 TABLET ORAL EVERY 6 HOURS PRN
Qty: 10 TABLET | Refills: 0 | Status: SHIPPED | OUTPATIENT
Start: 2019-12-11 | End: 2020-02-18 | Stop reason: SDUPTHER

## 2019-12-11 NOTE — TELEPHONE ENCOUNTER
I sent 3 prescriptions to the [facility]  Prochlorperazine 10 mg q 8 hr x 3 doses then prn  Decadron 2 mg  X 5 days  Depakote 250 mg 2 tabs for 3 nights then 1 tab for 2 nights  Please let her know that the Aimovig can take up to 6 months to be effective  Only rx given 10/18/19  Thanks      ----- Message from [facility], RN sent at 12/11/2019 11:57 AM EST -----  Regarding: FW: Prescription Question  Contact: [phone]      ----- Message -----  From: Jesus Alberto Ramirez: 12/11/2019  11:51 AM EST  To: [facility]  Subject: Prescription Question                            Good morning Olguin Malvin  I haven't been feeling well for the past 3 days I've had a headache and is getting worse it's in the back of my head and it runs to my left ear  The injection is not working for me and I'm still taking over-the-counter stuff I just wanted to know if there was something you can order and send to the pharmacy    Thanks Gabriella Pettit

## 2020-01-07 ENCOUNTER — TELEPHONE (OUTPATIENT)
Dept: NEUROLOGY | Facility: CLINIC | Age: 45
End: 2020-01-07

## 2020-01-07 NOTE — TELEPHONE ENCOUNTER
Received fax from [facility] to renew [address]  Renewal submitted  Awaiting determination      Lua: Alayna Rocha

## 2020-02-13 RX ORDER — LISINOPRIL 10 MG/1
20 TABLET ORAL DAILY
COMMUNITY
Start: 2019-12-23

## 2020-02-13 RX ORDER — LURASIDONE HYDROCHLORIDE 40 MG/1
TABLET, FILM COATED ORAL
COMMUNITY
Start: 2019-12-30 | End: 2020-09-24

## 2020-02-13 RX ORDER — LURASIDONE HYDROCHLORIDE 20 MG/1
20 TABLET, FILM COATED ORAL EVERY MORNING
Refills: 0 | COMMUNITY
Start: 2019-12-04 | End: 2020-09-24

## 2020-02-13 RX ORDER — PHENTERMINE HYDROCHLORIDE 37.5 MG/1
TABLET ORAL
COMMUNITY
Start: 2019-12-23 | End: 2020-02-18

## 2020-02-13 RX ORDER — GABAPENTIN 100 MG/1
CAPSULE ORAL
Refills: 0 | COMMUNITY
Start: 2019-12-03 | End: 2021-03-05

## 2020-02-18 ENCOUNTER — OFFICE VISIT (OUTPATIENT)
Dept: NEUROLOGY | Facility: CLINIC | Age: 45
End: 2020-02-18
Payer: MEDICARE

## 2020-02-18 VITALS
HEIGHT: 65 IN | SYSTOLIC BLOOD PRESSURE: 145 MMHG | BODY MASS INDEX: 32.15 KG/M2 | DIASTOLIC BLOOD PRESSURE: 84 MMHG | HEART RATE: 82 BPM | WEIGHT: 193 LBS

## 2020-02-18 DIAGNOSIS — G43.709 CHRONIC MIGRAINE WITHOUT AURA WITHOUT STATUS MIGRAINOSUS, NOT INTRACTABLE: ICD-10-CM

## 2020-02-18 DIAGNOSIS — F41.8 OTHER SPECIFIED ANXIETY DISORDERS: Primary | ICD-10-CM

## 2020-02-18 PROCEDURE — 99215 OFFICE O/P EST HI 40 MIN: CPT | Performed by: PHYSICIAN ASSISTANT

## 2020-02-18 RX ORDER — DEXAMETHASONE 2 MG/1
2 TABLET ORAL
Qty: 5 TABLET | Refills: 0 | Status: SHIPPED | OUTPATIENT
Start: 2020-02-18 | End: 2021-03-05

## 2020-02-18 RX ORDER — ACETAMINOPHEN, ASPIRIN AND CAFFEINE 250; 250; 65 MG/1; MG/1; MG/1
2 TABLET, FILM COATED ORAL EVERY 6 HOURS PRN
COMMUNITY

## 2020-02-18 RX ORDER — VENLAFAXINE HYDROCHLORIDE 75 MG/1
75 CAPSULE, EXTENDED RELEASE ORAL DAILY
Qty: 30 CAPSULE | Refills: 0 | Status: SHIPPED | OUTPATIENT
Start: 2020-02-18 | End: 2020-09-24

## 2020-02-18 RX ORDER — ACETAMINOPHEN 325 MG/1
650 TABLET ORAL EVERY 6 HOURS PRN
COMMUNITY

## 2020-02-18 RX ORDER — VENLAFAXINE HYDROCHLORIDE 150 MG/1
150 CAPSULE, EXTENDED RELEASE ORAL DAILY
Qty: 30 CAPSULE | Refills: 3 | Status: SHIPPED | OUTPATIENT
Start: 2020-02-18 | End: 2021-04-22

## 2020-02-18 RX ORDER — CYPROHEPTADINE HYDROCHLORIDE 4 MG/1
4 TABLET ORAL
Qty: 30 TABLET | Refills: 1 | Status: SHIPPED | OUTPATIENT
Start: 2020-02-18 | End: 2021-03-05

## 2020-02-18 RX ORDER — PROCHLORPERAZINE MALEATE 10 MG
10 TABLET ORAL EVERY 6 HOURS PRN
Qty: 20 TABLET | Refills: 0 | Status: SHIPPED | OUTPATIENT
Start: 2020-02-18 | End: 2020-06-02 | Stop reason: SDUPTHER

## 2020-02-18 NOTE — ASSESSMENT & PLAN NOTE
Continue Aimovig 1 pen every 30 days  Start Venlafaxine 75 mg in am for 14 days then increase to 150 mg (same as before)  Start cyproheptadine 4 mg at bedtime    At onset of migraine, take prochlorperazine 10 mg  May repeat in 8 hours    Limit of 20 a month    For next 5 days take Decadron 2 mg in the am

## 2020-02-18 NOTE — PROGRESS NOTES
Review of Systems   Constitutional: Negative  HENT: Positive for tinnitus (at times with headache )  Phonophobia    Eyes: Positive for photophobia  Respiratory: Negative  Cardiovascular: Negative  Gastrointestinal: Positive for nausea and vomiting  Endocrine: Negative  Genitourinary: Negative  Musculoskeletal: Negative  Skin: Negative  Allergic/Immunologic: Negative  Neurological: Positive for headaches  Hematological: Negative  Psychiatric/Behavioral: Positive for sleep disturbance (difficulty falling and staying asleep )

## 2020-02-18 NOTE — PATIENT INSTRUCTIONS
Continue Aimovig 1 pen every 30 days  Start Venlafaxine 75 mg in am for 14 days then increase to 150 mg (same as before)  Start cyproheptadine 4 mg at bedtime    At onset of migraine, take prochlorperazine 10 mg  May repeat in 8 hours  Limit of 20 a month    For next 5 days take Decadron 2 mg in the am    Call for appointment for psychiatry and colon/rectal please :)    Neck pain:   - We discussed the role of neck pathology and poor posture, with straightening of the normal cervical lordosis, in headaches  We discussed how tightening of the neck muscles can irritate the nerves in the occipital region of her head and cause or worsen head pain  We also discussed and demonstrated neck strengthening and relaxation exercises, as well as giving written instructions on these exercises  - We talked about the importance of good posture for improving shoulder, neck, and head pain  The patient was given visualization exercises for correcting posture, which patient will practice at home  If this simple exercise does not help improve the posture, we will consider formal physical therapy in the future  Medication overuse headaches:   - We discussed medication overuse headache [facility]) and how to avoid it in the future  It was explained that all analgesics have the potential to cause medication overuse headache [facility]) and analgesic overuse can negate the effectiveness of headache preventive measures  After successful [address] treatment, preventive medications for an underlying primary headache disorder have a greater chance for success  Avoid medications with narcotics, barbiturates, or caffeine in them as these can cause rebound headaches after very few doses and can interfere with other headache medicine efficacy  Taking any analgesics for more than 2-3 days a week can cause medication overuse headache  Reproductive age women: Should take folic acid daily when taking anti-seizure drugs especially Depakote       [facility] Prescription Drug Monitoring Program report was reviewed and was appropriate      Headache management instructions  - When patient has a moderate to severe headache, they should seek rest, initiate relaxation and apply cold compresses to the head  - Maintain regular sleep schedule  Adults need at least 7-8 hours of uninterrupted a night  - Limit over the counter medications such as Tylenol, Ibuprofen, Aleve, Excedrin  (No more than 3 times a week)  - Maintain headache diary  We discussed an ABBY for a smart phone is "Migraine darrin"  - Limit caffeine to 1-2 cups 8 to 16 oz a day or less  - Avoid dietary trigger  (aged cheese, peanuts, MSG, aspartame and nitrates)  - Patient is to have regular frequent meals to prevent headache onset  - Please drink at least 64 ounces of water a day to help remain hydrated  Please call with any questions or concerns   Office number is [phone]

## 2020-02-18 NOTE — PROGRESS NOTES
[facility]  PATIENT:  Sammy Marie  MRN:  [id number]  :  1975  DATE OF SERVICE:  2020      Assessment/Plan:     Chronic migraine without aura without status migrainosus, not intractable  Continue Aimovig 1 pen every 30 days  Start Venlafaxine 75 mg in am for 14 days then increase to 150 mg (same as before)  Start cyproheptadine 4 mg at bedtime    At onset of migraine, take prochlorperazine 10 mg  May repeat in 8 hours  Limit of 20 a month    For next 5 days take Decadron 2 mg in the am    Other specified anxiety disorders  Follow up with psychiatry as soon as you can for medication adjustments         Problem List Items Addressed This Visit        Cardiovascular and Mediastinum    Chronic migraine without aura without status migrainosus, not intractable     Continue Aimovig 1 pen every 30 days  Start Venlafaxine 75 mg in am for 14 days then increase to 150 mg (same as before)  Start cyproheptadine 4 mg at bedtime    At onset of migraine, take prochlorperazine 10 mg  May repeat in 8 hours  Limit of 20 a month    For next 5 days take Decadron 2 mg in the am         Relevant Medications    gabapentin (NEURONTIN) 100 mg capsule    aspirin-acetaminophen-caffeine (EXCEDRIN MIGRAINE) 250-250-65 MG per tablet    acetaminophen (TYLENOL) 325 mg tablet    venlafaxine (EFFEXOR-XR) 75 mg 24 hr capsule    dexamethasone (DECADRON) 2 mg tablet    cyproheptadine (PERIACTIN) 4 mg tablet    venlafaxine (EFFEXOR-XR) 150 mg 24 hr capsule    prochlorperazine (COMPAZINE) 10 mg tablet       Other    Other specified anxiety disorders - Primary     Follow up with psychiatry as soon as you can for medication adjustments         Relevant Medications    LATUDA 20 MG tablet    LATUDA 40 MG tablet    venlafaxine (EFFEXOR-XR) 75 mg 24 hr capsule    venlafaxine (EFFEXOR-XR) 150 mg 24 hr capsule    prochlorperazine (COMPAZINE) 10 mg tablet            History of Present Illness:    We had the pleasure of evaluating Adamaris Vieyra in neurological follow up  today for headaches  As you know,  she is a 38 y o   right handed female  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  She has a PMH of palpitations and has had an ablation  She continues to see her cardiologist      Patient states that she fell down a flight of stairs in early 2000s  Went to hospital, unsure if went via ambulance  Patient states she was out of work for about a month  States had significant pain, bruising and was limping  Went to PT, chiropractor after this  She states went back to work as   Thinks her issues with bowel incontinence began a few months after the fall  First instance happened at work where she thought that she was going to pass gas and soiled herself  Also began to have incontinence during intercourse  For the past 2-3 years has had very little control  States she doesn't go out unless she hasn't eaten for 24 hours  She can drink fluids but if she eats she needs a toilet  Patient varies from constipation to "explosion "  Patient has seen Dr Jessee Ford for gastroenterology but was over 6 years ago  Patient states she is unable to stop it once starts and has a full bowel movement amount  Patient had a colon resection for diverticulitis in 2001  Was not diverted  Patient states her headaches worsened around December 2019 or January 2020  Her symptoms have worsened and having more nausea and vomiting  Her psychiatrist changed her medications in November 2019 and she stopped taking her medications in January 2020  Has missed some days of her Topamax since Wednesday as she didn't take her medications with her      She is taking OTC at least once a day, if not more     QTc 12/23/2016 456 ms     Chronic Migraine headaches:   PREVENTIVE   Lisinopril, Metoprolol, Topamax, cyclobenzaprine, labetalol, Seroquel, Tizanidine, carbamazepine, citalopram, duloxetine, venlafaxine, Lyrica, Propranolol, olanzapine- ineffective, cyproheptadine, Botox  Aimovig 12/4/2019  ABORTIVE:   Dexamethasone, Fioricet, Naproxen Promethazine, Kenalog, Percocet, tramadol, Toradol, narcotics  Headache trigger: Fatigue, Stress/Tension, Weather change, lack of sleep  Alternative therapies used in the past for headaches? none   Headache are worse if the patient: cough, sneeze, bending over  Aura - none     Any family history of migraines? Yes, mother and father  Any family history of aneurysms? No     Current pain: 7/10  How often do the headaches occur -  almost daily  What time of the day do the headaches start - either wakes up with them or states it is mid day  How long do the headaches last - all day  Where are they located - face and frontal region and also has a lot of neck pain with this     What is the intensity of pain - 7-10/10; gets to a 10/10 most days  Describe your usual headache - Throbbing, Pounding  Associated symptoms:   -       Decrease of appetite, nausea   -       Photophobia, phonophobia, sensitivity to smell   -       Problem with concentration  -       prefer to be alone and in a dark room, unable to work     Number of days missed per month because of headaches:  Work (or school) days: NA  Social or Family activities: a lot     What time of the year do headaches occur more frequently?   Change of weather  Have you seen someone else for headaches or pain? Yes  Have you had trigger point injection performed and how often? No  Have you had Botox injection performed and how often? Yes   Have you had epidural injections or transforaminal injections performed? No     Have you used CBD or THC for your headaches and how often? No  Are you current pregnant or planning on getting pregnant? No, done with family planning  Have you ever had any Brain imaging? yes      7/23/2018 CT head  No acute abnormalities      I personally reviewed these images    - Reviewed old notes from physician seen in the past- see above HPI for summary of previous encounters         Past Medical History:   Diagnosis Date    B[facility] 1 disorder ([facility] )     Disease of thyroid gland     Fibromyalgia     Gout     Hyperlipidemia     Hypertension     Lupus ([place] [address] )     Migraine        Patient Active Problem List   Diagnosis    Palpitations    AVNRT (AV win re-entry tachycardia) ([place] [address] )    Chronic migraine without aura without status migrainosus, not intractable    Interstitial cystitis    Vaginal candidiasis    Encounter for gynecological examination    Screening for breast cancer    Hyperlipidemia    Disease of thyroid gland    Yeast infection of the vagina    Vaginal discharge    Other specified anxiety disorders    Vaginal dryness    Pelvic pain    Urine frequency    Full incontinence of feces       Medications:      Current Outpatient Medications   Medication Sig Dispense Refill    acetaminophen (TYLENOL) 325 mg tablet Take 650 mg by mouth every 6 (six) hours as needed for mild pain      aspirin-acetaminophen-caffeine (EXCEDRIN MIGRAINE) 250-250-65 MG per tablet Take 2 tablets by mouth every 6 (six) hours as needed for headaches      Cetirizine HCl (ZYRTEC ALLERGY) 10 MG CAPS Take 10 mg by mouth daily        Erenumab-aooe 140 MG/ML SOAJ Inject 140 mg under the skin every 30 (thirty) days 1 pen 11    fluticasone (FLOVENT HFA) 110 MCG/ACT inhaler Inhale 1 puff as needed       hydroxychloroquine (PLAQUENIL) 200 mg tablet as needed       ibuprofen (MOTRIN) 800 mg tablet Take 800 mg by mouth as needed       levothyroxine 88 mcg tablet Take 88 mcg by mouth daily       LISINOPRIL PO Take 20 mg by mouth daily       Multiple Vitamins-Minerals (MULTIVITAMIN ADULT PO) Take 1 tablet by mouth daily      omeprazole (PriLOSEC) 40 MG capsule Take 40 mg by mouth as needed   0    oxyCODONE (ROXICODONE) 15 mg immediate release tablet Take 15 mg by mouth as needed       PROAIR  (90 Base) MCG/ACT inhaler Inhale 1-2 puffs as needed       Probiotic Product (PROBIOTIC DAILY PO) Take 1 tablet by mouth daily      propranolol (INDERAL LA) 160 mg Take 160 mg by mouth daily at bedtime   0    propranolol (INDERAL LA) 60 mg 24 hr capsule Take 60 mg by mouth daily at bedtime       RESTASIS 0 05 % ophthalmic emulsion Administer 1 drop to both eyes daily       topiramate (TOPAMAX) 100 mg tablet One in am and two at bedtime daily (Patient taking differently: Take 100-200 mg by mouth 2 (two) times a day One in am and two at bedtime daily) 270 tablet 3    valACYclovir (VALTREX) 1,000 mg tablet 1 tablet as needed       clonazePAM (KlonoPIN) 1 mg tablet 1 mg daily   0    conjugated estrogens (PREMARIN) vaginal cream Insert 0 5 g into the vagina 3 (three) times a week (Patient not taking: Reported on 10/18/2019) 30 g 1    cyproheptadine (PERIACTIN) 4 mg tablet Take 1 tablet (4 mg total) by mouth daily at bedtime as needed for allergies 30 tablet 1    dexamethasone (DECADRON) 2 mg tablet Take 1 tablet (2 mg total) by mouth daily with breakfast (Patient not taking: Reported on 2/18/2020) 5 tablet 0    dexamethasone (DECADRON) 2 mg tablet Take 1 tablet (2 mg total) by mouth daily with breakfast 5 tablet 0    divalproex sodium (DEPAKOTE ER) 250 mg 24 hr tablet Take 1 tablet (250 mg total) by mouth daily at bedtime 2 tablets at bedtime for 3 days then 1 tablet at bedtime for 2 days (Patient not taking: Reported on 2/18/2020) 8 tablet 0    gabapentin (NEURONTIN) 100 mg capsule   0    hydrocortisone 2 5 % cream Apply to times a day to face for 5 days in a row  Do not put on eyes   (Patient not taking: Reported on 2/18/2020) 30 g 1    ibuprofen (MOTRIN) 600 mg tablet Take 1 tablet (600 mg total) by mouth every 6 (six) hours as needed for moderate pain (Patient not taking: Reported on 2/18/2020) 30 tablet 0    LATUDA 20 MG tablet Take 20 mg by mouth every morning  0    LATUDA 40 MG tablet       LATUDA 80 MG tablet Take 80 mg by mouth daily  0    lisinopril (ZESTRIL) 10 mg tablet Take 20 mg by mouth daily      omeprazole (PriLOSEC) 20 mg delayed release capsule Take 1 capsule by mouth as needed       ondansetron (ZOFRAN-ODT) 4 mg disintegrating tablet Take 1 tablet (4 mg total) by mouth every 6 (six) hours as needed for nausea (Patient not taking: Reported on 2/18/2020) 20 tablet 0    prochlorperazine (COMPAZINE) 10 mg tablet Take 1 tablet (10 mg total) by mouth every 6 (six) hours as needed (migraine) 20 tablet 0    tamsulosin (FLOMAX) 0 4 mg       venlafaxine (EFFEXOR-XR) 150 mg 24 hr capsule Take 1 capsule (150 mg total) by mouth daily 30 capsule 3    venlafaxine (EFFEXOR-XR) 75 mg 24 hr capsule Take 1 capsule (75 mg total) by mouth daily 30 capsule 0     No current facility-administered medications for this visit  Allergies: Allergies   Allergen Reactions    Other     Pregabalin      Other reaction(s): blurred vision    Shellfish-Derived Products Anaphylaxis and Hives    Sumatriptan      Other reaction(s):  Altered Heart Rate    Triptans Shortness Of Breath and Palpitations    Latex Swelling and Rash     Other reaction(s): Hives/Skin Rash  Other reaction(s): Hives/Skin Rash    Sulfa Antibiotics Swelling and Rash     Other reaction(s): Hives/Skin Rash  Other reaction(s): Hives/Skin Rash    Monistat [Miconazole] Swelling    Shrimp Extract Allergy Skin Test        Family History:     Family History   Problem Relation Age of Onset    Lupus Mother     Osteoporosis Mother     Hypotension Mother     Hypertension Father     Diabetes Father     Heart disease Father     Diabetes Sister     Schizophrenia Sister     Diabetes Maternal Grandmother     Rheum arthritis Maternal Grandmother     Hypertension Maternal Grandmother     Breast cancer Maternal Aunt 36       Social History:     Social History     Socioeconomic History    Marital status: Single     Spouse name: Not on file    Number of children: Not on file    Years of education: Not on file    Highest education level: Not on file   Occupational History    Not on file   Social Needs    Financial resource strain: Not on file    Food insecurity:     Worry: Not on file     Inability: Not on file    Transportation needs:     Medical: Not on file     Non-medical: Not on file   Tobacco Use    Smoking status: Former Smoker    Smokeless tobacco: Never Used    Tobacco comment: quit at 24years old   Substance and Sexual Activity    Alcohol use: No    Drug use: No    Sexual activity: Yes     Partners: Male     Birth control/protection: Female Sterilization   Lifestyle    Physical activity:     Days per week: Not on file     Minutes per session: Not on file    Stress: Not on file   Relationships    Social connections:     Talks on phone: Not on file     Gets together: Not on file     Attends Worship service: Not on file     Active member of club or organization: Not on file     Attends meetings of clubs or organizations: Not on file     Relationship status: Not on file    Intimate partner violence:     Fear of current or ex partner: Not on file     Emotionally abused: Not on file     Physically abused: Not on file     Forced sexual activity: Not on file   Other Topics Concern    Not on file   Social History Narrative    Not on file         Objective:   Physical Exam:                                                                   Vitals:            /84 (BP Location: Left arm, Patient Position: Sitting, Cuff Size: Extra-Large)   Pulse 82   Ht 5' 5" (1 651 m)   Wt 87 5 kg (193 lb)   LMP  (LMP Unknown)   BMI 32 12 kg/m²   BP Readings from Last 3 Encounters:   02/18/20 145/84   10/18/19 144/86   08/27/19 137/89     Pulse Readings from Last 3 Encounters:   02/18/20 82   10/18/19 77   08/27/19 83                CONSTITUTIONAL: Well developed, well nourished, well groomed  No dysmorphic features  Eyes:  PERRLA, EOM normal      Neck:  Normal ROM, neck supple        HEENT: Normocephalic atraumatic  No meningismus  Oropharynx is clear and moist  No oral mucosal lesions  Chest:  Respirations regular and unlabored  Cardiovascular:  Distal extremities warm without palpable edema or tenderness, no observed significant swelling  Musculoskeletal:  Full range of motion  (see below under neurologic exam for evaluation of motor function and gait)   Skin:  warm and dry   Psychiatric:  Normal behavior and appropriate affect        SKULL AND SPINE  ROM: Full range of motion  Tenderness: No focal tenderness    MENTAL STATUS  Orientation: Alert and oriented x 3  Fund of knowledge: Intact  CRANIAL NERVES  II: PERRL  III, IV, VI: Extraocular movements intact  No nystagmus  V: Facial sensation normal V1-V3  VII: Facial movements normal and symmetric  VIII: Intact hearing bilaterally  IX, X: Palate elevation normal and symmetric  XI: Intact trapezius, SCM strength  XII: Tongue protrudes to the midline    MOTOR (Upper and lower extremities)   Bulk/tone/abnormal movement: Normal muscle bulk and tone  Strength: Strength 5/5 throughout  COORDINATION   Station/Gait: Normal baseline gait  Reflexes:  deep tendon reflexes are 2+/4 throughout       Review of Systems:   Review of Systems  Constitutional: Negative  HENT: Positive for tinnitus (at times with headache )  Phonophobia    Eyes: Positive for photophobia  Respiratory: Negative  Cardiovascular: Negative  Gastrointestinal: Positive for nausea and vomiting  Endocrine: Negative  Genitourinary: Negative  Musculoskeletal: Negative  Skin: Negative  Allergic/Immunologic: Negative  Neurological: Positive for headaches  Hematological: Negative  Psychiatric/Behavioral: Positive for sleep disturbance (difficulty falling and staying asleep )     I personally reviewed the ROS entered by the MA    Greater than 50% of the 45 minutes evaluation was a face-to-face discussion regarding  the pathophysiology of her current symptoms and further plan, as well as counseling, educating, and coordinating the patient's care including diagnostic results, impression, and recommendations, risks and benefits of treatment, instructions for disease self management, treatment instructions, follow up requirements and risk factors and risk reduction of disease    Author:  Jesus Knight PA-C 2/18/2020 12:16 PM

## 2020-02-29 ENCOUNTER — APPOINTMENT (EMERGENCY)
Dept: RADIOLOGY | Facility: HOSPITAL | Age: 45
End: 2020-02-29
Payer: MEDICARE

## 2020-02-29 ENCOUNTER — OFFICE VISIT (OUTPATIENT)
Dept: URGENT CARE | Age: 45
End: 2020-02-29
Payer: MEDICARE

## 2020-02-29 ENCOUNTER — HOSPITAL ENCOUNTER (EMERGENCY)
Facility: HOSPITAL | Age: 45
Discharge: HOME | End: 2020-02-29
Attending: EMERGENCY MEDICINE | Admitting: EMERGENCY MEDICINE
Payer: MEDICARE

## 2020-02-29 VITALS
TEMPERATURE: 97.4 F | HEIGHT: 65 IN | BODY MASS INDEX: 31.16 KG/M2 | HEART RATE: 76 BPM | WEIGHT: 187 LBS | SYSTOLIC BLOOD PRESSURE: 138 MMHG | RESPIRATION RATE: 16 BRPM | OXYGEN SATURATION: 98 % | DIASTOLIC BLOOD PRESSURE: 80 MMHG

## 2020-02-29 VITALS
HEART RATE: 66 BPM | DIASTOLIC BLOOD PRESSURE: 89 MMHG | RESPIRATION RATE: 13 BRPM | WEIGHT: 187 LBS | OXYGEN SATURATION: 98 % | BODY MASS INDEX: 31.12 KG/M2 | SYSTOLIC BLOOD PRESSURE: 148 MMHG | TEMPERATURE: 98 F

## 2020-02-29 DIAGNOSIS — R06.00 DYSPNEA, UNSPECIFIED TYPE: ICD-10-CM

## 2020-02-29 DIAGNOSIS — R05.9 COUGH: Primary | ICD-10-CM

## 2020-02-29 DIAGNOSIS — R05.9 COUGH: ICD-10-CM

## 2020-02-29 DIAGNOSIS — R07.81 PLEURITIC CHEST PAIN: ICD-10-CM

## 2020-02-29 DIAGNOSIS — R07.9 CHEST PAIN, UNSPECIFIED TYPE: Primary | ICD-10-CM

## 2020-02-29 LAB
ALBUMIN SERPL BCP-MCNC: 3.7 G/DL (ref 3.5–5)
ALP SERPL-CCNC: 83 U/L (ref 46–116)
ALT SERPL W P-5'-P-CCNC: 24 U/L (ref 12–78)
ANION GAP SERPL CALCULATED.3IONS-SCNC: 7 MMOL/L (ref 4–13)
AST SERPL W P-5'-P-CCNC: 10 U/L (ref 5–45)
BASOPHILS # BLD AUTO: 0.04 THOUSANDS/ΜL (ref 0–0.1)
BASOPHILS NFR BLD AUTO: 1 % (ref 0–1)
BILIRUB SERPL-MCNC: 0.37 MG/DL (ref 0.2–1)
BUN SERPL-MCNC: 16 MG/DL (ref 5–25)
CALCIUM SERPL-MCNC: 8.9 MG/DL (ref 8.3–10.1)
CHLORIDE SERPL-SCNC: 110 MMOL/L (ref 100–108)
CO2 SERPL-SCNC: 23 MMOL/L (ref 21–32)
CREAT SERPL-MCNC: 0.95 MG/DL (ref 0.6–1.3)
EOSINOPHIL # BLD AUTO: 0.27 THOUSAND/ΜL (ref 0–0.61)
EOSINOPHIL NFR BLD AUTO: 3 % (ref 0–6)
ERYTHROCYTE [DISTWIDTH] IN BLOOD BY AUTOMATED COUNT: 12.6 % (ref 11.6–15.1)
GFR SERPL CREATININE-BSD FRML MDRD: 73 ML/MIN/1.73SQ M
GLUCOSE SERPL-MCNC: 124 MG/DL (ref 65–140)
HCT VFR BLD AUTO: 39 % (ref 34.8–46.1)
HGB BLD-MCNC: 12.7 G/DL (ref 11.5–15.4)
IMM GRANULOCYTES # BLD AUTO: 0.03 THOUSAND/UL (ref 0–0.2)
IMM GRANULOCYTES NFR BLD AUTO: 0 % (ref 0–2)
LYMPHOCYTES # BLD AUTO: 2.32 THOUSANDS/ΜL (ref 0.6–4.47)
LYMPHOCYTES NFR BLD AUTO: 27 % (ref 14–44)
MCH RBC QN AUTO: 28.5 PG (ref 26.8–34.3)
MCHC RBC AUTO-ENTMCNC: 32.6 G/DL (ref 31.4–37.4)
MCV RBC AUTO: 88 FL (ref 82–98)
MONOCYTES # BLD AUTO: 0.8 THOUSAND/ΜL (ref 0.17–1.22)
MONOCYTES NFR BLD AUTO: 9 % (ref 4–12)
NEUTROPHILS # BLD AUTO: 5.04 THOUSANDS/ΜL (ref 1.85–7.62)
NEUTS SEG NFR BLD AUTO: 60 % (ref 43–75)
NRBC BLD AUTO-RTO: 0 /100 WBCS
PLATELET # BLD AUTO: 336 THOUSANDS/UL (ref 149–390)
PMV BLD AUTO: 9.9 FL (ref 8.9–12.7)
POTASSIUM SERPL-SCNC: 3.9 MMOL/L (ref 3.5–5.3)
PROT SERPL-MCNC: 8 G/DL (ref 6.4–8.2)
RBC # BLD AUTO: 4.45 MILLION/UL (ref 3.81–5.12)
SODIUM SERPL-SCNC: 140 MMOL/L (ref 136–145)
TROPONIN I SERPL-MCNC: <0.02 NG/ML
WBC # BLD AUTO: 8.5 THOUSAND/UL (ref 4.31–10.16)

## 2020-02-29 PROCEDURE — 71046 X-RAY EXAM CHEST 2 VIEWS: CPT

## 2020-02-29 PROCEDURE — 80053 COMPREHEN METABOLIC PANEL: CPT | Performed by: EMERGENCY MEDICINE

## 2020-02-29 PROCEDURE — 93005 ELECTROCARDIOGRAM TRACING: CPT

## 2020-02-29 PROCEDURE — 36415 COLL VENOUS BLD VENIPUNCTURE: CPT

## 2020-02-29 PROCEDURE — 84484 ASSAY OF TROPONIN QUANT: CPT | Performed by: EMERGENCY MEDICINE

## 2020-02-29 PROCEDURE — 99285 EMERGENCY DEPT VISIT HI MDM: CPT | Performed by: EMERGENCY MEDICINE

## 2020-02-29 PROCEDURE — 85025 COMPLETE CBC W/AUTO DIFF WBC: CPT | Performed by: EMERGENCY MEDICINE

## 2020-02-29 PROCEDURE — 99213 OFFICE O/P EST LOW 20 MIN: CPT | Performed by: PHYSICIAN ASSISTANT

## 2020-02-29 PROCEDURE — 93005 ELECTROCARDIOGRAM TRACING: CPT | Performed by: PHYSICIAN ASSISTANT

## 2020-02-29 PROCEDURE — 99285 EMERGENCY DEPT VISIT HI MDM: CPT

## 2020-02-29 PROCEDURE — G0463 HOSPITAL OUTPT CLINIC VISIT: HCPCS | Performed by: PHYSICIAN ASSISTANT

## 2020-02-29 RX ORDER — FLUTICASONE PROPIONATE 50 MCG
1 SPRAY, SUSPENSION (ML) NASAL DAILY
Qty: 16 G | Refills: 0 | Status: SHIPPED | OUTPATIENT
Start: 2020-02-29

## 2020-03-01 LAB
ATRIAL RATE: 69 BPM
ATRIAL RATE: 74 BPM
P AXIS: 63 DEGREES
P AXIS: 70 DEGREES
PR INTERVAL: 148 MS
PR INTERVAL: 148 MS
QRS AXIS: 66 DEGREES
QRS AXIS: 69 DEGREES
QRSD INTERVAL: 90 MS
QRSD INTERVAL: 90 MS
QT INTERVAL: 376 MS
QT INTERVAL: 402 MS
QTC INTERVAL: 417 MS
QTC INTERVAL: 430 MS
T WAVE AXIS: 51 DEGREES
T WAVE AXIS: 56 DEGREES
VENTRICULAR RATE: 69 BPM
VENTRICULAR RATE: 74 BPM

## 2020-03-01 PROCEDURE — 93010 ELECTROCARDIOGRAM REPORT: CPT | Performed by: INTERNAL MEDICINE

## 2020-03-01 NOTE — PROGRESS NOTES
[address]        NAME: Erik Bello is a 38 y o  female  : 1975    MRN: [id number]  DATE: 2020  TIME: 7:42 PM    Assessment and Plan   Chest pain, unspecified type [R07 9]  1  Chest pain, unspecified type  ECG 12 lead    Transfer to other facility    CANCELED: ECG 12 lead   2  Dyspnea, unspecified type  ECG 12 lead    Transfer to other facility    CANCELED: ECG 12 lead   3  Cough  Transfer to other facility     Patient refusing EKG  Would like to leave AMA  Patient came back in from the waiting room at 7:30pm and stated she changed her mind and would like to go to the ER  She would like to get EKG here in the Urgent Care and she would like to go via private vehicle to [facility] ER  EKG- no obvious ST elevations or depressions    Patient Instructions     Discussed concerns with patient at length  Patient is alert, oriented and capable of making own medical decisions  Patient is refusing EKG  Patient is declining ER transfer at this time  Patient understands the risks of leaving AMA/not going to the ED for further evaluation such as worsening condition, disability, or even death  Patient verbalizes good understanding and continues to decline EKG here in the Urgent Care and ER transfer  Patient refused to sign AMA form  Patient returned  Decided she would like EKG and transfer to ER  She is requesting to go via private vehicle to [facility] ER for further evaluation and treatment  Chief Complaint     Chief Complaint   Patient presents with    Cough     Began yesterday   Shortness of Breath         History of Present Illness       44-year-old female presents with mild intermittent phlegmy cough, nasal congestion, runny nose and shortness of breath since yesterday  Also notes yesterday she started with chest pain that is centralized, does not radiate anywhere  States sometimes the chest pain is with coughing, sometimes unrelated    Sometimes a sharp stabbing pain, sometimes achy pain  States that shortness of breath yesterday was only with coughing, now she feels short of breath even at rest   States he tried NyQuil with some relief of the cold-like symptoms but did not relieve the chest pain or shortness of breath  Denies any wheezing  States she did have a history of asthma, has not needed her inhalers  Also notes cardiac history of high blood pressure, high cholesterol and some irregular heartbeats in the past   Also notes a history of blood clots in the past, no current blood thinners  Denies any palpitations, jaw pain, nausea, vomiting, diarrhea, abdominal pain, headaches, dizziness or other complaints  Denies any recent travel or exposure to the corona virus      Review of Systems   Review of Systems   Constitutional: Negative for activity change, appetite change, chills, fatigue and fever  HENT: Positive for congestion, postnasal drip and rhinorrhea  Negative for ear pain, facial swelling, sinus pressure, sore throat, trouble swallowing and voice change  Eyes: Negative for discharge, itching and visual disturbance  Respiratory: Positive for cough and shortness of breath  Negative for chest tightness and wheezing  Cardiovascular: Positive for chest pain  Negative for palpitations  Gastrointestinal: Negative for abdominal pain, diarrhea, nausea and vomiting  Musculoskeletal: Negative for back pain and neck pain  Skin: Negative for rash  Neurological: Negative for dizziness, syncope, weakness, numbness and headaches  All other systems reviewed and are negative          Current Medications       Current Outpatient Medications:     acetaminophen (TYLENOL) 325 mg tablet, Take 650 mg by mouth every 6 (six) hours as needed for mild pain, Disp: , Rfl:     aspirin-acetaminophen-caffeine (EXCEDRIN MIGRAINE) 250-250-65 MG per tablet, Take 2 tablets by mouth every 6 (six) hours as needed for headaches, Disp: , Rfl:     Cetirizine HCl (ZYRTEC ALLERGY) 10 MG CAPS, Take 10 mg by mouth daily  , Disp: , Rfl:     clonazePAM (KlonoPIN) 1 mg tablet, 1 mg daily , Disp: , Rfl: 0    conjugated estrogens (PREMARIN) vaginal cream, Insert 0 5 g into the vagina 3 (three) times a week, Disp: 30 g, Rfl: 1    cyproheptadine (PERIACTIN) 4 mg tablet, Take 1 tablet (4 mg total) by mouth daily at bedtime as needed for allergies, Disp: 30 tablet, Rfl: 1    dexamethasone (DECADRON) 2 mg tablet, Take 1 tablet (2 mg total) by mouth daily with breakfast, Disp: 5 tablet, Rfl: 0    dexamethasone (DECADRON) 2 mg tablet, Take 1 tablet (2 mg total) by mouth daily with breakfast, Disp: 5 tablet, Rfl: 0    divalproex sodium (DEPAKOTE ER) 250 mg 24 hr tablet, Take 1 tablet (250 mg total) by mouth daily at bedtime 2 tablets at bedtime for 3 days then 1 tablet at bedtime for 2 days, Disp: 8 tablet, Rfl: 0    Erenumab-aooe 140 MG/ML SOAJ, Inject 140 mg under the skin every 30 (thirty) days, Disp: 1 pen, Rfl: 11    fluticasone (FLOVENT HFA) 110 MCG/ACT inhaler, Inhale 1 puff as needed , Disp: , Rfl:     gabapentin (NEURONTIN) 100 mg capsule, , Disp: , Rfl: 0    hydrocortisone 2 5 % cream, Apply to times a day to face for 5 days in a row  Do not put on eyes  , Disp: 30 g, Rfl: 1    hydroxychloroquine (PLAQUENIL) 200 mg tablet, as needed , Disp: , Rfl:     ibuprofen (MOTRIN) 600 mg tablet, Take 1 tablet (600 mg total) by mouth every 6 (six) hours as needed for moderate pain, Disp: 30 tablet, Rfl: 0    ibuprofen (MOTRIN) 800 mg tablet, Take 800 mg by mouth as needed , Disp: , Rfl:     LATUDA 20 MG tablet, Take 20 mg by mouth every morning, Disp: , Rfl: 0    LATUDA 40 MG tablet, , Disp: , Rfl:     LATUDA 80 MG tablet, Take 80 mg by mouth daily, Disp: , Rfl: 0    levothyroxine 88 mcg tablet, Take 88 mcg by mouth daily , Disp: , Rfl:     lisinopril (ZESTRIL) 10 mg tablet, Take 20 mg by mouth daily, Disp: , Rfl:     LISINOPRIL PO, Take 20 mg by mouth daily , Disp: , Rfl:    Multiple Vitamins-Minerals (MULTIVITAMIN ADULT PO), Take 1 tablet by mouth daily, Disp: , Rfl:     omeprazole (PriLOSEC) 20 mg delayed release capsule, Take 1 capsule by mouth as needed , Disp: , Rfl:     omeprazole (PriLOSEC) 40 MG capsule, Take 40 mg by mouth as needed , Disp: , Rfl: 0    ondansetron (ZOFRAN-ODT) 4 mg disintegrating tablet, Take 1 tablet (4 mg total) by mouth every 6 (six) hours as needed for nausea, Disp: 20 tablet, Rfl: 0    oxyCODONE (ROXICODONE) 15 mg immediate release tablet, Take 15 mg by mouth as needed , Disp: , Rfl:     PROAIR  (90 Base) MCG/ACT inhaler, Inhale 1-2 puffs as needed , Disp: , Rfl:     Probiotic Product (PROBIOTIC DAILY PO), Take 1 tablet by mouth daily, Disp: , Rfl:     prochlorperazine (COMPAZINE) 10 mg tablet, Take 1 tablet (10 mg total) by mouth every 6 (six) hours as needed (migraine), Disp: 20 tablet, Rfl: 0    propranolol (INDERAL LA) 160 mg, Take 160 mg by mouth daily at bedtime , Disp: , Rfl: 0    propranolol (INDERAL LA) 60 mg 24 hr capsule, Take 60 mg by mouth daily at bedtime , Disp: , Rfl:     RESTASIS 0 05 % ophthalmic emulsion, Administer 1 drop to both eyes daily , Disp: , Rfl:     tamsulosin (FLOMAX) 0 4 mg, , Disp: , Rfl:     topiramate (TOPAMAX) 100 mg tablet, One in am and two at bedtime daily (Patient taking differently: Take 100-200 mg by mouth 2 (two) times a day One in am and two at bedtime daily), Disp: 270 tablet, Rfl: 3    valACYclovir (VALTREX) 1,000 mg tablet, 1 tablet as needed , Disp: , Rfl:     venlafaxine (EFFEXOR-XR) 150 mg 24 hr capsule, Take 1 capsule (150 mg total) by mouth daily, Disp: 30 capsule, Rfl: 3    venlafaxine (EFFEXOR-XR) 75 mg 24 hr capsule, Take 1 capsule (75 mg total) by mouth daily, Disp: 30 capsule, Rfl: 0    Current Allergies     Allergies as of 02/29/2020 - Reviewed 02/29/2020   Allergen Reaction Noted    Other  05/13/2015    Pregabalin  05/13/2015    Shellfish-derived products Anaphylaxis and Hives 02/05/2018    Sumatriptan  05/13/2015    Triptans Shortness Of Breath and Palpitations 10/17/2012    Latex Swelling and Rash 07/26/2012    Sulfa antibiotics Swelling and Rash 05/13/2015    Monistat [miconazole] Swelling 04/06/2018    Shrimp extract allergy skin test  05/20/2019            The following portions of the patient's history were reviewed and updated as appropriate: allergies, current medications, past family history, past medical history, past social history, past surgical history and problem list      Past Medical History:   Diagnosis Date    Bipolar 1 disorder ([facility] [address] )     Disease of thyroid gland     Fibromyalgia     Gout     Hyperlipidemia     Hypertension     Lupus ([facility] [address] )     Migraine        Past Surgical History:   Procedure Laterality Date    GALLBLADDER SURGERY  2008    HYSTERECTOMY  2014    LAPAROSCOPIC COLON RESECTION  2001       Family History   Problem Relation Age of Onset    Lupus Mother     Osteoporosis Mother     Hypotension Mother     Hypertension Father     Diabetes Father     Heart disease Father     Diabetes Sister     Schizophrenia Sister     Diabetes Maternal Grandmother     Rheum arthritis Maternal Grandmother     Hypertension Maternal Grandmother     Breast cancer Maternal Aunt 40         Medications have been verified  Objective   /80 (BP Location: Right arm, Patient Position: Sitting, Cuff Size: Large)   Pulse 76   Temp (!) 97 4 °F (36 3 °C)   Resp 16   Ht 5' 5" (1 651 m)   Wt 84 8 kg (187 lb)   LMP  (LMP Unknown)   SpO2 98%   BMI 31 12 kg/m²        Physical Exam     Physical Exam   Constitutional: She is oriented to person, place, and time  She appears well-developed and well-nourished  No distress  HENT:   Head: Normocephalic and atraumatic     Right Ear: Tympanic membrane normal    Left Ear: Tympanic membrane normal    Mouth/Throat: Uvula is midline, oropharynx is clear and moist and mucous membranes are normal  No uvula swelling  Mild PND present   Airway patent, handling secretions  Eyes: Pupils are equal, round, and reactive to light  Neck: Normal range of motion  Neck supple  Cardiovascular: Normal rate, regular rhythm and normal heart sounds  Pulmonary/Chest: Effort normal and breath sounds normal  No respiratory distress  She has no wheezes  She exhibits no tenderness  Neurological: She is alert and oriented to person, place, and time  Skin: Capillary refill takes less than 2 seconds  Psychiatric: She has a normal mood and affect  Her behavior is normal    Nursing note and vitals reviewed

## 2020-03-01 NOTE — ED PROVIDER NOTES
History  Chief Complaint   Patient presents with    Chest Pain     pt is coughing, feels "winded" and believes the pain is from coughing  began yesterday  50-year-old female history of hypertension, hyperlipidemia, asthma who uses as a rescue inhaler very rarely presenting with 1 day history of dry nonproductive cough  Patient stated that she began with a cough yesterday morning as persisted and she also has some chest pain that is pleuritic in nature and occurs with the coughing fits  Patient stated she only has chest pain with coughing fits  She otherwise does not have any chest pain  Pain does not occur with exertion and does not improve with rest   Due to the continued cough throughout today she decided come in to get evaluated  She 1st went to urgent care who did not have any answers for her so she came to the emergency department  She denies that she does have chronic nasal congestion due to severe seasonal allergies  She denies any prior heart history  She denies any other symptoms  She denies any headache, vision changes, shortness of breath, abdominal pain, fever/chills, nausea/vomiting, myalgias, or any bladder or bowel changes  Prior to Admission Medications   Prescriptions Last Dose Informant Patient Reported? Taking?    Cetirizine HCl (ZYRTEC ALLERGY) 10 MG CAPS Past Month at Unknown time Self Yes Yes   Sig: Take 10 mg by mouth daily     Erenumab-aooe 140 MG/ML SOAJ Past Month at Unknown time Self No Yes   Sig: Inject 140 mg under the skin every 30 (thirty) days   LATUDA 20 MG tablet Not Taking at Unknown time Self Yes No   Sig: Take 20 mg by mouth every morning   LATUDA 40 MG tablet Not Taking at Unknown time Self Yes No   LATUDA 80 MG tablet 2/29/2020 at Unknown time Self Yes Yes   Sig: Take 80 mg by mouth daily   LISINOPRIL PO Not Taking at Unknown time Self Yes No   Sig: Take 20 mg by mouth daily    Multiple Vitamins-Minerals (MULTIVITAMIN ADULT PO) Not Taking at Unknown time Self Yes No   Sig: Take 1 tablet by mouth daily   PROAIR  (90 Base) MCG/ACT inhaler Past Month at Unknown time Self Yes Yes   Sig: Inhale 1-2 puffs as needed    Probiotic Product (PROBIOTIC DAILY PO) Past Month at Unknown time Self Yes Yes   Sig: Take 1 tablet by mouth daily   RESTASIS 0 05 % ophthalmic emulsion 2020 at Unknown time Self Yes Yes   Sig: Administer 1 drop to both eyes daily    acetaminophen (TYLENOL) 325 mg tablet 2020 at Unknown time Self Yes Yes   Sig: Take 650 mg by mouth every 6 (six) hours as needed for mild pain   aspirin-acetaminophen-caffeine (EXCEDRIN MIGRAINE) 250-250-65 MG per tablet 2020 at Unknown time Self Yes Yes   Sig: Take 2 tablets by mouth every 6 (six) hours as needed for headaches   clonazePAM (KlonoPIN) 1 mg tablet 2020 at Unknown time Self Yes Yes   Si mg daily    conjugated estrogens (PREMARIN) vaginal cream Past Week at Unknown time Self No Yes   Sig: Insert 0 5 g into the vagina 3 (three) times a week   cyproheptadine (PERIACTIN) 4 mg tablet Not Taking at Unknown time  No No   Sig: Take 1 tablet (4 mg total) by mouth daily at bedtime as needed for allergies   Patient not taking: Reported on 2020   dexamethasone (DECADRON) 2 mg tablet Not Taking at Unknown time Self No No   Sig: Take 1 tablet (2 mg total) by mouth daily with breakfast   Patient not taking: Reported on 2020   dexamethasone (DECADRON) 2 mg tablet Not Taking at Unknown time  No No   Sig: Take 1 tablet (2 mg total) by mouth daily with breakfast   Patient not taking: Reported on 2020   divalproex sodium (DEPAKOTE ER) 250 mg 24 hr tablet Not Taking at Unknown time Self No No   Sig: Take 1 tablet (250 mg total) by mouth daily at bedtime 2 tablets at bedtime for 3 days then 1 tablet at bedtime for 2 days   Patient not taking: Reported on 2020   fluticasone (FLOVENT HFA) 110 MCG/ACT inhaler Past Month at Unknown time Self Yes Yes   Sig: Inhale 1 puff as needed gabapentin (NEURONTIN) 100 mg capsule Not Taking at Unknown time Self Yes No   hydrocortisone 2 5 % cream Not Taking at Unknown time Self No No   Sig: Apply to times a day to face for 5 days in a row  Do not put on eyes     Patient not taking: Reported on 2/29/2020   hydroxychloroquine (PLAQUENIL) 200 mg tablet More than a month at Unknown time Self Yes No   Sig: as needed    ibuprofen (MOTRIN) 600 mg tablet Past Week at Unknown time Self No Yes   Sig: Take 1 tablet (600 mg total) by mouth every 6 (six) hours as needed for moderate pain   ibuprofen (MOTRIN) 800 mg tablet Past Week at Unknown time Self Yes Yes   Sig: Take 800 mg by mouth as needed    levothyroxine 88 mcg tablet 2/29/2020 at Unknown time Self Yes Yes   Sig: Take 88 mcg by mouth daily    lisinopril (ZESTRIL) 10 mg tablet 2/29/2020 at Unknown time Self Yes Yes   Sig: Take 20 mg by mouth daily   omeprazole (PriLOSEC) 20 mg delayed release capsule Not Taking at Unknown time Self Yes No   Sig: Take 1 capsule by mouth as needed    omeprazole (PriLOSEC) 40 MG capsule Not Taking at Unknown time Self Yes No   Sig: Take 40 mg by mouth as needed    ondansetron (ZOFRAN-ODT) 4 mg disintegrating tablet Not Taking at Unknown time Self No No   Sig: Take 1 tablet (4 mg total) by mouth every 6 (six) hours as needed for nausea   Patient not taking: Reported on 2/29/2020   oxyCODONE (ROXICODONE) 15 mg immediate release tablet Past Month at Unknown time Self Yes Yes   Sig: Take 15 mg by mouth as needed    prochlorperazine (COMPAZINE) 10 mg tablet Past Month at Unknown time  No Yes   Sig: Take 1 tablet (10 mg total) by mouth every 6 (six) hours as needed (migraine)   propranolol (INDERAL LA) 160 mg 2/29/2020 at Unknown time Self Yes Yes   Sig: Take 160 mg by mouth daily at bedtime    propranolol (INDERAL LA) 60 mg 24 hr capsule 2/29/2020 at Unknown time Self Yes Yes   Sig: Take 60 mg by mouth daily at bedtime    tamsulosin (FLOMAX) 0 4 mg Past Month at Unknown time Self Yes Yes   topiramate (TOPAMAX) 100 mg tablet 2020 at Unknown time Self No Yes   Sig: One in am and two at bedtime daily   Patient taking differently: Take 100-200 mg by mouth 2 (two) times a day One in am and two at bedtime daily   valACYclovir (VALTREX) 1,000 mg tablet Past Month at Unknown time Self Yes Yes   Si tablet as needed    venlafaxine (EFFEXOR-XR) 150 mg 24 hr capsule 2020 at Unknown time  No Yes   Sig: Take 1 capsule (150 mg total) by mouth daily   venlafaxine (EFFEXOR-XR) 75 mg 24 hr capsule Not Taking at Unknown time  No No   Sig: Take 1 capsule (75 mg total) by mouth daily   Patient not taking: Reported on 2020      Facility-Administered Medications: None       Past Medical History:   Diagnosis Date    Asthma     Bipolar 1 disorder ([place] [address] )     Disease of thyroid gland     Fibromyalgia     Gout     Hyperlipidemia     Hypertension     Lupus ([place] [address] )     Migraine        Past Surgical History:   Procedure Laterality Date    GALLBLADDER SURGERY  2008    HYSTERECTOMY      LAPAROSCOPIC COLON RESECTION         Family History   Problem Relation Age of Onset    Lupus Mother     Osteoporosis Mother     Hypotension Mother     Hypertension Father     Diabetes Father     Heart disease Father     Diabetes Sister     Schizophrenia Sister     Diabetes Maternal Grandmother     Rheum arthritis Maternal Grandmother     Hypertension Maternal Grandmother     Breast cancer Maternal Aunt 40     I have reviewed and agree with the history as documented      E-Cigarette/Vaping    E-Cigarette Use Never User      E-Cigarette/Vaping Substances    Nicotine No     THC No     CBD No     Flavoring No     Other No     Unknown No      Social History     Tobacco Use    Smoking status: Former Smoker    Smokeless tobacco: Never Used    Tobacco comment: quit at 24years old   Substance Use Topics    Alcohol use: No    Drug use: No        Review of Systems   Constitutional: Negative for appetite change, chills, diaphoresis, fever and unexpected weight change  HENT: Negative for congestion and rhinorrhea  Eyes: Negative for photophobia and visual disturbance  Respiratory: Positive for cough  Negative for chest tightness and shortness of breath  Cardiovascular: Positive for chest pain  Negative for palpitations and leg swelling  Gastrointestinal: Negative for abdominal distention, abdominal pain, blood in stool, constipation, diarrhea, nausea and vomiting  Genitourinary: Negative for dysuria and hematuria  Musculoskeletal: Negative for back pain, joint swelling, neck pain and neck stiffness  Skin: Negative for color change, pallor, rash and wound  Neurological: Negative for dizziness, syncope, weakness, light-headedness and headaches  Psychiatric/Behavioral: Negative for agitation  All other systems reviewed and are negative  Physical Exam  ED Triage Vitals [02/29/20 2131]   Temperature Pulse Respirations Blood Pressure SpO2   98 °F (36 7 °C) 75 16 147/84 100 %      Temp Source Heart Rate Source Patient Position - Orthostatic VS BP Location FiO2 (%)   Oral Monitor Sitting Right arm --      Pain Score       5             Orthostatic Vital Signs  Vitals:    02/29/20 2131 02/29/20 2200   BP: 147/84 148/89   Pulse: 75 66   Patient Position - Orthostatic VS: Sitting Sitting       Physical Exam   Constitutional: She is oriented to person, place, and time  She appears well-developed and well-nourished  HENT:   Head: Normocephalic and atraumatic  Nose: Nose normal    Mouth/Throat: Oropharynx is clear and moist    Eyes: Pupils are equal, round, and reactive to light  EOM are normal  Right eye exhibits no discharge  Left eye exhibits no discharge  Neck: Normal range of motion  Neck supple  No JVD present  No tracheal deviation present  Cardiovascular: Normal rate, regular rhythm, normal heart sounds and intact distal pulses  Exam reveals no gallop and no friction rub  No murmur heard  Pulmonary/Chest: Effort normal and breath sounds normal  No stridor  No respiratory distress  She has no wheezes  She has no rales  She exhibits no tenderness  Abdominal: Soft  Bowel sounds are normal  She exhibits no distension  There is no tenderness  There is no rebound and no guarding  Musculoskeletal: Normal range of motion  She exhibits no edema, tenderness or deformity  Right lower leg: She exhibits no tenderness and no edema  Left lower leg: She exhibits no tenderness and no edema  No chest wall tenderness palpation   Lymphadenopathy:     She has no cervical adenopathy  Neurological: She is alert and oriented to person, place, and time  No cranial nerve deficit or sensory deficit  She exhibits normal muscle tone  Coordination normal    Skin: Skin is warm and dry  No rash noted  She is not diaphoretic  No erythema  No pallor  Psychiatric: She has a normal mood and affect  Her behavior is normal  Thought content normal    Nursing note and vitals reviewed        ED Medications  Medications - No data to display    Diagnostic Studies  Results Reviewed     Procedure Component Value Units Date/Time    Troponin I [[id number]]  (Normal) Collected:  02/29/20 2123    Lab Status:  Final result Specimen:  Blood from Arm, Right Updated:  02/29/20 2201     Troponin I <0 02 ng/mL     Comprehensive metabolic panel [[id number]]  (Abnormal) Collected:  02/29/20 2123    Lab Status:  Final result Specimen:  Blood from Arm, Right Updated:  02/29/20 2200     Sodium 140 mmol/L      Potassium 3 9 mmol/L      Chloride 110 mmol/L      CO2 23 mmol/L      ANION GAP 7 mmol/L      BUN 16 mg/dL      Creatinine 0 95 mg/dL      Glucose 124 mg/dL      Calcium 8 9 mg/dL      AST 10 U/L      ALT 24 U/L      Alkaline Phosphatase 83 U/L      Total Protein 8 0 g/dL      Albumin 3 7 g/dL      Total Bilirubin 0 37 mg/dL      eGFR 73 ml/min/1 73sq m     Narrative:       [place] guidelines for Chronic Kidney Disease (CKD):     Stage 1 with normal or high GFR (GFR > 90 mL/min/1 73 square meters)    Stage 2 Mild CKD (GFR = 60-89 mL/min/1 73 square meters)    Stage 3A Moderate CKD (GFR = 45-59 mL/min/1 73 square meters)    Stage 3B Moderate CKD (GFR = 30-44 mL/min/1 73 square meters)    Stage 4 Severe CKD (GFR = 15-29 mL/min/1 73 square meters)    Stage 5 End Stage CKD (GFR <15 mL/min/1 73 square meters)  Note: GFR calculation is accurate only with a steady state creatinine    CBC and differential [[id number]] Collected:  02/29/20 2123    Lab Status:  Final result Specimen:  Blood from Arm, Right Updated:  02/29/20 2140     WBC 8 50 Thousand/uL      RBC 4 45 Million/uL      Hemoglobin 12 7 g/dL      Hematocrit 39 0 %      MCV 88 fL      MCH 28 5 pg      MCHC 32 6 g/dL      RDW 12 6 %      MPV 9 9 fL      Platelets 768 Thousands/uL      nRBC 0 /100 WBCs      Neutrophils Relative 60 %      Immat GRANS % 0 %      Lymphocytes Relative 27 %      Monocytes Relative 9 %      Eosinophils Relative 3 %      Basophils Relative 1 %      Neutrophils Absolute 5 04 Thousands/µL      Immature Grans Absolute 0 03 Thousand/uL      Lymphocytes Absolute 2 32 Thousands/µL      Monocytes Absolute 0 80 Thousand/µL      Eosinophils Absolute 0 27 Thousand/µL      Basophils Absolute 0 04 Thousands/µL                  XR chest 2 views   ED Interpretation by Yvrose Giraldo MD (02/29 2228)   No acute cardiopulmonary process      Final Result by Mira Lay MD (03/01 1808)      No acute cardiopulmonary disease              Workstation performed: [id number]               Procedures  ECG 12 Lead Documentation Only  Date/Time: 2/29/2020 9:18 PM  Performed by: Yvrose Giraldo MD  Authorized by: Yvrose Giraldo MD     ECG reviewed by me, the ED Provider: yes    Patient location:  ED  Previous ECG:     Previous ECG:  Compared to current    Similarity:  No change    Comparison to cardiac monitor: Yes    Interpretation: Interpretation: normal    Rate:     ECG rate:  74    ECG rate assessment: normal    Rhythm:     Rhythm: sinus rhythm    Ectopy:     Ectopy: none    QRS:     QRS axis:  Normal    QRS intervals:  Normal  Conduction:     Conduction: normal    ST segments:     ST segments:  Normal  T waves:     T waves: normal            ED Course         HEART Risk Score      Most Recent Value   Heart Score Risk Calculator   History  0 Filed at: 02/29/2020 2146   ECG  0 Filed at: 02/29/2020 2146   Age  0 Filed at: 02/29/2020 2146   Risk Factors  1 Filed at: 02/29/2020 2146   Troponin  0 Filed at: 02/29/2020 2146   HEART Score  1 Filed at: 02/29/2020 2146                            MDM  Number of Diagnoses or Management Options  Cough:   Pleuritic chest pain:   Diagnosis management comments: 41-year-old female history of hypertension, hyperlipidemia, asthma who uses as a rescue inhaler very rarely presenting with 1 day history of dry nonproductive cough  Chest pain is likely pleuritic in nature and with a patient with no prior heart history that is not worsened with exertion improved with rest addition to a heart score of 1, very unlikely to be cardiac in origin  The patient with chronic severe seasonal allergies with a dry cough and tickle sensation in back of her throat, likely to be either related to allergies or viral   Plan to check EKG and chest x-ray  Basic labs and troponin x1  EKG no acute process  Basic labs within normal limits and troponin negative  CXR no acute process  Advised symptomatic treatment  Given prescription for flonase  Given instructions and return precuations  Advised PCP follow up  Discharged          Amount and/or Complexity of Data Reviewed  Clinical lab tests: ordered and reviewed  Tests in the radiology section of CPT®: ordered and reviewed  Tests in the medicine section of CPT®: ordered and reviewed  Review and summarize past medical records: yes  Independent visualization of images, tracings, or specimens: yes    Risk of Complications, Morbidity, and/or Mortality  Presenting problems: moderate  Diagnostic procedures: low  Management options: low    Patient Progress  Patient progress: stable        Disposition  Final diagnoses:   Cough   Pleuritic chest pain     Time reflects when diagnosis was documented in both MDM as applicable and the Disposition within this note     Time User Action Codes Description Comment    2/29/2020  9:58 PM Marveen Esther Add [R05] Cough     2/29/2020  9:58 PM Marveen Esther Add [R07 81] Pleuritic chest pain       ED Disposition     ED Disposition Condition Date/Time Comment    Discharge Stable Sat Feb 29, 2020 10:59 PM Chris Mendez discharge to home/self care              Follow-up Information     Follow up With Specialties Details Why Contact Info Additional Information    Leila Wiggins MD Internal Medicine Schedule an appointment as soon as possible for a visit   [address] Emergency Department Emergency Medicine Go to  If symptoms worsen [address]  [phone]  ED, [address] [place] [address], [phone]   [phone]          Discharge Medication List as of 2/29/2020 10:59 PM      START taking these medications    Details   fluticasone (FLONASE) 50 mcg/act nasal spray 1 spray into each nostril daily, Starting Sat 2/29/2020, Print         CONTINUE these medications which have NOT CHANGED    Details   acetaminophen (TYLENOL) 325 mg tablet Take 650 mg by mouth every 6 (six) hours as needed for mild pain, Historical Med      aspirin-acetaminophen-caffeine (EXCEDRIN MIGRAINE) 250-250-65 MG per tablet Take 2 tablets by mouth every 6 (six) hours as needed for headaches, Historical Med      Cetirizine HCl (ZYRTEC ALLERGY) 10 MG CAPS Take 10 mg by mouth daily  , Historical Med      clonazePAM (KlonoPIN) 1 mg tablet 1 mg daily , Starting Sat 12/15/2018, Historical Med      conjugated estrogens (PREMARIN) vaginal cream Insert 0 5 g into the vagina 3 (three) times a week, Starting Fri 8/23/2019, Print      Erenumab-aooe 140 MG/ML SOAJ Inject 140 mg under the skin every 30 (thirty) days, Starting Fri 10/18/2019, Normal      fluticasone (FLOVENT HFA) 110 MCG/ACT inhaler Inhale 1 puff as needed , Historical Med      !! ibuprofen (MOTRIN) 600 mg tablet Take 1 tablet (600 mg total) by mouth every 6 (six) hours as needed for moderate pain, Starting Mon 4/1/2019, Print      !! ibuprofen (MOTRIN) 800 mg tablet Take 800 mg by mouth as needed , Starting Thu 9/6/2018, Historical Med      !! LATUDA 80 MG tablet Take 80 mg by mouth daily, Starting Thu 10/18/2018, Historical Med      levothyroxine 88 mcg tablet Take 88 mcg by mouth daily , Starting Sun 3/31/2019, Historical Med      !! lisinopril (ZESTRIL) 10 mg tablet Take 20 mg by mouth daily, Starting Mon 12/23/2019, Historical Med      oxyCODONE (ROXICODONE) 15 mg immediate release tablet Take 15 mg by mouth as needed , Starting Fri 7/13/2018, Historical Med      PROAIR  (90 Base) MCG/ACT inhaler Inhale 1-2 puffs as needed , Starting Fri 11/9/2018, Historical Med      Probiotic Product (PROBIOTIC DAILY PO) Take 1 tablet by mouth daily, Historical Med      prochlorperazine (COMPAZINE) 10 mg tablet Take 1 tablet (10 mg total) by mouth every 6 (six) hours as needed (migraine), Starting Tue 2/18/2020, Normal      !! propranolol (INDERAL LA) 160 mg Take 160 mg by mouth daily at bedtime , Starting Tue 4/16/2019, Historical Med      !! propranolol (INDERAL LA) 60 mg 24 hr capsule Take 60 mg by mouth daily at bedtime , Starting Sun 3/31/2019, Historical Med      RESTASIS 0 05 % ophthalmic emulsion Administer 1 drop to both eyes daily , Starting Tue 2/6/2018, Historical Med      tamsulosin (FLOMAX) 0 4 mg Starting Wed 4/3/2019, Historical Med      topiramate (TOPAMAX) 100 mg tablet One in am and two at bedtime daily, Normal valACYclovir (VALTREX) 1,000 mg tablet 1 tablet as needed , Starting Thu 5/7/2015, Historical Med      !! venlafaxine (EFFEXOR-XR) 150 mg 24 hr capsule Take 1 capsule (150 mg total) by mouth daily, Starting Tue 2/18/2020, Normal      cyproheptadine (PERIACTIN) 4 mg tablet Take 1 tablet (4 mg total) by mouth daily at bedtime as needed for allergies, Starting Tue 2/18/2020, Normal      !! dexamethasone (DECADRON) 2 mg tablet Take 1 tablet (2 mg total) by mouth daily with breakfast, Starting Wed 12/11/2019, Normal      !! dexamethasone (DECADRON) 2 mg tablet Take 1 tablet (2 mg total) by mouth daily with breakfast, Starting Tue 2/18/2020, Normal      divalproex sodium (DEPAKOTE ER) 250 mg 24 hr tablet Take 1 tablet (250 mg total) by mouth daily at bedtime 2 tablets at bedtime for 3 days then 1 tablet at bedtime for 2 days, Starting Wed 12/11/2019, Normal      gabapentin (NEURONTIN) 100 mg capsule Starting Tue 12/3/2019, Historical Med      hydrocortisone 2 5 % cream Apply to times a day to face for 5 days in a row  Do not put on eyes  , Normal      hydroxychloroquine (PLAQUENIL) 200 mg tablet as needed , Starting Fri 6/15/2018, Historical Med      !! LATUDA 20 MG tablet Take 20 mg by mouth every morning, Starting Wed 12/4/2019, Historical Med      !! LATUDA 40 MG tablet Starting Mon 12/30/2019, Historical Med      !! LISINOPRIL PO Take 20 mg by mouth daily , Historical Med      Multiple Vitamins-Minerals (MULTIVITAMIN ADULT PO) Take 1 tablet by mouth daily, Historical Med      !! omeprazole (PriLOSEC) 20 mg delayed release capsule Take 1 capsule by mouth as needed , Starting Thu 5/7/2015, Historical Med      !! omeprazole (PriLOSEC) 40 MG capsule Take 40 mg by mouth as needed , Starting Wed 5/8/2019, Historical Med      ondansetron (ZOFRAN-ODT) 4 mg disintegrating tablet Take 1 tablet (4 mg total) by mouth every 6 (six) hours as needed for nausea, Starting Mon 4/1/2019, Print      !! venlafaxine (EFFEXOR-XR) 75 mg 24

## 2020-03-01 NOTE — PATIENT INSTRUCTIONS
Discussed concerns with patient at length  Patient is alert, oriented and capable of making own medical decisions  Patient is refusing EKG  Patient is declining ER transfer at this time  Patient understands the risks of leaving AMA/not going to the ED for further evaluation such as worsening condition, disability, or even death  Patient verbalizes good understanding and continues to decline  EKG here in the Urgent Care and ER transfer  Patient refused to sign AMA form  Patient returned  Decided she would like EKG and transfer to ER  She is requesting to go via private vehicle to [facility] ER for further evaluation and treatment

## 2020-03-01 NOTE — DISCHARGE INSTRUCTIONS
You came to the emergency room tonight for a persistent cough  We checked some blood work to check your heart in addition to an EKG and both were normal   We also got a chest x-ray to look for pneumonia but your x-ray was normal   Your cough is likely related to seasonal allergies or a viral illness  Both are managed symptomatically including nasal sprays which we prescribed and cough suppressants such as Delsym or Robitussin  You may also use over-the-counter allergy medicine as directed on the packaging  Please return for significantly worsening symptoms, severe chest pain, productive cough, fevers, or any other concerning signs or symptoms  Please follow-up with your primary care provider  Please refer to the following documents for additional instructions and return precautions

## 2020-03-07 NOTE — ED ATTENDING ATTESTATION
2/29/2020  IIvanna MD, saw and evaluated the patient  I have discussed the patient with the resident/non-physician practitioner and agree with the resident's/non-physician practitioner's findings, Plan of Care, and MDM as documented in the resident's/non-physician practitioner's note, except where noted  All available labs and Radiology studies were reviewed  I was present for key portions of any procedure(s) performed by the resident/non-physician practitioner and I was immediately available to provide assistance  At this point I agree with the current assessment done in the Emergency Department  I have conducted an independent evaluation of this patient a history and physical is as follows:   One day history of dry nonproductive cough nasal congestion  And mild pleuritic chest pain     Vitals reviewed    Well-appearing nontoxic no acute distress    TMs clear oropharynx clear nares clear bilaterally  Heart is regular rate rhythm lungs are clear to auscultation bilaterally      Abdomen soft nontender nondistended normal bowel sounds  Extremities no signs clubbing or edema no palpable cords    Check labs cardiac enzymes ECG chest x-ray    Patient discharged with Flonase follow-up PCP as outpatient return precautions given      ED Course         Critical Care Time  Procedures

## 2020-03-24 ENCOUNTER — TELEMEDICINE (OUTPATIENT)
Dept: NEUROLOGY | Facility: CLINIC | Age: 45
End: 2020-03-24
Payer: MEDICARE

## 2020-03-24 DIAGNOSIS — G44.86 CERVICOGENIC HEADACHE: ICD-10-CM

## 2020-03-24 DIAGNOSIS — G43.709 CHRONIC MIGRAINE WITHOUT AURA WITHOUT STATUS MIGRAINOSUS, NOT INTRACTABLE: Primary | ICD-10-CM

## 2020-03-24 DIAGNOSIS — M54.2 CERVICALGIA: ICD-10-CM

## 2020-03-24 PROCEDURE — 99442 PR PHYS/QHP TELEPHONE EVALUATION 11-20 MIN: CPT | Performed by: PHYSICIAN ASSISTANT

## 2020-03-24 NOTE — PROGRESS NOTES
Virtual Regular Visit    Problem List Items Addressed This Visit        Cardiovascular and Mediastinum    Chronic migraine without aura without status migrainosus, not intractable - Primary      Preventive therapy for headaches:  -  Continue venlafaxine 150 milligram once a day  -  Continue Topamax to 100 milligrams in the morning and 200 milligrams at bedtime now  - Continue Aimovig 140 every 30 days     Abortive therapy:  -   She is still using Motrin and Tylenol at least 3 to 4 times a week   Goal is to decrease the frequency of abortive medication  May use prochlorperazine 10 mg as needed for migraines            Other    Cervicogenic headache    Relevant Orders    Ambulatory referral to Physical Therapy    Cervicalgia    Relevant Orders    Ambulatory referral to Physical Therapy               Reason for visit is chronic migraine    Encounter provider Panda Stevenson PA-C    Provider located at [address] [address] [facility] [id number]      Recent Visits  No visits were found meeting these conditions  Showing recent visits within past 7 days and meeting all other requirements     Future Appointments  No visits were found meeting these conditions  Showing future appointments within next 150 days and meeting all other requirements        After connecting through [employer], the patient was identified by name and date of birth  Radha Guo was informed that this is a telemedicine visit and that the visit is being conducted through telephone which may not be secure and therefore, might not be HIPAA-compliant  My office door was closed  No one else was in the room  She acknowledged consent and understanding of privacy and security of the video platform  The patient has agreed to participate and understands they can discontinue the visit at any time      Subjective  Radha Guo is a 38 y o  female 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  She has a PMH of palpitations and has had an ablation      Patient states that she fell down a flight of stairs in early 2000s   Went to hospital, unsure if went via ambulance  Theadora Kruse states she was out of work for about a month   States had significant pain, bruising and was limping   Went to PT, chiropractor after this  Aris Winter states went back to work as   Yeyo Engels her issues with bowel incontinence began a few months after the fall   First instance happened at work where she thought that she was going to pass gas and soiled herself  [place] began to have incontinence during intercourse   For the past 2-3 years has had very little control   States she doesn't go out unless she hasn't eaten for 24 hours   She can drink fluids but if she eats she needs a toilet   Patient varies from constipation to "explosion  "  Patient has seen Dr Rosemarie Dumont for gastroenterology but was over 6 years ago  Theadora Kruse states she is unable to stop it once starts and has a full bowel movement amount  Patient had a colon resection for diverticulitis in 2001   Was not diverted        Patient states her headaches worsened around December 2019 or January 2020  Her symptoms have worsened and having more nausea and vomiting  Her psychiatrist changed her medications in November 2019 and she stopped taking her medications in January 2020  Has missed some days of her Topamax since Wednesday as she didn't take her medications with her      She is taking OTC at least once a day, if not more     QTc 12/23/2016 456 ms     Chronic Migraine headaches:   PREVENTIVE   Lisinopril, Metoprolol, Topamax, cyclobenzaprine, labetalol, Seroquel, Tizanidine, carbamazepine, citalopram, duloxetine, venlafaxine, Lyrica, Propranolol, olanzapine- ineffective, cyproheptadine, Botox  Aimovig 12/4/2019  ABORTIVE:   Dexamethasone, Fioricet, Naproxen Promethazine, Kenalog, Percocet, tramadol, Toradol, narcotics  Headache trigger: Fatigue, Stress/Tension, Weather change, lack of sleep  Alternative therapies used in the past for headaches? none   Headache are worse if the patient: cough, sneeze, bending over  Aura - none     Any family history of migraines? Yes, mother and father  Any family history of aneurysms? No     Current pain:  8/10  How often do the headaches occur -  3-4 a week  What time of the day do the headaches start - either wakes up with them or states it is mid day  How long do the headaches last - all day  Where are they located - face and frontal region and also has a lot of neck pain with this     What is the intensity of pain - 7-10/10; gets to a 10/10 most days  Describe your usual headache - Throbbing, Pounding  Associated symptoms:   -       Decrease of appetite, nausea   -       Photophobia, phonophobia, sensitivity to smell   -       Problem with concentration  -       prefer to be alone and in a dark room, unable to work     Number of days missed per month because of headaches:  Work (or school) days: NA  Social or Family activities: a lot     What time of the year do headaches occur more frequently?   Change of weather  Have you seen someone else for headaches or pain? Yes  Have you had trigger point injection performed and how often? No  Have you had Botox injection performed and how often? Yes   Have you had epidural injections or transforaminal injections performed? No     Have you used CBD or THC for your headaches and how often? No  Are you current pregnant or planning on getting pregnant? No, done with family planning  Have you ever had any Brain imaging? yes      7/23/2018 CT head  No acute abnormalities      I personally reviewed these images    - Reviewed old notes from physician seen in the past- see above HPI for summary of previous encounters         Past Medical History:   Diagnosis Date    Asthma     Bipolar 1 disorder ([facility] [place] [address] )     Disease of thyroid gland     Fibromyalgia     Gout     Hyperlipidemia     Hypertension     Lupus [facility] [address] [facility]     Migraine        Past Surgical History:   Procedure Laterality Date    GALLBLADDER SURGERY  2008    HYSTERECTOMY  2014    LAPAROSCOPIC COLON RESECTION  2001       Current Outpatient Medications   Medication Sig Dispense Refill    acetaminophen (TYLENOL) 325 mg tablet Take 650 mg by mouth every 6 (six) hours as needed for mild pain      aspirin-acetaminophen-caffeine (EXCEDRIN MIGRAINE) 250-250-65 MG per tablet Take 2 tablets by mouth every 6 (six) hours as needed for headaches      Cetirizine HCl (ZYRTEC ALLERGY) 10 MG CAPS Take 10 mg by mouth daily        clonazePAM (KlonoPIN) 1 mg tablet 1 mg daily   0    conjugated estrogens (PREMARIN) vaginal cream Insert 0 5 g into the vagina 3 (three) times a week 30 g 1    cyproheptadine (PERIACTIN) 4 mg tablet Take 1 tablet (4 mg total) by mouth daily at bedtime as needed for allergies (Patient not taking: Reported on 2/29/2020) 30 tablet 1    dexamethasone (DECADRON) 2 mg tablet Take 1 tablet (2 mg total) by mouth daily with breakfast (Patient not taking: Reported on 2/29/2020) 5 tablet 0    dexamethasone (DECADRON) 2 mg tablet Take 1 tablet (2 mg total) by mouth daily with breakfast (Patient not taking: Reported on 2/29/2020) 5 tablet 0    divalproex sodium (DEPAKOTE ER) 250 mg 24 hr tablet Take 1 tablet (250 mg total) by mouth daily at bedtime 2 tablets at bedtime for 3 days then 1 tablet at bedtime for 2 days (Patient not taking: Reported on 2/29/2020) 8 tablet 0    Erenumab-aooe 140 MG/ML SOAJ Inject 140 mg under the skin every 30 (thirty) days 1 pen 11    fluticasone (FLONASE) 50 mcg/act nasal spray 1 spray into each nostril daily 16 g 0    fluticasone (FLOVENT HFA) 110 MCG/ACT inhaler Inhale 1 puff as needed       gabapentin (NEURONTIN) 100 mg capsule   0    hydrocortisone 2 5 % cream Apply to times a day to face for 5 days in a row  Do not put on eyes   (Patient not taking: Reported on 2/29/2020) 30 g 1    hydroxychloroquine (PLAQUENIL) 200 mg tablet as needed       ibuprofen (MOTRIN) 600 mg tablet Take 1 tablet (600 mg total) by mouth every 6 (six) hours as needed for moderate pain 30 tablet 0    ibuprofen (MOTRIN) 800 mg tablet Take 800 mg by mouth as needed       LATUDA 20 MG tablet Take 20 mg by mouth every morning  0    LATUDA 40 MG tablet       LATUDA 80 MG tablet Take 80 mg by mouth daily  0    levothyroxine 88 mcg tablet Take 88 mcg by mouth daily       lisinopril (ZESTRIL) 10 mg tablet Take 20 mg by mouth daily      LISINOPRIL PO Take 20 mg by mouth daily       Multiple Vitamins-Minerals (MULTIVITAMIN ADULT PO) Take 1 tablet by mouth daily      omeprazole (PriLOSEC) 20 mg delayed release capsule Take 1 capsule by mouth as needed       omeprazole (PriLOSEC) 40 MG capsule Take 40 mg by mouth as needed   0    ondansetron (ZOFRAN-ODT) 4 mg disintegrating tablet Take 1 tablet (4 mg total) by mouth every 6 (six) hours as needed for nausea (Patient not taking: Reported on 2/29/2020) 20 tablet 0    oxyCODONE (ROXICODONE) 15 mg immediate release tablet Take 15 mg by mouth as needed       PROAIR  (90 Base) MCG/ACT inhaler Inhale 1-2 puffs as needed       Probiotic Product (PROBIOTIC DAILY PO) Take 1 tablet by mouth daily      prochlorperazine (COMPAZINE) 10 mg tablet Take 1 tablet (10 mg total) by mouth every 6 (six) hours as needed (migraine) 20 tablet 0    propranolol (INDERAL LA) 160 mg Take 160 mg by mouth daily at bedtime   0    propranolol (INDERAL LA) 60 mg 24 hr capsule Take 60 mg by mouth daily at bedtime       RESTASIS 0 05 % ophthalmic emulsion Administer 1 drop to both eyes daily       tamsulosin (FLOMAX) 0 4 mg       topiramate (TOPAMAX) 100 mg tablet One in am and two at bedtime daily (Patient taking differently: Take 100-200 mg by mouth 2 (two) times a day One in am and two at bedtime daily) 270 tablet 3    valACYclovir (VALTREX) 1,000 mg tablet 1 tablet as needed       venlafaxine (EFFEXOR-XR) 150 mg 24 hr capsule Take 1 capsule (150 mg total) by mouth daily 30 capsule 3    venlafaxine (EFFEXOR-XR) 75 mg 24 hr capsule Take 1 capsule (75 mg total) by mouth daily (Patient not taking: Reported on 2/29/2020) 30 capsule 0     No current facility-administered medications for this visit  Allergies   Allergen Reactions    Other     Pregabalin      Other reaction(s): blurred vision    Shellfish-Derived Products Anaphylaxis and Hives    Sumatriptan      Other reaction(s): Altered Heart Rate    Triptans Shortness Of Breath and Palpitations    Latex Swelling and Rash     Other reaction(s): Hives/Skin Rash  Other reaction(s): Hives/Skin Rash    Sulfa Antibiotics Swelling and Rash     Other reaction(s): Hives/Skin Rash  Other reaction(s): Hives/Skin Rash    Monistat [Miconazole] Swelling    Shrimp Extract Allergy Skin Test        Review of Systems   Constitutional: Negative  HENT: Positive for postnasal drip  Eyes: Negative  Respiratory: Positive for cough  Cardiovascular: Positive for chest pain  Gastrointestinal: Negative  Endocrine: Negative  Genitourinary: Negative  Musculoskeletal: Negative  Skin: Negative  Allergic/Immunologic: Negative  Neurological: Positive for headaches  Hematological: Negative  Psychiatric/Behavioral: Negative  I spent 20 minutes with the patient during this visit

## 2020-03-24 NOTE — ASSESSMENT & PLAN NOTE
Preventive therapy for headaches:  -  Continue venlafaxine 150 milligram once a day  -  Continue Topamax to 100 milligrams in the morning and 200 milligrams at bedtime now  - Continue Aimovig 140 every 30 days     Abortive therapy:  -   She is still using Motrin and Tylenol at least 3 to 4 times a week   Goal is to decrease the frequency of abortive medication  May use prochlorperazine 10 mg as needed for migraines

## 2020-04-06 ENCOUNTER — TELEMEDICINE (OUTPATIENT)
Dept: OBGYN CLINIC | Facility: CLINIC | Age: 45
End: 2020-04-06

## 2020-04-06 DIAGNOSIS — B37.3 VAGINAL YEAST INFECTION: Primary | ICD-10-CM

## 2020-04-06 PROBLEM — B37.31 VAGINAL YEAST INFECTION: Status: ACTIVE | Noted: 2020-04-06

## 2020-04-06 PROCEDURE — 99213 OFFICE O/P EST LOW 20 MIN: CPT | Performed by: NURSE PRACTITIONER

## 2020-04-06 RX ORDER — FLUCONAZOLE 150 MG/1
TABLET ORAL
Qty: 2 TABLET | Refills: 0 | Status: SHIPPED | OUTPATIENT
Start: 2020-04-06 | End: 2020-04-09

## 2020-05-28 ENCOUNTER — TELEPHONE (OUTPATIENT)
Dept: OBGYN CLINIC | Facility: CLINIC | Age: 45
End: 2020-05-28

## 2020-05-29 ENCOUNTER — OFFICE VISIT (OUTPATIENT)
Dept: OBGYN CLINIC | Facility: CLINIC | Age: 45
End: 2020-05-29

## 2020-05-29 VITALS
DIASTOLIC BLOOD PRESSURE: 70 MMHG | SYSTOLIC BLOOD PRESSURE: 109 MMHG | HEART RATE: 74 BPM | WEIGHT: 187 LBS | BODY MASS INDEX: 31.12 KG/M2

## 2020-05-29 DIAGNOSIS — R10.2 PELVIC PAIN: Primary | ICD-10-CM

## 2020-05-29 DIAGNOSIS — N30.10 IC (INTERSTITIAL CYSTITIS): ICD-10-CM

## 2020-05-29 DIAGNOSIS — B37.3 VAGINAL CANDIDIASIS: ICD-10-CM

## 2020-05-29 LAB
SL AMB  POCT GLUCOSE, UA: NEGATIVE
SL AMB LEUKOCYTE ESTERASE,UA: NEGATIVE
SL AMB POCT BILIRUBIN,UA: NEGATIVE
SL AMB POCT BLOOD,UA: ABNORMAL
SL AMB POCT CLARITY,UA: CLEAR
SL AMB POCT COLOR,UA: YELLOW
SL AMB POCT KETONES,UA: NEGATIVE
SL AMB POCT NITRITE,UA: NEGATIVE
SL AMB POCT PH,UA: 6
SL AMB POCT SPECIFIC GRAVITY,UA: 1.02
SL AMB POCT URINE PROTEIN: NEGATIVE
SL AMB POCT UROBILINOGEN: 0.2

## 2020-05-29 PROCEDURE — 99213 OFFICE O/P EST LOW 20 MIN: CPT | Performed by: OBSTETRICS & GYNECOLOGY

## 2020-05-29 PROCEDURE — 81002 URINALYSIS NONAUTO W/O SCOPE: CPT | Performed by: OBSTETRICS & GYNECOLOGY

## 2020-05-29 RX ORDER — FLUCONAZOLE 150 MG/1
150 TABLET ORAL ONCE
Qty: 2 TABLET | Refills: 0 | Status: SHIPPED | OUTPATIENT
Start: 2020-05-29 | End: 2020-05-29

## 2020-06-02 ENCOUNTER — OFFICE VISIT (OUTPATIENT)
Dept: NEUROLOGY | Facility: CLINIC | Age: 45
End: 2020-06-02
Payer: MEDICARE

## 2020-06-02 VITALS
WEIGHT: 186.8 LBS | TEMPERATURE: 98.9 F | HEART RATE: 72 BPM | BODY MASS INDEX: 31.12 KG/M2 | DIASTOLIC BLOOD PRESSURE: 80 MMHG | SYSTOLIC BLOOD PRESSURE: 118 MMHG | HEIGHT: 65 IN

## 2020-06-02 DIAGNOSIS — G43.709 CHRONIC MIGRAINE WITHOUT AURA WITHOUT STATUS MIGRAINOSUS, NOT INTRACTABLE: ICD-10-CM

## 2020-06-02 PROCEDURE — 99214 OFFICE O/P EST MOD 30 MIN: CPT | Performed by: PHYSICIAN ASSISTANT

## 2020-06-02 RX ORDER — TOPIRAMATE 100 MG/1
TABLET, FILM COATED ORAL
Qty: 270 TABLET | Refills: 3 | Status: SHIPPED | OUTPATIENT
Start: 2020-06-02 | End: 2021-03-05 | Stop reason: SDUPTHER

## 2020-06-02 RX ORDER — DIVALPROEX SODIUM 250 MG/1
500 TABLET, EXTENDED RELEASE ORAL
Qty: 90 TABLET | Refills: 0 | Status: SHIPPED | OUTPATIENT
Start: 2020-06-02 | End: 2021-03-05

## 2020-06-02 RX ORDER — PROCHLORPERAZINE MALEATE 10 MG
10 TABLET ORAL EVERY 6 HOURS PRN
Qty: 20 TABLET | Refills: 0 | Status: SHIPPED | OUTPATIENT
Start: 2020-06-02 | End: 2021-03-05 | Stop reason: SDUPTHER

## 2020-06-02 RX ORDER — DEXAMETHASONE 2 MG/1
2 TABLET ORAL
Qty: 5 TABLET | Refills: 0 | Status: SHIPPED | OUTPATIENT
Start: 2020-06-02 | End: 2021-03-05

## 2020-07-22 ENCOUNTER — TELEPHONE (OUTPATIENT)
Dept: OBGYN CLINIC | Facility: CLINIC | Age: 45
End: 2020-07-22

## 2020-07-22 DIAGNOSIS — N89.8 VAGINAL DRYNESS: ICD-10-CM

## 2020-07-22 NOTE — TELEPHONE ENCOUNTER
Called to inform patient of print out of her Premarin prescription is ready, would like to know if she wants it mailed to her or if she would like to come to the office to pick it up  Patient did not answer, left message to call office

## 2020-08-04 ENCOUNTER — ANNUAL EXAM (OUTPATIENT)
Dept: OBGYN CLINIC | Facility: CLINIC | Age: 45
End: 2020-08-04

## 2020-08-04 VITALS
WEIGHT: 184 LBS | HEIGHT: 65 IN | HEART RATE: 82 BPM | SYSTOLIC BLOOD PRESSURE: 147 MMHG | BODY MASS INDEX: 30.66 KG/M2 | DIASTOLIC BLOOD PRESSURE: 92 MMHG | TEMPERATURE: 98 F

## 2020-08-04 DIAGNOSIS — Z01.411 ENCOUNTER FOR GYNECOLOGICAL EXAMINATION WITH ABNORMAL FINDING: Primary | ICD-10-CM

## 2020-08-04 DIAGNOSIS — N89.8 VAGINAL DISCHARGE: ICD-10-CM

## 2020-08-04 DIAGNOSIS — N89.8 VAGINAL DRYNESS: ICD-10-CM

## 2020-08-04 DIAGNOSIS — B37.3 VAGINAL YEAST INFECTION: ICD-10-CM

## 2020-08-04 DIAGNOSIS — Z12.31 ENCOUNTER FOR SCREENING MAMMOGRAM FOR MALIGNANT NEOPLASM OF BREAST: ICD-10-CM

## 2020-08-04 LAB
BV WHIFF TEST VAG QL: ABNORMAL
CLUE CELLS SPEC QL WET PREP: ABNORMAL
PH SMN: 4 [PH]
SL AMB POCT WET MOUNT: ABNORMAL
T VAGINALIS VAG QL WET PREP: ABNORMAL
YEAST VAG QL WET PREP: ABNORMAL

## 2020-08-04 PROCEDURE — 87210 SMEAR WET MOUNT SALINE/INK: CPT | Performed by: NURSE PRACTITIONER

## 2020-08-04 PROCEDURE — G0101 CA SCREEN;PELVIC/BREAST EXAM: HCPCS | Performed by: NURSE PRACTITIONER

## 2020-08-04 RX ORDER — NIACIN 500 MG/1
500 TABLET, EXTENDED RELEASE ORAL DAILY
COMMUNITY
Start: 2020-07-17

## 2020-08-04 RX ORDER — PHENTERMINE HYDROCHLORIDE 37.5 MG/1
37.5 TABLET ORAL DAILY
COMMUNITY
Start: 2020-07-15 | End: 2021-03-05

## 2020-08-04 RX ORDER — FLUCONAZOLE 150 MG/1
TABLET ORAL
Qty: 2 TABLET | Refills: 0 | Status: SHIPPED | OUTPATIENT
Start: 2020-08-04 | End: 2020-08-07

## 2020-08-04 NOTE — PROGRESS NOTES
ASSESSMENT & PLAN: Linus Kunz is a 54 y o  [id number] with abnormal gynecologic exam  She has yeast infection  1   Routine well woman exam done today  2  Pap and HPV:  Not  Indicated history of hysterectomy in 2015  Current ASCCP Guidelines reviewed  3   Bilateral screening Mammogram ordered-Pt to schedule  4  The following were reviewed in today's visit: breast self exam, mammography screening ordered, STD testing, adequate intake of calcium and vitamin D, exercise, healthy diet and depression  Pt sees a therapist  Twice a month at the [address]  5  Fluconizole  150 mg 1 dose today and repeat in 3 days  Call if symptoms not improved  6   Vaginal dryness- prescription for Premarin renewed  7  IC-  Referral to Urogynecology,  Patient to do bladder history- back to be sent patient  Review to decrease use spicy foods, caffeine,  Sodas  CC:  Annual Gynecologic Examination    HPI: Linus Kunz is a 54 y o  [id number] who presents for annual gynecologic examination  She was treated for yeast infection 04/06/2020  She is given a prescription for Premarin which patient reports she lost ,will send through pharmacy today  She has hx of hysterectomy for DUB,  fibroids 02/25/2015  Pt with hx of IC, thyroid dz, HTN, migraine, bipolar dz,  Her last bladder instillation was 09/2018 has been referred to Urogyn for IC, has urinary frequency  But had difficulty making appointment,  Will assist patient with this today  Last mammogram was 02/20/2019-it was BI-RADS 2-  Patient is sees schedule screening mammogram  She has the following concerns:  She has vaginal itching, declines STI testing  Reports she has lipedema because of weight gain and placed on weight loss medicine by her PCP    BMI Counseling: Body mass index is 31 1 kg/m²  The BMI is above normal  Nutrition recommendations include 3-5 servings of fruits/vegetables daily and moderation in carbohydrate intake   Exercise recommendations include exercising 3-5 times per week  Health Maintenance:    She wears her seatbelt routinely  She does perform regular monthly self breast exams  She feels safe at home  Patient Active Problem List   Diagnosis    Palpitations    AVNRT (AV win re-entry tachycardia) ([facility] )    Chronic migraine without aura without status migrainosus, not intractable    Interstitial cystitis    Vaginal candidiasis    Encounter for gynecological examination    Screening for breast cancer    Hyperlipidemia    Disease of thyroid gland    Yeast infection of the vagina    Vaginal discharge    Other specified anxiety disorders    Vaginal dryness    Pelvic pain    Urine frequency    Full incontinence of feces    Cervicogenic headache    Cervicalgia    Vaginal yeast infection    IC (interstitial cystitis)       Past Medical History:   Diagnosis Date    Asthma     Bipolar 1 disorder ([facility] )     Disease of thyroid gland     Fibromyalgia     Gout     Hyperlipidemia     Hypertension     Lupus ([facility] )     Migraine        Past Surgical History:   Procedure Laterality Date    GALLBLADDER SURGERY      HYSTERECTOMY      LAPAROSCOPIC COLON RESECTION         Past OB/Gyn History:  OB History        4    Para   4    Term   4            AB        Living   4       SAB        TAB        Ectopic        Multiple        Live Births   4                  Pt does not have menstrual issues  History of hysterectomy  History of sexually transmitted infection: Yes  Declines testing  History of abnormal pap smears: Yes history of cryo when patient was age 20  Patient is currently sexually active  heterosexual  The current method of family planning is status post hysterectomy      Family History   Problem Relation Age of Onset    Lupus Mother     Osteoporosis Mother     Hypotension Mother     Hypertension Father     Diabetes Father     Heart disease Father     Diabetes Sister     Schizophrenia Sister     Hypertension Sister     Diabetes Maternal Grandmother     Rheum arthritis Maternal Grandmother     Hypertension Maternal Grandmother     Breast cancer Maternal Aunt 36       Social History:  Social History     Socioeconomic History    Marital status: Single     Spouse name: Not on file    Number of children: Not on file    Years of education: Not on file    Highest education level: Not on file   Occupational History    Not on file   Social Needs    Financial resource strain: Not on file    Food insecurity     Worry: Not on file     Inability: Not on file   [employer] needs     Medical: Not on file     Non-medical: Not on file   Tobacco Use    Smoking status: Former Smoker    Smokeless tobacco: Never Used    Tobacco comment: quit at 24years old   Substance and Sexual Activity    Alcohol use: No    Drug use: No    Sexual activity: Yes     Partners: Male     Birth control/protection: Female Sterilization   Lifestyle    Physical activity     Days per week: Not on file     Minutes per session: Not on file    Stress: Not on file   Relationships    Social connections     Talks on phone: Not on file     Gets together: Not on file     Attends Confucianism service: Not on file     Active member of club or organization: Not on file     Attends meetings of clubs or organizations: Not on file     Relationship status: Not on file    Intimate partner violence     Fear of current or ex partner: Not on file     Emotionally abused: Not on file     Physically abused: Not on file     Forced sexual activity: Not on file   Other Topics Concern    Not on file   Social History Narrative    Not on file     Presently lives with family  Patient is single    Patient is currently unemployed   Allergies   Allergen Reactions    Other     Pregabalin      Other reaction(s): blurred vision    Shellfish-Derived Products Anaphylaxis and Hives    Sumatriptan      Other reaction(s): Altered Heart Rate    Triptans Shortness Of Breath and Palpitations    Latex Swelling and Rash     Other reaction(s): Hives/Skin Rash  Other reaction(s): Hives/Skin Rash    Sulfa Antibiotics Swelling and Rash     Other reaction(s): Hives/Skin Rash  Other reaction(s): Hives/Skin Rash    Monistat [Miconazole] Swelling    Shrimp Extract Allergy Skin Test        Current Outpatient Medications:     acetaminophen (TYLENOL) 325 mg tablet, Take 650 mg by mouth every 6 (six) hours as needed for mild pain, Disp: , Rfl:     aspirin-acetaminophen-caffeine (EXCEDRIN MIGRAINE) 250-250-65 MG per tablet, Take 2 tablets by mouth every 6 (six) hours as needed for headaches, Disp: , Rfl:     Cetirizine HCl (ZYRTEC ALLERGY) 10 MG CAPS, Take 10 mg by mouth daily  , Disp: , Rfl:     clonazePAM (KlonoPIN) 1 mg tablet, 1 mg 2 (two) times a day as needed , Disp: , Rfl: 0    conjugated estrogens (PREMARIN) vaginal cream, Insert 0 5 g into the vagina 3 (three) times a week, Disp: 30 g, Rfl: 0    cyproheptadine (PERIACTIN) 4 mg tablet, Take 1 tablet (4 mg total) by mouth daily at bedtime as needed for allergies (Patient not taking: Reported on 2/29/2020), Disp: 30 tablet, Rfl: 1    dexamethasone (DECADRON) 2 mg tablet, Take 1 tablet (2 mg total) by mouth daily with breakfast (Patient not taking: Reported on 2/29/2020), Disp: 5 tablet, Rfl: 0    dexamethasone (DECADRON) 2 mg tablet, Take 1 tablet (2 mg total) by mouth daily with breakfast, Disp: 5 tablet, Rfl: 0    divalproex sodium (DEPAKOTE ER) 250 mg 24 hr tablet, Take 2 tablets (500 mg total) by mouth daily at bedtime, Disp: 90 tablet, Rfl: 0    Erenumab-aooe 140 MG/ML SOAJ, Inject 140 mg under the skin every 30 (thirty) days, Disp: 1 pen, Rfl: 11    fluticasone (FLONASE) 50 mcg/act nasal spray, 1 spray into each nostril daily, Disp: 16 g, Rfl: 0    fluticasone (FLOVENT HFA) 110 MCG/ACT inhaler, Inhale 1 puff as needed , Disp: , Rfl:     gabapentin (NEURONTIN) 100 mg capsule, , Disp: , Rfl: 0    hydrocortisone 2 5 % cream, Apply to times a day to face for 5 days in a row  Do not put on eyes   (Patient not taking: Reported on 2/29/2020), Disp: 30 g, Rfl: 1    hydroxychloroquine (PLAQUENIL) 200 mg tablet, as needed , Disp: , Rfl:     ibuprofen (MOTRIN) 600 mg tablet, Take 1 tablet (600 mg total) by mouth every 6 (six) hours as needed for moderate pain (Patient not taking: Reported on 4/6/2020), Disp: 30 tablet, Rfl: 0    ibuprofen (MOTRIN) 800 mg tablet, Take 800 mg by mouth as needed , Disp: , Rfl:     LATUDA 20 MG tablet, Take 20 mg by mouth every morning, Disp: , Rfl: 0    LATUDA 40 MG tablet, , Disp: , Rfl:     LATUDA 80 MG tablet, Take 80 mg by mouth daily, Disp: , Rfl: 0    levothyroxine 88 mcg tablet, Take 88 mcg by mouth daily , Disp: , Rfl:     lisinopril (ZESTRIL) 10 mg tablet, Take 20 mg by mouth daily, Disp: , Rfl:     Multiple Vitamins-Minerals (MULTIVITAMIN ADULT PO), Take 1 tablet by mouth daily, Disp: , Rfl:     omeprazole (PriLOSEC) 20 mg delayed release capsule, Take 1 capsule by mouth as needed , Disp: , Rfl:     omeprazole (PriLOSEC) 40 MG capsule, Take 40 mg by mouth as needed , Disp: , Rfl: 0    ondansetron (ZOFRAN-ODT) 4 mg disintegrating tablet, Take 1 tablet (4 mg total) by mouth every 6 (six) hours as needed for nausea (Patient not taking: Reported on 2/29/2020), Disp: 20 tablet, Rfl: 0    oxyCODONE (ROXICODONE) 15 mg immediate release tablet, Take 15 mg by mouth as needed , Disp: , Rfl:     PROAIR  (90 Base) MCG/ACT inhaler, Inhale 1-2 puffs as needed , Disp: , Rfl:     Probiotic Product (PROBIOTIC DAILY PO), Take 1 tablet by mouth daily, Disp: , Rfl:     prochlorperazine (COMPAZINE) 10 mg tablet, Take 1 tablet (10 mg total) by mouth every 6 (six) hours as needed (migraine), Disp: 20 tablet, Rfl: 0    propranolol (INDERAL LA) 160 mg, Take 160 mg by mouth daily at bedtime , Disp: , Rfl: 0    propranolol (INDERAL LA) 60 mg 24 hr capsule, Take 60 mg by mouth daily at bedtime , Disp: , Rfl:     RESTASIS 0 05 % ophthalmic emulsion, Administer 1 drop to both eyes daily , Disp: , Rfl:     tamsulosin (FLOMAX) 0 4 mg, , Disp: , Rfl:     topiramate (TOPAMAX) 100 mg tablet, One in am and two at bedtime daily, Disp: 270 tablet, Rfl: 3    valACYclovir (VALTREX) 1,000 mg tablet, 1 tablet as needed , Disp: , Rfl:     venlafaxine (EFFEXOR-XR) 150 mg 24 hr capsule, Take 1 capsule (150 mg total) by mouth daily (Patient taking differently: Take 150 mg by mouth 2 (two) times a day ), Disp: 30 capsule, Rfl: 3    venlafaxine (EFFEXOR-XR) 75 mg 24 hr capsule, Take 1 capsule (75 mg total) by mouth daily (Patient not taking: Reported on 2/29/2020), Disp: 30 capsule, Rfl: 0    Review of Systems:    Review of Systems  Constitutional :no fever, feels well, no tiredness, no recent weight gain or loss  ENT: no ear ache, no loss of hearing, no nosebleeds or nasal discharge, no sore throat or hoarseness  Cardiovascular: no complaints of slow or fast heart beat, no chest pain, no palpitations, no leg claudication or lower extremity edema  Respiratory: no complaints of shortness of shortness of breath, no ORELLANA  Breasts:no complaints of breast pain, breast lump, or nipple discharge  Gastrointestinal: no complaints of abdominal pain, constipation, nausea, vomiting, or diarrhea or bloody stools  Genitourinary : no complaints of dysuria, incontinence, pelvic pain, no dysmenorrhea, vaginal discharge or abnormal vaginal bleeding and as noted in HPI  Musculoskeletal: no complaints of arthralgia, no myalgia, no joint swelling or stiffness, no limb pain or swelling    Integumentary: no complaints of skin rash or lesion, itching or dry skin  Neurological: no complaints of headache, no confusion, no numbness or tingling, no dizziness or fainting    Objective      LMP  (LMP Unknown)     General:   appears stated age, cooperative, alert normal mood and affect   Neck: normal, supple,trachea midline, no masses   Heart: regular rate and rhythm, S1, S2 normal, no murmur, click, rub or gallop   Lungs: clear to auscultation bilaterally   Breasts: normal appearance, no masses or tenderness, Inspection negative, No nipple retraction or dimpling, No nipple discharge or bleeding, No axillary or supraclavicular adenopathy, Normal to palpation without dominant masses, Taught monthly breast self examination   Abdomen: soft, non-tender, without masses or organomegaly   Vulva: normal female genitalia, Bartholin's, Urethra, [place] normal   Vagina: vagina positive for white creamy dsch    Urethra: normal   Cervix: Absent  Uterus: uterus absent   Adnexa: no mass, fullness, tenderness   Lymphatic palpation of lymph nodes in neck, axilla, groin and/or other locations: no lymphadenopathy or masses noted   Skin normal skin turgor and no rashes     Psychiatric orientation to person, place, and time: normal  mood and affect: normal

## 2020-08-04 NOTE — PATIENT INSTRUCTIONS
Covid - 19 instructions    If you are having any of the following     Cough   Shortness of breath   Fever  If traveled internationally or to high risk [place] states  Or been in contact with someone that has     Please call:    [phone]  option 7    They will screen you and direct you to the nearest testing location     Should not come to the PCP or OB office without calling that number first          Keep appt with urogyn  Schedule [facility]G

## 2020-09-23 ENCOUNTER — TELEPHONE (OUTPATIENT)
Dept: OBGYN CLINIC | Facility: CLINIC | Age: 45
End: 2020-09-23

## 2020-09-24 ENCOUNTER — OFFICE VISIT (OUTPATIENT)
Dept: OBGYN CLINIC | Facility: CLINIC | Age: 45
End: 2020-09-24

## 2020-09-24 VITALS
HEART RATE: 77 BPM | TEMPERATURE: 98 F | SYSTOLIC BLOOD PRESSURE: 156 MMHG | DIASTOLIC BLOOD PRESSURE: 95 MMHG | BODY MASS INDEX: 31.75 KG/M2 | HEIGHT: 65 IN | WEIGHT: 190.6 LBS

## 2020-09-24 DIAGNOSIS — B37.3 YEAST INFECTION OF THE VAGINA: Primary | ICD-10-CM

## 2020-09-24 LAB
BV WHIFF TEST VAG QL: ABNORMAL
CLUE CELLS SPEC QL WET PREP: ABNORMAL
PH SMN: 4.5 [PH]
SL AMB POCT WET MOUNT: ABNORMAL
T VAGINALIS VAG QL WET PREP: ABNORMAL
YEAST VAG QL WET PREP: ABNORMAL

## 2020-09-24 PROCEDURE — 87210 SMEAR WET MOUNT SALINE/INK: CPT | Performed by: NURSE PRACTITIONER

## 2020-09-24 PROCEDURE — 99213 OFFICE O/P EST LOW 20 MIN: CPT | Performed by: NURSE PRACTITIONER

## 2020-09-24 RX ORDER — FLUCONAZOLE 150 MG/1
TABLET ORAL
Qty: 1 TABLET | Refills: 0 | Status: SHIPPED | OUTPATIENT
Start: 2020-09-24 | End: 2020-09-27

## 2020-09-24 NOTE — PROGRESS NOTES
Assessment/Plan:    No problem-specific Assessment & Plan notes found for this encounter  annual due 2021     Diagnoses and all orders for this visit:    Yeast infection of the vagina  -     POCT wet mount  -     fluconazole (DIFLUCAN) 150 mg tablet; Take 1 tablet today and repeat in 3 days  Reviewed prevention    Hx of IC has f/u with urogyn 2020        Subjective:      Patient ID: Ang Antoine is a 54 y o  female  HPI  26-year-old  004 with  yeast symptoms  Patient reports thick white curdy discharge and vaginal itching  Patient had her annual gyn exam 2020  Patient with history of hysterectomy in 2015 for DUB, fibroids     She was treated for yeast infection 2020, previously  She had a yeast infection 2020  She was to follow-up with urogynecology for her history of IC,  She was to complete packet  With bladder history and to decrease use of spicy foods,  Caffeine and sodas  She uses Premarin cream for vaginal dryness  The following portions of the patient's history were reviewed and updated as appropriate: allergies, current medications, past family history, past medical history, past social history, past surgical history and problem list     Review of Systems   Constitutional: Negative for chills and fever  HENT: Negative for congestion  Respiratory: Negative for cough and shortness of breath  Cardiovascular: Negative  Genitourinary: Positive for vaginal discharge  Negative for dysuria, frequency, pelvic pain and urgency  Objective:      /95 (BP Location: Right arm, Patient Position: Sitting, Cuff Size: Large)   Pulse 77   Temp 98 °F (36 7 °C) (Temporal)   Ht 5' 4 5" (1 638 m)   Wt 86 5 kg (190 lb 9 6 oz)   LMP  (LMP Unknown)   BMI 32 21 kg/m²          Physical Exam  Constitutional:       Appearance: Normal appearance  Cardiovascular:      Rate and Rhythm: Normal rate     Pulmonary:      Effort: Pulmonary effort is normal  Abdominal:      Palpations: Abdomen is soft  Tenderness: There is no abdominal tenderness  Skin:     General: Skin is warm and dry  Neurological:      Mental Status: She is oriented to person, place, and time     Psychiatric:         Mood and Affect: Mood normal          Behavior: Behavior normal        external genitalia- slight erythema labia majora  Vagina- small amount white dsc- pt reports she  Wiped out the discharge from her vagina prior to her exam   status post hysterectomy     mount KOH -positive for yeast buds, negative for BV negative for trich

## 2020-10-14 ENCOUNTER — TELEPHONE (OUTPATIENT)
Dept: NEUROLOGY | Facility: CLINIC | Age: 45
End: 2020-10-14

## 2020-10-25 DIAGNOSIS — G43.709 CHRONIC MIGRAINE WITHOUT AURA WITHOUT STATUS MIGRAINOSUS, NOT INTRACTABLE: ICD-10-CM

## 2020-10-26 RX ORDER — ERENUMAB-AOOE 140 MG/ML
INJECTION, SOLUTION SUBCUTANEOUS
Qty: 1 PEN | Refills: 11 | Status: SHIPPED | OUTPATIENT
Start: 2020-10-26 | End: 2021-03-05

## 2020-12-07 ENCOUNTER — TELEPHONE (OUTPATIENT)
Dept: NEUROLOGY | Facility: CLINIC | Age: 45
End: 2020-12-07

## 2020-12-10 ENCOUNTER — OFFICE VISIT (OUTPATIENT)
Dept: OBGYN CLINIC | Facility: CLINIC | Age: 45
End: 2020-12-10

## 2020-12-10 VITALS
WEIGHT: 195 LBS | HEART RATE: 78 BPM | BODY MASS INDEX: 32.49 KG/M2 | DIASTOLIC BLOOD PRESSURE: 79 MMHG | HEIGHT: 65 IN | SYSTOLIC BLOOD PRESSURE: 157 MMHG

## 2020-12-10 DIAGNOSIS — N30.10 IC (INTERSTITIAL CYSTITIS): ICD-10-CM

## 2020-12-10 DIAGNOSIS — N30.10 INTERSTITIAL CYSTITIS: Primary | ICD-10-CM

## 2020-12-10 PROCEDURE — 99215 OFFICE O/P EST HI 40 MIN: CPT | Performed by: STUDENT IN AN ORGANIZED HEALTH CARE EDUCATION/TRAINING PROGRAM

## 2021-01-21 ENCOUNTER — TRANSCRIBE ORDERS (OUTPATIENT)
Dept: ADMINISTRATIVE | Facility: HOSPITAL | Age: 46
End: 2021-01-21

## 2021-01-21 DIAGNOSIS — M54.12 RADICULOPATHY, CERVICAL REGION: Primary | ICD-10-CM

## 2021-01-28 ENCOUNTER — HOSPITAL ENCOUNTER (OUTPATIENT)
Dept: RADIOLOGY | Facility: HOSPITAL | Age: 46
Discharge: HOME | End: 2021-01-28
Attending: ANESTHESIOLOGY
Payer: MEDICARE

## 2021-01-28 DIAGNOSIS — M54.12 RADICULOPATHY, CERVICAL REGION: ICD-10-CM

## 2021-01-28 PROCEDURE — 72141 MRI NECK SPINE W/O DYE: CPT

## 2021-01-28 PROCEDURE — G1004 CDSM NDSC: HCPCS

## 2021-02-22 ENCOUNTER — TELEPHONE (OUTPATIENT)
Dept: NEUROLOGY | Facility: CLINIC | Age: 46
End: 2021-02-22

## 2021-02-22 NOTE — TELEPHONE ENCOUNTER
Called to remind patient of their upcoming appointment with Tessa Choudhary in [facility]  Voicemail left to advise patient to call back with any urgent symptoms or concerns  Patient also made aware that we will be calling back two days prior to appointment to complete the COVID screening

## 2021-03-03 ENCOUNTER — TELEPHONE (OUTPATIENT)
Dept: NEUROLOGY | Facility: CLINIC | Age: 46
End: 2021-03-03

## 2021-03-05 ENCOUNTER — OFFICE VISIT (OUTPATIENT)
Dept: NEUROLOGY | Facility: CLINIC | Age: 46
End: 2021-03-05
Payer: MEDICARE

## 2021-03-05 ENCOUNTER — TELEPHONE (OUTPATIENT)
Dept: NEUROLOGY | Facility: CLINIC | Age: 46
End: 2021-03-05

## 2021-03-05 VITALS
HEART RATE: 86 BPM | WEIGHT: 200 LBS | BODY MASS INDEX: 33.32 KG/M2 | SYSTOLIC BLOOD PRESSURE: 183 MMHG | HEIGHT: 65 IN | DIASTOLIC BLOOD PRESSURE: 108 MMHG

## 2021-03-05 DIAGNOSIS — R00.2 PALPITATIONS: Chronic | ICD-10-CM

## 2021-03-05 DIAGNOSIS — M54.2 CERVICALGIA: ICD-10-CM

## 2021-03-05 DIAGNOSIS — I47.1 AVNRT (AV NODAL RE-ENTRY TACHYCARDIA) (HCC): Chronic | ICD-10-CM

## 2021-03-05 DIAGNOSIS — G43.709 CHRONIC MIGRAINE WITHOUT AURA WITHOUT STATUS MIGRAINOSUS, NOT INTRACTABLE: Primary | ICD-10-CM

## 2021-03-05 DIAGNOSIS — F41.8 OTHER SPECIFIED ANXIETY DISORDERS: ICD-10-CM

## 2021-03-05 PROCEDURE — 99214 OFFICE O/P EST MOD 30 MIN: CPT | Performed by: PHYSICIAN ASSISTANT

## 2021-03-05 PROCEDURE — 96372 THER/PROPH/DIAG INJ SC/IM: CPT | Performed by: PHYSICIAN ASSISTANT

## 2021-03-05 RX ORDER — KETOROLAC TROMETHAMINE 30 MG/ML
60 INJECTION, SOLUTION INTRAMUSCULAR; INTRAVENOUS ONCE
Status: COMPLETED | OUTPATIENT
Start: 2021-03-05 | End: 2021-03-05

## 2021-03-05 RX ORDER — ARIPIPRAZOLE 15 MG/1
1 TABLET ORAL DAILY
COMMUNITY
Start: 2021-02-27

## 2021-03-05 RX ORDER — PROCHLORPERAZINE EDISYLATE 5 MG/ML
10 INJECTION INTRAMUSCULAR; INTRAVENOUS ONCE
Status: COMPLETED | OUTPATIENT
Start: 2021-03-05 | End: 2021-03-05

## 2021-03-05 RX ORDER — RIMEGEPANT SULFATE 75 MG/75MG
75 TABLET, ORALLY DISINTEGRATING ORAL AS NEEDED
Qty: 8 TABLET | Refills: 3 | Status: SHIPPED | OUTPATIENT
Start: 2021-03-05 | End: 2021-03-22 | Stop reason: SDUPTHER

## 2021-03-05 RX ORDER — MECLIZINE HCL 12.5 MG/1
12.5 TABLET ORAL AS NEEDED
COMMUNITY
Start: 2020-12-05

## 2021-03-05 RX ORDER — PROCHLORPERAZINE MALEATE 10 MG
10 TABLET ORAL EVERY 6 HOURS PRN
Qty: 20 TABLET | Refills: 3 | Status: SHIPPED | OUTPATIENT
Start: 2021-03-05 | End: 2021-03-22 | Stop reason: SDUPTHER

## 2021-03-05 RX ORDER — TOPIRAMATE 100 MG/1
TABLET, FILM COATED ORAL
Qty: 270 TABLET | Refills: 3 | Status: SHIPPED | OUTPATIENT
Start: 2021-03-05 | End: 2021-06-23 | Stop reason: SDUPTHER

## 2021-03-05 RX ORDER — BUPROPION HYDROCHLORIDE 75 MG/1
75 TABLET ORAL EVERY MORNING
COMMUNITY
Start: 2020-12-29

## 2021-03-05 RX ADMIN — KETOROLAC TROMETHAMINE 60 MG: 30 INJECTION, SOLUTION INTRAMUSCULAR; INTRAVENOUS at 11:42

## 2021-03-05 RX ADMIN — PROCHLORPERAZINE EDISYLATE 10 MG: 5 INJECTION INTRAMUSCULAR; INTRAVENOUS at 11:43

## 2021-03-05 NOTE — TELEPHONE ENCOUNTER
Patient has Medicare Part A and B- no authorization is required  Alessandra Dial,    Please reach out to the patient and schedule a New start Botox appointment with Hong Stanley  It will be our stock  Please let me know once the patient is scheduled so I can attach a referral     Please also let the patient know that since she has Medicare Part A and B she will be responsibly for the 20% that is not covered by Medicare which is about $275 00 per injection       Thank you    Baljit Weber

## 2021-03-05 NOTE — TELEPHONE ENCOUNTER
----- Message from Aspen Inman PA-C sent at 3/5/2021  9:47 AM EST -----  Please auth for 200 units of botox for CM, Dr Mar Harness    I can do when approved

## 2021-03-05 NOTE — PATIENT INSTRUCTIONS
Preventative: Will apply for Botox 200 units for migraines  Restart Topamax 1/2 tab bedtime for 3 nights 1/2 tab twice a day for 3 days 1/2 tab am and 1 tab bedtime for 3 days, then 1 tab twice a day for 3 days, 1 tab in am and 2 tabbedtime  Continue medications from psychiatry    At onset of migraine, take Nurtec 75 mg  Limit of 1 in 24 hours  If needed may use prochlorperazine 10 mg  May repeat in 8 hours  Limit of 20 a month      Neck pain:   - We discussed the role of neck pathology and poor posture, with straightening of the normal cervical lordosis, in headaches  We discussed how tightening of the neck muscles can irritate the nerves in the occipital region of her head and cause or worsen head pain  We also discussed and demonstrated neck strengthening and relaxation exercises, as well as giving written instructions on these exercises  - We talked about the importance of good posture for improving shoulder, neck, and head pain  The patient was given visualization exercises for correcting posture, which patient will practice at home  If this simple exercise does not help improve the posture, we will consider formal physical therapy in the future  Medication overuse headaches:   - We discussed medication overuse headache [facility]) and how to avoid it in the future  It was explained that all analgesics have the potential to cause medication overuse headache [facility]) and analgesic overuse can negate the effectiveness of headache preventive measures  After successful [address] treatment, preventive medications for an underlying primary headache disorder have a greater chance for success  Avoid medications with narcotics, barbiturates, or caffeine in them as these can cause rebound headaches after very few doses and can interfere with other headache medicine efficacy  Taking any analgesics for more than 2-3 days a week can cause medication overuse headache       Reproductive age women: Should take folic acid daily when taking anti-seizure drugs especially Depakote  NEW [facility] Prescription Drug Monitoring Program report was reviewed and was appropriate      Headache management instructions  - When patient has a moderate to severe headache, they should seek rest, initiate relaxation and apply cold compresses to the head  - Maintain regular sleep schedule  Adults need at least 7-8 hours of uninterrupted a night  - Limit over the counter medications such as Tylenol, Ibuprofen, Aleve, Excedrin  (No more than 3 times a week)  - Maintain headache diary  We discussed an ABBY for a smart phone is "Migraine darrin"  - Limit caffeine to 1-2 cups 8 to 16 oz a day or less  - Avoid dietary trigger  (aged cheese, peanuts, MSG, aspartame and nitrates)  - Patient is to have regular frequent meals to prevent headache onset  - Please drink at least 64 ounces of water a day to help remain hydrated  Please call with any questions or concerns   Office number is [phone]

## 2021-03-05 NOTE — PROGRESS NOTES
[facility]  PATIENT:  Mario Huerta  MRN:  [id number]  :  1975  DATE OF SERVICE:  3/5/2021      Assessment/Plan:     Chronic migraine without aura without status migrainosus, not intractable  Preventative: Will apply for Botox 200 units for migraines  Restart Topamax 1/2 tab bedtime for 3 nights 1/2 tab twice a day for 3 days 1/2 tab am and 1 tab bedtime for 3 days, then 1 tab twice a day for 3 days, 1 tab in am and 2 tabbedtime  Continue medications from psychiatry    At onset of migraine, take Nurtec 75 mg  Limit of 1 in 24 hours  If needed may use prochlorperazine 10 mg  May repeat in 8 hours  Limit of 20 a month      Unable to take triptans with as she has chest pain, SOB and palpitations with them         Problem List Items Addressed This Visit        Cardiovascular and Mediastinum    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)    Chronic migraine without aura without status migrainosus, not intractable - Primary     Preventative: Will apply for Botox 200 units for migraines  Restart Topamax 1/2 tab bedtime for 3 nights 1/2 tab twice a day for 3 days 1/2 tab am and 1 tab bedtime for 3 days, then 1 tab twice a day for 3 days, 1 tab in am and 2 tabbedtime  Continue medications from psychiatry    At onset of migraine, take Nurtec 75 mg  Limit of 1 in 24 hours  If needed may use prochlorperazine 10 mg  May repeat in 8 hours    Limit of 20 a month      Unable to take triptans with as she has chest pain, SOB and palpitations with them         Relevant Medications    buPROPion (WELLBUTRIN) 75 mg tablet    Rimegepant Sulfate (Nurtec) 75 MG TBDP    prochlorperazine (COMPAZINE) 10 mg tablet    topiramate (TOPAMAX) 100 mg tablet    ketorolac (TORADOL) 60 mg/2 mL IM injection 60 mg    prochlorperazine (COMPAZINE) injection 10 mg       Other    Palpitations (Chronic)    Other specified anxiety disorders    Relevant Medications    ARIPiprazole (ABILIFY) 15 mg tablet buPROPion (WELLBUTRIN) 75 mg tablet    prochlorperazine (COMPAZINE) 10 mg tablet    prochlorperazine (COMPAZINE) injection 10 mg    Cervicalgia              History of Present Illness: We had the pleasure of evaluating Adamaris Lowery in neurological follow up  today for headaches  As you know,  she is a 54 y o   right handed female  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  She has a PMH of palpitations and has had an ablation      Patient states that she fell down a flight of stairs in early 2000s   Went to hospital, unsure if went via ambulance  Obinna Anand states she was out of work for about a month   States had significant pain, bruising and was limping   Went to PT, chiropractor after this  Hugo Rodriges states went back to work as   Octaviano Moreno her issues with bowel incontinence began a few months after the fall   First instance happened at work where she thought that she was going to pass gas and soiled herself  Tejas Reyna began to have incontinence during intercourse   For the past 2-3 years has had very little control   States she doesn't go out unless she hasn't eaten for 24 hours   She can drink fluids but if she eats she needs a toilet   Patient varies from constipation to "explosion  "  Patient has seen Dr Genoveva Horowitz for gastroenterology but was over 6 years ago  Obinna Anand states she is unable to stop it once starts and has a full bowel movement amount   Patient had a colon resection for diverticulitis in 2001   Was not diverted        Patient states her headaches worsened around December 2019 or January 2020  Tiffanie Conteh symptoms have worsened and having more nausea and vomiting  Tiffanie Conteh psychiatrist changed her medications in November 2019 and she stopped taking her medications in January 2020       Interval updates as of 3/5/2021  Patient states her migraines are almost daily now  Used Aimovig from 12/2019 and stopped 1/2021  Was possibly helpful but now not at all    Has been worsening over the past 4-5 months with 15+ each month to now daily  Tylenol and Excedrin migraines is only slightly helpful  Doesn't feel much relief  Unable to take triptans to do chest pain, SOB and palpitations  QTc 12/23/2016 456 ms    What medications do you take or have you taken for your headaches?    Current Preventive:   Abilify, Wellbutrin  MVI  Propranolol  Current Abortive:   Fioricet   Prochlorperazine    Prior Preventive:   Lisinopril, labetalol  Topamax, carbamazepine, Lyrica, Depakote (weigh gain), Gabapentin (no help)  cyclobenzaprine, Tizanidine,  Seroquel, citalopram, duloxetine, venlafaxine, olanzapine- ineffective,   Cyproheptadine (too much dryness),   Botox  Aimovig 12/4/2019    Prior Abortive:   Dexamethasone,  Naproxen   Promethazine,   Kenalog,  Percocet, tramadol, Toradol, narcotics   Headache trigger: Fatigue, Stress/Tension, Weather change, lack of sleep  Alternative therapies used in the past for headaches? none   Headache are worse if the patient: cough, sneeze, bending over  Aura - none    Any family history of migraines? Yes, mother and father  Any family history of aneurysms? No     Current pain:  6-7/10  How often do the headaches occur -  almost daily >15 migraine days a month  What time of the day do the headaches start - either wakes up with them or states it is mid day  How long do the headaches last - all day, >4 hours  Where are they located - frontalis, retro orbital, temporalis and then neck pain   What is the intensity of pain - 7-10/10; average 6-7/10; gets to a 10/10 3 times a month  Describe your usual headache - Throbbing, Pounding, Pulsing  Associated symptoms:   -       Decrease of appetite, nausea, vomiting   -       Photophobia, phonophobia, sensitivity to smell   -       Problem with concentration  - Stiff or sore neck  -       prefer to be alone and in a dark room, unable to work     Number of days missed per month because of headaches:  Work (or school) days: NA  Social or Family activities: a lot     What time of the year do headaches occur more frequently?   Change of weather  Have you seen someone else for headaches or pain? Yes  Have you had trigger point injection performed and how often? No  Have you had Botox injection performed and how often? Yes   Have you had epidural injections or transforaminal injections performed? No     Have you used CBD or THC for your headaches and how often? No  Are you current pregnant or planning on getting pregnant? No, done with family planning  Have you ever had any Brain imaging? yes      7/23/2018 CT head  No acute abnormalities      I personally reviewed these images      Reviewed old notes from physician seen in the past- see above HPI for summary of previous encounters         Past Medical History:   Diagnosis Date    Asthma     Bipolar 1 disorder ([facility] )     Disease of thyroid gland     Fibromyalgia     Gout     Hyperlipidemia     Hypertension     Lupus ([place] [address] )     Migraine        Patient Active Problem List   Diagnosis    Palpitations    AVNRT (AV win re-entry tachycardia) ([place] [address] )    Chronic migraine without aura without status migrainosus, not intractable    Interstitial cystitis    Vaginal candidiasis    Encounter for gynecological examination    Screening for breast cancer    Hyperlipidemia    Disease of thyroid gland    Yeast infection of the vagina    Vaginal discharge    Other specified anxiety disorders    Vaginal dryness    Pelvic pain    Urine frequency    Full incontinence of feces    Cervicogenic headache    Cervicalgia    Vaginal yeast infection    IC (interstitial cystitis)       Medications:      Current Outpatient Medications   Medication Sig Dispense Refill    acetaminophen (TYLENOL) 325 mg tablet Take 650 mg by mouth every 6 (six) hours as needed for mild pain      ARIPiprazole (ABILIFY) 15 mg tablet Take 1 tablet by mouth daily      aspirin-acetaminophen-caffeine (EXCEDRIN MIGRAINE) 250-250-65 MG per tablet Take 2 tablets by mouth every 6 (six) hours as needed for headaches      buPROPion (WELLBUTRIN) 75 mg tablet Take 75 mg by mouth every morning      Cetirizine HCl (ZYRTEC ALLERGY) 10 MG CAPS Take 10 mg by mouth daily        clonazePAM (KlonoPIN) 1 mg tablet 1 mg 2 (two) times a day   0    fluticasone (FLONASE) 50 mcg/act nasal spray 1 spray into each nostril daily (Patient taking differently: 1 spray into each nostril daily as needed ) 16 g 0    fluticasone (FLOVENT HFA) 110 MCG/ACT inhaler Inhale 1 puff as needed       hydroxychloroquine (PLAQUENIL) 200 mg tablet as needed       levothyroxine 88 mcg tablet Take 88 mcg by mouth daily       lisinopril (ZESTRIL) 10 mg tablet Take 20 mg by mouth daily      meclizine (ANTIVERT) 12 5 MG tablet as needed      Multiple Vitamins-Minerals (MULTIVITAMIN ADULT PO) Take 1 tablet by mouth daily      niacin (NIASPAN) 500 mg CR tablet Take 500 mg by mouth daily take with food      omeprazole (PriLOSEC) 40 MG capsule Take 40 mg by mouth as needed   0    oxyCODONE (ROXICODONE) 15 mg immediate release tablet Take 15 mg by mouth as needed       PROAIR  (90 Base) MCG/ACT inhaler Inhale 1-2 puffs as needed       Probiotic Product (PROBIOTIC DAILY PO) Take 1 tablet by mouth daily      prochlorperazine (COMPAZINE) 10 mg tablet Take 1 tablet (10 mg total) by mouth every 6 (six) hours as needed (migraine) 20 tablet 3    propranolol (INDERAL LA) 160 mg Take 160 mg by mouth daily at bedtime   0    propranolol (INDERAL LA) 60 mg 24 hr capsule Take 60 mg by mouth daily at bedtime       topiramate (TOPAMAX) 100 mg tablet One in am and two at bedtime daily 270 tablet 3    valACYclovir (VALTREX) 1,000 mg tablet 1 tablet as needed       conjugated estrogens (PREMARIN) vaginal cream Insert 0 5 g into the vagina 3 (three) times a week 30 g 2    hydrocortisone 2 5 % cream Apply to times a day to face for 5 days in a row  Do not put on eyes   30 g 1    ibuprofen (MOTRIN) 600 mg tablet Take 1 tablet (600 mg total) by mouth every 6 (six) hours as needed for moderate pain (Patient not taking: Reported on 9/24/2020) 30 tablet 0    ibuprofen (MOTRIN) 800 mg tablet Take 800 mg by mouth as needed       ondansetron (ZOFRAN-ODT) 4 mg disintegrating tablet Take 1 tablet (4 mg total) by mouth every 6 (six) hours as needed for nausea (Patient not taking: Reported on 2/29/2020) 20 tablet 0    pentosan polysulfate (ELMIRON) 100 mg capsule Take 1 capsule (100 mg total) by mouth 3 (three) times a day before meals 90 capsule 3    Restasis 0 05 % ophthalmic emulsion instill 1 drop INTO AFFECTED EYE(S) every 12 hours      Rimegepant Sulfate (Nurtec) 75 MG TBDP Take 75 mg by mouth as needed (migraine) 8 tablet 3    venlafaxine (EFFEXOR-XR) 150 mg 24 hr capsule Take 1 capsule (150 mg total) by mouth daily (Patient taking differently: Take 150 mg by mouth 2 (two) times a day ) 30 capsule 3     Current Facility-Administered Medications   Medication Dose Route Frequency Provider Last Rate Last Admin    ketorolac (TORADOL) 60 mg/2 mL IM injection 60 mg  60 mg Intramuscular Once Kassie Andrade PA-C        prochlorperazine (COMPAZINE) injection 10 mg  10 mg Intramuscular Once Panda Stevenson PA-C            Allergies: Allergies   Allergen Reactions    Other     Pregabalin      Other reaction(s): blurred vision    Shellfish-Derived Products Anaphylaxis and Hives    Sumatriptan      Other reaction(s):  Altered Heart Rate    Triptans Shortness Of Breath and Palpitations    Latex Swelling and Rash     Other reaction(s): Hives/Skin Rash  Other reaction(s): Hives/Skin Rash    Sulfa Antibiotics Swelling and Rash     Other reaction(s): Hives/Skin Rash  Other reaction(s): Hives/Skin Rash    Monistat [Miconazole] Swelling    Shrimp Extract Allergy Skin Test        Family History:     Family History   Problem Relation Age of Onset    Lupus Mother     Osteoporosis Mother     Hypotension Mother     Hypertension Father     Diabetes Father     Heart disease Father     Diabetes Sister     Schizophrenia Sister     Hypertension Sister     Diabetes Maternal Grandmother     Rheum arthritis Maternal Grandmother     Hypertension Maternal Grandmother     Breast cancer Maternal Aunt 36       Social History:     Social History     Socioeconomic History    Marital status: Single     Spouse name: Not on file    Number of children: Not on file    Years of education: Not on file    Highest education level: Not on file   Occupational History    Not on file   Social Needs    Financial resource strain: Hard    Food insecurity     Worry: Sometimes true     Inability: Often true    Transportation needs     Medical: No     Non-medical: No   Tobacco Use    Smoking status: Former Smoker    Smokeless tobacco: Never Used    Tobacco comment: quit at 24years old   Substance and Sexual Activity    Alcohol use: No    Drug use: No    Sexual activity: Yes     Partners: Male     Birth control/protection: Female Sterilization   Lifestyle    Physical activity     Days per week: 0 days     Minutes per session: 0 min    Stress: Very much   Relationships    Social connections     Talks on phone: More than three times a week     Gets together: Once a week     Attends Gnosticism service: Never     Active member of club or organization: No     Attends meetings of clubs or organizations: Never     Relationship status: Never     Intimate partner violence     Fear of current or ex partner: No     Emotionally abused: No     Physically abused: No     Forced sexual activity: No   Other Topics Concern    Not on file   Social History Narrative    Not on file      I have reviewed the patient's medical, social and surgical history as well as medications in detail and updated the computerized patient record        Objective:   Physical Exam: Vitals:            BP (!) 183/108 (BP Location: Right arm, Patient Position: Sitting, Cuff Size: Adult)   Pulse 86   Ht 5' 4 5" (1 638 m)   Wt 90 7 kg (200 lb)   LMP  (LMP Unknown)   BMI 33 80 kg/m²   BP Readings from Last 3 Encounters:   03/05/21 (!) 183/108   12/10/20 157/79   09/24/20 156/95     Pulse Readings from Last 3 Encounters:   03/05/21 86   12/10/20 78   09/24/20 77          CONSTITUTIONAL: Well developed, well nourished, well groomed  No dysmorphic features  Eyes:  EOM normal      Neck:  Normal ROM, neck supple  HEENT:  Normocephalic atraumatic  Chest:  Respirations regular and unlabored  Psychiatric:  Normal behavior and appropriate affect      MENTAL STATUS  Orientation: Alert and oriented x 3  Fund of knowledge: Intact  MOTOR (Upper and lower extremities)   Bulk/tone/abnormal movement: Normal muscle bulk and tone  COORDINATION   Station/Gait: Normal baseline gait  Review of Systems:   Review of Systems  Constitutional: Positive for fatigue  Negative for appetite change and fever  HENT: Negative  Negative for hearing loss, tinnitus, trouble swallowing and voice change  Sound sensitivity   Sensitivity to smell   Eyes: Positive for photophobia, pain and visual disturbance (blurred vision / auras (floaters))  Respiratory: Negative  Negative for shortness of breath  Cardiovascular: Negative  Negative for palpitations  Gastrointestinal: Positive for nausea and vomiting (occasionally)  Endocrine: Negative  Negative for cold intolerance  Genitourinary: Negative  Negative for dysuria, frequency and urgency  Musculoskeletal: Positive for back pain, neck pain and neck stiffness  Negative for myalgias  Skin: Negative  Negative for rash  Neurological: Positive for weakness and headaches (average 15-20 migraines per month  Patient states she had a migraine today 7/10 scale)   Negative for dizziness, tremors, seizures, syncope, facial asymmetry, speech difficulty, light-headedness and numbness  Pain in temples   Hematological: Negative  Does not bruise/bleed easily  Psychiatric/Behavioral: Positive for decreased concentration and sleep disturbance  Negative for confusion and hallucinations  I personally reviewed the ROS entered by the MA    I spent 20 minutes in face-to-face discussion regarding  the pathophysiology of her current symptoms and further plan, as well as counseling, educating, and coordinating the patient's care including prognosis of diagnosis, diagnostic results, impression, and recommendations, risks and benefits of treatment, instructions for disease self management, treatment instructions and follow up requirements and spent 15 minutes non-face to face    Author:  Roque Ryan PA-C 3/5/2021 10:48 AM

## 2021-03-05 NOTE — ASSESSMENT & PLAN NOTE
Preventative: Will apply for Botox 200 units for migraines  Restart Topamax 1/2 tab bedtime for 3 nights 1/2 tab twice a day for 3 days 1/2 tab am and 1 tab bedtime for 3 days, then 1 tab twice a day for 3 days, 1 tab in am and 2 tabbedtime  Continue medications from psychiatry    At onset of migraine, take Nurtec 75 mg  Limit of 1 in 24 hours  If needed may use prochlorperazine 10 mg  May repeat in 8 hours    Limit of 20 a month      Unable to take triptans with as she has chest pain, SOB and palpitations with them

## 2021-03-05 NOTE — TELEPHONE ENCOUNTER
Called patient and I left a message to call back to schedule her New Start Botox appointment with Alphonse Rendon

## 2021-03-05 NOTE — TELEPHONE ENCOUNTER
[facility] calling in regarding below      aarp part d  ID [id number]  [facility] [place] [address]  [address]  [id number]  [address] [id number]  phone# [phone]      Key: [id number]  Of note, triptans are contraindicated d/t chest pain, SOB, palpitations when taking triptans  Submitted on ST  [facility]

## 2021-03-05 NOTE — PROGRESS NOTES
Review of Systems   Constitutional: Positive for fatigue  Negative for appetite change and fever  HENT: Negative  Negative for hearing loss, tinnitus, trouble swallowing and voice change  Sound sensitivity  Sensitivity to smell   Eyes: Positive for photophobia, pain and visual disturbance (blurred vision / auras (floaters))  Respiratory: Negative  Negative for shortness of breath  Cardiovascular: Negative  Negative for palpitations  Gastrointestinal: Positive for nausea and vomiting (occasionally)  Endocrine: Negative  Negative for cold intolerance  Genitourinary: Negative  Negative for dysuria, frequency and urgency  Musculoskeletal: Positive for back pain, neck pain and neck stiffness  Negative for myalgias  Skin: Negative  Negative for rash  Neurological: Positive for weakness and headaches (average 15-20 migraines per month  Patient states she had a migraine today 7/10 scale)  Negative for dizziness, tremors, seizures, syncope, facial asymmetry, speech difficulty, light-headedness and numbness  Pain in temples   Hematological: Negative  Does not bruise/bleed easily  Psychiatric/Behavioral: Positive for decreased concentration and sleep disturbance  Negative for confusion and hallucinations

## 2021-03-08 NOTE — TELEPHONE ENCOUNTER
Called patient and I left a message to call back to schedule her New Start Botox appointment with Rosario

## 2021-03-09 NOTE — TELEPHONE ENCOUNTER
Nurtec approved per [facility]  Request Reference Number: [id number]  NURTEC TAB 75MG ODT is approved through 12/31/2021      Left message for pharm making them aware of approval

## 2021-03-15 NOTE — TELEPHONE ENCOUNTER
Called patient and I scheduled her for this Wednesday 03/17/21 in the  with Zain Noyola  Patient was made aware of Medicare coverage for the Botox       Shannen Curtis

## 2021-03-15 NOTE — TELEPHONE ENCOUNTER
Called patient and I left a message to call back to schedule her New Start Botox appointment with Francia Jon

## 2021-03-17 NOTE — TELEPHONE ENCOUNTER
Noted thank you- a referral has been attached to the patient's upcoming Botox appointment with Ayad Wallace on 3/17/2021 in the [facility] location  Type Date User Summary Attachment   General 03/17/2021  7:26 AM Blair Chua care coordination  -   Note    Botox- no authorization needed   Please use our stock      Thank you,     Nurys Lauren

## 2021-03-18 ENCOUNTER — PROCEDURE VISIT (OUTPATIENT)
Dept: NEUROLOGY | Facility: CLINIC | Age: 46
End: 2021-03-18
Payer: MEDICARE

## 2021-03-18 VITALS — HEART RATE: 77 BPM | TEMPERATURE: 99.4 F | DIASTOLIC BLOOD PRESSURE: 76 MMHG | SYSTOLIC BLOOD PRESSURE: 118 MMHG

## 2021-03-18 DIAGNOSIS — G43.709 CHRONIC MIGRAINE WITHOUT AURA WITHOUT STATUS MIGRAINOSUS, NOT INTRACTABLE: Primary | ICD-10-CM

## 2021-03-18 PROCEDURE — 64615 CHEMODENERV MUSC MIGRAINE: CPT | Performed by: PHYSICIAN ASSISTANT

## 2021-03-18 NOTE — PROGRESS NOTES
Universal Protocol   Consent: Verbal consent obtained  Written consent obtained  Risks and benefits: risks, benefits and alternatives were discussed  Consent given by: patient  Time out: Immediately prior to procedure a "time out" was called to verify the correct patient, procedure, equipment, support staff and site/side marked as required  Patient understanding: patient states understanding of the procedure being performed  Patient consent: the patient's understanding of the procedure matches consent given  Procedure consent: procedure consent matches procedure scheduled  Relevant documents: relevant documents present and verified  Patient identity confirmed: verbally with patient        Chemodenervation     Date/Time 3/18/2021 11:29 AM     Performed by  Maria Victoria Mane PA-C     Authorized by Maria Victoria Mane PA-C        Pre-procedure details      Prepped With: Alcohol     Anesthesia  (see MAR for exact dosages):      Anesthesia method:  None   Procedure details     Position:  Upright   Botox     Botox Type:  Type A    Brand:  Botox    mL's of Botulinum Toxin:  155    Final Concentration per CC:  200 units    Needle Gauge:  30 G 2 5 inch   Procedures     Botox Procedures: chronic headache      Indications: migraines     Injection Location      Head / Face:  L superior cervical paraspinal, R superior cervical paraspinal, L , R , L frontalis, R frontalis, L medial occipitalis, R medial occipitalis, procerus, R temporalis, L temporalis, L superior trapezius and R superior trapezius    L  injection amount:  5 unit(s)    R  injection amount:  5 unit(s)    L lateral frontalis:  5 unit(s)    R lateral frontalis:  5 unit(s)    L medial frontalis:  5 unit(s)    R medial frontalis:  5 unit(s)    L temporalis injection amount:  20 unit(s)    R temporalis injection amount:  20 unit(s)    Procerus injection amount:  5 unit(s)    L medial occipitalis injection amount:  15 unit(s)    R medial occipitalis injection amount:  15 unit(s)    L superior cervical paraspinal injection amount:  10 unit(s)    R superior cervical paraspinal injection amount:  10 unit(s)    L superior trapezius injection amount:  15 unit(s)    R superior trapezius injection amount:  15 unit(s)   Total Units     Total units used:  155    Total units discarded:  45   Post-procedure details      Chemodenervation:  Chronic migraine    Facial Nerve Location[de-identified]  Bilateral facial nerve    Patient tolerance of procedure:   Tolerated well, no immediate complications

## 2021-03-22 DIAGNOSIS — G43.709 CHRONIC MIGRAINE WITHOUT AURA WITHOUT STATUS MIGRAINOSUS, NOT INTRACTABLE: ICD-10-CM

## 2021-03-22 RX ORDER — RIMEGEPANT SULFATE 75 MG/75MG
75 TABLET, ORALLY DISINTEGRATING ORAL AS NEEDED
Qty: 8 TABLET | Refills: 3 | Status: SHIPPED | OUTPATIENT
Start: 2021-03-22 | End: 2021-04-09 | Stop reason: SDUPTHER

## 2021-03-22 RX ORDER — PROCHLORPERAZINE MALEATE 10 MG
10 TABLET ORAL EVERY 6 HOURS PRN
Qty: 20 TABLET | Refills: 3 | Status: SHIPPED | OUTPATIENT
Start: 2021-03-22 | End: 2021-06-23 | Stop reason: SDUPTHER

## 2021-04-09 DIAGNOSIS — G43.709 CHRONIC MIGRAINE WITHOUT AURA WITHOUT STATUS MIGRAINOSUS, NOT INTRACTABLE: ICD-10-CM

## 2021-04-09 RX ORDER — RIMEGEPANT SULFATE 75 MG/75MG
75 TABLET, ORALLY DISINTEGRATING ORAL AS NEEDED
Qty: 8 TABLET | Refills: 3 | Status: SHIPPED | OUTPATIENT
Start: 2021-04-09 | End: 2021-06-23 | Stop reason: SDUPTHER

## 2021-04-20 ENCOUNTER — HOSPITAL ENCOUNTER (OUTPATIENT)
Dept: RADIOLOGY | Age: 46
Discharge: HOME | End: 2021-04-20
Payer: MEDICARE

## 2021-04-20 VITALS — BODY MASS INDEX: 34.16 KG/M2 | HEIGHT: 65 IN | WEIGHT: 205 LBS

## 2021-04-20 DIAGNOSIS — Z12.31 ENCOUNTER FOR SCREENING MAMMOGRAM FOR MALIGNANT NEOPLASM OF BREAST: ICD-10-CM

## 2021-04-20 PROCEDURE — 77067 SCR MAMMO BI INCL CAD: CPT

## 2021-04-20 PROCEDURE — 77063 BREAST TOMOSYNTHESIS BI: CPT

## 2021-04-22 ENCOUNTER — TELEMEDICINE (OUTPATIENT)
Dept: NEUROLOGY | Facility: CLINIC | Age: 46
End: 2021-04-22
Payer: MEDICARE

## 2021-04-22 DIAGNOSIS — M79.644 THUMB PAIN, RIGHT: ICD-10-CM

## 2021-04-22 DIAGNOSIS — I47.1 AVNRT (AV NODAL RE-ENTRY TACHYCARDIA) (HCC): Chronic | ICD-10-CM

## 2021-04-22 DIAGNOSIS — G43.709 CHRONIC MIGRAINE WITHOUT AURA WITHOUT STATUS MIGRAINOSUS, NOT INTRACTABLE: Primary | ICD-10-CM

## 2021-04-22 DIAGNOSIS — F41.8 OTHER SPECIFIED ANXIETY DISORDERS: ICD-10-CM

## 2021-04-22 PROCEDURE — 99214 OFFICE O/P EST MOD 30 MIN: CPT | Performed by: PHYSICIAN ASSISTANT

## 2021-04-22 NOTE — ASSESSMENT & PLAN NOTE
Preventative:  Botox 200 units every 3 months  Topamax 1 tab in am and 2 tabs bedtime  Continue medications from psychiatry    At onset of migraine, take Nurtec 75 mg  Limit of 1 in 24 hours  If needed may use prochlorperazine 10 mg  May repeat in 8 hours    Limit of 20 a month

## 2021-04-22 NOTE — PATIENT INSTRUCTIONS
Preventative:  Botox 200 units every 3 months  Topamax 1 tab in am and 2 tabs bedtime  Continue medications from psychiatry    At onset of migraine, take Nurtec 75 mg  Limit of 1 in 24 hours  If needed may use prochlorperazine 10 mg  May repeat in 8 hours  Limit of 20 a month      Neck pain:   - We discussed the role of neck pathology and poor posture, with straightening of the normal cervical lordosis, in headaches  We discussed how tightening of the neck muscles can irritate the nerves in the occipital region of her head and cause or worsen head pain  We also discussed and demonstrated neck strengthening and relaxation exercises, as well as giving written instructions on these exercises  - We talked about the importance of good posture for improving shoulder, neck, and head pain  The patient was given visualization exercises for correcting posture, which patient will practice at home  If this simple exercise does not help improve the posture, we will consider formal physical therapy in the future  Medication overuse headaches:   - We discussed medication overuse headache [facility]) and how to avoid it in the future  It was explained that all analgesics have the potential to cause medication overuse headache [facility]) and analgesic overuse can negate the effectiveness of headache preventive measures  After successful [address] treatment, preventive medications for an underlying primary headache disorder have a greater chance for success  Avoid medications with narcotics, barbiturates, or caffeine in them as these can cause rebound headaches after very few doses and can interfere with other headache medicine efficacy  Taking any analgesics for more than 2-3 days a week can cause medication overuse headache  Reproductive age women: Should take folic acid daily when taking anti-seizure drugs especially Depakote       [place] Prescription Drug Monitoring Program report was reviewed and was appropriate      Headache management instructions  - When patient has a moderate to severe headache, they should seek rest, initiate relaxation and apply cold compresses to the head  - Maintain regular sleep schedule  Adults need at least 7-8 hours of uninterrupted a night  - Limit over the counter medications such as Tylenol, Ibuprofen, Aleve, Excedrin  (No more than 3 times a week)  - Maintain headache diary  We discussed an ABBY for a smart phone is "Migraine darrin"  - Limit caffeine to 1-2 cups 8 to 16 oz a day or less  - Avoid dietary trigger  (aged cheese, peanuts, MSG, aspartame and nitrates)  - Patient is to have regular frequent meals to prevent headache onset  - Please drink at least 64 ounces of water a day to help remain hydrated  Please call with any questions or concerns   Office number is [phone]

## 2021-04-22 NOTE — PROGRESS NOTES
Virtual Regular Visit      Assessment/Plan:    Problem List Items Addressed This Visit        Cardiovascular and Mediastinum    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)    Chronic migraine without aura without status migrainosus, not intractable - Primary     Preventative:  Botox 200 units every 3 months  Topamax 1 tab in am and 2 tabs bedtime  Continue medications from psychiatry    At onset of migraine, take Nurtec 75 mg  Limit of 1 in 24 hours  If needed may use prochlorperazine 10 mg  May repeat in 8 hours  Limit of 20 a month            Other    Other specified anxiety disorders      Other Visit Diagnoses     Thumb pain, right        Relevant Orders    Ambulatory referral to Hand Surgery               Reason for visit is   Chief Complaint   Patient presents with    Virtual Regular Visit        Encounter provider Anil Mayer PA-C    Provider located at [address] [place] [address] [facility]  [phone]      Recent Visits  No visits were found meeting these conditions  Showing recent visits within past 7 days and meeting all other requirements     Today's Visits  Date Type Provider Dept   04/22/21 Telemedicine Anil Mayer PA-C  [facility]   Showing today's visits and meeting all other requirements     Future Appointments  No visits were found meeting these conditions  Showing future appointments within next 150 days and meeting all other requirements        The patient was identified by name and date of birth  Madonna Rocha was informed that this is a telemedicine visit and that the visit is being conducted through [employer] and patient was informed that this is a secure, HIPAA-compliant platform  She agrees to proceed     My office door was closed  No one else was in the room  She acknowledged consent and understanding of privacy and security of the video platform   The patient has agreed to participate and understands they can discontinue the visit at any time  Patient is aware this is a billable service  Subjective  Sabina Sousa is a 54 y o  female 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  She has a PMH of palpitations and has had an ablation      Patient states that she fell down a flight of stairs in early 2000s   Went to hospital, unsure if went via ambulance  Ovidio Wilson states she was out of work for about a month   States had significant pain, bruising and was limping   Went to PT, chiropractor after this  Tan Hebert states went back to work as   Rose Campos her issues with bowel incontinence began a few months after the fall   First instance happened at work where she thought that she was going to pass gas and soiled herself  Tanesha Better began to have incontinence during intercourse   For the past 2-3 years has had very little control   States she doesn't go out unless she hasn't eaten for 24 hours   She can drink fluids but if she eats she needs a toilet   Patient varies from constipation to "explosion  "  Patient has seen Dr Yesi Stevenson for gastroenterology but was over 6 years ago  [facility] states she is unable to stop it once starts and has a full bowel movement amount   Patient had a colon resection for diverticulitis in 2001   Was not diverted        Patient states her headaches worsened around December 2019 or January 2020  Angie Headley symptoms have worsened and having more nausea and vomiting  Angie Headley psychiatrist changed her medications in November 2019 and she stopped taking her medications in January 2020       Interval updates as of 4/22/2021  Patient states her migraines Are markedly improved since restarting Botox  Patient states the 1st month after Botox was a little challenging however, they are now down to 2 more mild migraines a week with 2 severe month  Patient states the Nurtec does provide significant relief for her       Unable to take triptans to do chest pain, SOB and palpitations  QTc 12/23/2016 456 ms     What medications do you take or have you taken for your headaches?    Current Preventive:   Abilify, Wellbutrin  MVI  Propranolol  Botox  Current Abortive:   Fioricet   Prochlorperazine  Nurtec     Prior Preventive:   Lisinopril, labetalol  Topamax, carbamazepine, Lyrica, Depakote (weigh gain), Gabapentin (no help)  cyclobenzaprine, Tizanidine,  Seroquel, citalopram, duloxetine, venlafaxine, olanzapine- ineffective,   Cyproheptadine (too much dryness),   Aimovig 12/4/2019     Prior Abortive:   Dexamethasone,  Naproxen   Promethazine,   Kenalog,  Percocet, tramadol, Toradol, narcotics   Headache trigger: Fatigue, Stress/Tension, Weather change, lack of sleep  Alternative therapies used in the past for headaches? none   Headache are worse if the patient: cough, sneeze, bending over  Aura - none     Any family history of migraines? Yes, mother and father  Any family history of aneurysms? No     Current pain:  0/10  How often do the headaches occur -  this month (apr 2021) has had 2 a week, severe 2 the entire month prior to restart of Botox was almost daily   What time of the day do the headaches start - either wakes up with them or states it is mid day  How long do the headaches last - all day,if takes Nurtec lasts 3-4 hours  Where are they located - frontalis, retro orbital, temporalis and then neck pain   What is the intensity of pain - 7-10/10; average 6-7/10; gets to a 10/10 3 times a month  Describe your usual headache - Throbbing, Pounding, Pulsing  Associated symptoms:   -       Decrease of appetite, nausea, vomiting   -       Photophobia, phonophobia, sensitivity to smell   -       Problem with concentration  -       Stiff or sore neck  -       prefer to be alone and in a dark room, unable to work     Number of days missed per month because of headaches:  Work (or school) days: NA  Social or Family activities: a lot     What time of the year do headaches occur more frequently?   Change of weather  Have you seen someone else for headaches or pain? Yes  Have you had trigger point injection performed and how often? No  Have you had Botox injection performed and how often? Yes   Have you had epidural injections or transforaminal injections performed? No     Have you used CBD or THC for your headaches and how often? No  Are you current pregnant or planning on getting pregnant? No, done with family planning  Have you ever had any Brain imaging? yes      7/23/2018 CT head  No acute abnormalities      I personally reviewed these images      Reviewed old notes from physician seen in the past- see above HPI for summary of previous encounters           Past Medical History:   Diagnosis Date    Asthma     Bipolar 1 disorder ([facility] [address] )     Disease of thyroid gland     Fibromyalgia     Gout     Hyperlipidemia     Hypertension     Lupus ([facility] [address] )     Migraine        Past Surgical History:   Procedure Laterality Date    GALLBLADDER SURGERY  2008    HYSTERECTOMY  2014    LAPAROSCOPIC COLON RESECTION  2001       Current Outpatient Medications   Medication Sig Dispense Refill    acetaminophen (TYLENOL) 325 mg tablet Take 650 mg by mouth every 6 (six) hours as needed for mild pain      ARIPiprazole (ABILIFY) 15 mg tablet Take 1 tablet by mouth daily      aspirin-acetaminophen-caffeine (EXCEDRIN MIGRAINE) 250-250-65 MG per tablet Take 2 tablets by mouth every 6 (six) hours as needed for headaches      buPROPion (WELLBUTRIN) 75 mg tablet Take 75 mg by mouth every morning      Cetirizine HCl (ZYRTEC ALLERGY) 10 MG CAPS Take 10 mg by mouth daily        clonazePAM (KlonoPIN) 1 mg tablet 1 mg 2 (two) times a day   0    fluticasone (FLONASE) 50 mcg/act nasal spray 1 spray into each nostril daily (Patient taking differently: 1 spray into each nostril daily as needed ) 16 g 0    fluticasone (FLOVENT HFA) 110 MCG/ACT inhaler Inhale 1 puff as needed       hydroxychloroquine (PLAQUENIL) 200 mg tablet as needed       ibuprofen (MOTRIN) 600 mg tablet Take 1 tablet (600 mg total) by mouth every 6 (six) hours as needed for moderate pain 30 tablet 0    levothyroxine 88 mcg tablet Take 88 mcg by mouth daily       lisinopril (ZESTRIL) 10 mg tablet Take 20 mg by mouth daily      meclizine (ANTIVERT) 12 5 MG tablet as needed      Multiple Vitamins-Minerals (MULTIVITAMIN ADULT PO) Take 1 tablet by mouth daily      niacin (NIASPAN) 500 mg CR tablet Take 500 mg by mouth daily take with food      omeprazole (PriLOSEC) 40 MG capsule Take 40 mg by mouth as needed   0    oxyCODONE (ROXICODONE) 15 mg immediate release tablet Take 15 mg by mouth as needed       PROAIR  (90 Base) MCG/ACT inhaler Inhale 1-2 puffs as needed       Probiotic Product (PROBIOTIC DAILY PO) Take 1 tablet by mouth daily      prochlorperazine (COMPAZINE) 10 mg tablet Take 1 tablet (10 mg total) by mouth every 6 (six) hours as needed (migraine) 20 tablet 3    propranolol (INDERAL LA) 160 mg Take 160 mg by mouth daily at bedtime   0    propranolol (INDERAL LA) 60 mg 24 hr capsule Take 60 mg by mouth daily at bedtime       Rimegepant Sulfate (Nurtec) 75 MG TBDP Take 75 mg by mouth as needed (migraine) 8 tablet 3    topiramate (TOPAMAX) 100 mg tablet One in am and two at bedtime daily 270 tablet 3    conjugated estrogens (PREMARIN) vaginal cream Insert 0 5 g into the vagina 3 (three) times a week 30 g 2    hydrocortisone 2 5 % cream Apply to times a day to face for 5 days in a row  Do not put on eyes   30 g 1    ibuprofen (MOTRIN) 800 mg tablet Take 800 mg by mouth as needed       ondansetron (ZOFRAN-ODT) 4 mg disintegrating tablet Take 1 tablet (4 mg total) by mouth every 6 (six) hours as needed for nausea (Patient not taking: Reported on 2/29/2020) 20 tablet 0    pentosan polysulfate (ELMIRON) 100 mg capsule Take 1 capsule (100 mg total) by mouth 3 (three) times a day before meals 90 capsule 3    Restasis 0 05 % ophthalmic emulsion instill 1 drop INTO AFFECTED EYE(S) every 12 hours      valACYclovir (VALTREX) 1,000 mg tablet 1 tablet as needed        Current Facility-Administered Medications   Medication Dose Route Frequency Provider Last Rate Last Admin    Botulinum Toxin Type A SOLR 200 Units  200 Units Injection Q3 Months Luz Marina Herron PA-C   200 Units at 03/18/21 1154        Allergies   Allergen Reactions    Other     Pregabalin      Other reaction(s): blurred vision    Shellfish-Derived Products - Food Allergy Anaphylaxis and Hives    Sumatriptan      Other reaction(s): Altered Heart Rate    Triptans Shortness Of Breath and Palpitations    Latex Swelling and Rash     Other reaction(s): Hives/Skin Rash  Other reaction(s): Hives/Skin Rash    Sulfa Antibiotics Swelling and Rash     Other reaction(s): Hives/Skin Rash  Other reaction(s): Hives/Skin Rash    Monistat [Miconazole] Swelling    Shrimp Extract Allergy Skin Test - Food Allergy     I have reviewed the patient's medical, social and surgical history as well as medications in detail and updated the computerized patient record  Review of Systems   Constitutional: Negative  HENT: Negative  Eyes: Negative  Respiratory: Negative  Cardiovascular: Negative  Gastrointestinal: Negative  Endocrine: Negative  Genitourinary: Negative  Musculoskeletal: Negative  Skin: Negative  Allergic/Immunologic: Negative  Neurological: Positive for headaches  Hematological: Negative  Psychiatric/Behavioral: Negative  Video Exam    There were no vitals filed for this visit  Physical Exam   CONSTITUTIONAL: Well developed, well nourished, well groomed  No dysmorphic features  Eyes:  EOM normal      Neck:  Normal ROM, neck supple  HEENT:  Normocephalic atraumatic  Chest:  Respirations regular and unlabored      Psychiatric:  Normal behavior and appropriate affect      MENTAL STATUS  Orientation: Alert and oriented x 3  Fund of knowledge: Intact  MOTOR (Upper and lower extremities)   Bulk/tone/abnormal movement: Normal muscle bulk and tone  I spent 15 minutes with patient today in which greater than 50% of the time was spent in counseling/coordination of care regarding as above and 15 minutes of non-face to face time      VIRTUAL VISIT DISCLAIMER    Gonzalo De La Garza acknowledges that she has consented to an online visit or consultation  She understands that the online visit is based solely on information provided by her, and that, in the absence of a face-to-face physical evaluation by the physician, the diagnosis she receives is both limited and provisional in terms of accuracy and completeness  This is not intended to replace a full medical face-to-face evaluation by the physician  Gonzalo Holli understands and accepts these terms

## 2021-04-28 ENCOUNTER — HOSPITAL ENCOUNTER (OUTPATIENT)
Dept: ULTRASOUND IMAGING | Facility: CLINIC | Age: 46
Discharge: HOME | End: 2021-04-28
Payer: MEDICARE

## 2021-04-28 VITALS — WEIGHT: 205 LBS | HEIGHT: 65 IN | BODY MASS INDEX: 34.16 KG/M2

## 2021-04-28 DIAGNOSIS — R92.8 ABNORMAL MAMMOGRAM: ICD-10-CM

## 2021-04-28 PROCEDURE — 76642 ULTRASOUND BREAST LIMITED: CPT

## 2021-04-30 ENCOUNTER — TELEPHONE (OUTPATIENT)
Dept: OBGYN CLINIC | Facility: CLINIC | Age: 46
End: 2021-04-30

## 2021-04-30 NOTE — TELEPHONE ENCOUNTER
Informed patient of breast u/s results  Advised patient to call office regarding any pain or changes  Patient agreeable

## 2021-05-10 ENCOUNTER — DOCUMENTATION (OUTPATIENT)
Dept: NEUROLOGY | Facility: CLINIC | Age: 46
End: 2021-05-10

## 2021-05-10 NOTE — PROGRESS NOTES
Patient is scheduled with Nika Hanks on 6/23/2021 in the [facility] 
Type Date User Summary Attachment   General 05/10/2021  8:04 AM Layton Car care coordination  -   Note    Botox- no authorization needed   Please use our stock      Thank you,     Jose Bravo
None

## 2021-05-26 ENCOUNTER — OFFICE VISIT (OUTPATIENT)
Dept: OBGYN CLINIC | Facility: HOSPITAL | Age: 46
End: 2021-05-26
Payer: MEDICARE

## 2021-05-26 ENCOUNTER — HOSPITAL ENCOUNTER (OUTPATIENT)
Dept: RADIOLOGY | Facility: HOSPITAL | Age: 46
Discharge: HOME | End: 2021-05-26
Attending: ORTHOPAEDIC SURGERY
Payer: MEDICARE

## 2021-05-26 VITALS
BODY MASS INDEX: 34.09 KG/M2 | HEART RATE: 68 BPM | SYSTOLIC BLOOD PRESSURE: 149 MMHG | DIASTOLIC BLOOD PRESSURE: 95 MMHG | HEIGHT: 65 IN | WEIGHT: 204.6 LBS

## 2021-05-26 DIAGNOSIS — M65.341 TRIGGER RING FINGER OF RIGHT HAND: ICD-10-CM

## 2021-05-26 DIAGNOSIS — M79.644 THUMB PAIN, RIGHT: ICD-10-CM

## 2021-05-26 DIAGNOSIS — M65.331 TRIGGER MIDDLE FINGER OF RIGHT HAND: ICD-10-CM

## 2021-05-26 DIAGNOSIS — M18.11 ARTHRITIS OF CARPOMETACARPAL (CMC) JOINT OF RIGHT THUMB: Primary | ICD-10-CM

## 2021-05-26 PROCEDURE — 99203 OFFICE O/P NEW LOW 30 MIN: CPT | Performed by: ORTHOPAEDIC SURGERY

## 2021-05-26 PROCEDURE — 20550 NJX 1 TENDON SHEATH/LIGAMENT: CPT | Performed by: ORTHOPAEDIC SURGERY

## 2021-05-26 PROCEDURE — 20600 DRAIN/INJ JOINT/BURSA W/O US: CPT | Performed by: ORTHOPAEDIC SURGERY

## 2021-05-26 PROCEDURE — 73140 X-RAY EXAM OF FINGER(S): CPT

## 2021-05-26 RX ORDER — LIDOCAINE HYDROCHLORIDE 10 MG/ML
1 INJECTION, SOLUTION INFILTRATION; PERINEURAL
Status: COMPLETED | OUTPATIENT
Start: 2021-05-26 | End: 2021-05-26

## 2021-05-26 RX ORDER — BETAMETHASONE SODIUM PHOSPHATE AND BETAMETHASONE ACETATE 3; 3 MG/ML; MG/ML
6 INJECTION, SUSPENSION INTRA-ARTICULAR; INTRALESIONAL; INTRAMUSCULAR; SOFT TISSUE
Status: COMPLETED | OUTPATIENT
Start: 2021-05-26 | End: 2021-05-26

## 2021-05-26 RX ORDER — LIDOCAINE HYDROCHLORIDE 10 MG/ML
0.5 INJECTION, SOLUTION INFILTRATION; PERINEURAL
Status: COMPLETED | OUTPATIENT
Start: 2021-05-26 | End: 2021-05-26

## 2021-05-26 RX ORDER — BETAMETHASONE SODIUM PHOSPHATE AND BETAMETHASONE ACETATE 3; 3 MG/ML; MG/ML
3 INJECTION, SUSPENSION INTRA-ARTICULAR; INTRALESIONAL; INTRAMUSCULAR; SOFT TISSUE
Status: COMPLETED | OUTPATIENT
Start: 2021-05-26 | End: 2021-05-26

## 2021-05-26 RX ADMIN — LIDOCAINE HYDROCHLORIDE 0.5 ML: 10 INJECTION, SOLUTION INFILTRATION; PERINEURAL at 12:09

## 2021-05-26 RX ADMIN — BETAMETHASONE SODIUM PHOSPHATE AND BETAMETHASONE ACETATE 6 MG: 3; 3 INJECTION, SUSPENSION INTRA-ARTICULAR; INTRALESIONAL; INTRAMUSCULAR; SOFT TISSUE at 12:09

## 2021-05-26 RX ADMIN — LIDOCAINE HYDROCHLORIDE 1 ML: 10 INJECTION, SOLUTION INFILTRATION; PERINEURAL at 12:09

## 2021-05-26 RX ADMIN — BETAMETHASONE SODIUM PHOSPHATE AND BETAMETHASONE ACETATE 3 MG: 3; 3 INJECTION, SUSPENSION INTRA-ARTICULAR; INTRALESIONAL; INTRAMUSCULAR; SOFT TISSUE at 12:09

## 2021-05-26 NOTE — PROGRESS NOTES
ASSESSMENT/PLAN:    Assessment:   Trigger Finger  right  long finger, ring finger and Thumb CMC Arthritis  right    Plan:   The patient will proceed with a right long and ring trigger finger steroid injection today this will be her first injection  The patient will proceed with right thumb cmc jt steroid injection as well as a comfort cool brace today  She has no formal restrictions  Follow Up:  6  week(s)    To Do Next Visit:       General Discussions:     CMC Arthritis: The anatomy and physiology of carpometacarpal joint arthritis was discussed with the patient today in the office  Deterioration of the articular cartilage eventually leads to hypermobility at the thumb [facility] joint, resulting in joint subluxation, osteophyte formation, cystic changes within the trapezium and base of the first metacarpal, as well as subchondral sclerosis  Eventually, pain, limited mobility, and compensatory hyperextension at the metacarpophalangeal joint may develop  While normal activity and usage of the thumb joint may provide a painful experience to the patient, this typically does not result in damage to the thumb or hand  Treatment options include resting thumb spica splints to decreased joint edema, pain, and inflammation  Therapy exercises to strengthen the thenar musculature may relieve pain, but do not alter the overall continued development of osteoarthritis  Oral medications, topical medications, corticosteroid injections may decrease pain and increase overall function  Eventually, approximately 5% of patients may require surgical intervention  Trigger FInger: The anatomy and physiology of trigger finger was discussed with the patient today in the office  Edema and increased contact pressure within the flexor tendons at the A1 pulley can cause pain, crepitation, and limitation of function    Treatment options include resting MP blocking splints to decrease edema, oral anti-inflammatory medications, home or formal therapy exercises, up to 2 steroid injections within the tendon sheath, or surgical release  While majority of patients do respond to conservative treatment, up to 20% may require surgical release  Operative Discussions:       _____________________________________________________  CHIEF COMPLAINT:  Chief Complaint   Patient presents with    Right Thumb - Pain         SUBJECTIVE:  Rina Neves is a 54 y o  female who presents with Pain  Moderate  Intermittant  Sharp and Aching to the right long finger, ring finger and thumb  She states that her thumb pain started about 6 months ago and her trigger fingers started about 1 5 years ago     Radiation: Yes to the  long finger, ring finger and thumb  Previous Treatments: activity modification without relief  Associated symptoms: No Complaints  Handedness: right  Work status: homemaker     PAST MEDICAL HISTORY:  Past Medical History:   Diagnosis Date    Asthma     Bipolar 1 disorder (RUST 75 )     Disease of thyroid gland     Fibromyalgia     Gout     Hyperlipidemia     Hypertension     Lupus (RUST 75 )     Migraine        PAST SURGICAL HISTORY:  Past Surgical History:   Procedure Laterality Date    GALLBLADDER SURGERY  2008    HYSTERECTOMY  2014    LAPAROSCOPIC COLON RESECTION  2001       FAMILY HISTORY:  Family History   Problem Relation Age of Onset    Lupus Mother     Osteoporosis Mother     Hypotension Mother     Hypertension Father     Diabetes Father     Heart disease Father     Diabetes Sister     Schizophrenia Sister     Hypertension Sister     Diabetes Maternal Grandmother     Rheum arthritis Maternal Grandmother     Hypertension Maternal Grandmother     Breast cancer Maternal Aunt 36    No Known Problems Maternal Aunt     No Known Problems Maternal Aunt     No Known Problems Maternal Aunt     No Known Problems Paternal Aunt        SOCIAL HISTORY:  Social History     Tobacco Use    Smoking status: Former Smoker    Smokeless tobacco: Never Used    Tobacco comment: quit at 24years old   Substance Use Topics    Alcohol use: No    Drug use: No       MEDICATIONS:    Current Outpatient Medications:     acetaminophen (TYLENOL) 325 mg tablet, Take 650 mg by mouth every 6 (six) hours as needed for mild pain, Disp: , Rfl:     ARIPiprazole (ABILIFY) 15 mg tablet, Take 1 tablet by mouth daily, Disp: , Rfl:     aspirin-acetaminophen-caffeine (EXCEDRIN MIGRAINE) 250-250-65 MG per tablet, Take 2 tablets by mouth every 6 (six) hours as needed for headaches, Disp: , Rfl:     buPROPion (WELLBUTRIN) 75 mg tablet, Take 75 mg by mouth every morning, Disp: , Rfl:     Cetirizine HCl (ZYRTEC ALLERGY) 10 MG CAPS, Take 10 mg by mouth daily  , Disp: , Rfl:     clonazePAM (KlonoPIN) 1 mg tablet, 1 mg 2 (two) times a day , Disp: , Rfl: 0    fluticasone (FLONASE) 50 mcg/act nasal spray, 1 spray into each nostril daily (Patient taking differently: 1 spray into each nostril daily as needed ), Disp: 16 g, Rfl: 0    fluticasone (FLOVENT HFA) 110 MCG/ACT inhaler, Inhale 1 puff as needed , Disp: , Rfl:     hydroxychloroquine (PLAQUENIL) 200 mg tablet, as needed , Disp: , Rfl:     ibuprofen (MOTRIN) 600 mg tablet, Take 1 tablet (600 mg total) by mouth every 6 (six) hours as needed for moderate pain, Disp: 30 tablet, Rfl: 0    ibuprofen (MOTRIN) 800 mg tablet, Take 800 mg by mouth as needed , Disp: , Rfl:     levothyroxine 88 mcg tablet, Take 88 mcg by mouth daily , Disp: , Rfl:     lisinopril (ZESTRIL) 10 mg tablet, Take 20 mg by mouth daily, Disp: , Rfl:     meclizine (ANTIVERT) 12 5 MG tablet, as needed, Disp: , Rfl:     Multiple Vitamins-Minerals (MULTIVITAMIN ADULT PO), Take 1 tablet by mouth daily, Disp: , Rfl:     niacin (NIASPAN) 500 mg CR tablet, Take 500 mg by mouth daily take with food, Disp: , Rfl:     omeprazole (PriLOSEC) 40 MG capsule, Take 40 mg by mouth as needed , Disp: , Rfl: 0    oxyCODONE (ROXICODONE) 15 mg immediate release tablet, Take 15 mg by mouth as needed , Disp: , Rfl:     PROAIR  (90 Base) MCG/ACT inhaler, Inhale 1-2 puffs as needed , Disp: , Rfl:     Probiotic Product (PROBIOTIC DAILY PO), Take 1 tablet by mouth daily, Disp: , Rfl:     prochlorperazine (COMPAZINE) 10 mg tablet, Take 1 tablet (10 mg total) by mouth every 6 (six) hours as needed (migraine), Disp: 20 tablet, Rfl: 3    propranolol (INDERAL LA) 160 mg, Take 160 mg by mouth daily at bedtime , Disp: , Rfl: 0    propranolol (INDERAL LA) 60 mg 24 hr capsule, Take 60 mg by mouth daily at bedtime , Disp: , Rfl:     Rimegepant Sulfate (Nurtec) 75 MG TBDP, Take 75 mg by mouth as needed (migraine), Disp: 8 tablet, Rfl: 3    topiramate (TOPAMAX) 100 mg tablet, One in am and two at bedtime daily, Disp: 270 tablet, Rfl: 3    valACYclovir (VALTREX) 1,000 mg tablet, 1 tablet as needed , Disp: , Rfl:     ondansetron (ZOFRAN-ODT) 4 mg disintegrating tablet, Take 1 tablet (4 mg total) by mouth every 6 (six) hours as needed for nausea (Patient not taking: Reported on 2/29/2020), Disp: 20 tablet, Rfl: 0    Current Facility-Administered Medications:     Botulinum Toxin Type A SOLR 200 Units, 200 Units, Injection, Q3 Months, Kassie Andrade PA-C, 200 Units at 03/18/21 1154    ALLERGIES:  Allergies   Allergen Reactions    Other     Pregabalin      Other reaction(s): blurred vision    Shellfish-Derived Products - Food Allergy Anaphylaxis and Hives    Sumatriptan      Other reaction(s): Altered Heart Rate    Triptans Shortness Of Breath and Palpitations    Latex Swelling and Rash     Other reaction(s): Hives/Skin Rash  Other reaction(s): Hives/Skin Rash    Sulfa Antibiotics Swelling and Rash     Other reaction(s): Hives/Skin Rash  Other reaction(s): Hives/Skin Rash    Monistat [Miconazole] Swelling    Shrimp Extract Allergy Skin Test - Food Allergy        REVIEW OF SYSTEMS:  Pertinent items are noted in HPI      LABS:  HgA1c:   Lab Results   Component Value Date HGBA1C 5 6 11/20/2019     BMP:   Lab Results   Component Value Date    GLUCOSE 109 11/11/2015    CALCIUM 8 9 02/29/2020     11/11/2015    K 3 9 02/29/2020    CO2 23 02/29/2020     (H) 02/29/2020    BUN 16 02/29/2020    CREATININE 0 95 02/29/2020         _____________________________________________________  PHYSICAL EXAMINATION:  Vital signs: /95   Pulse 68   Ht 5' 4 5" (1 638 m)   Wt 92 8 kg (204 lb 9 6 oz)   LMP  (LMP Unknown)   BMI 34 58 kg/m²   General: well developed and well nourished, alert, oriented times 3 and appears comfortable  Psychiatric: Normal  HEENT: Trachea Midline, No torticollis  Cardiovascular: No discernable arrhythmia  Pulmonary: No wheezing or stridor  Abdomen: No rebound or guarding  Extremities: No peripheral edema  Skin: No Erythema  Neurovascular: Sensation Intact to the Median, Ulnar, Radial Nerve, Motor Intact to the Median, Ulnar, Radial Nerve and Pulses Intact    MUSCULOSKELETAL EXAMINATION:  RIGHT SIDE:  CMC: No Instability, Positive tendnerness CMC and no MP jt hyperextension and Finger:  Tenderness to A1 pulley of long and ring, Crepitation long finger and Nodules  long finger, ring finger negative tinels at carpal tunnel,   No evidence of intrinsic tightness    _____________________________________________________  STUDIES REVIEWED:  Images were reviewed in PACS by Dr Merna Do and demonstrate: xray of right thumb on 5/26/2021 demonstrates stage 2 thumb cmc jt arthritis  PROCEDURES PERFORMED:  Hand/upper extremity injection: R long A1  Universal Protocol:  Consent: Verbal consent obtained    Risks and benefits: risks, benefits and alternatives were discussed  Consent given by: patient  Site marked: the operative site was marked  Supporting Documentation  Indications: tendon swelling   Procedure Details  Condition:trigger finger Location: long finger - R long A1   Medications administered: 1 mL lidocaine 1 %; 6 mg betamethasone acetate-betamethasone sodium phosphate 6 (3-3) mg/mL    Patient tolerance: patient tolerated the procedure well with no immediate complications  Dressing:  Sterile dressing applied     Hand/upper extremity injection: R ring A1  Universal Protocol:  Consent: Verbal consent obtained  Risks and benefits: risks, benefits and alternatives were discussed  Consent given by: patient  Site marked: the operative site was marked  Supporting Documentation  Indications: tendon swelling   Procedure Details  Condition:trigger finger Location: ring finger - R ring A1   Medications administered: 1 mL lidocaine 1 %; 6 mg betamethasone acetate-betamethasone sodium phosphate 6 (3-3) mg/mL    Patient tolerance: patient tolerated the procedure well with no immediate complications  Dressing:  Sterile dressing applied     Small joint arthrocentesis: R thumb CMC  [place] Protocol:  Consent: Verbal consent obtained    Risks and benefits: risks, benefits and alternatives were discussed  Consent given by: patient  Site marked: the operative site was marked  Supporting Documentation  Indications: pain   Procedure Details  Location: thumb - R thumb CMC  Medications administered: 3 mg betamethasone acetate-betamethasone sodium phosphate 6 (3-3) mg/mL; 0 5 mL lidocaine 1 %    Patient tolerance: patient tolerated the procedure well with no immediate complications  Dressing:  Sterile dressing applied            Scribe Attestation    I,:  Olga Castillo am acting as a scribe while in the presence of the attending physician :       I,:  Alexi Maciel MD personally performed the services described in this documentation    as scribed in my presence :

## 2021-05-27 ENCOUNTER — APPOINTMENT (EMERGENCY)
Dept: RADIOLOGY | Facility: HOSPITAL | Age: 46
End: 2021-05-27
Payer: MEDICARE

## 2021-05-27 ENCOUNTER — HOSPITAL ENCOUNTER (EMERGENCY)
Facility: HOSPITAL | Age: 46
Discharge: HOME | End: 2021-05-27
Attending: EMERGENCY MEDICINE | Admitting: EMERGENCY MEDICINE
Payer: MEDICARE

## 2021-05-27 VITALS
HEART RATE: 70 BPM | DIASTOLIC BLOOD PRESSURE: 72 MMHG | TEMPERATURE: 99.1 F | OXYGEN SATURATION: 98 % | SYSTOLIC BLOOD PRESSURE: 138 MMHG | RESPIRATION RATE: 16 BRPM

## 2021-05-27 DIAGNOSIS — R07.89 FEELING OF CHEST TIGHTNESS: Primary | ICD-10-CM

## 2021-05-27 DIAGNOSIS — G43.009 MIGRAINE WITHOUT AURA AND WITHOUT STATUS MIGRAINOSUS, NOT INTRACTABLE: ICD-10-CM

## 2021-05-27 LAB
ALBUMIN SERPL BCP-MCNC: 3.7 G/DL (ref 3.5–5)
ALP SERPL-CCNC: 64 U/L (ref 46–116)
ALT SERPL W P-5'-P-CCNC: 41 U/L (ref 12–78)
ANION GAP SERPL CALCULATED.3IONS-SCNC: 7 MMOL/L (ref 4–13)
AST SERPL W P-5'-P-CCNC: 23 U/L (ref 5–45)
BACTERIA UR QL AUTO: NORMAL /HPF
BASOPHILS # BLD AUTO: 0.02 THOUSANDS/ΜL (ref 0–0.1)
BASOPHILS NFR BLD AUTO: 0 % (ref 0–1)
BILIRUB SERPL-MCNC: 0.24 MG/DL (ref 0.2–1)
BILIRUB UR QL STRIP: NEGATIVE
BUN SERPL-MCNC: 14 MG/DL (ref 5–25)
CALCIUM SERPL-MCNC: 9.5 MG/DL (ref 8.3–10.1)
CHLORIDE SERPL-SCNC: 111 MMOL/L (ref 100–108)
CLARITY UR: CLEAR
CO2 SERPL-SCNC: 22 MMOL/L (ref 21–32)
COLOR UR: YELLOW
CREAT SERPL-MCNC: 0.92 MG/DL (ref 0.6–1.3)
D DIMER PPP FEU-MCNC: 0.33 UG/ML FEU
EOSINOPHIL # BLD AUTO: 0 THOUSAND/ΜL (ref 0–0.61)
EOSINOPHIL NFR BLD AUTO: 0 % (ref 0–6)
ERYTHROCYTE [DISTWIDTH] IN BLOOD BY AUTOMATED COUNT: 12.8 % (ref 11.6–15.1)
EXT PREG TEST URINE: NEGATIVE
EXT. CONTROL ED NAV: NORMAL
GFR SERPL CREATININE-BSD FRML MDRD: 75 ML/MIN/1.73SQ M
GLUCOSE SERPL-MCNC: 144 MG/DL (ref 65–140)
GLUCOSE UR STRIP-MCNC: NEGATIVE MG/DL
HCT VFR BLD AUTO: 39.3 % (ref 34.8–46.1)
HGB BLD-MCNC: 13 G/DL (ref 11.5–15.4)
HGB UR QL STRIP.AUTO: ABNORMAL
HYALINE CASTS #/AREA URNS LPF: NORMAL /LPF
IMM GRANULOCYTES # BLD AUTO: 0.12 THOUSAND/UL (ref 0–0.2)
IMM GRANULOCYTES NFR BLD AUTO: 1 % (ref 0–2)
KETONES UR STRIP-MCNC: NEGATIVE MG/DL
LEUKOCYTE ESTERASE UR QL STRIP: NEGATIVE
LYMPHOCYTES # BLD AUTO: 1.36 THOUSANDS/ΜL (ref 0.6–4.47)
LYMPHOCYTES NFR BLD AUTO: 8 % (ref 14–44)
MCH RBC QN AUTO: 29.6 PG (ref 26.8–34.3)
MCHC RBC AUTO-ENTMCNC: 33.1 G/DL (ref 31.4–37.4)
MCV RBC AUTO: 90 FL (ref 82–98)
MONOCYTES # BLD AUTO: 0.74 THOUSAND/ΜL (ref 0.17–1.22)
MONOCYTES NFR BLD AUTO: 4 % (ref 4–12)
NEUTROPHILS # BLD AUTO: 14.85 THOUSANDS/ΜL (ref 1.85–7.62)
NEUTS SEG NFR BLD AUTO: 87 % (ref 43–75)
NITRITE UR QL STRIP: NEGATIVE
NON-SQ EPI CELLS URNS QL MICRO: NORMAL /HPF
NRBC BLD AUTO-RTO: 0 /100 WBCS
PH UR STRIP.AUTO: 6 [PH] (ref 4.5–8)
PLATELET # BLD AUTO: 336 THOUSANDS/UL (ref 149–390)
PMV BLD AUTO: 9.8 FL (ref 8.9–12.7)
POTASSIUM SERPL-SCNC: 3.9 MMOL/L (ref 3.5–5.3)
PROT SERPL-MCNC: 8.3 G/DL (ref 6.4–8.2)
PROT UR STRIP-MCNC: NEGATIVE MG/DL
RBC # BLD AUTO: 4.39 MILLION/UL (ref 3.81–5.12)
RBC #/AREA URNS AUTO: NORMAL /HPF
SARS-COV-2 RNA RESP QL NAA+PROBE: NEGATIVE
SODIUM SERPL-SCNC: 140 MMOL/L (ref 136–145)
SP GR UR STRIP.AUTO: <=1.005 (ref 1–1.03)
TROPONIN I SERPL-MCNC: <0.02 NG/ML
UROBILINOGEN UR QL STRIP.AUTO: 0.2 E.U./DL
WBC # BLD AUTO: 17.09 THOUSAND/UL (ref 4.31–10.16)
WBC #/AREA URNS AUTO: NORMAL /HPF

## 2021-05-27 PROCEDURE — 99285 EMERGENCY DEPT VISIT HI MDM: CPT

## 2021-05-27 PROCEDURE — 96360 HYDRATION IV INFUSION INIT: CPT

## 2021-05-27 PROCEDURE — 99284 EMERGENCY DEPT VISIT MOD MDM: CPT | Performed by: PHYSICIAN ASSISTANT

## 2021-05-27 PROCEDURE — 85025 COMPLETE CBC W/AUTO DIFF WBC: CPT | Performed by: EMERGENCY MEDICINE

## 2021-05-27 PROCEDURE — 36415 COLL VENOUS BLD VENIPUNCTURE: CPT | Performed by: EMERGENCY MEDICINE

## 2021-05-27 PROCEDURE — 93005 ELECTROCARDIOGRAM TRACING: CPT

## 2021-05-27 PROCEDURE — U0003 INFECTIOUS AGENT DETECTION BY NUCLEIC ACID (DNA OR RNA); SEVERE ACUTE RESPIRATORY SYNDROME CORONAVIRUS 2 (SARS-COV-2) (CORONAVIRUS DISEASE [COVID-19]), AMPLIFIED PROBE TECHNIQUE, MAKING USE OF HIGH THROUGHPUT TECHNOLOGIES AS DESCRIBED BY CMS-2020-01-R: HCPCS | Performed by: PHYSICIAN ASSISTANT

## 2021-05-27 PROCEDURE — 81025 URINE PREGNANCY TEST: CPT | Performed by: PHYSICIAN ASSISTANT

## 2021-05-27 PROCEDURE — 85379 FIBRIN DEGRADATION QUANT: CPT | Performed by: PHYSICIAN ASSISTANT

## 2021-05-27 PROCEDURE — 94640 AIRWAY INHALATION TREATMENT: CPT

## 2021-05-27 PROCEDURE — 71045 X-RAY EXAM CHEST 1 VIEW: CPT

## 2021-05-27 PROCEDURE — 81001 URINALYSIS AUTO W/SCOPE: CPT

## 2021-05-27 PROCEDURE — 84484 ASSAY OF TROPONIN QUANT: CPT | Performed by: EMERGENCY MEDICINE

## 2021-05-27 PROCEDURE — 80053 COMPREHEN METABOLIC PANEL: CPT | Performed by: EMERGENCY MEDICINE

## 2021-05-27 PROCEDURE — 96372 THER/PROPH/DIAG INJ SC/IM: CPT

## 2021-05-27 PROCEDURE — U0005 INFEC AGEN DETEC AMPLI PROBE: HCPCS | Performed by: PHYSICIAN ASSISTANT

## 2021-05-27 RX ORDER — KETOROLAC TROMETHAMINE 30 MG/ML
15 INJECTION, SOLUTION INTRAMUSCULAR; INTRAVENOUS ONCE
Status: COMPLETED | OUTPATIENT
Start: 2021-05-27 | End: 2021-05-27

## 2021-05-27 RX ORDER — IPRATROPIUM BROMIDE AND ALBUTEROL SULFATE 2.5; .5 MG/3ML; MG/3ML
3 SOLUTION RESPIRATORY (INHALATION) ONCE
Status: COMPLETED | OUTPATIENT
Start: 2021-05-27 | End: 2021-05-27

## 2021-05-27 RX ORDER — ASPIRIN 81 MG/1
324 TABLET, CHEWABLE ORAL ONCE
Status: COMPLETED | OUTPATIENT
Start: 2021-05-27 | End: 2021-05-27

## 2021-05-27 RX ADMIN — SODIUM CHLORIDE 500 ML: 0.9 INJECTION, SOLUTION INTRAVENOUS at 11:49

## 2021-05-27 RX ADMIN — KETOROLAC TROMETHAMINE 15 MG: 30 INJECTION, SOLUTION INTRAMUSCULAR; INTRAVENOUS at 13:29

## 2021-05-27 RX ADMIN — ASPIRIN 324 MG: 81 TABLET, CHEWABLE ORAL at 11:49

## 2021-05-27 RX ADMIN — IPRATROPIUM BROMIDE AND ALBUTEROL SULFATE 3 ML: 2.5; .5 SOLUTION RESPIRATORY (INHALATION) at 13:30

## 2021-05-27 NOTE — Clinical Note
Laurie Janene was seen and treated in our emergency department on 5/27/2021  Diagnosis:     Asif Oneill  may return to work on return date  She may return on this date: 05/28/2021         If you have any questions or concerns, please don't hesitate to call        Cheli Prakash PA-C    ______________________________           _______________          _______________  Hospital Representative                              Date                                Time

## 2021-05-27 NOTE — ED PROVIDER NOTES
History  Chief Complaint   Patient presents with    Chest Pain     Pt has been having pain for 3 days, high BP too   Palpitations     "i had an ablation for palpitations and it feels like that"  Pt states no sleep last night   Headache     Pt states ongong front and side of temples  "I've been eating motrin and excederin for the past 3 days"     54 y o  female presents for evaluation of mostly constant chest tightness and discomfort that is worse at night x 3 days, notes she feels like she cant take a deep breath, symptoms are not worsened by anything and not improved by anyting  Notes she had a cardiac ablation approximately 6 years ago for SVT and at times discomfort is similar to that but does not feel her heart racing  She does not she has had a persistent HA that is not relieved by tylenol, motrin or excedrin  Does have history of migraines and this is consistent with her typical migraine syndrome  Denies N/V/D, SOB, radiation of pain, reproducible pain, dizziness, lightheadedness, syncope, fainting, photophobia  PMH includes HTN, Hyperlipidemia, AVNRT with ablation, provoked DVT, Lupus, asthma and migraines          Prior to Admission Medications   Prescriptions Last Dose Informant Patient Reported? Taking?    ARIPiprazole (ABILIFY) 15 mg tablet   Yes No   Sig: Take 1 tablet by mouth daily   Cetirizine HCl (ZYRTEC ALLERGY) 10 MG CAPS  Self Yes No   Sig: Take 10 mg by mouth daily     Multiple Vitamins-Minerals (MULTIVITAMIN ADULT PO)  Self Yes No   Sig: Take 1 tablet by mouth daily   PROAIR  (90 Base) MCG/ACT inhaler  Self Yes No   Sig: Inhale 1-2 puffs as needed    Probiotic Product (PROBIOTIC DAILY PO)  Self Yes No   Sig: Take 1 tablet by mouth daily   Rimegepant Sulfate (Nurtec) 75 MG TBDP   No No   Sig: Take 75 mg by mouth as needed (migraine)   acetaminophen (TYLENOL) 325 mg tablet  Self Yes No   Sig: Take 650 mg by mouth every 6 (six) hours as needed for mild pain aspirin-acetaminophen-caffeine (Marylouise Lat) 250-250-65 MG per tablet  Self Yes No   Sig: Take 2 tablets by mouth every 6 (six) hours as needed for headaches   buPROPion (WELLBUTRIN) 75 mg tablet   Yes No   Sig: Take 75 mg by mouth every morning   clonazePAM (KlonoPIN) 1 mg tablet  Self Yes No   Si mg 2 (two) times a day    fluticasone (FLONASE) 50 mcg/act nasal spray   No No   Si spray into each nostril daily   Patient taking differently: 1 spray into each nostril daily as needed    fluticasone (FLOVENT HFA) 110 MCG/ACT inhaler  Self Yes No   Sig: Inhale 1 puff as needed    hydroxychloroquine (PLAQUENIL) 200 mg tablet  Self Yes No   Sig: as needed    ibuprofen (MOTRIN) 600 mg tablet  Self No No   Sig: Take 1 tablet (600 mg total) by mouth every 6 (six) hours as needed for moderate pain   ibuprofen (MOTRIN) 800 mg tablet  Self Yes No   Sig: Take 800 mg by mouth as needed    levothyroxine 88 mcg tablet  Self Yes No   Sig: Take 88 mcg by mouth daily    lisinopril (ZESTRIL) 10 mg tablet  Self Yes No   Sig: Take 20 mg by mouth daily   meclizine (ANTIVERT) 12 5 MG tablet   Yes No   Sig: as needed   niacin (NIASPAN) 500 mg CR tablet   Yes No   Sig: Take 500 mg by mouth daily take with food   omeprazole (PriLOSEC) 40 MG capsule  Self Yes No   Sig: Take 40 mg by mouth as needed    ondansetron (ZOFRAN-ODT) 4 mg disintegrating tablet  Self No No   Sig: Take 1 tablet (4 mg total) by mouth every 6 (six) hours as needed for nausea   Patient not taking: Reported on 2020   oxyCODONE (ROXICODONE) 15 mg immediate release tablet  Self Yes No   Sig: Take 15 mg by mouth as needed    prochlorperazine (COMPAZINE) 10 mg tablet   No No   Sig: Take 1 tablet (10 mg total) by mouth every 6 (six) hours as needed (migraine)   propranolol (INDERAL LA) 160 mg  Self Yes No   Sig: Take 160 mg by mouth daily at bedtime    propranolol (INDERAL LA) 60 mg 24 hr capsule  Self Yes No   Sig: Take 60 mg by mouth daily at bedtime topiramate (TOPAMAX) 100 mg tablet   No No   Sig: One in am and two at bedtime daily   valACYclovir (VALTREX) 1,000 mg tablet  Self Yes No   Si tablet as needed       Facility-Administered Medications Last Administration Doses Remaining   Botulinum Toxin Type A SOLR 200 Units 3/18/2021 11:54 AM    betamethasone acetate-betamethasone sodium phosphate (CELESTONE) injection 6 mg 2021 12:09 PM 0   betamethasone acetate-betamethasone sodium phosphate (CELESTONE) injection 6 mg 2021 12:09 PM 0   betamethasone acetate-betamethasone sodium phosphate (CELESTONE) injection 3 mg 2021 12:09 PM 0   lidocaine (XYLOCAINE) 1 % injection 0 5 mL 2021 12:09 PM 0   lidocaine (XYLOCAINE) 1 % injection 1 mL 2021 12:09 PM 0   lidocaine (XYLOCAINE) 1 % injection 1 mL 2021 12:09 PM 0          Past Medical History:   Diagnosis Date    Asthma     [facility]     Disease of thyroid gland     Fibromyalgia     Gout     Hyperlipidemia     Hypertension     Lupus [facility]     Migraine        Past Surgical History:   Procedure Laterality Date    GALLBLADDER SURGERY  2008    HYSTERECTOMY  2014    LAPAROSCOPIC COLON RESECTION         Family History   Problem Relation Age of Onset    Lupus Mother     Osteoporosis Mother     Hypotension Mother     Hypertension Father     Diabetes Father     Heart disease Father     Diabetes Sister     Schizophrenia Sister     Hypertension Sister     Diabetes Maternal Grandmother     Rheum arthritis Maternal Grandmother     Hypertension Maternal Grandmother     Breast cancer Maternal Aunt 36    No Known Problems Maternal Aunt     No Known Problems Maternal Aunt     No Known Problems Maternal Aunt     No Known Problems Paternal Aunt      I have reviewed and agree with the history as documented      E-Cigarette/Vaping    E-Cigarette Use Never User      E-Cigarette/Vaping Substances    Nicotine No     THC No     CBD No     Flavoring No     Other No     Unknown No      Social History     Tobacco Use    Smoking status: Former Smoker    Smokeless tobacco: Never Used    Tobacco comment: quit at 24years old   Substance Use Topics    Alcohol use: No    Drug use: No       Review of Systems   Respiratory: Positive for chest tightness  Cardiovascular: Positive for chest pain  All other systems reviewed and are negative  Physical Exam  Physical Exam  Vitals signs and nursing note reviewed  Constitutional:       General: She is not in acute distress  Appearance: She is well-developed and normal weight  She is not ill-appearing or toxic-appearing  HENT:      Head: Normocephalic and atraumatic  Eyes:      Extraocular Movements: Extraocular movements intact  Pupils: Pupils are equal, round, and reactive to light  Neck:      Musculoskeletal: Normal range of motion and neck supple  Cardiovascular:      Rate and Rhythm: Normal rate and regular rhythm  Heart sounds: Normal heart sounds  Pulmonary:      Effort: Pulmonary effort is normal  No tachypnea, accessory muscle usage or respiratory distress  Breath sounds: Examination of the right-lower field reveals decreased breath sounds  Examination of the left-lower field reveals decreased breath sounds  Decreased breath sounds present  Abdominal:      Palpations: Abdomen is soft  Musculoskeletal: Normal range of motion  Skin:     General: Skin is warm and dry  Capillary Refill: Capillary refill takes less than 2 seconds  Neurological:      General: No focal deficit present  Mental Status: She is alert and oriented to person, place, and time     Psychiatric:         Mood and Affect: Mood normal          Behavior: Behavior normal          Vital Signs  ED Triage Vitals   Temperature Pulse Respirations Blood Pressure SpO2   05/27/21 1121 05/27/21 1118 05/27/21 1118 05/27/21 1118 05/27/21 1118   99 1 °F (37 3 °C) 83 18 (!) 176/91 97 %      Temp Source Heart Rate Source Patient Position - Orthostatic VS BP Location FiO2 (%)   05/27/21 1121 05/27/21 1118 05/27/21 1118 05/27/21 1118 --   Oral Monitor Sitting Right arm       Pain Score       05/27/21 1118       9           Vitals:    05/27/21 1118 05/27/21 1245 05/27/21 1300   BP: (!) 176/91 (!) 179/86 138/72   Pulse: 83 74 70   Patient Position - Orthostatic VS: Sitting           Visual Acuity      ED Medications  Medications   aspirin chewable tablet 324 mg (324 mg Oral Given 5/27/21 1149)   sodium chloride 0 9 % bolus 500 mL (0 mL Intravenous Stopped 5/27/21 1249)   ketorolac (TORADOL) injection 15 mg (15 mg Intramuscular Given 5/27/21 1329)   ipratropium-albuterol (DUO-NEB) 0 5-2 5 mg/3 mL inhalation solution 3 mL (3 mL Nebulization Given 5/27/21 1330)       Diagnostic Studies  Results Reviewed     Procedure Component Value Units Date/Time    Novel Coronavirus (Covid-19),PCR [facility] [[id number]]  (Normal) Collected: 05/27/21 1154    Lab Status: Final result Specimen: Nares from Nose Updated: 05/27/21 1353     SARS-CoV-2 Negative    Narrative:           D-dimer, quantitative [[id number]]  (Normal) Collected: 05/27/21 1126    Lab Status: Final result Specimen: Blood from Arm, Right Updated: 05/27/21 1343     D-Dimer, Quant 0 33 ug/ml FEU     Urine Microscopic [[id number]]  (Normal) Collected: 05/27/21 1246    Lab Status: Final result Specimen: Urine Updated: 05/27/21 1317     RBC, UA 2-4 /hpf      WBC, UA None Seen /hpf      Epithelial Cells None Seen /hpf      Bacteria, UA None Seen /hpf      Hyaline Casts, UA None Seen /lpf     POCT pregnancy, urine [[id number]]  (Normal) Resulted: 05/27/21 1250    Lab Status: Final result Updated: 05/27/21 1250     EXT PREG TEST UR (Ref: Negative) negative     Control valid    POCT urinalysis dipstick [[id number]]  (Normal) Resulted: 05/27/21 1250    Lab Status: Final result Specimen: Urine Updated: 05/27/21 1250    Urine Macroscopic, POC [[id number]]  (Abnormal) Collected: 05/27/21 1246    Lab Status: Final result Specimen: Urine Updated: 05/27/21 1248     Color, UA Yellow     Clarity, UA Clear     pH, UA 6 0     Leukocytes, UA Negative     Nitrite, UA Negative     Protein, UA Negative mg/dl      Glucose, UA Negative mg/dl      Ketones, UA Negative mg/dl      Urobilinogen, UA 0 2 E U /dl      Bilirubin, UA Negative     Blood, UA Small     Specific [place], UA <=1 005    Narrative:      CLINITEK RESULT    Troponin I [[id number]]  (Normal) Collected: 05/27/21 1126    Lab Status: Final result Specimen: Blood from Arm, Right Updated: 05/27/21 1214     Troponin I <0 02 ng/mL     Comprehensive metabolic panel [[id number]]  (Abnormal) Collected: 05/27/21 1126    Lab Status: Final result Specimen: Blood from Arm, Right Updated: 05/27/21 1213     Sodium 140 mmol/L      Potassium 3 9 mmol/L      Chloride 111 mmol/L      CO2 22 mmol/L      ANION GAP 7 mmol/L      BUN 14 mg/dL      Creatinine 0 92 mg/dL      Glucose 144 mg/dL      Calcium 9 5 mg/dL      AST 23 U/L      ALT 41 U/L      Alkaline Phosphatase 64 U/L      Total Protein 8 3 g/dL      Albumin 3 7 g/dL      Total Bilirubin 0 24 mg/dL      eGFR 75 ml/min/1 73sq m     Narrative:      [place] guidelines for Chronic Kidney Disease (CKD):     Stage 1 with normal or high GFR (GFR > 90 mL/min/1 73 square meters)    Stage 2 Mild CKD (GFR = 60-89 mL/min/1 73 square meters)    Stage 3A Moderate CKD (GFR = 45-59 mL/min/1 73 square meters)    Stage 3B Moderate CKD (GFR = 30-44 mL/min/1 73 square meters)    Stage 4 Severe CKD (GFR = 15-29 mL/min/1 73 square meters)    Stage 5 End Stage CKD (GFR <15 mL/min/1 73 square meters)  Note: GFR calculation is accurate only with a steady state creatinine    CBC and differential [[id number]]  (Abnormal) Collected: 05/27/21 1126    Lab Status: Final result Specimen: Blood from Arm, Right Updated: 05/27/21 1147     WBC 17 09 Thousand/uL      RBC 4 39 Million/uL      Hemoglobin 13 0 g/dL      Hematocrit 39 3 %      MCV 90 fL      MCH 29 6 pg      MCHC 33 1 g/dL      RDW 12 8 %      MPV 9 8 fL      Platelets 231 Thousands/uL      nRBC 0 /100 WBCs      Neutrophils Relative 87 %      Immat GRANS % 1 %      Lymphocytes Relative 8 %      Monocytes Relative 4 %      Eosinophils Relative 0 %      Basophils Relative 0 %      Neutrophils Absolute 14 85 Thousands/µL      Immature Grans Absolute 0 12 Thousand/uL      Lymphocytes Absolute 1 36 Thousands/µL      Monocytes Absolute 0 74 Thousand/µL      Eosinophils Absolute 0 00 Thousand/µL      Basophils Absolute 0 02 Thousands/µL                  XR chest 1 view portable   Final Result by Slick Rodgers DO (05/27 1533)      No acute cardiopulmonary disease                    Workstation performed: [id number]                    Procedures  Procedures         ED Course             HEART Risk Score      Most Recent Value   Heart Score Risk Calculator   History  0 Filed at: 05/27/2021 1353   ECG  1 Filed at: 05/27/2021 1353   Age  1 Filed at: 05/27/2021 1353   Risk Factors  2 Filed at: 05/27/2021 1353   Troponin  0 Filed at: 05/27/2021 1353   HEART Score  4 Filed at: 05/27/2021 1353              PERC Rule for PE      Most Recent Value   PERC Rule for PE   Age >=50  0 Filed at: 05/27/2021 1353   HR >=100  0 Filed at: 05/27/2021 1353   O2 Sat on room air < 95%  0 Filed at: 05/27/2021 1353   History of PE or DVT  1 Filed at: 05/27/2021 1353   Recent trauma or surgery  0 Filed at: 05/27/2021 1353   Hemoptysis  0 Filed at: 05/27/2021 1353   Exogenous estrogen  0 Filed at: 05/27/2021 1353   Unilateral leg swelling  0 Filed at: 05/27/2021 1353   [address]  Rule for PE Results  1 Filed at: 05/27/2021 1353                            MDM  Number of Diagnoses or Management Options  Feeling of chest tightness:   Migraine without aura and without status migrainosus, not intractable:   Diagnosis management comments: Pt  Is A&Ox3, vss afebrile, NAD nontoxic appearing,resting comfortably, PERRLA, EOMI, LS CTA but diminished/tight b/l bases, RRR abd soft NT/ND, +BSx4, cap refill is brisk, no LE edema,   See additional risk stratification scores  ECG NSR no ST elevation,   Initial troponin negative, D-dimer negative, CBC, CMP unremarkable  Will give 324 aspirin, toradol, and duoneb  Does not want admission, feels better and would like to go home  ACS unlikely   PT feeling better after duo-neb did discuss need to follow up with PCP and cardiology,  Strict return precautions discussed pt verbalized understanding will DC      Disposition  Final diagnoses:   Feeling of chest tightness   Migraine without aura and without status migrainosus, not intractable     Time reflects when diagnosis was documented in both MDM as applicable and the Disposition within this note     Time User Action Codes Description Comment    5/27/2021  2:25 PM Adaline Deacon Add [R07 [id number]] Feeling of chest tightness     5/27/2021  2:25 PM Adaline Deacon Add [G43 009] Migraine without aura and without status migrainosus, not intractable       ED Disposition     ED Disposition Condition Date/Time Comment    Discharge Stable Thu May 27, 2021  2:25 PM Alanna Whitman discharge to home/self care              Follow-up Information     Follow up With Specialties Details Why Contact Trini Brown MD Internal Medicine   [phone] [facility]            Discharge Medication List as of 5/27/2021  2:28 PM      CONTINUE these medications which have NOT CHANGED    Details   acetaminophen (TYLENOL) 325 mg tablet Take 650 mg by mouth every 6 (six) hours as needed for mild pain, Historical Med      ARIPiprazole (ABILIFY) 15 mg tablet Take 1 tablet by mouth daily, Starting Sat 2/27/2021, Historical Med      aspirin-acetaminophen-caffeine (EXCEDRIN MIGRAINE) 250-250-65 MG per tablet Take 2 tablets by mouth every 6 (six) hours as needed for headaches, Historical Med      buPROPion (WELLBUTRIN) 75 mg tablet Take 75 mg by mouth every morning, Starting Tue 12/29/2020, Historical Med      Cetirizine HCl (ZYRTEC ALLERGY) 10 MG CAPS Take 10 mg by mouth daily  , Historical Med      clonazePAM (KlonoPIN) 1 mg tablet 1 mg 2 (two) times a day , Starting Sat 12/15/2018, Historical Med      fluticasone (FLONASE) 50 mcg/act nasal spray 1 spray into each nostril daily, Starting Sat 2/29/2020, Print      fluticasone (FLOVENT HFA) 110 MCG/ACT inhaler Inhale 1 puff as needed , Historical Med      hydroxychloroquine (PLAQUENIL) 200 mg tablet as needed , Starting Fri 6/15/2018, Historical Med      !! ibuprofen (MOTRIN) 600 mg tablet Take 1 tablet (600 mg total) by mouth every 6 (six) hours as needed for moderate pain, Starting Mon 4/1/2019, Print      !! ibuprofen (MOTRIN) 800 mg tablet Take 800 mg by mouth as needed , Starting Thu 9/6/2018, Historical Med      levothyroxine 88 mcg tablet Take 88 mcg by mouth daily , Starting Sun 3/31/2019, Historical Med      lisinopril (ZESTRIL) 10 mg tablet Take 20 mg by mouth daily, Starting Mon 12/23/2019, Historical Med      meclizine (ANTIVERT) 12 5 MG tablet as needed, Starting Sat 12/5/2020, Historical Med      Multiple Vitamins-Minerals (MULTIVITAMIN ADULT PO) Take 1 tablet by mouth daily, Historical Med      niacin (NIASPAN) 500 mg CR tablet Take 500 mg by mouth daily take with food, Starting Fri 7/17/2020, Historical Med      omeprazole (PriLOSEC) 40 MG capsule Take 40 mg by mouth as needed , Starting Wed 5/8/2019, Historical Med      ondansetron (ZOFRAN-ODT) 4 mg disintegrating tablet Take 1 tablet (4 mg total) by mouth every 6 (six) hours as needed for nausea, Starting Mon 4/1/2019, Print      oxyCODONE (ROXICODONE) 15 mg immediate release tablet Take 15 mg by mouth as needed , Starting Fri 7/13/2018, Historical Med      PROAIR  (90 Base) MCG/ACT inhaler Inhale 1-2 puffs as needed , Starting Fri 11/9/2018, Historical Med      Probiotic Product (PROBIOTIC DAILY PO) Take 1 tablet by mouth daily, Historical Med      prochlorperazine (COMPAZINE) 10 mg tablet Take 1 tablet (10 mg total) by mouth every 6 (six) hours as needed (migraine), Starting Mon 3/22/2021, Normal      !! propranolol (INDERAL LA) 160 mg Take 160 mg by mouth daily at bedtime , Starting Tue 4/16/2019, Historical Med      !! propranolol (INDERAL LA) 60 mg 24 hr capsule Take 60 mg by mouth daily at bedtime , Starting Sun 3/31/2019, Historical Med      Rimegepant Sulfate (Nurtec) 75 MG TBDP Take 75 mg by mouth as needed (migraine), Starting Fri 4/9/2021, Normal      topiramate (TOPAMAX) 100 mg tablet One in am and two at bedtime daily, Normal      valACYclovir (VALTREX) 1,000 mg tablet 1 tablet as needed , Starting Thu 5/7/2015, Historical Med       !! - Potential duplicate medications found  Please discuss with provider  No discharge procedures on file      PDMP Review     None          ED Provider  Electronically Signed by           Tyler Mattson PA-C  05/27/21 0314

## 2021-05-27 NOTE — DISCHARGE INSTRUCTIONS
Make follow up appointment with PCP and cardiology for follow up  Return to ER if development of fever, chills, nausea, vomiting, shortness of breath, chest pain, worsening symptoms  Use albuterol inhaler as directed

## 2021-05-29 LAB
ATRIAL RATE: 74 BPM
P AXIS: 61 DEGREES
PR INTERVAL: 152 MS
QRS AXIS: 42 DEGREES
QRSD INTERVAL: 86 MS
QT INTERVAL: 366 MS
QTC INTERVAL: 406 MS
T WAVE AXIS: 46 DEGREES
VENTRICULAR RATE: 74 BPM

## 2021-05-29 PROCEDURE — 93010 ELECTROCARDIOGRAM REPORT: CPT | Performed by: INTERNAL MEDICINE

## 2021-06-23 ENCOUNTER — PROCEDURE VISIT (OUTPATIENT)
Dept: NEUROLOGY | Facility: CLINIC | Age: 46
End: 2021-06-23
Payer: MEDICARE

## 2021-06-23 VITALS — HEART RATE: 72 BPM | SYSTOLIC BLOOD PRESSURE: 139 MMHG | TEMPERATURE: 98.2 F | DIASTOLIC BLOOD PRESSURE: 81 MMHG

## 2021-06-23 DIAGNOSIS — G43.709 CHRONIC MIGRAINE WITHOUT AURA WITHOUT STATUS MIGRAINOSUS, NOT INTRACTABLE: Primary | ICD-10-CM

## 2021-06-23 PROCEDURE — 64615 CHEMODENERV MUSC MIGRAINE: CPT | Performed by: PHYSICIAN ASSISTANT

## 2021-06-23 RX ORDER — TOPIRAMATE 100 MG/1
TABLET, FILM COATED ORAL
Qty: 270 TABLET | Refills: 3 | Status: SHIPPED | OUTPATIENT
Start: 2021-06-23 | End: 2022-03-07 | Stop reason: SDUPTHER

## 2021-06-23 RX ORDER — PROCHLORPERAZINE MALEATE 10 MG
10 TABLET ORAL EVERY 6 HOURS PRN
Qty: 20 TABLET | Refills: 3 | Status: SHIPPED | OUTPATIENT
Start: 2021-06-23 | End: 2022-02-04 | Stop reason: SDUPTHER

## 2021-06-23 RX ORDER — RIMEGEPANT SULFATE 75 MG/75MG
75 TABLET, ORALLY DISINTEGRATING ORAL AS NEEDED
Qty: 8 TABLET | Refills: 3 | Status: SHIPPED | OUTPATIENT
Start: 2021-06-23 | End: 2022-03-07 | Stop reason: SDUPTHER

## 2021-06-23 NOTE — PROGRESS NOTES

## 2021-08-04 ENCOUNTER — PREPPED CHART (OUTPATIENT)
Dept: URBAN - METROPOLITAN AREA CLINIC 6 | Facility: CLINIC | Age: 46
End: 2021-08-04

## 2021-08-25 ENCOUNTER — OFFICE VISIT (OUTPATIENT)
Dept: OBGYN CLINIC | Facility: HOSPITAL | Age: 46
End: 2021-08-25
Payer: MEDICARE

## 2021-08-25 VITALS
HEIGHT: 65 IN | DIASTOLIC BLOOD PRESSURE: 92 MMHG | SYSTOLIC BLOOD PRESSURE: 166 MMHG | WEIGHT: 206.6 LBS | BODY MASS INDEX: 34.42 KG/M2 | HEART RATE: 79 BPM

## 2021-08-25 DIAGNOSIS — M18.11 ARTHRITIS OF CARPOMETACARPAL (CMC) JOINT OF RIGHT THUMB: ICD-10-CM

## 2021-08-25 DIAGNOSIS — M65.331 TRIGGER MIDDLE FINGER OF RIGHT HAND: Primary | ICD-10-CM

## 2021-08-25 DIAGNOSIS — M65.341 TRIGGER RING FINGER OF RIGHT HAND: ICD-10-CM

## 2021-08-25 PROCEDURE — 20600 DRAIN/INJ JOINT/BURSA W/O US: CPT | Performed by: ORTHOPAEDIC SURGERY

## 2021-08-25 PROCEDURE — 99214 OFFICE O/P EST MOD 30 MIN: CPT | Performed by: ORTHOPAEDIC SURGERY

## 2021-08-25 PROCEDURE — 20550 NJX 1 TENDON SHEATH/LIGAMENT: CPT | Performed by: ORTHOPAEDIC SURGERY

## 2021-08-25 RX ORDER — BETAMETHASONE SODIUM PHOSPHATE AND BETAMETHASONE ACETATE 3; 3 MG/ML; MG/ML
3 INJECTION, SUSPENSION INTRA-ARTICULAR; INTRALESIONAL; INTRAMUSCULAR; SOFT TISSUE
Status: COMPLETED | OUTPATIENT
Start: 2021-08-25 | End: 2021-08-25

## 2021-08-25 RX ORDER — BETAMETHASONE SODIUM PHOSPHATE AND BETAMETHASONE ACETATE 3; 3 MG/ML; MG/ML
6 INJECTION, SUSPENSION INTRA-ARTICULAR; INTRALESIONAL; INTRAMUSCULAR; SOFT TISSUE
Status: COMPLETED | OUTPATIENT
Start: 2021-08-25 | End: 2021-08-25

## 2021-08-25 RX ORDER — LIDOCAINE HYDROCHLORIDE 10 MG/ML
1 INJECTION, SOLUTION INFILTRATION; PERINEURAL
Status: COMPLETED | OUTPATIENT
Start: 2021-08-25 | End: 2021-08-25

## 2021-08-25 RX ORDER — LIDOCAINE HYDROCHLORIDE 10 MG/ML
0.5 INJECTION, SOLUTION INFILTRATION; PERINEURAL
Status: COMPLETED | OUTPATIENT
Start: 2021-08-25 | End: 2021-08-25

## 2021-08-25 RX ADMIN — BETAMETHASONE SODIUM PHOSPHATE AND BETAMETHASONE ACETATE 6 MG: 3; 3 INJECTION, SUSPENSION INTRA-ARTICULAR; INTRALESIONAL; INTRAMUSCULAR; SOFT TISSUE at 15:42

## 2021-08-25 RX ADMIN — LIDOCAINE HYDROCHLORIDE 0.5 ML: 10 INJECTION, SOLUTION INFILTRATION; PERINEURAL at 15:42

## 2021-08-25 RX ADMIN — LIDOCAINE HYDROCHLORIDE 1 ML: 10 INJECTION, SOLUTION INFILTRATION; PERINEURAL at 15:42

## 2021-08-25 RX ADMIN — BETAMETHASONE SODIUM PHOSPHATE AND BETAMETHASONE ACETATE 3 MG: 3; 3 INJECTION, SUSPENSION INTRA-ARTICULAR; INTRALESIONAL; INTRAMUSCULAR; SOFT TISSUE at 15:42

## 2021-08-25 NOTE — PROGRESS NOTES
ASSESSMENT/PLAN:    Assessment:   Trigger Finger  right  long finger, ring finger  Right thumb CMC arthritis    Plan:   steroid injections  -  2nd steroid injections provided to the right long and ring finger trigger fingers  -  CMC arthritis injection provided as well    Follow Up:  3  month(s)    To Do Next Visit:  Assess response to treatment      _____________________________________________________  CHIEF COMPLAINT:  Chief Complaint   Patient presents with    Right Hand - Follow-up     TF right long, ring, and thumb CMC         SUBJECTIVE:  Yumiko Sweet is a 51 y o  adult who presents for follow up regarding  Right long and ring finger trigger fingers and right thumb CMC arthritis  Patient got good relief from her thumb CMC injection and partial relief from her trigger finger injections  She states that the pain has returned in the thumb  She states that her fingers are getting more stiff and stuck in the mornings  She denies any other acute complaints      PAST MEDICAL HISTORY:  Past Medical History:   Diagnosis Date    Asthma     Bipolar 1 disorder ([facility] )     Disease of thyroid gland     Fibromyalgia     Gout     Hyperlipidemia     Hypertension     Lupus ([facility] )     Migraine        PAST SURGICAL HISTORY:  Past Surgical History:   Procedure Laterality Date    GALLBLADDER SURGERY  2008    HYSTERECTOMY  2014    LAPAROSCOPIC COLON RESECTION  2001       FAMILY HISTORY:  Family History   Problem Relation Age of Onset    Lupus Mother     Osteoporosis Mother     Hypotension Mother     Hypertension Father     Diabetes Father     Heart disease Father     Diabetes Sister     Schizophrenia Sister     Hypertension Sister     Diabetes Maternal Grandmother     Rheum arthritis Maternal Grandmother     Hypertension Maternal Grandmother     Breast cancer Maternal Aunt 36    No Known Problems Maternal Aunt     No Known Problems Maternal Aunt     No Known Problems Maternal Aunt     No Known Problems Paternal Aunt        SOCIAL HISTORY:  Social History     Tobacco Use    Smoking status: Former Smoker    Smokeless tobacco: Never Used    Tobacco comment: quit at 24years old   Vaping Use    Vaping Use: Never used   Substance Use Topics    Alcohol use: No    Drug use: No       MEDICATIONS:    Current Outpatient Medications:     acetaminophen (TYLENOL) 325 mg tablet, Take 650 mg by mouth every 6 (six) hours as needed for mild pain, Disp: , Rfl:     ARIPiprazole (ABILIFY) 15 mg tablet, Take 1 tablet by mouth daily, Disp: , Rfl:     aspirin-acetaminophen-caffeine (EXCEDRIN MIGRAINE) 250-250-65 MG per tablet, Take 2 tablets by mouth every 6 (six) hours as needed for headaches, Disp: , Rfl:     buPROPion (WELLBUTRIN) 75 mg tablet, Take 75 mg by mouth every morning, Disp: , Rfl:     Cetirizine HCl (ZYRTEC ALLERGY) 10 MG CAPS, Take 10 mg by mouth daily  , Disp: , Rfl:     clonazePAM (KlonoPIN) 1 mg tablet, 1 mg 2 (two) times a day , Disp: , Rfl: 0    fluticasone (FLONASE) 50 mcg/act nasal spray, 1 spray into each nostril daily (Patient taking differently: 1 spray into each nostril daily as needed ), Disp: 16 g, Rfl: 0    fluticasone (FLOVENT HFA) 110 MCG/ACT inhaler, Inhale 1 puff as needed , Disp: , Rfl:     hydroxychloroquine (PLAQUENIL) 200 mg tablet, as needed , Disp: , Rfl:     ibuprofen (MOTRIN) 600 mg tablet, Take 1 tablet (600 mg total) by mouth every 6 (six) hours as needed for moderate pain, Disp: 30 tablet, Rfl: 0    levothyroxine 88 mcg tablet, Take 88 mcg by mouth daily , Disp: , Rfl:     lisinopril (ZESTRIL) 10 mg tablet, Take 20 mg by mouth daily, Disp: , Rfl:     meclizine (ANTIVERT) 12 5 MG tablet, as needed, Disp: , Rfl:     Multiple Vitamins-Minerals (MULTIVITAMIN ADULT PO), Take 1 tablet by mouth daily, Disp: , Rfl:     niacin (NIASPAN) 500 mg CR tablet, Take 500 mg by mouth daily take with food, Disp: , Rfl:     omeprazole (PriLOSEC) 40 MG capsule, Take 40 mg by mouth as needed , Disp: , Rfl: 0    oxyCODONE (ROXICODONE) 15 mg immediate release tablet, Take 15 mg by mouth as needed , Disp: , Rfl:     PROAIR  (90 Base) MCG/ACT inhaler, Inhale 1-2 puffs as needed , Disp: , Rfl:     Probiotic Product (PROBIOTIC DAILY PO), Take 1 tablet by mouth daily, Disp: , Rfl:     prochlorperazine (COMPAZINE) 10 mg tablet, Take 1 tablet (10 mg total) by mouth every 6 (six) hours as needed (migraine), Disp: 20 tablet, Rfl: 3    propranolol (INDERAL LA) 160 mg, Take 160 mg by mouth daily at bedtime , Disp: , Rfl: 0    Rimegepant Sulfate (Nurtec) 75 MG TBDP, Take 75 mg by mouth as needed (migraine), Disp: 8 tablet, Rfl: 3    topiramate (TOPAMAX) 100 mg tablet, One in am and two at bedtime daily, Disp: 270 tablet, Rfl: 3    valACYclovir (VALTREX) 1,000 mg tablet, 1 tablet as needed , Disp: , Rfl:     ibuprofen (MOTRIN) 800 mg tablet, Take 800 mg by mouth as needed , Disp: , Rfl:     ondansetron (ZOFRAN-ODT) 4 mg disintegrating tablet, Take 1 tablet (4 mg total) by mouth every 6 (six) hours as needed for nausea (Patient not taking: Reported on 2/29/2020), Disp: 20 tablet, Rfl: 0    propranolol (INDERAL LA) 60 mg 24 hr capsule, Take 60 mg by mouth daily at bedtime , Disp: , Rfl:     Current Facility-Administered Medications:     Botulinum Toxin Type A SOLR 200 Units, 200 Units, Injection, Q3 Months, Kassie Andrade PA-C, 200 Units at 03/18/21 1154    ALLERGIES:  Allergies   Allergen Reactions    Other     Pregabalin      Other reaction(s): blurred vision    Shellfish-Derived Products - Food Allergy Anaphylaxis and Hives    Sumatriptan      Other reaction(s):  Altered Heart Rate    Triptans Shortness Of Breath and Palpitations    Latex Swelling and Rash     Other reaction(s): Hives/Skin Rash  Other reaction(s): Hives/Skin Rash    Sulfa Antibiotics Swelling and Rash     Other reaction(s): Hives/Skin Rash  Other reaction(s): Hives/Skin Rash    Monistat [Miconazole] Swelling    Shrimp Extract Allergy Skin Test - Food Allergy        REVIEW OF SYSTEMS:  Pertinent items are noted in HPI  A comprehensive review of systems was negative  LABS:  HgA1c:   Lab Results   Component Value Date    HGBA1C 5 6 11/20/2019     BMP:   Lab Results   Component Value Date    GLUCOSE 109 11/11/2015    CALCIUM 9 5 05/27/2021     11/11/2015    K 3 9 05/27/2021    CO2 22 05/27/2021     (H) 05/27/2021    BUN 14 05/27/2021    CREATININE 0 92 05/27/2021           _____________________________________________________  PHYSICAL EXAMINATION:  Vital signs: /92   Pulse 79   Ht 5' 4 5" (1 638 m)   Wt 93 7 kg (206 lb 9 6 oz)   LMP  (LMP Unknown)   BMI 34 92 kg/m²   General: well developed and well nourished, alert, oriented times 3 and appears comfortable  Psychiatric: Normal  HEENT: Trachea Midline, No torticollis  Cardiovascular: No discernable arrhythmia  Pulmonary: No wheezing or stridor  Abdomen: No rebound or guarding  Extremities: No peripheral edema  Skin: No masses, erythema, lacerations, fluctation, ulcerations  Neurovascular: Sensation Intact to the Median, Ulnar, Radial Nerve, Motor Intact to the Median, Ulnar, Radial Nerve and Pulses Intact    MUSCULOSKELETAL EXAMINATION:  RIGHT SIDE:  CMC: Positive grind, Positive tendnerness CMC and Positive Shoulder Sign and Finger:  Palpable clicking long finger, ring finger, Crepitation long finger, ring finger and Nodules  long finger, ring finger    _____________________________________________________  STUDIES REVIEWED:  No Studies to review      PROCEDURES PERFORMED:  Small joint arthrocentesis: R thumb CMC  [place] Protocol:  Consent: Verbal consent obtained  Consent given by: patient  Time out: Immediately prior to procedure a "time out" was called to verify the correct patient, procedure, equipment, support staff and site/side marked as required    Supporting Documentation  Indications: pain   Procedure Details  Location: thumb - R thumb [facility] [place] [facility]  Needle size: 25 G  Medications administered: 0 5 mL lidocaine 1 %; 3 mg betamethasone acetate-betamethasone sodium phosphate 6 (3-3) mg/mL    Patient tolerance: patient tolerated the procedure well with no immediate complications  Dressing:  Sterile dressing applied    Hand/upper extremity injection: R long A1  Universal Protocol:  Consent: Verbal consent obtained  Consent given by: patient  Time out: Immediately prior to procedure a "time out" was called to verify the correct patient, procedure, equipment, support staff and site/side marked as required  Supporting Documentation  Indications: pain and therapeutic   Procedure Details  Condition:trigger finger Location: long finger - R long A1   Preparation: Patient was prepped and draped in the usual sterile fashion  Needle size: 25 G  Ultrasound guidance: no  Approach: volar  Medications administered: 1 mL lidocaine 1 %; 6 mg betamethasone acetate-betamethasone sodium phosphate 6 (3-3) mg/mL    Patient tolerance: patient tolerated the procedure well with no immediate complications  Dressing:  Sterile dressing applied     Hand/upper extremity injection: R ring A1  Universal Protocol:  Consent: Verbal consent obtained  Consent given by: patient  Time out: Immediately prior to procedure a "time out" was called to verify the correct patient, procedure, equipment, support staff and site/side marked as required    Supporting Documentation  Indications: pain and therapeutic   Procedure Details  Condition:trigger finger Location: ring finger - R ring A1   Preparation: Patient was prepped and draped in the usual sterile fashion  Needle size: 25 G  Ultrasound guidance: no  Approach: volar  Medications administered: 1 mL lidocaine 1 %; 6 mg betamethasone acetate-betamethasone sodium phosphate 6 (3-3) mg/mL    Patient tolerance: patient tolerated the procedure well with no immediate complications  Dressing:  Sterile dressing applied              Scribe Attestation    I,: Junior Newton PA-C am acting as a scribe while in the presence of the attending physician :       I,:  Yanna Mane MD personally performed the services described in this documentation    as scribed in my presence :           Bridger Stone,:  Junior Newton PA-C am acting as a scribe while in the presence of the attending physician :       I,:  Yanna Mane MD personally performed the services described in this documentation    as scribed in my presence :

## 2021-09-17 ENCOUNTER — TELEPHONE (OUTPATIENT)
Dept: OBGYN CLINIC | Facility: HOSPITAL | Age: 46
End: 2021-09-17

## 2021-09-27 ENCOUNTER — PROCEDURE VISIT (OUTPATIENT)
Dept: NEUROLOGY | Facility: CLINIC | Age: 46
End: 2021-09-27
Payer: MEDICARE

## 2021-09-27 VITALS — HEART RATE: 69 BPM | SYSTOLIC BLOOD PRESSURE: 169 MMHG | TEMPERATURE: 97.6 F | DIASTOLIC BLOOD PRESSURE: 90 MMHG

## 2021-09-27 DIAGNOSIS — G43.709 CHRONIC MIGRAINE WITHOUT AURA WITHOUT STATUS MIGRAINOSUS, NOT INTRACTABLE: Primary | ICD-10-CM

## 2021-09-27 DIAGNOSIS — G43.009 MIGRAINE WITHOUT AURA AND WITHOUT STATUS MIGRAINOSUS, NOT INTRACTABLE: ICD-10-CM

## 2021-09-27 PROCEDURE — 64615 CHEMODENERV MUSC MIGRAINE: CPT | Performed by: PHYSICIAN ASSISTANT

## 2021-09-27 RX ORDER — RIMEGEPANT SULFATE 75 MG/75MG
75 TABLET, ORALLY DISINTEGRATING ORAL EVERY OTHER DAY
Qty: 16 TABLET | Refills: 11 | Status: SHIPPED | OUTPATIENT
Start: 2021-09-27 | End: 2022-03-07

## 2021-09-27 NOTE — PROGRESS NOTES

## 2021-12-03 ENCOUNTER — OFFICE VISIT (OUTPATIENT)
Dept: OBGYN CLINIC | Facility: HOSPITAL | Age: 46
End: 2021-12-03
Payer: MEDICARE

## 2021-12-03 ENCOUNTER — OFFICE VISIT (OUTPATIENT)
Dept: OCCUPATIONAL THERAPY | Facility: HOSPITAL | Age: 46
End: 2021-12-03
Payer: MEDICARE

## 2021-12-03 VITALS
HEART RATE: 82 BPM | SYSTOLIC BLOOD PRESSURE: 148 MMHG | DIASTOLIC BLOOD PRESSURE: 85 MMHG | BODY MASS INDEX: 34.16 KG/M2 | HEIGHT: 65 IN | WEIGHT: 205 LBS

## 2021-12-03 DIAGNOSIS — M18.0 OSTEOARTHRITIS OF CARPOMETACARPAL (CMC) JOINTS OF BOTH THUMBS, UNSPECIFIED OSTEOARTHRITIS TYPE: ICD-10-CM

## 2021-12-03 DIAGNOSIS — G56.03 CARPAL TUNNEL SYNDROME, BILATERAL: ICD-10-CM

## 2021-12-03 DIAGNOSIS — M19.049 CMC ARTHRITIS: Primary | ICD-10-CM

## 2021-12-03 DIAGNOSIS — G56.03 CARPAL TUNNEL SYNDROME, BILATERAL: Primary | ICD-10-CM

## 2021-12-03 PROCEDURE — L3913 HFO W/O JOINTS CF: HCPCS | Performed by: OCCUPATIONAL THERAPIST

## 2021-12-03 PROCEDURE — 99214 OFFICE O/P EST MOD 30 MIN: CPT | Performed by: ORTHOPAEDIC SURGERY

## 2021-12-06 ENCOUNTER — TELEPHONE (OUTPATIENT)
Dept: NEUROLOGY | Facility: CLINIC | Age: 46
End: 2021-12-06

## 2021-12-28 ENCOUNTER — TELEPHONE (OUTPATIENT)
Dept: NEUROLOGY | Facility: CLINIC | Age: 46
End: 2021-12-28

## 2022-01-03 ENCOUNTER — HOSPITAL ENCOUNTER (OUTPATIENT)
Dept: RADIOLOGY | Facility: HOSPITAL | Age: 47
Discharge: HOME | End: 2022-01-03
Attending: ORTHOPAEDIC SURGERY
Payer: MEDICARE

## 2022-01-03 ENCOUNTER — PROCEDURE VISIT (OUTPATIENT)
Dept: NEUROLOGY | Facility: CLINIC | Age: 47
End: 2022-01-03
Payer: MEDICARE

## 2022-01-03 VITALS
HEIGHT: 65 IN | TEMPERATURE: 97.6 F | SYSTOLIC BLOOD PRESSURE: 128 MMHG | BODY MASS INDEX: 34.16 KG/M2 | DIASTOLIC BLOOD PRESSURE: 67 MMHG | HEART RATE: 73 BPM | WEIGHT: 205 LBS

## 2022-01-03 DIAGNOSIS — G56.03 CARPAL TUNNEL SYNDROME, BILATERAL: ICD-10-CM

## 2022-01-03 DIAGNOSIS — G43.709 CHRONIC MIGRAINE WITHOUT AURA WITHOUT STATUS MIGRAINOSUS, NOT INTRACTABLE: Primary | ICD-10-CM

## 2022-01-03 PROCEDURE — 76882 US LMTD JT/FCL EVL NVASC XTR: CPT

## 2022-01-03 PROCEDURE — 64615 CHEMODENERV MUSC MIGRAINE: CPT | Performed by: PHYSICIAN ASSISTANT

## 2022-01-03 NOTE — PROGRESS NOTES

## 2022-02-04 DIAGNOSIS — G43.009 MIGRAINE WITHOUT AURA AND WITHOUT STATUS MIGRAINOSUS, NOT INTRACTABLE: ICD-10-CM

## 2022-02-04 DIAGNOSIS — G43.709 CHRONIC MIGRAINE WITHOUT AURA WITHOUT STATUS MIGRAINOSUS, NOT INTRACTABLE: ICD-10-CM

## 2022-02-04 RX ORDER — RIMEGEPANT SULFATE 75 MG/75MG
75 TABLET, ORALLY DISINTEGRATING ORAL EVERY OTHER DAY
Qty: 16 TABLET | Refills: 0 | Status: CANCELLED | OUTPATIENT
Start: 2022-02-04

## 2022-02-04 RX ORDER — PROCHLORPERAZINE MALEATE 10 MG
10 TABLET ORAL EVERY 6 HOURS PRN
Qty: 20 TABLET | Refills: 0 | Status: SHIPPED | OUTPATIENT
Start: 2022-02-04 | End: 2022-03-07 | Stop reason: SDUPTHER

## 2022-02-04 RX ORDER — TOPIRAMATE 100 MG/1
TABLET, FILM COATED ORAL
Qty: 270 TABLET | Refills: 0 | Status: CANCELLED | OUTPATIENT
Start: 2022-02-04

## 2022-02-04 NOTE — TELEPHONE ENCOUNTER
nurtec last sent on 9/27/21 with 11 refills and topamax sent on 6/23/21 for 90 day supply with 3 refills  Pt should have refills available     Compazine last prescribed on 6/23/21-due for refill    Please sign off    Hashtrack message making her aware that refills should be available for nurtec and topamax and that we were sending refill request for compazine

## 2022-02-05 ENCOUNTER — APPOINTMENT (EMERGENCY)
Dept: CT IMAGING | Facility: HOSPITAL | Age: 47
End: 2022-02-05
Payer: MEDICARE

## 2022-02-05 ENCOUNTER — HOSPITAL ENCOUNTER (EMERGENCY)
Facility: HOSPITAL | Age: 47
Discharge: HOME | End: 2022-02-05
Attending: EMERGENCY MEDICINE | Admitting: EMERGENCY MEDICINE
Payer: MEDICARE

## 2022-02-05 VITALS
HEART RATE: 82 BPM | DIASTOLIC BLOOD PRESSURE: 78 MMHG | SYSTOLIC BLOOD PRESSURE: 139 MMHG | RESPIRATION RATE: 14 BRPM | TEMPERATURE: 98.2 F | OXYGEN SATURATION: 98 %

## 2022-02-05 DIAGNOSIS — J02.0 STREP PHARYNGITIS: Primary | ICD-10-CM

## 2022-02-05 LAB
ALBUMIN SERPL BCP-MCNC: 4 G/DL (ref 3.5–5)
ALP SERPL-CCNC: 68 U/L (ref 46–116)
ALT SERPL W P-5'-P-CCNC: 42 U/L (ref 12–78)
ANION GAP SERPL CALCULATED.3IONS-SCNC: 8 MMOL/L (ref 4–13)
AST SERPL W P-5'-P-CCNC: 22 U/L (ref 5–45)
BASOPHILS # BLD AUTO: 0.02 THOUSANDS/ΜL (ref 0–0.1)
BASOPHILS NFR BLD AUTO: 0 % (ref 0–1)
BILIRUB SERPL-MCNC: 0.46 MG/DL (ref 0.2–1)
BUN SERPL-MCNC: 9 MG/DL (ref 5–25)
CALCIUM SERPL-MCNC: 10.1 MG/DL (ref 8.3–10.1)
CHLORIDE SERPL-SCNC: 101 MMOL/L (ref 100–108)
CO2 SERPL-SCNC: 28 MMOL/L (ref 21–32)
CREAT SERPL-MCNC: 0.92 MG/DL (ref 0.6–1.3)
EOSINOPHIL # BLD AUTO: 0.01 THOUSAND/ΜL (ref 0–0.61)
EOSINOPHIL NFR BLD AUTO: 0 % (ref 0–6)
ERYTHROCYTE [DISTWIDTH] IN BLOOD BY AUTOMATED COUNT: 12.8 % (ref 11.6–15.1)
FLUAV RNA RESP QL NAA+PROBE: NEGATIVE
FLUBV RNA RESP QL NAA+PROBE: NEGATIVE
GLUCOSE SERPL-MCNC: 121 MG/DL (ref 65–140)
HCT VFR BLD AUTO: 42.4 % (ref 36.5–46.1)
HGB BLD-MCNC: 14 G/DL (ref 12–15.4)
IMM GRANULOCYTES # BLD AUTO: 0.08 THOUSAND/UL (ref 0–0.2)
IMM GRANULOCYTES NFR BLD AUTO: 1 % (ref 0–2)
LIPASE SERPL-CCNC: 37 U/L (ref 73–393)
LYMPHOCYTES # BLD AUTO: 0.88 THOUSANDS/ΜL (ref 0.6–4.47)
LYMPHOCYTES NFR BLD AUTO: 8 % (ref 14–44)
MCH RBC QN AUTO: 29 PG (ref 26.8–34.3)
MCHC RBC AUTO-ENTMCNC: 33 G/DL (ref 31.4–37.4)
MCV RBC AUTO: 88 FL (ref 82–98)
MONOCYTES # BLD AUTO: 0.66 THOUSAND/ΜL (ref 0.17–1.22)
MONOCYTES NFR BLD AUTO: 6 % (ref 4–12)
NEUTROPHILS # BLD AUTO: 9.64 THOUSANDS/ΜL (ref 1.85–7.62)
NEUTS SEG NFR BLD AUTO: 85 % (ref 43–75)
NRBC BLD AUTO-RTO: 0 /100 WBCS
PLATELET # BLD AUTO: 266 THOUSANDS/UL (ref 149–390)
PMV BLD AUTO: 9.7 FL (ref 8.9–12.7)
POTASSIUM SERPL-SCNC: 4 MMOL/L (ref 3.5–5.3)
PROT SERPL-MCNC: 8.7 G/DL (ref 6.4–8.2)
RBC # BLD AUTO: 4.82 MILLION/UL (ref 3.88–5.12)
RSV RNA RESP QL NAA+PROBE: NEGATIVE
S PYO DNA THROAT QL NAA+PROBE: DETECTED
SARS-COV-2 RNA RESP QL NAA+PROBE: NEGATIVE
SODIUM SERPL-SCNC: 137 MMOL/L (ref 136–145)
WBC # BLD AUTO: 11.29 THOUSAND/UL (ref 4.31–10.16)

## 2022-02-05 PROCEDURE — 99284 EMERGENCY DEPT VISIT MOD MDM: CPT

## 2022-02-05 PROCEDURE — 85025 COMPLETE CBC W/AUTO DIFF WBC: CPT | Performed by: EMERGENCY MEDICINE

## 2022-02-05 PROCEDURE — 96361 HYDRATE IV INFUSION ADD-ON: CPT

## 2022-02-05 PROCEDURE — 80053 COMPREHEN METABOLIC PANEL: CPT | Performed by: EMERGENCY MEDICINE

## 2022-02-05 PROCEDURE — 99284 EMERGENCY DEPT VISIT MOD MDM: CPT | Performed by: EMERGENCY MEDICINE

## 2022-02-05 PROCEDURE — 36415 COLL VENOUS BLD VENIPUNCTURE: CPT | Performed by: EMERGENCY MEDICINE

## 2022-02-05 PROCEDURE — 96374 THER/PROPH/DIAG INJ IV PUSH: CPT

## 2022-02-05 PROCEDURE — 83690 ASSAY OF LIPASE: CPT | Performed by: EMERGENCY MEDICINE

## 2022-02-05 PROCEDURE — 0241U HB NFCT DS VIR RESP RNA 4 TRGT: CPT | Performed by: EMERGENCY MEDICINE

## 2022-02-05 PROCEDURE — 87651 STREP A DNA AMP PROBE: CPT | Performed by: EMERGENCY MEDICINE

## 2022-02-05 RX ORDER — ONDANSETRON 4 MG/1
4 TABLET, ORALLY DISINTEGRATING ORAL EVERY 8 HOURS PRN
Qty: 12 TABLET | Refills: 0 | Status: SHIPPED | OUTPATIENT
Start: 2022-02-05 | End: 2022-03-07

## 2022-02-05 RX ORDER — ONDANSETRON 2 MG/ML
4 INJECTION INTRAMUSCULAR; INTRAVENOUS ONCE
Status: COMPLETED | OUTPATIENT
Start: 2022-02-05 | End: 2022-02-05

## 2022-02-05 RX ORDER — KETOROLAC TROMETHAMINE 30 MG/ML
15 INJECTION, SOLUTION INTRAMUSCULAR; INTRAVENOUS ONCE
Status: COMPLETED | OUTPATIENT
Start: 2022-02-05 | End: 2022-02-05

## 2022-02-05 RX ORDER — AMOXICILLIN 250 MG/1
500 CAPSULE ORAL ONCE
Status: COMPLETED | OUTPATIENT
Start: 2022-02-05 | End: 2022-02-05

## 2022-02-05 RX ORDER — AMOXICILLIN 500 MG/1
500 CAPSULE ORAL EVERY 12 HOURS SCHEDULED
Qty: 20 CAPSULE | Refills: 0 | Status: SHIPPED | OUTPATIENT
Start: 2022-02-05 | End: 2022-02-15

## 2022-02-05 RX ADMIN — SODIUM CHLORIDE 1000 ML: 0.9 INJECTION, SOLUTION INTRAVENOUS at 14:33

## 2022-02-05 RX ADMIN — KETOROLAC TROMETHAMINE 15 MG: 30 INJECTION, SOLUTION INTRAMUSCULAR at 15:04

## 2022-02-05 RX ADMIN — AMOXICILLIN 500 MG: 250 CAPSULE ORAL at 15:57

## 2022-02-05 RX ADMIN — ONDANSETRON 4 MG: 2 INJECTION INTRAMUSCULAR; INTRAVENOUS at 14:33

## 2022-02-05 NOTE — ED NOTES
Pt requesting zofran RX to go home with  Dr Brent Zepeda made aware and agrees to send RX to pharmacy  Pt informed        Khris Boyd RN  02/05/22 2864

## 2022-02-05 NOTE — ED NOTES
Pt was asked to pee, she stated she peed already and that she had a hysterectomy  Rn advised        Carlos Thompson  02/05/22 1417

## 2022-02-06 NOTE — ED PROVIDER NOTES
History  Chief Complaint   Patient presents with    Nausea    Vomiting    Migraine    Sore Throat       Nausea  The primary symptoms include abdominal pain, nausea and vomiting  Primary symptoms do not include fever, diarrhea, dysuria or rash  The illness does not include chills  Vomiting  Associated symptoms: abdominal pain, headaches and sore throat    Associated symptoms: no chills, no cough, no diarrhea and no fever    Migraine  Associated symptoms: abdominal pain, headaches, nausea, sore throat and vomiting    Associated symptoms: no chest pain, no congestion, no cough, no diarrhea, no fever, no rash, no shortness of breath and no wheezing    Sore Throat  Associated symptoms: abdominal pain and headaches    Associated symptoms: no chest pain, no chills, no cough, no fever, no neck stiffness, no rash, no shortness of breath and no stridor      Patient is a 48-year-old female presenting to emergency department with nausea and vomiting  Complaining of lower abdominal pain  Left lower quadrant  Has had some diarrhea  No fevers  Sore throat  History of diverticulitis with resection  mdm Will check abdominal labs, CT, swab for covid flu and rapid strep  Fluid Zofran and toradol      Prior to Admission Medications   Prescriptions Last Dose Informant Patient Reported? Taking?    ARIPiprazole (ABILIFY) 15 mg tablet   Yes No   Sig: Take 1 tablet by mouth daily   Cetirizine HCl (ZYRTEC ALLERGY) 10 MG CAPS  Self Yes No   Sig: Take 10 mg by mouth daily     Multiple Vitamins-Minerals (MULTIVITAMIN ADULT PO)  Self Yes No   Sig: Take 1 tablet by mouth daily   PROAIR  (90 Base) MCG/ACT inhaler  Self Yes No   Sig: Inhale 1-2 puffs as needed    Probiotic Product (PROBIOTIC DAILY PO)  Self Yes No   Sig: Take 1 tablet by mouth daily   Rimegepant Sulfate (Nurtec) 75 MG TBDP   No No   Sig: Take 75 mg by mouth as needed (migraine)   Rimegepant Sulfate (Nurtec) 75 MG TBDP   No No   Sig: Take 75 mg by mouth every other day   acetaminophen (TYLENOL) 325 mg tablet  Self Yes No   Sig: Take 650 mg by mouth every 6 (six) hours as needed for mild pain   aspirin-acetaminophen-caffeine (EXCEDRIN MIGRAINE) 250-250-65 MG per tablet  Self Yes No   Sig: Take 2 tablets by mouth every 6 (six) hours as needed for headaches   buPROPion (WELLBUTRIN) 75 mg tablet   Yes No   Sig: Take 75 mg by mouth every morning   clonazePAM (KlonoPIN) 1 mg tablet  Self Yes No   Si mg 2 (two) times a day    fluticasone (FLONASE) 50 mcg/act nasal spray   No No   Si spray into each nostril daily   Patient taking differently: 1 spray into each nostril daily as needed    fluticasone (FLOVENT HFA) 110 MCG/ACT inhaler  Self Yes No   Sig: Inhale 1 puff as needed    hydroxychloroquine (PLAQUENIL) 200 mg tablet  Self Yes No   Sig: as needed    ibuprofen (MOTRIN) 600 mg tablet  Self No No   Sig: Take 1 tablet (600 mg total) by mouth every 6 (six) hours as needed for moderate pain   ibuprofen (MOTRIN) 800 mg tablet  Self Yes No   Sig: Take 800 mg by mouth as needed    levothyroxine 88 mcg tablet  Self Yes No   Sig: Take 88 mcg by mouth daily    lisinopril (ZESTRIL) 10 mg tablet  Self Yes No   Sig: Take 20 mg by mouth daily   meclizine (ANTIVERT) 12 5 MG tablet   Yes No   Sig: as needed   niacin (NIASPAN) 500 mg CR tablet   Yes No   Sig: Take 500 mg by mouth daily take with food   omeprazole (PriLOSEC) 40 MG capsule  Self Yes No   Sig: Take 40 mg by mouth as needed    oxyCODONE (ROXICODONE) 15 mg immediate release tablet  Self Yes No   Sig: Take 15 mg by mouth as needed    prochlorperazine (COMPAZINE) 10 mg tablet   No No   Sig: Take 1 tablet (10 mg total) by mouth every 6 (six) hours as needed (migraine)   propranolol (INDERAL LA) 160 mg  Self Yes No   Sig: Take 160 mg by mouth daily at bedtime    propranolol (INDERAL LA) 60 mg 24 hr capsule  Self Yes No   Sig: Take 60 mg by mouth daily at bedtime    topiramate (TOPAMAX) 100 mg tablet   No No   Sig: One in am and two at bedtime daily   valACYclovir (VALTREX) 1,000 mg tablet  Self Yes No   Si tablet as needed       Facility-Administered Medications Last Administration Doses Remaining   Botulinum Toxin Type A SOLR 200 Units 3/18/2021 11:54 AM           Past Medical History:   Diagnosis Date    Asthma     Bipolar 1 disorder (HCC)     Disease of thyroid gland     Fibromyalgia     Gout     Hyperlipidemia     Hypertension     Lupus ([address] )     Migraine        Past Surgical History:   Procedure Laterality Date    GALLBLADDER SURGERY  2008    HYSTERECTOMY      LAPAROSCOPIC COLON RESECTION         Family History   Problem Relation Age of Onset    Lupus Mother     Osteoporosis Mother     Hypotension Mother     Hypertension Father     Diabetes Father     Heart disease Father     Diabetes Sister     Schizophrenia Sister     Hypertension Sister     Diabetes Maternal Grandmother     Rheum arthritis Maternal Grandmother     Hypertension Maternal Grandmother     Breast cancer Maternal Aunt 36    No Known Problems Maternal Aunt     No Known Problems Maternal Aunt     No Known Problems Maternal Aunt     No Known Problems Paternal Aunt      I have reviewed and agree with the history as documented  E-Cigarette/Vaping    E-Cigarette Use Never User      E-Cigarette/Vaping Substances    Nicotine No     THC No     CBD No     Flavoring No     Other No     Unknown No      Social History     Tobacco Use    Smoking status: Former Smoker    Smokeless tobacco: Never Used    Tobacco comment: quit at 24years old   Vaping Use    Vaping Use: Never used   Substance Use Topics    Alcohol use: No    Drug use: No       Review of Systems   Constitutional: Negative for chills, diaphoresis and fever  HENT: Positive for sore throat  Negative for congestion  Respiratory: Negative for cough, shortness of breath, wheezing and stridor      Cardiovascular: Negative for chest pain, palpitations and leg swelling  Gastrointestinal: Positive for abdominal pain, nausea and vomiting  Negative for blood in stool and diarrhea  Genitourinary: Negative for dysuria, frequency and urgency  Musculoskeletal: Negative for neck pain and neck stiffness  Skin: Negative for pallor and rash  Neurological: Positive for headaches  Negative for dizziness, syncope, weakness and light-headedness  All other systems reviewed and are negative  Physical Exam  Physical Exam  Vitals reviewed  Constitutional:       Appearance: She is well-developed  HENT:      Head: Normocephalic and atraumatic  Mouth/Throat:      Pharynx: Posterior oropharyngeal erythema present  No uvula swelling  Tonsils: No tonsillar exudate or tonsillar abscesses  Eyes:      Pupils: Pupils are equal, round, and reactive to light  Cardiovascular:      Rate and Rhythm: Normal rate and regular rhythm  Heart sounds: Normal heart sounds  Pulmonary:      Effort: Pulmonary effort is normal  No respiratory distress  Breath sounds: Normal breath sounds  Abdominal:      General: Bowel sounds are normal       Palpations: Abdomen is soft  Tenderness: There is no abdominal tenderness  Musculoskeletal:      Cervical back: Normal range of motion and neck supple  Skin:     General: Skin is warm and dry  Capillary Refill: Capillary refill takes less than 2 seconds  Neurological:      General: No focal deficit present  Mental Status: She is alert and oriented to person, place, and time           Vital Signs  ED Triage Vitals [02/05/22 1348]   Temperature Pulse Respirations Blood Pressure SpO2   98 7 °F (37 1 °C) 82 18 162/92 97 %      Temp Source Heart Rate Source Patient Position - Orthostatic VS BP Location FiO2 (%)   Oral Monitor Sitting Right arm --      Pain Score       10 - Worst Possible Pain           Vitals:    02/05/22 1348 02/05/22 1609   BP: 162/92 139/78   Pulse: 82 82   Patient Position - Orthostatic VS: Sitting Sitting         Visual Acuity  Visual Acuity      Most Recent Value   L Pupil Size (mm) 3   R Pupil Size (mm) 3          ED Medications  Medications   sodium chloride 0 9 % bolus 1,000 mL (0 mL Intravenous Stopped 2/5/22 1557)   ondansetron (ZOFRAN) injection 4 mg (4 mg Intravenous Given 2/5/22 1433)   ketorolac (TORADOL) injection 15 mg (15 mg Intravenous Given 2/5/22 1504)   amoxicillin (AMOXIL) capsule 500 mg (500 mg Oral Given 2/5/22 1557)       Diagnostic Studies  Results Reviewed     Procedure Component Value Units Date/Time    COVID/FLU/RSV - 2 hour TAT [[id number]]  (Normal) Collected: 02/05/22 1429    Lab Status: Final result Specimen: Nares from Nose Updated: 02/05/22 1555     SARS-CoV-2 Negative     INFLUENZA A PCR Negative     INFLUENZA B PCR Negative     RSV PCR Negative    Narrative:      FOR PEDIATRIC PATIENTS - copy/paste COVID Guidelines URL to browser: [email or web address]    SARS-CoV-2 assay is a Nucleic Acid Amplification assay intended for the  qualitative detection of nucleic acid from SARS-CoV-2 in nasopharyngeal  swabs  Results are for the presumptive identification of SARS-CoV-2 RNA  Positive results are indicative of infection with SARS-CoV-2, the virus  causing COVID-19, but do not rule out bacterial infection or co-infection  with other viruses  Laboratories within the [place] and its  territories are required to report all positive results to the appropriate  public health authorities  Negative results do not preclude SARS-CoV-2  infection and should not be used as the sole basis for treatment or other  patient management decisions  Negative results must be combined with  clinical observations, patient history, and epidemiological information  This test has not been FDA cleared or approved  This test has been authorized by FDA under an Emergency Use Authorization  (EUA)   This test is only authorized for the duration of time the  declaration that circumstances exist justifying the authorization of the  emergency use of an in vitro diagnostic tests for detection of SARS-CoV-2  virus and/or diagnosis of COVID-19 infection under section 564(b)(1) of  the Act, 21 U  S C  319CGK-2(U)(1), unless the authorization is terminated  or revoked sooner  The test has been validated but independent review by FDA  and CLIA is pending  Test performed using [employer] Gene[employer]pert: This RT-PCR assay targets N2,  a region unique to SARS-CoV-2  A conserved region in the E-gene was chosen  for pan-Sarbecovirus detection which includes SARS-CoV-2  Strep A PCR [[id number]]  (Abnormal) Collected: 02/05/22 1429    Lab Status: Final result Specimen: Throat Updated: 02/05/22 1512     STREP A PCR Detected    Comprehensive metabolic panel [[id number]]  (Abnormal) Collected: 02/05/22 1429    Lab Status: Final result Specimen: Blood from Arm, Right Updated: 02/05/22 1502     Sodium 137 mmol/L      Potassium 4 0 mmol/L      Chloride 101 mmol/L      CO2 28 mmol/L      ANION GAP 8 mmol/L      BUN 9 mg/dL      Creatinine 0 92 mg/dL      Glucose 121 mg/dL      Calcium 10 1 mg/dL      AST 22 U/L      ALT 42 U/L      Alkaline Phosphatase 68 U/L      Total Protein 8 7 g/dL      Albumin 4 0 g/dL      Total Bilirubin 0 46 mg/dL      eGFR --    Narrative:      Notes:     1  eGFR calculation is only valid for adults 18 years and older  2  EGFR calculation cannot be performed for patients who are transgender, non-binary, or whose legal sex, sex at birth, and gender identity differ      Lipase [[id number]]  (Abnormal) Collected: 02/05/22 1429    Lab Status: Final result Specimen: Blood from Arm, Right Updated: 02/05/22 1502     Lipase 37 u/L     CBC and differential [[id number]]  (Abnormal) Collected: 02/05/22 1429    Lab Status: Final result Specimen: Blood from Arm, Right Updated: 02/05/22 1447     WBC 11 29 Thousand/uL      RBC 4 82 Million/uL      Hemoglobin 14 0 g/dL      Hematocrit 42 4 %      MCV 88 fL      MCH 29 0 pg      MCHC 33 0 g/dL      RDW 12 8 %      MPV 9 7 fL      Platelets 542 Thousands/uL      nRBC 0 /100 WBCs      Neutrophils Relative 85 %      Immat GRANS % 1 %      Lymphocytes Relative 8 %      Monocytes Relative 6 %      Eosinophils Relative 0 %      Basophils Relative 0 %      Neutrophils Absolute 9 64 Thousands/µL      Immature Grans Absolute 0 08 Thousand/uL      Lymphocytes Absolute 0 88 Thousands/µL      Monocytes Absolute 0 66 Thousand/µL      Eosinophils Absolute 0 01 Thousand/µL      Basophils Absolute 0 02 Thousands/µL                  No orders to display              Procedures  Procedures         ED Course  ED Course as of 02/06/22 1631   Sat Feb 05, 2022   1603 Patient refusing CT scan  States it is all just from strep  States has happened to her previously  Pain is different from her diverticulitis  Understands the risk of missing diverticulitis and that it could lead to severe infection in abdomen, abscess, surgery, multi organ failure                                             MDM    Disposition  Final diagnoses:   Strep pharyngitis     Time reflects when diagnosis was documented in both MDM as applicable and the Disposition within this note     Time User Action Codes Description Comment    2/5/2022  3:39 PM Marielena Smith Add [J02 0] Strep pharyngitis       ED Disposition     ED Disposition Condition Date/Time Comment    Discharge Stable Sat Feb 5, 2022  3:38 PM Trent Evener Deborha Dubin discharge to home/self care              Follow-up Information     Follow up With Specialties Details Why Contact Info Additional Information    Nik Nj MD Internal Medicine In 3 days Re-evaluation [id number]  [place]  [place] [address] Emergency Department Emergency Medicine  As needed, If symptoms worsen [address] [facility] Emergency Department, [address], [place], [place], [address]          Discharge Medication List as of 2/5/2022  3:40 PM      START taking these medications    Details   amoxicillin (AMOXIL) 500 mg capsule Take 1 capsule (500 mg total) by mouth every 12 (twelve) hours for 10 days, Starting Sat 2/5/2022, Until Tue 2/15/2022, Normal         CONTINUE these medications which have NOT CHANGED    Details   acetaminophen (TYLENOL) 325 mg tablet Take 650 mg by mouth every 6 (six) hours as needed for mild pain, Historical Med      ARIPiprazole (ABILIFY) 15 mg tablet Take 1 tablet by mouth daily, Starting Sat 2/27/2021, Historical Med      aspirin-acetaminophen-caffeine (EXCEDRIN MIGRAINE) 250-250-65 MG per tablet Take 2 tablets by mouth every 6 (six) hours as needed for headaches, Historical Med      buPROPion (WELLBUTRIN) 75 mg tablet Take 75 mg by mouth every morning, Starting Tue 12/29/2020, Historical Med      Cetirizine HCl (ZYRTEC ALLERGY) 10 MG CAPS Take 10 mg by mouth daily  , Historical Med      clonazePAM (KlonoPIN) 1 mg tablet 1 mg 2 (two) times a day , Starting Sat 12/15/2018, Historical Med      fluticasone (FLONASE) 50 mcg/act nasal spray 1 spray into each nostril daily, Starting Sat 2/29/2020, Print      fluticasone (FLOVENT HFA) 110 MCG/ACT inhaler Inhale 1 puff as needed , Historical Med      hydroxychloroquine (PLAQUENIL) 200 mg tablet as needed , Starting Fri 6/15/2018, Historical Med      !! ibuprofen (MOTRIN) 600 mg tablet Take 1 tablet (600 mg total) by mouth every 6 (six) hours as needed for moderate pain, Starting Mon 4/1/2019, Print      !! ibuprofen (MOTRIN) 800 mg tablet Take 800 mg by mouth as needed , Starting Thu 9/6/2018, Historical Med      levothyroxine 88 mcg tablet Take 88 mcg by mouth daily , Starting Sun 3/31/2019, Historical Med      lisinopril (ZESTRIL) 10 mg tablet Take 20 mg by mouth daily, Starting Mon 12/23/2019, Historical Med      meclizine (ANTIVERT) 12 5 MG tablet as needed, Starting Sat 12/5/2020, Historical Med      Multiple Vitamins-Minerals (MULTIVITAMIN ADULT PO) Take 1 tablet by mouth daily, Historical Med      niacin (NIASPAN) 500 mg CR tablet Take 500 mg by mouth daily take with food, Starting Fri 7/17/2020, Historical Med      omeprazole (PriLOSEC) 40 MG capsule Take 40 mg by mouth as needed , Starting Wed 5/8/2019, Historical Med      oxyCODONE (ROXICODONE) 15 mg immediate release tablet Take 15 mg by mouth as needed , Starting Fri 7/13/2018, Historical Med      PROAIR  (90 Base) MCG/ACT inhaler Inhale 1-2 puffs as needed , Starting Fri 11/9/2018, Historical Med      Probiotic Product (PROBIOTIC DAILY PO) Take 1 tablet by mouth daily, Historical Med      prochlorperazine (COMPAZINE) 10 mg tablet Take 1 tablet (10 mg total) by mouth every 6 (six) hours as needed (migraine), Starting Fri 2/4/2022, Normal      !! propranolol (INDERAL LA) 160 mg Take 160 mg by mouth daily at bedtime , Starting Tue 4/16/2019, Historical Med      !! propranolol (INDERAL LA) 60 mg 24 hr capsule Take 60 mg by mouth daily at bedtime , Starting Sun 3/31/2019, Historical Med      !! Rimegepant Sulfate (Nurtec) 75 MG TBDP Take 75 mg by mouth as needed (migraine), Starting Wed 6/23/2021, Normal      !! Rimegepant Sulfate (Nurtec) 75 MG TBDP Take 75 mg by mouth every other day, Starting Mon 9/27/2021, Normal      topiramate (TOPAMAX) 100 mg tablet One in am and two at bedtime daily, Normal      valACYclovir (VALTREX) 1,000 mg tablet 1 tablet as needed , Starting Thu 5/7/2015, Historical Med      ondansetron (ZOFRAN-ODT) 4 mg disintegrating tablet Take 1 tablet (4 mg total) by mouth every 6 (six) hours as needed for nausea, Starting Mon 4/1/2019, Print       !! - Potential duplicate medications found  Please discuss with provider  No discharge procedures on file      PDMP Review     None          ED Provider  Electronically Signed by           Albert Todd MD  02/06/22 [address], MD  02/06/22 2551

## 2022-03-07 ENCOUNTER — OFFICE VISIT (OUTPATIENT)
Dept: NEUROLOGY | Facility: CLINIC | Age: 47
End: 2022-03-07
Payer: MEDICARE

## 2022-03-07 ENCOUNTER — TELEPHONE (OUTPATIENT)
Dept: NEUROLOGY | Facility: CLINIC | Age: 47
End: 2022-03-07

## 2022-03-07 VITALS
HEART RATE: 81 BPM | WEIGHT: 211.5 LBS | SYSTOLIC BLOOD PRESSURE: 130 MMHG | TEMPERATURE: 97.6 F | BODY MASS INDEX: 35.24 KG/M2 | HEIGHT: 65 IN | DIASTOLIC BLOOD PRESSURE: 85 MMHG

## 2022-03-07 DIAGNOSIS — I47.1 AVNRT (AV NODAL RE-ENTRY TACHYCARDIA) (HCC): Chronic | ICD-10-CM

## 2022-03-07 DIAGNOSIS — G43.709 CHRONIC MIGRAINE WITHOUT AURA WITHOUT STATUS MIGRAINOSUS, NOT INTRACTABLE: Primary | ICD-10-CM

## 2022-03-07 PROCEDURE — 99215 OFFICE O/P EST HI 40 MIN: CPT | Performed by: PHYSICIAN ASSISTANT

## 2022-03-07 RX ORDER — TOPIRAMATE 100 MG/1
TABLET, FILM COATED ORAL
Qty: 270 TABLET | Refills: 3 | Status: SHIPPED | OUTPATIENT
Start: 2022-03-07

## 2022-03-07 RX ORDER — PROCHLORPERAZINE MALEATE 10 MG
10 TABLET ORAL EVERY 6 HOURS PRN
Qty: 20 TABLET | Refills: 6 | Status: SHIPPED | OUTPATIENT
Start: 2022-03-07

## 2022-03-07 RX ORDER — RIMEGEPANT SULFATE 75 MG/75MG
75 TABLET, ORALLY DISINTEGRATING ORAL AS NEEDED
Qty: 8 TABLET | Refills: 3 | Status: SHIPPED | OUTPATIENT
Start: 2022-03-07 | End: 2022-05-26 | Stop reason: SDUPTHER

## 2022-03-07 NOTE — PATIENT INSTRUCTIONS
Preventative:  Botox 200 units every 3 months  Topamax 1 tab in am and 2 tabs bedtime  Continue medications from psychiatry    At onset of migraine, take Nurtec 75 mg  Limit of 1 in 24 hours  If needed may use prochlorperazine 10 mg  May repeat in 8 hours  Limit of 20 a month      Neck pain:   - We discussed the role of neck pathology and poor posture, with straightening of the normal cervical lordosis, in headaches  We discussed how tightening of the neck muscles can irritate the nerves in the occipital region of her head and cause or worsen head pain  We also discussed and demonstrated neck strengthening and relaxation exercises, as well as giving written instructions on these exercises  - We talked about the importance of good posture for improving shoulder, neck, and head pain  The patient was given visualization exercises for correcting posture, which patient will practice at home  If this simple exercise does not help improve the posture, we will consider formal physical therapy in the future  Medication overuse headaches:   - We discussed medication overuse headache [facility]) and how to avoid it in the future  It was explained that all analgesics have the potential to cause medication overuse headache [facility]) and analgesic overuse can negate the effectiveness of headache preventive measures  After successful [address] treatment, preventive medications for an underlying primary headache disorder have a greater chance for success  Avoid medications with narcotics, barbiturates, or caffeine in them as these can cause rebound headaches after very few doses and can interfere with other headache medicine efficacy  Taking any analgesics for more than 2-3 days a week can cause medication overuse headache  Reproductive age women: Should take folic acid daily when taking anti-seizure drugs especially Depakote       [place] Prescription Drug Monitoring Program report was reviewed and was appropriate      Headache management instructions  - When patient has a moderate to severe headache, they should seek rest, initiate relaxation and apply cold compresses to the head  - Maintain regular sleep schedule  Adults need at least 7-8 hours of uninterrupted a night  - Limit over the counter medications such as Tylenol, Ibuprofen, Aleve, Excedrin  (No more than 3 times a week)  - Maintain headache diary  We discussed an ABBY for a smart phone is "Migraine darrin"  - Limit caffeine to 1-2 cups 8 to 16 oz a day or less  - Avoid dietary trigger  (aged cheese, peanuts, MSG, aspartame and nitrates)  - Patient is to have regular frequent meals to prevent headache onset  - Please drink at least 64 ounces of water a day to help remain hydrated  Please call with any questions or concerns   Office number is [phone]

## 2022-03-07 NOTE — PROGRESS NOTES
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)    Chronic migraine without aura without status migrainosus, not intractable - Primary     Preventative:  Botox 200 units every 3 months  Topamax 1 tab in am and 2 tabs bedtime  Continue medications from psychiatry    At onset of migraine, take Nurtec 75 mg  Limit of 1 in 24 hours  If needed may use prochlorperazine 10 mg  May repeat in 8 hours  Limit of 20 a month         Relevant Medications    prochlorperazine (COMPAZINE) 10 mg tablet    Rimegepant Sulfate (Nurtec) 75 MG TBDP    topiramate (TOPAMAX) 100 mg tablet              History of Present Illness: We had the pleasure of evaluating Adamaris Ibarra in neurological follow up  today for headaches  As you know,  she is a 51 y o   right handed adult  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   She has a PMH of palpitations and has had an ablation      Patient states that she fell down a flight of stairs in early    Went to hospital, unsure if went via ambulance  Carine Houston states she was out of work for about a month   States had significant pain, bruising and was limping   Went to PT, chiropractor after this  Michael Bernal states went back to work as   Osorio Luna her issues with bowel incontinence began a few months after the fall   First instance happened at work where she thought that she was going to pass gas and soiled herself  Toña Drake began to have incontinence during intercourse   For the past 2-3 years has had very little control   States she doesn't go out unless she hasn't eaten for 24 hours   She can drink fluids but if she eats she needs a toilet   Patient varies from constipation to "explosion  "  Patient has seen Dr Guadalupe Lemon for gastroenterology but was over 6 years ago  Lynnann Dakin states she is unable to stop it once starts and has a full bowel movement amount   Patient had a colon resection for diverticulitis in 2001   Was not diverted        Patient states her headaches worsened around December 2019 or January 2020  David Tucker symptoms have worsened and having more nausea and vomiting  David Tucker psychiatrist changed her medications in November 2019 and she stopped taking her medications in January 2020       Interval update as of 3/7/2022  Doing same as before  Botox is helpful for her  Interval updates as of 4/22/2021  Patient states her migraines Are markedly improved since restarting Botox  Patient states the 1st month after Botox was a little challenging however, they are now down to 2 more mild migraines a week with 2 severe month  Patient states the Nurtec does provide significant relief for her       Unable to take triptans to do chest pain, SOB and palpitations  QTc 12/23/2016 456 ms     What medications do you take or have you taken for your headaches?    Current Preventive:   Abilify, Wellbutrin  MVI  Propranolol  Botox  Current Abortive:   Fioricet   Prochlorperazine  Nurtec     Prior Preventive:   Lisinopril, labetalol  Topamax, carbamazepine, Lyrica, Depakote (weigh gain), Gabapentin (no help)  cyclobenzaprine, Tizanidine,  Seroquel, citalopram, duloxetine, venlafaxine, olanzapine- ineffective,   Cyproheptadine (too much dryness),   Aimovig 12/4/2019     Prior Abortive:   Dexamethasone,  Naproxen   Promethazine,   Kenalog,  Percocet, tramadol, Toradol, narcotics   Headache trigger: Fatigue, Stress/Tension, Weather change, lack of sleep  Alternative therapies used in the past for headaches? none   Headache are worse if the patient: cough, sneeze, bending over  Aura - none     Any family history of migraines? Yes, mother and father  Any family history of aneurysms? No     Current pain:  5/10  How often do the headaches occur - mild are 2 a week, severe 2 the entire month prior to restart of Botox was almost daily   What time of the day do the headaches start - wakes up with them  How long do the headaches last - all day,if takes Nurtec lasts 3-4 hours  Where are they located - frontalis, retro orbital, temporalis and then neck pain   What is the intensity of pain - 8-10/10; gets to a 10/10 1-2 times a month (if doesn't take her rescue medications early enough)  Describe your usual headache - Throbbing, Pounding, Pulsing, pressure    Associated symptoms:   -       Decrease of appetite, nausea, vomiting   -       Photophobia, phonophobia, sensitivity to smell   -       Problem with concentration  -       Stiff or sore neck  -       prefer to be alone and in a dark room, unable to work     Number of days missed per month because of headaches:  Work (or school) days: NA  Social or Family activities: occasionally     What time of the year do headaches occur more frequently?   Change of weather  Have you seen someone else for headaches or pain? Yes  Have you had trigger point injection performed and how often? No  Have you had Botox injection performed and how often? Yes   Have you had epidural injections or transforaminal injections performed? No     Have you used CBD or THC for your headaches and how often? No  Are you current pregnant or planning on getting pregnant? No, done with family planning  Have you ever had any Brain imaging? yes      7/23/2018 CT head  No acute abnormalities      I personally reviewed these images      Reviewed old notes from physician seen in the past- see above HPI for summary of previous encounters      With botox has had a reduction of at least 7 migraine days with less abortive medication, less ER visits which correlates to headache diary    Past Medical History:   Diagnosis Date    Asthma     Bipolar 1 disorder ([place] [address] )     Disease of thyroid gland     Fibromyalgia     Gout     Hyperlipidemia     Hypertension     Lupus ([place] [address] )     Migraine        Patient Active Problem List   Diagnosis    Palpitations    AVNRT (AV win re-entry tachycardia) ([place] [address] )    Chronic migraine without aura without status migrainosus, not intractable    Interstitial cystitis    Vaginal candidiasis    Encounter for gynecological examination    Screening for breast cancer    Hyperlipidemia    Disease of thyroid gland    Yeast infection of the vagina    Vaginal discharge    Other specified anxiety disorders    Vaginal dryness    Pelvic pain    Urine frequency    Full incontinence of feces    Cervicogenic headache    Cervicalgia    Vaginal yeast infection    IC (interstitial cystitis)       Medications:      Current Outpatient Medications   Medication Sig Dispense Refill    acetaminophen (TYLENOL) 325 mg tablet Take 650 mg by mouth every 6 (six) hours as needed for mild pain      ARIPiprazole (ABILIFY) 15 mg tablet Take 1 tablet by mouth daily      aspirin-acetaminophen-caffeine (EXCEDRIN MIGRAINE) 250-250-65 MG per tablet Take 2 tablets by mouth every 6 (six) hours as needed for headaches      buPROPion (WELLBUTRIN) 75 mg tablet Take 75 mg by mouth every morning      Cetirizine HCl (ZYRTEC ALLERGY) 10 MG CAPS Take 10 mg by mouth daily        clonazePAM (KlonoPIN) 1 mg tablet 1 mg 2 (two) times a day   0    fluticasone (FLONASE) 50 mcg/act nasal spray 1 spray into each nostril daily (Patient taking differently: 1 spray into each nostril daily as needed ) 16 g 0    fluticasone (FLOVENT HFA) 110 MCG/ACT inhaler Inhale 1 puff as needed       hydroxychloroquine (PLAQUENIL) 200 mg tablet Take 200 mg by mouth as needed During Summer time       ibuprofen (MOTRIN) 600 mg tablet Take 1 tablet (600 mg total) by mouth every 6 (six) hours as needed for moderate pain 30 tablet 0    levothyroxine 88 mcg tablet Take 88 mcg by mouth daily       lisinopril (ZESTRIL) 10 mg tablet Take 20 mg by mouth daily      meclizine (ANTIVERT) 12 5 MG tablet Take 12 5 mg by mouth as needed        Multiple Vitamins-Minerals (MULTIVITAMIN ADULT PO) Take 1 tablet by mouth daily      omeprazole (PriLOSEC) 40 MG capsule Take 40 mg by mouth as needed   0    oxyCODONE (ROXICODONE) 15 mg immediate release tablet Take 15 mg by mouth as needed       PROAIR  (90 Base) MCG/ACT inhaler Inhale 1-2 puffs as needed       prochlorperazine (COMPAZINE) 10 mg tablet Take 1 tablet (10 mg total) by mouth every 6 (six) hours as needed (migraine) 20 tablet 6    propranolol (INDERAL LA) 160 mg Take 160 mg by mouth 2 (two) times a day    0    Rimegepant Sulfate (Nurtec) 75 MG TBDP Take 75 mg by mouth as needed (migraine) 8 tablet 3    topiramate (TOPAMAX) 100 mg tablet One in am and two at bedtime daily 270 tablet 3    valACYclovir (VALTREX) 1,000 mg tablet 1 tablet as needed       ibuprofen (MOTRIN) 800 mg tablet Take 800 mg by mouth as needed  (Patient not taking: Reported on 3/7/2022 )      niacin (NIASPAN) 500 mg CR tablet Take 500 mg by mouth daily take with food (Patient not taking: Reported on 3/7/2022 )      ondansetron (ZOFRAN-ODT) 4 mg disintegrating tablet Take 1 tablet (4 mg total) by mouth every 8 (eight) hours as needed for nausea or vomiting for up to 4 days (Patient not taking: Reported on 3/7/2022 ) 12 tablet 0    Probiotic Product (PROBIOTIC DAILY PO) Take 1 tablet by mouth daily (Patient not taking: Reported on 3/7/2022 )       Current Facility-Administered Medications   Medication Dose Route Frequency Provider Last Rate Last Admin    Botulinum Toxin Type A SOLR 200 Units  200 Units Injection Q3 Months [place], [place]   200 Units at 03/18/21 1154        Allergies: Allergies   Allergen Reactions    Other     Pregabalin      Other reaction(s): blurred vision    Shellfish-Derived Products - Food Allergy Anaphylaxis and Hives    Sumatriptan      Other reaction(s):  Altered Heart Rate    Triptans Shortness Of Breath and Palpitations    Latex Swelling and Rash     Other reaction(s): Hives/Skin Rash  Other reaction(s): Hives/Skin Rash    Sulfa Antibiotics Swelling and Rash     Other reaction(s): Hives/Skin Rash  Other reaction(s): Hives/Skin Rash    Monistat [Miconazole] Swelling    Shrimp Extract Allergy Skin Test - Food Allergy        Family History:     Family History   Problem Relation Age of Onset    Lupus Mother     Osteoporosis Mother     Hypotension Mother     Hypertension Father     Diabetes Father     Heart disease Father     Diabetes Sister     Schizophrenia Sister     Hypertension Sister     Diabetes Maternal Grandmother     Rheum arthritis Maternal Grandmother     Hypertension Maternal Grandmother     Breast cancer Maternal Aunt 36    No Known Problems Maternal Aunt     No Known Problems Maternal Aunt     No Known Problems Maternal Aunt     No Known Problems Paternal Aunt        Social History:     Social History     Socioeconomic History    Marital status: Single     Spouse name: Not on file    Number of children: Not on file    Years of education: Not on file    Highest education level: Not on file   Occupational History    Not on file   Tobacco Use    Smoking status: Former Smoker    Smokeless tobacco: Never Used    Tobacco comment: quit at 24years old   Vaping Use    Vaping Use: Never used   Substance and Sexual Activity    Alcohol use: No    Drug use: No    Sexual activity: Yes     Partners: Male     Birth control/protection: Female Sterilization   Other Topics Concern    Not on file   Social History Narrative    Not on file     Social Determinants of Health     Financial Resource Strain: High Risk    Difficulty of Paying Living Expenses: Hard   Food Insecurity: Food Insecurity Present    Worried About Running Out of Food in the Last Year: Sometimes true    [place] of Food in the Last Year: Often true   Transportation Needs: No Transportation Needs    Lack of Transportation (Medical): No    Lack of Transportation (Non-Medical): No   Physical Activity: Not on file   Stress: Not on file   Social Connections: Not on file   Intimate Partner Violence: Not on file   Housing Stability: Not on file      I have reviewed the patient's medical, social and surgical history as well as medications in detail and updated the computerized patient record  Objective:   Physical Exam:                                                                   Vitals:            /85 (BP Location: Right arm, Patient Position: Sitting, Cuff Size: Standard)   Pulse 81   Temp 97 6 °F (36 4 °C) (Temporal)   Ht 5' 4 5" (1 638 m)   Wt 95 9 kg (211 lb 8 oz)   LMP  (LMP Unknown)   BMI 35 74 kg/m²   BP Readings from Last 3 Encounters:   03/07/22 130/85   02/05/22 139/78   01/03/22 128/67     Pulse Readings from Last 3 Encounters:   03/07/22 81   02/05/22 82   01/03/22 73          CONSTITUTIONAL: Well developed, well nourished, well groomed  No dysmorphic features  Eyes:  EOM normal      Neck:  Normal ROM, neck supple  HEENT:  Normocephalic atraumatic  Chest:  Respirations regular and unlabored  Psychiatric:  Normal behavior and appropriate affect      MENTAL STATUS  Orientation: Alert and oriented x 3  Fund of knowledge: Intact  MOTOR (Upper and lower extremities)   Bulk/tone/abnormal movement: Normal muscle bulk and tone  COORDINATION   Station/Gait: Normal baseline gait  Review of Systems:   Review of Systems  Constitutional: Negative  HENT: Negative  Eyes: Negative  Respiratory: Negative  Cardiovascular: Negative      Gastrointestinal: Negative  Endocrine: Negative  Genitourinary: Negative  Musculoskeletal: Negative  Skin: Negative  Allergic/Immunologic: Negative  Neurological: Positive for headaches  Hematological: Negative  Psychiatric/Behavioral: Negative  I personally reviewed the ROS entered by the MA    I spent 25 minutes in face-to-face discussion regarding  the pathophysiology of her current symptoms and further plan, as well as counseling, educating, and coordinating the patient's care including prognosis of diagnosis, diagnostic results, impression, and recommendations, risks and benefits of treatment, instructions for disease self management, treatment instructions and follow up requirements and spent 20 minutes non-face to face    Author:  Calvin Tracey PA-C 3/7/2022 1:14 PM

## 2022-03-13 ENCOUNTER — HOSPITAL ENCOUNTER (EMERGENCY)
Facility: HOSPITAL | Age: 47
Discharge: HOME | End: 2022-03-13
Attending: EMERGENCY MEDICINE | Admitting: EMERGENCY MEDICINE
Payer: MEDICARE

## 2022-03-13 VITALS
HEART RATE: 82 BPM | BODY MASS INDEX: 43.19 KG/M2 | DIASTOLIC BLOOD PRESSURE: 79 MMHG | TEMPERATURE: 98.3 F | OXYGEN SATURATION: 97 % | RESPIRATION RATE: 22 BRPM | SYSTOLIC BLOOD PRESSURE: 146 MMHG | HEIGHT: 60 IN | WEIGHT: 220 LBS

## 2022-03-13 DIAGNOSIS — R51.9 HEADACHE: Primary | ICD-10-CM

## 2022-03-13 DIAGNOSIS — R11.2 NAUSEA AND VOMITING: ICD-10-CM

## 2022-03-13 PROCEDURE — 96372 THER/PROPH/DIAG INJ SC/IM: CPT

## 2022-03-13 PROCEDURE — 99284 EMERGENCY DEPT VISIT MOD MDM: CPT

## 2022-03-13 PROCEDURE — 96376 TX/PRO/DX INJ SAME DRUG ADON: CPT

## 2022-03-13 PROCEDURE — 99284 EMERGENCY DEPT VISIT MOD MDM: CPT | Performed by: EMERGENCY MEDICINE

## 2022-03-13 PROCEDURE — 96374 THER/PROPH/DIAG INJ IV PUSH: CPT

## 2022-03-13 PROCEDURE — 96375 TX/PRO/DX INJ NEW DRUG ADDON: CPT

## 2022-03-13 RX ORDER — KETOROLAC TROMETHAMINE 30 MG/ML
15 INJECTION, SOLUTION INTRAMUSCULAR; INTRAVENOUS ONCE
Status: COMPLETED | OUTPATIENT
Start: 2022-03-13 | End: 2022-03-13

## 2022-03-13 RX ORDER — METOCLOPRAMIDE HYDROCHLORIDE 5 MG/ML
10 INJECTION INTRAMUSCULAR; INTRAVENOUS ONCE
Status: COMPLETED | OUTPATIENT
Start: 2022-03-13 | End: 2022-03-13

## 2022-03-13 RX ORDER — DEXAMETHASONE SODIUM PHOSPHATE 10 MG/ML
10 INJECTION, SOLUTION INTRAMUSCULAR; INTRAVENOUS ONCE
Status: COMPLETED | OUTPATIENT
Start: 2022-03-13 | End: 2022-03-13

## 2022-03-13 RX ORDER — ONDANSETRON 2 MG/ML
1 INJECTION INTRAMUSCULAR; INTRAVENOUS ONCE
Status: COMPLETED | OUTPATIENT
Start: 2022-03-13 | End: 2022-03-13

## 2022-03-13 RX ORDER — ACETAMINOPHEN 325 MG/1
975 TABLET ORAL ONCE
Status: DISCONTINUED | OUTPATIENT
Start: 2022-03-13 | End: 2022-03-13 | Stop reason: HOSPADM

## 2022-03-13 RX ADMIN — METOCLOPRAMIDE HYDROCHLORIDE 10 MG: 5 INJECTION INTRAMUSCULAR; INTRAVENOUS at 10:19

## 2022-03-13 RX ADMIN — DEXAMETHASONE SODIUM PHOSPHATE 10 MG: 10 INJECTION, SOLUTION INTRAMUSCULAR; INTRAVENOUS at 10:19

## 2022-03-13 RX ADMIN — METOCLOPRAMIDE HYDROCHLORIDE 10 MG: 5 INJECTION INTRAMUSCULAR; INTRAVENOUS at 12:43

## 2022-03-13 RX ADMIN — KETOROLAC TROMETHAMINE 15 MG: 30 INJECTION, SOLUTION INTRAMUSCULAR at 12:43

## 2022-03-13 RX ADMIN — KETOROLAC TROMETHAMINE 15 MG: 30 INJECTION, SOLUTION INTRAMUSCULAR at 10:19

## 2022-03-13 NOTE — ED ATTENDING ATTESTATION
3/13/2022  IImani DO, saw and evaluated the patient  I have discussed the patient with the resident/non-physician practitioner and agree with the resident's/non-physician practitioner's findings, Plan of Care, and MDM as documented in the resident's/non-physician practitioner's note, except where noted  All available labs and Radiology studies were reviewed  I was present for key portions of any procedure(s) performed by the resident/non-physician practitioner and I was immediately available to provide assistance  At this point I agree with the current assessment done in the Emergency Department  I have conducted an independent evaluation of this patient a history and physical is as follows:    Patient is a 79-year-old female with a history of migraine headaches, followed by Neurology, says she woke this morning and shortly afterwards had the onset of 1 or typical migraines which is predominantly occipital, progressively worse, photophobia, phonophobia, several episodes of nonbloody, nonbilious emesis  She tried to take her prochlorperazine and Nurtec abortive medication but vomited that up  There is no one else sick with similar headaches, no recent head or neck infections, no recent head or neck surgeries  Headache was not maximal in onset  No decreased urine output, no chest pain, shortness of breath    General:  Patient is well-appearing  Head:  Atraumatic  Eyes:  Conjunctiva pink, Extraocular muscle intact, PERRL  ENT:  Mucous membranes are moist  Neck:  Supple  Cardiac:  S1-S2, without murmurs  Lungs:  Clear to auscultation bilaterally  Abdomen:  Soft, nontender, normal bowel sounds, no CVA tenderness, no tympany, no rigidity, no guarding  Extremities:  Normal range of motion  Neurologic:  Awake, fluent speech, normal comprehension  AAOx3   Cranial nerves 2-12 are intact, strength is 5/5 in the bilateral upper & lower extremities, no slurred speech, no facial droop, no deficit on finger-to-nose testing, no pronator drift  Sensation to light touch is equal and symmetric throughout the whole body  Skin:  Pink warm and dry, no rash  Psychiatric:  Alert, cooperative            ED Course     On reassessment after analgesia treatment, patient was feeling better  No change the above findings  I do not believe that the patient has a serious cause of their headache which would require additional testing right now, or immediate treatment because well-appearing, normal neuro exam, similar to prior headaches  No significant headache risk factors  No focal deficits  Supportive care, importance of follow-up and return precautions were discussed with the patient, who expressed understanding              Critical Care Time  Procedures

## 2022-03-13 NOTE — ED PROVIDER NOTES
History  Chief Complaint   Patient presents with    Migraine     Patient presents with migraine and vomiting since waking up this morning around 0730  Hypertensive for EMS (SBP 200s) Patient denies any CP/SOB/fevers/chills  50 yo F w/ hx of chronic migraines presents w/ rapid onset headache since 0730 this morning  Pt woke up feeling well but within a few minutes of getting up experienced rapidly progressing occipital headache described as non-radiating, severe, pressure-like, and constant  Associated photophobia, phonophobia, and three episodes of NBNB vomiting  Attempted to take prescribed abortives but vomiting shortly after taking  Pt states this headache is similar to prior headaches in terms of location, quality, and associated symptoms  Denies recent infections, trauma, neck pain, neck stiffness, visual changes, or neuro deficits  No one else at home has headache  Follows with neurology for headaches  Prior to Admission Medications   Prescriptions Last Dose Informant Patient Reported? Taking?    ARIPiprazole (ABILIFY) 15 mg tablet   Yes No   Sig: Take 1 tablet by mouth daily   Cetirizine HCl (ZYRTEC ALLERGY) 10 MG CAPS  Self Yes No   Sig: Take 10 mg by mouth daily     Multiple Vitamins-Minerals (MULTIVITAMIN ADULT PO)  Self Yes No   Sig: Take 1 tablet by mouth daily   PROAIR  (90 Base) MCG/ACT inhaler  Self Yes No   Sig: Inhale 1-2 puffs as needed    Probiotic Product (PROBIOTIC DAILY PO)  Self Yes No   Sig: Take 1 tablet by mouth daily   Patient not taking: Reported on 3/7/2022    Rimegepant Sulfate (Nurtec) 75 MG TBDP   No No   Sig: Take 75 mg by mouth as needed (migraine)   acetaminophen (TYLENOL) 325 mg tablet  Self Yes No   Sig: Take 650 mg by mouth every 6 (six) hours as needed for mild pain   aspirin-acetaminophen-caffeine (EXCEDRIN MIGRAINE) 250-250-65 MG per tablet  Self Yes No   Sig: Take 2 tablets by mouth every 6 (six) hours as needed for headaches   buPROPion (WELLBUTRIN) 75 mg tablet   Yes No   Sig: Take 75 mg by mouth every morning   clonazePAM (KlonoPIN) 1 mg tablet  Self Yes No   Si mg 2 (two) times a day    fluticasone (FLONASE) 50 mcg/act nasal spray   No No   Si spray into each nostril daily   Patient taking differently: 1 spray into each nostril daily as needed    fluticasone (FLOVENT HFA) 110 MCG/ACT inhaler  Self Yes No   Sig: Inhale 1 puff as needed    hydroxychloroquine (PLAQUENIL) 200 mg tablet  Self Yes No   Sig: Take 200 mg by mouth as needed During Summer time    ibuprofen (MOTRIN) 600 mg tablet  Self No No   Sig: Take 1 tablet (600 mg total) by mouth every 6 (six) hours as needed for moderate pain   ibuprofen (MOTRIN) 800 mg tablet  Self Yes No   Sig: Take 800 mg by mouth as needed    Patient not taking: Reported on 3/7/2022    levothyroxine 88 mcg tablet  Self Yes No   Sig: Take 88 mcg by mouth daily    lisinopril (ZESTRIL) 10 mg tablet  Self Yes No   Sig: Take 20 mg by mouth daily   meclizine (ANTIVERT) 12 5 MG tablet   Yes No   Sig: Take 12 5 mg by mouth as needed     niacin (NIASPAN) 500 mg CR tablet   Yes No   Sig: Take 500 mg by mouth daily take with food   Patient not taking: Reported on 3/7/2022    omeprazole (PriLOSEC) 40 MG capsule  Self Yes No   Sig: Take 40 mg by mouth as needed    ondansetron (ZOFRAN-ODT) 4 mg disintegrating tablet   No No   Sig: Take 1 tablet (4 mg total) by mouth every 8 (eight) hours as needed for nausea or vomiting for up to 4 days   Patient not taking: Reported on 3/7/2022    oxyCODONE (ROXICODONE) 15 mg immediate release tablet  Self Yes No   Sig: Take 15 mg by mouth as needed    prochlorperazine (COMPAZINE) 10 mg tablet   No No   Sig: Take 1 tablet (10 mg total) by mouth every 6 (six) hours as needed (migraine)   propranolol (INDERAL LA) 160 mg  Self Yes No   Sig: Take 160 mg by mouth 2 (two) times a day     topiramate (TOPAMAX) 100 mg tablet   No No   Sig: One in am and two at bedtime daily   valACYclovir (VALTREX) 1,000 mg tablet  Self Yes No   Si tablet as needed       Facility-Administered Medications Last Administration Doses Remaining   Botulinum Toxin Type A SOLR 200 Units 3/18/2021 11:54 AM           Past Medical History:   Diagnosis Date    Asthma     Bipolar 1 disorder (HCC)     Disease of thyroid gland     Fibromyalgia     Gout     Hyperlipidemia     Hypertension     Lupus ([address] )     Migraine        Past Surgical History:   Procedure Laterality Date    GALLBLADDER SURGERY  2008    HYSTERECTOMY  2014    LAPAROSCOPIC COLON RESECTION  2001       Family History   Problem Relation Age of Onset    Lupus Mother     Osteoporosis Mother     Hypotension Mother     Hypertension Father     Diabetes Father     Heart disease Father     Diabetes Sister     Schizophrenia Sister     Hypertension Sister     Diabetes Maternal Grandmother     Rheum arthritis Maternal Grandmother     Hypertension Maternal Grandmother     Breast cancer Maternal Aunt 36    No Known Problems Maternal Aunt     No Known Problems Maternal Aunt     No Known Problems Maternal Aunt     No Known Problems Paternal Aunt      I have reviewed and agree with the history as documented  E-Cigarette/Vaping    E-Cigarette Use Never User      E-Cigarette/Vaping Substances    Nicotine No     THC No     CBD No     Flavoring No     Other No     Unknown No      Social History     Tobacco Use    Smoking status: Former Smoker    Smokeless tobacco: Never Used    Tobacco comment: quit at 24years old   Vaping Use    Vaping Use: Never used   Substance Use Topics    Alcohol use: No    Drug use: No        Review of Systems   All other systems reviewed and are negative        Physical Exam  ED Triage Vitals [22 0940]   Temperature Pulse Respirations Blood Pressure SpO2   98 3 °F (36 8 °C) 90 18 (!) 213/111 98 %      Temp Source Heart Rate Source Patient Position - Orthostatic VS BP Location FiO2 (%)   Oral Monitor Lying Left arm --      Pain Score       8             Orthostatic Vital Signs  Vitals:    03/13/22 0940 03/13/22 1206 03/13/22 1318 03/13/22 1334   BP: (!) 213/111 (!) 176/90 145/67 146/79   Pulse: 90 88 91 82   Patient Position - Orthostatic VS: Lying  Lying Lying       Physical Exam  Vitals and nursing note reviewed  Constitutional:       General: She is in acute distress  Appearance: Normal appearance  She is normal weight  She is not ill-appearing, toxic-appearing or diaphoretic  HENT:      Head: Normocephalic and atraumatic  Right Ear: External ear normal       Left Ear: External ear normal       Nose: Nose normal       Mouth/Throat:      Mouth: Mucous membranes are moist       Pharynx: Oropharynx is clear  Eyes:      General:         Right eye: No discharge  Left eye: No discharge  Extraocular Movements: Extraocular movements intact  Conjunctiva/sclera: Conjunctivae normal       Pupils: Pupils are equal, round, and reactive to light  Cardiovascular:      Rate and Rhythm: Normal rate and regular rhythm  Pulses: Normal pulses  Heart sounds: Normal heart sounds  No murmur heard  No friction rub  Pulmonary:      Effort: Pulmonary effort is normal  No respiratory distress  Breath sounds: Normal breath sounds  No wheezing or rales  Abdominal:      General: Abdomen is flat  Bowel sounds are normal  There is no distension  Palpations: Abdomen is soft  Tenderness: There is no abdominal tenderness  There is no guarding  Musculoskeletal:         General: Normal range of motion  Cervical back: Normal range of motion and neck supple  No rigidity or tenderness  Skin:     General: Skin is warm and dry  Capillary Refill: Capillary refill takes less than 2 seconds  Coloration: Skin is not pale  Neurological:      General: No focal deficit present  Mental Status: She is alert and oriented to person, place, and time  Cranial Nerves: No cranial nerve deficit  Sensory: No sensory deficit  Motor: No weakness  Coordination: Coordination normal    Psychiatric:         Mood and Affect: Mood normal          Behavior: Behavior normal          ED Medications  Medications   acetaminophen (TYLENOL) tablet 975 mg (975 mg Oral Not Given 3/13/22 1019)   ondansetron (FOR EMS ONLY) (ZOFRAN) 4 mg/2 mL injection 4 mg (0 mg Does not apply Given to EMS 3/13/22 0952)   ketorolac (TORADOL) injection 15 mg (15 mg Intravenous Given 3/13/22 1019)   dexamethasone (PF) (DECADRON) injection 10 mg (10 mg Intravenous Given 3/13/22 1019)   metoclopramide (REGLAN) injection 10 mg (10 mg Intravenous Given 3/13/22 1019)   metoclopramide (REGLAN) injection 10 mg (10 mg Intravenous Given 3/13/22 1243)   ketorolac (TORADOL) injection 15 mg (15 mg Intramuscular Given 3/13/22 1243)       Diagnostic Studies  Results Reviewed     None                 No orders to display         Procedures  Procedures      ED Course  ED Course as of 03/13/22 1554   Sun Mar 13, 2022   1051 Pt reports mild improvement in headache  Nausea continues  Unable to take PO tylenol  1215 Pt reports headache 9/10  Nausea improved  Encouraged patient to take the PO tylenol at bedside if possible  Will re-dose toradol  1325 Pt reports improvement in sxs  Feels well enough to go home  SBIRT 20yo+      Most Recent Value   SBIRT (22 yo +)    In order to provide better care to our patients, we are screening all of our patients for alcohol and drug use  Would it be okay to ask you these screening questions? Yes Filed at: 03/13/2022 0951   Initial Alcohol Screen: US AUDIT-C     1  How often do you have a drink containing alcohol? 0 Filed at: 03/13/2022 0951   2  How many drinks containing alcohol do you have on a typical day you are drinking? 0 Filed at: 03/13/2022 0951   3a  Male UNDER 65: How often do you have five or more drinks on one occasion? 0 Filed at: 03/13/2022 0951   3b   FEMALE Any Age, or MALE 65+: How often do you have 4 or more drinks on one occassion? 0 Filed at: 03/13/2022 0951   Audit-C Score 0 Filed at: 03/13/2022 5516   FAUSTO: How many times in the past year have you    Used an illegal drug or used a prescription medication for non-medical reasons? Never Filed at: 03/13/2022 0951                MDM  Number of Diagnoses or Management Options  Headache: established and worsening  Nausea and vomiting: established and worsening  Diagnosis management comments: Impression: 50 yo F presents w/ hx of chronic migraines presents w/ severe headache similar in quality and location to prior headaches  No findings on exam  No recent head trauma, neck trauma, infections, physical exam findings, or blood thinners to suggest SAH, CO poisoning, or meningitis  Plan: symptomatic treatment, re-assess, repeat BP    Headache, nausea, and BP improved in department  Pt feels well enough to go home  Amount and/or Complexity of Data Reviewed  Review and summarize past medical records: yes    Risk of Complications, Morbidity, and/or Mortality  Presenting problems: low  Diagnostic procedures: minimal  Management options: low    Patient Progress  Patient progress: improved      Disposition  Final diagnoses:   Headache   Nausea and vomiting     Time reflects when diagnosis was documented in both MDM as applicable and the Disposition within this note     Time User Action Codes Description Comment    3/13/2022  1:25 PM Layla Ernie Add [R51 9] Headache     3/13/2022  1:25 PM Layla Ernie Add [R11 2] Nausea and vomiting       ED Disposition     ED Disposition Condition Date/Time Comment    Discharge Stable Sun Mar 13, 2022  1:25 PM Ilya Rogers discharge to home/self care              Follow-up Information     Follow up With Specialties Details Why Leia Ridley MD Internal Medicine Call  As needed [phone]  [phone]            Discharge Medication List as of 3/13/2022  1:27 PM      CONTINUE these medications which have NOT CHANGED    Details   acetaminophen (TYLENOL) 325 mg tablet Take 650 mg by mouth every 6 (six) hours as needed for mild pain, Historical Med      ARIPiprazole (ABILIFY) 15 mg tablet Take 1 tablet by mouth daily, Starting Sat 2/27/2021, Historical Med      aspirin-acetaminophen-caffeine (EXCEDRIN MIGRAINE) 250-250-65 MG per tablet Take 2 tablets by mouth every 6 (six) hours as needed for headaches, Historical Med      buPROPion (WELLBUTRIN) 75 mg tablet Take 75 mg by mouth every morning, Starting Tue 12/29/2020, Historical Med      Cetirizine HCl (ZYRTEC ALLERGY) 10 MG CAPS Take 10 mg by mouth daily  , Historical Med      clonazePAM (KlonoPIN) 1 mg tablet 1 mg 2 (two) times a day , Starting Sat 12/15/2018, Historical Med      fluticasone (FLONASE) 50 mcg/act nasal spray 1 spray into each nostril daily, Starting Sat 2/29/2020, Print      fluticasone (FLOVENT HFA) 110 MCG/ACT inhaler Inhale 1 puff as needed , Historical Med      hydroxychloroquine (PLAQUENIL) 200 mg tablet Take 200 mg by mouth as needed During Summer time , Starting Fri 6/15/2018, Historical Med      !! ibuprofen (MOTRIN) 600 mg tablet Take 1 tablet (600 mg total) by mouth every 6 (six) hours as needed for moderate pain, Starting Mon 4/1/2019, Print      !! ibuprofen (MOTRIN) 800 mg tablet Take 800 mg by mouth as needed , Starting Thu 9/6/2018, Historical Med      levothyroxine 88 mcg tablet Take 88 mcg by mouth daily , Starting Sun 3/31/2019, Historical Med      lisinopril (ZESTRIL) 10 mg tablet Take 20 mg by mouth daily, Starting Mon 12/23/2019, Historical Med      meclizine (ANTIVERT) 12 5 MG tablet Take 12 5 mg by mouth as needed  , Starting Sat 12/5/2020, Historical Med      Multiple Vitamins-Minerals (MULTIVITAMIN ADULT PO) Take 1 tablet by mouth daily, Historical Med      niacin (NIASPAN) 500 mg CR tablet Take 500 mg by mouth daily take with food, Starting Fri 7/17/2020, Historical Med      omeprazole (PriLOSEC) 40 MG capsule Take 40 mg by mouth as needed , Starting Wed 5/8/2019, Historical Med      ondansetron (ZOFRAN-ODT) 4 mg disintegrating tablet Take 1 tablet (4 mg total) by mouth every 8 (eight) hours as needed for nausea or vomiting for up to 4 days, Starting Sat 2/5/2022, Until Mon 3/7/2022 at 2359, Normal      oxyCODONE (ROXICODONE) 15 mg immediate release tablet Take 15 mg by mouth as needed , Starting Fri 7/13/2018, Historical Med      PROAIR  (90 Base) MCG/ACT inhaler Inhale 1-2 puffs as needed , Starting Fri 11/9/2018, Historical Med      Probiotic Product (PROBIOTIC DAILY PO) Take 1 tablet by mouth daily, Historical Med      prochlorperazine (COMPAZINE) 10 mg tablet Take 1 tablet (10 mg total) by mouth every 6 (six) hours as needed (migraine), Starting Mon 3/7/2022, Normal      propranolol (INDERAL LA) 160 mg Take 160 mg by mouth 2 (two) times a day  , Starting Tue 4/16/2019, Historical Med      Rimegepant Sulfate (Nurtec) 75 MG TBDP Take 75 mg by mouth as needed (migraine), Starting Mon 3/7/2022, Normal      topiramate (TOPAMAX) 100 mg tablet One in am and two at bedtime daily, Normal      valACYclovir (VALTREX) 1,000 mg tablet 1 tablet as needed , Starting Thu 5/7/2015, Historical Med       !! - Potential duplicate medications found  Please discuss with provider  No discharge procedures on file  PDMP Review     None           ED Provider  Attending physically available and evaluated Misty Claude I managed the patient along with the ED Attending      Electronically Signed by         Seda Ortiz MD  03/13/22 9701

## 2022-03-15 ENCOUNTER — TELEPHONE (OUTPATIENT)
Dept: NEUROLOGY | Facility: CLINIC | Age: 47
End: 2022-03-15

## 2022-03-15 ENCOUNTER — CLINICAL SUPPORT (OUTPATIENT)
Dept: NEUROLOGY | Facility: CLINIC | Age: 47
End: 2022-03-15
Payer: MEDICARE

## 2022-03-15 DIAGNOSIS — G43.709 CHRONIC MIGRAINE WITHOUT AURA WITHOUT STATUS MIGRAINOSUS, NOT INTRACTABLE: Primary | ICD-10-CM

## 2022-03-15 DIAGNOSIS — G43.111 INTRACTABLE MIGRAINE WITH AURA WITH STATUS MIGRAINOSUS: Primary | ICD-10-CM

## 2022-03-15 PROCEDURE — 96372 THER/PROPH/DIAG INJ SC/IM: CPT | Performed by: PSYCHIATRY & NEUROLOGY

## 2022-03-15 RX ORDER — KETOROLAC TROMETHAMINE 30 MG/ML
60 INJECTION, SOLUTION INTRAMUSCULAR; INTRAVENOUS ONCE
Status: COMPLETED | OUTPATIENT
Start: 2022-03-15 | End: 2022-03-15

## 2022-03-15 RX ORDER — DIVALPROEX SODIUM 500 MG/1
500 TABLET, EXTENDED RELEASE ORAL DAILY
Qty: 5 TABLET | Refills: 0 | Status: SHIPPED | OUTPATIENT
Start: 2022-03-15 | End: 2022-03-21

## 2022-03-15 RX ORDER — PROCHLORPERAZINE EDISYLATE 5 MG/ML
10 INJECTION INTRAMUSCULAR; INTRAVENOUS ONCE
Status: COMPLETED | OUTPATIENT
Start: 2022-03-15 | End: 2022-03-15

## 2022-03-15 RX ORDER — PROCHLORPERAZINE MALEATE 10 MG
10 TABLET ORAL EVERY 6 HOURS PRN
Qty: 10 TABLET | Refills: 0 | Status: SHIPPED | OUTPATIENT
Start: 2022-03-15

## 2022-03-15 RX ORDER — DEXAMETHASONE 2 MG/1
2 TABLET ORAL
Qty: 5 TABLET | Refills: 0 | Status: SHIPPED | OUTPATIENT
Start: 2022-03-15 | End: 2022-05-27

## 2022-03-15 RX ADMIN — KETOROLAC TROMETHAMINE 60 MG: 30 INJECTION, SOLUTION INTRAMUSCULAR; INTRAVENOUS at 14:24

## 2022-03-15 RX ADMIN — PROCHLORPERAZINE EDISYLATE 10 MG: 5 INJECTION INTRAMUSCULAR; INTRAVENOUS at 14:26

## 2022-03-15 NOTE — PROGRESS NOTES
Pt was given Ketorolac 60mg and Prochlorperazine 10mg injection in Left Deltoid  Pt tolerated both medications well with no side effects

## 2022-03-15 NOTE — TELEPHONE ENCOUNTER
Called and advised pt of all of the below  She verbalized clear understanding of all instructions  Also agreeable to get toradol and compazine inj  Appt scheduled today at 2 pm in [facility]      Pls enter order       thanks

## 2022-03-15 NOTE — TELEPHONE ENCOUNTER
pt called and states that she was in the ER sunday for a migraine    states that she is still in severe pain    I was then unable to hear pt, call very static and call disconnected    Left message for pt to call office back

## 2022-03-15 NOTE — TELEPHONE ENCOUNTER
May come to an office for additional Ketorolac and prochloreprazine injections    Decadron 2 mg for 5 days in am  Depakote 500 mg at bedtime for 5 nights  Take Prochlorperazine 10 mg every 8 hours for 3 doses then as needed    Sent to [facility]

## 2022-03-15 NOTE — TELEPHONE ENCOUNTER
----- Message from Charlie Powell sent at 3/14/2022  8:03 AM EDT -----  Regarding: ER yesterday   Rosita Iglesias thought I was doing better then yesterday morning I had to go to the ER  I woke up my head spun then started throwing up tried drinking Gatorade threw that up  Then the pressure of my head was so bad I had to call the ambulance and go to the hospital my blood pressure was 226/125 and the Headache didnt fully go away  I went to sleep with a headache felt like a sledgehammer hitting my head and I woke up with one again this morning   Please we have to reevaluate my onset meds or more testing like an MRI these headaches are becoming more frequent and more painful doesnt feel right even with my Blood pressure pills increased dont know whats going on and they limit it what they can do in the ER

## 2022-03-15 NOTE — TELEPHONE ENCOUNTER
Pt called c/o ongoing migraine  Took tylenol 3 tabs just now  She took Nurtec 1 tab this morning  It took the edge off this morning but did not take the migraine away completely  Now, it feel like someone hit her w/ a sledge hammer  When did migraine start? She woke up w/ a migraine on Sunday  At that time  her bp was "uzma high"  She took her bp meds then it went down  Her pcp was aware and increased her bp meds (propranolol and lisinopril)  States that her bp is under control now  Location/Description: It feels like someone hit her w/ a sledgehammer  Back of her head then radiates to the front     Pain scale: 10    Associated symptoms: slight nausea  Sunday, she was vomiting    Precipitating factors: unknown    What medications have you tried for this migraine headache? Prior Preventive:   Lisinopril, labetalol  Topamax, carbamazepine, Lyrica, Depakote (weigh gain), Gabapentin (no help)  cyclobenzaprine, Tizanidine,  Seroquel, citalopram, duloxetine, venlafaxine, olanzapine- ineffective,   Cyproheptadine (too much dryness),   [place] 12/4/2019     Prior Abortive:   Dexamethasone,  Naproxen   Promethazine,   Kenalog,  Percocet, tramadol, Toradol, narcotics     Current migraine medications are confirmed as:  Compazine 10 mg prn as ordered   Took 1 tab last night   topamax 100 mg 1 tab and 2 tabs at bedtime   Nurtec 75 mg prn as ordered     Medications tried in the past? Tried decadron and depakote and olanzapine {    Pls see ED visit on 3/13/22      [phone] ok to leave detailed message

## 2022-03-16 ENCOUNTER — OFFICE VISIT (OUTPATIENT)
Dept: OBGYN CLINIC | Facility: HOSPITAL | Age: 47
End: 2022-03-16
Payer: MEDICARE

## 2022-03-16 VITALS
SYSTOLIC BLOOD PRESSURE: 153 MMHG | BODY MASS INDEX: 41.43 KG/M2 | HEIGHT: 60 IN | DIASTOLIC BLOOD PRESSURE: 78 MMHG | WEIGHT: 211 LBS

## 2022-03-16 DIAGNOSIS — E66.01 OBESITY, MORBID (HCC): ICD-10-CM

## 2022-03-16 DIAGNOSIS — M18.11 ARTHRITIS OF CARPOMETACARPAL (CMC) JOINT OF RIGHT THUMB: ICD-10-CM

## 2022-03-16 DIAGNOSIS — G56.01 CARPAL TUNNEL SYNDROME ON RIGHT: Primary | ICD-10-CM

## 2022-03-16 PROCEDURE — 99214 OFFICE O/P EST MOD 30 MIN: CPT | Performed by: ORTHOPAEDIC SURGERY

## 2022-03-16 RX ORDER — CEFAZOLIN SODIUM 2 G/50ML
2000 SOLUTION INTRAVENOUS ONCE
Status: CANCELLED | OUTPATIENT
Start: 2022-03-16 | End: 2022-03-16

## 2022-03-16 NOTE — PROGRESS NOTES
ASSESSMENT/PLAN:    Assessment:   Right carpal tunnel syndrome  Right CMC joint arthritis    Plan:   Discussed with patient she qualifies for a right carpal tunnel syndrome for a right endoscopic carpal tunnel release  Patient states that she would like to address the thumb 1st    Due to the mild degenerative nature in her thumb, and her young age, would recommend a right thumb CMC arthroscopy to assess and clean out the thumb CMC joint  Follow Up: After Surgery    To Do Next Visit:  Sutures out    Operative Discussions:  Standard Consent: The risks and benefits of the procedure were explained to the patient, which include, but are not limited to: Bleeding, infection, recurrence, pain, scar, damage to tendons, damage to nerves, and damage to blood vessels, failure to give desired results and complications related to anesthesia  These risks, along with alternative conservative treatment options, and postoperative protocols were voiced back and understood by the patient  All questions were answered to the patient's satisfaction  The patient agrees to comply with a standard postoperative protocol, and is willing to proceed  Education was provided via written and auditory forms  There were no barriers to learning  Written handouts regarding wound care, incision and scar care, and general preoperative information was provided to the patient  Prior to surgery, the patient may be requested to stop all anti-inflammatory medications  Prophylactic aspirin, Plavix, and Coumadin may be allowed to be continued  Medications including vitamin E , ginkgo, and fish oil are requested to be stopped approximately one week prior to surgery  Hypertensive medications and beta blockers, if taken, should be continued  Reviewed risks and benefits of operative intervention  Reviewed operative and postoperative course  Reviewed with patient postoperative expectations    Patient would like to move for surgery at this time    _____________________________________________________  CHIEF COMPLAINT:  Chief Complaint   Patient presents with    Left Wrist - Follow-up, Carpal Tunnel    Right Wrist - Follow-up, Carpal Tunnel    Left Thumb - Follow-up    Right Thumb - Follow-up         SUBJECTIVE:  Jet Alexis is a 51 y o  adult who presents for follow up regarding numbness and tingling in the right hand as well as significant right thumb pain  Patient notes that the right thumb [facility] joint pain is the most problematic for her at this time  She states that it interferes with gripping activities  She has had steroid injections back in August, and does not have any significant improvement in her condition at this time  PAST MEDICAL HISTORY:  Past Medical History:   Diagnosis Date    Asthma     [facility] [address]     Disease of thyroid gland     Fibromyalgia     Gout     Hyperlipidemia     Hypertension     [facility]     Migraine        PAST SURGICAL HISTORY:  Past Surgical History:   Procedure Laterality Date    GALLBLADDER SURGERY  2008    HYSTERECTOMY  2014    LAPAROSCOPIC COLON RESECTION  2001       FAMILY HISTORY:  Family History   Problem Relation Age of Onset    Lupus Mother     Osteoporosis Mother     Hypotension Mother     Hypertension Father     Diabetes Father     Heart disease Father     Diabetes Sister     Schizophrenia Sister     Hypertension Sister     Diabetes Maternal Grandmother     Rheum arthritis Maternal Grandmother     Hypertension Maternal Grandmother     Breast cancer Maternal Aunt 36    No Known Problems Maternal Aunt     No Known Problems Maternal Aunt     No Known Problems Maternal Aunt     No Known Problems Paternal Aunt        SOCIAL HISTORY:  Social History     Tobacco Use    Smoking status: Former Smoker    Smokeless tobacco: Never Used    Tobacco comment: quit at 24years old   Vaping Use    Vaping Use: Never used   Substance Use Topics    Alcohol use:  No  Drug use: No       MEDICATIONS:    Current Outpatient Medications:     acetaminophen (TYLENOL) 325 mg tablet, Take 650 mg by mouth every 6 (six) hours as needed for mild pain, Disp: , Rfl:     ARIPiprazole (ABILIFY) 15 mg tablet, Take 1 tablet by mouth daily, Disp: , Rfl:     aspirin-acetaminophen-caffeine (EXCEDRIN MIGRAINE) 250-250-65 MG per tablet, Take 2 tablets by mouth every 6 (six) hours as needed for headaches, Disp: , Rfl:     buPROPion (WELLBUTRIN) 75 mg tablet, Take 75 mg by mouth every morning, Disp: , Rfl:     Cetirizine HCl (ZYRTEC ALLERGY) 10 MG CAPS, Take 10 mg by mouth daily  , Disp: , Rfl:     clonazePAM (KlonoPIN) 1 mg tablet, 1 mg 2 (two) times a day , Disp: , Rfl: 0    dexamethasone (DECADRON) 2 mg tablet, Take 1 tablet (2 mg total) by mouth daily with breakfast, Disp: 5 tablet, Rfl: 0    divalproex sodium (DEPAKOTE ER) 500 mg 24 hr tablet, Take 1 tablet (500 mg total) by mouth daily, Disp: 5 tablet, Rfl: 0    fluticasone (FLONASE) 50 mcg/act nasal spray, 1 spray into each nostril daily (Patient taking differently: 1 spray into each nostril daily as needed ), Disp: 16 g, Rfl: 0    fluticasone (FLOVENT HFA) 110 MCG/ACT inhaler, Inhale 1 puff as needed , Disp: , Rfl:     hydroxychloroquine (PLAQUENIL) 200 mg tablet, Take 200 mg by mouth as needed During Summer time , Disp: , Rfl:     ibuprofen (MOTRIN) 600 mg tablet, Take 1 tablet (600 mg total) by mouth every 6 (six) hours as needed for moderate pain, Disp: 30 tablet, Rfl: 0    ibuprofen (MOTRIN) 800 mg tablet, Take 800 mg by mouth as needed  (Patient not taking: Reported on 3/7/2022 ), Disp: , Rfl:     levothyroxine 88 mcg tablet, Take 88 mcg by mouth daily , Disp: , Rfl:     lisinopril (ZESTRIL) 10 mg tablet, Take 20 mg by mouth daily, Disp: , Rfl:     meclizine (ANTIVERT) 12 5 MG tablet, Take 12 5 mg by mouth as needed  , Disp: , Rfl:     Multiple Vitamins-Minerals (MULTIVITAMIN ADULT PO), Take 1 tablet by mouth daily, Disp: , Rfl:     niacin (NIASPAN) 500 mg CR tablet, Take 500 mg by mouth daily take with food (Patient not taking: Reported on 3/7/2022 ), Disp: , Rfl:     omeprazole (PriLOSEC) 40 MG capsule, Take 40 mg by mouth as needed , Disp: , Rfl: 0    ondansetron (ZOFRAN-ODT) 4 mg disintegrating tablet, Take 1 tablet (4 mg total) by mouth every 8 (eight) hours as needed for nausea or vomiting for up to 4 days (Patient not taking: Reported on 3/7/2022 ), Disp: 12 tablet, Rfl: 0    oxyCODONE (ROXICODONE) 15 mg immediate release tablet, Take 15 mg by mouth as needed , Disp: , Rfl:     PROAIR  (90 Base) MCG/ACT inhaler, Inhale 1-2 puffs as needed , Disp: , Rfl:     Probiotic Product (PROBIOTIC DAILY PO), Take 1 tablet by mouth daily (Patient not taking: Reported on 3/7/2022 ), Disp: , Rfl:     prochlorperazine (COMPAZINE) 10 mg tablet, Take 1 tablet (10 mg total) by mouth every 6 (six) hours as needed (migraine), Disp: 20 tablet, Rfl: 6    prochlorperazine (COMPAZINE) 10 mg tablet, Take 1 tablet (10 mg total) by mouth every 6 (six) hours as needed (migraine), Disp: 10 tablet, Rfl: 0    propranolol (INDERAL LA) 160 mg, Take 160 mg by mouth 2 (two) times a day  , Disp: , Rfl: 0    Rimegepant Sulfate (Nurtec) 75 MG TBDP, Take 75 mg by mouth as needed (migraine), Disp: 8 tablet, Rfl: 3    topiramate (TOPAMAX) 100 mg tablet, One in am and two at bedtime daily, Disp: 270 tablet, Rfl: 3    valACYclovir (VALTREX) 1,000 mg tablet, 1 tablet as needed , Disp: , Rfl:     Current Facility-Administered Medications:     Botulinum Toxin Type A SOLR 200 Units, 200 Units, Injection, Q3 Months, Kassie Andrade PA-C, 200 Units at 03/18/21 1154    ALLERGIES:  Allergies   Allergen Reactions    Other     Pregabalin      Other reaction(s): blurred vision    Shellfish-Derived Products - Food Allergy Anaphylaxis and Hives    Sumatriptan      Other reaction(s):  Altered Heart Rate    Triptans Shortness Of Breath and Palpitations    Latex Swelling and Rash     Other reaction(s): Hives/Skin Rash  Other reaction(s): Hives/Skin Rash    Sulfa Antibiotics Swelling and Rash     Other reaction(s): Hives/Skin Rash  Other reaction(s): Hives/Skin Rash    Monistat [Miconazole] Swelling    Shrimp Extract Allergy Skin Test - Food Allergy        REVIEW OF SYSTEMS:  Pertinent items are noted in HPI  A comprehensive review of systems was negative  LABS:  HgA1c:   Lab Results   Component Value Date    HGBA1C 5 6 11/20/2019     BMP:   Lab Results   Component Value Date    GLUCOSE 109 11/11/2015    CALCIUM 10 1 02/05/2022     11/11/2015    K 4 0 02/05/2022    CO2 28 02/05/2022     02/05/2022    BUN 9 02/05/2022    CREATININE 0 92 02/05/2022           _____________________________________________________  PHYSICAL EXAMINATION:  Vital signs: /78   Ht 5' (1 524 m)   Wt 95 7 kg (211 lb)   LMP  (LMP Unknown)   BMI 41 21 kg/m²   General: well developed and well nourished, alert, oriented times 3 and appears comfortable  Psychiatric: Normal  HEENT: Trachea Midline, No torticollis  Cardiovascular: No discernable arrhythmia  Pulmonary: No wheezing or stridor  Abdomen: No rebound or guarding  Extremities: No peripheral edema  Skin: No masses, erythema, lacerations, fluctation, ulcerations  Neurovascular: Sensation Intact to the Median, Ulnar, Radial Nerve, Motor Intact to the Median, Ulnar, Radial Nerve and Pulses Intact    MUSCULOSKELETAL EXAMINATION:  Right hand:   Tenderness over the right thumb CMC joint  No shoulder sign  Positive Tinel's right carpal tunnel apb 4-5    Left hand:  Mildly positive Tinel's over carpal tunnel apb 4+/5    _____________________________________________________  STUDIES REVIEWED:  Images were reviewed in PACS by Dr Luci Martinez and demonstrate: evidence of carpal tunnel in hand bilaterally  Patient measures a 13 8 (right) in the carpal tunnel and 11 5 (left) in the carpal tunnel    Right thumb x-ray imaging from May of 2021 demonstrate mild degenerative changes to the right thumb CMC joint      PROCEDURES PERFORMED:  Procedures  No Procedures performed today    Scribe Attestation    I,:  Gloria Ortiz PA-C am acting as a scribe while in the presence of the attending physician :       I,:  Esmer Palm MD personally performed the services described in this documentation    as scribed in my presence :

## 2022-03-19 DIAGNOSIS — G43.709 CHRONIC MIGRAINE WITHOUT AURA WITHOUT STATUS MIGRAINOSUS, NOT INTRACTABLE: ICD-10-CM

## 2022-03-21 RX ORDER — DIVALPROEX SODIUM 500 MG/1
TABLET, EXTENDED RELEASE ORAL
Qty: 5 TABLET | Refills: 0 | Status: SHIPPED | OUTPATIENT
Start: 2022-03-21 | End: 2022-05-27

## 2022-03-30 ENCOUNTER — APPOINTMENT (OUTPATIENT)
Dept: LAB | Facility: CLINIC | Age: 47
End: 2022-03-30
Payer: MEDICARE

## 2022-03-30 ENCOUNTER — HOSPITAL ENCOUNTER (OUTPATIENT)
Dept: RADIOLOGY | Facility: IMAGING CENTER | Age: 47
Discharge: HOME | End: 2022-03-30
Payer: MEDICARE

## 2022-03-30 DIAGNOSIS — G43.111 INTRACTABLE MIGRAINE WITH AURA WITH STATUS MIGRAINOSUS: ICD-10-CM

## 2022-03-30 LAB
BUN SERPL-MCNC: 13 MG/DL (ref 5–25)
CREAT SERPL-MCNC: 0.87 MG/DL (ref 0.6–1.3)

## 2022-03-30 PROCEDURE — 36415 COLL VENOUS BLD VENIPUNCTURE: CPT

## 2022-03-30 PROCEDURE — 70553 MRI BRAIN STEM W/O & W/DYE: CPT

## 2022-03-30 PROCEDURE — G1004 CDSM NDSC: HCPCS

## 2022-03-30 PROCEDURE — 84520 ASSAY OF UREA NITROGEN: CPT

## 2022-03-30 PROCEDURE — A9585 GADOBUTROL INJECTION: HCPCS | Performed by: PHYSICIAN ASSISTANT

## 2022-03-30 PROCEDURE — 82565 ASSAY OF CREATININE: CPT

## 2022-03-30 RX ADMIN — GADOBUTROL 9 ML: 604.72 INJECTION INTRAVENOUS at 10:49

## 2022-04-01 ENCOUNTER — OFFICE VISIT (OUTPATIENT)
Dept: URGENT CARE | Age: 47
End: 2022-04-01
Payer: MEDICARE

## 2022-04-01 VITALS
SYSTOLIC BLOOD PRESSURE: 157 MMHG | OXYGEN SATURATION: 96 % | DIASTOLIC BLOOD PRESSURE: 89 MMHG | HEART RATE: 62 BPM | RESPIRATION RATE: 18 BRPM | TEMPERATURE: 97.9 F

## 2022-04-01 DIAGNOSIS — J01.10 ACUTE NON-RECURRENT FRONTAL SINUSITIS: Primary | ICD-10-CM

## 2022-04-01 DIAGNOSIS — B37.0 ORAL CANDIDIASIS: ICD-10-CM

## 2022-04-01 PROCEDURE — 99213 OFFICE O/P EST LOW 20 MIN: CPT

## 2022-04-01 PROCEDURE — G0463 HOSPITAL OUTPT CLINIC VISIT: HCPCS

## 2022-04-01 RX ORDER — AMOXICILLIN AND CLAVULANATE POTASSIUM 875; 125 MG/1; MG/1
1 TABLET, FILM COATED ORAL EVERY 12 HOURS SCHEDULED
Qty: 20 TABLET | Refills: 0 | Status: SHIPPED | OUTPATIENT
Start: 2022-04-01 | End: 2022-04-11

## 2022-04-01 NOTE — PROGRESS NOTES
[facility]        NAME: Kellie Serrato is a 51 y o  adult  : 1975    MRN: [id number]  DATE: 2022  TIME: 3:16 PM    Assessment and Plan   Acute non-recurrent frontal sinusitis [J01 10]  1  Acute non-recurrent frontal sinusitis  amoxicillin-clavulanate (AUGMENTIN) 875-125 mg per tablet   2  Oral candidiasis  nystatin (MYCOSTATIN) 500,000 units/5 mL suspension         Patient Instructions     Antibiotics as discussed  Edmundo Carter as discussed for candidiasis  Please follow-up with your PCP for further control of hypertension  Follow up with PCP in 3-5 days  Proceed to  ER if symptoms worsen  Chief Complaint     Chief Complaint   Patient presents with    Headache    Nasal Congestion     sinus pressure         History of Present Illness       Patient presenting for evaluation of increasing sinus pressure over the past 5 days, as well as a raw tongue  Patient admits to nasal drainage  She has states she has a history of allergic rhinitis as well as asthma  She denies any recent sick contacts, and states that she has been using Zyrtec and Benadryl for her symptoms  States that she has been using her albuterol and fluticasone more frequently, as she is having increased chest tightness and asthma symptoms  She denies any recent fevers, chills, chest pain or shortness of breath  She denies any cough, congestion, abdominal pain, nausea, vomiting or diarrhea  Patient states that she is having pressure behind her eyes as well as visual changes  She states that her hypertensive patient is were recently adjusted  She states that at times her vision blacks out  She denies any loss his consciousness or frequent headaches  Patient states that she does not monitor her blood pressure at home  Review of Systems   Review of Systems   Constitutional: Negative for chills and fever  HENT: Positive for rhinorrhea, sinus pressure and sinus pain   Negative for congestion, ear pain, sneezing and sore throat  Raw tongue     Eyes: Negative for pain and visual disturbance  Respiratory: Positive for chest tightness  Negative for cough and shortness of breath  Cardiovascular: Negative for chest pain and palpitations  Gastrointestinal: Negative for abdominal pain and vomiting  Genitourinary: Negative for dysuria and hematuria  Musculoskeletal: Negative for arthralgias and back pain  Skin: Negative for color change and rash  Neurological: Negative for seizures and syncope  All other systems reviewed and are negative          Current Medications       Current Outpatient Medications:     acetaminophen (TYLENOL) 325 mg tablet, Take 650 mg by mouth every 6 (six) hours as needed for mild pain, Disp: , Rfl:     amoxicillin-clavulanate (AUGMENTIN) 875-125 mg per tablet, Take 1 tablet by mouth every 12 (twelve) hours for 10 days, Disp: 20 tablet, Rfl: 0    ARIPiprazole (ABILIFY) 15 mg tablet, Take 1 tablet by mouth daily, Disp: , Rfl:     aspirin-acetaminophen-caffeine (EXCEDRIN MIGRAINE) 250-250-65 MG per tablet, Take 2 tablets by mouth every 6 (six) hours as needed for headaches, Disp: , Rfl:     buPROPion (WELLBUTRIN) 75 mg tablet, Take 75 mg by mouth every morning, Disp: , Rfl:     Cetirizine HCl (ZYRTEC ALLERGY) 10 MG CAPS, Take 10 mg by mouth daily  , Disp: , Rfl:     clonazePAM (KlonoPIN) 1 mg tablet, 1 mg 2 (two) times a day , Disp: , Rfl: 0    dexamethasone (DECADRON) 2 mg tablet, Take 1 tablet (2 mg total) by mouth daily with breakfast, Disp: 5 tablet, Rfl: 0    divalproex sodium (DEPAKOTE ER) 500 mg 24 hr tablet, take 1 tablet by mouth once daily, Disp: 5 tablet, Rfl: 0    fluticasone (FLONASE) 50 mcg/act nasal spray, 1 spray into each nostril daily (Patient taking differently: 1 spray into each nostril daily as needed ), Disp: 16 g, Rfl: 0    fluticasone (FLOVENT HFA) 110 MCG/ACT inhaler, Inhale 1 puff as needed , Disp: , Rfl:     hydroxychloroquine (PLAQUENIL) 200 mg tablet, Take 200 mg by mouth as needed During Summer time , Disp: , Rfl:     ibuprofen (MOTRIN) 600 mg tablet, Take 1 tablet (600 mg total) by mouth every 6 (six) hours as needed for moderate pain, Disp: 30 tablet, Rfl: 0    ibuprofen (MOTRIN) 800 mg tablet, Take 800 mg by mouth as needed  (Patient not taking: Reported on 3/7/2022 ), Disp: , Rfl:     levothyroxine 88 mcg tablet, Take 88 mcg by mouth daily , Disp: , Rfl:     lisinopril (ZESTRIL) 10 mg tablet, Take 20 mg by mouth daily, Disp: , Rfl:     meclizine (ANTIVERT) 12 5 MG tablet, Take 12 5 mg by mouth as needed  , Disp: , Rfl:     Multiple Vitamins-Minerals (MULTIVITAMIN ADULT PO), Take 1 tablet by mouth daily, Disp: , Rfl:     niacin (NIASPAN) 500 mg CR tablet, Take 500 mg by mouth daily take with food (Patient not taking: Reported on 3/7/2022 ), Disp: , Rfl:     nystatin (MYCOSTATIN) 500,000 units/5 mL suspension, Apply 5 mL (500,000 Units total) to the mouth or throat 4 (four) times a day for 7 days, Disp: 140 mL, Rfl: 0    omeprazole (PriLOSEC) 40 MG capsule, Take 40 mg by mouth as needed , Disp: , Rfl: 0    ondansetron (ZOFRAN-ODT) 4 mg disintegrating tablet, Take 1 tablet (4 mg total) by mouth every 8 (eight) hours as needed for nausea or vomiting for up to 4 days (Patient not taking: Reported on 3/7/2022 ), Disp: 12 tablet, Rfl: 0    oxyCODONE (ROXICODONE) 15 mg immediate release tablet, Take 15 mg by mouth as needed , Disp: , Rfl:     PROAIR  (90 Base) MCG/ACT inhaler, Inhale 1-2 puffs as needed , Disp: , Rfl:     Probiotic Product (PROBIOTIC DAILY PO), Take 1 tablet by mouth daily (Patient not taking: Reported on 3/7/2022 ), Disp: , Rfl:     prochlorperazine (COMPAZINE) 10 mg tablet, Take 1 tablet (10 mg total) by mouth every 6 (six) hours as needed (migraine), Disp: 20 tablet, Rfl: 6    prochlorperazine (COMPAZINE) 10 mg tablet, Take 1 tablet (10 mg total) by mouth every 6 (six) hours as needed (migraine), Disp: 10 tablet, Rfl: 0    propranolol (INDERAL LA) 160 mg, Take 160 mg by mouth 2 (two) times a day  , Disp: , Rfl: 0    Rimegepant Sulfate (Nurtec) 75 MG TBDP, Take 75 mg by mouth as needed (migraine), Disp: 8 tablet, Rfl: 3    topiramate (TOPAMAX) 100 mg tablet, One in am and two at bedtime daily, Disp: 270 tablet, Rfl: 3    valACYclovir (VALTREX) 1,000 mg tablet, 1 tablet as needed , Disp: , Rfl:     Current Facility-Administered Medications:     Botulinum Toxin Type A SOLR 200 Units, 200 Units, Injection, Q3 Months, Kassie Andrade PA-C, 200 Units at 03/18/21 1154    Current Allergies     Allergies as of 04/01/2022 - Reviewed 04/01/2022   Allergen Reaction Noted    Other  05/13/2015    Pregabalin  05/13/2015    Shellfish-derived products - food allergy Anaphylaxis and Hives 02/05/2018    Sumatriptan  05/13/2015    Triptans Shortness Of Breath and Palpitations 10/17/2012    Latex Swelling and Rash 07/26/2012    Sulfa antibiotics Swelling and Rash 05/13/2015    Monistat [miconazole] Swelling 04/06/2018    Shrimp extract allergy skin test - food allergy  05/20/2019            The following portions of the patient's history were reviewed and updated as appropriate: allergies, current medications, past family history, past medical history, past social history, past surgical history and problem list      Past Medical History:   Diagnosis Date    Asthma     [facility] [address] [facility]     Disease of thyroid gland     Fibromyalgia     Gout     Hyperlipidemia     Hypertension     Lupus [facility] [address] [facility]     Migraine        Past Surgical History:   Procedure Laterality Date    GALLBLADDER SURGERY  2008    HYSTERECTOMY  2014    LAPAROSCOPIC COLON RESECTION  2001       Family History   Problem Relation Age of Onset    Lupus Mother     Osteoporosis Mother     Hypotension Mother     Hypertension Father     Diabetes Father     Heart disease Father     Diabetes Sister     Schizophrenia Sister     Hypertension Sister    Niko Flower Diabetes Maternal Grandmother     Rheum arthritis Maternal Grandmother     Hypertension Maternal Grandmother     Breast cancer Maternal Aunt 36    No Known Problems Maternal Aunt     No Known Problems Maternal Aunt     No Known Problems Maternal Aunt     No Known Problems Paternal Aunt          Medications have been verified  Objective   /89   Pulse 62   Temp 97 9 °F (36 6 °C) (Temporal)   Resp 18   LMP  (LMP Unknown)   SpO2 96%        Physical Exam     Physical Exam  Vitals and nursing note reviewed  Constitutional:       General: She is not in acute distress  Appearance: Normal appearance  She is not ill-appearing  HENT:      Head: Normocephalic and atraumatic  Right Ear: A middle ear effusion (Serous) is present  Tympanic membrane is not injected or bulging  Left Ear: A middle ear effusion (Serous) is present  Tympanic membrane is not injected or bulging  Nose: Congestion present  No rhinorrhea  Right Sinus: Frontal sinus tenderness present  No maxillary sinus tenderness  Left Sinus: Frontal sinus tenderness present  No maxillary sinus tenderness  Mouth/Throat:      Mouth: Mucous membranes are moist       Pharynx: Oropharynx is clear  Uvula midline  Posterior oropharyngeal erythema present  No pharyngeal swelling, oropharyngeal exudate or uvula swelling  Tonsils: No tonsillar exudate or tonsillar abscesses  0 on the right  0 on the left  Eyes:      Extraocular Movements: Extraocular movements intact  Conjunctiva/sclera: Conjunctivae normal       Pupils: Pupils are equal, round, and reactive to light  Cardiovascular:      Rate and Rhythm: Normal rate and regular rhythm  Pulses: Normal pulses  Heart sounds: Normal heart sounds  No murmur heard  No friction rub  No gallop  Pulmonary:      Effort: Pulmonary effort is normal  No respiratory distress  Breath sounds: Normal breath sounds  No stridor   No wheezing, rhonchi or rales    Chest:      Chest wall: No tenderness  Abdominal:      General: Bowel sounds are normal       Palpations: Abdomen is soft  Tenderness: There is no abdominal tenderness  Musculoskeletal:         General: Normal range of motion  Cervical back: Normal range of motion and neck supple  Skin:     General: Skin is warm and dry  Capillary Refill: Capillary refill takes less than 2 seconds  Neurological:      General: No focal deficit present  Mental Status: She is alert and oriented to person, place, and time     Psychiatric:         Mood and Affect: Mood normal          Behavior: Behavior normal

## 2022-04-01 NOTE — PATIENT INSTRUCTIONS
1  Drink plenty fluids  2   Take probiotics [i e  Yogurt, Acidophilus, Florastor (liquid)] daily  3   Over-the-counter medications as needed for symptomatic care  4    Advance activities as tolerated  5    Follow-up with your primary care physician in 3-4 days  6   Go to emergency room if symptoms are worsening  7   Use a humidifier at bedtime    Sinusitis   AMBULATORY CARE:   Sinusitis  is inflammation or infection of your sinuses  Sinusitis is most often caused by a virus  Acute sinusitis may last up to 12 weeks  Chronic sinusitis lasts longer than 12 weeks  Recurrent sinusitis means you have 4 or more infections in 1 year  Common signs and symptoms:   · Fever    · Pain, pressure, redness, or swelling around the forehead, cheeks, or eyes    · Thick yellow or green discharge from your nose    · Tenderness when you touch your face over your sinuses    · Dry cough that happens mostly at night or when you lie down    · Headache and face pain that is worse when you lean forward    · Tooth pain, or pain when you chew    Seek care immediately if:   · You have trouble breathing or wheezing that is getting worse  · You have a stiff neck, a fever, or a bad headache  · You cannot open your eye  · Your eyeball bulges out or you cannot move your eye  · You are more sleepy than normal, or you notice changes in your ability to think, move, or talk  · You have swelling of your forehead or scalp  Call your doctor if:   · You have vision changes, such as double vision  · Your eye and eyelid are red, swollen, and painful  · Your symptoms do not improve or go away after 10 days  · You have nausea and are vomiting  · Your nose is bleeding  · You have questions or concerns about your condition or care  Medicines: Your symptoms may go away on their own  Your healthcare provider may recommend watchful waiting for up to 10 days before starting antibiotics   You may need any of the following:  · Acetaminophen  decreases pain and fever  It is available without a doctor's order  Ask how much to take and how often to take it  Follow directions  Read the labels of all other medicines you are using to see if they also contain acetaminophen, or ask your doctor or pharmacist  Acetaminophen can cause liver damage if not taken correctly  Do not use more than 4 grams (4,000 milligrams) total of acetaminophen in one day  · NSAIDs , such as ibuprofen, help decrease swelling, pain, and fever  This medicine is available with or without a doctor's order  NSAIDs can cause stomach bleeding or kidney problems in certain people  If you take blood thinner medicine, always ask your healthcare provider if NSAIDs are safe for you  Always read the medicine label and follow directions  · Nasal steroid sprays  may help decrease inflammation in your nose and sinuses  · Decongestants  help reduce swelling and drain mucus in the nose and sinuses  They may help you breathe easier  · Antihistamines  help dry mucus in the nose and relieve sneezing  · Antibiotics  help treat or prevent a bacterial infection  Self-care:   · Rinse your sinuses as directed  Use a sinus rinse device to rinse your nasal passages with a saline (salt water) solution or distilled water  Do not use tap water  This will help thin the mucus in your nose and rinse away pollen and dirt  It will also help reduce swelling so you can breathe normally  · Use a humidifier  to increase air moisture in your home  This may make it easier for you to breathe and help decrease your cough  · Sleep with your head elevated  Place an extra pillow under your head before you go to sleep to help your sinuses drain  · Drink liquids as directed  Ask your healthcare provider how much liquid to drink each day and which liquids are best for you  Liquids will thin the mucus in your nose and help it drain   Avoid drinks that contain alcohol or caffeine  · Do not smoke, and avoid secondhand smoke  Nicotine and other chemicals in cigarettes and cigars can make your symptoms worse  Ask your healthcare provider for information if you currently smoke and need help to quit  E-cigarettes or smokeless tobacco still contain nicotine  Talk to your healthcare provider before you use these products  Prevent the spread of germs:   · Wash your hands often with soap and water  Wash your hands after you use the bathroom, change a child's diaper, or sneeze  Wash your hands before you prepare or eat food  · Stay away from people who are sick  Some germs spread easily and quickly through contact  Follow up with your doctor as directed: You may be referred to an ear, nose, and throat specialist  Write down your questions so you remember to ask them during your visits  © [employer] 2022 Information is for End User's use only and may not be sold, redistributed or otherwise used for commercial purposes  All illustrations and images included in CareNotes® are the copyrighted property of [employer]  or [employer]   The above information is an  only  It is not intended as medical advice for individual conditions or treatments  Talk to your doctor, nurse or pharmacist before following any medical regimen to see if it is safe and effective for you  Oral Candidiasis   AMBULATORY CARE:   Oral candidiasis , or thrush, is a fungal infection that affects the inside of your mouth  Seek care immediately if:   · You have trouble swallowing and your jaw and neck are stiff  · You are dizzy, thirsty, or have a dry mouth  · You are urinating little or not at all  · You cannot eat or drink because of the pain  Contact your healthcare provider if:   · You have a fever  · You have nausea, vomiting, or diarrhea  · Your signs and symptoms get worse, even after treatment      · You have questions or concerns about your condition or care  Common symptoms include the following:   · White or whitish-yellow patches in the mouth that look like milk curds    · Redness or bleeding under the patches    · Sore and painful mouth, with cracking or tearing on the corners    · Bright red tongue that may feel like it is burning    · Trouble swallowing and tasting    · Swelling under dentures    Treatment for oral candidiasis  includes antifungal medicine that helps kill the fungus that caused your oral candidiasis  This medicine may be a pill or a solution that you gargle  Remove dentures before you gargle  Prevent oral candidiasis:  Brush your teeth, gums, and tongue after you eat and before you go to sleep  Use a toothbrush with soft bristles  See your dentist for regular exams  Remove your dentures when you sleep, or at least 6 hours each day  Clean your dentures and soak them in denture   Let them air dry after soaking  Follow up with your doctor as directed:  Write down your questions so you remember to ask them during your visits  © Copyright [employer] 2022 Information is for End User's use only and may not be sold, redistributed or otherwise used for commercial purposes  All illustrations and images included in CareNotes® are the copyrighted property of [employer]  or Navya Salas   The above information is an  only  It is not intended as medical advice for individual conditions or treatments  Talk to your doctor, nurse or pharmacist before following any medical regimen to see if it is safe and effective for you

## 2022-04-05 ENCOUNTER — PROCEDURE VISIT (OUTPATIENT)
Dept: NEUROLOGY | Facility: CLINIC | Age: 47
End: 2022-04-05
Payer: MEDICARE

## 2022-04-05 VITALS — HEART RATE: 70 BPM | TEMPERATURE: 97.3 F | DIASTOLIC BLOOD PRESSURE: 78 MMHG | SYSTOLIC BLOOD PRESSURE: 155 MMHG

## 2022-04-05 DIAGNOSIS — G43.709 CHRONIC MIGRAINE WITHOUT AURA WITHOUT STATUS MIGRAINOSUS, NOT INTRACTABLE: Primary | ICD-10-CM

## 2022-04-05 DIAGNOSIS — E66.01 OBESITY, MORBID (HCC): ICD-10-CM

## 2022-04-05 DIAGNOSIS — I10 UNCONTROLLED HYPERTENSION: ICD-10-CM

## 2022-04-05 PROCEDURE — 64615 CHEMODENERV MUSC MIGRAINE: CPT | Performed by: PHYSICIAN ASSISTANT

## 2022-04-05 NOTE — PROGRESS NOTES

## 2022-04-20 ENCOUNTER — OFFICE VISIT (OUTPATIENT)
Dept: OBGYN CLINIC | Facility: CLINIC | Age: 47
End: 2022-04-20

## 2022-04-20 VITALS
DIASTOLIC BLOOD PRESSURE: 71 MMHG | BODY MASS INDEX: 39.46 KG/M2 | HEART RATE: 58 BPM | HEIGHT: 60 IN | WEIGHT: 201 LBS | SYSTOLIC BLOOD PRESSURE: 107 MMHG

## 2022-04-20 DIAGNOSIS — N89.8 VAGINAL DISCHARGE: Primary | ICD-10-CM

## 2022-04-20 DIAGNOSIS — B37.3 VAGINAL YEAST INFECTION: ICD-10-CM

## 2022-04-20 PROCEDURE — 87210 SMEAR WET MOUNT SALINE/INK: CPT | Performed by: NURSE PRACTITIONER

## 2022-04-20 PROCEDURE — 99213 OFFICE O/P EST LOW 20 MIN: CPT | Performed by: NURSE PRACTITIONER

## 2022-04-20 RX ORDER — FLUCONAZOLE 150 MG/1
TABLET ORAL
Qty: 2 TABLET | Refills: 0 | Status: SHIPPED | OUTPATIENT
Start: 2022-04-20 | End: 2022-04-23

## 2022-04-20 NOTE — PATIENT INSTRUCTIONS
Yeast Infection   AMBULATORY CARE:   A yeast infection , or vaginal candidiasis, is a common vaginal infection  A yeast infection is caused by a fungus, or yeast-like germ  Fungi are normally found in your vagina  Too many fungi can cause an infection  Common signs and symptoms:   · Thick, white, cheese-like discharge from your vagina    · Itching, swelling, and redness in your vagina    · Pain or burning when you urinate    · Pain during sexual intercourse    Call your doctor or gynecologist if:   · You have a fever and chills  · You develop abdominal or pelvic pain  · Your discharge is bloody and it is not your monthly period  · Your signs and symptoms get worse, even after treatment  · You have questions or concerns about your condition or care  Treatment for a yeast infection  includes medicines to treat the fungal infection and decrease inflammation  The medicine may be a pill, cream, ointment, or vaginal tablet or suppository  Keep your vagina healthy:   · Clean your genital area with mild soap and warm water each day  Do not get soap inside your vagina  Gently dry the area after washing  Do not use hot tubs  The heat and moisture from hot tubs can increase your risk for another yeast infection  · Always wipe from front to back  after you use the toilet  This prevents spreading bacteria from your rectal area into your vagina  · Do not wear tight-fitting clothes or undergarments  for long periods of time  Wear cotton underwear during the day  Cotton helps keep your genital area dry and does not hold in warmth or moisture  Do not wear underwear at night  · Do not douche  or use feminine hygiene sprays or bubble bath  Do not use pads or tampons that are scented, or colored or perfumed toilet paper  · Do not have sex until your symptoms go away  Have your partner wear a condom until you complete your course of medication      · Ask your healthcare provider about birth control options if necessary  Condoms have latex and diaphragms have gel that kills sperm  Both of these may irritate your genital area  Follow up with your doctor or gynecologist as directed:  Write down your questions so you remember to ask them during your visits  © Copyright [employer] 2022 Information is for End User's use only and may not be sold, redistributed or otherwise used for commercial purposes  All illustrations and images included in CareNotes® are the copyrighted property of [employer]  or Navya Thompson  The above information is an  only  It is not intended as medical advice for individual conditions or treatments  Talk to your doctor, nurse or pharmacist before following any medical regimen to see if it is safe and effective for you

## 2022-04-20 NOTE — PROGRESS NOTES
Assessment/Plan:    No problem-specific Assessment & Plan notes found for this encounter  Due for annual -1-2 weeks     Diagnoses and all orders for this visit:    Vaginal discharge  -     POCT wet mount    Vaginal yeast infection  -     fluconazole (DIFLUCAN) 150 mg tablet; Take 1 pill today and repeat in 3 days  Patient request of 2 pills  Follow-up at her annual gyn exam        Subjective:      Patient ID: James Jiménez is a 51 y o  adult  HPI  50 yo  004   Patient reports tenderness externally and internally  History of hysterectomy  Denies any itching  Does report some soreness in tenderness in the pelvic area  Denies any odor  Declines any STD testing   Recently treated for a sinus infection with a 3 weeks ago  Has been taking probiotics  Uses coconut oil for vaginal dryness  She saw urogynecology 12/10/2020 for IC , instructed on kegels and was to f/u in 3 months  The following portions of the patient's history were reviewed and updated as appropriate: allergies, current medications, past family history, past medical history, past social history, past surgical history and problem list     Review of Systems   Constitutional: Negative for chills and fever  Respiratory: Negative  Cardiovascular: Negative  Genitourinary: Positive for vaginal discharge  Tenderness in labia and vagina         Objective:      /71 (BP Location: Left arm, Patient Position: Sitting, Cuff Size: Adult)   Pulse 58   Ht 5' (1 524 m)   Wt 91 2 kg (201 lb)   LMP  (LMP Unknown)   BMI 39 26 kg/m²          Physical Exam  Constitutional:       Appearance: Normal appearance  Cardiovascular:      Rate and Rhythm: Normal rate  Pulmonary:      Effort: Pulmonary effort is normal    Abdominal:      Tenderness: There is no abdominal tenderness  Neurological:      Mental Status: She is oriented to person, place, and time     Psychiatric:         Mood and Affect: Mood normal        external genitalia-no lesions, very mild erythema  Vagina-mild erythema, small amount white discharge  Cervix-surgically absent  Uterus-surgically absent  Adnexa-no masses, nontender    Wet mount KOH-positive for yeast hyphae

## 2022-04-26 ENCOUNTER — HOSPITAL ENCOUNTER (OUTPATIENT)
Dept: RADIOLOGY | Age: 47
Discharge: HOME | End: 2022-04-26
Payer: MEDICARE

## 2022-04-26 VITALS — HEIGHT: 60 IN | WEIGHT: 201 LBS | BODY MASS INDEX: 39.46 KG/M2

## 2022-04-26 DIAGNOSIS — Z12.31 ENCOUNTER FOR SCREENING MAMMOGRAM FOR MALIGNANT NEOPLASM OF BREAST: ICD-10-CM

## 2022-04-26 PROCEDURE — 77063 BREAST TOMOSYNTHESIS BI: CPT

## 2022-04-26 PROCEDURE — 77067 SCR MAMMO BI INCL CAD: CPT

## 2022-04-28 ENCOUNTER — TELEPHONE (OUTPATIENT)
Dept: OBGYN CLINIC | Facility: CLINIC | Age: 47
End: 2022-04-28

## 2022-04-28 NOTE — TELEPHONE ENCOUNTER
Pt called if you can send the medication for the yeast infection to the pharmacy  She prefers two doses since that usually helps  Would like call back when sent   Thank you

## 2022-05-02 ENCOUNTER — TELEPHONE (OUTPATIENT)
Dept: OBGYN CLINIC | Facility: CLINIC | Age: 47
End: 2022-05-02

## 2022-05-02 NOTE — TELEPHONE ENCOUNTER
L/m to pt to inform pt that her results from Mammogram Result is benign  If pt has any questions pt may call office, number was provided  Also may be seen thru [facility]

## 2022-05-04 NOTE — PROGRESS NOTES
ASSESSMENT & PLAN: Michael Donato is a 51 y o  [id number] with normal gynecologic exam  Hx of hysterectomy    1  Routine well woman exam done today  2  Pap and HPV not indicated due to hysterectomy  Hx of abnormal pap in her teens and then follow up paps were all negative  Current ASCCP Guidelines reviewed  3   Mammogram ordered to be done 04/26/2023  4  The following were reviewed in today's visit: breast self exam, mammography screening ordered, adequate intake of calcium and vitamin D, exercise and healthy diet  5  Hx of IC- she does not want any treatment at this time but will call if she desires a referral      BMI Counseling: Body mass index is 39 06 kg/m²  The BMI is above normal  Nutrition recommendations include reducing portion sizes, 3-5 servings of fruits/vegetables daily, moderation in carbohydrate intake and increasing intake of lean protein  Exercise recommendations include exercising 3-5 times per week  Depression Screening Follow-up Plan: Patient's depression screening was positive with a PHQ-2 score of 1  Their PHQ-9 score was 5  CC:  Annual Gynecologic Examination    HPI: Michael Donato is a 51 y o  [id number] who presents for annual gynecologic examination  History of hysterectomy for DUB, fibroids 02/25/2015     Uses coconut oil for vaginal dryness  Has seen Urogynecology 12/10/2020 for IC, was also instructed in [facility] at that time and was to follow-up in 3 months  Her last mammogram was 04/26/2022 and was benign  Next  screening is due in 1 year  The history of IC, thyroid disease, hypertension, migraines, bipolar disease  History of bladder instillations  History of lipedema because of weight gain and placed on weight loss medicine by her PCP  Her goal to lose weight is 180 lbs, states she lost 20 lbs so far  She will be getting wrist surgery 5/31/2022  Health Maintenance:     She wears her seatbelt routinely  She does perform regular monthly self breast exams  She feels safe at home  Patient Active Problem List   Diagnosis    Palpitations    AVNRT (AV win re-entry tachycardia) ([place] [address] )    Chronic migraine without aura without status migrainosus, not intractable    Interstitial cystitis    Vaginal candidiasis    Encounter for gynecological examination    Screening for breast cancer    Hyperlipidemia    Disease of thyroid gland    Yeast infection of the vagina    Vaginal discharge    Other specified anxiety disorders    Vaginal dryness    Pelvic pain    Urine frequency    Full incontinence of feces    Cervicogenic headache    Cervicalgia    Vaginal yeast infection    IC (interstitial cystitis)    Obesity, morbid ([place] [address] )       Past Medical History:   Diagnosis Date    Asthma     Bipolar 1 disorder ([place] [address] )     Disease of thyroid gland     Fibromyalgia     Gout     Hyperlipidemia     Hypertension     Lupus ([place] [address] )     Migraine        Past Surgical History:   Procedure Laterality Date    GALLBLADDER SURGERY  2008    HYSTERECTOMY      LAPAROSCOPIC COLON RESECTION         Past OB/Gyn History:  OB History        4    Para   4    Term   4            AB        Living   4       SAB        IAB        Ectopic        Multiple        Live Births   4                  Pt does not have menstrual issues  hysterectomy  History of sexually transmitted infection: Yes  Declines testing  History of abnormal pap smears: Yes , see HPI   Patient is currently sexually active  heterosexual  The current method of family planning is status post hysterectomy      Family History   Problem Relation Age of Onset    Lupus Mother     Osteoporosis Mother     Hypotension Mother     Hypertension Father     Diabetes Father     Heart disease Father     Diabetes Sister     Schizophrenia Sister     Hypertension Sister     Diabetes Maternal Grandmother     Rheum arthritis Maternal Grandmother     Hypertension Maternal Grandmother     Breast cancer Maternal Aunt 40    No Known Problems Maternal Aunt     No Known Problems Maternal Aunt     No Known Problems Maternal Aunt     No Known Problems Paternal Aunt        Social History:  Social History     Socioeconomic History    Marital status: Single     Spouse name: Not on file    Number of children: Not on file    Years of education: Not on file    Highest education level: Not on file   Occupational History    Not on file   Tobacco Use    Smoking status: Former Smoker    Smokeless tobacco: Never Used    Tobacco comment: quit at 24years old   Vaping Use    Vaping Use: Never used   Substance and Sexual Activity    Alcohol use: No    Drug use: No    Sexual activity: Yes     Partners: Male     Birth control/protection: Female Sterilization   Other Topics Concern    Not on file   Social History Narrative    Not on file     Social Determinants of Health     Financial Resource Strain: High Risk    Difficulty of Paying Living Expenses: Hard   Food Insecurity: Food Insecurity Present    Worried About Running Out of Food in the Last Year: Sometimes true    [place] of Food in the Last Year: Often true   Transportation Needs: No Transportation Needs    Lack of Transportation (Medical): No    Lack of Transportation (Non-Medical): No   Physical Activity: Not on file   Stress: Not on file   Social Connections: Not on file   Intimate Partner Violence: Not on file   Housing Stability: Not on file     Presently lives with family  Patient is single  Patient is currently employed   Allergies   Allergen Reactions    Other     Pregabalin      Other reaction(s): blurred vision    Shellfish-Derived Products - Food Allergy Anaphylaxis and Hives    Sumatriptan      Other reaction(s):  Altered Heart Rate    Triptans Shortness Of Breath and Palpitations    Latex Swelling and Rash     Other reaction(s): Hives/Skin Rash  Other reaction(s): Hives/Skin Rash    Sulfa Antibiotics Swelling and Rash     Other reaction(s): Hives/Skin Rash  Other reaction(s): Hives/Skin Rash    Monistat [Miconazole] Swelling    Shrimp Extract Allergy Skin Test - Food Allergy        Current Outpatient Medications:     acetaminophen (TYLENOL) 325 mg tablet, Take 650 mg by mouth every 6 (six) hours as needed for mild pain, Disp: , Rfl:     ARIPiprazole (ABILIFY) 15 mg tablet, Take 1 tablet by mouth daily, Disp: , Rfl:     aspirin-acetaminophen-caffeine (EXCEDRIN MIGRAINE) 250-250-65 MG per tablet, Take 2 tablets by mouth every 6 (six) hours as needed for headaches, Disp: , Rfl:     buPROPion (WELLBUTRIN) 75 mg tablet, Take 75 mg by mouth every morning, Disp: , Rfl:     Cetirizine HCl (ZYRTEC ALLERGY) 10 MG CAPS, Take 10 mg by mouth daily  , Disp: , Rfl:     clonazePAM (KlonoPIN) 1 mg tablet, 1 mg 2 (two) times a day , Disp: , Rfl: 0    dexamethasone (DECADRON) 2 mg tablet, Take 1 tablet (2 mg total) by mouth daily with breakfast, Disp: 5 tablet, Rfl: 0    divalproex sodium (DEPAKOTE ER) 500 mg 24 hr tablet, take 1 tablet by mouth once daily, Disp: 5 tablet, Rfl: 0    fluticasone (FLONASE) 50 mcg/act nasal spray, 1 spray into each nostril daily (Patient taking differently: 1 spray into each nostril daily as needed ), Disp: 16 g, Rfl: 0    fluticasone (FLOVENT HFA) 110 MCG/ACT inhaler, Inhale 1 puff as needed , Disp: , Rfl:     hydroxychloroquine (PLAQUENIL) 200 mg tablet, Take 200 mg by mouth as needed During Summer time , Disp: , Rfl:     ibuprofen (MOTRIN) 600 mg tablet, Take 1 tablet (600 mg total) by mouth every 6 (six) hours as needed for moderate pain, Disp: 30 tablet, Rfl: 0    ibuprofen (MOTRIN) 800 mg tablet, Take 800 mg by mouth as needed  (Patient not taking: Reported on 3/7/2022 ), Disp: , Rfl:     levothyroxine 88 mcg tablet, Take 88 mcg by mouth daily , Disp: , Rfl:     lisinopril (ZESTRIL) 10 mg tablet, Take 20 mg by mouth daily, Disp: , Rfl:     meclizine (ANTIVERT) 12 5 MG tablet, Take 12 5 mg by mouth as needed  , Disp: , Rfl:     Multiple Vitamins-Minerals (MULTIVITAMIN ADULT PO), Take 1 tablet by mouth daily, Disp: , Rfl:     niacin (NIASPAN) 500 mg CR tablet, Take 500 mg by mouth daily take with food (Patient not taking: Reported on 3/7/2022 ), Disp: , Rfl:     omeprazole (PriLOSEC) 40 MG capsule, Take 40 mg by mouth as needed , Disp: , Rfl: 0    ondansetron (ZOFRAN-ODT) 4 mg disintegrating tablet, Take 1 tablet (4 mg total) by mouth every 8 (eight) hours as needed for nausea or vomiting for up to 4 days (Patient not taking: Reported on 3/7/2022 ), Disp: 12 tablet, Rfl: 0    oxyCODONE (ROXICODONE) 15 mg immediate release tablet, Take 15 mg by mouth as needed , Disp: , Rfl:     PROAIR  (90 Base) MCG/ACT inhaler, Inhale 1-2 puffs as needed , Disp: , Rfl:     Probiotic Product (PROBIOTIC DAILY PO), Take 1 tablet by mouth daily (Patient not taking: Reported on 3/7/2022 ), Disp: , Rfl:     prochlorperazine (COMPAZINE) 10 mg tablet, Take 1 tablet (10 mg total) by mouth every 6 (six) hours as needed (migraine), Disp: 20 tablet, Rfl: 6    prochlorperazine (COMPAZINE) 10 mg tablet, Take 1 tablet (10 mg total) by mouth every 6 (six) hours as needed (migraine), Disp: 10 tablet, Rfl: 0    propranolol (INDERAL LA) 160 mg, Take 160 mg by mouth 2 (two) times a day  , Disp: , Rfl: 0    Rimegepant Sulfate (Nurtec) 75 MG TBDP, Take 75 mg by mouth as needed (migraine), Disp: 8 tablet, Rfl: 3    topiramate (TOPAMAX) 100 mg tablet, One in am and two at bedtime daily, Disp: 270 tablet, Rfl: 3    valACYclovir (VALTREX) 1,000 mg tablet, 1 tablet as needed , Disp: , Rfl:     Current Facility-Administered Medications:     Botulinum Toxin Type A SOLR 200 Units, 200 Units, Injection, Q3 Months, Kassie Andrade PA-C, 200 Units at 03/18/21 1154    Review of Systems:    Review of Systems  Constitutional :no fever, feels well, no tiredness, no recent weight gain or loss  ENT: no ear ache, no loss of hearing, no nosebleeds or nasal discharge, no sore throat or hoarseness  Cardiovascular: no complaints of slow or fast heart beat, no chest pain, no palpitations, no leg claudication or lower extremity edema  Respiratory: no complaints of shortness of shortness of breath, no ORELLANA  Breasts:no complaints of breast pain, breast lump, or nipple discharge  Gastrointestinal: no complaints of abdominal pain, constipation, nausea, vomiting, or diarrhea or bloody stools  Genitourinary : no complaints of dysuria, incontinence, pelvic pain, no dysmenorrhea, vaginal discharge or abnormal vaginal bleeding and as noted in HPI  Musculoskeletal: no complaints of arthralgia, no myalgia, no joint swelling or stiffness, no limb pain or swelling  Integumentary: no complaints of skin rash or lesion, itching or dry skin  Neurological: no complaints of headache, no confusion, no numbness or tingling, no dizziness or fainting    Objective      LMP  (LMP Unknown)     General:   appears stated age, cooperative, alert normal mood and affect   Neck: normal, supple,trachea midline, no masses   Heart: regular rate and rhythm, S1, S2 normal, no murmur, click, rub or gallop   Lungs: clear to auscultation bilaterally   Breasts: normal appearance, no masses or tenderness, Inspection negative, No nipple retraction or dimpling, No nipple discharge or bleeding, No axillary or supraclavicular adenopathy, Normal to palpation without dominant masses, Taught monthly breast self examination   Abdomen: soft, non-tender, without masses or organomegaly   Vulva: normal female genitalia, Bartholin's, Urethra, [place] normal   Vagina: normal vagina, no discharge, exudate, lesion, or erythema   Urethra: normal   Cervix: Absent  Uterus: uterus absent   Adnexa: normal adnexa   Lymphatic palpation of lymph nodes in neck, axilla, groin and/or other locations: no lymphadenopathy or masses noted   Skin normal skin turgor and no rashes     Psychiatric orientation to person, place, and time: normal  mood and affect: normal

## 2022-05-05 ENCOUNTER — ANNUAL EXAM (OUTPATIENT)
Dept: OBGYN CLINIC | Facility: CLINIC | Age: 47
End: 2022-05-05

## 2022-05-05 VITALS
BODY MASS INDEX: 39.27 KG/M2 | DIASTOLIC BLOOD PRESSURE: 95 MMHG | HEART RATE: 69 BPM | HEIGHT: 60 IN | SYSTOLIC BLOOD PRESSURE: 155 MMHG | WEIGHT: 200 LBS

## 2022-05-05 DIAGNOSIS — Z01.419 ENCOUNTER FOR GYNECOLOGICAL EXAMINATION WITHOUT ABNORMAL FINDING: Primary | ICD-10-CM

## 2022-05-05 DIAGNOSIS — Z12.31 ENCOUNTER FOR SCREENING MAMMOGRAM FOR MALIGNANT NEOPLASM OF BREAST: ICD-10-CM

## 2022-05-05 DIAGNOSIS — Z90.710 HISTORY OF HYSTERECTOMY: ICD-10-CM

## 2022-05-05 PROCEDURE — G0101 CA SCREEN;PELVIC/BREAST EXAM: HCPCS | Performed by: NURSE PRACTITIONER

## 2022-05-19 ENCOUNTER — OFFICE VISIT (OUTPATIENT)
Dept: URGENT CARE | Age: 47
End: 2022-05-19
Payer: MEDICARE

## 2022-05-19 VITALS
RESPIRATION RATE: 18 BRPM | HEART RATE: 76 BPM | OXYGEN SATURATION: 99 % | TEMPERATURE: 97.4 F | DIASTOLIC BLOOD PRESSURE: 81 MMHG | SYSTOLIC BLOOD PRESSURE: 148 MMHG

## 2022-05-19 DIAGNOSIS — R05.9 COUGH: Primary | ICD-10-CM

## 2022-05-19 PROCEDURE — 99213 OFFICE O/P EST LOW 20 MIN: CPT

## 2022-05-19 PROCEDURE — G0463 HOSPITAL OUTPT CLINIC VISIT: HCPCS

## 2022-05-19 RX ORDER — PREDNISONE 20 MG/1
20 TABLET ORAL 2 TIMES DAILY WITH MEALS
Qty: 10 TABLET | Refills: 0 | Status: SHIPPED | OUTPATIENT
Start: 2022-05-19 | End: 2022-05-24

## 2022-05-19 NOTE — PROGRESS NOTES
[facility]        NAME: Isaias Gonzales is a 51 y o  adult  : 1975    MRN: [id number]  DATE: May 19, 2022  TIME: 11:09 AM    Assessment and Plan   Cough [R05 9]  1  Cough  predniSONE 20 mg tablet   Clinical presentation consistent with viral syndrome, patient deferring COVID/Flu cultures at this time  Continue symptom management with OTC MPAP/NSAIDs, humidifier and decongestants  Short course of prednisone ordered for control of underlying asthma symptoms  Patient to follow-up with primary care provider next week who manages her asthma  Patient Instructions   Report to emergency department if:  -Chest pain/SOB/wheezing uncontrolled by inhaler  -Intractable fever > 100 4   -Unable to tolerate P O  fluids or manage saliva  -Decreased urinary output    Follow up with PCP in 3-5 days  Proceed to  ER if symptoms worsen  Chief Complaint     Chief Complaint   Patient presents with    Cough    Nausea    Nasal Congestion    Shortness of Breath         History of Present Illness       Patient is a 58-year-old female with past medical history significant for hypertension, asthma, who presents for chief complaint of cough, congestion over the past 2-3 nights  She notes associated chest tightness for which she has been needing to take her p r n  albuterol inhaler, nausea, and shortness of breath with coughing spells  She also began noticing right ear pain today, and is concerned that her mucus is turning green  She has tried Claritin, Allegra D as well with little relief  She reports that her primary care provider manages her asthma, and that she has an appointment next week  She denies fever, vomiting, neck pain or stiffness, chest pain, dyspnea at rest  She took an at home COVID test which was negative  , and she is COVID vaccinated x3  Cough  Associated symptoms include ear pain, headaches, rhinorrhea, shortness of breath and wheezing   Pertinent negatives include no chest pain, eye redness, fever, myalgias, postnasal drip, rash or sore throat  Her past medical history is significant for environmental allergies  Nausea  Associated symptoms include congestion, coughing, headaches and nausea  Pertinent negatives include no arthralgias, chest pain, fatigue, fever, joint swelling, myalgias, neck pain, numbness, rash, sore throat, vomiting or weakness  Shortness of Breath  Associated symptoms include coughing, rhinorrhea and wheezing  Pertinent negatives include no chest pain, dizziness, fatigue, leg swelling, palpitations, sore throat or stridor  Review of Systems   Review of Systems   Constitutional: Negative  Negative for fatigue and fever  HENT: Positive for congestion, ear pain and rhinorrhea  Negative for postnasal drip, sinus pressure, sinus pain, sneezing and sore throat  Eyes: Negative for discharge and redness  Respiratory: Positive for cough, shortness of breath and wheezing  Negative for apnea, choking, chest tightness and stridor  Cardiovascular: Negative for chest pain, palpitations and leg swelling  Gastrointestinal: Positive for nausea  Negative for blood in stool, constipation, diarrhea and vomiting  Endocrine: Negative  Genitourinary: Negative  Negative for decreased urine volume, difficulty urinating and dysuria  Musculoskeletal: Negative for arthralgias, back pain, joint swelling, myalgias, neck pain and neck stiffness  Skin: Negative  Negative for color change and rash  Allergic/Immunologic: Positive for environmental allergies  Neurological: Positive for headaches  Negative for dizziness, syncope, weakness, light-headedness and numbness  Hematological: Negative  Negative for adenopathy  Psychiatric/Behavioral: Negative  Current Medications       Current Outpatient Medications:     predniSONE 20 mg tablet, Take 1 tablet (20 mg total) by mouth in the morning and 1 tablet (20 mg total) in the evening  Take with meals   Do all this for 5 days  , Disp: 10 tablet, Rfl: 0    acetaminophen (TYLENOL) 325 mg tablet, Take 650 mg by mouth every 6 (six) hours as needed for mild pain, Disp: , Rfl:     ARIPiprazole (ABILIFY) 15 mg tablet, Take 1 tablet by mouth daily, Disp: , Rfl:     aspirin-acetaminophen-caffeine (EXCEDRIN MIGRAINE) 250-250-65 MG per tablet, Take 2 tablets by mouth every 6 (six) hours as needed for headaches, Disp: , Rfl:     buPROPion (WELLBUTRIN) 75 mg tablet, Take 75 mg by mouth every morning, Disp: , Rfl:     Cetirizine HCl (ZYRTEC ALLERGY) 10 MG CAPS, Take 10 mg by mouth daily  , Disp: , Rfl:     clonazePAM (KlonoPIN) 1 mg tablet, 1 mg 2 (two) times a day , Disp: , Rfl: 0    dexamethasone (DECADRON) 2 mg tablet, Take 1 tablet (2 mg total) by mouth daily with breakfast (Patient not taking: Reported on 5/19/2022), Disp: 5 tablet, Rfl: 0    divalproex sodium (DEPAKOTE ER) 500 mg 24 hr tablet, take 1 tablet by mouth once daily, Disp: 5 tablet, Rfl: 0    fluticasone (FLONASE) 50 mcg/act nasal spray, 1 spray into each nostril daily (Patient taking differently: 1 spray into each nostril daily as needed ), Disp: 16 g, Rfl: 0    fluticasone (FLOVENT HFA) 110 MCG/ACT inhaler, Inhale 1 puff as needed , Disp: , Rfl:     hydroxychloroquine (PLAQUENIL) 200 mg tablet, Take 200 mg by mouth as needed During Summer time , Disp: , Rfl:     ibuprofen (MOTRIN) 600 mg tablet, Take 1 tablet (600 mg total) by mouth every 6 (six) hours as needed for moderate pain, Disp: 30 tablet, Rfl: 0    ibuprofen (MOTRIN) 800 mg tablet, Take 800 mg by mouth as needed  , Disp: , Rfl:     levothyroxine 88 mcg tablet, Take 88 mcg by mouth daily , Disp: , Rfl:     lisinopril (ZESTRIL) 10 mg tablet, Take 20 mg by mouth daily, Disp: , Rfl:     meclizine (ANTIVERT) 12 5 MG tablet, Take 12 5 mg by mouth as needed  , Disp: , Rfl:     Multiple Vitamins-Minerals (MULTIVITAMIN ADULT PO), Take 1 tablet by mouth daily, Disp: , Rfl:     niacin (NIASPAN) 500 mg CR tablet, Take 500 mg by mouth daily take with food , Disp: , Rfl:     omeprazole (PriLOSEC) 40 MG capsule, Take 40 mg by mouth as needed , Disp: , Rfl: 0    ondansetron (ZOFRAN-ODT) 4 mg disintegrating tablet, Take 1 tablet (4 mg total) by mouth every 8 (eight) hours as needed for nausea or vomiting for up to 4 days (Patient not taking: Reported on 3/7/2022 ), Disp: 12 tablet, Rfl: 0    oxyCODONE (ROXICODONE) 15 mg immediate release tablet, Take 15 mg by mouth as needed , Disp: , Rfl:     PROAIR  (90 Base) MCG/ACT inhaler, Inhale 1-2 puffs as needed , Disp: , Rfl:     Probiotic Product (PROBIOTIC DAILY PO), Take 1 tablet by mouth daily  , Disp: , Rfl:     prochlorperazine (COMPAZINE) 10 mg tablet, Take 1 tablet (10 mg total) by mouth every 6 (six) hours as needed (migraine), Disp: 20 tablet, Rfl: 6    prochlorperazine (COMPAZINE) 10 mg tablet, Take 1 tablet (10 mg total) by mouth every 6 (six) hours as needed (migraine), Disp: 10 tablet, Rfl: 0    propranolol (INDERAL LA) 160 mg, Take 160 mg by mouth 2 (two) times a day  , Disp: , Rfl: 0    Rimegepant Sulfate (Nurtec) 75 MG TBDP, Take 75 mg by mouth as needed (migraine), Disp: 8 tablet, Rfl: 3    topiramate (TOPAMAX) 100 mg tablet, One in am and two at bedtime daily, Disp: 270 tablet, Rfl: 3    valACYclovir (VALTREX) 1,000 mg tablet, 1 tablet as needed , Disp: , Rfl:     Current Facility-Administered Medications:     Botulinum Toxin Type A SOLR 200 Units, 200 Units, Injection, Q3 Months, Kassie Andrade PA-C, 200 Units at 03/18/21 1154    Current Allergies     Allergies as of 05/19/2022 - Reviewed 05/05/2022   Allergen Reaction Noted    Other  05/13/2015    Pregabalin  05/13/2015    Shellfish-derived products - food allergy Anaphylaxis and Hives 02/05/2018    Sumatriptan  05/13/2015    Triptans Shortness Of Breath and Palpitations 10/17/2012    Latex Swelling and Rash 07/26/2012    Sulfa antibiotics Swelling and Rash 05/13/2015    Monistat [miconazole] Swelling 04/06/2018    Shrimp extract allergy skin test - food allergy  05/20/2019            The following portions of the patient's history were reviewed and updated as appropriate: allergies, current medications, past family history, past medical history, past social history, past surgical history and problem list      Past Medical History:   Diagnosis Date    Asthma     Bipolar 1 disorder ([facility] [place] [address] )     Disease of thyroid gland     Fibromyalgia     Gout     Hyperlipidemia     Hypertension     [facility]     Migraine        Past Surgical History:   Procedure Laterality Date    GALLBLADDER SURGERY  2008    HYSTERECTOMY  2014    LAPAROSCOPIC COLON RESECTION  2001       Family History   Problem Relation Age of Onset    Lupus Mother     Osteoporosis Mother     Hypotension Mother     Hypertension Father     Diabetes Father     Heart disease Father     Diabetes Sister     Schizophrenia Sister     Hypertension Sister     Diabetes Maternal Grandmother     Rheum arthritis Maternal Grandmother     Hypertension Maternal Grandmother     Breast cancer Maternal Aunt 36    No Known Problems Maternal Aunt     No Known Problems Maternal Aunt     No Known Problems Maternal Aunt     No Known Problems Paternal Aunt          Medications have been verified  Objective   /81 (BP Location: Left arm, Patient Position: Sitting, Cuff Size: Large)   Pulse 76   Temp (!) 97 4 °F (36 3 °C) (Temporal)   Resp 18   LMP  (LMP Unknown)   SpO2 99%        Physical Exam     Physical Exam  Vitals and nursing note reviewed  Constitutional:       General: She is not in acute distress  Appearance: Normal appearance  She is not ill-appearing, toxic-appearing or diaphoretic  Interventions: She is not intubated  HENT:      Head: Normocephalic and atraumatic  Right Ear: Tympanic membrane, ear canal and external ear normal  There is no impacted cerumen        Left Ear: Tympanic membrane, ear canal and external ear normal  There is no impacted cerumen  Nose: Nose normal  No congestion or rhinorrhea  Mouth/Throat:      Mouth: Mucous membranes are moist       Pharynx: No pharyngeal swelling, oropharyngeal exudate, posterior oropharyngeal erythema or uvula swelling  Tonsils: No tonsillar exudate or tonsillar abscesses  1+ on the right  1+ on the left  Eyes:      Extraocular Movements: Extraocular movements intact  Conjunctiva/sclera: Conjunctivae normal       Pupils: Pupils are equal, round, and reactive to light  Neck:      Thyroid: No thyromegaly  Vascular: No hepatojugular reflux  Trachea: No tracheal deviation  Cardiovascular:      Rate and Rhythm: Normal rate and regular rhythm  Pulses: Normal pulses  Heart sounds: Normal heart sounds, S1 normal and S2 normal  No murmur heard  No friction rub  No gallop  Pulmonary:      Effort: Pulmonary effort is normal  No tachypnea, bradypnea, accessory muscle usage or respiratory distress  She is not intubated  Breath sounds: Normal breath sounds  No stridor  No decreased breath sounds, wheezing, rhonchi or rales  Abdominal:      General: Bowel sounds are normal       Palpations: Abdomen is soft  Tenderness: There is no abdominal tenderness  There is no guarding or rebound  Musculoskeletal:         General: Normal range of motion  Cervical back: Normal range of motion and neck supple  No rigidity or tenderness  Lymphadenopathy:      Cervical: No cervical adenopathy  Skin:     General: Skin is warm and dry  Capillary Refill: Capillary refill takes less than 2 seconds  Neurological:      General: No focal deficit present  Mental Status: She is alert and oriented to person, place, and time  Cranial Nerves: No cranial nerve deficit     Psychiatric:         Mood and Affect: Mood normal          Behavior: Behavior normal

## 2022-05-24 ENCOUNTER — TELEPHONE (OUTPATIENT)
Dept: OBGYN CLINIC | Facility: OTHER | Age: 47
End: 2022-05-24

## 2022-05-24 NOTE — TELEPHONE ENCOUNTER
Patient called in , she is asking to reschedule her Surgery with Dr Vicki Moseley on 05-31      C/b # [phone]

## 2022-05-26 ENCOUNTER — TELEPHONE (OUTPATIENT)
Dept: NEUROLOGY | Facility: CLINIC | Age: 47
End: 2022-05-26

## 2022-05-26 DIAGNOSIS — G43.709 CHRONIC MIGRAINE WITHOUT AURA WITHOUT STATUS MIGRAINOSUS, NOT INTRACTABLE: ICD-10-CM

## 2022-05-26 DIAGNOSIS — G43.009 MIGRAINE WITHOUT AURA AND WITHOUT STATUS MIGRAINOSUS, NOT INTRACTABLE: Primary | ICD-10-CM

## 2022-05-26 RX ORDER — RIMEGEPANT SULFATE 75 MG/75MG
75 TABLET, ORALLY DISINTEGRATING ORAL EVERY OTHER DAY
Qty: 16 TABLET | Refills: 11 | Status: SHIPPED | OUTPATIENT
Start: 2022-05-26 | End: 2023-05-08

## 2022-05-26 NOTE — TELEPHONE ENCOUNTER
Pt called back and states that she is at the pharm and they can't reverse fill from yesterday as they need an active script and we cancelled script when we sent new script today  I asked if I could call in a script and she states that I can not  They would need the previous script un-cancelled  I asked if she could call the insurance to see if they can help as dose was increased    She will call insurance and I said I will see if provider can un-cancel script form yesterday    I spoke to Edwar and she can not un-cancel script    Left message for pharm making htem aware that we can not un cancel script

## 2022-05-26 NOTE — TELEPHONE ENCOUNTER
Pt made aware of below  She states that she picked up 8 tabs of nurtec yesterday  Called pharm, she states that she can't fill at this time since she already picked up yesterday, she states that she can set it up to fill in 10-12 days for the 16 tabs  She states that she can't back out claim from yesterday and rebill for 16  I made pt aware of above  She states that the pharmacist yesterday told her that we could send a script with yesterday's date and they can fill for 16  I made her aware that we can't back date a prescription  She states that she will have to pay another co-pay then  She states that she is taking the 8 tabs back to pharmacy      She will call back if any issues

## 2022-05-26 NOTE — TELEPHONE ENCOUNTER
King Staples from [facility] left  statign that she was able to reverse claim for 8 tabs and was able to fill 16 tabs

## 2022-05-26 NOTE — TELEPHONE ENCOUNTER
Please let patient know she needs a sleep study because I think she could have untreated sleep apnea and needs to see a sleep specialist   This can help with her headaches and hypertension  I send a new prescription to [facility] for the EOD dosing of Nurtec #16  Can we also make sure that [facility] fills this?   She only received 8

## 2022-05-28 ENCOUNTER — APPOINTMENT (EMERGENCY)
Dept: NON INVASIVE DIAGNOSTICS | Facility: HOSPITAL | Age: 47
End: 2022-05-28
Payer: MEDICARE

## 2022-05-28 ENCOUNTER — HOSPITAL ENCOUNTER (EMERGENCY)
Facility: HOSPITAL | Age: 47
Discharge: HOME | End: 2022-05-28
Attending: EMERGENCY MEDICINE | Admitting: EMERGENCY MEDICINE
Payer: MEDICARE

## 2022-05-28 VITALS
RESPIRATION RATE: 16 BRPM | TEMPERATURE: 97.2 F | HEART RATE: 71 BPM | OXYGEN SATURATION: 100 % | SYSTOLIC BLOOD PRESSURE: 140 MMHG | DIASTOLIC BLOOD PRESSURE: 96 MMHG

## 2022-05-28 DIAGNOSIS — I82.452 ACUTE DEEP VEIN THROMBOSIS (DVT) OF LEFT PERONEAL VEIN (HCC): Primary | ICD-10-CM

## 2022-05-28 LAB — DEPRECATED AT III PPP: 115 % OF NORMAL (ref 92–136)

## 2022-05-28 PROCEDURE — 85300 ANTITHROMBIN III ACTIVITY: CPT | Performed by: EMERGENCY MEDICINE

## 2022-05-28 PROCEDURE — 86146 BETA-2 GLYCOPROTEIN ANTIBODY: CPT | Performed by: EMERGENCY MEDICINE

## 2022-05-28 PROCEDURE — 85303 CLOT INHIBIT PROT C ACTIVITY: CPT | Performed by: EMERGENCY MEDICINE

## 2022-05-28 PROCEDURE — 85705 THROMBOPLASTIN INHIBITION: CPT | Performed by: EMERGENCY MEDICINE

## 2022-05-28 PROCEDURE — 85732 THROMBOPLASTIN TIME PARTIAL: CPT | Performed by: EMERGENCY MEDICINE

## 2022-05-28 PROCEDURE — 96372 THER/PROPH/DIAG INJ SC/IM: CPT

## 2022-05-28 PROCEDURE — 85306 CLOT INHIBIT PROT S FREE: CPT | Performed by: EMERGENCY MEDICINE

## 2022-05-28 PROCEDURE — 93971 EXTREMITY STUDY: CPT

## 2022-05-28 PROCEDURE — 86147 CARDIOLIPIN ANTIBODY EA IG: CPT | Performed by: EMERGENCY MEDICINE

## 2022-05-28 PROCEDURE — 99284 EMERGENCY DEPT VISIT MOD MDM: CPT | Performed by: EMERGENCY MEDICINE

## 2022-05-28 PROCEDURE — 93971 EXTREMITY STUDY: CPT | Performed by: SURGERY

## 2022-05-28 PROCEDURE — 36415 COLL VENOUS BLD VENIPUNCTURE: CPT | Performed by: EMERGENCY MEDICINE

## 2022-05-28 PROCEDURE — 85613 RUSSELL VIPER VENOM DILUTED: CPT | Performed by: EMERGENCY MEDICINE

## 2022-05-28 PROCEDURE — 99284 EMERGENCY DEPT VISIT MOD MDM: CPT

## 2022-05-28 PROCEDURE — 85670 THROMBIN TIME PLASMA: CPT | Performed by: EMERGENCY MEDICINE

## 2022-05-28 PROCEDURE — 85305 CLOT INHIBIT PROT S TOTAL: CPT | Performed by: EMERGENCY MEDICINE

## 2022-05-28 RX ORDER — ACETAMINOPHEN 325 MG/1
650 TABLET ORAL ONCE
Status: COMPLETED | OUTPATIENT
Start: 2022-05-28 | End: 2022-05-28

## 2022-05-28 RX ORDER — KETOROLAC TROMETHAMINE 30 MG/ML
15 INJECTION, SOLUTION INTRAMUSCULAR; INTRAVENOUS ONCE
Status: COMPLETED | OUTPATIENT
Start: 2022-05-28 | End: 2022-05-28

## 2022-05-28 RX ADMIN — APIXABAN 10 MG: 5 TABLET, FILM COATED ORAL at 19:16

## 2022-05-28 RX ADMIN — ACETAMINOPHEN 650 MG: 325 TABLET, FILM COATED ORAL at 16:57

## 2022-05-28 RX ADMIN — KETOROLAC TROMETHAMINE 15 MG: 30 INJECTION, SOLUTION INTRAMUSCULAR at 16:57

## 2022-05-28 NOTE — ED PROVIDER NOTES
History  Chief Complaint   Patient presents with    Leg Pain     Left lower leg pain with h/o DVT and no apparent injury for 2 days     HPI    69-year-old female, remote history of a DVT that was unprovoked, presenting for evaluation of left calf pain that began 2 days ago  She states that this feels similar to her prior DVT  She is not on any anticoagulation at this time  Pain is worse since with ambulation  Is relieved by rest   Patient has taken Tylenol without relief for symptoms  Patient denies exogenous estrogen, recent hospitalization, recent trauma, recent surgery  Denies chest pain, shortness of breath, abdominal pain, nausea, vomiting, fever, chills, calf swelling  Prior to Admission Medications   Prescriptions Last Dose Informant Patient Reported? Taking?    ARIPiprazole (ABILIFY) 15 mg tablet   Yes No   Sig: Take 1 tablet by mouth daily   Cetirizine HCl 10 MG CAPS  Self Yes No   Sig: Take 10 mg by mouth daily     Multiple Vitamins-Minerals (MULTIVITAMIN ADULT PO)  Self Yes No   Sig: Take 1 tablet by mouth daily   PROAIR  (90 Base) MCG/ACT inhaler  Self Yes No   Sig: Inhale 1-2 puffs as needed    Probiotic Product (PROBIOTIC DAILY PO)  Self Yes No   Sig: Take 1 tablet by mouth daily     Rimegepant Sulfate (Nurtec) 75 MG TBDP   No No   Sig: Take 75 mg by mouth every other day   acetaminophen (TYLENOL) 325 mg tablet  Self Yes No   Sig: Take 650 mg by mouth every 6 (six) hours as needed for mild pain   aspirin-acetaminophen-caffeine (EXCEDRIN MIGRAINE) 250-250-65 MG per tablet  Self Yes No   Sig: Take 2 tablets by mouth every 6 (six) hours as needed for headaches   buPROPion (WELLBUTRIN) 75 mg tablet   Yes No   Sig: Take 75 mg by mouth every morning   clonazePAM (KlonoPIN) 1 mg tablet  Self Yes No   Si mg 2 (two) times a day    fluticasone (FLONASE) 50 mcg/act nasal spray   No No   Si spray into each nostril daily   Patient taking differently: 1 spray into each nostril daily as needed fluticasone (FLOVENT HFA) 110 MCG/ACT inhaler  Self Yes No   Sig: Inhale 1 puff as needed    hydroxychloroquine (PLAQUENIL) 200 mg tablet  Self Yes No   Sig: Take 200 mg by mouth as needed During Summer time    ibuprofen (MOTRIN) 600 mg tablet  Self No No   Sig: Take 1 tablet (600 mg total) by mouth every 6 (six) hours as needed for moderate pain   ibuprofen (MOTRIN) 800 mg tablet  Self Yes No   Sig: Take 800 mg by mouth as needed     levothyroxine 88 mcg tablet  Self Yes No   Sig: Take 88 mcg by mouth daily    lisinopril (ZESTRIL) 10 mg tablet  Self Yes No   Sig: Take 20 mg by mouth daily   meclizine (ANTIVERT) 12 5 MG tablet   Yes No   Sig: Take 12 5 mg by mouth as needed     niacin (NIASPAN) 500 mg CR tablet   Yes No   Sig: Take 500 mg by mouth daily take with food    omeprazole (PriLOSEC) 40 MG capsule  Self Yes No   Sig: Take 40 mg by mouth as needed    ondansetron (ZOFRAN-ODT) 4 mg disintegrating tablet   No No   Sig: Take 1 tablet (4 mg total) by mouth every 8 (eight) hours as needed for nausea or vomiting for up to 4 days   Patient not taking: Reported on 3/7/2022    oxyCODONE (ROXICODONE) 15 mg immediate release tablet  Self Yes No   Sig: Take 15 mg by mouth as needed    prochlorperazine (COMPAZINE) 10 mg tablet   No No   Sig: Take 1 tablet (10 mg total) by mouth every 6 (six) hours as needed (migraine)   prochlorperazine (COMPAZINE) 10 mg tablet   No No   Sig: Take 1 tablet (10 mg total) by mouth every 6 (six) hours as needed (migraine)   propranolol (INDERAL LA) 160 mg  Self Yes No   Sig: Take 160 mg by mouth see administration instructions 2 tabs in the cf=765ad and 1 tablet 160 mg at HS   topiramate (TOPAMAX) 100 mg tablet   No No   Sig: One in am and two at bedtime daily   valACYclovir (VALTREX) 1,000 mg tablet  Self Yes No   Si tablet as needed       Facility-Administered Medications Last Administration Doses Remaining   Botulinum Toxin Type A SOLR 200 Units 3/18/2021 11:54 AM           Past Medical History:   Diagnosis Date    Asthma     Bipolar 1 disorder ([facility] [address] )     Disease of thyroid gland     Fibromyalgia     Gout     Hyperlipidemia     Hypertension     Lupus (HCC)     Migraine     PONV (postoperative nausea and vomiting)        Past Surgical History:   Procedure Laterality Date    GALLBLADDER SURGERY  2008    HYSTERECTOMY  2014    LAPAROSCOPIC COLON RESECTION  2001       Family History   Problem Relation Age of Onset    Lupus Mother     Osteoporosis Mother     Hypotension Mother     Hypertension Father     Diabetes Father     Heart disease Father     Diabetes Sister     Schizophrenia Sister     Hypertension Sister     Diabetes Maternal Grandmother     Rheum arthritis Maternal Grandmother     Hypertension Maternal Grandmother     Breast cancer Maternal Aunt 36    No Known Problems Maternal Aunt     No Known Problems Maternal Aunt     No Known Problems Maternal Aunt     No Known Problems Paternal Aunt      I have reviewed and agree with the history as documented  E-Cigarette/Vaping    E-Cigarette Use Never User      E-Cigarette/Vaping Substances    Nicotine No     THC No     CBD No     Flavoring No     Other No     Unknown No      Social History     Tobacco Use    Smoking status: Former Smoker    Smokeless tobacco: Never Used    Tobacco comment: quit at 24years old   Vaping Use    Vaping Use: Never used   Substance Use Topics    Alcohol use: No    Drug use: No        Review of Systems   Constitutional: Negative for chills and fever  HENT: Negative for sore throat  Respiratory: Negative for cough and shortness of breath  Cardiovascular: Negative for chest pain, palpitations and leg swelling  Gastrointestinal: Negative for abdominal pain, diarrhea, nausea and vomiting  Genitourinary: Negative for dysuria and frequency  Musculoskeletal: Negative for myalgias  Skin: Negative for rash and wound     Neurological: Negative for dizziness, light-headedness and headaches  All other systems reviewed and are negative  Physical Exam  ED Triage Vitals [05/28/22 1511]   Temperature Pulse Respirations Blood Pressure SpO2   (!) 97 2 °F (36 2 °C) 71 16 140/96 100 %      Temp src Heart Rate Source Patient Position - Orthostatic VS BP Location FiO2 (%)   -- -- -- -- --      Pain Score       10 - Worst Possible Pain             Orthostatic Vital Signs  Vitals:    05/28/22 1511   BP: 140/96   Pulse: 71       Physical Exam  Vitals and nursing note reviewed  Constitutional:       General: She is not in acute distress  Appearance: Normal appearance  She is not ill-appearing or toxic-appearing  HENT:      Head: Normocephalic and atraumatic  Right Ear: External ear normal       Left Ear: External ear normal       Nose: Nose normal       Mouth/Throat:      Mouth: Mucous membranes are moist       Pharynx: Oropharynx is clear  Eyes:      General: No scleral icterus  Extraocular Movements: Extraocular movements intact  Cardiovascular:      Rate and Rhythm: Normal rate and regular rhythm  Pulses: Normal pulses  Pulmonary:      Effort: Pulmonary effort is normal  No respiratory distress  Breath sounds: Normal breath sounds  Abdominal:      Palpations: Abdomen is soft  Tenderness: There is no abdominal tenderness  Musculoskeletal:         General: No tenderness (No calf tenderness)  Normal range of motion  Cervical back: Normal range of motion and neck supple  Right lower leg: No edema  Left lower leg: No edema  Skin:     General: Skin is warm  Capillary Refill: Capillary refill takes less than 2 seconds  Neurological:      General: No focal deficit present  Mental Status: She is alert and oriented to person, place, and time           ED Medications  Medications   ketorolac (TORADOL) injection 15 mg (15 mg Intramuscular Given 5/28/22 5917)   acetaminophen (TYLENOL) tablet 650 mg (650 mg Oral Given 5/28/22 1657)   apixaban (ELIQUIS) tablet 10 mg (10 mg Oral Given 5/28/22 1916)       Diagnostic Studies  Results Reviewed     Procedure Component Value Units Date/Time    Antithrombin III Activity [[id number]]  (Normal) Collected: 05/28/22 1800    Lab Status: Final result Specimen: Blood from Arm, Left Updated: 05/28/22 1924     AntiThrombIN III Activity 115 % of Normal     Prothrombin gene mutation [[id number]] Collected: 05/28/22 1800    Lab Status: In process Specimen: Blood from Arm, Left Updated: 05/28/22 1811    Factor 5 leiden [[id number]] Collected: 05/28/22 1800    Lab Status: In process Specimen: Blood from Arm, Left Updated: 05/28/22 1811    Cardiolipin antibody [[id number]] Collected: 05/28/22 1800    Lab Status: In process Specimen: Blood from Arm, Left Updated: 05/28/22 1811    Beta-2 glycoprotein antibodies [[id number]] Collected: 05/28/22 1800    Lab Status: In process Specimen: Blood from Arm, Left Updated: 05/28/22 1811    Protein C activity [[id number]] Collected: 05/28/22 1800    Lab Status: In process Specimen: Blood from Arm, Left Updated: 05/28/22 1811    Protein S Antigen, Total & Free [[id number]] Collected: 05/28/22 1800    Lab Status: In process Specimen: Blood from Arm, Left Updated: 05/28/22 1811    Protein S activity [[id number]] Collected: 05/28/22 1800    Lab Status: In process Specimen: Blood from Arm, Left Updated: 05/28/22 1811    Lupus anticoagulant [[id number]] Collected: 05/28/22 1800    Lab Status: In process Specimen: Blood from Arm, Left Updated: 05/28/22 1811                 VAS lower limb venous duplex study, unilateral/limited    (Results Pending)         Procedures  Procedures      ED Course                             SBIRT 22yo+    Flowsheet Row Most Recent Value   SBIRT (25 yo +)    In order to provide better care to our patients, we are screening all of our patients for alcohol and drug use  Would it be okay to ask you these screening questions?  No Filed at: 05/28/2022 7692 MDM  Number of Diagnoses or Management Options  Acute deep vein thrombosis (DVT) of left peroneal vein (HCC)  Diagnosis management comments: 62-year-old female presenting for evaluation of left calf pain  Physical exam is unremarkable, patient has no calf swelling or calf tenderness  I suspect that this is musculoskeletal pain but given the patient's prior history of DVT will get a duplex ultrasound to assess  Symptomatic treatment  Dispo per workup  Patient's duplex ultrasound was positive for DVT per ultrasound tech in the peroneal vein  Thrombosis panel to be drawn, patient to be started on Eliquis  Patient was advised to follow-up with her primary care physician in regards to the thrombosis panel  Patient was advised to take Eliquis as prescribed and given return to ED precautions  All questions were answered at this time  I reviewed all testing with the patient: Duplex US, thrombosis panel  I gave oral return precautions for what to return for in addition to the written return precautions  The patient (and any family present: n/a) verbalized understanding of the discharge instructions and warnings that would necessitate return to the Emergency Department  I specifically highlighted areas of special concern regarding the written and verbal discharge instructions and return precautions  All questions were answered prior to discharge  Disposition  Final diagnoses:   Acute deep vein thrombosis (DVT) of left peroneal vein ([address] )     Time reflects when diagnosis was documented in both MDM as applicable and the Disposition within this note     Time User Action Codes Description Comment    5/28/2022  5:24 PM Sina Justin Add [I82 [id number]] Acute deep vein thrombosis (DVT) of left peroneal vein [facility])       ED Disposition     ED Disposition   Discharge    Condition   Stable    Date/Time   Sat May 28, 2022  7:19 PM    Comment   Ricci Gosselin Bartolo Brook discharge to home/self care  Follow-up Information     Follow up With Specialties Details Why Contact Info Additional Information    Sarah Pressley MD Internal Medicine  For Emergency Department Follow-up [id number]  [place]  [place] [address] Emergency Department Emergency Medicine  If symptoms worsen [address]  [address] [facility] [address] Department, [address] , [place], [place], [facility]          Discharge Medication List as of 5/28/2022  7:19 PM      START taking these medications    Details   apixaban (Eliquis) 5 mg Multiple Dosages:Starting Sat 5/28/2022, Until Fri 6/3/2022 at 2359, THEN Starting Sat 6/4/2022, Until Sun 6/26/2022 at 2359Take 2 tablets (10 mg total) by mouth 2 (two) times a day for 7 days, THEN 1 tablet (5 mg total) 2 (two) times a day for 23 day s , Normal         CONTINUE these medications which have NOT CHANGED    Details   acetaminophen (TYLENOL) 325 mg tablet Take 650 mg by mouth every 6 (six) hours as needed for mild pain, Historical Med      ARIPiprazole (ABILIFY) 15 mg tablet Take 1 tablet by mouth daily, Starting Sat 2/27/2021, Historical Med      aspirin-acetaminophen-caffeine (EXCEDRIN MIGRAINE) 250-250-65 MG per tablet Take 2 tablets by mouth every 6 (six) hours as needed for headaches, Historical Med      buPROPion (WELLBUTRIN) 75 mg tablet Take 75 mg by mouth every morning, Starting Tue 12/29/2020, Historical Med      Cetirizine HCl 10 MG CAPS Take 10 mg by mouth daily  , Historical Med      clonazePAM (KlonoPIN) 1 mg tablet 1 mg 2 (two) times a day , Starting Sat 12/15/2018, Historical Med      fluticasone (FLONASE) 50 mcg/act nasal spray 1 spray into each nostril daily, Starting Sat 2/29/2020, Print      fluticasone (FLOVENT HFA) 110 MCG/ACT inhaler Inhale 1 puff as needed , Historical Med      hydroxychloroquine (PLAQUENIL) 200 mg tablet Take 200 mg by mouth as needed During Summer time , Starting Fri 6/15/2018, Historical Med      !! ibuprofen (MOTRIN) 600 mg tablet Take 1 tablet (600 mg total) by mouth every 6 (six) hours as needed for moderate pain, Starting Mon 4/1/2019, Print      !! ibuprofen (MOTRIN) 800 mg tablet Take 800 mg by mouth as needed  , Starting Thu 9/6/2018, Historical Med      levothyroxine 88 mcg tablet Take 88 mcg by mouth daily , Starting Sun 3/31/2019, Historical Med      lisinopril (ZESTRIL) 10 mg tablet Take 20 mg by mouth daily, Starting Mon 12/23/2019, Historical Med      meclizine (ANTIVERT) 12 5 MG tablet Take 12 5 mg by mouth as needed  , Starting Sat 12/5/2020, Historical Med      Multiple Vitamins-Minerals (MULTIVITAMIN ADULT PO) Take 1 tablet by mouth daily, Historical Med      niacin (NIASPAN) 500 mg CR tablet Take 500 mg by mouth daily take with food , Starting Fri 7/17/2020, Historical Med      omeprazole (PriLOSEC) 40 MG capsule Take 40 mg by mouth as needed , Starting Wed 5/8/2019, Historical Med      ondansetron (ZOFRAN-ODT) 4 mg disintegrating tablet Take 1 tablet (4 mg total) by mouth every 8 (eight) hours as needed for nausea or vomiting for up to 4 days, Starting Sat 2/5/2022, Until Mon 3/7/2022 at 2359, Normal      oxyCODONE (ROXICODONE) 15 mg immediate release tablet Take 15 mg by mouth as needed , Starting Fri 7/13/2018, Historical Med      PROAIR  (90 Base) MCG/ACT inhaler Inhale 1-2 puffs as needed , Starting Fri 11/9/2018, Historical Med      Probiotic Product (PROBIOTIC DAILY PO) Take 1 tablet by mouth daily  , Historical Med      !! prochlorperazine (COMPAZINE) 10 mg tablet Take 1 tablet (10 mg total) by mouth every 6 (six) hours as needed (migraine), Starting Mon 3/7/2022, Normal      !! prochlorperazine (COMPAZINE) 10 mg tablet Take 1 tablet (10 mg total) by mouth every 6 (six) hours as needed (migraine), Starting Tue 3/15/2022, Normal      propranolol (INDERAL LA) 160 mg Take 160 mg by mouth see administration instructions 2 tabs in the nu=[id number] and 1 tablet 160 mg at HS, Starting Tue 4/16/2019, Historical Med      Rimegepant Sulfate (Nurtec) 75 MG TBDP Take 75 mg by mouth every other day, Starting Thu 5/26/2022, Normal      topiramate (TOPAMAX) 100 mg tablet One in am and two at bedtime daily, Normal      valACYclovir (VALTREX) 1,000 mg tablet 1 tablet as needed , Starting Thu 5/7/2015, Historical Med       !! - Potential duplicate medications found  Please discuss with provider  No discharge procedures on file  PDMP Review     None           ED Provider  Attending physically available and evaluated Lieutenant Spence  I managed the patient along with the ED Attending      Electronically Signed by         Christine Verma DO  05/28/22 1943

## 2022-05-28 NOTE — DISCHARGE INSTRUCTIONS
Follow up with your primary care physician regarding the results of your thrombosis panel  Take your eloquis as prescribed, return to the ED with any of the concerning symptoms we discussed

## 2022-05-28 NOTE — ED ATTENDING ATTESTATION
5/28/2022  IDontrell DO, saw and evaluated the patient  I have discussed the patient with the resident/non-physician practitioner and agree with the resident's/non-physician practitioner's findings, Plan of Care, and MDM as documented in the resident's/non-physician practitioner's note, except where noted  All available labs and Radiology studies were reviewed  I was present for key portions of any procedure(s) performed by the resident/non-physician practitioner and I was immediately available to provide assistance  At this point I agree with the current assessment done in the Emergency Department  I have conducted an independent evaluation of this patient a history and physical is as follows:    51 yof with remote hx of dvt, now with left calf pain  This pain started two days ago, feels similar to unprovoked dvt  Maybe thought to be related to ocps  Currently has pain in left calf  No swelling  NVI   Plan duplex    ED Course         Critical Care Time  Procedures

## 2022-05-30 LAB
PROT C AG ACT/NOR PPP IA: 116 % OF NORMAL (ref 60–150)
PROT S ACT/NOR PPP: 101 % (ref 71–108)

## 2022-05-31 LAB
B2 GLYCOPROT1 IGA SERPL IA-ACNC: 3.6
B2 GLYCOPROT1 IGG SERPL IA-ACNC: 1.4
B2 GLYCOPROT1 IGM SERPL IA-ACNC: <2.9
CARDIOLIPIN IGA SER IA-ACNC: 5.3
CARDIOLIPIN IGG SER IA-ACNC: 1.3
CARDIOLIPIN IGM SER IA-ACNC: <0.8

## 2022-06-01 LAB
APTT SCREEN TO CONFIRM RATIO: 1.08 RATIO (ref 0–1.34)
CONFIRM APTT/NORMAL: 35.2 SEC (ref 0–47.6)
LA PPP-IMP: NORMAL
PROT S ACT/NOR PPP: 85 % (ref 61–136)
PROT S PPP-ACNC: 114 % (ref 60–150)
SCREEN APTT: 35.5 SEC (ref 0–51.9)
SCREEN DRVVT: 42.2 SEC (ref 0–47)
THROMBIN TIME: 17.8 SEC (ref 0–23)

## 2022-07-05 ENCOUNTER — PROCEDURE VISIT (OUTPATIENT)
Dept: NEUROLOGY | Facility: CLINIC | Age: 47
End: 2022-07-05
Payer: MEDICARE

## 2022-07-05 VITALS — DIASTOLIC BLOOD PRESSURE: 77 MMHG | SYSTOLIC BLOOD PRESSURE: 140 MMHG | HEART RATE: 69 BPM | TEMPERATURE: 97.6 F

## 2022-07-05 DIAGNOSIS — G43.709 CHRONIC MIGRAINE WITHOUT AURA WITHOUT STATUS MIGRAINOSUS, NOT INTRACTABLE: Primary | ICD-10-CM

## 2022-07-05 PROCEDURE — 64615 CHEMODENERV MUSC MIGRAINE: CPT | Performed by: PHYSICIAN ASSISTANT

## 2022-07-05 NOTE — PROGRESS NOTES
Universal Protocol   Consent: Verbal consent obtained  Written consent obtained  Risks and benefits: risks, benefits and alternatives were discussed  Consent given by: patient  Time out: Immediately prior to procedure a "time out" was called to verify the correct patient, procedure, equipment, support staff and site/side marked as required  Patient understanding: patient states understanding of the procedure being performed  Patient consent: the patient's understanding of the procedure matches consent given  Procedure consent: procedure consent matches procedure scheduled  Relevant documents: relevant documents present and verified  Patient identity confirmed: verbally with patient        Chemodenervation     Date/Time 7/5/2022 8:43 AM     Performed by  Nate Nguyen PA-C     Authorized by Nate Nguyen PA-C        Pre-procedure details      Prepped With: Alcohol     Anesthesia  (see MAR for exact dosages):      Anesthesia method:  None   Procedure details     Position:  Upright   Botox     Botox Type:  Type A    Brand:  Botox    mL's of Botulinum Toxin:  200    Final Concentration per CC:  50 units    Needle Gauge:  30 G 2 5 inch   Procedures     Botox Procedures: chronic headache      Indications: migraines     Injection Location      Head / Face:  L superior cervical paraspinal, R superior cervical paraspinal, L , R , L frontalis, R frontalis, L medial occipitalis, R medial occipitalis, procerus, R temporalis, L temporalis, R superior trapezius and L superior trapezius    L  injection amount:  5 unit(s)    R  injection amount:  5 unit(s)    L lateral frontalis:  5 unit(s)    R lateral frontalis:  5 unit(s)    L medial frontalis:  5 unit(s)    R medial frontalis:  5 unit(s)    L temporalis injection amount:  20 unit(s)    R temporalis injection amount:  20 unit(s)    Procerus injection amount:  5 unit(s)    L medial occipitalis injection amount:  15 unit(s)    R medial occipitalis injection amount:  15 unit(s)    L superior cervical paraspinal injection amount:  10 unit(s)    R superior cervical paraspinal injection amount:  10 unit(s)    L superior trapezius injection amount:  15 unit(s)    R superior trapezius injection amount:  15 unit(s)   Total Units     Total units used:  200    Total units discarded:  0   Post-procedure details      Chemodenervation:  Chronic migraine    Facial Nerve Location[de-identified]  Bilateral facial nerve    Patient tolerance of procedure:   Tolerated well, no immediate complications   Comments      5 units orbicularis oculi bilaterally  10 units frontalis bilaterally  15 units occipitalis  All medically necessary

## 2022-09-12 ENCOUNTER — TELEPHONE (OUTPATIENT)
Dept: NON INVASIVE DIAGNOSTICS | Facility: CLINIC | Age: 47
End: 2022-09-12

## 2022-09-12 ENCOUNTER — OFFICE VISIT (OUTPATIENT)
Dept: CARDIOLOGY CLINIC | Facility: CLINIC | Age: 47
End: 2022-09-12
Payer: MEDICARE

## 2022-09-12 VITALS
SYSTOLIC BLOOD PRESSURE: 140 MMHG | HEART RATE: 74 BPM | DIASTOLIC BLOOD PRESSURE: 88 MMHG | BODY MASS INDEX: 33.63 KG/M2 | WEIGHT: 202.1 LBS | OXYGEN SATURATION: 98 %

## 2022-09-12 DIAGNOSIS — R07.9 CHEST PAIN, UNSPECIFIED TYPE: Primary | ICD-10-CM

## 2022-09-12 DIAGNOSIS — I47.1 AVNRT (AV NODAL RE-ENTRY TACHYCARDIA) (HCC): ICD-10-CM

## 2022-09-12 DIAGNOSIS — I10 HYPERTENSION, UNSPECIFIED TYPE: ICD-10-CM

## 2022-09-12 PROCEDURE — 99204 OFFICE O/P NEW MOD 45 MIN: CPT | Performed by: STUDENT IN AN ORGANIZED HEALTH CARE EDUCATION/TRAINING PROGRAM

## 2022-09-12 RX ORDER — LISINOPRIL 40 MG/1
40 TABLET ORAL DAILY
Qty: 30 TABLET | Refills: 3 | Status: SHIPPED | OUTPATIENT
Start: 2022-09-12

## 2022-09-12 RX ORDER — LISINOPRIL 30 MG/1
40 TABLET ORAL DAILY
COMMUNITY
Start: 2022-08-17 | End: 2022-09-12 | Stop reason: ALTCHOICE

## 2022-09-12 NOTE — PROGRESS NOTES
Outpatient Cardiology Consult Note - Misty Claude 49 y o  adult MRN: [id number]    @ Encounter: [id number]      Patient Active Problem List    Diagnosis Date Noted    History of hysterectomy 05/05/2022    Obesity, morbid ([facility] ) 03/16/2022    IC (interstitial cystitis) 05/29/2020    Vaginal yeast infection 04/06/2020    Cervicogenic headache 03/24/2020    Cervicalgia 03/24/2020    Full incontinence of feces 10/18/2019    Pelvic pain 08/27/2019    Urine frequency 08/27/2019    Vaginal dryness 08/15/2019    Other specified anxiety disorders 03/04/2019    Vaginal discharge 09/24/2018    Yeast infection of the vagina 09/10/2018    Encounter for gynecological examination 08/03/2018    Screening for breast cancer 08/03/2018    Hyperlipidemia 08/03/2018    Disease of thyroid gland 08/03/2018    Interstitial cystitis 04/20/2018    Vaginal candidiasis 04/20/2018    Chronic migraine without aura without status migrainosus, not intractable 03/05/2018    Palpitations 12/23/2016    AVNRT (AV win re-entry tachycardia) ([facility] ) 12/23/2016     Assessment:  49 y o  female Hx per chart p/w HTN  DVT, on AC  HTN  Avnrt, 2016 ablation  Lupus, skin level, on plaquenil for this but only take sin summer    TODAY's PLAN:  EST for her chest pain, has risk factors, if she canot tolerate exercise 2/2 joint pain will consider pharm stress but she thinks she can do it and has treadmill at home  She walks daily outside      [facility] 2weeeks for possible arrhythmias    Past echo reassuring, euvolemic on exam    Home BP log with sbp 140-150s consistently per pt, despite lisinopril increase to 30mg 1 month ago>>increase to 40mg today    Check BMP in 1-2 weeks for Cr and K with ACEi increases    Discussed therapeutic lifestyle changes, exercise, maintain good hydration, weight loss    Not taking NSAIDs    Studies - personally reviewed by me:    EKG:  Personally reviewed by me   2021: NSR, nl axis, no acute ischemic STT changes    TTE: 2015  LEFT VENTRICLE: Size was normal  Systolic function was normal  Ejection   TRANSTHORACIC ECHOCARDIOGRAM   Patient: Rc [address] [place] [address]   MR number: [id number]  fraction was estimated in the range of 55 % to 65 %  There were no regional   wall motion abnormalities  Wall thickness was mildly increased  DOPPLER: The   ratio of early ventricular filling to atrial contraction velocities was within   the normal range  The deceleration time of the early transmitral flow velocity   was increased  RIGHT VENTRICLE: The size was normal  Systolic function was normal  Wall   thickness was normal      HPI:   49 y o  female Hx per chart p/w HTN and chest pain  CPcan be exertional and at rest, substernal, squeezing, lasts 1 min or so, relieved by rest  She walks flat about 1 ile and up 1 flight stairs without overt issues, although sometimes chest squeezing as noted  +Sense of palpitatoins though different than before ablation  No new SOB/dizziness/syncope  No new leg swelling, PND, pillow orthopnea, unintentional weight changes, fatigue  No new fevers, chills, cough, nausea, vomiting, diarrhea, dysuria  No toxic habits  Father had cva and MIs in 42-51s    Past Medical History:   Diagnosis Date    Asthma     Bipolar 1 disorder ([facility] )     Disease of thyroid gland     Fibromyalgia     Gout     Hyperlipidemia     Hypertension     Lupus (HCC)     Migraine     PONV (postoperative nausea and vomiting)        ROS:  10 point ROS negative except as specified in HPI    Allergies   Allergen Reactions    Other     Pregabalin      Other reaction(s): blurred vision    Shellfish-Derived Products - Food Allergy Anaphylaxis and Hives    Sumatriptan      Other reaction(s):  Altered Heart Rate    Triptans Shortness Of Breath and Palpitations    Latex Swelling and Rash     Other reaction(s): Hives/Skin Rash  Other reaction(s): Hives/Skin Rash    Sulfa Antibiotics Swelling and Rash     Other reaction(s): Hives/Skin Rash  Other reaction(s): Hives/Skin Rash    Monistat [Miconazole] Swelling    Shrimp Extract Allergy Skin Test - Food Allergy        Current Outpatient Medications   Medication Instructions    acetaminophen (TYLENOL) 650 mg, Oral, Every 6 hours PRN    apixaban (Eliquis) 5 mg Take 2 tablets (10 mg total) by mouth 2 (two) times a day for 7 days, THEN 1 tablet (5 mg total) 2 (two) times a day for 23 days      ARIPiprazole (ABILIFY) 15 mg tablet 1 tablet, Oral, Daily    aspirin-acetaminophen-caffeine (EXCEDRIN MIGRAINE) 250-250-65 MG per tablet 2 tablets, Oral, Every 6 hours PRN    buPROPion (WELLBUTRIN) 75 mg, Oral, Every morning    Cetirizine HCl 10 mg, Oral, Daily    clonazePAM (KLONOPIN) 1 mg, 2 times daily    fluticasone (FLONASE) 50 mcg/act nasal spray 1 spray, Nasal, Daily    fluticasone (FLOVENT HFA) 110 MCG/ACT inhaler 1 puff, Inhalation, As needed    hydroxychloroquine (PLAQUENIL) 200 mg, Oral, As needed, During Summer time    ibuprofen (MOTRIN) 800 mg, As needed    ibuprofen (MOTRIN) 600 mg, Oral, Every 6 hours PRN    levothyroxine 88 mcg, Oral, Daily    lisinopril (ZESTRIL) 40 mg, Oral, Daily    meclizine (ANTIVERT) 12 5 mg, As needed    Multiple Vitamins-Minerals (MULTIVITAMIN ADULT PO) 1 tablet, Oral, Daily    niacin (NIASPAN) 500 mg, Daily    Nurtec 75 mg, Oral, Every other day    omeprazole (PRILOSEC) 40 mg, Oral, As needed    ondansetron (ZOFRAN-ODT) 4 mg, Oral, Every 8 hours PRN    oxyCODONE (ROXICODONE) 15 mg, Oral, As needed    PROAIR  (90 Base) MCG/ACT inhaler 1-2 puffs, Inhalation, As needed    Probiotic Product (PROBIOTIC DAILY PO) 1 tablet, Daily    prochlorperazine (COMPAZINE) 10 mg, Oral, Every 6 hours PRN    prochlorperazine (COMPAZINE) 10 mg, Oral, Every 6 hours PRN    propranolol (INDERAL LA) 160 mg, Oral, See admin instructions, 2 tabs in the [id number] and 1 tablet 160 mg at HS    topiramate (TOPAMAX) 100 mg tablet One in am and two at bedtime daily    valACYclovir (VALTREX) 1,000 mg tablet 1 tablet, As needed        Social History     Socioeconomic History    Marital status: Single     Spouse name: Not on file    Number of children: Not on file    Years of education: Not on file    Highest education level: Not on file   Occupational History    Not on file   Tobacco Use    Smoking status: Former Smoker    Smokeless tobacco: Never Used    Tobacco comment: quit at 24years old   Vaping Use    Vaping Use: Never used   Substance and Sexual Activity    Alcohol use: No    Drug use: No    Sexual activity: Yes     Partners: Male     Birth control/protection: Female Sterilization   Other Topics Concern    Not on file   Social History Narrative    Not on file     Social Determinants of Health     Financial Resource Strain: Medium Risk    Difficulty of Paying Living Expenses: Somewhat hard   Food Insecurity: Food Insecurity Present    Worried About Running Out of Food in the Last Year: Sometimes true    [place] of Food in the Last Year: Sometimes true   Transportation Needs: No Transportation Needs    Lack of Transportation (Medical): No    Lack of Transportation (Non-Medical): No   Physical Activity: Insufficiently Active    Days of Exercise per Week: 2 days    Minutes of Exercise per Session: 20 min   Stress: Stress Concern Present    Feeling of Stress :  To some extent   Social Connections: Socially Isolated    Frequency of Communication with Friends and Family: More than three times a week    Frequency of Social Gatherings with Friends and Family: More than three times a week    Attends Spiritism Services: Never    Active Member of Clubs or Organizations: No    Attends Club or Organization Meetings: Never    Marital Status: Never    Intimate Partner Violence: Not At Risk    Fear of Current or Ex-Partner: No    Emotionally Abused: No    Physically Abused: No    Sexually Abused: No   Housing Stability: Low Risk     Unable to Pay for Housing in the Last Year: No    Number of Places Lived in the Last Year: 1    Unstable Housing in the Last Year: No       Family History   Problem Relation Age of Onset    Lupus Mother     Osteoporosis Mother     Hypotension Mother     Hypertension Father     Diabetes Father     Heart disease Father     Diabetes Sister     Schizophrenia Sister     Hypertension Sister     Diabetes Maternal Grandmother     Rheum arthritis Maternal Grandmother     Hypertension Maternal Grandmother     Breast cancer Maternal Aunt 36    No Known Problems Maternal Aunt     No Known Problems Maternal Aunt     No Known Problems Maternal Aunt     No Known Problems Paternal Aunt        Physical Exam:  Vitals:    09/12/22 1019   BP: 140/88   BP Location: Right arm   Patient Position: Sitting   Cuff Size: Large   Pulse: 74   SpO2: 98%   Weight: 91 7 kg (202 lb 1 6 oz)     Constitutional: NAD, non toxic  Ears/nose/mouth/throat: atraumatic  CV: RRR, nl S1S2, no murmurs/rubs/gallups, no JVD, no HJR  Resp: Decreased breath sounds BL  GI: Soft, NTND  MSK: no swollen joints in exposed areas  Extr: No edema, WWP  Pysche: Normal affect  Neuro: appropriate in conversation  Skin: dry and intact in exposed areas    Labs & Results:    Lab Results   Component Value Date    WBC 11 29 (H) 02/05/2022    HGB 14 0 02/05/2022    HCT 42 4 02/05/2022    MCV 88 02/05/2022     02/05/2022     Lab Results   Component Value Date    SODIUM 137 02/05/2022    K 4 0 02/05/2022     02/05/2022    CO2 28 02/05/2022    BUN 13 03/30/2022    CREATININE 0 87 03/30/2022    GLUC 121 02/05/2022    CALCIUM 10 1 02/05/2022     No results found for: BNP   Lab Results   Component Value Date    CHOLESTEROL 162 11/20/2019    CHOLESTEROL 141 01/24/2019    CHOLESTEROL 131 09/11/2018     Lab Results   Component Value Date    HDL 46 11/20/2019    HDL 42 01/24/2019    HDL 45 09/11/2018     Lab Results   Component Value Date    TRIG 90 11/20/2019    TRIG 61 01/24/2019    TRIG 65 09/11/2018     Lab Results   Component Value Date    NONHDLC 116 11/20/2019    Ester 99 01/24/2019    Ester 86 09/11/2018       Counseling / Coordination of Care  Time spent today 47 minutes  Greater than 50% of total time was spent with the patient and / or family counseling and / or coordination of care  We discussed diagnoses, most recent studies and any changes in treatment  Thank you for the opportunity to participate in the care of this patient      Sonja Cox MD  Attending Physician  [facility] Trans[facility] Cardiology  [address] [place] [address]

## 2022-09-14 ENCOUNTER — TELEPHONE (OUTPATIENT)
Dept: CARDIOLOGY CLINIC | Facility: CLINIC | Age: 47
End: 2022-09-14

## 2022-09-14 ENCOUNTER — HOSPITAL ENCOUNTER (OUTPATIENT)
Dept: NON INVASIVE DIAGNOSTICS | Facility: CLINIC | Age: 47
Discharge: HOME | End: 2022-09-14
Payer: MEDICARE

## 2022-09-14 VITALS
HEIGHT: 65 IN | DIASTOLIC BLOOD PRESSURE: 90 MMHG | HEART RATE: 68 BPM | OXYGEN SATURATION: 98 % | WEIGHT: 202 LBS | SYSTOLIC BLOOD PRESSURE: 140 MMHG | BODY MASS INDEX: 33.66 KG/M2

## 2022-09-14 DIAGNOSIS — R07.9 CHEST PAIN, UNSPECIFIED TYPE: ICD-10-CM

## 2022-09-14 LAB
ARRHY DURING EX: NORMAL
CHEST PAIN STATEMENT: NORMAL
MAX DIASTOLIC BP: 100 MMHG
MAX HEART RATE: 127 BPM
MAX HR PERCENT: 73 %
MAX HR: 127 BPM
MAX PREDICTED HEART RATE: 172 BPM
MAX. SYSTOLIC BP: 200 MMHG
PROTOCOL NAME: NORMAL
RATE PRESSURE PRODUCT: NORMAL
REASON FOR TERMINATION: NORMAL
SL CV STRESS RECOVERY BP: NORMAL MMHG
SL CV STRESS RECOVERY HR: 81 BPM
SL CV STRESS RECOVERY O2 SAT: 100 %
SL CV STRESS STAGE REACHED: 3
STRESS ANGINA INDEX: 1
STRESS BASELINE BP: NORMAL MMHG
STRESS BASELINE HR: 68 BPM
STRESS DUKE TREADMILL SCORE: 4
STRESS O2 SAT REST: 98 %
STRESS PEAK HR: 127 BPM
STRESS POST ESTIMATED WORKLOAD: 10.1 METS
STRESS POST EXERCISE DUR MIN: 7 MIN
STRESS POST EXERCISE DUR SEC: 31 SEC
STRESS POST O2 SAT PEAK: 100 %
STRESS POST PEAK BP: 200 MMHG
STRESS ST DEPRESSION: 0 MM
TARGET HR FORMULA: NORMAL
TEST INDICATION: NORMAL
TIME IN EXERCISE PHASE: NORMAL

## 2022-09-14 PROCEDURE — 93018 CV STRESS TEST I&R ONLY: CPT | Performed by: INTERNAL MEDICINE

## 2022-09-14 PROCEDURE — 93016 CV STRESS TEST SUPVJ ONLY: CPT | Performed by: INTERNAL MEDICINE

## 2022-09-14 PROCEDURE — 93017 CV STRESS TEST TRACING ONLY: CPT

## 2022-09-14 NOTE — TELEPHONE ENCOUNTER
----- Message from Rachana Tee MD sent at 9/14/2022  1:51 PM EDT -----  Please inform the pt of overall acceptable test result w following comments:    - her BP trend is elevated, plan to c/w our very recent lisinopril increase per my note and she can follow up with her home BP cuff/log and her PCP for further med titration if BP remains high    - the stress test was overall acceptable except for somewhat blunted HR response to stress - likely due to her propranolol  Her current bblocker dosing may be too much and contributing to her symptoms   She should follow up with her prescribing physician for potential reduction/substitution of other meds without so much HR reduction    Thanks!    - Betty Estrada

## 2022-09-14 NOTE — RESULT ENCOUNTER NOTE
Please inform the pt of overall acceptable test result w following comments:    - her BP trend is elevated, plan to c/w our very recent lisinopril increase per my note and she can follow up with her home BP cuff/log and her PCP for further med titration if BP remains high    - the stress test was overall acceptable except for somewhat blunted HR response to stress - likely due to her propranolol  Her current bblocker dosing may be too much and contributing to her symptoms   She should follow up with her prescribing physician for potential reduction/substitution of other meds without so much HR reduction    Thanks!    - Tae Van

## 2022-09-15 ENCOUNTER — CONSULT (OUTPATIENT)
Dept: HEMATOLOGY ONCOLOGY | Facility: CLINIC | Age: 47
End: 2022-09-15
Payer: MEDICARE

## 2022-09-15 ENCOUNTER — DOCUMENTATION (OUTPATIENT)
Dept: CARDIOLOGY CLINIC | Facility: CLINIC | Age: 47
End: 2022-09-15

## 2022-09-15 VITALS
OXYGEN SATURATION: 98 % | RESPIRATION RATE: 16 BRPM | HEIGHT: 65 IN | HEART RATE: 72 BPM | BODY MASS INDEX: 33.32 KG/M2 | SYSTOLIC BLOOD PRESSURE: 168 MMHG | WEIGHT: 200 LBS | DIASTOLIC BLOOD PRESSURE: 100 MMHG | TEMPERATURE: 98.3 F

## 2022-09-15 DIAGNOSIS — I82.402 DEEP VEIN THROMBOSIS (DVT) OF LEFT LOWER EXTREMITY, UNSPECIFIED CHRONICITY, UNSPECIFIED VEIN (HCC): Primary | ICD-10-CM

## 2022-09-15 DIAGNOSIS — I82.890 ACUTE EMBOLISM AND THROMBOSIS OF OTHER SPECIFIED VEINS: ICD-10-CM

## 2022-09-15 PROCEDURE — 99204 OFFICE O/P NEW MOD 45 MIN: CPT | Performed by: NURSE PRACTITIONER

## 2022-09-15 NOTE — PROGRESS NOTES
[id number] [facility] [address] [place] [id number] [phone]  Hematology Ambulatory Consult  Gerardo Bhagat, 1975, [id number]  9/15/2022      Assessment and Plan   1  Deep vein thrombosis (DVT) of left lower extremity, unspecified chronicity, unspecified vein (HCC)  2  Acute embolism and thrombosis of other specified veins    Patient is a 58-year-old female with a history of gynecologic disorder, migraines, 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, hysterectomy, discoid lupus, hypertension and DVT on anticoagulation with Eliquis  Patient had a DVT 10 years ago while on birth control  She was on anticoagulation but does not recall for how long  In May 2022 she presented to the ER with pain in her left calf that felt similar to the prior DVT  She was found to have a DVT in the left peroneal vein, thrombosis panel was obtained and patient was started on Eliquis which she continues  She was referred to our office for further evaluation and management  Thrombosis panel on 05/28/2022 was negative for factor 5 Leiden, prothrombin gene mutation, lupus anticoagulant, and remainder was normal   Per chart review patient had a lupus anticoagulant in 2017 that was negative  We discussed confirming lupus anticoagulant since this most recent DVT  We discussed holding Eliquis for 4 days prior to repeat LA  I instructed her to take a baby aspirin 81 mg p o  once daily during those 4 days then resume once blood work is obtained  I would plan to see her shortly after to discuss results and anticoagulation plan  Given patient's history I recommend she continue on lifelong anticoagulation  Patient reports having pain in the right lower extremity that feels similar to left leg prior to finding DVT  We will further evaluate with a vascular study and I will call her with the results and recommendations    Otherwise we will plan to see her in 3 months, patient verbalized understanding and is in agreement with the plan  - VAS lower limb venous duplex study, unilateral/limited; Future  - Lupus anticoagulant; Future    Barrier(s) to care: None  The patient is    able to self care  [address] [facility]    Subjective     Chief Complaint   Patient presents with    Consult       Referring provider    González Joseph MD  [phone],  [address]    History of present illness:  Patient is a 79-year-old female with a history of gynecologic disorder, migraines, 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, hysterectomy, discoid lupus, hypertension and DVT on anticoagulation with Eliquis  Patient had a DVT 10 years ago while on birth control  She was on anticoagulation but does not recall for how long  In May 2022 she presented to the ER with pain in her left calf that felt similar to the prior DVT  She was found to have a DVT in the left peroneal vein, thrombosis panel was obtained and patient was started on Eliquis which she continues  She was referred to our office for further evaluation and management  09/15/22:  Clinically stable    Review of Systems   Constitutional: Negative for activity change, appetite change, fatigue, fever and unexpected weight change  Respiratory: Negative for cough and shortness of breath  Cardiovascular: Negative for chest pain and leg swelling  Gastrointestinal: Negative for abdominal pain, constipation, diarrhea and nausea  Endocrine: Negative for cold intolerance and heat intolerance  Musculoskeletal: Negative for arthralgias and myalgias  Pain in right lower extremity   Skin: Negative  Neurological: Negative for dizziness, weakness and headaches  Hematological: Negative for adenopathy  Does not bruise/bleed easily         Past Medical History:   Diagnosis Date    Asthma     Bipolar 1 disorder ([facility] )     Disease of thyroid gland     Fibromyalgia     Gout     Hyperlipidemia     Hypertension     Lupus (HCC)     Migraine     PONV (postoperative nausea and vomiting)      Past Surgical History:   Procedure Laterality Date    GALLBLADDER SURGERY  2008    HYSTERECTOMY  2014    LAPAROSCOPIC COLON RESECTION  2001     Family History   Problem Relation Age of Onset    Lupus Mother     Osteoporosis Mother     Hypotension Mother     Hypertension Father     Diabetes Father     Heart disease Father     Diabetes Sister     Schizophrenia Sister     Hypertension Sister     Diabetes Maternal Grandmother     Rheum arthritis Maternal Grandmother     Hypertension Maternal Grandmother     Breast cancer Maternal Aunt 36    No Known Problems Maternal Aunt     No Known Problems Maternal Aunt     No Known Problems Maternal Aunt     No Known Problems Paternal Aunt      Social History     Socioeconomic History    Marital status: Single     Spouse name: None    Number of children: None    Years of education: None    Highest education level: None   Occupational History    None   Tobacco Use    Smoking status: Former Smoker     Types: Cigarettes    Smokeless tobacco: Never Used    Tobacco comment: quit at 24years old   Vaping Use    Vaping Use: Never used   Substance and Sexual Activity    Alcohol use: No    Drug use: No    Sexual activity: Yes     Partners: Male     Birth control/protection: Female Sterilization   Other Topics Concern    None   Social History Narrative    None     Social Determinants of Health     Financial Resource Strain: Medium Risk    Difficulty of Paying Living Expenses: Somewhat hard   Food Insecurity: Food Insecurity Present    Worried About Running Out of Food in the Last Year: Sometimes true    Paloma of Food in the Last Year: Sometimes true   Transportation Needs: No Transportation Needs    Lack of Transportation (Medical):  No    Lack of Transportation (Non-Medical): No   Physical Activity: Insufficiently Active    Days of Exercise per Week: 2 days    Minutes of Exercise per Session: 20 min   Stress: Stress Concern Present    Feeling of Stress : To some extent   Social Connections: Socially Isolated    Frequency of Communication with Friends and Family: More than three times a week    Frequency of Social Gatherings with Friends and Family: More than three times a week    Attends Jew Services: Never    Active Member of Clubs or Organizations: No    Attends Club or Organization Meetings: Never    Marital Status: Never    Intimate Partner Violence: Not At Risk    Fear of Current or Ex-Partner: No    Emotionally Abused: No    Physically Abused: No    Sexually Abused: No   Housing Stability: [address] Unable to Pay for Housing in the Last Year: No    Number of [place] in the Last Year: 1    Unstable Housing in the Last Year: No       Current Outpatient Medications:     acetaminophen (TYLENOL) 325 mg tablet, Take 650 mg by mouth every 6 (six) hours as needed for mild pain, Disp: , Rfl:     apixaban (Eliquis) 5 mg, Take 2 tablets (10 mg total) by mouth 2 (two) times a day for 7 days, THEN 1 tablet (5 mg total) 2 (two) times a day for 23 days  , Disp: 74 tablet, Rfl: 0    ARIPiprazole (ABILIFY) 15 mg tablet, Take 1 tablet by mouth daily, Disp: , Rfl:     aspirin-acetaminophen-caffeine (EXCEDRIN MIGRAINE) 250-250-65 MG per tablet, Take 2 tablets by mouth every 6 (six) hours as needed for headaches, Disp: , Rfl:     buPROPion (WELLBUTRIN) 75 mg tablet, Take 75 mg by mouth every morning, Disp: , Rfl:     Cetirizine HCl 10 MG CAPS, Take 10 mg by mouth daily  , Disp: , Rfl:     fluticasone (FLONASE) 50 mcg/act nasal spray, 1 spray into each nostril daily (Patient taking differently: 1 spray into each nostril daily as needed), Disp: 16 g, Rfl: 0    fluticasone (FLOVENT HFA) 110 MCG/ACT inhaler, Inhale 1 puff as needed , Disp: , Rfl:   hydroxychloroquine (PLAQUENIL) 200 mg tablet, Take 200 mg by mouth as needed During Summer time , Disp: , Rfl:     levothyroxine 88 mcg tablet, Take 88 mcg by mouth daily , Disp: , Rfl:     lisinopril (ZESTRIL) 40 mg tablet, Take 1 tablet (40 mg total) by mouth daily, Disp: 30 tablet, Rfl: 3    meclizine (ANTIVERT) 12 5 MG tablet, Take 12 5 mg by mouth as needed, Disp: , Rfl:     Multiple Vitamins-Minerals (MULTIVITAMIN ADULT PO), Take 1 tablet by mouth daily, Disp: , Rfl:     omeprazole (PriLOSEC) 40 MG capsule, Take 40 mg by mouth as needed , Disp: , Rfl: 0    oxyCODONE (ROXICODONE) 15 mg immediate release tablet, Take 15 mg by mouth as needed , Disp: , Rfl:     PROAIR  (90 Base) MCG/ACT inhaler, Inhale 1-2 puffs as needed , Disp: , Rfl:     Probiotic Product (PROBIOTIC DAILY PO), Take 1 tablet by mouth daily, Disp: , Rfl:     prochlorperazine (COMPAZINE) 10 mg tablet, Take 1 tablet (10 mg total) by mouth every 6 (six) hours as needed (migraine), Disp: 10 tablet, Rfl: 0    propranolol (INDERAL LA) 160 mg, Take 160 mg by mouth see administration instructions 2 tabs in the [id number] and 1 tablet 160 mg at HS, Disp: , Rfl: 0    Rimegepant Sulfate (Nurtec) 75 MG TBDP, Take 75 mg by mouth every other day, Disp: 16 tablet, Rfl: 11    topiramate (TOPAMAX) 100 mg tablet, One in am and two at bedtime daily, Disp: 270 tablet, Rfl: 3    valACYclovir (VALTREX) 1,000 mg tablet, 1 tablet as needed , Disp: , Rfl:     Current Facility-Administered Medications:     Botulinum Toxin Type A SOLR 200 Units, 200 Units, Injection, Q3 Months, Kassie Andrade PA-C, 200 Units at 03/18/21 1154  Allergies   Allergen Reactions    Other     Pregabalin      Other reaction(s): blurred vision    Shellfish-Derived Products - Food Allergy Anaphylaxis and Hives    Sumatriptan      Other reaction(s):  Altered Heart Rate    Triptans Shortness Of Breath and Palpitations    Latex Swelling and Rash     Other reaction(s): Hives/Skin Rash  Other reaction(s): Hives/Skin Rash    Sulfa Antibiotics Swelling and Rash     Other reaction(s): Hives/Skin Rash  Other reaction(s): Hives/Skin Rash    Monistat [Miconazole] Swelling     Objective   /100 (BP Location: Right arm, Patient Position: Sitting, Cuff Size: Adult)   Pulse 72   Temp 98 3 °F (36 8 °C) (Temporal)   Resp 16   Ht 5' 5" (1 651 m)   Wt 90 7 kg (200 lb)   LMP  (LMP Unknown)   SpO2 98%   BMI 33 28 kg/m²   Physical Exam  Constitutional:       Appearance: Normal appearance  She is well-developed  HENT:      Head: Normocephalic and atraumatic  Eyes:      Conjunctiva/sclera: Conjunctivae normal       Pupils: Pupils are equal, round, and reactive to light  Cardiovascular:      Rate and Rhythm: Normal rate and regular rhythm  Heart sounds: Normal heart sounds  No murmur heard  Pulmonary:      Effort: No respiratory distress  Breath sounds: Normal breath sounds  Abdominal:      General: Bowel sounds are normal       Palpations: Abdomen is soft  Musculoskeletal:         General: Normal range of motion  Cervical back: Normal range of motion and neck supple  Lymphadenopathy:      Cervical: No cervical adenopathy  Skin:     General: Skin is warm and dry  Capillary Refill: Capillary refill takes less than 2 seconds  Neurological:      Mental Status: She is alert and oriented to person, place, and time  Psychiatric:         Behavior: Behavior normal        Please note: This report has been generated by a voice recognition software system  Therefore there may be syntax, spelling, and/or grammatical errors  Please call if you have any questions

## 2022-09-15 NOTE — TELEPHONE ENCOUNTER
Reginaldo Velázquez returned my call last evening at [phone]  I called her back this morning  Discussed findings on stress test    Patient unsure why she is taking Propranolol-- stated she "is not a doctor"  PCP orders medication  Advised to monitor bp's for further med titration-- verbally understood  She is requested records be faxed to PCP-- Dr Maximus Hunt who is located in [place]  Stated I could google his info for contact  I will fax, o/v note and stress results to his office  Patient has not yet received zio-- will await results

## 2022-09-18 NOTE — DISCHARGE INSTRUCTIONS
Call or leave a message for you neurologist tomorrow  Try to take a nap when you get home today  Avoid bright lights, loud noises, and screens 
PAST SURGICAL HISTORY:  History of      History of ectopic pregnancy     S/P cholecystectomy

## 2022-09-26 ENCOUNTER — HOSPITAL ENCOUNTER (OUTPATIENT)
Dept: VASCULAR ULTRASOUND | Facility: HOSPITAL | Age: 47
Discharge: HOME | End: 2022-09-26
Payer: MEDICARE

## 2022-09-26 DIAGNOSIS — I82.890 ACUTE EMBOLISM AND THROMBOSIS OF OTHER SPECIFIED VEINS: ICD-10-CM

## 2022-09-26 DIAGNOSIS — I82.402 DEEP VEIN THROMBOSIS (DVT) OF LEFT LOWER EXTREMITY, UNSPECIFIED CHRONICITY, UNSPECIFIED VEIN (HCC): ICD-10-CM

## 2022-09-26 PROCEDURE — 93970 EXTREMITY STUDY: CPT

## 2022-09-26 PROCEDURE — 93970 EXTREMITY STUDY: CPT | Performed by: SURGERY

## 2022-09-27 ENCOUNTER — OFFICE VISIT (OUTPATIENT)
Dept: SLEEP CENTER | Facility: CLINIC | Age: 47
End: 2022-09-27
Payer: MEDICARE

## 2022-09-27 VITALS
WEIGHT: 202 LBS | HEIGHT: 65 IN | DIASTOLIC BLOOD PRESSURE: 90 MMHG | OXYGEN SATURATION: 99 % | SYSTOLIC BLOOD PRESSURE: 122 MMHG | HEART RATE: 76 BPM | BODY MASS INDEX: 33.66 KG/M2

## 2022-09-27 DIAGNOSIS — I10 UNCONTROLLED HYPERTENSION: ICD-10-CM

## 2022-09-27 DIAGNOSIS — G47.33 OSA (OBSTRUCTIVE SLEEP APNEA): Primary | ICD-10-CM

## 2022-09-27 DIAGNOSIS — E66.01 OBESITY, MORBID (HCC): ICD-10-CM

## 2022-09-27 PROCEDURE — 99203 OFFICE O/P NEW LOW 30 MIN: CPT | Performed by: INTERNAL MEDICINE

## 2022-09-27 RX ORDER — ARIPIPRAZOLE 5 MG/1
5 TABLET ORAL EVERY MORNING
COMMUNITY
Start: 2022-09-26

## 2022-09-27 RX ORDER — BUPROPION HYDROCHLORIDE 150 MG/1
150 TABLET, EXTENDED RELEASE ORAL EVERY MORNING
COMMUNITY
Start: 2022-09-26

## 2022-09-27 NOTE — PROGRESS NOTES
Consultation - Sleep Center   Zahida Doherty : 1975  MRN: [id number]      Assessment:  The patient's primary complaint is difficulty maintaining sleep, which in turn causes excessive daytime sleepiness  She denies significant snoring or leg movements and polysomnography in  was unremarkable for CK or PLMD     Before I treat her sleep maintenance insomnia I will repeat polysomnography  Plan:  Polysomnography     The patient has sleep maintenance insomnia may be a result of nocturia with interstitial cystitis, chronic pain or psychophysiologic factors  Follow up: After testing    History of Present Illness:   49 y  o adult with a longstanding history of difficulty maintaining sleep  She has been treated for chronic migraine headaches  Headaches occasionally awaken her, but more frequently she gets up due to nocturia  She has a history of interstitial cystitis  When she awakens, she has a difficult time falling back to sleep  She has severe drowsiness during the day, but manages to stay awake  She denies hypnagogic hallucinations sleep paralysis or cataplexy  She does have vivid dreams during the night      Review of systems:  Nocturia, hot flashes palpitations, ankle swelling, GERD, headaches, restless legs, numbness, fatigue, irritability, mood change, joint pain, muscle aches, leg cramps, dyspnea on exertion, chest tightness, cough, throat clearing, excessive thirst and mouth dryness  I have reviewed and updated the review of systems as necessary    Historical Information      Family History: non-contributory    Social History     Socioeconomic History    Marital status: Single     Spouse name: Not on file    Number of children: Not on file    Years of education: Not on file    Highest education level: Not on file   Occupational History    Not on file   Tobacco Use    Smoking status: Former Smoker     Types: Cigarettes    Smokeless tobacco: Never Used    Tobacco comment: quit at 24years old   Vaping Use    Vaping Use: Never used   Substance and Sexual Activity    Alcohol use: No    Drug use: No    Sexual activity: Yes     Partners: Male     Birth control/protection: Female Sterilization   Other Topics Concern    Not on file   Social History Narrative    Not on file     Social Determinants of Health     Financial Resource Strain: Medium Risk    Difficulty of Paying Living Expenses: Somewhat hard   Food Insecurity: Food Insecurity Present    Worried About Running Out of Food in the Last Year: Sometimes true    [place] of Food in the Last Year: Sometimes true   Transportation Needs: No Transportation Needs    Lack of Transportation (Medical): No    Lack of Transportation (Non-Medical): No   Physical Activity: Insufficiently Active    Days of Exercise per Week: 2 days    Minutes of Exercise per Session: 20 min   Stress: Stress Concern Present    Feeling of Stress :  To some extent   Social Connections: Socially Isolated    Frequency of Communication with Friends and Family: More than three times a week    Frequency of Social Gatherings with Friends and Family: More than three times a week    Attends Religion Services: Never    Active Member of Clubs or Organizations: No    Attends Club or Organization Meetings: Never    Marital Status: Never    Intimate Partner Violence: Not At Risk    Fear of Current or Ex-Partner: No    Emotionally Abused: No    Physically Abused: No    Sexually Abused: No   Housing Stability: [address] Unable to Pay for Housing in the Last Year: No    Number of [place] in the Last Year: 1    Unstable Housing in the Last Year: No         Sleep Schedule: unremarkable    Snoring:  Mild    Witnessed Apnea:  No    Medications/Allergies:    Current Outpatient Medications:     acetaminophen (TYLENOL) 325 mg tablet, Take 650 mg by mouth every 6 (six) hours as needed for mild pain, Disp: , Rfl:     ARIPiprazole (ABILIFY) 5 mg tablet, Take 5 mg by mouth every morning, Disp: , Rfl:     aspirin-acetaminophen-caffeine (EXCEDRIN MIGRAINE) 250-250-65 MG per tablet, Take 2 tablets by mouth every 6 (six) hours as needed for headaches, Disp: , Rfl:     buPROPion (WELLBUTRIN SR) 150 mg 12 hr tablet, Take 150 mg by mouth every morning, Disp: , Rfl:     Cetirizine HCl 10 MG CAPS, Take 10 mg by mouth daily  , Disp: , Rfl:     fluticasone (FLONASE) 50 mcg/act nasal spray, 1 spray into each nostril daily (Patient taking differently: 1 spray into each nostril daily as needed), Disp: 16 g, Rfl: 0    fluticasone (FLOVENT HFA) 110 MCG/ACT inhaler, Inhale 1 puff as needed , Disp: , Rfl:     hydroxychloroquine (PLAQUENIL) 200 mg tablet, Take 200 mg by mouth as needed During Summer time , Disp: , Rfl:     levothyroxine 88 mcg tablet, Take 88 mcg by mouth daily , Disp: , Rfl:     lisinopril (ZESTRIL) 40 mg tablet, Take 1 tablet (40 mg total) by mouth daily, Disp: 30 tablet, Rfl: 3    meclizine (ANTIVERT) 12 5 MG tablet, Take 12 5 mg by mouth as needed, Disp: , Rfl:     Multiple Vitamins-Minerals (MULTIVITAMIN ADULT PO), Take 1 tablet by mouth daily, Disp: , Rfl:     omeprazole (PriLOSEC) 40 MG capsule, Take 40 mg by mouth as needed , Disp: , Rfl: 0    oxyCODONE (ROXICODONE) 15 mg immediate release tablet, Take 15 mg by mouth as needed , Disp: , Rfl:     PROAIR  (90 Base) MCG/ACT inhaler, Inhale 1-2 puffs as needed , Disp: , Rfl:     Probiotic Product (PROBIOTIC DAILY PO), Take 1 tablet by mouth daily, Disp: , Rfl:     prochlorperazine (COMPAZINE) 10 mg tablet, Take 1 tablet (10 mg total) by mouth every 6 (six) hours as needed (migraine), Disp: 10 tablet, Rfl: 0    propranolol (INDERAL LA) 160 mg, Take 160 mg by mouth see administration instructions 2 tabs in the [id number] and 1 tablet 160 mg at HS, Disp: , Rfl: 0    Rimegepant Sulfate (Nurtec) 75 MG TBDP, Take 75 mg by mouth every other day, Disp: 16 tablet, Rfl: 11    topiramate (TOPAMAX) 100 mg tablet, One in am and two at bedtime daily, Disp: 270 tablet, Rfl: 3    valACYclovir (VALTREX) 1,000 mg tablet, 1 tablet as needed , Disp: , Rfl:     apixaban (Eliquis) 5 mg, Take 2 tablets (10 mg total) by mouth 2 (two) times a day for 7 days, THEN 1 tablet (5 mg total) 2 (two) times a day for 23 days  , Disp: 74 tablet, Rfl: 0    Current Facility-Administered Medications:     Botulinum Toxin Type A SOLR 200 Units, 200 Units, Injection, Q3 Months, Kassie Andrade PA-C, 200 Units at 03/18/21 1154        No notes on file                  Objective:    Vital Signs:   Vitals:    09/27/22 1036   BP: 122/90   Pulse: 76   SpO2: 99%   Weight: 91 6 kg (202 lb)   Height: 5' 5" (1 651 m)     Neck Circumference: 15      Meally Sleepiness Scale: Total score: 11    Physical Exam:    General: Alert, appropriate, cooperative, overweight    Head: NC/AT, no retrognathia    Nose: No septal deviation, nares partially obstructed, mucosa normal    Throat: Airway diminished, tongue base thickened, no tonsils visualized    Neck: Normal, no thyromegaly or lymphadenopathy, no JVD    Heart: RR, normal S1 and S2, no murmurs    Chest: Clear bilaterally    Extremity: No clubbing, cyanosis, no edema    Skin: Warm, dry    Neuro: No motor abnormalities, cranial nerves appear intact    Sleep Study Results:   From 2009, no significant CK or PLMD      Counseling / Coordination of Care  A description of the counseling / coordination of care: We discussed sleep hygiene  Board Certified Sleep Specialist    Portions of the record may have been created with voice recognition software  Occasional wrong word or "sound a like" substitutions may have occurred due to the inherent limitations of voice recognition software  Read the chart carefully and recognize, using context, where substitutions have occurred

## 2022-10-06 ENCOUNTER — PROCEDURE VISIT (OUTPATIENT)
Dept: NEUROLOGY | Facility: CLINIC | Age: 47
End: 2022-10-06
Payer: MEDICARE

## 2022-10-06 VITALS — SYSTOLIC BLOOD PRESSURE: 128 MMHG | DIASTOLIC BLOOD PRESSURE: 86 MMHG | TEMPERATURE: 97.3 F | HEART RATE: 72 BPM

## 2022-10-06 DIAGNOSIS — G43.709 CHRONIC MIGRAINE WITHOUT AURA WITHOUT STATUS MIGRAINOSUS, NOT INTRACTABLE: Primary | ICD-10-CM

## 2022-10-06 PROCEDURE — 64615 CHEMODENERV MUSC MIGRAINE: CPT | Performed by: PHYSICIAN ASSISTANT

## 2022-10-06 NOTE — PROGRESS NOTES
Universal Protocol   Consent: Verbal consent obtained  Written consent obtained  Risks and benefits: risks, benefits and alternatives were discussed  Consent given by: patient  Time out: Immediately prior to procedure a "time out" was called to verify the correct patient, procedure, equipment, support staff and site/side marked as required  Patient understanding: patient states understanding of the procedure being performed  Patient consent: the patient's understanding of the procedure matches consent given  Procedure consent: procedure consent matches procedure scheduled  Relevant documents: relevant documents present and verified  Patient identity confirmed: verbally with patient        Chemodenervation     Date/Time 10/6/2022 12:09 PM     Performed by  Irena Randall PA-C     Authorized by Irena Randall PA-C        Pre-procedure details      Prepped With: Alcohol     Anesthesia  (see MAR for exact dosages):      Anesthesia method:  None   Procedure details     Position:  Upright   Botox     Botox Type:  Type A    Brand:  Botox    mL's of Botulinum Toxin:  200    Final Concentration per CC:  50 units    Needle Gauge:  30 G 2 5 inch   Procedures     Botox Procedures: chronic headache      Indications: migraines     Injection Location      Head / Face:  L superior cervical paraspinal, R superior cervical paraspinal, L , R , L frontalis, R frontalis, L medial occipitalis, R medial occipitalis, procerus, R temporalis, L temporalis, R superior trapezius and L superior trapezius    L  injection amount:  5 unit(s)    R  injection amount:  5 unit(s)    L lateral frontalis:  5 unit(s)    R lateral frontalis:  5 unit(s)    L medial frontalis:  5 unit(s)    R medial frontalis:  5 unit(s)    L temporalis injection amount:  20 unit(s)    R temporalis injection amount:  20 unit(s)    Procerus injection amount:  5 unit(s)    L medial occipitalis injection amount:  15 unit(s)    R medial occipitalis injection amount:  15 unit(s)    L superior cervical paraspinal injection amount:  10 unit(s)    R superior cervical paraspinal injection amount:  10 unit(s)    L superior trapezius injection amount:  15 unit(s)    R superior trapezius injection amount:  15 unit(s)   Total Units     Total units used:  200    Total units discarded:  0   Post-procedure details      Chemodenervation:  Chronic migraine    Facial Nerve Location[de-identified]  Bilateral facial nerve    Patient tolerance of procedure:   Tolerated well, no immediate complications   Comments      5 units orbicularis oculi bilaterally  10 units frontalis bilaterally  15 units occipitalis  All medically necessary

## 2022-10-17 ENCOUNTER — TELEPHONE (OUTPATIENT)
Dept: CARDIOLOGY CLINIC | Facility: CLINIC | Age: 47
End: 2022-10-17

## 2022-10-17 NOTE — TELEPHONE ENCOUNTER
We received a fax from [employer] stating the zio patch has not yet been returned  I called the patient and she said she is still wearing it, and will return it next week    She wears it until Sunday: 10/23/22

## 2022-10-26 ENCOUNTER — OFFICE VISIT (OUTPATIENT)
Dept: OBGYN CLINIC | Facility: CLINIC | Age: 47
End: 2022-10-26

## 2022-10-26 VITALS
WEIGHT: 206 LBS | HEIGHT: 65 IN | DIASTOLIC BLOOD PRESSURE: 89 MMHG | HEART RATE: 88 BPM | BODY MASS INDEX: 34.32 KG/M2 | SYSTOLIC BLOOD PRESSURE: 132 MMHG

## 2022-10-26 DIAGNOSIS — B37.31 VAGINAL YEAST INFECTION: Primary | ICD-10-CM

## 2022-10-26 LAB
BV WHIFF TEST VAG QL: ABNORMAL
CLUE CELLS SPEC QL WET PREP: ABNORMAL
PH SMN: 5 [PH]
SL AMB POCT WET MOUNT: ABNORMAL
T VAGINALIS VAG QL WET PREP: ABNORMAL
YEAST VAG QL WET PREP: ABNORMAL

## 2022-10-26 PROCEDURE — 87210 SMEAR WET MOUNT SALINE/INK: CPT | Performed by: NURSE PRACTITIONER

## 2022-10-26 PROCEDURE — 99213 OFFICE O/P EST LOW 20 MIN: CPT | Performed by: NURSE PRACTITIONER

## 2022-10-26 RX ORDER — FLUCONAZOLE 150 MG/1
TABLET ORAL
Qty: 2 TABLET | Refills: 0 | Status: SHIPPED | OUTPATIENT
Start: 2022-10-26 | End: 2022-10-29

## 2022-10-26 NOTE — PROGRESS NOTES
Assessment/Plan:    No problem-specific Assessment & Plan notes found for this encounter  Annual due 05/05/2023     Diagnoses and all orders for this visit:    Vaginal yeast infection  -     fluconazole (DIFLUCAN) 150 mg tablet; Take 1 pill today and repeat in 3 days  -     POCT wet mount- positive buds  Call if symptoms not improved  Subjective:      Patient ID: Joceline Doran is a 49 y o  adult  HPI 79-year-old patient here with possible yeast infection  Was on antibiotic for tooth extraction  Has itching and soreness  Has no vaginal dsch      History of hysterectomy, history of IC  Patient was last seen 05/05/2022 for her annual gyn exam   She has last  treated for yeast infection 04/20/2022  Uses coconut oil for vaginal dryness, works the best  Has used estrogen cream, replens- those products irritate her more  She states she wants to have bladder instillations for her IC, they used to help her, she desires to follow with Dr Moises Noonan for this  History of Botox injection for chronic headache    The following portions of the patient's history were reviewed and updated as appropriate: allergies, current medications, past family history, past medical history, past social history, past surgical history and problem list     Review of Systems   Constitutional: Negative for chills and fever  Respiratory: Negative  Cardiovascular: Negative  Genitourinary: Positive for frequency  Negative for dysuria, flank pain and vaginal discharge  Bladder irritation  itching         Objective:    Hx hysterectomy         Physical Exam  Constitutional:       Appearance: Normal appearance  Cardiovascular:      Rate and Rhythm: Normal rate  Pulmonary:      Effort: Pulmonary effort is normal    Genitourinary:         Comments: Very small skin tag  Skin:     General: Skin is warm and dry  Neurological:      Mental Status: She is oriented to person, place, and time         External genitalia-no erythema, 1 very small skin tag Rt labia majora  Vagina- small amount  white discharge  Cervix-surgically absent  Uterus-surgically absent  Adnexa-no masses nontender    Wet mount KOH-positive for buds

## 2022-10-31 ENCOUNTER — CLINICAL SUPPORT (OUTPATIENT)
Dept: CARDIOLOGY CLINIC | Facility: CLINIC | Age: 47
End: 2022-10-31

## 2022-10-31 DIAGNOSIS — I47.1 AVNRT (AV NODAL RE-ENTRY TACHYCARDIA) (HCC): ICD-10-CM

## 2022-11-03 DIAGNOSIS — I10 HYPERTENSION, UNSPECIFIED TYPE: ICD-10-CM

## 2022-11-06 RX ORDER — LISINOPRIL 40 MG/1
TABLET ORAL
Qty: 90 TABLET | Refills: 1 | Status: SHIPPED | OUTPATIENT
Start: 2022-11-06

## 2022-11-08 ENCOUNTER — TELEPHONE (OUTPATIENT)
Dept: CARDIOLOGY CLINIC | Facility: CLINIC | Age: 47
End: 2022-11-08

## 2022-11-08 NOTE — TELEPHONE ENCOUNTER
Called patient and left message   that her ziopatch was normal  Good news  ----- Message from Ellen Klein MD sent at 11/8/2022 12:39 PM EST -----  Please notify pt that her ziopatch was normal  Good news        Thanks!    - Ibeth Vega

## 2022-11-08 NOTE — TELEPHONE ENCOUNTER
ERROR  ----- Message from Vincent Wall MD sent at 11/8/2022 12:39 PM EST -----  Please notify pt that her ziopatch was normal  Good news        Thanks!    - Brionna Mccartney

## 2022-11-09 ENCOUNTER — PROCEDURE VISIT (OUTPATIENT)
Dept: OBGYN CLINIC | Facility: CLINIC | Age: 47
End: 2022-11-09

## 2022-11-09 VITALS
SYSTOLIC BLOOD PRESSURE: 155 MMHG | HEART RATE: 71 BPM | DIASTOLIC BLOOD PRESSURE: 91 MMHG | HEIGHT: 65 IN | BODY MASS INDEX: 34.28 KG/M2

## 2022-11-09 DIAGNOSIS — N90.89 SKIN TAG OF VULVA: Primary | ICD-10-CM

## 2022-11-10 NOTE — PROGRESS NOTES
Skin tag removal    Date/Time: 11/9/2022 7:37 PM  Performed by: MICHELLE Justice  Authorized by: MICHELLE Justice   Universal Protocol:  Consent: Verbal consent obtained  Written consent obtained  Risks and benefits: risks, benefits and alternatives were discussed (risk of pain and burning with TCA 80% applied to skin tag)  Consent given by: patient  Patient understanding: patient states understanding of the procedure being performed  Patient consent: the patient's understanding of the procedure matches consent given  Procedure consent: procedure consent matches procedure scheduled  Required blood products, implants, devices, and special equipment available: TCA 80% topical   Patient identity confirmed: verbally with patient        Lesion 6:      Procedure reviewed with pt  She has had procedure done in the past  Small amount of TCA 80% applied to 1 small skin tag only  on Rt vulva  Good whitening effect obtained  Pt tolerated procedure well  RTO in 2 wks if not resolved  Review to call with any concerns

## 2022-11-23 ENCOUNTER — OFFICE VISIT (OUTPATIENT)
Dept: OBGYN CLINIC | Facility: CLINIC | Age: 47
End: 2022-11-23

## 2022-11-23 VITALS
DIASTOLIC BLOOD PRESSURE: 99 MMHG | WEIGHT: 209 LBS | HEART RATE: 110 BPM | RESPIRATION RATE: 18 BRPM | SYSTOLIC BLOOD PRESSURE: 158 MMHG | HEIGHT: 65 IN | BODY MASS INDEX: 34.82 KG/M2

## 2022-11-23 DIAGNOSIS — B37.31 VAGINAL YEAST INFECTION: Primary | ICD-10-CM

## 2022-11-23 LAB
BV WHIFF TEST VAG QL: NEGATIVE
CLUE CELLS SPEC QL WET PREP: NEGATIVE
PH SMN: 5.5 [PH]
SL AMB POCT WET MOUNT: POSITIVE
T VAGINALIS VAG QL WET PREP: NEGATIVE
YEAST VAG QL WET PREP: ABNORMAL

## 2022-11-23 RX ORDER — DOXEPIN HYDROCHLORIDE 100 MG/1
100 CAPSULE ORAL
COMMUNITY
Start: 2022-11-15

## 2022-11-23 RX ORDER — AMOXICILLIN 500 MG/1
500 CAPSULE ORAL EVERY 6 HOURS
COMMUNITY
Start: 2022-10-05

## 2022-11-23 RX ORDER — PHENTERMINE HYDROCHLORIDE 37.5 MG/1
37.5 TABLET ORAL DAILY
COMMUNITY
Start: 2022-11-14

## 2022-11-23 RX ORDER — FLUCONAZOLE 150 MG/1
TABLET ORAL
Qty: 2 TABLET | Refills: 0 | Status: SHIPPED | OUTPATIENT
Start: 2022-11-23 | End: 2022-11-26

## 2022-11-23 RX ORDER — BUPROPION HYDROCHLORIDE 75 MG/1
75 TABLET ORAL EVERY MORNING
COMMUNITY
Start: 2022-10-08

## 2022-11-23 RX ORDER — IBUPROFEN 800 MG/1
800 TABLET ORAL EVERY 8 HOURS
COMMUNITY
Start: 2022-10-05

## 2022-11-23 NOTE — PROGRESS NOTES
Assessment/Plan:    No problem-specific Assessment & Plan notes found for this encounter  Annual due 05/05/2020     Diagnoses and all orders for this visit:    Vaginal yeast infection  -     POCT wet mount  -     fluconazole (DIFLUCAN) 150 mg tablet; Take 1 pill today and repeat in 3 days          Subjective:      Patient ID: Flor Louis is a 49 y o  adult  HPI patient here with possible yeast infection  She has itching but it is more external   History of being on antibiotics for a tooth extraction  She was last treated for yeast 10/26/2022 patient reports that did help her symptoms but still has some itching and wants to be checked  Uses coconut oil for vaginal dryness, has had irritation with using estrogen cream, history of hysterectomy    The following portions of the patient's history were reviewed and updated as appropriate: allergies, current medications, past family history, past medical history, past social history, past surgical history and problem list     Review of Systems   Constitutional: Negative for chills and fever  Respiratory: Negative  Cardiovascular: Negative  Genitourinary: Positive for vaginal discharge  Itching         Objective:      /99 (BP Location: Right arm, Patient Position: Sitting, Cuff Size: Adult)   Pulse (!) 110   Resp 18   Ht 5' 5" (1 651 m)   Wt 94 8 kg (209 lb)   LMP  (LMP Unknown)   BMI 34 78 kg/m²          Physical Exam  Constitutional:       Appearance: Normal appearance  Cardiovascular:      Rate and Rhythm: Normal rate  Pulmonary:      Effort: Pulmonary effort is normal    Abdominal:      Palpations: Abdomen is soft  Tenderness: There is no abdominal tenderness  Skin:     General: Skin is warm and dry  Psychiatric:         Mood and Affect: Mood normal          Behavior: Behavior normal          External genitalia-no lesions or erythema  Small Condyloma absent after TCA treatment recently     Vagina-normal white discharge  Cervix-surgically absent  Uterus-surgically absent  Adnexa-no masses nontender    Wet mount KOH-few hyphae

## 2023-01-04 ENCOUNTER — TELEPHONE (OUTPATIENT)
Dept: HEMATOLOGY ONCOLOGY | Facility: CLINIC | Age: 48
End: 2023-01-04

## 2023-01-04 ENCOUNTER — APPOINTMENT (OUTPATIENT)
Dept: LAB | Facility: CLINIC | Age: 48
End: 2023-01-04

## 2023-01-04 DIAGNOSIS — I82.402 DEEP VEIN THROMBOSIS (DVT) OF LEFT LOWER EXTREMITY, UNSPECIFIED CHRONICITY, UNSPECIFIED VEIN (HCC): ICD-10-CM

## 2023-01-04 DIAGNOSIS — I82.890 ACUTE EMBOLISM AND THROMBOSIS OF OTHER SPECIFIED VEINS: ICD-10-CM

## 2023-01-04 NOTE — TELEPHONE ENCOUNTER
CALL RETURN FORM   Reason for patient call? Patient calling to inform Prakash Barr she will keep the new scheduled appt 1/18/23 @ 3:30pm with [facility]  She also needs a new lab order sent to her chart asap  Patient's primary oncologist? Dr Precious Corral   Name of person the patient was calling for? Ronnie Dudley   Any additional information to add, if applicable? [phone]   Informed patient that the message will be forwarded to the team and someone will get back to them as soon as possible    Did you relay this information to the patient?  Yes

## 2023-01-04 NOTE — TELEPHONE ENCOUNTER
Returned call to patient explaining that her lab order was already in the system and she can report to any [facility]'s lab to have this completed  Patient voiced understanding   Lab test to be completed- (Lupus anticoagulant)

## 2023-01-04 NOTE — TELEPHONE ENCOUNTER
Left message for patient stating that per [facility] her appointment needs to be r/s'd until after her blood work is completed  Made new appointment for 1/18/23 at 3:30 PM  Instructed patient to have blood work completed as soon as possible since the test may take a week or longer to come back

## 2023-01-09 ENCOUNTER — TELEPHONE (OUTPATIENT)
Dept: NEUROLOGY | Facility: CLINIC | Age: 48
End: 2023-01-09

## 2023-01-09 NOTE — TELEPHONE ENCOUNTER
Received fax from [facility] PA is about to   Key [id number]  This key #     Per enc [id number]  2022    PA renewed on CM   (SFL: [id number])    Message from Plan  This medication or product was previously approved on [id number] from 2023 to 2023

## 2023-01-10 ENCOUNTER — HOSPITAL ENCOUNTER (OUTPATIENT)
Dept: SLEEP CENTER | Facility: CLINIC | Age: 48
Discharge: HOME | End: 2023-01-10

## 2023-01-10 DIAGNOSIS — G47.33 OSA (OBSTRUCTIVE SLEEP APNEA): ICD-10-CM

## 2023-01-12 ENCOUNTER — PROCEDURE VISIT (OUTPATIENT)
Dept: NEUROLOGY | Facility: CLINIC | Age: 48
End: 2023-01-12

## 2023-01-12 VITALS — SYSTOLIC BLOOD PRESSURE: 147 MMHG | TEMPERATURE: 97.9 F | HEART RATE: 70 BPM | DIASTOLIC BLOOD PRESSURE: 74 MMHG

## 2023-01-12 DIAGNOSIS — G43.709 CHRONIC MIGRAINE WITHOUT AURA WITHOUT STATUS MIGRAINOSUS, NOT INTRACTABLE: Primary | ICD-10-CM

## 2023-01-12 NOTE — PROGRESS NOTES
Universal Protocol   Consent: Verbal consent obtained  Written consent obtained  Risks and benefits: risks, benefits and alternatives were discussed  Consent given by: patient  Time out: Immediately prior to procedure a "time out" was called to verify the correct patient, procedure, equipment, support staff and site/side marked as required  Patient understanding: patient states understanding of the procedure being performed  Patient consent: the patient's understanding of the procedure matches consent given  Procedure consent: procedure consent matches procedure scheduled  Relevant documents: relevant documents present and verified  Patient identity confirmed: verbally with patient        Chemodenervation     Date/Time 1/12/2023 1:03 PM     Performed by  Calvin Tracey PA-C     Authorized by Calvin Tracey PA-C        Pre-procedure details      Prepped With: Alcohol     Anesthesia  (see MAR for exact dosages):      Anesthesia method:  None   Procedure details     Position:  Upright   Botox     Botox Type:  Type A    Brand:  Botox    mL's of Botulinum Toxin:  200    Final Concentration per CC:  50 units    Needle Gauge:  30 G 2 5 inch   Procedures     Botox Procedures: chronic headache      Indications: migraines     Injection Location      Head / Face:  L superior cervical paraspinal, R superior cervical paraspinal, L , R , L frontalis, R frontalis, L medial occipitalis, R medial occipitalis, procerus, R temporalis, L temporalis, R superior trapezius and L superior trapezius    L  injection amount:  5 unit(s)    R  injection amount:  5 unit(s)    L lateral frontalis:  5 unit(s)    R lateral frontalis:  5 unit(s)    L medial frontalis:  5 unit(s)    R medial frontalis:  5 unit(s)    L temporalis injection amount:  20 unit(s)    R temporalis injection amount:  20 unit(s)    Procerus injection amount:  5 unit(s)    L medial occipitalis injection amount:  15 unit(s)    R medial occipitalis injection amount:  15 unit(s)    L superior cervical paraspinal injection amount:  10 unit(s)    R superior cervical paraspinal injection amount:  10 unit(s)    L superior trapezius injection amount:  15 unit(s)    R superior trapezius injection amount:  15 unit(s)   Total Units     Total units used:  200    Total units discarded:  0   Post-procedure details      Chemodenervation:  Chronic migraine    Facial Nerve Location[de-identified]  Bilateral facial nerve    Patient tolerance of procedure:   Tolerated well, no immediate complications   Comments       5 units naris bilaterally  10 units frontalis bilaterally  15 units occipitalis  All medically necessary

## 2023-01-17 ENCOUNTER — TELEPHONE (OUTPATIENT)
Dept: HEMATOLOGY ONCOLOGY | Facility: CLINIC | Age: 48
End: 2023-01-17

## 2023-01-17 DIAGNOSIS — G47.33 OSA (OBSTRUCTIVE SLEEP APNEA): Primary | ICD-10-CM

## 2023-01-17 NOTE — TELEPHONE ENCOUNTER
----- Message from Martina Kang sent at 1/17/2023  7:52 AM EST -----  Regarding: FW: labs  Contact: [phone]    ----- Message -----  From: Tommy Ellis  Sent: 1/16/2023   8:10 PM EST  To: Hematology Oncology [facility] [place] [place] Clinical  Subject: labs                                             Good Evening   I have an appointment on Wednesday 1/18/23 that I need to change if you have something for Tuesday 1/17/23 anytime or 1/19/23 Thursday morning I’d appreciate it     Thank you  Stacey Rodriguez [phone]

## 2023-01-17 NOTE — TELEPHONE ENCOUNTER
Spoke with patient stating that we had a 1:30 available on Thursday 1/19/23 and she accepted appointment

## 2023-01-19 ENCOUNTER — OFFICE VISIT (OUTPATIENT)
Dept: HEMATOLOGY ONCOLOGY | Facility: CLINIC | Age: 48
End: 2023-01-19

## 2023-01-19 ENCOUNTER — TELEPHONE (OUTPATIENT)
Dept: SLEEP CENTER | Facility: CLINIC | Age: 48
End: 2023-01-19

## 2023-01-19 VITALS
HEIGHT: 65 IN | BODY MASS INDEX: 34.66 KG/M2 | DIASTOLIC BLOOD PRESSURE: 80 MMHG | TEMPERATURE: 98.2 F | HEART RATE: 70 BPM | SYSTOLIC BLOOD PRESSURE: 138 MMHG | RESPIRATION RATE: 18 BRPM | WEIGHT: 208 LBS | OXYGEN SATURATION: 97 %

## 2023-01-19 DIAGNOSIS — I82.4Z9 DEEP VEIN THROMBOSIS (DVT) OF DISTAL VEIN OF LOWER EXTREMITY, UNSPECIFIED CHRONICITY, UNSPECIFIED LATERALITY (HCC): Primary | ICD-10-CM

## 2023-01-19 RX ORDER — TELMISARTAN AND HYDROCHLORTHIAZIDE 80; 12.5 MG/1; MG/1
1 TABLET ORAL DAILY
COMMUNITY
Start: 2023-01-18

## 2023-01-19 RX ORDER — AMLODIPINE BESYLATE 5 MG/1
5 TABLET ORAL DAILY
COMMUNITY
Start: 2023-01-18

## 2023-01-19 NOTE — TELEPHONE ENCOUNTER
Called patient and advised sleep study resulted and shows mild CK  APAP ordered  Scheduled DME set up 2/8/23 in [place]  Rx for CPAP and clinical information sent to [facility] via [facility]  Compliance follow up is already scheduled 3/22/23

## 2023-01-19 NOTE — PROGRESS NOTES
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  She also has a history of DVT 10 years ago while on birth control  In May 2022 she presented to the ER with DVT in the left peroneal vein  Thrombosis was obtained which was negative  She was started on Eliquis  She had repeat lupus anticoagulant after holding Eliquis for 4 days  She was on baby aspirin 81 mg p o  once daily for 4 days prior to blood work  Lupus anticoagulant was negative indicating she does not need to transition to Coumadin  Given that patient has a history of previous DVT with subsequent DVT in 2022 I recommend she continue on lifelong anticoagulation  She does not need to be on Coumadin with a negative lupus anticoagulant  Coumadin could be considered if financially preferable  Patient states she is able to afford the Eliquis with co-pay  At this time she does not need to follow with our practice and can continue following with her family physician  We will therefore see her on a as needed basis  Patient verbalized understanding and is in agreement with plan  Barrier(s) to care: None  The patient is able to self care  [address] [facility]    Interval history: Clinically stable    Review of Systems   Constitutional: Negative for activity change, appetite change, fatigue, fever and unexpected weight change  Respiratory: Negative for cough and shortness of breath  Cardiovascular: Negative for chest pain and leg swelling  Gastrointestinal: Negative for abdominal pain, constipation, diarrhea and nausea  Endocrine: Negative for cold intolerance and heat intolerance  Musculoskeletal: Negative for arthralgias and myalgias  Skin: Negative  Neurological: Negative for dizziness, weakness and headaches  Hematological: Negative for adenopathy  Does not bruise/bleed easily  Past Medical History:   Diagnosis Date   • Asthma    • Bipolar 1 disorder ([facility] )    • Disease of thyroid gland    • Fibromyalgia    • Gout    • Hyperlipidemia    • Hypertension    • Lupus ([facility] )    • Migraine    • PONV (postoperative nausea and vomiting)      Past Surgical History:   Procedure Laterality Date   • GALLBLADDER SURGERY  2008   • HYSTERECTOMY  2014   • LAPAROSCOPIC COLON RESECTION  2001     Objective:  /80 (BP Location: Left arm, Patient Position: Sitting, Cuff Size: Adult)   Pulse 70   Temp 98 2 °F (36 8 °C) (Skin)   Resp 18   Ht 5' 5" (1 651 m)   Wt 94 3 kg (208 lb)   LMP  (LMP Unknown)   SpO2 97%   BMI 34 61 kg/m²    Physical Exam  Vitals reviewed  Constitutional:       Appearance: Normal appearance  She is well-developed  HENT:      Head: Normocephalic and atraumatic  Eyes:      Conjunctiva/sclera: Conjunctivae normal       Pupils: Pupils are equal, round, and reactive to light  Pulmonary:      Effort: Pulmonary effort is normal  No respiratory distress  Musculoskeletal:         General: Normal range of motion  Cervical back: Normal range of motion  Lymphadenopathy:      Cervical: No cervical adenopathy  Skin:     General: Skin is dry  Neurological:      Mental Status: She is alert and oriented to person, place, and time  Psychiatric:         Behavior: Behavior normal      Please note: This report has been generated by a voice recognition software system  Therefore there may be syntax, spelling, and/or grammatical errors  Please call if you have any questions

## 2023-01-23 LAB
DME PARACHUTE DELIVERY DATE EXPECTED: NORMAL
DME PARACHUTE DELIVERY DATE REQUESTED: NORMAL
DME PARACHUTE DELIVERY NOTE: NORMAL
DME PARACHUTE ITEM DESCRIPTION: NORMAL
DME PARACHUTE ORDER STATUS: NORMAL
DME PARACHUTE SUPPLIER NAME: NORMAL
DME PARACHUTE SUPPLIER PHONE: NORMAL

## 2023-01-30 ENCOUNTER — PROBLEM (OUTPATIENT)
Dept: URBAN - METROPOLITAN AREA CLINIC 6 | Facility: CLINIC | Age: 48
End: 2023-01-30

## 2023-01-30 DIAGNOSIS — H40.023: ICD-10-CM

## 2023-01-30 DIAGNOSIS — H04.123: ICD-10-CM

## 2023-01-30 PROCEDURE — 92014 COMPRE OPH EXAM EST PT 1/>: CPT

## 2023-01-30 ASSESSMENT — VISUAL ACUITY
OS_SC: 20/30
OD_OTHER: TESTED 1ST
OD_SC: 20/25

## 2023-01-30 ASSESSMENT — KERATOMETRY
OD_AXISANGLE_DEGREES: 8
OS_AXISANGLE2_DEGREES: 34
OS_AXISANGLE_DEGREES: 124
OD_K2POWER_DIOPTERS: 44.50
OS_K1POWER_DIOPTERS: 43.75
OD_AXISANGLE2_DEGREES: 98
OD_K1POWER_DIOPTERS: 44.25
OS_K2POWER_DIOPTERS: 44.25

## 2023-01-30 ASSESSMENT — TONOMETRY
OS_IOP_MMHG: 10
OD_IOP_MMHG: 12

## 2023-02-03 ENCOUNTER — OFFICE VISIT (OUTPATIENT)
Dept: OBGYN CLINIC | Facility: CLINIC | Age: 48
End: 2023-02-03

## 2023-02-03 VITALS
BODY MASS INDEX: 36.32 KG/M2 | SYSTOLIC BLOOD PRESSURE: 106 MMHG | HEIGHT: 65 IN | HEART RATE: 80 BPM | RESPIRATION RATE: 18 BRPM | WEIGHT: 218 LBS | DIASTOLIC BLOOD PRESSURE: 70 MMHG

## 2023-02-03 DIAGNOSIS — Z11.3 ENCOUNTER FOR SCREENING EXAMINATION FOR SEXUALLY TRANSMITTED DISEASE: ICD-10-CM

## 2023-02-03 DIAGNOSIS — B37.31 VAGINAL YEAST INFECTION: Primary | ICD-10-CM

## 2023-02-03 RX ORDER — FLUCONAZOLE 150 MG/1
150 TABLET ORAL DAILY
Qty: 2 TABLET | Refills: 0 | Status: SHIPPED | OUTPATIENT
Start: 2023-02-03 | End: 2023-02-05

## 2023-02-03 NOTE — PROGRESS NOTES
Family Medicine Follow-Up Office Visit  Emily Hairston 49 y o  adult   MRN: [id number] : 1975  ENCOUNTER: 2/3/2023 12:42 PM    Assessment and Plan   Vaginal yeast infection  Patient presenting with vaginal itchiness, burning - reports experiencing similar symptoms previously with her prior yeast infections  Said she became concerned after smelling some type of odor, but says it is possible that the odor is from her coconut oil mixing with discharge  Physical examination found white discharge, mild vulvar erythema with mild edema, and no foul odor could be appreciated  Lab obtained in office for gonorrhea/chlamydia, and wet mount showed NEGATIVE whiff test, pH 4 5, Clue Cells minimal, KOH slide - budding yeast present  Plan:  - Diflucan 150 mg today and again 3 days later  - Return to clinic if symptoms persisting or new symptom development      Chief Complaint     Chief Complaint   Patient presents with   • Vaginitis       History of Present Illness   Emily Hairston is a 49y o -year-old adult who presents today for possible yeast infection  She reports symptoms starting last weekend, and initially thought she was having another vaginal yeast infection  Reports at least 2 prior yeast infections in her past, but says she became concerned due to possible foul-smelling odor  She denies fish-like smell from the odor but does report using coconut oil for vaginal dryness, and thinks maybe the discharge with the coconut oil is causing an odor  She reports associated symptoms of vaginal irritation and itchiness, for which she says the itchiness was the first symptom to develop  She denies using any other medication to treat her symptoms  Denies any other associated symptoms  Review of Systems   Review of Systems   Constitutional: Negative  HENT: Negative  Eyes: Negative  Respiratory: Negative  Cardiovascular: Negative  Gastrointestinal: Negative      Genitourinary:        Vaginal itchiness, burning, and general irritation  Musculoskeletal: Negative  Neurological: Negative  Active Problem List     Patient Active Problem List   Diagnosis   • Palpitations   • AVNRT (AV win re-entry tachycardia) ([facility])   • Chronic migraine without aura without status migrainosus, not intractable   • Interstitial cystitis   • Vaginal candidiasis   • Encounter for gynecological examination   • Screening for breast cancer   • Hyperlipidemia   • Disease of thyroid gland   • Yeast infection of the vagina   • Vaginal discharge   • Other specified anxiety disorders   • Vaginal dryness   • Pelvic pain   • Urine frequency   • Full incontinence of feces   • Cervicogenic headache   • Cervicalgia   • Vaginal yeast infection   • IC (interstitial cystitis)   • Obesity, morbid ([facility])   • History of hysterectomy   • CK (obstructive sleep apnea)   • Deep vein thrombosis (DVT) of distal vein of lower extremity ([facility] [address] )   • Encounter for screening examination for sexually transmitted disease       Past Medical History, Past Surgical History, Family History, and Social History were reviewed and updated today as appropriate  Objective   /70 (BP Location: Right arm, Patient Position: Sitting, Cuff Size: Adult)   Pulse 80   Resp 18   Ht 5' 5" (1 651 m)   Wt 98 9 kg (218 lb)   LMP  (LMP Unknown)   BMI 36 28 kg/m²     Physical Exam  Exam conducted with a chaperone present  HENT:      Head: Normocephalic and atraumatic  Cardiovascular:      Rate and Rhythm: Normal rate and regular rhythm  Heart sounds: Normal heart sounds  No murmur heard  Pulmonary:      Effort: Pulmonary effort is normal       Breath sounds: Normal breath sounds  Abdominal:      General: Bowel sounds are normal       Tenderness: There is no right CVA tenderness or left CVA tenderness  Genitourinary:     General: Normal vulva  Pubic Area: No rash  Labia:         Right: No rash, tenderness, lesion or injury  Left: No rash, tenderness or lesion  Comments: White discharge present   Mild vulvar erythema with mild edema present   Musculoskeletal:      Right lower leg: No edema  Left lower leg: No edema  Skin:     General: Skin is warm  Capillary Refill: Capillary refill takes less than 2 seconds  Neurological:      General: No focal deficit present  Mental Status: She is alert     Psychiatric:         Mood and Affect: Mood normal          Behavior: Behavior normal          Pertinent Laboratory/Diagnostic Studies:  Lab Results   Component Value Date    GLUCOSE 109 11/11/2015    BUN 13 03/30/2022    CREATININE 0 87 03/30/2022    CALCIUM 10 1 02/05/2022     11/11/2015    K 4 0 02/05/2022    CO2 28 02/05/2022     02/05/2022     Lab Results   Component Value Date    ALT 42 02/05/2022    AST 22 02/05/2022    ALKPHOS 68 02/05/2022    BILITOT 0 2 02/28/2015       Lab Results   Component Value Date    WBC 11 29 (H) 02/05/2022    HGB 14 0 02/05/2022    HCT 42 4 02/05/2022    MCV 88 02/05/2022     02/05/2022       No results found for: TSH    Lab Results   Component Value Date    CHOL 161 09/30/2014     Lab Results   Component Value Date    TRIG 90 11/20/2019     Lab Results   Component Value Date    HDL 46 11/20/2019     Lab Results   Component Value Date    LDLCALC 98 11/20/2019     Lab Results   Component Value Date    HGBA1C 5 6 11/20/2019       Results for orders placed or performed in visit on 01/19/23   CPAP Package   Result Value Ref Range    Supplier Name [facility]/[address]     Supplier Phone Number [phone]     Order Status Ready For Delivery     Delivery Note DME set up 2/8/23 [facility]     Delivery Request Date 01/19/2023     Supplier Name 02/08/2023     Item Description CPAP Machine, Resmed S10 Auto-CPAP     Item Description       PAP Mask, Nasal, Fit Upon Setup, N/A, 1 per 3 months    Item Description PAP Mask Interface Cushion, Nasal, 2 per 1 month Item Description PAP Headgear, 1 per 6 months     Item Description PAP Humidifier, Heated     Item Description Disposable PAP Filter, 2 per 1 month     Item Description Non-Disposable PAP Filter, 1 per 6 months     Item Description PAP Machine Tubing, Heated, 1 per 3 months     Item Description PAP Monitoring Modem     Item Description Humidifier Water Chamber, 1 per 6 months        Orders Placed This Encounter   Procedures   • Chlamydia/GC amplified DNA by PCR           Current Medications     Current Outpatient Medications   Medication Sig Dispense Refill   • amLODIPine (NORVASC) 5 mg tablet Take 5 mg by mouth daily     • ARIPiprazole (ABILIFY) 5 mg tablet Take 5 mg by mouth every morning     • aspirin-acetaminophen-caffeine (EXCEDRIN MIGRAINE) 250-250-65 MG per tablet Take 2 tablets by mouth every 6 (six) hours as needed for headaches     • buPROPion (WELLBUTRIN SR) 150 mg 12 hr tablet Take 150 mg by mouth every morning     • Cetirizine HCl 10 MG CAPS Take 10 mg by mouth daily       • doxepin (SINEquan) 100 mg capsule Take 100 mg by mouth daily at bedtime     • fluconazole (DIFLUCAN) 150 mg tablet Take 1 tablet (150 mg total) by mouth daily for 2 doses Take one pill today, and then the second pill 3 days later   2 tablet 0   • fluticasone (FLONASE) 50 mcg/act nasal spray 1 spray into each nostril daily (Patient taking differently: 1 spray into each nostril daily as needed) 16 g 0   • fluticasone (FLOVENT HFA) 110 MCG/ACT inhaler Inhale 1 puff as needed      • hydroxychloroquine (PLAQUENIL) 200 mg tablet Take 200 mg by mouth as needed During Summer time      • levothyroxine 88 mcg tablet Take 88 mcg by mouth daily      • lisinopril (ZESTRIL) 40 mg tablet take 1 tablet by mouth once daily 90 tablet 1   • meclizine (ANTIVERT) 12 5 MG tablet Take 12 5 mg by mouth as needed     • Multiple Vitamins-Minerals (MULTIVITAMIN ADULT PO) Take 1 tablet by mouth daily     • omeprazole (PriLOSEC) 40 MG capsule Take 40 mg by mouth as needed  0   • oxyCODONE (ROXICODONE) 15 mg immediate release tablet Take 15 mg by mouth as needed     • PROAIR  (90 Base) MCG/ACT inhaler Inhale 1-2 puffs as needed      • prochlorperazine (COMPAZINE) 10 mg tablet Take 1 tablet (10 mg total) by mouth every 6 (six) hours as needed (migraine) 10 tablet 0   • Rimegepant Sulfate (Nurtec) 75 MG TBDP Take 75 mg by mouth every other day 16 tablet 11   • telmisartan-hydrochlorothiazide (MICARDIS HCT) 80-12 5 MG per tablet Take 1 tablet by mouth daily     • topiramate (TOPAMAX) 100 mg tablet One in am and two at bedtime daily 270 tablet 3   • valACYclovir (VALTREX) 1,000 mg tablet 1 tablet as needed      • acetaminophen (TYLENOL) 325 mg tablet Take 650 mg by mouth every 6 (six) hours as needed for mild pain (Patient not taking: Reported on 2/3/2023)     • apixaban (Eliquis) 5 mg Take 2 tablets (10 mg total) by mouth 2 (two) times a day for 7 days, THEN 1 tablet (5 mg total) 2 (two) times a day for 23 days  74 tablet 0   • propranolol (INDERAL LA) 160 mg Take 160 mg by mouth see administration instructions 2 tabs in the [id number] and 1 tablet 160 mg at HS (Patient not taking: Reported on 2/3/2023)  0     Current Facility-Administered Medications   Medication Dose Route Frequency Provider Last Rate Last Admin   • Botulinum Toxin Type A SOLR 200 Units  200 Units Injection Q3 Months Mirtha Sahni PA-C   200 Units at 03/18/21 1154       ALLERGIES:  Allergies   Allergen Reactions   • Pregabalin      Other reaction(s): blurred vision   • Shellfish-Derived Products - Food Allergy Anaphylaxis and Hives   • Sumatriptan      Other reaction(s):  Altered Heart Rate   • Triptans Shortness Of Breath and Palpitations   • Latex Swelling and Rash     Other reaction(s): Hives/Skin Rash  Other reaction(s): Hives/Skin Rash   • Sulfa Antibiotics Swelling and Rash     Other reaction(s): Hives/Skin Rash  Other reaction(s): Hives/Skin Rash   • Monistat [Miconazole] Swelling       Health Maintenance     Health Maintenance   Topic Date Due   • Hepatitis C Screening  Never done   • Medicare Annual Wellness Visit (AWV)  Never done   • COVID-19 Vaccine (1) Never done   • Hepatitis A Vaccine (1 of 2 - Risk 2-dose series) Never done   • Pneumococcal Vaccine: Pediatrics (0 to 5 Years) and At-Risk Patients (6 to 59 Years) (1 - PCV) Never done   • HIV Screening  Never done   • PT PLAN OF CARE  03/24/2018   • Colorectal Cancer Screening  Never done   • OT PLAN OF CARE  01/02/2022   • Influenza Vaccine (1) 09/01/2022   • Breast Cancer Screening: Mammogram  04/26/2023   • BMI: Followup Plan  05/05/2023   • Depression Screening  02/03/2024   • BMI: Adult  02/03/2024   • HIB Vaccine  Aged Out   • IPV Vaccine  Aged Out   • Meningococcal ACWY Vaccine  Aged Out   • HPV Vaccine  Aged Out     Immunization History   Administered Date(s) Administered   • INFLUENZA 10/17/2012   • Influenza Quadrivalent Preservative Free 3 years and older IM 09/30/2014   • Influenza, seasonal, injectable 10/17/2012, 11/22/2013         Jose A Pro  2/3/2023  12:42 PM    Parts of this note were dictated using [employer] [place] dictation software and may have sounds-like errors due to variation in pronunciation

## 2023-02-03 NOTE — ASSESSMENT & PLAN NOTE
Patient presenting with vaginal itchiness, burning - reports experiencing similar symptoms previously with her prior yeast infections  Said she became concerned after smelling some type of odor, but says it is possible that the odor is from her coconut oil mixing with discharge  Physical examination found white discharge, mild vulvar erythema with mild edema, and no foul odor could be appreciated  Lab obtained in office for gonorrhea/chlamydia, and wet mount showed NEGATIVE whiff test, pH 4 5, Clue Cells minimal, KOH slide - budding yeast present        Plan:  - Diflucan 150 mg today and again 3 days later  - Return to clinic if symptoms persisting or new symptom development

## 2023-02-04 LAB
C TRACH DNA SPEC QL NAA+PROBE: NEGATIVE
N GONORRHOEA DNA SPEC QL NAA+PROBE: NEGATIVE

## 2023-02-08 LAB
DME PARACHUTE DELIVERY DATE ACTUAL: NORMAL
DME PARACHUTE DELIVERY DATE EXPECTED: NORMAL
DME PARACHUTE DELIVERY DATE REQUESTED: NORMAL
DME PARACHUTE DELIVERY NOTE: NORMAL
DME PARACHUTE ITEM DESCRIPTION: NORMAL
DME PARACHUTE ORDER STATUS: NORMAL
DME PARACHUTE SUPPLIER NAME: NORMAL
DME PARACHUTE SUPPLIER PHONE: NORMAL

## 2023-02-16 ENCOUNTER — TELEPHONE (OUTPATIENT)
Dept: SLEEP CENTER | Facility: CLINIC | Age: 48
End: 2023-02-16

## 2023-03-07 ENCOUNTER — OFFICE VISIT (OUTPATIENT)
Dept: NEUROLOGY | Facility: CLINIC | Age: 48
End: 2023-03-07

## 2023-03-07 VITALS
HEART RATE: 83 BPM | WEIGHT: 220 LBS | BODY MASS INDEX: 36.65 KG/M2 | DIASTOLIC BLOOD PRESSURE: 70 MMHG | HEIGHT: 65 IN | SYSTOLIC BLOOD PRESSURE: 108 MMHG

## 2023-03-07 DIAGNOSIS — F41.8 OTHER SPECIFIED ANXIETY DISORDERS: ICD-10-CM

## 2023-03-07 DIAGNOSIS — G43.709 CHRONIC MIGRAINE WITHOUT AURA WITHOUT STATUS MIGRAINOSUS, NOT INTRACTABLE: ICD-10-CM

## 2023-03-07 DIAGNOSIS — G43.009 MIGRAINE WITHOUT AURA AND WITHOUT STATUS MIGRAINOSUS, NOT INTRACTABLE: Primary | ICD-10-CM

## 2023-03-07 DIAGNOSIS — G47.33 OSA (OBSTRUCTIVE SLEEP APNEA): ICD-10-CM

## 2023-03-07 RX ORDER — PROCHLORPERAZINE MALEATE 10 MG
10 TABLET ORAL EVERY 6 HOURS PRN
Qty: 20 TABLET | Refills: 3 | Status: SHIPPED | OUTPATIENT
Start: 2023-03-07

## 2023-03-07 NOTE — ASSESSMENT & PLAN NOTE
Preventative:  Botox 200 units every 3 months  Topamax 1 tab in am and 2 tabs bedtime  Continue medications from psychiatry    At onset of migraine, take Nurtec 75 mg  Limit of 1 in 24 hours  May use Reyvow 100 mg at onset of migraine  Limit of 1 in 24 hours  Do not drive within 8 hours of taking  If needed may use prochlorperazine 10 mg  May repeat in 8 hours    Limit of 20 a month

## 2023-03-07 NOTE — PROGRESS NOTES
[facility]  PATIENT:  Juliano Sy  MRN:  [id number]  :  1975  DATE OF SERVICE:  3/7/2023      Assessment/Plan:     Chronic migraine without aura without status migrainosus, not intractable  Preventative:  Botox 200 units every 3 months  Topamax 1 tab in am and 2 tabs bedtime  Continue medications from psychiatry    At onset of migraine, take Nurtec 75 mg  Limit of 1 in 24 hours  May use Reyvow 100 mg at onset of migraine  Limit of 1 in 24 hours  Do not drive within 8 hours of taking  If needed may use prochlorperazine 10 mg  May repeat in 8 hours  Limit of 20 a month    CK (obstructive sleep apnea)  Continue CPAP nightly    Other specified anxiety disorders  Continue to follow-up with psychiatry  Did recommend talking to psychiatrist about increasing bupropion and/or adding BuSpar as needed         Problem List Items Addressed This Visit        Respiratory    CK (obstructive sleep apnea)     Continue CPAP nightly            Cardiovascular and Mediastinum    Chronic migraine without aura without status migrainosus, not intractable     Preventative:  Botox 200 units every 3 months  Topamax 1 tab in am and 2 tabs bedtime  Continue medications from psychiatry    At onset of migraine, take Nurtec 75 mg  Limit of 1 in 24 hours  May use Reyvow 100 mg at onset of migraine  Limit of 1 in 24 hours  Do not drive within 8 hours of taking  If needed may use prochlorperazine 10 mg  May repeat in 8 hours  Limit of 20 a month         Relevant Medications    Lasmiditan Succinate (REYVOW) 100 MG tablet    prochlorperazine (COMPAZINE) 10 mg tablet       Other    Other specified anxiety disorders     Continue to follow-up with psychiatry    Did recommend talking to psychiatrist about increasing bupropion and/or adding BuSpar as needed         Relevant Medications    prochlorperazine (COMPAZINE) 10 mg tablet   Other Visit Diagnoses     Migraine without aura and without status migrainosus, not intractable    -  Primary    Relevant Medications    Lasmiditan Succinate (REYVOW) 100 MG tablet              History of Present Illness: We had the pleasure of evaluating Adamaris Brady Done in neurological follow up  today for headaches  As you know,  she is a 49 y o   right handed adult  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  She has a PMH of palpitations and has had an ablation      Patient states that she fell down a flight of stairs in early 2000s   Went to hospital, unsure if went via ambulance  Lenoor Tang states she was out of work for about a month   States had significant pain, bruising and was limping   Went to PT, chiropractor after this  Elex Vik states went back to work as   Murali Courtney her issues with bowel incontinence began a few months after the fall   First instance happened at work where she thought that she was going to pass gas and soiled herself  Jose Cruz Christine began to have incontinence during intercourse   For the past 2-3 years has had very little control   States she doesn't go out unless she hasn't eaten for 24 hours   She can drink fluids but if she eats she needs a toilet   Patient varies from constipation to "explosion  "  Patient has seen Dr Whit Griffin for gastroenterology but was over 6 years ago  Leonor Tang states she is unable to stop it once starts and has a full bowel movement amount   Patient had a colon resection for diverticulitis in 2001   Was not diverted        Patient states her headaches worsened around December 2019 or January 2020  Larua Mitchell symptoms have worsened and having more nausea and vomiting  Laura Mitchell psychiatrist changed her medications in November 2019 and she stopped taking her medications in January 2020       Interval update as of 3/7/2023  Patient is under a great deal of stress currently with her kids    Interval update as of 3/7/2022  Doing same as before    Botox is helpful for her        Interval updates as of 4/22/2021  Patient states her migraines Are markedly improved since restarting Botox   Patient states the 1st month after Botox was a little challenging however, they are now down to 2 more mild migraines a week with 2 severe month   Patient states the Nurtec does provide significant relief for her       Unable to take triptans to do chest pain, SOB and palpitations  QTc 12/23/2016 456 ms     What medications do you take or have you taken for your headaches? Current Preventive:   Abilify, Wellbutrin  MVI  Propranolol  Botox    Current Abortive:   Fioricet   Prochlorperazine  Nurtec     Prior Preventive:   Lisinopril, labetalol  Topamax, carbamazepine, Lyrica, Depakote (weigh gain), Gabapentin (no help)  cyclobenzaprine, Tizanidine,  Seroquel, citalopram, duloxetine, venlafaxine, olanzapine- ineffective,   Cyproheptadine (too much dryness),   Aimovig 12/4/2019     Prior Abortive:   Dexamethasone,  Naproxen   Promethazine,   Kenalog,  Percocet, tramadol, Toradol, narcotics   triptans--tachycardia, SOB, palpitations    Headache trigger: Fatigue, Stress/Tension, Weather change, lack of sleep  Alternative therapies used in the past for headaches? none   Headache are worse if the patient: cough, sneeze, bending over  Aura - none     Any family history of migraines? Yes, mother and father  Any family history of aneurysms? No     Current pain:  8/10  How often do the headaches occur -   mild are 2-3 a week,   severe 1-2 a week because of her stress    Otherwise only gets 2 a the entire month prior to restart of Botox was almost daily     What time of the day do the headaches start - wakes up with them  How long do the headaches last - all day,if takes Nurtec lasts 3-4 hours  Where are they located - frontalis, retro orbital, temporalis and then neck pain   What is the intensity of pain - 8-10/10; gets to a 10/10 1-2 times a month (if doesn't take her rescue medications early enough)  Describe your usual headache - Throbbing, Pounding, Pulsing, pressure     Associated symptoms:   -       Decrease of appetite, nausea, vomiting   -       Photophobia, phonophobia, sensitivity to smell   -       Problem with concentration  -       Stiff or sore neck  -       prefer to be alone and in a dark room, unable to work     Number of days missed per month because of headaches:  Work (or school) days: NA  Social or Family activities: occasionally     What time of the year do headaches occur more frequently?   Change of weather  Have you seen someone else for headaches or pain? Yes  Have you had trigger point injection performed and how often? No  Have you had Botox injection performed and how often? Yes   Have you had epidural injections or transforaminal injections performed? No     Have you used CBD or THC for your headaches and how often? No  Are you current pregnant or planning on getting pregnant? No, done with family planning  Have you ever had any Brain imaging? yes      7/23/2018 CT head  No acute abnormalities      I personally reviewed these images      Reviewed old notes from physician seen in the past- see above HPI for summary of previous encounters       With botox has had a reduction of at least 7 migraine days with less abortive medication, less ER visits which correlates to headache diary      Past Medical History:   Diagnosis Date   • Asthma    • Bipolar 1 disorder ([facility] )    • Cluster headache    • CTS (carpal tunnel syndrome)    • Difficulty walking    • Disease of thyroid gland    • Fibromyalgia    • Fibromyalgia, primary    • Gout    • Headache, tension-type    • Hyperlipidemia    • Hypertension    • Lupus ([facility] )    • Memory loss    • Migraine    • Mild sleep apnea    • Peripheral neuropathy    • PONV (postoperative nausea and vomiting)    • Vision loss        Patient Active Problem List   Diagnosis   • Palpitations   • AVNRT (AV win re-entry tachycardia) ([facility])   • Chronic migraine without aura without status migrainosus, not intractable   • Interstitial cystitis   • Vaginal candidiasis   • Encounter for gynecological examination   • Screening for breast cancer   • Hyperlipidemia   • Disease of thyroid gland   • Yeast infection of the vagina   • Vaginal discharge   • Other specified anxiety disorders   • Vaginal dryness   • Pelvic pain   • Urine frequency   • Full incontinence of feces   • Cervicogenic headache   • Cervicalgia   • Vaginal yeast infection   • IC (interstitial cystitis)   • Obesity, morbid (HCC)   • History of hysterectomy   • CK (obstructive sleep apnea)   • Deep vein thrombosis (DVT) of distal vein of lower extremity ([facility])   • Encounter for screening examination for sexually transmitted disease       Medications:      Current Outpatient Medications   Medication Sig Dispense Refill   • acetaminophen (TYLENOL) 325 mg tablet Take 650 mg by mouth every 6 (six) hours as needed for mild pain     • amLODIPine (NORVASC) 5 mg tablet Take 5 mg by mouth daily     • apixaban (Eliquis) 5 mg Take 2 tablets (10 mg total) by mouth 2 (two) times a day for 7 days, THEN 1 tablet (5 mg total) 2 (two) times a day for 23 days   (Patient taking differently: 5mg PO BID) 74 tablet 0   • ARIPiprazole (ABILIFY) 5 mg tablet Take 5 mg by mouth every morning     • aspirin-acetaminophen-caffeine (EXCEDRIN MIGRAINE) 250-250-65 MG per tablet Take 2 tablets by mouth every 6 (six) hours as needed for headaches     • buPROPion (WELLBUTRIN SR) 150 mg 12 hr tablet Take 150 mg by mouth every morning     • Cetirizine HCl 10 MG CAPS Take 10 mg by mouth daily       • doxepin (SINEquan) 100 mg capsule Take 100 mg by mouth daily at bedtime     • fluticasone (FLONASE) 50 mcg/act nasal spray 1 spray into each nostril daily (Patient taking differently: 1 spray into each nostril daily as needed) 16 g 0   • fluticasone (FLOVENT HFA) 110 MCG/ACT inhaler Inhale 1 puff as needed      • hydroxychloroquine (PLAQUENIL) 200 mg tablet Take 200 mg by mouth as needed During Summer time      • Lasmiditan Succinate (REYVOW) 100 MG tablet Take 1 tablet (100mg)  one time as needed for migraine  Do not use more than one dose per day, or more than 8 doses per month 8 tablet 3   • levothyroxine 88 mcg tablet Take 88 mcg by mouth daily      • lisinopril (ZESTRIL) 40 mg tablet take 1 tablet by mouth once daily 90 tablet 1   • meclizine (ANTIVERT) 12 5 MG tablet Take 12 5 mg by mouth as needed     • Multiple Vitamins-Minerals (MULTIVITAMIN ADULT PO) Take 1 tablet by mouth daily     • omeprazole (PriLOSEC) 40 MG capsule Take 40 mg by mouth as needed  0   • oxyCODONE (ROXICODONE) 15 mg immediate release tablet Take 15 mg by mouth as needed     • PROAIR  (90 Base) MCG/ACT inhaler Inhale 1-2 puffs as needed      • prochlorperazine (COMPAZINE) 10 mg tablet Take 1 tablet (10 mg total) by mouth every 6 (six) hours as needed (migraine) 20 tablet 3   • propranolol (INDERAL LA) 160 mg Take 160 mg by mouth 3 (three) times a day  0   • Rimegepant Sulfate (Nurtec) 75 MG TBDP Take 75 mg by mouth every other day 16 tablet 11   • telmisartan-hydrochlorothiazide (MICARDIS HCT) 80-12 5 MG per tablet Take 1 tablet by mouth daily     • topiramate (TOPAMAX) 100 mg tablet One in am and two at bedtime daily 270 tablet 3   • valACYclovir (VALTREX) 1,000 mg tablet 1 tablet as needed        Current Facility-Administered Medications   Medication Dose Route Frequency Provider Last Rate Last Admin   • Botulinum Toxin Type A SOLR 200 Units  200 Units Injection Q3 Months Gilberto Otero PA-C   200 Units at 03/18/21 1154        Allergies: Allergies   Allergen Reactions   • Pregabalin      Other reaction(s): blurred vision   • Shellfish-Derived Products - Food Allergy Anaphylaxis and Hives   • Sumatriptan      Other reaction(s):  Altered Heart Rate   • Triptans Shortness Of Breath and Palpitations   • Latex Swelling and Rash     Other reaction(s): Hives/Skin Rash  Other reaction(s): Hives/Skin Rash   • Sulfa Antibiotics Swelling and Rash Other reaction(s): Hives/Skin Rash  Other reaction(s): Hives/Skin Rash   • Monistat [Miconazole] Swelling       Family History:     Family History   Problem Relation Age of Onset   • Lupus Mother    • Osteoporosis Mother    • Hypotension Mother    • Hypertension Father    • Diabetes Father    • Heart disease Father    • Migraines Father    • Stroke Father    • Diabetes Sister    • Schizophrenia Sister    • Hypertension Sister    • Diabetes Maternal Grandmother    • Rheum arthritis Maternal Grandmother    • Hypertension Maternal Grandmother    • Neuropathy Maternal Grandmother    • Seizures Maternal Grandmother    • Breast cancer Maternal Aunt 40   • No Known Problems Maternal Aunt    • No Known Problems Maternal Aunt    • No Known Problems Maternal Aunt    • No Known Problems Paternal Aunt        Social History:     Social History     Socioeconomic History   • Marital status: Single     Spouse name: Not on file   • Number of children: Not on file   • Years of education: Not on file   • Highest education level: Not on file   Occupational History   • Not on file   Tobacco Use   • Smoking status: Former     Packs/day: 1 00     Years: 5 00     Pack years: 5 00     Types: Cigarettes   • Smokeless tobacco: Never   • Tobacco comments:     quit at 24years old   Vaping Use   • Vaping Use: Never used   Substance and Sexual Activity   • Alcohol use: No   • Drug use: No   • Sexual activity: Yes     Partners: Male     Birth control/protection: Female Sterilization   Other Topics Concern   • Not on file   Social History Narrative   • Not on file     Social Determinants of Health     Financial Resource Strain: Low Risk    • Difficulty of Paying Living Expenses: Not hard at all   Food Insecurity: No Food Insecurity   • Worried About Running Out of Food in the Last Year: Never true   • Ran Out of Food in the Last Year: Never true   Transportation Needs: No Transportation Needs   • Lack of Transportation (Medical):  No   • Lack of Transportation (Non-Medical): No   Physical Activity: Insufficiently Active   • Days of Exercise per Week: 2 days   • Minutes of Exercise per Session: 20 min   Stress: Stress Concern Present   • Feeling of Stress : To some extent   Social Connections: Socially Isolated   • Frequency of Communication with Friends and Family: More than three times a week   • Frequency of Social Gatherings with Friends and Family: More than three times a week   • Attends Anabaptist Services: Never   • Active Member of Clubs or Organizations: No   • Attends Club or Organization Meetings: Never   • Marital Status: Never    Intimate Partner Violence: Not At Risk   • Fear of Current or Ex-Partner: No   • Emotionally Abused: No   • Physically Abused: No   • Sexually Abused: No   Housing Stability: Low Risk    • Unable to Pay for Housing in the Last Year: No   • Number of [place] in the Last Year: 1   • Unstable Housing in the Last Year: No      I have reviewed the patient's medical, social and surgical history as well as medications in detail and updated the computerized patient record  Objective:   Physical Exam:                                                                   Vitals:            /70 (BP Location: Left arm, Patient Position: Sitting, Cuff Size: Standard)   Pulse 83   Ht 5' 5" (1 651 m)   Wt 99 8 kg (220 lb)   LMP  (LMP Unknown)   BMI 36 61 kg/m²   BP Readings from Last 3 Encounters:   03/07/23 108/70   02/03/23 106/70   01/19/23 138/80     Pulse Readings from Last 3 Encounters:   03/07/23 83   02/03/23 80   01/19/23 70          CONSTITUTIONAL: Well developed, well nourished, well groomed  No dysmorphic features  Eyes:  EOM normal      Neck:  Normal ROM, neck supple  HEENT:  Normocephalic atraumatic  Chest:  Respirations regular and unlabored      Psychiatric:  Normal behavior and appropriate affect      MENTAL STATUS  Orientation: Alert and oriented x 3  Fund of knowledge: Intact  MOTOR (Upper and lower extremities)   Bulk/tone/abnormal movement: Normal muscle bulk and tone  COORDINATION   Station/Gait: Normal baseline gait  Review of Systems:   Review of Systems  Constitutional: Negative  HENT: Negative  Eyes: Negative  Respiratory: Negative  Cardiovascular: Negative  Gastrointestinal: Negative  Endocrine: Negative  Genitourinary: Negative  Musculoskeletal: Negative  Skin: Negative  Allergic/Immunologic: Negative  Neurological: Positive for headaches  Hematological: Negative  Psychiatric/Behavioral: +for anxiety and depression    I personally reviewed the ROS entered by the MA      I have spent a total time of 40 minutes on 03/07/23 in caring for this patient including Risks and benefits of tx options, Patient and family education, Importance of tx compliance, Counseling / Coordination of care, Documenting in the medical record, Reviewing / ordering tests, medicine, procedures   and Obtaining or reviewing history          Author:  Yeyo Pride PA-C 3/7/2023 12:36 PM

## 2023-03-07 NOTE — ASSESSMENT & PLAN NOTE
Continue to follow-up with psychiatry    Did recommend talking to psychiatrist about increasing bupropion and/or adding BuSpar as needed

## 2023-03-07 NOTE — PATIENT INSTRUCTIONS
Preventative:  Botox 200 units every 3 months  Topamax 1 tab in am and 2 tabs bedtime  Continue medications from psychiatry    At onset of migraine, take Nurtec 75 mg  Limit of 1 in 24 hours  May use Reyvow 100 mg at onset of migraine  Limit of 1 in 24 hours  Do not drive within 8 hours of taking  If needed may use prochlorperazine 10 mg  May repeat in 8 hours  Limit of 20 a month    Talk to psychiatry about increasing wellbutrin and possible adding in Buspar as needed to help  Continue to use your CPAP as this will help over time    Conchita Coon )          Neck pain:   - We discussed the role of neck pathology and poor posture, with straightening of the normal cervical lordosis, in headaches  We discussed how tightening of the neck muscles can irritate the nerves in the occipital region of her head and cause or worsen head pain  We also discussed and demonstrated neck strengthening and relaxation exercises, as well as giving written instructions on these exercises  - We talked about the importance of good posture for improving shoulder, neck, and head pain  The patient was given visualization exercises for correcting posture, which patient will practice at home  If this simple exercise does not help improve the posture, we will consider formal physical therapy in the future  Medication overuse headaches:   - We discussed medication overuse headache [facility]) and how to avoid it in the future  It was explained that all analgesics have the potential to cause medication overuse headache [facility]) and analgesic overuse can negate the effectiveness of headache preventive measures  After successful [address] treatment, preventive medications for an underlying primary headache disorder have a greater chance for success  Avoid medications with narcotics, barbiturates, or caffeine in them as these can cause rebound headaches after very few doses and can interfere with other headache medicine efficacy   Taking any analgesics for more than 2-3 days a week can cause medication overuse headache  Reproductive age women: Should take folic acid daily when taking anti-seizure drugs especially Depakote  [place] Prescription Drug Monitoring Program report was reviewed and was appropriate      Headache management instructions  - When patient has a moderate to severe headache, they should seek rest, initiate relaxation and apply cold compresses to the head  - Maintain regular sleep schedule  Adults need at least 7-8 hours of uninterrupted a night  - Limit over the counter medications such as Tylenol, Ibuprofen, Aleve, Excedrin  (No more than 3 times a week)  - Maintain headache diary  We discussed an ABBY for a smart phone is "Migraine darrin"  - Limit caffeine to 1-2 cups 8 to 16 oz a day or less  - Avoid dietary trigger  (aged cheese, peanuts, MSG, aspartame and nitrates)  - Patient is to have regular frequent meals to prevent headache onset  - Please drink at least 64 ounces of water a day to help remain hydrated  Please call with any questions or concerns   Office number is [phone]

## 2023-03-08 ENCOUNTER — TELEPHONE (OUTPATIENT)
Dept: NEUROLOGY | Facility: CLINIC | Age: 48
End: 2023-03-08

## 2023-03-08 NOTE — TELEPHONE ENCOUNTER
Received fax from [facility] aid  Marlo requires PA    Key [id number]    PA initiated on CMM    Awaiting determination

## 2023-03-13 NOTE — TELEPHONE ENCOUNTER
Per CMM- [id number]   REYVOW TAB 100MG is approved through 12/31/2023     Approval letter placed in clerical bin to be scanned

## 2023-03-22 ENCOUNTER — TELEPHONE (OUTPATIENT)
Dept: SLEEP CENTER | Facility: CLINIC | Age: 48
End: 2023-03-22

## 2023-03-22 ENCOUNTER — OFFICE VISIT (OUTPATIENT)
Dept: SLEEP CENTER | Facility: CLINIC | Age: 48
End: 2023-03-22

## 2023-03-22 VITALS
HEIGHT: 65 IN | WEIGHT: 213.6 LBS | SYSTOLIC BLOOD PRESSURE: 98 MMHG | BODY MASS INDEX: 35.59 KG/M2 | DIASTOLIC BLOOD PRESSURE: 64 MMHG

## 2023-03-22 DIAGNOSIS — F51.04 PSYCHOPHYSIOLOGICAL INSOMNIA: ICD-10-CM

## 2023-03-22 DIAGNOSIS — G47.33 OSA (OBSTRUCTIVE SLEEP APNEA): Primary | ICD-10-CM

## 2023-03-22 RX ORDER — ZALEPLON 5 MG/1
5 CAPSULE ORAL
Qty: 30 CAPSULE | Refills: 5 | Status: SHIPPED | OUTPATIENT
Start: 2023-03-22

## 2023-03-22 RX ORDER — SEMAGLUTIDE 1.34 MG/ML
INJECTION, SOLUTION SUBCUTANEOUS
COMMUNITY
Start: 2023-03-15

## 2023-03-22 NOTE — PROGRESS NOTES
Progress Note - Sleep Center   Xochitl Guido FLA:[birth year] MRN: [id number]      Reason for Visit:    49 y  o adult with mild obstructive sleep apnea as well as sleep maintenance insomnia  Assessment:  The patient has been unable to tolerate CPAP  Her AHI of 5 6 does not require treatment in the absence of daytime sleepiness and use of CPAP has not improved her sleep quality  Her primary complaint is still sleep maintenance insomnia for which I will start short acting hypnotic in the middle of the night  Plan:  Start Sonata 5 mg at 2 to 4 AM as needed for sleep maintenance insomnia    Follow up:  6 months    History of Present Illness: The patient underwent recent polysomnography which demonstrates AHI = 5 6  No periodic limb movements were seen  The patient's primary complaint is sleep maintenance insomnia           Historical Information    Past Medical History:   Diagnosis Date   • Asthma    • Bipolar 1 disorder ([facility] )    • Cluster headache    • CTS (carpal tunnel syndrome)    • Difficulty walking    • Disease of thyroid gland    • Fibromyalgia    • Fibromyalgia, primary    • Gout    • Headache, tension-type    • Hyperlipidemia    • Hypertension    • Lupus ([facility] )    • Memory loss    • Migraine    • Mild sleep apnea    • Peripheral neuropathy    • PONV (postoperative nausea and vomiting)    • Vision loss          Past Surgical History:   Procedure Laterality Date   • GALLBLADDER SURGERY  2008   • HYSTERECTOMY  2014   • LAPAROSCOPIC COLON RESECTION  2001         Social History     Socioeconomic History   • Marital status: Single     Spouse name: None   • Number of children: None   • Years of education: None   • Highest education level: None   Occupational History   • None   Tobacco Use   • Smoking status: Former     Packs/day: 1 00     Years: 5 00     Pack years: 5 00     Types: Cigarettes   • Smokeless tobacco: Never   • Tobacco comments:     quit at 24years old   Vaping Use   • Vaping Use: Never used Substance and Sexual Activity   • Alcohol use: No   • Drug use: No   • Sexual activity: Yes     Partners: Male     Birth control/protection: Female Sterilization   Other Topics Concern   • None   Social History Narrative   • None     Social Determinants of Health     Financial Resource Strain: Low Risk    • Difficulty of Paying Living Expenses: Not hard at all   Food Insecurity: No Food Insecurity   • Worried About Running Out of Food in the Last Year: Never true   • Ran Out of Food in the Last Year: Never true   Transportation Needs: No Transportation Needs   • Lack of Transportation (Medical): No   • Lack of Transportation (Non-Medical): No   Physical Activity: Insufficiently Active   • Days of Exercise per Week: 2 days   • Minutes of Exercise per Session: 20 min   Stress: Stress Concern Present   • Feeling of Stress :  To some extent   Social Connections: Socially Isolated   • Frequency of Communication with Friends and Family: More than three times a week   • Frequency of Social Gatherings with Friends and Family: More than three times a week   • Attends [employer] Services: Never   • Active Member of Clubs or Organizations: No   • Attends Club or Organization Meetings: Never   • Marital Status: Never    Intimate Partner Violence: Not At Risk   • Fear of Current or Ex-Partner: No   • Emotionally Abused: No   • Physically Abused: No   • Sexually Abused: No   Housing Stability: Low Risk    • Unable to Pay for Housing in the Last Year: No   • Number of [place] in the Last Year: 1   • Unstable Housing in the Last Year: No           History   Alcohol use: Not on file       History   Smoking Status   • Not on file   Smokeless Tobacco   • Not on file       Family History:   Family History   Problem Relation Age of Onset   • Lupus Mother    • Osteoporosis Mother    • Hypotension Mother    • Hypertension Father    • Diabetes Father    • Heart disease Father    • Migraines Father    • Stroke Father    • Diabetes Sister    • Schizophrenia Sister    • Hypertension Sister    • Diabetes Maternal Grandmother    • Rheum arthritis Maternal Grandmother    • Hypertension Maternal Grandmother    • Neuropathy Maternal Grandmother    • Seizures Maternal Grandmother    • Breast cancer Maternal Aunt 40   • No Known Problems Maternal Aunt    • No Known Problems Maternal Aunt    • No Known Problems Maternal Aunt    • No Known Problems Paternal Aunt        Medications/Allergies:      Current Outpatient Medications:   •  acetaminophen (TYLENOL) 325 mg tablet, Take 650 mg by mouth if needed for mild pain, Disp: , Rfl:   •  apixaban (Eliquis) 5 mg, Take 2 tablets (10 mg total) by mouth 2 (two) times a day for 7 days, THEN 1 tablet (5 mg total) 2 (two) times a day for 23 days  (Patient taking differently: 5mg PO BID), Disp: 74 tablet, Rfl: 0  •  ARIPiprazole (ABILIFY) 5 mg tablet, Take 5 mg by mouth every morning, Disp: , Rfl:   •  aspirin-acetaminophen-caffeine (EXCEDRIN MIGRAINE) 250-250-65 MG per tablet, Take 2 tablets by mouth if needed for headaches, Disp: , Rfl:   •  buPROPion (WELLBUTRIN SR) 150 mg 12 hr tablet, Take 150 mg by mouth every morning, Disp: , Rfl:   •  Cetirizine HCl 10 MG CAPS, Take 10 mg by mouth daily  , Disp: , Rfl:   •  doxepin (SINEquan) 100 mg capsule, Take 100 mg by mouth daily at bedtime, Disp: , Rfl:   •  fluticasone (FLONASE) 50 mcg/act nasal spray, 1 spray into each nostril daily (Patient taking differently: 1 spray into each nostril daily as needed), Disp: 16 g, Rfl: 0  •  fluticasone (FLOVENT HFA) 110 MCG/ACT inhaler, Inhale 1 puff as needed , Disp: , Rfl:   •  hydroxychloroquine (PLAQUENIL) 200 mg tablet, Take 200 mg by mouth as needed During Summer time , Disp: , Rfl:   •  Lasmiditan Succinate (REYVOW) 100 MG tablet, Take 1 tablet (100mg)  one time as needed for migraine   Do not use more than one dose per day, or more than 8 doses per month, Disp: 8 tablet, Rfl: 3  •  levothyroxine 88 mcg tablet, Take 88 mcg by mouth daily , Disp: , Rfl:   •  lisinopril (ZESTRIL) 40 mg tablet, take 1 tablet by mouth once daily, Disp: 90 tablet, Rfl: 1  •  meclizine (ANTIVERT) 12 5 MG tablet, Take 12 5 mg by mouth as needed, Disp: , Rfl:   •  Multiple Vitamins-Minerals (MULTIVITAMIN ADULT PO), Take 1 tablet by mouth daily, Disp: , Rfl:   •  omeprazole (PriLOSEC) 40 MG capsule, Take 40 mg by mouth as needed, Disp: , Rfl: 0  •  oxyCODONE (ROXICODONE) 15 mg immediate release tablet, Take 15 mg by mouth as needed, Disp: , Rfl:   •  Ozempic, 0 25 or 0 5 MG/DOSE, 2 MG/1 5ML injection pen, inject 0 25 milligrams subcutaneously every week for 30 DAYS, Disp: , Rfl:   •  PROAIR  (90 Base) MCG/ACT inhaler, Inhale 1-2 puffs as needed , Disp: , Rfl:   •  prochlorperazine (COMPAZINE) 10 mg tablet, Take 1 tablet (10 mg total) by mouth every 6 (six) hours as needed (migraine) (Patient taking differently: Take 10 mg by mouth if needed (migraine)), Disp: 20 tablet, Rfl: 3  •  propranolol (INDERAL LA) 160 mg, Take 160 mg by mouth 3 (three) times a day, Disp: , Rfl: 0  •  Rimegepant Sulfate (Nurtec) 75 MG TBDP, Take 75 mg by mouth every other day, Disp: 16 tablet, Rfl: 11  •  telmisartan-hydrochlorothiazide (MICARDIS HCT) 80-12 5 MG per tablet, Take 1 tablet by mouth daily, Disp: , Rfl:   •  topiramate (TOPAMAX) 100 mg tablet, One in am and two at bedtime daily, Disp: 270 tablet, Rfl: 3  •  valACYclovir (VALTREX) 1,000 mg tablet, 1 tablet as needed , Disp: , Rfl:   •  amLODIPine (NORVASC) 5 mg tablet, Take 5 mg by mouth daily, Disp: , Rfl:     Current Facility-Administered Medications:   •  Botulinum Toxin Type A SOLR 200 Units, 200 Units, Injection, Q3 Months, Kassie Andrade PA-C, 200 Units at 03/18/21 1154      Objective    Vital Signs:   Vitals:    03/22/23 1155   BP: 98/64   Weight: 96 9 kg (213 lb 9 6 oz)   Height: 5' 5" (1 651 m)     [place] Sleepiness Scale:  Total score: 15    Physical Exam:    General: Alert, appropriate, cooperative, overweight    Head: NC/AT    Skin: Warm, dry    Neuro: No motor abnormalities, cranial nerves appear intact    Psych: Normal affect            CARMITA Costello    Board Certified Sleep Specialist

## 2023-04-04 PROBLEM — Z11.3 ENCOUNTER FOR SCREENING EXAMINATION FOR SEXUALLY TRANSMITTED DISEASE: Status: RESOLVED | Noted: 2023-02-03 | Resolved: 2023-04-04

## 2023-05-07 DIAGNOSIS — G43.009 MIGRAINE WITHOUT AURA AND WITHOUT STATUS MIGRAINOSUS, NOT INTRACTABLE: ICD-10-CM

## 2023-05-08 ENCOUNTER — FOLLOW UP (OUTPATIENT)
Dept: URBAN - METROPOLITAN AREA CLINIC 6 | Facility: CLINIC | Age: 48
End: 2023-05-08

## 2023-05-08 DIAGNOSIS — H40.023: ICD-10-CM

## 2023-05-08 DIAGNOSIS — Z79.899: ICD-10-CM

## 2023-05-08 PROCEDURE — 92250 FUNDUS PHOTOGRAPHY W/I&R: CPT

## 2023-05-08 PROCEDURE — 92012 INTRM OPH EXAM EST PATIENT: CPT

## 2023-05-08 PROCEDURE — 92133 CPTRZD OPH DX IMG PST SGM ON: CPT

## 2023-05-08 RX ORDER — RIMEGEPANT SULFATE 75 MG/75MG
TABLET, ORALLY DISINTEGRATING ORAL
Qty: 16 TABLET | Refills: 11 | Status: SHIPPED | OUTPATIENT
Start: 2023-05-08

## 2023-05-08 ASSESSMENT — VISUAL ACUITY
OS_SC: 20/25
OD_SC: 20/25

## 2023-05-08 ASSESSMENT — KERATOMETRY
OS_K1POWER_DIOPTERS: 43.75
OD_K1POWER_DIOPTERS: 44.25
OD_K2POWER_DIOPTERS: 44.50
OS_AXISANGLE2_DEGREES: 34
OS_AXISANGLE_DEGREES: 124
OS_K2POWER_DIOPTERS: 44.25
OD_AXISANGLE2_DEGREES: 98
OD_AXISANGLE_DEGREES: 8

## 2023-05-08 ASSESSMENT — TONOMETRY
OD_IOP_MMHG: 12
OS_IOP_MMHG: 11

## 2023-07-06 ENCOUNTER — HOSPITAL ENCOUNTER (OUTPATIENT)
Dept: RADIOLOGY | Age: 48
Discharge: HOME | End: 2023-07-06
Payer: MEDICARE

## 2023-07-06 VITALS — HEIGHT: 65 IN | WEIGHT: 220 LBS | BODY MASS INDEX: 36.65 KG/M2

## 2023-07-06 DIAGNOSIS — Z12.31 ENCOUNTER FOR SCREENING MAMMOGRAM FOR MALIGNANT NEOPLASM OF BREAST: ICD-10-CM

## 2023-07-06 PROCEDURE — 77067 SCR MAMMO BI INCL CAD: CPT

## 2023-07-06 PROCEDURE — 77063 BREAST TOMOSYNTHESIS BI: CPT

## 2023-07-10 ENCOUNTER — TELEPHONE (OUTPATIENT)
Dept: OBGYN CLINIC | Facility: CLINIC | Age: 48
End: 2023-07-10

## 2023-07-10 ENCOUNTER — TELEPHONE (OUTPATIENT)
Dept: NEUROLOGY | Facility: CLINIC | Age: 48
End: 2023-07-10

## 2023-07-10 NOTE — TELEPHONE ENCOUNTER
----- Message from Bibi Khan, [address] [facility] sent at 7/7/2023  7:51 AM EDT -----  Please inform Adamaris that her mammogram was benign, next screening due in 1 year.   Thanks

## 2023-07-10 NOTE — LETTER
May 19, 2022     Patient: Pj Bejarano   YOB: 1975   Date of Visit: 5/19/2022       To Whom it May Concern:    Usman Fong was seen in my clinic on 5/19/2022  She  May return on 5/23/22  If you have any questions or concerns, please don't hesitate to call           Sincerely,          MICHELLE Yeh        CC: No Recipients
74-year-old male, past medical history of hypertension, diabetes, high cholesterol, anxiety/depression, vertigo, presenting to the emergency room complaining of dizziness episode that occurred around 9 AM today. Patient is not found to have any focal neurological deficits on exam.  Will activate stroke code.  Will obtain imaging, send medical labs and discuss case with neurology.  Dispo pending medical work-up.

## 2023-07-20 ENCOUNTER — PROCEDURE VISIT (OUTPATIENT)
Dept: NEUROLOGY | Facility: CLINIC | Age: 48
End: 2023-07-20
Payer: MEDICARE

## 2023-07-20 VITALS — SYSTOLIC BLOOD PRESSURE: 112 MMHG | HEART RATE: 92 BPM | TEMPERATURE: 97.8 F | DIASTOLIC BLOOD PRESSURE: 68 MMHG

## 2023-07-20 DIAGNOSIS — G43.709 CHRONIC MIGRAINE WITHOUT AURA WITHOUT STATUS MIGRAINOSUS, NOT INTRACTABLE: Primary | ICD-10-CM

## 2023-07-20 PROCEDURE — 64615 CHEMODENERV MUSC MIGRAINE: CPT | Performed by: PHYSICIAN ASSISTANT

## 2023-07-20 NOTE — PROGRESS NOTES
Universal Protocol   Consent: Verbal consent obtained. Written consent obtained. Risks and benefits: risks, benefits and alternatives were discussed  Consent given by: patient  Time out: Immediately prior to procedure a "time out" was called to verify the correct patient, procedure, equipment, support staff and site/side marked as required. Patient understanding: patient states understanding of the procedure being performed  Patient consent: the patient's understanding of the procedure matches consent given  Procedure consent: procedure consent matches procedure scheduled  Relevant documents: relevant documents present and verified  Patient identity confirmed: verbally with patient        Chemodenervation     Date/Time 7/20/2023 3:30 PM     Performed by  Sukhwinder Bae PA-C   Authorized by Sukhwinder Bae PA-C       Pre-procedure details      Prepped With: Alcohol     Anesthesia  (see MAR for exact dosages):      Anesthesia method:  None   Procedure details     Position:  Upright   Botox     Botox Type:  Type A    Brand:  Botox    mL's of Botulinum Toxin:  200    Final Concentration per CC:  50 units    Needle Gauge:  30 G 2.5 inch   Procedures     Botox Procedures: chronic headache      Indications: migraines     Injection Location      Head / Face:  L superior cervical paraspinal, R superior cervical paraspinal, L , R , L frontalis, R frontalis, L medial occipitalis, R medial occipitalis, procerus, R temporalis, L temporalis, R superior trapezius and L superior trapezius    L  injection amount:  5 unit(s)    R  injection amount:  5 unit(s)    L lateral frontalis:  5 unit(s)    R lateral frontalis:  5 unit(s)    L medial frontalis:  5 unit(s)    R medial frontalis:  5 unit(s)    L temporalis injection amount:  20 unit(s)    R temporalis injection amount:  20 unit(s)    Procerus injection amount:  5 unit(s)    L medial occipitalis injection amount:  15 unit(s)    R medial occipitalis injection amount:  15 unit(s)    L superior cervical paraspinal injection amount:  10 unit(s)    R superior cervical paraspinal injection amount:  10 unit(s)    L superior trapezius injection amount:  15 unit(s)    R superior trapezius injection amount:  15 unit(s)   Total Units     Total units used:  200    Total units discarded:  0   Post-procedure details      Chemodenervation:  Chronic migraine    Facial Nerve Location[de-identified]  Bilateral facial nerve    Patient tolerance of procedure:   Tolerated well, no immediate complications   Comments      5 units naris bilaterally  10 units frontalis bilaterally  15 units occipitalis  All medically necessary

## 2023-08-07 DIAGNOSIS — G43.009 MIGRAINE WITHOUT AURA AND WITHOUT STATUS MIGRAINOSUS, NOT INTRACTABLE: ICD-10-CM

## 2023-08-07 RX ORDER — LASMIDITAN 100 MG/1
TABLET ORAL
Qty: 8 TABLET | Refills: 5 | Status: SHIPPED | OUTPATIENT
Start: 2023-08-07

## 2023-08-08 NOTE — TELEPHONE ENCOUNTER
Recd  8/7 taken off 8/8  Hi, I'm calling my name is Nasir Malloy is [birth year]. I need a prescription for the reyvow. So if you can call in the pharmacy, [facility] on [address]. Thank you.   shruthi [phone]    sent 8/7

## 2023-09-13 ENCOUNTER — TELEPHONE (OUTPATIENT)
Dept: NEUROLOGY | Facility: CLINIC | Age: 48
End: 2023-09-13

## 2023-09-13 ENCOUNTER — PATIENT MESSAGE (OUTPATIENT)
Dept: NEUROLOGY | Facility: CLINIC | Age: 48
End: 2023-09-13

## 2023-09-13 ENCOUNTER — CLINICAL SUPPORT (OUTPATIENT)
Dept: NEUROLOGY | Facility: CLINIC | Age: 48
End: 2023-09-13
Payer: MEDICARE

## 2023-09-13 DIAGNOSIS — G43.111 INTRACTABLE MIGRAINE WITH AURA WITH STATUS MIGRAINOSUS: Primary | ICD-10-CM

## 2023-09-13 PROCEDURE — 96372 THER/PROPH/DIAG INJ SC/IM: CPT | Performed by: STUDENT IN AN ORGANIZED HEALTH CARE EDUCATION/TRAINING PROGRAM

## 2023-09-13 RX ORDER — PROCHLORPERAZINE EDISYLATE 5 MG/ML
10 INJECTION INTRAMUSCULAR; INTRAVENOUS ONCE
Status: COMPLETED | OUTPATIENT
Start: 2023-09-13 | End: 2023-09-13

## 2023-09-13 RX ORDER — KETOROLAC TROMETHAMINE 30 MG/ML
60 INJECTION, SOLUTION INTRAMUSCULAR; INTRAVENOUS ONCE
Status: COMPLETED | OUTPATIENT
Start: 2023-09-13 | End: 2023-09-13

## 2023-09-13 RX ADMIN — KETOROLAC TROMETHAMINE 60 MG: 30 INJECTION, SOLUTION INTRAMUSCULAR; INTRAVENOUS at 13:17

## 2023-09-13 RX ADMIN — PROCHLORPERAZINE EDISYLATE 10 MG: 5 INJECTION INTRAMUSCULAR; INTRAVENOUS at 13:20

## 2023-09-13 NOTE — TELEPHONE ENCOUNTER
Patient called to report that they are having a horrible migraine and nothing is helping    Patient is requesting to come in to the office for a Toradol injection for the HA    Explained that  I would send a message to the medical team to reach out to the patient to get more information    Did suggest to go to the ED    Patient insisted that they   have given this to them at the office in the past    Patient did not want to talk due to the pain just asking to be seen today      # [phone]

## 2023-09-13 NOTE — PROGRESS NOTES
Pt tolerated Toradol injection in right dorsogluteal muscle and compazine into left dorsogluteal muscle well.

## 2023-10-05 ENCOUNTER — OFFICE VISIT (OUTPATIENT)
Dept: OBGYN CLINIC | Facility: CLINIC | Age: 48
End: 2023-10-05

## 2023-10-05 ENCOUNTER — TELEPHONE (OUTPATIENT)
Dept: OTHER | Facility: OTHER | Age: 48
End: 2023-10-05

## 2023-10-05 VITALS
HEART RATE: 82 BPM | HEIGHT: 65 IN | SYSTOLIC BLOOD PRESSURE: 105 MMHG | DIASTOLIC BLOOD PRESSURE: 64 MMHG | BODY MASS INDEX: 33.82 KG/M2 | WEIGHT: 203 LBS | RESPIRATION RATE: 18 BRPM

## 2023-10-05 DIAGNOSIS — R10.2 SUPRAPUBIC PAIN: Primary | ICD-10-CM

## 2023-10-05 DIAGNOSIS — N30.10 INTERSTITIAL CYSTITIS: ICD-10-CM

## 2023-10-05 LAB
SL AMB  POCT GLUCOSE, UA: ABNORMAL
SL AMB LEUKOCYTE ESTERASE,UA: ABNORMAL
SL AMB POCT BILIRUBIN,UA: ABNORMAL
SL AMB POCT BLOOD,UA: ABNORMAL
SL AMB POCT CLARITY,UA: CLEAR
SL AMB POCT COLOR,UA: ABNORMAL
SL AMB POCT KETONES,UA: ABNORMAL
SL AMB POCT NITRITE,UA: ABNORMAL
SL AMB POCT PH,UA: 5
SL AMB POCT SPECIFIC GRAVITY,UA: 1.01
SL AMB POCT URINE PROTEIN: ABNORMAL
SL AMB POCT UROBILINOGEN: 0.2

## 2023-10-05 PROCEDURE — 87086 URINE CULTURE/COLONY COUNT: CPT | Performed by: OBSTETRICS & GYNECOLOGY

## 2023-10-05 NOTE — TELEPHONE ENCOUNTER
Pt calling to request appointment as she is having severe bladder pain. Pt will call back after 0800 to check for availability of appointment.
Breath sounds clear and equal bilaterally.

## 2023-10-05 NOTE — PROGRESS NOTES
[address] [facility]etric[facility]ne[facility]    Problem List Items Addressed This Visit        Genitourinary    Interstitial cystitis    Relevant Orders    Ambulatory Referral to Urology   Other Visit Diagnoses     Suprapubic pain    -  Primary    Relevant Orders    POCT urine dip (Completed)    Urine culture          Subjective:   Bharath Aparicio is a 51 y.o. [id number] here to discuss recurrence of her her bladder pain. She has history of interstitial cystitis and was treated many years ago (>6) by Dr. Poppy Nieves and Dr. Gladys Tsai with bladder instillations that used to be done weekly-biweekly. She has not had bladder instillations in many years. She avoids bladder irritants and does timed voids but in the recent weeks her pain has recurred. She feels a constant pressure, worse with coughing or sneezing. She has trouble sleeping at night due to this. She is s/p laparoscopic hysterectomy 7-8 years ago. Objective:  Vitals: Blood pressure 105/64, pulse 82, resp. rate 18, height 5' 5" (1.651 m), weight 92.1 kg (203 lb), not currently breastfeeding. Body mass index is 33.78 kg/m². Physical Exam  Abdominal:      General: There is no distension. Palpations: Abdomen is soft. Tenderness: There is abdominal tenderness. There is guarding. There is no right CVA tenderness or left CVA tenderness. Comments: Tenderness with guarding in the suprapubic region and just to the left of the modline   Genitourinary:     Comments: Normal external female genitalia. Surgically absent cervix. Scant vaginal discharge. Mild apical vaginal relaxation  Neurological:      Mental Status: She is oriented to person, place, and time.    Psychiatric:         Behavior: Behavior normal.       Recent Results (from the past 24 hour(s))   POCT urine dip    Collection Time: 10/05/23  1:51 PM   Result Value Ref Range    LEUKOCYTE ESTERASE,UA neg     NITRITE,UA neg     SL AMB POCT UROBILINOGEN 0.2     POCT URINE PROTEIN neg      PH,UA 5.0 BLOOD,UA moderate ++     SPECIFIC GRAVITY,UA 1.010     KETONES,UA neg     BILIRUBIN,UA neg     GLUCOSE, UA neg      COLOR,UA dark yellow     CLARITY,UA clear        Patient discussed with Dr. Felton Crews. Genoveva Muñoz MD  PGY-IV, OB/GYN  10/5/2023

## 2023-10-07 LAB — BACTERIA UR CULT: NORMAL

## 2023-10-10 NOTE — RESULT ENCOUNTER NOTE
Negative urine culture. [employer] message sent. Genoveva Garg MD  PGY-IV, OB/GYN  10/10/2023, 11:00 AM

## 2023-10-19 ENCOUNTER — PROCEDURE VISIT (OUTPATIENT)
Dept: NEUROLOGY | Facility: CLINIC | Age: 48
End: 2023-10-19

## 2023-10-19 VITALS — HEART RATE: 83 BPM | SYSTOLIC BLOOD PRESSURE: 129 MMHG | DIASTOLIC BLOOD PRESSURE: 77 MMHG | TEMPERATURE: 98.5 F

## 2023-10-19 DIAGNOSIS — G43.709 CHRONIC MIGRAINE WITHOUT AURA WITHOUT STATUS MIGRAINOSUS, NOT INTRACTABLE: Primary | ICD-10-CM

## 2023-10-19 NOTE — PROGRESS NOTES
Universal Protocol   Consent: Verbal consent obtained. Written consent obtained. Risks and benefits: risks, benefits and alternatives were discussed  Consent given by: patient  Time out: Immediately prior to procedure a "time out" was called to verify the correct patient, procedure, equipment, support staff and site/side marked as required. Patient understanding: patient states understanding of the procedure being performed  Patient consent: the patient's understanding of the procedure matches consent given  Procedure consent: procedure consent matches procedure scheduled  Relevant documents: relevant documents present and verified  Patient identity confirmed: verbally with patient      Chemodenervation     Date/Time  10/19/2023 3:30 PM     Performed by  Cyndi Mcintyre PA-C   Authorized by  Cyndi Mcintyre PA-C     Pre-procedure details      Prepped With: Alcohol     Anesthesia  (see MAR for exact dosages):      Anesthesia method:  None   Procedure details      Position:  Upright   Botox      Botox Type:  Type A    Brand:  Botox    mL's of Botulinum Toxin:  200    Final Concentration per CC:  50 units    Needle Gauge:  30 G 2.5 inch   Procedures      Botox Procedures: chronic headache      Indications: migraines     Injection Location      Head / Face:  L superior cervical paraspinal, R superior cervical paraspinal, L , R , L frontalis, R frontalis, L medial occipitalis, R medial occipitalis, procerus, R temporalis, L temporalis, R superior trapezius and L superior trapezius    L  injection amount:  5 unit(s)    R  injection amount:  5 unit(s)    L lateral frontalis:  5 unit(s)    R lateral frontalis:  5 unit(s)    L medial frontalis:  5 unit(s)    R medial frontalis:  5 unit(s)    L temporalis injection amount:  20 unit(s)    R temporalis injection amount:  20 unit(s)    Procerus injection amount:  5 unit(s)    L medial occipitalis injection amount:  15 unit(s)    R medial occipitalis injection amount:  15 unit(s)    L superior cervical paraspinal injection amount:  10 unit(s)    R superior cervical paraspinal injection amount:  10 unit(s)    L superior trapezius injection amount:  15 unit(s)    R superior trapezius injection amount:  15 unit(s)   Total Units      Total units used:  200    Total units discarded:  0   Post-procedure details      Chemodenervation:  Chronic migraine    Facial Nerve Location[de-identified]  Bilateral facial nerve    Patient tolerance of procedure:   Tolerated well, no immediate complications   Comments       5 units naris bilaterally  10 units frontalis bilaterally  15 units occipitalis  All medically necessary

## 2023-11-08 ENCOUNTER — OFFICE VISIT (OUTPATIENT)
Dept: OBGYN CLINIC | Facility: HOSPITAL | Age: 48
End: 2023-11-08
Payer: MEDICARE

## 2023-11-08 VITALS
HEART RATE: 66 BPM | BODY MASS INDEX: 33.32 KG/M2 | HEIGHT: 65 IN | DIASTOLIC BLOOD PRESSURE: 81 MMHG | SYSTOLIC BLOOD PRESSURE: 124 MMHG | WEIGHT: 200 LBS

## 2023-11-08 DIAGNOSIS — G56.01 CARPAL TUNNEL SYNDROME OF RIGHT WRIST: Primary | ICD-10-CM

## 2023-11-08 DIAGNOSIS — M65.341 TRIGGER RING FINGER OF RIGHT HAND: ICD-10-CM

## 2023-11-08 DIAGNOSIS — M18.11 ARTHRITIS OF CARPOMETACARPAL (CMC) JOINT OF RIGHT THUMB: ICD-10-CM

## 2023-11-08 DIAGNOSIS — F51.04 PSYCHOPHYSIOLOGICAL INSOMNIA: ICD-10-CM

## 2023-11-08 PROCEDURE — 99214 OFFICE O/P EST MOD 30 MIN: CPT | Performed by: ORTHOPAEDIC SURGERY

## 2023-11-08 RX ORDER — TELMISARTAN 80 MG/1
80 TABLET ORAL DAILY
COMMUNITY
Start: 2023-10-11

## 2023-11-08 RX ORDER — IBUPROFEN 800 MG/1
800 TABLET ORAL EVERY 6 HOURS
COMMUNITY
Start: 2023-09-15

## 2023-11-08 RX ORDER — FLUCONAZOLE 150 MG/1
TABLET ORAL
COMMUNITY
Start: 2023-09-15

## 2023-11-08 RX ORDER — AMOXICILLIN AND CLAVULANATE POTASSIUM 500; 125 MG/1; MG/1
1 TABLET, FILM COATED ORAL EVERY 8 HOURS
COMMUNITY
Start: 2023-09-15

## 2023-11-08 RX ORDER — LIDOCAINE HYDROCHLORIDE AND EPINEPHRINE 10; 10 MG/ML; UG/ML
20 INJECTION, SOLUTION INFILTRATION; PERINEURAL ONCE
OUTPATIENT
Start: 2023-11-08 | End: 2023-11-08

## 2023-11-08 RX ORDER — SEMAGLUTIDE 0.68 MG/ML
INJECTION, SOLUTION SUBCUTANEOUS
COMMUNITY
Start: 2023-09-05

## 2023-11-08 NOTE — PROGRESS NOTES
ASSESSMENT/PLAN:    Assessment:   Trigger Finger right  ring finger  Right Carpal Tunnel Syndrome    Plan:   Trigger Finger Release  right  ring finger  Endoscopic Right Carpal Tunnel Release    Follow Up:  - return postop  _____________________________________________________  CHIEF COMPLAINT:  Chief Complaint   Patient presents with    Right Wrist - Follow-up, Carpal Tunnel     Discuss surgery    Right Thumb - Follow-up, [facility]     Discuss surgery      - Right ring trigger finger      SUBJECTIVE:  Sheryl Huertas is a 51 y.o. adult who presents for evaluation for trigger finger of her right ring finger. She is concerned her finger is constantly locked in a flexed position. She is aggravated by pain at night and unable to have restful sleep. She has discussed options for treatment in the past and is eager to pursue surgery. She was recently scheduled for surgery but it was cancelled due a blood clot found preoperatively. She is ready once again to move forward into operative workup. She also indicates numbness and tingling of her hand. She describes the numbness being distributed over her index and long finger. She would like operative treatment for this complaint as well.      PAST MEDICAL HISTORY:  Past Medical History:   Diagnosis Date    Asthma     Bipolar 1 disorder ([address])     Cluster headache     CTS (carpal tunnel syndrome)     Difficulty walking     Disease of thyroid gland     Fibromyalgia     Fibromyalgia, primary     Gout     Headache, tension-type     Hyperlipidemia     Hypertension     Lupus (HCC)     Memory loss     Migraine     Mild sleep apnea     Peripheral neuropathy     PONV (postoperative nausea and vomiting)     Vision loss        PAST SURGICAL HISTORY:  Past Surgical History:   Procedure Laterality Date    GALLBLADDER SURGERY  2008    HYSTERECTOMY  2014    LAPAROSCOPIC COLON RESECTION  2001       FAMILY HISTORY:  Family History   Problem Relation Age of Onset    Lupus Mother     Osteoporosis Mother Hypotension Mother     Hypertension Father     Diabetes Father     Heart disease Father     Migraines Father     Stroke Father     Diabetes Sister     Schizophrenia Sister     Hypertension Sister     Diabetes Maternal Grandmother     Rheum arthritis Maternal Grandmother     Hypertension Maternal Grandmother     Neuropathy Maternal Grandmother     Seizures Maternal Grandmother     Breast cancer Maternal Aunt 40    No Known Problems Maternal Aunt     No Known Problems Maternal Aunt     No Known Problems Maternal Aunt     No Known Problems Paternal Aunt        SOCIAL HISTORY:  Social History     Tobacco Use    Smoking status: Former     Packs/day: 1.00     Years: 5.00     Total pack years: 5.00     Types: Cigarettes    Smokeless tobacco: Never    Tobacco comments:     quit at 24years old   Vaping Use    Vaping Use: Never used   Substance Use Topics    Alcohol use: No    Drug use: No       MEDICATIONS:    Current Outpatient Medications:     amoxicillin-clavulanate (AUGMENTIN) 500-125 mg per tablet, Take 1 tablet by mouth every 8 (eight) hours, Disp: , Rfl:     fluconazole (DIFLUCAN) 150 mg tablet, take 1 tablet by mouth EVERY 3 DAY, Disp: , Rfl:     ibuprofen (MOTRIN) 800 mg tablet, Take 800 mg by mouth every 6 (six) hours, Disp: , Rfl:     Ozempic, 0.25 or 0.5 MG/DOSE, 2 MG/3ML injection pen, INJECT 0.5MG SUBCUTANEOUSLY WEEKLY FOR 30 DAYS, Disp: , Rfl:     telmisartan (MICARDIS) 80 MG tablet, Take 80 mg by mouth daily, Disp: , Rfl:     acetaminophen (TYLENOL) 325 mg tablet, Take 650 mg by mouth if needed for mild pain, Disp: , Rfl:     amLODIPine (NORVASC) 5 mg tablet, Take 5 mg by mouth daily, Disp: , Rfl:     apixaban (Eliquis) 5 mg, Take 2 tablets (10 mg total) by mouth 2 (two) times a day for 7 days, THEN 1 tablet (5 mg total) 2 (two) times a day for 23 days.  (Patient not taking: Reported on 10/5/2023), Disp: 74 tablet, Rfl: 0    ARIPiprazole (ABILIFY) 5 mg tablet, Take 5 mg by mouth every morning (Patient not taking: Reported on 10/5/2023), Disp: , Rfl:     aspirin-acetaminophen-caffeine (Marlou Elmira) 254-031-20 MG per tablet, Take 2 tablets by mouth if needed for headaches, Disp: , Rfl:     buPROPion (WELLBUTRIN SR) 150 mg 12 hr tablet, Take 150 mg by mouth every morning (Patient not taking: Reported on 10/5/2023), Disp: , Rfl:     Cetirizine HCl 10 MG CAPS, Take 10 mg by mouth daily  , Disp: , Rfl:     doxepin (SINEquan) 100 mg capsule, Take 100 mg by mouth daily at bedtime (Patient not taking: Reported on 10/5/2023), Disp: , Rfl:     fluticasone (FLONASE) 50 mcg/act nasal spray, 1 spray into each nostril daily (Patient not taking: Reported on 10/5/2023), Disp: 16 g, Rfl: 0    fluticasone (FLOVENT HFA) 110 MCG/ACT inhaler, Inhale 1 puff as needed , Disp: , Rfl:     hydroxychloroquine (PLAQUENIL) 200 mg tablet, Take 200 mg by mouth as needed During Summer time  (Patient not taking: Reported on 10/5/2023), Disp: , Rfl:     levothyroxine 88 mcg tablet, Take 88 mcg by mouth daily , Disp: , Rfl:     lisinopril (ZESTRIL) 40 mg tablet, take 1 tablet by mouth once daily, Disp: 90 tablet, Rfl: 1    meclizine (ANTIVERT) 12.5 MG tablet, Take 12.5 mg by mouth as needed, Disp: , Rfl:     Multiple Vitamins-Minerals (MULTIVITAMIN ADULT PO), Take 1 tablet by mouth daily, Disp: , Rfl:     Nurtec 75 MG TBDP, DISSOLVE 1 TABLET IN MOUTH EVERY OTHER DAY, Disp: 16 tablet, Rfl: 11    omeprazole (PriLOSEC) 40 MG capsule, Take 40 mg by mouth as needed (Patient not taking: Reported on 10/5/2023), Disp: , Rfl: 0    oxyCODONE (ROXICODONE) 15 mg immediate release tablet, Take 15 mg by mouth as needed, Disp: , Rfl:     Ozempic, 0.25 or 0.5 MG/DOSE, 2 MG/1.5ML injection pen, inject 0.25 milligrams subcutaneously every week for 30 DAYS, Disp: , Rfl:     PROAIR  (90 Base) MCG/ACT inhaler, Inhale 1-2 puffs as needed , Disp: , Rfl:     prochlorperazine (COMPAZINE) 10 mg tablet, Take 1 tablet (10 mg total) by mouth every 6 (six) hours as needed (migraine) (Patient taking differently: Take 10 mg by mouth if needed (migraine)), Disp: 20 tablet, Rfl: 3    propranolol (INDERAL LA) 160 mg, Take 160 mg by mouth 3 (three) times a day, Disp: , Rfl: 0    Reyvow 100 MG tablet, TAKE 1 TABLET (100MG) ONE TIME AS NEEDED FOR MIGRAINE. DO NOT USE MORE THAN ONE DOSEPER DAY OR MORE THAN 8 DOSES PER MONTH, Disp: 8 tablet, Rfl: 5    telmisartan-hydrochlorothiazide (MICARDIS HCT) 80-12.5 MG per tablet, Take 1 tablet by mouth daily, Disp: , Rfl:     topiramate (TOPAMAX) 100 mg tablet, One in am and two at bedtime daily (Patient not taking: Reported on 10/5/2023), Disp: 270 tablet, Rfl: 3    valACYclovir (VALTREX) 1,000 mg tablet, 1 tablet as needed , Disp: , Rfl:     zaleplon (SONATA) 5 MG capsule, Take 1 capsule (5 mg total) by mouth daily at bedtime, Disp: 30 capsule, Rfl: 5    Current Facility-Administered Medications: Botulinum Toxin Type A SOLR 200 Units, 200 Units, Injection, Q3 Months, Zahida Hamilton PA-C, 200 Units at 03/18/21 1154    ALLERGIES:  Allergies   Allergen Reactions    Pregabalin      Other reaction(s): blurred vision    Shellfish-Derived Products - Food Allergy Anaphylaxis and Hives    Sumatriptan      Other reaction(s): Altered Heart Rate    Triptans Shortness Of Breath and Palpitations    Latex Swelling and Rash     Other reaction(s): Hives/Skin Rash  Other reaction(s): Hives/Skin Rash    Sulfa Antibiotics Swelling and Rash     Other reaction(s): Hives/Skin Rash  Other reaction(s): Hives/Skin Rash    Monistat [Miconazole] Swelling       REVIEW OF SYSTEMS:  Pertinent items are noted in HPI. A comprehensive review of systems was negative.     LABS:  HgA1c:   Lab Results   Component Value Date    HGBA1C 5.6 11/20/2019     BMP:   Lab Results   Component Value Date    GLUCOSE 109 11/11/2015    CALCIUM 10.1 02/05/2022     11/11/2015    K 4.0 02/05/2022    CO2 28 02/05/2022     02/05/2022    BUN 13 03/30/2022    CREATININE 0.87 03/30/2022 _____________________________________________________  PHYSICAL EXAMINATION:  Vital signs: /81   Pulse 66   Ht 5' 5" (1.651 m)   Wt 90.7 kg (200 lb)   LMP  (LMP Unknown)   BMI 33.28 kg/m²   General: well developed and well nourished, alert, oriented times 3, and appears comfortable  Psychiatric: Normal  HEENT: Trachea Midline, No torticollis  Cardiovascular: No discernable arrhythmia  Pulmonary: No wheezing or stridor  Abdomen: No rebound or guarding  Extremities: No peripheral edema  Skin: No masses, erythema, lacerations, fluctation, ulcerations  Neurovascular: Sensation Intact to the Median, Ulnar, Radial Nerve, Motor Intact to the Median, Ulnar, Radial Nerve, and Pulses Intact    MUSCULOSKELETAL EXAMINATION:    Right Ring Finger  - appears in a locked flexed position  - passively correctable  - tenderness along the ring MCP joint    Right Hand:  - positive Tinel's exam  - numbness and tingling over index and long finger  - tenderness over median nerve     _____________________________________________________  STUDIES REVIEWED:  No Studies to review      PROCEDURES PERFORMED:  Procedures  No Procedures performed today

## 2023-11-09 RX ORDER — ZALEPLON 5 MG/1
5 CAPSULE ORAL
Qty: 30 CAPSULE | Refills: 2 | Status: SHIPPED | OUTPATIENT
Start: 2023-11-09

## 2023-11-09 NOTE — PROGRESS NOTES
ASSESSMENT/PLAN:    Assessment:   Right hand carpal tunnel syndrome  Right thumb CMC arthritis  Right ring finger trigger finger    Plan: At this point in time, lengthy discussion was had with the patient. She recently had a blood clot and is on blood thinners. We did discuss her 3 individual problems, decision was made at this point in time to undergo endoscopic carpal tunnel release and right ring finger trigger finger release under local medication. We will delay arthroscopy of the right thumb CMC joint until after blood thinners and she is deemed low risk for general anesthesia. She is agreeable to this. We will see her at the time of her choosing for right sided endoscopic carpal tunnel release and ring finger trigger finger release under local.    To Do Next Visit:  Sutures out    General Discussions:  [facility] Arthritis: The anatomy and physiology of carpometacarpal joint arthritis was discussed with the patient today in the office. Deterioration of the articular cartilage eventually leads to hypermobility at the thumb [facility] joint, resulting in joint subluxation, osteophyte formation, cystic changes within the trapezium and base of the first metacarpal, as well as subchondral sclerosis. Eventually, pain, limited mobility, and compensatory hyperextension at the metacarpophalangeal joint may develop. While normal activity and usage of the thumb joint may provide a painful experience to the patient, this typically does not result in damage to the thumb or hand. Treatment options include resting thumb spica splints to decreased joint edema, pain, and inflammation. Therapy exercises to strengthen the thenar musculature may relieve pain, but do not alter the overall continued development of osteoarthritis. Oral medications, topical medications, corticosteroid injections may decrease pain and increase overall function. Eventually, approximately 5% of patients may require surgical intervention.        Operative Discussions:  Endoscopic Carpal Tunnel Release: The anatomy and physiology of carpal tunnel syndrome was discussed with the patient today. Increase pressure localized under the transverse carpal ligament can cause pain, numbness, tingling, or dysesthesias within the median nerve distribution as well as feelings of fatigue, clumsiness, or awkwardness. These symptoms typically occur at night and worse in the morning upon waking. Eventually, untreated carpal tunnel syndrome can result in weakness and permanent loss of muscle within the thenar compartment of the hand. Treatment options were discussed with the patient. Conservative treatment includes nocturnal resting splints to keep the nerve in a neutral position, ergonomic changes within the work or home environment, activity modification, and tendon gliding exercises. Steroid injections within the carpal canal can help a majority of patients, however this is often self-limited in a majority of patients. Surgical intervention to divide the transverse carpal ligament typically results in a long-lasting relief of the patient's complaints, with the recurrence rate of less than 1%. The patient has elected to undergo an endoscopic carpal tunnel release. The 2 incision technique was discussed with the patient, which results in approximately a two-week less recovery time, and less wound complications. In the postoperative period, light activities are allowed immediately, driving is allowed when narcotic medication has stopped, and the bandages may be removed and incision may get wet after 2 days. Heavy activities (lifting more than approximately 10 pounds) will be allowed after follow up appointment in 1-2 weeks. While the pain and discomfort in the hands generally improves rapidly, the numbness and tingling as well as the strength will slowly improve over weeks to months depending on the severity of the carpal tunnel syndrome.   Pillar pain was discussed with the patient, which is typically a common but self-limiting condition. The risks of bleeding and infection from the surgery are less than 1%. Risk of recurrence is approximately 0.5%. The risks of nerve injury or nerve damage or damage to the blood vessels is approximately 1 in 1200. The patient has an understanding of the above mentioned discussion. The risks and benefits of the procedure were explained to the patient, which include, but are not limited to: Bleeding, infection, recurrence, pain, scar, damage to tendons, damage to nerves, and damage to blood vessels, failure to give desired results and complications related to anesthesia. These risks, along with alternative conservative treatment options, and postoperative protocols were voiced back and understood by the patient. All questions were answered to the patient's satisfaction. The patient agrees to comply with a standard postoperative protocol, and is willing to proceed. Education was provided via written and auditory forms. There were no barriers to learning. Written handouts regarding wound care, incision and scar care, and general preoperative information was provided to the patient. Prior to surgery, the patient may be requested to stop all anti-inflammatory medications. Prophylactic aspirin, Plavix, and Coumadin may be allowed to be continued. Medications including vitamin E., ginkgo, and fish oil are requested to be stopped approximately one week prior to surgery. Hypertensive medications and beta blockers, if taken, should be continued. Trigger Finger Release: The anatomy and physiology of trigger finger was discussed with the patient today in the office. Edema and increased contact pressure within the flexor tendons at the A1 pulley can cause pain, crepitation, and limitation of function.   Treatment options include resting MP blocking splints to decrease edema, oral anti-inflammatory medications, home or formal therapy exercises, up to 2 steroid injections or surgical release. While majority of patients do respond to conservative treatment, up to 20% may require surgical release. The patient has elected release of the trigger finger. The patient has elected to undergo a release of the A1 pulley (trigger finger). A small incision will be made over the palmar aspect of the hand, the tendon sheath holding the flexor tendons will be released. In the postoperative period, light activities are allowed immediately, driving is allowed when narcotic medication has stopped, and the incision may get wet after 2 days. Heavy activities (lifting more than approximately 10 pounds) will be allowed after the follow up appointment in 1-2 weeks. While the pain and discomfort within the wrist typically improves rapidly, some residual discomfort may be present for up to 6 weeks. The nodule that is typically palpable in the palmar aspect of the hand will not be removed, as this would necessitate removal of a portion of the flexor tendon, however the catching, clicking, and locking should resolve. Approximate success rate is 98%. The risks and benefits of the procedure were explained to the patient, which include, but are not limited to: Bleeding, infection, recurrence, pain, scar, damage to tendons, damage to nerves, and damage to blood vessels, need for future surgery and complications related to anesthesia. If bony work is done, risks also include malunion and nonunion. These risks, along with alternative conservative treatment options, and postoperative protocols were voiced back and understood by the patient. All questions were answered to the patient's satisfaction. The patient agrees to comply with a standard postoperative protocol, and is willing to proceed. Education was provided via written and auditory forms. There were no barriers to learning.  Written handouts regarding wound care, incision and scar care, and general preoperative information, as well as risks and benefits were provided to the patient.      _____________________________________________________  CHIEF COMPLAINT:  Chief Complaint   Patient presents with    Right Wrist - Follow-up, Carpal Tunnel     Discuss surgery    Right Thumb - Follow-up, CMC     Discuss surgery          SUBJECTIVE:  Leidy Yarbrough is a 51 y.o. adult who presents for follow-up regarding right-sided carpal tunnel syndrome, right ring trigger finger, and right thumb CMC arthritis. Since last visit, she does have a history of a blood clot and is now on blood thinners. She continues to report numbness and tingling into her right hand in the median nerve distribution made worse at night, worse with activities, problems with fine motor motion. She also reports a constant clicking and locking of her right hand ring finger requiring her other finger to release it. This is mild to moderate discomfort without radiation. She also has discomfort into her right thumb CMC joint which is moderate, aching, and made worse with activities.     PAST MEDICAL HISTORY:  Past Medical History:   Diagnosis Date    Asthma     Bipolar 1 disorder (HCC)     Cluster headache     CTS (carpal tunnel syndrome)     Difficulty walking     Disease of thyroid gland     Fibromyalgia     Fibromyalgia, primary     Gout     Headache, tension-type     Hyperlipidemia     Hypertension     Lupus (HCC)     Memory loss     Migraine     Mild sleep apnea     Peripheral neuropathy     PONV (postoperative nausea and vomiting)     Vision loss        PAST SURGICAL HISTORY:  Past Surgical History:   Procedure Laterality Date    GALLBLADDER SURGERY  2008    HYSTERECTOMY  2014    LAPAROSCOPIC COLON RESECTION  2001       FAMILY HISTORY:  Family History   Problem Relation Age of Onset    Lupus Mother     Osteoporosis Mother     Hypotension Mother     Hypertension Father     Diabetes Father     Heart disease Father     Migraines Father     Stroke Father     Diabetes Sister Schizophrenia Sister     Hypertension Sister     Diabetes Maternal Grandmother     Rheum arthritis Maternal Grandmother     Hypertension Maternal Grandmother     Neuropathy Maternal Grandmother     Seizures Maternal Grandmother     Breast cancer Maternal Aunt 40    No Known Problems Maternal Aunt     No Known Problems Maternal Aunt     No Known Problems Maternal Aunt     No Known Problems Paternal Aunt        SOCIAL HISTORY:  Social History     Tobacco Use    Smoking status: Former     Packs/day: 1.00     Years: 5.00     Total pack years: 5.00     Types: Cigarettes    Smokeless tobacco: Never    Tobacco comments:     quit at 24years old   Vaping Use    Vaping Use: Never used   Substance Use Topics    Alcohol use: No    Drug use: No       MEDICATIONS:    Current Outpatient Medications:     amoxicillin-clavulanate (AUGMENTIN) 500-125 mg per tablet, Take 1 tablet by mouth every 8 (eight) hours, Disp: , Rfl:     fluconazole (DIFLUCAN) 150 mg tablet, take 1 tablet by mouth EVERY 3 DAY, Disp: , Rfl:     ibuprofen (MOTRIN) 800 mg tablet, Take 800 mg by mouth every 6 (six) hours, Disp: , Rfl:     Ozempic, 0.25 or 0.5 MG/DOSE, 2 MG/3ML injection pen, INJECT 0.5MG SUBCUTANEOUSLY WEEKLY FOR 30 DAYS, Disp: , Rfl:     telmisartan (MICARDIS) 80 MG tablet, Take 80 mg by mouth daily, Disp: , Rfl:     acetaminophen (TYLENOL) 325 mg tablet, Take 650 mg by mouth if needed for mild pain, Disp: , Rfl:     amLODIPine (NORVASC) 5 mg tablet, Take 5 mg by mouth daily, Disp: , Rfl:     apixaban (Eliquis) 5 mg, Take 2 tablets (10 mg total) by mouth 2 (two) times a day for 7 days, THEN 1 tablet (5 mg total) 2 (two) times a day for 23 days.  (Patient not taking: Reported on 10/5/2023), Disp: 74 tablet, Rfl: 0    ARIPiprazole (ABILIFY) 5 mg tablet, Take 5 mg by mouth every morning (Patient not taking: Reported on 10/5/2023), Disp: , Rfl:     aspirin-acetaminophen-caffeine (EXCEDRIN MIGRAINE) 411-691-40 MG per tablet, Take 2 tablets by mouth if needed for headaches, Disp: , Rfl:     buPROPion (WELLBUTRIN SR) 150 mg 12 hr tablet, Take 150 mg by mouth every morning (Patient not taking: Reported on 10/5/2023), Disp: , Rfl:     Cetirizine HCl 10 MG CAPS, Take 10 mg by mouth daily  , Disp: , Rfl:     doxepin (SINEquan) 100 mg capsule, Take 100 mg by mouth daily at bedtime (Patient not taking: Reported on 10/5/2023), Disp: , Rfl:     fluticasone (FLONASE) 50 mcg/act nasal spray, 1 spray into each nostril daily (Patient not taking: Reported on 10/5/2023), Disp: 16 g, Rfl: 0    fluticasone (FLOVENT HFA) 110 MCG/ACT inhaler, Inhale 1 puff as needed , Disp: , Rfl:     hydroxychloroquine (PLAQUENIL) 200 mg tablet, Take 200 mg by mouth as needed During Summer time  (Patient not taking: Reported on 10/5/2023), Disp: , Rfl:     levothyroxine 88 mcg tablet, Take 88 mcg by mouth daily , Disp: , Rfl:     lisinopril (ZESTRIL) 40 mg tablet, take 1 tablet by mouth once daily, Disp: 90 tablet, Rfl: 1    meclizine (ANTIVERT) 12.5 MG tablet, Take 12.5 mg by mouth as needed, Disp: , Rfl:     Multiple Vitamins-Minerals (MULTIVITAMIN ADULT PO), Take 1 tablet by mouth daily, Disp: , Rfl:     Nurtec 75 MG TBDP, DISSOLVE 1 TABLET IN MOUTH EVERY OTHER DAY, Disp: 16 tablet, Rfl: 11    omeprazole (PriLOSEC) 40 MG capsule, Take 40 mg by mouth as needed (Patient not taking: Reported on 10/5/2023), Disp: , Rfl: 0    oxyCODONE (ROXICODONE) 15 mg immediate release tablet, Take 15 mg by mouth as needed, Disp: , Rfl:     Ozempic, 0.25 or 0.5 MG/DOSE, 2 MG/1.5ML injection pen, inject 0.25 milligrams subcutaneously every week for 30 DAYS, Disp: , Rfl:     PROAIR  (90 Base) MCG/ACT inhaler, Inhale 1-2 puffs as needed , Disp: , Rfl:     prochlorperazine (COMPAZINE) 10 mg tablet, Take 1 tablet (10 mg total) by mouth every 6 (six) hours as needed (migraine) (Patient taking differently: Take 10 mg by mouth if needed (migraine)), Disp: 20 tablet, Rfl: 3    propranolol (INDERAL LA) 160 mg, Take 160 mg by mouth 3 (three) times a day, Disp: , Rfl: 0    Reyvow 100 MG tablet, TAKE 1 TABLET (100MG) ONE TIME AS NEEDED FOR MIGRAINE. DO NOT USE MORE THAN ONE DOSEPER DAY OR MORE THAN 8 DOSES PER MONTH, Disp: 8 tablet, Rfl: 5    telmisartan-hydrochlorothiazide (MICARDIS HCT) 80-12.5 MG per tablet, Take 1 tablet by mouth daily, Disp: , Rfl:     topiramate (TOPAMAX) 100 mg tablet, One in am and two at bedtime daily (Patient not taking: Reported on 10/5/2023), Disp: 270 tablet, Rfl: 3    valACYclovir (VALTREX) 1,000 mg tablet, 1 tablet as needed , Disp: , Rfl:     zaleplon (SONATA) 5 MG capsule, Take 1 capsule (5 mg total) by mouth daily at bedtime, Disp: 30 capsule, Rfl: 5    Current Facility-Administered Medications: Botulinum Toxin Type A SOLR 200 Units, 200 Units, Injection, Q3 Months, Kiarra Martino PA-C, 200 Units at 03/18/21 1154    ALLERGIES:  Allergies   Allergen Reactions    Pregabalin      Other reaction(s): blurred vision    Shellfish-Derived Products - Food Allergy Anaphylaxis and Hives    Sumatriptan      Other reaction(s): Altered Heart Rate    Triptans Shortness Of Breath and Palpitations    Latex Swelling and Rash     Other reaction(s): Hives/Skin Rash  Other reaction(s): Hives/Skin Rash    Sulfa Antibiotics Swelling and Rash     Other reaction(s): Hives/Skin Rash  Other reaction(s): Hives/Skin Rash    Monistat [Miconazole] Swelling       REVIEW OF SYSTEMS:  Pertinent items are noted in HPI. A comprehensive review of systems was negative.     LABS:  HgA1c:   Lab Results   Component Value Date    HGBA1C 5.6 11/20/2019     BMP:   Lab Results   Component Value Date    GLUCOSE 109 11/11/2015    CALCIUM 10.1 02/05/2022     11/11/2015    K 4.0 02/05/2022    CO2 28 02/05/2022     02/05/2022    BUN 13 03/30/2022    CREATININE 0.87 03/30/2022       _____________________________________________________  PHYSICAL EXAMINATION:  Vital signs: /81   Pulse 66   Ht 5' 5" (1.651 m)   Wt 90.7 kg (200 lb)   LMP  (LMP Unknown)   BMI 33.28 kg/m²   General: well developed and well nourished, alert, oriented times 3, and appears comfortable  Psychiatric: Normal  HEENT: Trachea Midline, No torticollis  Cardiovascular: No discernable arrhythmia  Pulmonary: No wheezing or stridor  Abdomen: No rebound or guarding  Extremities: No peripheral edema  Skin: No masses, erythema, lacerations, fluctation, ulcerations  Neurovascular: Sensation Intact to the Median, Ulnar, Radial Nerve, Motor Intact to the Median, Ulnar, Radial Nerve, and Pulses Intact    MUSCULOSKELETAL EXAMINATION:  Extremities: Right hand demonstrates tenderness over the ring finger A1 pulley with a palpable nodule, full range of motion but active locking in the office today which is release of all. No extensor tendon subluxation. Remaining fingers without triggering. Negative de Quervain. Right carpal tunnel is positive Tinel's, carpal tunnel compression test, and Phalen's test with weakness of her abductor pollicis brevis noted is 4/5.  5/5 intrinsics, 5/5 anterior interosseous nerve. Tenderness over the right thumb CMC joint with positive shuck, grind, circumduction test.  Positive shoulder sign, no hyperextension of the metacarpophalangeal joint, no instability to the ulnar collateral ligament.     Full wrist flexion, full wrist extension with full strength, full elbow flexion extension with full strength.      _____________________________________________________  STUDIES REVIEWED:  No Studies to review      PROCEDURES PERFORMED:  Procedures  No Procedures performed today

## 2023-11-09 NOTE — H&P (VIEW-ONLY)
ASSESSMENT/PLAN:    Assessment:   Right hand carpal tunnel syndrome  Right thumb CMC arthritis  Right ring finger trigger finger    Plan: At this point in time, lengthy discussion was had with the patient. She recently had a blood clot and is on blood thinners. We did discuss her 3 individual problems, decision was made at this point in time to undergo endoscopic carpal tunnel release and right ring finger trigger finger release under local medication. We will delay arthroscopy of the right thumb CMC joint until after blood thinners and she is deemed low risk for general anesthesia. She is agreeable to this. We will see her at the time of her choosing for right sided endoscopic carpal tunnel release and ring finger trigger finger release under local.    To Do Next Visit:  Sutures out    General Discussions:  [facility] Arthritis: The anatomy and physiology of carpometacarpal joint arthritis was discussed with the patient today in the office. Deterioration of the articular cartilage eventually leads to hypermobility at the thumb [facility] joint, resulting in joint subluxation, osteophyte formation, cystic changes within the trapezium and base of the first metacarpal, as well as subchondral sclerosis. Eventually, pain, limited mobility, and compensatory hyperextension at the metacarpophalangeal joint may develop. While normal activity and usage of the thumb joint may provide a painful experience to the patient, this typically does not result in damage to the thumb or hand. Treatment options include resting thumb spica splints to decreased joint edema, pain, and inflammation. Therapy exercises to strengthen the thenar musculature may relieve pain, but do not alter the overall continued development of osteoarthritis. Oral medications, topical medications, corticosteroid injections may decrease pain and increase overall function. Eventually, approximately 5% of patients may require surgical intervention.        Operative Discussions:  Endoscopic Carpal Tunnel Release: The anatomy and physiology of carpal tunnel syndrome was discussed with the patient today. Increase pressure localized under the transverse carpal ligament can cause pain, numbness, tingling, or dysesthesias within the median nerve distribution as well as feelings of fatigue, clumsiness, or awkwardness. These symptoms typically occur at night and worse in the morning upon waking. Eventually, untreated carpal tunnel syndrome can result in weakness and permanent loss of muscle within the thenar compartment of the hand. Treatment options were discussed with the patient. Conservative treatment includes nocturnal resting splints to keep the nerve in a neutral position, ergonomic changes within the work or home environment, activity modification, and tendon gliding exercises. Steroid injections within the carpal canal can help a majority of patients, however this is often self-limited in a majority of patients. Surgical intervention to divide the transverse carpal ligament typically results in a long-lasting relief of the patient's complaints, with the recurrence rate of less than 1%. The patient has elected to undergo an endoscopic carpal tunnel release. The 2 incision technique was discussed with the patient, which results in approximately a two-week less recovery time, and less wound complications. In the postoperative period, light activities are allowed immediately, driving is allowed when narcotic medication has stopped, and the bandages may be removed and incision may get wet after 2 days. Heavy activities (lifting more than approximately 10 pounds) will be allowed after follow up appointment in 1-2 weeks. While the pain and discomfort in the hands generally improves rapidly, the numbness and tingling as well as the strength will slowly improve over weeks to months depending on the severity of the carpal tunnel syndrome.   Pillar pain was discussed with the patient, which is typically a common but self-limiting condition. The risks of bleeding and infection from the surgery are less than 1%. Risk of recurrence is approximately 0.5%. The risks of nerve injury or nerve damage or damage to the blood vessels is approximately 1 in 1200. The patient has an understanding of the above mentioned discussion. The risks and benefits of the procedure were explained to the patient, which include, but are not limited to: Bleeding, infection, recurrence, pain, scar, damage to tendons, damage to nerves, and damage to blood vessels, failure to give desired results and complications related to anesthesia. These risks, along with alternative conservative treatment options, and postoperative protocols were voiced back and understood by the patient. All questions were answered to the patient's satisfaction. The patient agrees to comply with a standard postoperative protocol, and is willing to proceed. Education was provided via written and auditory forms. There were no barriers to learning. Written handouts regarding wound care, incision and scar care, and general preoperative information was provided to the patient. Prior to surgery, the patient may be requested to stop all anti-inflammatory medications. Prophylactic aspirin, Plavix, and Coumadin may be allowed to be continued. Medications including vitamin E., ginkgo, and fish oil are requested to be stopped approximately one week prior to surgery. Hypertensive medications and beta blockers, if taken, should be continued. Trigger Finger Release: The anatomy and physiology of trigger finger was discussed with the patient today in the office. Edema and increased contact pressure within the flexor tendons at the A1 pulley can cause pain, crepitation, and limitation of function.   Treatment options include resting MP blocking splints to decrease edema, oral anti-inflammatory medications, home or formal therapy exercises, up to 2 steroid injections or surgical release. While majority of patients do respond to conservative treatment, up to 20% may require surgical release. The patient has elected release of the trigger finger. The patient has elected to undergo a release of the A1 pulley (trigger finger). A small incision will be made over the palmar aspect of the hand, the tendon sheath holding the flexor tendons will be released. In the postoperative period, light activities are allowed immediately, driving is allowed when narcotic medication has stopped, and the incision may get wet after 2 days. Heavy activities (lifting more than approximately 10 pounds) will be allowed after the follow up appointment in 1-2 weeks. While the pain and discomfort within the wrist typically improves rapidly, some residual discomfort may be present for up to 6 weeks. The nodule that is typically palpable in the palmar aspect of the hand will not be removed, as this would necessitate removal of a portion of the flexor tendon, however the catching, clicking, and locking should resolve. Approximate success rate is 98%. The risks and benefits of the procedure were explained to the patient, which include, but are not limited to: Bleeding, infection, recurrence, pain, scar, damage to tendons, damage to nerves, and damage to blood vessels, need for future surgery and complications related to anesthesia. If bony work is done, risks also include malunion and nonunion. These risks, along with alternative conservative treatment options, and postoperative protocols were voiced back and understood by the patient. All questions were answered to the patient's satisfaction. The patient agrees to comply with a standard postoperative protocol, and is willing to proceed. Education was provided via written and auditory forms. There were no barriers to learning.  Written handouts regarding wound care, incision and scar care, and general preoperative information, as well as risks and benefits were provided to the patient.      _____________________________________________________  CHIEF COMPLAINT:  Chief Complaint   Patient presents with    Right Wrist - Follow-up, Carpal Tunnel     Discuss surgery    Right Thumb - Follow-up, CMC     Discuss surgery          SUBJECTIVE:  Kenrick Flores is a 51 y.o. adult who presents for follow-up regarding right-sided carpal tunnel syndrome, right ring trigger finger, and right thumb CMC arthritis. Since last visit, she does have a history of a blood clot and is now on blood thinners. She continues to report numbness and tingling into her right hand in the median nerve distribution made worse at night, worse with activities, problems with fine motor motion. She also reports a constant clicking and locking of her right hand ring finger requiring her other finger to release it. This is mild to moderate discomfort without radiation. She also has discomfort into her right thumb CMC joint which is moderate, aching, and made worse with activities.     PAST MEDICAL HISTORY:  Past Medical History:   Diagnosis Date    Asthma     Bipolar 1 disorder (HCC)     Cluster headache     CTS (carpal tunnel syndrome)     Difficulty walking     Disease of thyroid gland     Fibromyalgia     Fibromyalgia, primary     Gout     Headache, tension-type     Hyperlipidemia     Hypertension     Lupus (HCC)     Memory loss     Migraine     Mild sleep apnea     Peripheral neuropathy     PONV (postoperative nausea and vomiting)     Vision loss        PAST SURGICAL HISTORY:  Past Surgical History:   Procedure Laterality Date    GALLBLADDER SURGERY  2008    HYSTERECTOMY  2014    LAPAROSCOPIC COLON RESECTION  2001       FAMILY HISTORY:  Family History   Problem Relation Age of Onset    Lupus Mother     Osteoporosis Mother     Hypotension Mother     Hypertension Father     Diabetes Father     Heart disease Father     Migraines Father     Stroke Father     Diabetes Sister Schizophrenia Sister     Hypertension Sister     Diabetes Maternal Grandmother     Rheum arthritis Maternal Grandmother     Hypertension Maternal Grandmother     Neuropathy Maternal Grandmother     Seizures Maternal Grandmother     Breast cancer Maternal Aunt 40    No Known Problems Maternal Aunt     No Known Problems Maternal Aunt     No Known Problems Maternal Aunt     No Known Problems Paternal Aunt        SOCIAL HISTORY:  Social History     Tobacco Use    Smoking status: Former     Packs/day: 1.00     Years: 5.00     Total pack years: 5.00     Types: Cigarettes    Smokeless tobacco: Never    Tobacco comments:     quit at 24years old   Vaping Use    Vaping Use: Never used   Substance Use Topics    Alcohol use: No    Drug use: No       MEDICATIONS:    Current Outpatient Medications:     amoxicillin-clavulanate (AUGMENTIN) 500-125 mg per tablet, Take 1 tablet by mouth every 8 (eight) hours, Disp: , Rfl:     fluconazole (DIFLUCAN) 150 mg tablet, take 1 tablet by mouth EVERY 3 DAY, Disp: , Rfl:     ibuprofen (MOTRIN) 800 mg tablet, Take 800 mg by mouth every 6 (six) hours, Disp: , Rfl:     Ozempic, 0.25 or 0.5 MG/DOSE, 2 MG/3ML injection pen, INJECT 0.5MG SUBCUTANEOUSLY WEEKLY FOR 30 DAYS, Disp: , Rfl:     telmisartan (MICARDIS) 80 MG tablet, Take 80 mg by mouth daily, Disp: , Rfl:     acetaminophen (TYLENOL) 325 mg tablet, Take 650 mg by mouth if needed for mild pain, Disp: , Rfl:     amLODIPine (NORVASC) 5 mg tablet, Take 5 mg by mouth daily, Disp: , Rfl:     apixaban (Eliquis) 5 mg, Take 2 tablets (10 mg total) by mouth 2 (two) times a day for 7 days, THEN 1 tablet (5 mg total) 2 (two) times a day for 23 days.  (Patient not taking: Reported on 10/5/2023), Disp: 74 tablet, Rfl: 0    ARIPiprazole (ABILIFY) 5 mg tablet, Take 5 mg by mouth every morning (Patient not taking: Reported on 10/5/2023), Disp: , Rfl:     aspirin-acetaminophen-caffeine (EXCEDRIN MIGRAINE) 008-766-81 MG per tablet, Take 2 tablets by mouth if needed for headaches, Disp: , Rfl:     buPROPion (WELLBUTRIN SR) 150 mg 12 hr tablet, Take 150 mg by mouth every morning (Patient not taking: Reported on 10/5/2023), Disp: , Rfl:     Cetirizine HCl 10 MG CAPS, Take 10 mg by mouth daily  , Disp: , Rfl:     doxepin (SINEquan) 100 mg capsule, Take 100 mg by mouth daily at bedtime (Patient not taking: Reported on 10/5/2023), Disp: , Rfl:     fluticasone (FLONASE) 50 mcg/act nasal spray, 1 spray into each nostril daily (Patient not taking: Reported on 10/5/2023), Disp: 16 g, Rfl: 0    fluticasone (FLOVENT HFA) 110 MCG/ACT inhaler, Inhale 1 puff as needed , Disp: , Rfl:     hydroxychloroquine (PLAQUENIL) 200 mg tablet, Take 200 mg by mouth as needed During Summer time  (Patient not taking: Reported on 10/5/2023), Disp: , Rfl:     levothyroxine 88 mcg tablet, Take 88 mcg by mouth daily , Disp: , Rfl:     lisinopril (ZESTRIL) 40 mg tablet, take 1 tablet by mouth once daily, Disp: 90 tablet, Rfl: 1    meclizine (ANTIVERT) 12.5 MG tablet, Take 12.5 mg by mouth as needed, Disp: , Rfl:     Multiple Vitamins-Minerals (MULTIVITAMIN ADULT PO), Take 1 tablet by mouth daily, Disp: , Rfl:     Nurtec 75 MG TBDP, DISSOLVE 1 TABLET IN MOUTH EVERY OTHER DAY, Disp: 16 tablet, Rfl: 11    omeprazole (PriLOSEC) 40 MG capsule, Take 40 mg by mouth as needed (Patient not taking: Reported on 10/5/2023), Disp: , Rfl: 0    oxyCODONE (ROXICODONE) 15 mg immediate release tablet, Take 15 mg by mouth as needed, Disp: , Rfl:     Ozempic, 0.25 or 0.5 MG/DOSE, 2 MG/1.5ML injection pen, inject 0.25 milligrams subcutaneously every week for 30 DAYS, Disp: , Rfl:     PROAIR  (90 Base) MCG/ACT inhaler, Inhale 1-2 puffs as needed , Disp: , Rfl:     prochlorperazine (COMPAZINE) 10 mg tablet, Take 1 tablet (10 mg total) by mouth every 6 (six) hours as needed (migraine) (Patient taking differently: Take 10 mg by mouth if needed (migraine)), Disp: 20 tablet, Rfl: 3    propranolol (INDERAL LA) 160 mg, Take 160 mg by mouth 3 (three) times a day, Disp: , Rfl: 0    Reyvow 100 MG tablet, TAKE 1 TABLET (100MG) ONE TIME AS NEEDED FOR MIGRAINE. DO NOT USE MORE THAN ONE DOSEPER DAY OR MORE THAN 8 DOSES PER MONTH, Disp: 8 tablet, Rfl: 5    telmisartan-hydrochlorothiazide (MICARDIS HCT) 80-12.5 MG per tablet, Take 1 tablet by mouth daily, Disp: , Rfl:     topiramate (TOPAMAX) 100 mg tablet, One in am and two at bedtime daily (Patient not taking: Reported on 10/5/2023), Disp: 270 tablet, Rfl: 3    valACYclovir (VALTREX) 1,000 mg tablet, 1 tablet as needed , Disp: , Rfl:     zaleplon (SONATA) 5 MG capsule, Take 1 capsule (5 mg total) by mouth daily at bedtime, Disp: 30 capsule, Rfl: 5    Current Facility-Administered Medications: Botulinum Toxin Type A SOLR 200 Units, 200 Units, Injection, Q3 Months, Radha Penny PA-C, 200 Units at 03/18/21 1154    ALLERGIES:  Allergies   Allergen Reactions    Pregabalin      Other reaction(s): blurred vision    Shellfish-Derived Products - Food Allergy Anaphylaxis and Hives    Sumatriptan      Other reaction(s): Altered Heart Rate    Triptans Shortness Of Breath and Palpitations    Latex Swelling and Rash     Other reaction(s): Hives/Skin Rash  Other reaction(s): Hives/Skin Rash    Sulfa Antibiotics Swelling and Rash     Other reaction(s): Hives/Skin Rash  Other reaction(s): Hives/Skin Rash    Monistat [Miconazole] Swelling       REVIEW OF SYSTEMS:  Pertinent items are noted in HPI. A comprehensive review of systems was negative.     LABS:  HgA1c:   Lab Results   Component Value Date    HGBA1C 5.6 11/20/2019     BMP:   Lab Results   Component Value Date    GLUCOSE 109 11/11/2015    CALCIUM 10.1 02/05/2022     11/11/2015    K 4.0 02/05/2022    CO2 28 02/05/2022     02/05/2022    BUN 13 03/30/2022    CREATININE 0.87 03/30/2022       _____________________________________________________  PHYSICAL EXAMINATION:  Vital signs: /81   Pulse 66   Ht 5' 5" (1.651 m)   Wt 90.7 kg (200 lb)   LMP  (LMP Unknown)   BMI 33.28 kg/m²   General: well developed and well nourished, alert, oriented times 3, and appears comfortable  Psychiatric: Normal  HEENT: Trachea Midline, No torticollis  Cardiovascular: No discernable arrhythmia  Pulmonary: No wheezing or stridor  Abdomen: No rebound or guarding  Extremities: No peripheral edema  Skin: No masses, erythema, lacerations, fluctation, ulcerations  Neurovascular: Sensation Intact to the Median, Ulnar, Radial Nerve, Motor Intact to the Median, Ulnar, Radial Nerve, and Pulses Intact    MUSCULOSKELETAL EXAMINATION:  Extremities: Right hand demonstrates tenderness over the ring finger A1 pulley with a palpable nodule, full range of motion but active locking in the office today which is release of all. No extensor tendon subluxation. Remaining fingers without triggering. Negative de Quervain. Right carpal tunnel is positive Tinel's, carpal tunnel compression test, and Phalen's test with weakness of her abductor pollicis brevis noted is 4/5.  5/5 intrinsics, 5/5 anterior interosseous nerve. Tenderness over the right thumb CMC joint with positive shuck, grind, circumduction test.  Positive shoulder sign, no hyperextension of the metacarpophalangeal joint, no instability to the ulnar collateral ligament.     Full wrist flexion, full wrist extension with full strength, full elbow flexion extension with full strength.      _____________________________________________________  STUDIES REVIEWED:  No Studies to review      PROCEDURES PERFORMED:  Procedures  No Procedures performed today

## 2023-12-06 ENCOUNTER — HOSPITAL ENCOUNTER (OUTPATIENT)
Facility: HOSPITAL | Age: 48
Setting detail: OUTPATIENT SURGERY
Discharge: HOME | End: 2023-12-06
Attending: ORTHOPAEDIC SURGERY | Admitting: ORTHOPAEDIC SURGERY
Payer: MEDICARE

## 2023-12-06 VITALS
BODY MASS INDEX: 33.32 KG/M2 | OXYGEN SATURATION: 97 % | SYSTOLIC BLOOD PRESSURE: 186 MMHG | HEIGHT: 65 IN | DIASTOLIC BLOOD PRESSURE: 106 MMHG | WEIGHT: 200 LBS | TEMPERATURE: 96.8 F | RESPIRATION RATE: 18 BRPM | HEART RATE: 76 BPM

## 2023-12-06 DIAGNOSIS — G56.01 CARPAL TUNNEL SYNDROME ON RIGHT: Primary | ICD-10-CM

## 2023-12-06 PROCEDURE — 26055 INCISE FINGER TENDON SHEATH: CPT | Performed by: ORTHOPAEDIC SURGERY

## 2023-12-06 PROCEDURE — 29848 WRIST ENDOSCOPY/SURGERY: CPT | Performed by: ORTHOPAEDIC SURGERY

## 2023-12-06 PROCEDURE — 26055 INCISE FINGER TENDON SHEATH: CPT | Performed by: PHYSICIAN ASSISTANT

## 2023-12-06 PROCEDURE — 29848 WRIST ENDOSCOPY/SURGERY: CPT | Performed by: PHYSICIAN ASSISTANT

## 2023-12-06 RX ORDER — LIDOCAINE HYDROCHLORIDE AND EPINEPHRINE 10; 10 MG/ML; UG/ML
20 INJECTION, SOLUTION INFILTRATION; PERINEURAL ONCE
Status: DISCONTINUED | OUTPATIENT
Start: 2023-12-06 | End: 2023-12-06 | Stop reason: HOSPADM

## 2023-12-06 RX ORDER — SENNOSIDES 8.6 MG
650 CAPSULE ORAL EVERY 8 HOURS PRN
Qty: 30 TABLET | Refills: 0 | Status: SHIPPED | OUTPATIENT
Start: 2023-12-06

## 2023-12-06 RX ORDER — ONDANSETRON 2 MG/ML
4 INJECTION INTRAMUSCULAR; INTRAVENOUS EVERY 6 HOURS PRN
Status: CANCELLED | OUTPATIENT
Start: 2023-12-06

## 2023-12-06 RX ORDER — ACETAMINOPHEN 325 MG/1
650 TABLET ORAL EVERY 6 HOURS PRN
Status: CANCELLED | OUTPATIENT
Start: 2023-12-06

## 2023-12-06 RX ORDER — MAGNESIUM HYDROXIDE 1200 MG/15ML
LIQUID ORAL AS NEEDED
Status: DISCONTINUED | OUTPATIENT
Start: 2023-12-06 | End: 2023-12-06 | Stop reason: HOSPADM

## 2023-12-06 RX ORDER — TRAMADOL HYDROCHLORIDE 50 MG/1
50 TABLET ORAL EVERY 6 HOURS PRN
Status: CANCELLED | OUTPATIENT
Start: 2023-12-06

## 2023-12-06 RX ADMIN — SODIUM BICARBONATE: 84 INJECTION, SOLUTION INTRAVENOUS at 08:07

## 2023-12-06 NOTE — LETTER
[facility]  [address] [place] [id number]  Dept: [phone]    December 6, 2023     Patient: Leidy Yarbrough   YOB: 1975   Date of Visit: 12/6/2023       To Whom it May Concern:    Inna Self is under my professional care. She was seen in the hospital from 12/6/2023 to 12/06/23. She will be out of work 12/15/2023. If you have any questions or concerns, please don't hesitate to call.          Sincerely,          Vickey Marcano PA-C

## 2023-12-06 NOTE — DISCHARGE INSTR - AVS FIRST PAGE
Post Operative Instructions    You have had surgery on your arm today, please read and follow the information below:  Elevate your hand above your elbow during the next 24-48 hours to help with swelling. Place your hand and arm over your head with motion at your shoulder three times a day. Do not apply any cream/ointment/oil to your incisions including antibiotics. Do not soak your hands in standing water (dishwater, tubs, Jacuzzi's, pools, etc.) until given permission (typically 2-3 weeks after injury)    Call the office if you notice any:  Increased numbness or tingling of your hand or fingers that is not relieved with elevation. Increasing pain that is not controlled with medication. Difficulty chewing, breathing, swallowing. Chest pains or shortness of breath. Fever over 101.4 degrees. Bandage: Remove bandage after 5 days. Motion: Move fingers into a fist 5 times a day, DO NOT move any splinted fingers. Weight bearing status: Avoid heavy lifting (>5 pounds) with the extremity that was operated on until follow up appointment. Normal activities of daily living are OK. Ice: Ice for 10 minutes every hour as needed for swelling x 24 hours. Sling: No sling necessary. Medications:   Tylenol Extended Release 650 mg every 8 hours  Oxycodone which you have at home for pain    After surgery, we would like you to take Tylenol 650 mg one tablet by mouth every 8 hours  (at breakfast, lunch and dinner) for 3-5 days after your surgery. Please take this medication EVERYDAY after surgery for 3-5 days, and not just as needed. Taking this medication after surgery will limit your need for prescription pain medication. Follow-up Appointment: 7-10 days. Please call the office if you have any questions or concerns regarding your post-operative care.

## 2023-12-06 NOTE — OP NOTE
OPERATIVE REPORT  PATIENT NAME: Jonathan Alexis  :  1975  MRN: [id number]  Pt Location: [facility]    SURGERY DATE: 23    Surgeon(s) and Role:     * Candice Connor MD - Primary    Pre-Op Diagnosis:  Carpal tunnel syndrome of right wrist [G56.01]  Trigger ring finger of right hand [M65.341]    Post-Op Diagnosis:  .Carpal tunnel syndrome of right wrist [G56.01]  Trigger ring finger of right hand [M65.341]    Procedure(s) (LRB):  Right endoscopic carpal tunnel release (Right)  Right ring trigger finger release (Right)    Specimen(s):  No specimens collected during this procedure. Estimated Blood Loss:   Minimal      Anesthesia Type:   Local    Operative Indications: The patient has a history of Carpal Tunnel Syndrome  right and Trigger Finger  right  ring finger that was recalcitrant to conservative management. The decision was made to bring the patient to the operating room for Endoscopic Carpal Tunnel Release  right and Trigger Finger Release  right  ring finger. Risks of the procedure were explained which include, but are not limited to bleeding; infection; damage to nerves, arteries,veins, tendons; scar; pain; need for reoperation; failure to give desired result; and risks of anaesthesia. All questions were answered to satisfaction and they were willing to proceed. Operative Findings:  Right carpal tunnel  Right ring finger trigger finger    Complications:   None    Procedure and Technique:  After the patient, site, and procedure were identified, the patient was brought into the operating room in a supine position. Local anaesthesia was adminstered in the preoperative holding area. A tourniquet was not used. The  right upper extremity was then prepped and drapped in a normal, sterile, orthopedic fashion.     After reconfirmation of the patient, site, and surgical procedure, which was agreed upon by the entire surgical team, attention was turned to the right wrist.  The sites of the proximal and distal incisions were marked. The addy of the proximal incision was placed horizontally at the midline of the wrist.  The distal incision addy was longitudinal extending distally from the point of intersection of the line between the long finger and ring finger and the line along the distal border of the fully abducted thumb. The proximal incision was performed. Subcutaneous tissues were dissected. Then the transverse volar antebrachial fascia was perforated with a scalpel. The edges of the skin incision where retracted and the forearm fascia was incised for approximately 1.5 cm proximally with care taken to identify and protect the median nerve. Retractors were used to inspect the transverse carpal ligament distally. A curved Bryson dissector was used to glide under the transverse carpal ligament and superficial to the median nerve with confirmation via the washboard feeling. Then the curved Bryson was pushed into the palm toward the distal incision site. When the location of the distal skin addy was adequate, the distal incision was made. Then with retraction of the skin, further dissection and perforation of the palmar fascia was performed with the use of tenotomy scissors. The curved Bryson was guided from proximal to distal out the distal incisions without any twisting to allow for dilation of the tract. The curved Bryson was removed, and the cannula for the camera was inserted along the same tract, making sure to keep the alignment post on the cannula perpendicular to the plane of the hand without twisting. Then while keeping the wrist in extension, and holding the cannula of the camera in place, the wrist was placed on the hyperextension board. The scope was inserted distally, and a cotton-tip applicator was used proximally to clean the tract as well as the scope. A curved cutting knife was introduced from proximal to distal while keeping visualization with the use of the camera.   Without twisting of the canula, the knife was used to cut the transverse carpal ligament completely, making sure there were no remnant fibers. Then after this was accomplished, the hand was removed from the extension block. Three maneuvers were used to confirm the full release of the transverse carpal ligament. First, the ease of twisting the trocar of the camera confirmed the release of the ligament. Second, the curved Bryson was introduced to make sure there were no remnant fibers that could be felt palmarly. Third, the scope was introduced again to visualize that the whole ligament was released proximally to distally. Additional confirmation of full release included retraction and inspection in the distal and proximal incisions to make sure there were no remnant fibers distally or proximally respectively. After the patient, site, and procedure were once again identified, attention was turned to the right ring finger. An incision was made over the flexor tendon sheath at the level of the A1 pulley. Dissection was carried out in-line with the flexor tendon sheath and the radial and ulnar digital artery and nerve were protected. The A1 pulley was identified at the base of the incision. Under direct visualization, the A1 pulley was divided along the midline in its entirety with care taken to avoid injury to the underlying tendon. The tendons were examined to ensure that no further catching, popping, clicking or locking occurred with motion of the finger. At the completion of the procedure, hemostasis was obtained with cautery and direct pressure. The wounds were copiously irrigated with sterile solution. The wounds were closed with Prolene. Sterile dressings were applied, including Xeroform, gauze, tweeners, webril, ACE. Please note, all sponge, needle, and instrument counts were correct prior to closure. Loupe magnification was utilized. The patient tolerated the procedure well.      I was present for all critical portions of the procedure., A qualified resident physician was not available. , and A physician assistant was required during the procedure for retraction, tissue handling, dissection and suturing.     Patient Disposition:  APU and hemodynamically stable    SIGNATURE: Jason Steele MD  DATE: 12/06/23  TIME: 9:36 AM

## 2023-12-06 NOTE — INTERVAL H&P NOTE
H&P reviewed. After examining the patient I find no changes in the patients condition since the H&P had been written.     Vitals:    12/06/23 0806   Pulse: 72   Resp: 16   Temp: (!) 97.2 °F (36.2 °C)   SpO2: 98%

## 2023-12-15 ENCOUNTER — TELEPHONE (OUTPATIENT)
Dept: OBGYN CLINIC | Facility: HOSPITAL | Age: 48
End: 2023-12-15

## 2023-12-15 ENCOUNTER — OFFICE VISIT (OUTPATIENT)
Dept: OBGYN CLINIC | Facility: HOSPITAL | Age: 48
End: 2023-12-15

## 2023-12-15 VITALS — BODY MASS INDEX: 33.32 KG/M2 | WEIGHT: 200 LBS | HEIGHT: 65 IN

## 2023-12-15 DIAGNOSIS — M65.341 TRIGGER RING FINGER OF RIGHT HAND: ICD-10-CM

## 2023-12-15 DIAGNOSIS — G56.01 CARPAL TUNNEL SYNDROME OF RIGHT WRIST: Primary | ICD-10-CM

## 2023-12-15 PROCEDURE — 99024 POSTOP FOLLOW-UP VISIT: CPT | Performed by: PHYSICIAN ASSISTANT

## 2023-12-15 NOTE — LETTER
December 15, 2023     Patient: Ashely Dempsey  YOB: 1975  Date of Visit: 12/15/2023      To Whom it May Concern:    Erin Bryant is under my professional care. Royb Cruz was seen in my office on 12/15/2023. Roby Cruz will be on work restrictions of no lifting great than 5 pounds. She will follow up in 6 weeks for re-evaluation. If you have any questions or concerns, please don't hesitate to call.          Sincerely,          Haritha Tomas PA-C        CC: No Recipients

## 2023-12-15 NOTE — PROGRESS NOTES
Assessment:   S/P Right endoscopic carpal tunnel release - Right and Right ring trigger finger release - Right on 12/6/2023    Plan:   Patient was advised to allow the incisions to finish healing up over the next few days  She was advised to use heat massage as demonstrated in the office  She was given a therapy prescription to work on range of motion due to stiffness  She was given a work note today  She will follow-up in 6 weeks for reevaluation    Follow Up:  6 weeks    To Do Next Visit:  Reevaluation      CHIEF COMPLAINT:  Chief Complaint   Patient presents with    Right Hand - Post-op, Suture / Staple Removal     Right ring TFR &ECTR- 12/6/23         SUBJECTIVE:  Radha Greene is a 51 y.o. adult who presents for follow up after Right endoscopic carpal tunnel release - Right and Right ring trigger finger release - Right on 12/6/2023.   Today patient has some discomfort noted in the hand when trying to make a full fist.       PHYSICAL EXAMINATION:  Vital signs: Ht 5' 5" (1.651 m)   Wt 90.7 kg (200 lb)   LMP  (LMP Unknown)   BMI 33.28 kg/m²   General: well developed and well nourished, alert, oriented times 3, and appears comfortable  Psychiatric: Normal    MUSCULOSKELETAL EXAMINATION:  Incision: Clean, dry, intact  Range of Motion: As expected and Limited due to stiffness  Neurovascular status: Neuro intact, good cap refill  Activity Restrictions: No restrictions  Done today: Sutures out      STUDIES REVIEWED:  No Studies to review      PROCEDURES PERFORMED:  Procedures  No Procedures performed today

## 2023-12-15 NOTE — TELEPHONE ENCOUNTER
Patient needs a 6wk f/up with Dr. Annabelle Celeste or Thang Grant. I offered a morning appointment on 1/26/23 in the morning but due to patient's employment, the earliest she can be here is 2:30pm Monday through Friday. Can you help with an appointment?     Thank you

## 2023-12-26 NOTE — PROGRESS NOTES
OT Evaluation     Today's date: 2023  Patient name: Adamaris Brian  : 1975  MRN: [id number]  Referring provider: Pham Dozier MD  Dx:   Encounter Diagnosis     ICD-10-CM    1. Carpal tunnel syndrome of right wrist  G56.01 Ambulatory Referral to PT/OT Hand Therapy      2. Trigger ring finger of right hand  M65.341 Ambulatory Referral to PT/OT Hand Therapy          Start Time: 1055  Stop Time: 1137  Total time in clinic (min): 42 minutes    Assessment  Assessment details: Pt presents for OT eval s/p R ECTR . Subjectively, she has some pain and weakness since surgery that continues to limit her ability to open jars, carry heavier objects, and perform certain work tasks. Objectively, she can make a full fist however lacks a few degrees of wrist motion. The main issue is the decreased  and pinch strength. OT recommending services 1x/week for 6 weeks. Pt understands/agrees with current POC.   Impairments: abnormal or restricted ROM, activity intolerance, impaired physical strength, lacks appropriate home exercise program and weight-bearing intolerance    Symptom irritability: moderateUnderstanding of Dx/Px/POC: good   Prognosis: good    Goals  Short term goals:  To be achieved within 2-3 visits from start of care on 23.   Patient will demonstrate independence with scar mobilization techniques and post-op wound care instructions.  Patient will demonstrate independence with all AROM and stretching HEPs.  Patient will verbalize understanding of activity limitations within post-op precautions for improved symptom management.   Patient will verbalize understanding of activity modifications to maximize functional use of R hand throughout normal routine.   Patient will tolerate therapeutic exercises/activities with reports of pain <2/10.      Long term goals:  To be achieved at time of discharge:   Patient will demonstrate improved AROM to WFL compared to uninvolved side.  Patient will begin to  report decreased pain with completion of ADLs (donning shoes/dressing, etc.).   Patient will begin to report improved completion of IADLs (cooking, washing dishes, cleaning, etc.) with implementation of recommended symptom management strategies.   Patient will begin to report improved participation/engagement in desired leisure occupations.   Patient will demonstrate improvements with strength and activity tolerance to WFL for increased readiness to return to work.   Patient will report readiness for discharge from OT services.              Plan  Planned modality interventions: thermotherapy: hydrocollator packs and ultrasound  Other planned modality interventions: IAS  Planned therapy interventions: IADL retraining, manual therapy, activity modification, fine motor coordination training, flexibility, functional ROM exercises, home exercise program, graded exercise, graded activity, therapeutic exercise, therapeutic activities, stretching, strengthening, patient education, orthotic fitting/training, joint mobilization, orthotic management and training, work reintegration, dressing changes, balance/weight bearing training and ADL retraining  Treatment plan discussed with: patient      Subjective Evaluation    History of Present Illness  Mechanism of injury: Adamaris is a 47 yo RHD female who presents to OT for eval s/p R endo CTR DOS 12/6. Symptoms had been ongoing for years.    Occupational Profile  ADLs: trouble opening jars    IADLs: pain with putting weight through finger tips.     School: n/a    Driving: does not normally drive with R hand. Having some problems gripping steering wheel.     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 Difficulty carrying trays.       Patient Goals  Patient goals for therapy: decreased edema, decreased pain, increased motion, increased strength, independence with ADLs/IADLs and return to work    Pain  Current pain  ratin  At best pain ratin  At worst pain ratin  Quality: radiating and throbbing  Progression: improved    Social Support  Lives with: spouse    Treatments  Current treatment: occupational therapy        Objective    Tissue Integrity: Pink, clean, dry, healing incisions. No hypersensitivity.     Sensation (Ten-Test )  Right Hand (thumb to small finger): 10/10, 10/10, 10/10, 10/10, 10/10  Left Hand (thumb to small finger): 10/10, 10/10, 10/10, 10/10, 10/10      Edema (circumferential) (cm):    Right Left   Wrist crease 15.4 15   Palm 20.5 18.7   MCPs 19.1 18.5       AROM (PROM) degrees    Wrist   Right Left   Flexion 60 65   Extension 64 75   Radial Dev. 20 20   Ulnar Dev. 35 35     /Pinch Strength  Dynamometer R UE  L UE comments   Position #2 (lbs) 13.7 41     Pinch Meter          Lateral 7 15      3 JAW ELAN 7 10      2-point 6  8.5            Precautions: ECTR and ring trigger release   1x/week 6 weeks    Manuals  eval            Scar mob edu                                                   Ther Ex             Wrist AROM x20            Prayer stretch 3x30s            TGEs x20            RADHA PIP/DIP x10            Digit ext stretch 3x30s            MNGs             Graded strengthening                                                                 Ther Activity             Activity tolerance             FMC/Dexterity                                                                              Orthotic fit                                       Modalities             HP 5                            Sensation and skin integrity assessed prior to, during, and following the application of moist heat pack. All assessments found sensation and integrity to be intact. Pt instructed to inform clinician if use of the modality became uncomfortable and/or too intense to tolerate at any time.

## 2023-12-27 ENCOUNTER — EVALUATION (OUTPATIENT)
Dept: OCCUPATIONAL THERAPY | Age: 48
End: 2023-12-27
Payer: MEDICARE

## 2023-12-27 DIAGNOSIS — M65.341 TRIGGER RING FINGER OF RIGHT HAND: ICD-10-CM

## 2023-12-27 DIAGNOSIS — G56.01 CARPAL TUNNEL SYNDROME OF RIGHT WRIST: Primary | ICD-10-CM

## 2023-12-27 PROCEDURE — 97110 THERAPEUTIC EXERCISES: CPT

## 2023-12-27 PROCEDURE — 97165 OT EVAL LOW COMPLEX 30 MIN: CPT

## 2023-12-27 NOTE — LETTER
2023    Pham Dozier MD  [address]  [address]  [facility] [address]    Patient: Adamaris Brian   YOB: 1975   Date of Visit: 2023     Encounter Diagnosis     ICD-10-CM    1. Carpal tunnel syndrome of right wrist  G56.01 Ambulatory Referral to PT/OT Hand Therapy      2. Trigger ring finger of right hand  M65.341 Ambulatory Referral to PT/OT Hand Therapy          Dear Dr. Dozier:    Thank you for your recent referral of Adamaris Brian. Please review the attached evaluation summary from Adamaris's recent visit.     Please verify that you agree with the plan of care by signing the attached order.     If you have any questions or concerns, please do not hesitate to call.     I sincerely appreciate the opportunity to share in the care of one of your patients and hope to have another opportunity to work with you in the near future.     Sincerely,    Jose Vazquez OT      Referring Provider:     I certify that I have read the below Plan of Care and certify the need for these services furnished under this plan of treatment while under my care.                    Pham Dozier MD  [address]  [facility] [address]  Via Fax: [phone]        OT Evaluation     Today's date: 2023  Patient name: Adamaris Brian  : 1975  MRN: [id number]  Referring provider: Pham Dozier MD  Dx:   Encounter Diagnosis     ICD-10-CM    1. Carpal tunnel syndrome of right wrist  G56.01 Ambulatory Referral to PT/OT Hand Therapy      2. Trigger ring finger of right hand  M65.341 Ambulatory Referral to PT/OT Hand Therapy          Start Time: 1055  Stop Time: 1137  Total time in clinic (min): 42 minutes    Assessment  Assessment details: Pt presents for OT eval s/p R ECTR . Subjectively, she has some pain and weakness since surgery that continues to limit her ability to open jars, carry heavier objects, and perform certain work tasks. Objectively, she can make a full  fist however lacks a few degrees of wrist motion. The main issue is the decreased  and pinch strength. OT recommending services 1x/week for 6 weeks. Pt understands/agrees with current POC.   Impairments: abnormal or restricted ROM, activity intolerance, impaired physical strength, lacks appropriate home exercise program and weight-bearing intolerance    Symptom irritability: moderateUnderstanding of Dx/Px/POC: good   Prognosis: good    Goals  Short term goals:  To be achieved within 2-3 visits from start of care on 12/27/23.   Patient will demonstrate independence with scar mobilization techniques and post-op wound care instructions.  Patient will demonstrate independence with all AROM and stretching HEPs.  Patient will verbalize understanding of activity limitations within post-op precautions for improved symptom management.   Patient will verbalize understanding of activity modifications to maximize functional use of R hand throughout normal routine.   Patient will tolerate therapeutic exercises/activities with reports of pain <2/10.      Long term goals:  To be achieved at time of discharge:   Patient will demonstrate improved AROM to WFL compared to uninvolved side.  Patient will begin to report decreased pain with completion of ADLs (donning shoes/dressing, etc.).   Patient will begin to report improved completion of IADLs (cooking, washing dishes, cleaning, etc.) with implementation of recommended symptom management strategies.   Patient will begin to report improved participation/engagement in desired leisure occupations.   Patient will demonstrate improvements with strength and activity tolerance to WFL for increased readiness to return to work.   Patient will report readiness for discharge from OT services.              Plan  Planned modality interventions: thermotherapy: hydrocollator packs and ultrasound  Other planned modality interventions: IASTM  Planned therapy interventions: IADL retraining,  manual therapy, activity modification, fine motor coordination training, flexibility, functional ROM exercises, home exercise program, graded exercise, graded activity, therapeutic exercise, therapeutic activities, stretching, strengthening, patient education, orthotic fitting/training, joint mobilization, orthotic management and training, work reintegration, dressing changes, balance/weight bearing training and ADL retraining  Treatment plan discussed with: patient      Subjective Evaluation    History of Present Illness  Mechanism of injury: Adamaris is a 49 yo RHD female who presents to OT for eval s/p R endo CTR DOS . Symptoms had been ongoing for years.    Occupational Profile  ADLs: trouble opening jars    IADLs: pain with putting weight through finger tips.     School: n/a    Driving: does not normally drive with R hand. Having some problems gripping steering wheel.     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 Difficulty carrying trays.       Patient Goals  Patient goals for therapy: decreased edema, decreased pain, increased motion, increased strength, independence with ADLs/IADLs and return to work    Pain  Current pain ratin  At best pain ratin  At worst pain ratin  Quality: radiating and throbbing  Progression: improved    Social Support  Lives with: spouse    Treatments  Current treatment: occupational therapy        Objective    Tissue Integrity: Pink, clean, dry, healing incisions. No hypersensitivity.     Sensation (Ten-Test )  Right Hand (thumb to small finger): 10/10, 10/10, 10/10, 10/10, 10/10  Left Hand (thumb to small finger): 10/10, 10/10, 10/10, 10/10, 10/10      Edema (circumferential) (cm):    Right Left   Wrist crease 15.4 15   Palm 20.5 18.7   MCPs 19.1 18.5       AROM (PROM) degrees    Wrist   Right Left   Flexion 60 65   Extension 64 75   Radial Dev. 20 20   Ulnar Dev. 35 35     /Pinch  Strength  Dynamometer R UE  L UE comments   Position #2 (lbs) 13.7 41     Pinch Meter          Lateral 7 15      3 JAW ELAN 7 10      2-point 6  8.5            Precautions: ECTR and ring trigger release 12/6  1x/week 6 weeks    Manuals 12/27 eval            Scar mob edu                                                   Ther Ex             Wrist AROM x20            Prayer stretch 3x30s            TGEs x20            RADHA PIP/DIP x10            Digit ext stretch 3x30s            MNGs             Graded strengthening                                                                 Ther Activity             Activity tolerance             FMC/Dexterity                                                                              Orthotic fit                                       Modalities             HP 5                            Sensation and skin integrity assessed prior to, during, and following the application of moist heat pack. All assessments found sensation and integrity to be intact. Pt instructed to inform clinician if use of the modality became uncomfortable and/or too intense to tolerate at any time.

## 2024-01-04 ENCOUNTER — OFFICE VISIT (OUTPATIENT)
Dept: OCCUPATIONAL THERAPY | Age: 49
End: 2024-01-04
Payer: MEDICARE

## 2024-01-04 DIAGNOSIS — M65.341 TRIGGER RING FINGER OF RIGHT HAND: ICD-10-CM

## 2024-01-04 DIAGNOSIS — G56.01 CARPAL TUNNEL SYNDROME OF RIGHT WRIST: Primary | ICD-10-CM

## 2024-01-04 PROCEDURE — 97140 MANUAL THERAPY 1/> REGIONS: CPT

## 2024-01-04 PROCEDURE — 97110 THERAPEUTIC EXERCISES: CPT

## 2024-01-04 NOTE — PROGRESS NOTES
"Daily Note     Today's date: 2024  Patient name: Adamaris Brian  : 1975  MRN: [id number]  Referring provider: Pham Dozier MD  Dx:   Encounter Diagnosis     ICD-10-CM    1. Carpal tunnel syndrome of right wrist  G56.01       2. 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 Pt demo good understanding of exercises today, requires cues for positioning and sequencing tendon glides. Discussed palmar desensitization with ball rolls. Patient would benefit from continued OT      Plan: Continue per plan of care.  Progress treatment as tolerated.       Precautions: ECTR and ring trigger release   1x/week 6 weeks    Manuals  eval            Scar mob edu 10           Desensitization with spike ball  2                                     Ther Ex             Wrist AROM x20 x20           Prayer stretch 3x30s 3x30s           TGEs x20 x20           RADHA PIP/DIP x10 X10 each           Digit ext stretch 3x30s 3x30s           MNGs  x15           Graded strengthening  Red flex bar 2x15 bend                                                               Ther Activity             Activity tolerance             FMC/Dexterity             Theraputty HEP                                                                 Orthotic fit                                       Modalities             HP 5 5                             "

## 2024-01-11 ENCOUNTER — OFFICE VISIT (OUTPATIENT)
Dept: OCCUPATIONAL THERAPY | Age: 49
End: 2024-01-11
Payer: MEDICARE

## 2024-01-11 DIAGNOSIS — M65.341 TRIGGER RING FINGER OF RIGHT HAND: ICD-10-CM

## 2024-01-11 DIAGNOSIS — G56.01 CARPAL TUNNEL SYNDROME OF RIGHT WRIST: Primary | ICD-10-CM

## 2024-01-11 PROCEDURE — 97110 THERAPEUTIC EXERCISES: CPT

## 2024-01-11 PROCEDURE — 97530 THERAPEUTIC ACTIVITIES: CPT

## 2024-01-11 PROCEDURE — 97140 MANUAL THERAPY 1/> REGIONS: CPT

## 2024-01-11 NOTE — PROGRESS NOTES
"Daily Note     Today's date: 2024  Patient name: Adamaris Brian  : 1975  MRN: [id number]  Referring provider: Edward Resendez PA-C  Dx:   Encounter Diagnosis     ICD-10-CM    1. Carpal tunnel syndrome of right wrist  G56.01       2. Trigger ring finger of right hand  M65.341           Start Time: 1430  Stop Time: 1528  Total time in clinic (min): 58 minutes    Subjective: \"I get a shooting pain sometimes.\"      Objective: See treatment diary below      Assessment: Tolerated treatment well. Pt tolerated exercises well. She continues with discomfort in her volar wrist and some pillar pain in the palm. This improves with doing wrist ROM with thumb in flexed position. Could be from tension on her soft-tissues or neural tension.  Reviewed nerve glides, stretches, and other symptom management strategies. Issued dycem for improved scar mobilization. Instructed on theraputty HEP. Patient would benefit from continued OT      Plan: Continue per plan of care.  Progress treatment as tolerated.       Precautions: ECTR and ring trigger release   1x/week 6 weeks    Manuals  eval           Scar mob edu 10 10          Desensitization with spike ball  2 2                                    Ther Ex             Wrist AROM x20 x20 x20          Prayer stretch 3x30s 3x30s 3x30s          TGEs x20 x20 x20          RADHA PIP/DIP x10 X10 each X10 each          Digit ext stretch 3x30s 3x30s 3x30s          MNGs  x15 x15          Graded strengthening  Red flex bar 2x15 bend Red flex bar 2x15 bend                                                              Ther Activity             Activity tolerance             FMC/Dexterity             Theraputty HEP   10m                                                              Orthotic fit                                       Modalities             HP 5 5 5                            "

## 2024-01-15 ENCOUNTER — OFFICE VISIT (OUTPATIENT)
Dept: OBGYN CLINIC | Facility: CLINIC | Age: 49
End: 2024-01-15
Payer: MEDICARE

## 2024-01-15 ENCOUNTER — APPOINTMENT (OUTPATIENT)
Dept: RADIOLOGY | Age: 49
End: 2024-01-15
Payer: MEDICARE

## 2024-01-15 VITALS
DIASTOLIC BLOOD PRESSURE: 89 MMHG | HEART RATE: 79 BPM | SYSTOLIC BLOOD PRESSURE: 150 MMHG | HEIGHT: 65 IN | BODY MASS INDEX: 33.32 KG/M2 | WEIGHT: 200 LBS

## 2024-01-15 DIAGNOSIS — M25.511 ACUTE PAIN OF RIGHT SHOULDER: Primary | ICD-10-CM

## 2024-01-15 DIAGNOSIS — S49.90XS: ICD-10-CM

## 2024-01-15 DIAGNOSIS — M25.511 ACUTE PAIN OF RIGHT SHOULDER: ICD-10-CM

## 2024-01-15 DIAGNOSIS — S43.51XA ACROMIOCLAVICULAR SPRAIN, RIGHT, INITIAL ENCOUNTER: ICD-10-CM

## 2024-01-15 PROCEDURE — 99213 OFFICE O/P EST LOW 20 MIN: CPT | Performed by: PHYSICIAN ASSISTANT

## 2024-01-15 PROCEDURE — 73030 X-RAY EXAM OF SHOULDER: CPT

## 2024-01-15 NOTE — PROGRESS NOTES
Orthopaedic Surgery - Office Note  Adamaris Brian (48 y.o. adult)   : 1975   MRN: [id number]  Encounter Date: 1/15/2024    Chief Complaint   Patient presents with    Right Shoulder - Pain         Assessment/Plan  Diagnoses and all orders for this visit:    Acute pain of right shoulder  -     XR shoulder 2+ vw right; Future  -     Ambulatory Referral to Orthopedic Surgery; Future    Acromioclavicular sprain, right, initial encounter  -     Ambulatory Referral to Orthopedic Surgery; Future    Acromioclavicular joint injury, sequela-cyst    The diagnosis as well as treatment options were reviewed with the patient in the office today.  I suspect she has developed an AC joint cyst after trauma without significant AC joint separation.  Aspiration cortisone treatment options were reviewed.  Pros and cons were discussed and shared decision-making was utilized.  She would like to hold off on any type of injection to see if it improves as she is having no difficulty with activities of daily living and only mild difficulty falling asleep at night.  Physical therapy would be felt to have limited benefit at this time as she has full range of motion and strength.  She will continue to monitor symptoms and return if they worsen otherwise she will return in 3 to 4 weeks for repeat evaluation with orthopedic shoulder surgeon.     Return for Recheck 3-4 weeks with ortho shoulder surgeon.        History of Present Illness  This is a previous patient here for right shoulder pain.  She reports about 3 to 4 weeks ago she had a fall while walking her dog landing on the top of the shoulder.  She had pain localized to AC joint for several weeks afterwards that gradually got better.  She reports she then began to develop and noticed a bump on the region of the AC joint that she wanted to get checked out.  She denies any paresthesias in the right upper extremity.  She denies any neck pain.  She has not had problems like this in the  "past.  She has not lost any motion of the right shoulder.  She has had no treatment.    Patient's status post right carpal tunnel and trigger finger release on 12/6/2023 with Dr. Richards.  She has a contributing medical history of fibromyalgia, lupus, and peripheral neuropathy.  She had a cervical neck MRI in 2021 showing moderate to severe degenerative changes C4-C7.    Review of Systems  Pertinent items are noted in HPI.  All other systems were reviewed and are negative.    Physical Exam  /89 (BP Location: Left arm, Patient Position: Sitting, Cuff Size: Standard) Comment: recent change in htn meds  Pulse 79   Ht 5' 5\" (1.651 m)   Wt 90.7 kg (200 lb)   LMP  (LMP Unknown)   BMI 33.28 kg/m²   Cons: Appears well.  No apparent distress.  Psych: Alert. Oriented x3.  Mood and affect normal.    Right shoulder has no skin breakdown lesion or signs of infection.  She does have what appears to be a small fluid cyst in the region of the AC joint.  This area is mildly tender to palpation.  She has full active and passive range of motion to the right shoulder without limitations.  She has 5 out of 5 rotator cuff strength in all planes without weakness or reproducible pain symptoms.  She has a negative impingement sign.  She is neurovascularly intact in the right upper extremity.  She has no shoulder instability with anterior and posterior compression glide testing.  She has a negative sulcus sign.  She is nontender at the clavicle.  Cervical neck range of motion is grossly within normal limits.      X-rays performed in the office today 3 views of right shoulder show no acute fractures or dislocations.  There is no significant step-off deformity seen at the right AC joint where there is mild degenerative changes.  She does have a suspected cystic change in the AC joint noted.  X-rays were reviewed from orthopedic standpoint will await official radiologist interpretation.          Studies Reviewed  MRI CERVICAL SPINE " WITHOUT CONTRAST     INDICATION: M54.12: Radiculopathy, cervical region.     COMPARISON:  CT 5/28/2017     TECHNIQUE:  Sagittal T1, sagittal T2, sagittal inversion recovery, axial T2, axial  2D merge     IMAGE QUALITY:  Diagnostic     FINDINGS:     ALIGNMENT:  Straightening of normal cervical lordosis.  Multilevel spondylosis and osteoarthritis.     MARROW SIGNAL:  Normal marrow signal is identified within the visualized bony structures.  No discrete marrow lesion.     CERVICAL AND VISUALIZED THORACIC CORD:  Normal signal within the visualized cord.  The cord is compressed at several levels, to include C4-C5 on the right, centrally and to the left at C5-C6, and slightly less so, centrally at C6-C7.  Cord signal is   maintained.     PREVERTEBRAL AND PARASPINAL SOFT TISSUES:  Normal.     VISUALIZED POSTERIOR FOSSA:  The visualized posterior fossa demonstrates no abnormal signal.     CERVICAL DISC SPACES:     C2-C3:  Normal.     C3-C4:  Normal.     C4-C5:  Circumferential bulge with broad-based right posterolateral protrusion.  AP dimension of the canal reduced to estimated 4-5 mm.  Moderately severe deformity of the cord to the right, where remote CT demonstrates anterolisthesis.     C5-C6:  Circumferential bulge with marginal osteophytes, these are asymmetric to the left.  AP dimension of the canal reduced to estimated 5-6 mm.  Cord is flattened, mild bilateral foraminal stenosis.     C6-C7:  Circumferential bulge with marginal osteophytes.  AP dimension of the sac reduced to estimated 6-7 mm.  Again the cord is flattened.  The foramen are patent.     C7-T1:  Moderately advanced bilateral facet arthrosis with mild degenerative anterolisthesis.  No significant stenosis.       UPPER THORACIC DISC SPACES:  Normal.     IMPRESSION:     Multilevel spondylosis and osteoarthritis with 3 level cord compression; decreasing in severity from C4-C5, C5-C6 and C6-C7.  Cord signal is maintained despite cord deformity.               Workstation performed: [id number]       Procedures  No procedures today.    Medical, Surgical, Family, and Social History  The patient's medical history, family history, and social history, were reviewed and updated as appropriate.    Past Medical History:   Diagnosis Date    Asthma     Bipolar 1 disorder (HCC)     Cluster headache     CTS (carpal tunnel syndrome)     Difficulty walking     Disease of thyroid gland     Fibromyalgia     Fibromyalgia, primary     Gout     Headache, tension-type     Hyperlipidemia     Hypertension     Lupus (HCC)     Memory loss     Migraine     Mild sleep apnea     Peripheral neuropathy     PONV (postoperative nausea and vomiting)     Vision loss        Past Surgical History:   Procedure Laterality Date    GALLBLADDER SURGERY  2008    HYSTERECTOMY  2014    LAPAROSCOPIC COLON RESECTION  2001    AL NDSC WRST SURG W/RLS TRANSVRS CARPL LIGM Right 12/6/2023    Procedure: Right endoscopic carpal tunnel release;  Surgeon: Alek Richards MD;  Location: [facility];  Service: Orthopedics    AL TENDON SHEATH INCISION Right 12/6/2023    Procedure: Right ring trigger finger release;  Surgeon: Alek Richards MD;  Location: [facility];  Service: Orthopedics       Family History   Problem Relation Age of Onset    Lupus Mother     Osteoporosis Mother     Hypotension Mother     Hypertension Father     Diabetes Father     Heart disease Father     Migraines Father     Stroke Father     Diabetes Sister     Schizophrenia Sister     Hypertension Sister     Diabetes Maternal Grandmother     Rheum arthritis Maternal Grandmother     Hypertension Maternal Grandmother     Neuropathy Maternal Grandmother     Seizures Maternal Grandmother     Breast cancer Maternal Aunt 40    No Known Problems Maternal Aunt     No Known Problems Maternal Aunt     No Known Problems Maternal Aunt     No Known Problems Paternal Aunt        Social History     Occupational History    Not on file   Tobacco Use    Smoking  status: Former     Current packs/day: 1.00     Average packs/day: 1 pack/day for 5.0 years (5.0 ttl pk-yrs)     Types: Cigarettes    Smokeless tobacco: Never    Tobacco comments:     quit at 17 years old   Vaping Use    Vaping status: Never Used   Substance and Sexual Activity    Alcohol use: No    Drug use: No    Sexual activity: Yes     Partners: Male     Birth control/protection: Female Sterilization       Allergies   Allergen Reactions    Pregabalin      Other reaction(s): blurred vision    Shellfish-Derived Products - Food Allergy Anaphylaxis and Hives    Sumatriptan      Other reaction(s): Altered Heart Rate    Triptans Shortness Of Breath and Palpitations    Latex Swelling and Rash     Other reaction(s): Hives/Skin Rash  Other reaction(s): Hives/Skin Rash    Sulfa Antibiotics Swelling and Rash     Other reaction(s): Hives/Skin Rash  Other reaction(s): Hives/Skin Rash    Monistat [Miconazole] Swelling         Current Outpatient Medications:     acetaminophen (TYLENOL) 325 mg tablet, Take 650 mg by mouth if needed for mild pain, Disp: , Rfl:     acetaminophen (TYLENOL) 650 mg CR tablet, Take 1 tablet (650 mg total) by mouth every 8 (eight) hours as needed for mild pain, Disp: 30 tablet, Rfl: 0    amLODIPine (NORVASC) 5 mg tablet, Take 5 mg by mouth daily, Disp: , Rfl:     amoxicillin-clavulanate (AUGMENTIN) 500-125 mg per tablet, Take 1 tablet by mouth every 8 (eight) hours, Disp: , Rfl:     apixaban (Eliquis) 5 mg, Take 2 tablets (10 mg total) by mouth 2 (two) times a day for 7 days, THEN 1 tablet (5 mg total) 2 (two) times a day for 23 days., Disp: 74 tablet, Rfl: 0    ARIPiprazole (ABILIFY) 5 mg tablet, Take 5 mg by mouth every morning (Patient not taking: Reported on 10/5/2023), Disp: , Rfl:     aspirin-acetaminophen-caffeine (EXCEDRIN MIGRAINE) 250-250-65 MG per tablet, Take 2 tablets by mouth if needed for headaches, Disp: , Rfl:     buPROPion (WELLBUTRIN SR) 150 mg 12 hr tablet, Take 150 mg by mouth  every morning (Patient not taking: Reported on 10/5/2023), Disp: , Rfl:     Cetirizine HCl 10 MG CAPS, Take 10 mg by mouth daily  , Disp: , Rfl:     doxepin (SINEquan) 100 mg capsule, Take 100 mg by mouth daily at bedtime (Patient not taking: Reported on 10/5/2023), Disp: , Rfl:     fluconazole (DIFLUCAN) 150 mg tablet, take 1 tablet by mouth EVERY 3 DAY, Disp: , Rfl:     fluticasone (FLONASE) 50 mcg/act nasal spray, 1 spray into each nostril daily, Disp: 16 g, Rfl: 0    fluticasone (FLOVENT HFA) 110 MCG/ACT inhaler, Inhale 1 puff as needed , Disp: , Rfl:     hydroxychloroquine (PLAQUENIL) 200 mg tablet, Take 200 mg by mouth as needed During Summer time  (Patient not taking: Reported on 10/5/2023), Disp: , Rfl:     ibuprofen (MOTRIN) 800 mg tablet, Take 800 mg by mouth every 6 (six) hours, Disp: , Rfl:     levothyroxine 88 mcg tablet, Take 88 mcg by mouth daily , Disp: , Rfl:     lisinopril (ZESTRIL) 40 mg tablet, take 1 tablet by mouth once daily, Disp: 90 tablet, Rfl: 1    meclizine (ANTIVERT) 12.5 MG tablet, Take 12.5 mg by mouth as needed, Disp: , Rfl:     Multiple Vitamins-Minerals (MULTIVITAMIN ADULT PO), Take 1 tablet by mouth daily, Disp: , Rfl:     Nurtec 75 MG TBDP, DISSOLVE 1 TABLET IN MOUTH EVERY OTHER DAY, Disp: 16 tablet, Rfl: 11    omeprazole (PriLOSEC) 40 MG capsule, Take 40 mg by mouth as needed (Patient not taking: Reported on 10/5/2023), Disp: , Rfl: 0    oxyCODONE (ROXICODONE) 15 mg immediate release tablet, Take 15 mg by mouth as needed, Disp: , Rfl:     Ozempic, 0.25 or 0.5 MG/DOSE, 2 MG/1.5ML injection pen, inject 0.25 milligrams subcutaneously every week for 30 DAYS, Disp: , Rfl:     Ozempic, 0.25 or 0.5 MG/DOSE, 2 MG/3ML injection pen, INJECT 0.5MG SUBCUTANEOUSLY WEEKLY FOR 30 DAYS, Disp: , Rfl:     PROAIR  (90 Base) MCG/ACT inhaler, Inhale 1-2 puffs as needed , Disp: , Rfl:     prochlorperazine (COMPAZINE) 10 mg tablet, Take 1 tablet (10 mg total) by mouth every 6 (six) hours as  needed (migraine) (Patient taking differently: Take 10 mg by mouth if needed (migraine)), Disp: 20 tablet, Rfl: 3    propranolol (INDERAL LA) 160 mg, Take 160 mg by mouth 3 (three) times a day, Disp: , Rfl: 0    Reyvow 100 MG tablet, TAKE 1 TABLET (100MG) ONE TIME AS NEEDED FOR MIGRAINE.DO NOT USE MORE THAN ONE DOSEPER DAY OR MORE THAN 8 DOSES PER MONTH, Disp: 8 tablet, Rfl: 5    telmisartan (MICARDIS) 80 MG tablet, Take 80 mg by mouth daily, Disp: , Rfl:     telmisartan-hydrochlorothiazide (MICARDIS HCT) 80-12.5 MG per tablet, Take 1 tablet by mouth daily, Disp: , Rfl:     topiramate (TOPAMAX) 100 mg tablet, One in am and two at bedtime daily (Patient not taking: Reported on 10/5/2023), Disp: 270 tablet, Rfl: 3    valACYclovir (VALTREX) 1,000 mg tablet, 1 tablet as needed , Disp: , Rfl:     zaleplon (SONATA) 5 MG capsule, take 1 capsule by mouth at bedtime, Disp: 30 capsule, Rfl: 2    Current Facility-Administered Medications:     Botulinum Toxin Type A SOLR 200 Units, 200 Units, Injection, Q3 Months, Kassie Andrade PA-C, 200 Units at 03/18/21 1154      Brett Daugherty PA-C

## 2024-01-18 ENCOUNTER — PROCEDURE VISIT (OUTPATIENT)
Dept: NEUROLOGY | Facility: CLINIC | Age: 49
End: 2024-01-18
Payer: MEDICARE

## 2024-01-18 VITALS — SYSTOLIC BLOOD PRESSURE: 131 MMHG | TEMPERATURE: 98.7 F | DIASTOLIC BLOOD PRESSURE: 74 MMHG | HEART RATE: 86 BPM

## 2024-01-18 DIAGNOSIS — G43.709 CHRONIC MIGRAINE WITHOUT AURA WITHOUT STATUS MIGRAINOSUS, NOT INTRACTABLE: Primary | ICD-10-CM

## 2024-01-18 PROCEDURE — 64615 CHEMODENERV MUSC MIGRAINE: CPT | Performed by: PHYSICIAN ASSISTANT

## 2024-01-18 NOTE — PROGRESS NOTES
"Universal Protocol   Consent: Verbal consent obtained. Written consent obtained.  Risks and benefits: risks, benefits and alternatives were discussed  Consent given by: patient  Time out: Immediately prior to procedure a \"time out\" was called to verify the correct patient, procedure, equipment, support staff and site/side marked as required.  Patient understanding: patient states understanding of the procedure being performed  Patient consent: the patient's understanding of the procedure matches consent given  Procedure consent: procedure consent matches procedure scheduled  Relevant documents: relevant documents present and verified  Patient identity confirmed: verbally with patient      Chemodenervation     Date/Time  1/18/2024 3:00 PM     Performed by  Kassie Andrade PA-C   Authorized by  Kassie Andrade PA-C     Pre-procedure details      Prepped With: Alcohol     Anesthesia  (see MAR for exact dosages):     Anesthesia method:  None   Procedure details      Position:  Upright   Botox      Botox Type:  Type A    Brand:  Botox    mL's of Botulinum Toxin:  200    Final Concentration per CC:  50 units    Needle Gauge:  30 G 2.5 inch   Procedures      Botox Procedures: chronic headache      Indications: migraines     Injection Location      Head / Face:  L superior cervical paraspinal, R superior cervical paraspinal, L , R , L frontalis, R frontalis, L medial occipitalis, R medial occipitalis, procerus, R temporalis, L temporalis, R superior trapezius and L superior trapezius    L  injection amount:  5 unit(s)    R  injection amount:  5 unit(s)    L lateral frontalis:  5 unit(s)    R lateral frontalis:  5 unit(s)    L medial frontalis:  5 unit(s)    R medial frontalis:  5 unit(s)    L temporalis injection amount:  20 unit(s)    R temporalis injection amount:  20 unit(s)    Procerus injection amount:  5 unit(s)    L medial occipitalis injection amount:  15 unit(s)    R medial " occipitalis injection amount:  15 unit(s)    L superior cervical paraspinal injection amount:  10 unit(s)    R superior cervical paraspinal injection amount:  10 unit(s)    L superior trapezius injection amount:  15 unit(s)    R superior trapezius injection amount:  15 unit(s)   Total Units      Total units used:  200    Total units discarded:  0   Post-procedure details      Chemodenervation:  Chronic migraine    Facial Nerve Location::  Bilateral facial nerve    Patient tolerance of procedure:  Tolerated well, no immediate complications   Comments        5 units naris bilaterally  20 units frontalis   15 units occipitalis  All medically necessary

## 2024-01-24 NOTE — PROGRESS NOTES
"Daily Note     Today's date: 2024  Patient name: Adamaris Brian  : 1975  MRN: [id number]  Referring provider: Edward Resendez PA-C  Dx:   Encounter Diagnosis     ICD-10-CM    1. Carpal tunnel syndrome of right wrist  G56.01       2. Trigger ring finger of right hand  M65.341           Start Time: 1430  Stop Time: 1515  Total time in clinic (min): 45 minutes    Subjective: \"My hand is still stiff.\"      Objective: See treatment diary below      Assessment: Tolerated treatment well. Requires min cues for positioning during tendon glides. She is progressing well over course of therapy. Continues with weakness with . Patient would benefit from continued OT      Plan: Continue per plan of care.  Progress treatment as tolerated.       Precautions: ECTR and ring trigger release   1x/week 6 weeks    Manuals  eval          Scar mob edu 10 10 10         Desensitization with spike ball  2 2                                    Ther Ex             Wrist AROM x20 x20 x20 x20         Prayer stretch 3x30s 3x30s 3x30s 3x30s         TGEs x20 x20 x20 x20         RADHA PIP/DIP x10 X10 each X10 each X10 each         Digit ext stretch 3x30s 3x30s 3x30s 3x30s         MNGs  x15 x15 x15         Graded strengthening  Red flex bar 2x15 bend Red flex bar 2x15 bend Red/green flex bar 2x15 bend                                                             Ther Activity             Activity tolerance    Pink putty press using PVC pipe         FMC/Dexterity             Theraputty HEP   10m                                                              Orthotic fit                                       Modalities             HP 5 5 5 5                           "

## 2024-01-25 ENCOUNTER — OFFICE VISIT (OUTPATIENT)
Dept: OCCUPATIONAL THERAPY | Age: 49
End: 2024-01-25
Payer: MEDICARE

## 2024-01-25 DIAGNOSIS — G56.01 CARPAL TUNNEL SYNDROME OF RIGHT WRIST: Primary | ICD-10-CM

## 2024-01-25 DIAGNOSIS — M65.341 TRIGGER RING FINGER OF RIGHT HAND: ICD-10-CM

## 2024-01-25 PROCEDURE — 97140 MANUAL THERAPY 1/> REGIONS: CPT

## 2024-01-25 PROCEDURE — 97530 THERAPEUTIC ACTIVITIES: CPT

## 2024-01-25 PROCEDURE — 97110 THERAPEUTIC EXERCISES: CPT

## 2024-01-31 DIAGNOSIS — G43.009 MIGRAINE WITHOUT AURA AND WITHOUT STATUS MIGRAINOSUS, NOT INTRACTABLE: ICD-10-CM

## 2024-02-01 ENCOUNTER — OFFICE VISIT (OUTPATIENT)
Dept: OCCUPATIONAL THERAPY | Age: 49
End: 2024-02-01
Payer: MEDICARE

## 2024-02-01 DIAGNOSIS — M65.341 TRIGGER RING FINGER OF RIGHT HAND: ICD-10-CM

## 2024-02-01 DIAGNOSIS — G56.01 CARPAL TUNNEL SYNDROME OF RIGHT WRIST: Primary | ICD-10-CM

## 2024-02-01 PROCEDURE — 97110 THERAPEUTIC EXERCISES: CPT

## 2024-02-01 PROCEDURE — 97140 MANUAL THERAPY 1/> REGIONS: CPT

## 2024-02-01 RX ORDER — LASMIDITAN 100 MG/1
TABLET ORAL
Qty: 8 TABLET | Refills: 5 | Status: SHIPPED | OUTPATIENT
Start: 2024-02-01

## 2024-02-01 NOTE — PROGRESS NOTES
"Daily Note     Today's date: 2024  Patient name: Adamaris Brian  : 1975  MRN: [id number]  Referring provider: Edward Resendez PA-C  Dx:   Encounter Diagnosis     ICD-10-CM    1. Carpal tunnel syndrome of right wrist  G56.01       2. Trigger ring finger of right hand  M65.341           Start Time: 1433  Stop Time: 1516  Total time in clinic (min): 43 minutes    Subjective: \"My palm has been hurting but I'm not doing anything to it.\"      Objective: See treatment diary below      Assessment: Tolerated treatment well. Pt reports some discomfort in her palm that does not correlate with any specific reason. Explained that pillar pain is a common symptom following CTR and that it takes time to resolve. Pt notes relief with desensitization techniques today. Patient would benefit from continued OT      Plan: Continue per plan of care.  Progress treatment as tolerated.       Precautions: ECTR and ring trigger release   1x/week 6 weeks    Manuals  eval         Scar mob edu 10 10 10 5        Desensitization with spike ball  2 2  5                                  Ther Ex             Wrist AROM x20 x20 x20 x20 x20        Prayer stretch 3x30s 3x30s 3x30s 3x30s 3x30s        TGEs x20 x20 x20 x20 x20        RADHA PIP/DIP x10 X10 each X10 each X10 each X20        Digit ext stretch 3x30s 3x30s 3x30s 3x30s 3x30s        MNGs  x15 x15 x15 x15        Graded strengthening  Red flex bar 2x15 bend Red flex bar 2x15 bend Red/green flex bar 2x15 bend Red/green flex bar 2x15 bend                                                            Ther Activity             Activity tolerance    Pink putty press using PVC pipe         FMC/Dexterity             Theraputty HEP   10m                                                              Orthotic fit                                       Modalities             HP 5 5 5 5 5                          "

## 2024-02-02 ENCOUNTER — OFFICE VISIT (OUTPATIENT)
Dept: OBGYN CLINIC | Facility: CLINIC | Age: 49
End: 2024-02-02
Payer: MEDICARE

## 2024-02-02 VITALS
DIASTOLIC BLOOD PRESSURE: 88 MMHG | BODY MASS INDEX: 32.99 KG/M2 | HEART RATE: 96 BPM | SYSTOLIC BLOOD PRESSURE: 131 MMHG | WEIGHT: 198 LBS | HEIGHT: 65 IN

## 2024-02-02 DIAGNOSIS — M25.511 ACUTE PAIN OF RIGHT SHOULDER: ICD-10-CM

## 2024-02-02 DIAGNOSIS — S43.51XA ACROMIOCLAVICULAR SPRAIN, RIGHT, INITIAL ENCOUNTER: ICD-10-CM

## 2024-02-02 PROCEDURE — 99213 OFFICE O/P EST LOW 20 MIN: CPT | Performed by: ORTHOPAEDIC SURGERY

## 2024-02-02 RX ORDER — ERGOCALCIFEROL 1.25 MG/1
50000 CAPSULE ORAL WEEKLY
COMMUNITY
Start: 2023-12-18

## 2024-02-02 RX ORDER — LIFITEGRAST 50 MG/ML
SOLUTION/ DROPS OPHTHALMIC
COMMUNITY
Start: 2023-12-06

## 2024-02-02 NOTE — PROGRESS NOTES
"CHIEF COMPLAIN/REASON FOR VISIT  Chief Complaint   Patient presents with    Right Shoulder - Pain       HISTORY OF PRESENT ILLNESS  Adamaris Brian is a RHD 48 y.o. adult who presents for evaluation of their right shoulder. Patient said over a month ago she fell on her right shoulder. She said she has been dealing with pain in the shoulder on and off. She reports worsening pain with overhead activities.    HPI from orthopedic provider, Brett Daugherty, on 1/15/24: \"This is a previous patient here for right shoulder pain.  She reports about 3 to 4 weeks ago she had a fall while walking her dog landing on the top of the shoulder.  She had pain localized to AC joint for several weeks afterwards that gradually got better.  She reports she then began to develop and noticed a bump on the region of the AC joint that she wanted to get checked out.  She denies any paresthesias in the right upper extremity.  She denies any neck pain.  She has not had problems like this in the past.  She has not lost any motion of the right shoulder.  She has had no treatment.     Patient's status post right carpal tunnel and trigger finger release on 12/6/2023 with Dr. Richards.  She has a contributing medical history of fibromyalgia, lupus, and peripheral neuropathy.  She had a cervical neck MRI in 2021 showing moderate to severe degenerative changes C4-C7.\"    REVIEW OF SYSTEMS  Review of systems was performed and, woutside that mentioned in the HPI, it was negative for symptomology related to the integumentary, hematologic, immunologic, allergic, neurologic, cardiovascular, respiratory, GI or  systems.     MEDICAL HISTORY  Patient Active Problem List   Diagnosis    Palpitations    AVNRT (AV win re-entry tachycardia)    Chronic migraine without aura without status migrainosus, not intractable    Interstitial cystitis    Vaginal candidiasis    Encounter for gynecological examination    Screening for breast cancer    Hyperlipidemia    Disease " of thyroid gland    Yeast infection of the vagina    Vaginal discharge    Other specified anxiety disorders    Vaginal dryness    Pelvic pain    Urine frequency    Full incontinence of feces    Cervicogenic headache    Cervicalgia    Vaginal yeast infection    IC (interstitial cystitis)    Obesity, morbid (HCC)    History of hysterectomy    CK (obstructive sleep apnea)    Deep vein thrombosis (DVT) of distal vein of lower extremity (HCC)       SURGICAL HISTORY  Past Surgical History:   Procedure Laterality Date    GALLBLADDER SURGERY  2008    HYSTERECTOMY  2014    LAPAROSCOPIC COLON RESECTION  2001    CO NDSC WRST SURG W/RLS TRANSVRS CARPL LIGM Right 12/6/2023    Procedure: Right endoscopic carpal tunnel release;  Surgeon: Alek Richards MD;  Location: [facility];  Service: Orthopedics    CO TENDON SHEATH INCISION Right 12/6/2023    Procedure: Right ring trigger finger release;  Surgeon: Alek Richards MD;  Location: [facility];  Service: Orthopedics       CURRENT MEDICATIONS    Current Outpatient Medications:     acetaminophen (TYLENOL) 325 mg tablet, Take 650 mg by mouth if needed for mild pain, Disp: , Rfl:     acetaminophen (TYLENOL) 650 mg CR tablet, Take 1 tablet (650 mg total) by mouth every 8 (eight) hours as needed for mild pain, Disp: 30 tablet, Rfl: 0    amLODIPine (NORVASC) 5 mg tablet, Take 5 mg by mouth daily, Disp: , Rfl:     amoxicillin-clavulanate (AUGMENTIN) 500-125 mg per tablet, Take 1 tablet by mouth every 8 (eight) hours, Disp: , Rfl:     apixaban (Eliquis) 5 mg, Take 2 tablets (10 mg total) by mouth 2 (two) times a day for 7 days, THEN 1 tablet (5 mg total) 2 (two) times a day for 23 days., Disp: 74 tablet, Rfl: 0    aspirin-acetaminophen-caffeine (EXCEDRIN MIGRAINE) 250-250-65 MG per tablet, Take 2 tablets by mouth if needed for headaches, Disp: , Rfl:     Cetirizine HCl 10 MG CAPS, Take 10 mg by mouth daily  , Disp: , Rfl:     ergocalciferol (VITAMIN D2) 50,000 units, Take 50,000  Units by mouth once a week, Disp: , Rfl:     fluconazole (DIFLUCAN) 150 mg tablet, take 1 tablet by mouth EVERY 3 DAY, Disp: , Rfl:     fluticasone (FLONASE) 50 mcg/act nasal spray, 1 spray into each nostril daily, Disp: 16 g, Rfl: 0    fluticasone (FLOVENT HFA) 110 MCG/ACT inhaler, Inhale 1 puff as needed , Disp: , Rfl:     ibuprofen (MOTRIN) 800 mg tablet, Take 800 mg by mouth every 6 (six) hours, Disp: , Rfl:     lasmiditan succinate (Reyvow) 100 mg tablet, TAKE 1 TABLET (100MG) ONE TIME AS NEEDED FOR MIGRAINE.DO NOT USE MORE THAN ONE DOSEPER DAY OR MORE THAN 8 DOSES PER MONTH, Disp: 8 tablet, Rfl: 5    levothyroxine 88 mcg tablet, Take 88 mcg by mouth daily , Disp: , Rfl:     lisinopril (ZESTRIL) 40 mg tablet, take 1 tablet by mouth once daily, Disp: 90 tablet, Rfl: 1    meclizine (ANTIVERT) 12.5 MG tablet, Take 12.5 mg by mouth as needed, Disp: , Rfl:     Multiple Vitamins-Minerals (MULTIVITAMIN ADULT PO), Take 1 tablet by mouth daily, Disp: , Rfl:     Nurtec 75 MG TBDP, DISSOLVE 1 TABLET IN MOUTH EVERY OTHER DAY, Disp: 16 tablet, Rfl: 11    oxyCODONE (ROXICODONE) 15 mg immediate release tablet, Take 15 mg by mouth as needed, Disp: , Rfl:     Ozempic, 0.25 or 0.5 MG/DOSE, 2 MG/1.5ML injection pen, inject 0.25 milligrams subcutaneously every week for 30 DAYS, Disp: , Rfl:     Ozempic, 0.25 or 0.5 MG/DOSE, 2 MG/3ML injection pen, INJECT 0.5MG SUBCUTANEOUSLY WEEKLY FOR 30 DAYS, Disp: , Rfl:     PROAIR  (90 Base) MCG/ACT inhaler, Inhale 1-2 puffs as needed , Disp: , Rfl:     prochlorperazine (COMPAZINE) 10 mg tablet, Take 1 tablet (10 mg total) by mouth every 6 (six) hours as needed (migraine) (Patient taking differently: Take 10 mg by mouth if needed (migraine)), Disp: 20 tablet, Rfl: 3    propranolol (INDERAL LA) 160 mg, Take 160 mg by mouth 3 (three) times a day, Disp: , Rfl: 0    telmisartan (MICARDIS) 80 MG tablet, Take 80 mg by mouth daily, Disp: , Rfl:     telmisartan-hydrochlorothiazide (MICARDIS  HCT) 80-12.5 MG per tablet, Take 1 tablet by mouth daily, Disp: , Rfl:     valACYclovir (VALTREX) 1,000 mg tablet, 1 tablet as needed , Disp: , Rfl:     Xiidra 5 % op solution, instill 1 drop into both eyes twice a day, Disp: , Rfl:     zaleplon (SONATA) 5 MG capsule, take 1 capsule by mouth at bedtime, Disp: 30 capsule, Rfl: 2    ARIPiprazole (ABILIFY) 5 mg tablet, Take 5 mg by mouth every morning (Patient not taking: Reported on 10/5/2023), Disp: , Rfl:     buPROPion (WELLBUTRIN SR) 150 mg 12 hr tablet, Take 150 mg by mouth every morning (Patient not taking: Reported on 10/5/2023), Disp: , Rfl:     doxepin (SINEquan) 100 mg capsule, Take 100 mg by mouth daily at bedtime (Patient not taking: Reported on 10/5/2023), Disp: , Rfl:     hydroxychloroquine (PLAQUENIL) 200 mg tablet, Take 200 mg by mouth as needed During Summer time  (Patient not taking: Reported on 10/5/2023), Disp: , Rfl:     omeprazole (PriLOSEC) 40 MG capsule, Take 40 mg by mouth as needed (Patient not taking: Reported on 10/5/2023), Disp: , Rfl: 0    topiramate (TOPAMAX) 100 mg tablet, One in am and two at bedtime daily (Patient not taking: Reported on 10/5/2023), Disp: 270 tablet, Rfl: 3    Current Facility-Administered Medications:     Botulinum Toxin Type A SOLR 200 Units, 200 Units, Injection, Q3 Months, Kassie Andrade PA-C, 200 Units at 03/18/21 1154    SOCIAL HISTORY  Social History     Socioeconomic History    Marital status: Single     Spouse name: Not on file    Number of children: Not on file    Years of education: Not on file    Highest education level: Not on file   Occupational History    Not on file   Tobacco Use    Smoking status: Former     Current packs/day: 1.00     Average packs/day: 1 pack/day for 5.0 years (5.0 ttl pk-yrs)     Types: Cigarettes    Smokeless tobacco: Never    Tobacco comments:     quit at 17 years old   Vaping Use    Vaping status: Never Used   Substance and Sexual Activity    Alcohol use: No    Drug use: No     Sexual activity: Yes     Partners: Male     Birth control/protection: Female Sterilization   Other Topics Concern    Not on file   Social History Narrative    Not on file     Social Determinants of Health     Financial Resource Strain: Low Risk  (2/3/2023)    Overall Financial Resource Strain (CARDIA)     Difficulty of Paying Living Expenses: Not hard at all   Food Insecurity: No Food Insecurity (2/3/2023)    Hunger Vital Sign     Worried About Running Out of Food in the Last Year: Never true     Ran Out of Food in the Last Year: Never true   Transportation Needs: No Transportation Needs (2/3/2023)    PRAPARE - Transportation     Lack of Transportation (Medical): No     Lack of Transportation (Non-Medical): No   Physical Activity: Insufficiently Active (5/5/2022)    Exercise Vital Sign     Days of Exercise per Week: 2 days     Minutes of Exercise per Session: 20 min   Stress: Stress Concern Present (5/5/2022)    [employer] [place] [employer] - Occupational Stress Questionnaire     Feeling of Stress : To some extent   Social Connections: Socially Isolated (5/5/2022)    Social Connection and Isolation Panel [NHANES]     Frequency of Communication with Friends and Family: More than three times a week     Frequency of Social Gatherings with Friends and Family: More than three times a week     Attends Faith Services: Never     Active Member of Clubs or Organizations: No     Attends Club or Organization Meetings: Never     Marital Status: Never    Intimate Partner Violence: Not At Risk (5/5/2022)    Humiliation, Afraid, Rape, and Kick questionnaire     Fear of Current or Ex-Partner: No     Emotionally Abused: No     Physically Abused: No     Sexually Abused: No   Housing Stability: Low Risk  (2/3/2023)    Housing Stability Vital Sign     Unable to Pay for Housing in the Last Year: No     Number of Places Lived in the Last Year: 1     Unstable Housing in the Last Year: No       Objective     VITAL  "SIGNS  /88 (BP Location: Left arm, Patient Position: Sitting, Cuff Size: Adult)   Pulse 96   Ht 5' 5\" (1.651 m) Comment: verbal  Wt 89.8 kg (198 lb)   LMP  (LMP Unknown)   BMI 32.95 kg/m²      PHYSICAL EXAM  General:   Well-appearing  No acute distress  Appears stated age    Right Shoulder  Negative tenderness to palpation over the acromioclavicular joint  Negative tenderness to palpation over the long head of the biceps tendon  Shoulder effusion none present  Shoulder abduction 180/180  Shoulder forward flexion 180/180  Shoulder internal rotation T10 / T7  Supraspinatus/ABD 5/5   Infraspinatus/ER 5/5   Negative Belly Press/SS  Positive Neer  Positive Galeano  Positive O'briens  Skin is intact with no erythema, warmth or drainage  Motor strength intact distally  Sensation to light touch is normal in the axillary, radial, ulnar, and median nerve distributions.  Fingers warm and perfused    RADIOGRAPHIC EXAMINATION/DIAGNOSTICS:  Procedure: XR shoulder 2+ vw right    Result Date: 1/15/2024  Narrative: RIGHT SHOULDER INDICATION:   Pain in right shoulder. COMPARISON:  There are no previous examinations available for comparison. VIEWS:  XR SHOULDER 2+ VW RIGHT FINDINGS: There is no acute fracture or dislocation. No significant degenerative changes. No lytic or blastic osseous lesion. Soft tissues are unremarkable.     Impression: No acute osseous abnormality. Electronically signed: 01/15/2024 04:58 PM Gallo Arroyo MD     ASSESSMENT/PLAN:  Right shoulder pain; AC joint sprain    Today, we discussed conservative treatment options including but are certainly not limited to: Rest, ice, compression, elevation, activity modification, anti-inflammatory medication, physical therapy, and/or injections.  We discussed benefits of each potential treatment option.  After discussion, patient was agreeable to trying Rest, Ice, Compression, Elevation, Activity Modification, and Anti-inflammatory Medication. She declined PT " script. Advised it may take up to 6-8 weeks to heal on its own. Discussion had with patient about whether to pursue MRI. After discussion, patient would prefer to hold off on MRI at this time. Encouraged patient to let us know if his pain worsens or changes their mind and we would be happy to order this. We will plan to follow up with the patient in 6 weeks.  They are understanding that if the pain should worsen or they develop new symptoms to please reach out to us sooner. Patient is understanding and agreeable to this plan.       Scribe Attestation      I,:  Davi Carver PA-C am acting as a scribe while in the presence of the attending physician.:       I,:  Luke Gamez MD personally performed the services described in this documentation    as scribed in my presence.:

## 2024-02-14 ENCOUNTER — OFFICE VISIT (OUTPATIENT)
Dept: NEUROLOGY | Facility: CLINIC | Age: 49
End: 2024-02-14
Payer: MEDICARE

## 2024-02-14 VITALS
WEIGHT: 199.3 LBS | DIASTOLIC BLOOD PRESSURE: 80 MMHG | HEIGHT: 65 IN | TEMPERATURE: 98.3 F | HEART RATE: 80 BPM | SYSTOLIC BLOOD PRESSURE: 140 MMHG | BODY MASS INDEX: 33.2 KG/M2

## 2024-02-14 DIAGNOSIS — G47.33 OSA (OBSTRUCTIVE SLEEP APNEA): ICD-10-CM

## 2024-02-14 DIAGNOSIS — G43.709 CHRONIC MIGRAINE WITHOUT AURA WITHOUT STATUS MIGRAINOSUS, NOT INTRACTABLE: ICD-10-CM

## 2024-02-14 DIAGNOSIS — G43.009 MIGRAINE WITHOUT AURA AND WITHOUT STATUS MIGRAINOSUS, NOT INTRACTABLE: Primary | ICD-10-CM

## 2024-02-14 PROCEDURE — 99214 OFFICE O/P EST MOD 30 MIN: CPT | Performed by: PHYSICIAN ASSISTANT

## 2024-02-14 RX ORDER — ATOGEPANT 60 MG/1
60 TABLET ORAL DAILY
Qty: 30 TABLET | Refills: 11 | Status: SHIPPED | OUTPATIENT
Start: 2024-02-14

## 2024-02-14 RX ORDER — BUPROPION HYDROCHLORIDE 150 MG/1
150 TABLET, EXTENDED RELEASE ORAL 2 TIMES DAILY
Qty: 180 TABLET | Refills: 3 | Status: SHIPPED | OUTPATIENT
Start: 2024-02-14

## 2024-02-14 NOTE — ASSESSMENT & PLAN NOTE
Preventative:  Botox 200 units every 3 months  Will see if we can get Qulipta 60 mg daily approved.  Continue Nurtec 75 mg every other day until then  Continue medications from psychiatry--I will start your wellbutrin 150 mg twice a day    At onset of migraine, take Nurtec 75 mg.  Limit of 1 in 24 hours  May use Reyvow 100 mg at onset of migraine.  Limit of 1 in 24 hours.  Do not drive within 8 hours of taking  If needed may use prochlorperazine 10 mg.  May repeat in 8 hours.  Limit of 20 a month

## 2024-02-14 NOTE — PROGRESS NOTES
Review of Systems   Constitutional: Negative.    HENT: Negative.     Eyes: Negative.    Respiratory: Negative.     Cardiovascular: Negative.    Gastrointestinal: Negative.    Endocrine: Negative.    Genitourinary: Negative.    Musculoskeletal: Negative.    Skin: Negative.    Allergic/Immunologic: Negative.    Neurological:  Positive for headaches.   Hematological: Negative.    Psychiatric/Behavioral: Negative.

## 2024-02-14 NOTE — PATIENT INSTRUCTIONS
Preventative:  Botox 200 units every 3 months  Will see if we can get Qulipta 60 mg daily approved.  Continue Nurtec 75 mg every other day until then  Continue medications from psychiatry--I will start your wellbutrin 150 mg twice a day    At onset of migraine, take Nurtec 75 mg.  Limit of 1 in 24 hours  May use Reyvow 100 mg at onset of migraine.  Limit of 1 in 24 hours.  Do not drive within 8 hours of taking  If needed may use prochlorperazine 10 mg.  May repeat in 8 hours.  Limit of 20 a month    Talk to psychiatry   Continue to use your CPAP as this will help over time.... :)          Neck pain:   - We discussed the role of neck pathology and poor posture, with straightening of the normal cervical lordosis, in headaches. We discussed how tightening of the neck muscles can irritate the nerves in the occipital region of her head and cause or worsen head pain. We also discussed and demonstrated neck strengthening and relaxation exercises, as well as giving written instructions on these exercises.     - We talked about the importance of good posture for improving shoulder, neck, and head pain. The patient was given visualization exercises for correcting posture, which patient will practice at home. If this simple exercise does not help improve the posture, we will consider formal physical therapy in the future.     Medication overuse headaches:   - We discussed medication overuse headache (MOH) and how to avoid it in the future. It was explained that all analgesics have the potential to cause medication overuse headache (MOH) and analgesic overuse can negate the effectiveness of headache preventive measures. After successful MOH treatment, preventive medications for an underlying primary headache disorder have a greater chance for success. Avoid medications with narcotics, barbiturates, or caffeine in them as these can cause rebound headaches after very few doses and can interfere with other headache medicine  "efficacy. Taking any analgesics for more than 2-3 days a week can cause medication overuse headache.     Reproductive age women: Should take folic acid daily when taking anti-seizure drugs especially Depakote.     [place] Prescription Drug Monitoring Program report was reviewed and was appropriate      Headache management instructions  - When patient has a moderate to severe headache, they should seek rest, initiate relaxation and apply cold compresses to the head.   - Maintain regular sleep schedule. Adults need at least 7-8 hours of uninterrupted a night.   - Limit over the counter medications such as Tylenol, Ibuprofen, Aleve, Excedrin. (No more than 3 times a week).  - Maintain headache diary.  We discussed an ABBY for a smart phone is \"Migraine darrin\"  - Limit caffeine to 1-2 cups 8 to 16 oz a day or less.  - Avoid dietary trigger. (aged cheese, peanuts, MSG, aspartame and nitrates).  - Patient is to have regular frequent meals to prevent headache onset.    - Please drink at least 64 ounces of water a day to help remain hydrated.    Please call with any questions or concerns. Office number is [phone]          "

## 2024-02-14 NOTE — PROGRESS NOTES
[facility]  PATIENT:  Adamaris Brian  MRN:  [id number]  :  1975  DATE OF SERVICE:  2024      Assessment/Plan:     Chronic migraine without aura without status migrainosus, not intractable  Preventative:  Botox 200 units every 3 months  Will see if we can get Qulipta 60 mg daily approved.  Continue Nurtec 75 mg every other day until then  Continue medications from psychiatry--I will start your wellbutrin 150 mg twice a day    At onset of migraine, take Nurtec 75 mg.  Limit of 1 in 24 hours  May use Reyvow 100 mg at onset of migraine.  Limit of 1 in 24 hours.  Do not drive within 8 hours of taking  If needed may use prochlorperazine 10 mg.  May repeat in 8 hours.  Limit of 20 a month    CK (obstructive sleep apnea)  Recommended CPAP use         Problem List Items Addressed This Visit        Respiratory    CK (obstructive sleep apnea)     Recommended CPAP use            Cardiovascular and Mediastinum    Chronic migraine without aura without status migrainosus, not intractable     Preventative:  Botox 200 units every 3 months  Will see if we can get Qulipta 60 mg daily approved.  Continue Nurtec 75 mg every other day until then  Continue medications from psychiatry--I will start your wellbutrin 150 mg twice a day    At onset of migraine, take Nurtec 75 mg.  Limit of 1 in 24 hours  May use Reyvow 100 mg at onset of migraine.  Limit of 1 in 24 hours.  Do not drive within 8 hours of taking  If needed may use prochlorperazine 10 mg.  May repeat in 8 hours.  Limit of 20 a month         Relevant Medications    Atogepant (Qulipta) 60 MG TABS    buPROPion (WELLBUTRIN SR) 150 mg 12 hr tablet   Other Visit Diagnoses     Migraine without aura and without status migrainosus, not intractable    -  Primary    Relevant Medications    Atogepant (Qulipta) 60 MG TABS    buPROPion (WELLBUTRIN SR) 150 mg 12 hr tablet              History of Present Illness:   We had the pleasure of evaluating Adamaris HUERTAS  "Brian in neurological follow up  today for headaches.  As you know,  she is a 48 y.o.  right handed adult. 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  Was astay at home mother of four children and one grandchild. She has a PMH of palpitations and has had an ablation.     Patient states that she fell down a flight of stairs in early 2000s.  Went to hospital, unsure if went via ambulance.  Patient states she was out of work for about a month.  States had significant pain, bruising and was limping.  Went to PT, chiropractor after this.  She states went back to work as .  Thinks her issues with bowel incontinence began a few months after the fall.  First instance happened at work where she thought that she was going to pass gas and soiled herself.  Also began to have incontinence during intercourse.  For the past 2-3 years has had very little control.  States she doesn't go out unless she hasn't eaten for 24 hours.  She can drink fluids but if she eats she needs a toilet.  Patient varies from constipation to \"explosion.\"  Patient has seen Dr Blanc for gastroenterology but was over 6 years ago.  Patient states she is unable to stop it once starts and has a full bowel movement amount.  Patient had a colon resection for diverticulitis in 2001.  Was not diverted.       Patient states her headaches worsened around December 2019 or January 2020.  Her symptoms have worsened and having more nausea and vomiting.  Her psychiatrist changed her medications in November 2019 and she stopped taking her medications in January 2020.      Interval update as of 3/7/2023  Patient is under a great deal of stress currently with her kids     Interval update as of 3/7/2022  Doing same as before.  Botox is helpful for her.       Interval updates as of 4/22/2021  Patient states her migraines Are markedly improved since restarting Botox.  Patient states the 1st month after Botox was a little challenging however, " they are now down to 2 more mild migraines a week with 2 severe month.  Patient states the Nurtec does provide significant relief for her.      Unable to take triptans to do chest pain, SOB and palpitations.  QTc 12/23/2016 456 ms     What medications do you take or have you taken for your headaches?   Has been out of her medications from psychiatry due to insurance change as of 2/14/2024    Current Preventive:   MVI  Propranolol, telmisartan  Eliquis  Nurtec  Botox     Current Abortive:   Fioricet   Reyvow  Prochlorperazine  Nurtec     Prior Preventive:   Lisinopril, labetalol, amlodipine  Topamax, carbamazepine, Lyrica, Depakote (weigh gain), Gabapentin (no help)  cyclobenzaprine, Tizanidine,  Seroquel, citalopram, duloxetine, venlafaxine, olanzapine- ineffective, Abilify, Wellbutrin, doxepin  Cyproheptadine (too much dryness),   Aimovig 12/4/2019     Prior Abortive:   Dexamethasone,  Naproxen   Promethazine,   Kenalog,  Percocet, tramadol, Toradol, narcotics   triptans--tachycardia, SOB, palpitations     Headache trigger: Fatigue, Stress/Tension, Weather change, lack of sleep  Alternative therapies used in the past for headaches? none   Headache are worse if the patient: cough, sneeze, bending over  Aura - none     Any family history of migraines? Yes, mother and father  Any family history of aneurysms? No     Current pain:  8/10  How often do the headaches occur -   mild are 3 a week,   severe 2 a the entire month prior to restart of Botox was almost daily      What time of the day do the headaches start - wakes up with them  How long do the headaches last - all day,if takes Nurtec lasts 3-4 hours  Where are they located - frontalis, retro orbital, temporalis and then neck pain   What is the intensity of pain - 8-10/10; gets to a 10/10 1-2 times a month (if doesn't take her rescue medications early enough)  Describe your usual headache - Throbbing, Pounding, Pulsing, pressure     Associated symptoms:   -        Decrease of appetite, nausea, vomiting   -       Photophobia, phonophobia, sensitivity to smell   -       Problem with concentration  -       Stiff or sore neck  -       prefer to be alone and in a dark room, unable to work     Number of days missed per month because of headaches:  Work (or school) days: 2-3 times this year  Social or Family activities: occasionally     What time of the year do headaches occur more frequently?   Change of weather  Have you seen someone else for headaches or pain? Yes  Have you had trigger point injection performed and how often? No  Have you had Botox injection performed and how often? Yes   Have you had epidural injections or transforaminal injections performed? No     Have you used CBD or THC for your headaches and how often? No  Are you current pregnant or planning on getting pregnant? No, done with family planning  Have you ever had any Brain imaging? yes      7/23/2018 CT head  No acute abnormalities      I personally reviewed these images.     Reviewed old notes from physician seen in the past- see above HPI for summary of previous encounters.      With botox has had a reduction of at least 7 migraine days with less abortive medication, less ER visits which correlates to headache diary      Past Medical History:   Diagnosis Date   • Asthma    • Bipolar 1 disorder (HCC)    • Cluster headache    • CTS (carpal tunnel syndrome)    • Difficulty walking    • Disease of thyroid gland    • Fibromyalgia    • Fibromyalgia, primary    • Gout    • Headache, tension-type    • Hyperlipidemia    • Hypertension    • Lupus (HCC)    • Memory loss    • Migraine    • Mild sleep apnea    • Peripheral neuropathy    • PONV (postoperative nausea and vomiting)    • Vision loss        Patient Active Problem List   Diagnosis   • Palpitations   • AVNRT (AV win re-entry tachycardia)   • Chronic migraine without aura without status migrainosus, not intractable   • Interstitial cystitis   • Vaginal  candidiasis   • Encounter for gynecological examination   • Screening for breast cancer   • Hyperlipidemia   • Disease of thyroid gland   • Yeast infection of the vagina   • Vaginal discharge   • Other specified anxiety disorders   • Vaginal dryness   • Pelvic pain   • Urine frequency   • Full incontinence of feces   • Cervicogenic headache   • Cervicalgia   • Vaginal yeast infection   • IC (interstitial cystitis)   • Obesity, morbid (HCC)   • History of hysterectomy   • CK (obstructive sleep apnea)   • Deep vein thrombosis (DVT) of distal vein of lower extremity (HCC)       Medications:      Current Outpatient Medications   Medication Sig Dispense Refill   • acetaminophen (TYLENOL) 325 mg tablet Take 650 mg by mouth if needed for mild pain     • acetaminophen (TYLENOL) 650 mg CR tablet Take 1 tablet (650 mg total) by mouth every 8 (eight) hours as needed for mild pain 30 tablet 0   • apixaban (Eliquis) 5 mg Take 2 tablets (10 mg total) by mouth 2 (two) times a day for 7 days, THEN 1 tablet (5 mg total) 2 (two) times a day for 23 days. (Patient taking differently: Take 2 tablets (10 mg total) by mouth 2 (two) times a day ) 74 tablet 0   • aspirin-acetaminophen-caffeine (EXCEDRIN MIGRAINE) 250-250-65 MG per tablet Take 2 tablets by mouth if needed for headaches     • Atogepant (Qulipta) 60 MG TABS Take 60 mg by mouth in the morning 30 tablet 11   • buPROPion (WELLBUTRIN SR) 150 mg 12 hr tablet Take 1 tablet (150 mg total) by mouth 2 (two) times a day 180 tablet 3   • Cetirizine HCl 10 MG CAPS Take 10 mg by mouth daily       • ergocalciferol (VITAMIN D2) 50,000 units Take 50,000 Units by mouth once a week     • fluticasone (FLONASE) 50 mcg/act nasal spray 1 spray into each nostril daily (Patient taking differently: 1 spray into each nostril if needed) 16 g 0   • fluticasone (FLOVENT HFA) 110 MCG/ACT inhaler Inhale 1 puff as needed      • hydroxychloroquine (PLAQUENIL) 200 mg tablet Take 200 mg by mouth as needed  During Summer time     • lasmiditan succinate (Reyvow) 100 mg tablet TAKE 1 TABLET (100MG) ONE TIME AS NEEDED FOR MIGRAINE.DO NOT USE MORE THAN ONE DOSEPER DAY OR MORE THAN 8 DOSES PER MONTH 8 tablet 5   • levothyroxine 88 mcg tablet Take 88 mcg by mouth daily      • Multiple Vitamins-Minerals (MULTIVITAMIN ADULT PO) Take 1 tablet by mouth daily     • Nurtec 75 MG TBDP DISSOLVE 1 TABLET IN MOUTH EVERY OTHER DAY 16 tablet 11   • oxyCODONE (ROXICODONE) 15 mg immediate release tablet Take 15 mg by mouth as needed     • PROAIR  (90 Base) MCG/ACT inhaler Inhale 1-2 puffs as needed      • propranolol (INDERAL LA) 160 mg Take 160 mg by mouth 3 (three) times a day  0   • telmisartan (MICARDIS) 80 MG tablet Take 80 mg by mouth daily     • valACYclovir (VALTREX) 1,000 mg tablet 1 tablet as needed      • Xiidra 5 % op solution instill 1 drop into both eyes twice a day     • zaleplon (SONATA) 5 MG capsule take 1 capsule by mouth at bedtime 30 capsule 2   • amLODIPine (NORVASC) 5 mg tablet Take 5 mg by mouth daily (Patient not taking: Reported on 2/14/2024)     • amoxicillin-clavulanate (AUGMENTIN) 500-125 mg per tablet Take 1 tablet by mouth every 8 (eight) hours (Patient not taking: Reported on 2/14/2024)     • ARIPiprazole (ABILIFY) 5 mg tablet Take 5 mg by mouth every morning (Patient not taking: Reported on 10/5/2023)     • doxepin (SINEquan) 100 mg capsule Take 100 mg by mouth daily at bedtime (Patient not taking: Reported on 10/5/2023)     • fluconazole (DIFLUCAN) 150 mg tablet take 1 tablet by mouth EVERY 3 DAY (Patient not taking: Reported on 2/14/2024)     • ibuprofen (MOTRIN) 800 mg tablet Take 800 mg by mouth every 6 (six) hours (Patient not taking: Reported on 2/14/2024)     • lisinopril (ZESTRIL) 40 mg tablet take 1 tablet by mouth once daily (Patient not taking: Reported on 2/14/2024) 90 tablet 1   • meclizine (ANTIVERT) 12.5 MG tablet Take 12.5 mg by mouth as needed (Patient not taking: Reported on  2/14/2024)     • omeprazole (PriLOSEC) 40 MG capsule Take 40 mg by mouth as needed (Patient not taking: Reported on 10/5/2023)  0   • Ozempic, 0.25 or 0.5 MG/DOSE, 2 MG/1.5ML injection pen inject 0.25 milligrams subcutaneously every week for 30 DAYS (Patient not taking: Reported on 2/14/2024)     • Ozempic, 0.25 or 0.5 MG/DOSE, 2 MG/3ML injection pen INJECT 0.5MG SUBCUTANEOUSLY WEEKLY FOR 30 DAYS (Patient not taking: Reported on 2/14/2024)     • prochlorperazine (COMPAZINE) 10 mg tablet Take 1 tablet (10 mg total) by mouth every 6 (six) hours as needed (migraine) (Patient not taking: Reported on 2/14/2024) 20 tablet 3   • telmisartan-hydrochlorothiazide (MICARDIS HCT) 80-12.5 MG per tablet Take 1 tablet by mouth daily (Patient not taking: Reported on 2/14/2024)     • topiramate (TOPAMAX) 100 mg tablet One in am and two at bedtime daily (Patient not taking: Reported on 10/5/2023) 270 tablet 3     Current Facility-Administered Medications   Medication Dose Route Frequency Provider Last Rate Last Admin   • Botulinum Toxin Type A SOLR 200 Units  200 Units Injection Q3 Months Kassie Andrade PA-C   200 Units at 03/18/21 1154        Allergies:      Allergies   Allergen Reactions   • Pregabalin      Other reaction(s): blurred vision   • Shellfish-Derived Products - Food Allergy Anaphylaxis and Hives   • Sumatriptan      Other reaction(s): Altered Heart Rate   • Triptans Shortness Of Breath and Palpitations   • Latex Swelling and Rash     Other reaction(s): Hives/Skin Rash  Other reaction(s): Hives/Skin Rash   • Sulfa Antibiotics Swelling and Rash     Other reaction(s): Hives/Skin Rash  Other reaction(s): Hives/Skin Rash   • Monistat [Miconazole] Swelling       Family History:     Family History   Problem Relation Age of Onset   • Lupus Mother    • Osteoporosis Mother    • Hypotension Mother    • Hypertension Father    • Diabetes Father    • Heart disease Father    • Migraines Father    • Stroke Father    • Diabetes Sister     • Schizophrenia Sister    • Hypertension Sister    • Diabetes Maternal Grandmother    • Rheum arthritis Maternal Grandmother    • Hypertension Maternal Grandmother    • Neuropathy Maternal Grandmother    • Seizures Maternal Grandmother    • Breast cancer Maternal Aunt 40   • No Known Problems Maternal Aunt    • No Known Problems Maternal Aunt    • No Known Problems Maternal Aunt    • No Known Problems Paternal Aunt        Social History:     Social History     Socioeconomic History   • Marital status: Single     Spouse name: Not on file   • Number of children: Not on file   • Years of education: Not on file   • Highest education level: Not on file   Occupational History   • Not on file   Tobacco Use   • Smoking status: Former     Current packs/day: 1.00     Average packs/day: 1 pack/day for 5.0 years (5.0 ttl pk-yrs)     Types: Cigarettes   • Smokeless tobacco: Never   • Tobacco comments:     quit at 17 years old   Vaping Use   • Vaping status: Never Used   Substance and Sexual Activity   • Alcohol use: No   • Drug use: No   • Sexual activity: Yes     Partners: Male     Birth control/protection: Female Sterilization   Other Topics Concern   • Not on file   Social History Narrative   • Not on file     Social Determinants of Health     Financial Resource Strain: Low Risk  (2/3/2023)    Overall Financial Resource Strain (CARDIA)    • Difficulty of Paying Living Expenses: Not hard at all   Food Insecurity: No Food Insecurity (2/3/2023)    Hunger Vital Sign    • Worried About Running Out of Food in the Last Year: Never true    • Ran Out of Food in the Last Year: Never true   Transportation Needs: No Transportation Needs (2/3/2023)    PRAPARE - Transportation    • Lack of Transportation (Medical): No    • Lack of Transportation (Non-Medical): No   Physical Activity: Insufficiently Active (5/5/2022)    Exercise Vital Sign    • Days of Exercise per Week: 2 days    • Minutes of Exercise per Session: 20 min   Stress: Stress  "Concern Present (5/5/2022)    [employer] - Occupational Stress Questionnaire    • Feeling of Stress : To some extent   Social Connections: Socially Isolated (5/5/2022)    Social Connection and Isolation Panel [NHANES]    • Frequency of Communication with Friends and Family: More than three times a week    • Frequency of Social Gatherings with Friends and Family: More than three times a week    • Attends Sikhism Services: Never    • Active Member of Clubs or Organizations: No    • Attends Club or Organization Meetings: Never    • Marital Status: Never    Intimate Partner Violence: Not At Risk (5/5/2022)    Humiliation, Afraid, Rape, and Kick questionnaire    • Fear of Current or Ex-Partner: No    • Emotionally Abused: No    • Physically Abused: No    • Sexually Abused: No   Housing Stability: Low Risk  (2/3/2023)    Housing Stability Vital Sign    • Unable to Pay for Housing in the Last Year: No    • Number of Places Lived in the Last Year: 1    • Unstable Housing in the Last Year: No      I have reviewed the patient's medical, social and surgical history as well as medications in detail and updated the computerized patient record.      Objective:   Physical Exam:                                                                   Vitals:            /80 (BP Location: Left arm, Patient Position: Sitting, Cuff Size: Standard)   Pulse 80   Temp 98.3 °F (36.8 °C) (Temporal)   Ht 5' 5\" (1.651 m)   Wt 90.4 kg (199 lb 4.8 oz)   LMP  (LMP Unknown)   BMI 33.17 kg/m²   BP Readings from Last 3 Encounters:   02/14/24 140/80   02/02/24 131/88   01/18/24 131/74     Pulse Readings from Last 3 Encounters:   02/14/24 80   02/02/24 96   01/18/24 86          CONSTITUTIONAL: Well developed, well nourished, well groomed. No dysmorphic features.     Eyes:  EOM normal      Neck:  Normal ROM, neck supple.      HEENT:  Normocephalic atraumatic.    Chest:  Respirations regular and unlabored.  "   Psychiatric:  Normal behavior and appropriate affect      MENTAL STATUS  Orientation: Alert and oriented x 3  Fund of knowledge: Intact.    MOTOR (Upper and lower extremities)   Bulk/tone/abnormal movement: Normal muscle bulk and tone.      COORDINATION   Station/Gait: Normal baseline gait.           Review of Systems:   Review of Systems  Constitutional: Negative.    HENT: Negative.     Eyes: Negative.    Respiratory: Negative.     Cardiovascular: Negative.    Gastrointestinal: Negative.    Endocrine: Negative.    Genitourinary: Negative.    Musculoskeletal: Negative.    Skin: Negative.    Allergic/Immunologic: Negative.    Neurological:  Positive for headaches.   Hematological: Negative.    Psychiatric/Behavioral: Negative.   I personally reviewed the ROS entered by the MA      I have spent a total time of 33 minutes on 02/14/24 in caring for this patient including Prognosis, Risks and benefits of tx options, Instructions for management, Patient and family education, Importance of tx compliance, Risk factor reductions, Impressions, Counseling / Coordination of care, Documenting in the medical record, Reviewing / ordering tests, medicine, procedures  , and Obtaining or reviewing history  .      Author:  Kassie Andrade PA-C 2/14/2024 3:56 PM

## 2024-02-15 ENCOUNTER — APPOINTMENT (OUTPATIENT)
Dept: OCCUPATIONAL THERAPY | Age: 49
End: 2024-02-15
Payer: MEDICARE

## 2024-02-20 ENCOUNTER — CLINICAL SUPPORT (OUTPATIENT)
Dept: NEUROLOGY | Facility: CLINIC | Age: 49
End: 2024-02-20
Payer: MEDICARE

## 2024-02-20 ENCOUNTER — TELEPHONE (OUTPATIENT)
Dept: NEUROLOGY | Facility: CLINIC | Age: 49
End: 2024-02-20

## 2024-02-20 DIAGNOSIS — G43.111 INTRACTABLE MIGRAINE WITH AURA WITH STATUS MIGRAINOSUS: Primary | ICD-10-CM

## 2024-02-20 PROCEDURE — 96372 THER/PROPH/DIAG INJ SC/IM: CPT | Performed by: PSYCHIATRY & NEUROLOGY

## 2024-02-20 RX ORDER — KETOROLAC TROMETHAMINE 30 MG/ML
60 INJECTION, SOLUTION INTRAMUSCULAR; INTRAVENOUS ONCE
Status: COMPLETED | OUTPATIENT
Start: 2024-02-20 | End: 2024-02-20

## 2024-02-20 RX ADMIN — KETOROLAC TROMETHAMINE 60 MG: 30 INJECTION, SOLUTION INTRAMUSCULAR; INTRAVENOUS at 15:16

## 2024-02-20 NOTE — TELEPHONE ENCOUNTER
Patient called state she is having a terrible migraine and would like to possibly come in for a Toradol shot.    Please assist

## 2024-02-20 NOTE — PROGRESS NOTES
Pt tolerated toradol 60mg inj in left dorsal gluteal muscle well. No redness, swelling, or bruising.

## 2024-02-20 NOTE — TELEPHONE ENCOUNTER
Called patient and I left a message to call back to schedule Toradol injection. I did let her know that we can schedule in any location that works best for her.

## 2024-02-21 PROBLEM — Z01.419 ENCOUNTER FOR GYNECOLOGICAL EXAMINATION WITHOUT ABNORMAL FINDING: Status: RESOLVED | Noted: 2018-08-03 | Resolved: 2024-02-21

## 2024-02-21 PROBLEM — Z12.39 SCREENING FOR BREAST CANCER: Status: RESOLVED | Noted: 2018-08-03 | Resolved: 2024-02-21

## 2024-02-22 ENCOUNTER — APPOINTMENT (OUTPATIENT)
Dept: OCCUPATIONAL THERAPY | Age: 49
End: 2024-02-22
Payer: MEDICARE

## 2024-02-28 NOTE — PROGRESS NOTES
Heart Failure Outpatient Progress Note - Adamaris Brian 48 y.o. adult MRN: [id number]    @ Encounter: [id number]      Assessment/Plan:    Patient Active Problem List    Diagnosis Date Noted    Deep vein thrombosis (DVT) of distal vein of lower extremity (HCC) 01/19/2023    CK (obstructive sleep apnea)     History of hysterectomy 05/05/2022    Obesity, morbid (HCC) 03/16/2022    IC (interstitial cystitis) 05/29/2020    Vaginal yeast infection 04/06/2020    Cervicogenic headache 03/24/2020    Cervicalgia 03/24/2020    Full incontinence of feces 10/18/2019    Pelvic pain 08/27/2019    Urine frequency 08/27/2019    Vaginal dryness 08/15/2019    Other specified anxiety disorders 03/04/2019    Vaginal discharge 09/24/2018    Yeast infection of the vagina 09/10/2018    Hyperlipidemia 08/03/2018    Disease of thyroid gland 08/03/2018    Interstitial cystitis 04/20/2018    Vaginal candidiasis 04/20/2018    Chronic migraine without aura without status migrainosus, not intractable 03/05/2018    Palpitations 12/23/2016    AVNRT (AV win re-entry tachycardia) 12/23/2016     HTN. Rx- Suboptimally controlled on Telmisartan 80 mg daily. Will continue Telmisartan 80 mg but with HCTZ 12.5 mg daily and assess response. May need to add another agent if ineffective.     SOB. May be r/t deconditioning but certainly with risk factors for HFpEF. Will recheck TTE to assess structure and biventricular function     Chest pain. Appears atypical and non-cardiac in nature. Occurs unpredictably when sitting or when active. Resolves spontaneously without intervention. Will repeat TTE as above. Further workup pending those results.     -Treadmill stress test 9/2022:  Overall, the patient's exercise capacity was normal for their age. The patient reached stage 3.0 of the protocol after exercising for 7 min and 31 sec and had a maximal HR of 127 bpm (73 % of MPHR) and 10.1 METS. The patient experienced non-limiting angina during the test. Blood  pressure was elevated at the baseline and demonstrated a normal response to the stress. Heart rate demonstrated a blunted response to stress.    Stress ECG: The ECG was not diagnostic due to submaximal stress. This was non-diagnostic study due to inability to achieve maximum age predicted heart rate but there were no EKG changes suggestive of stress induced ischemia at the level of exercise achieved.    -TTE: 2015  LEFT VENTRICLE: Size was normal. Systolic function was normal. Ejection   fraction was estimated in the range of 55 % to 65 %. There were no regional   wall motion abnormalities. Wall thickness was mildly increased. DOPPLER: The ratio of early ventricular filling to atrial contraction velocities was within cc  the normal range. The deceleration time of the early transmitral flow velocity   was increased.   RIGHT VENTRICLE: The size was normal. Systolic function was normal. Wall thickness was normal.     DVT, on AC  AVNRT, 2016 ablation. Still has c/o occasional palpitations  -11/8/22 event monitor:  Impression:  Patient had a min HR of 54 bpm, max HR of 149 bpm, and avg HR of 84  bpm. Predominant underlying rhythm was Sinus Rhythm. Isolated SVEs  were rare (<1.0%), SVE Couplets were rare (<1.0%), and no SVE Triplets  were present. Isolated VEs were rare (<1.0%, 213), VE Couplets were rare  (<1.0%, 1), and VE Triplets were rare (<1.0%, 1).     The recording showed no occurrence of atrial fibrillation/flutter, prolonged pause, or significant AV block.     Patient reported symptoms correlated with NSR at a normal rate on multiple occasions.  There was 1 occasion of symptoms associated with 1 isolated PAC and another single occasion where symptoms were associated with 1 isolated PVC.  I reviewed all strips personally.    Lupus, skin level, on plaquenil for this but only takes in summer  Chronic Migraine  CK.   Diverticulitis. S/p colon resection 2001.      HPI:   47 y.o. female Hx per chart p/w HTN and chest  pain. CPcan be exertional and at rest, substernal, squeezing, lasts 1 min or so, relieved by rest. She walks flat about 1 ile and up 1 flight stairs without overt issues, although sometimes chest squeezing as noted. +Sense of palpitatoins though different than before ablation.  No new SOB/dizziness/syncope.  No new leg swelling, PND, pillow orthopnea, unintentional weight changes, fatigue.  No new fevers, chills, cough, nausea, vomiting, diarrhea, dysuria.    3/6/24 Presents for follow up. Not feeling great. Still gets some SOB and chest squeezing both at rest and with some exertional activities. Very unpredictable at rest. Can just come on suddenly. Still gets intermittent palpitations. She is worried about her blood pressure being uncontrolled. She monitors at home and numbers have been ranging 140-150/80-90. Thinks she did better on the Telmisartan with HCTZ.     Past Medical History:   Diagnosis Date    Asthma     Bipolar 1 disorder (HCC)     Cluster headache     CTS (carpal tunnel syndrome)     Difficulty walking     Disease of thyroid gland     Fibromyalgia     Fibromyalgia, primary     Gout     Headache, tension-type     Hyperlipidemia     Hypertension     Lupus (HCC)     Memory loss     Migraine     Mild sleep apnea     Peripheral neuropathy     PONV (postoperative nausea and vomiting)     Vision loss        12 point ROS negative other than that stated in HPI    Allergies   Allergen Reactions    Pregabalin      Other reaction(s): blurred vision    Shellfish-Derived Products - Food Allergy Anaphylaxis and Hives    Sumatriptan      Other reaction(s): Altered Heart Rate    Triptans Shortness Of Breath and Palpitations    Latex Swelling and Rash     Other reaction(s): Hives/Skin Rash  Other reaction(s): Hives/Skin Rash    Sulfa Antibiotics Swelling and Rash     Other reaction(s): Hives/Skin Rash  Other reaction(s): Hives/Skin Rash    Monistat [Miconazole] Swelling     .    Current Outpatient Medications:      acetaminophen (TYLENOL) 325 mg tablet, Take 650 mg by mouth if needed for mild pain, Disp: , Rfl:     acetaminophen (TYLENOL) 650 mg CR tablet, Take 1 tablet (650 mg total) by mouth every 8 (eight) hours as needed for mild pain, Disp: 30 tablet, Rfl: 0    amLODIPine (NORVASC) 5 mg tablet, Take 5 mg by mouth daily (Patient not taking: Reported on 2/14/2024), Disp: , Rfl:     amoxicillin-clavulanate (AUGMENTIN) 500-125 mg per tablet, Take 1 tablet by mouth every 8 (eight) hours (Patient not taking: Reported on 2/14/2024), Disp: , Rfl:     apixaban (Eliquis) 5 mg, Take 2 tablets (10 mg total) by mouth 2 (two) times a day for 7 days, THEN 1 tablet (5 mg total) 2 (two) times a day for 23 days. (Patient taking differently: Take 2 tablets (10 mg total) by mouth 2 (two) times a day ), Disp: 74 tablet, Rfl: 0    ARIPiprazole (ABILIFY) 5 mg tablet, Take 5 mg by mouth every morning (Patient not taking: Reported on 10/5/2023), Disp: , Rfl:     aspirin-acetaminophen-caffeine (EXCEDRIN MIGRAINE) 250-250-65 MG per tablet, Take 2 tablets by mouth if needed for headaches, Disp: , Rfl:     Atogepant (Qulipta) 60 MG TABS, Take 60 mg by mouth in the morning, Disp: 30 tablet, Rfl: 11    buPROPion (WELLBUTRIN SR) 150 mg 12 hr tablet, Take 1 tablet (150 mg total) by mouth 2 (two) times a day, Disp: 180 tablet, Rfl: 3    Cetirizine HCl 10 MG CAPS, Take 10 mg by mouth daily  , Disp: , Rfl:     doxepin (SINEquan) 100 mg capsule, Take 100 mg by mouth daily at bedtime (Patient not taking: Reported on 10/5/2023), Disp: , Rfl:     ergocalciferol (VITAMIN D2) 50,000 units, Take 50,000 Units by mouth once a week, Disp: , Rfl:     fluconazole (DIFLUCAN) 150 mg tablet, take 1 tablet by mouth EVERY 3 DAY (Patient not taking: Reported on 2/14/2024), Disp: , Rfl:     fluticasone (FLONASE) 50 mcg/act nasal spray, 1 spray into each nostril daily (Patient taking differently: 1 spray into each nostril if needed), Disp: 16 g, Rfl: 0    fluticasone (FLOVENT  HFA) 110 MCG/ACT inhaler, Inhale 1 puff as needed , Disp: , Rfl:     hydroxychloroquine (PLAQUENIL) 200 mg tablet, Take 200 mg by mouth as needed During Summer time, Disp: , Rfl:     ibuprofen (MOTRIN) 800 mg tablet, Take 800 mg by mouth every 6 (six) hours (Patient not taking: Reported on 2/14/2024), Disp: , Rfl:     lasmiditan succinate (Reyvow) 100 mg tablet, TAKE 1 TABLET (100MG) ONE TIME AS NEEDED FOR MIGRAINE.DO NOT USE MORE THAN ONE DOSEPER DAY OR MORE THAN 8 DOSES PER MONTH, Disp: 8 tablet, Rfl: 5    levothyroxine 88 mcg tablet, Take 88 mcg by mouth daily , Disp: , Rfl:     lisinopril (ZESTRIL) 40 mg tablet, take 1 tablet by mouth once daily (Patient not taking: Reported on 2/14/2024), Disp: 90 tablet, Rfl: 1    meclizine (ANTIVERT) 12.5 MG tablet, Take 12.5 mg by mouth as needed (Patient not taking: Reported on 2/14/2024), Disp: , Rfl:     Multiple Vitamins-Minerals (MULTIVITAMIN ADULT PO), Take 1 tablet by mouth daily, Disp: , Rfl:     Nurtec 75 MG TBDP, DISSOLVE 1 TABLET IN MOUTH EVERY OTHER DAY, Disp: 16 tablet, Rfl: 11    omeprazole (PriLOSEC) 40 MG capsule, Take 40 mg by mouth as needed (Patient not taking: Reported on 10/5/2023), Disp: , Rfl: 0    oxyCODONE (ROXICODONE) 15 mg immediate release tablet, Take 15 mg by mouth as needed, Disp: , Rfl:     Ozempic, 0.25 or 0.5 MG/DOSE, 2 MG/1.5ML injection pen, inject 0.25 milligrams subcutaneously every week for 30 DAYS (Patient not taking: Reported on 2/14/2024), Disp: , Rfl:     Ozempic, 0.25 or 0.5 MG/DOSE, 2 MG/3ML injection pen, INJECT 0.5MG SUBCUTANEOUSLY WEEKLY FOR 30 DAYS (Patient not taking: Reported on 2/14/2024), Disp: , Rfl:     PROAIR  (90 Base) MCG/ACT inhaler, Inhale 1-2 puffs as needed , Disp: , Rfl:     prochlorperazine (COMPAZINE) 10 mg tablet, Take 1 tablet (10 mg total) by mouth every 6 (six) hours as needed (migraine) (Patient not taking: Reported on 2/14/2024), Disp: 20 tablet, Rfl: 3    propranolol (INDERAL LA) 160 mg, Take 160  mg by mouth 3 (three) times a day, Disp: , Rfl: 0    telmisartan (MICARDIS) 80 MG tablet, Take 80 mg by mouth daily, Disp: , Rfl:     telmisartan-hydrochlorothiazide (MICARDIS HCT) 80-12.5 MG per tablet, Take 1 tablet by mouth daily (Patient not taking: Reported on 2/14/2024), Disp: , Rfl:     topiramate (TOPAMAX) 100 mg tablet, One in am and two at bedtime daily (Patient not taking: Reported on 10/5/2023), Disp: 270 tablet, Rfl: 3    valACYclovir (VALTREX) 1,000 mg tablet, 1 tablet as needed , Disp: , Rfl:     Xiidra 5 % op solution, instill 1 drop into both eyes twice a day, Disp: , Rfl:     zaleplon (SONATA) 5 MG capsule, take 1 capsule by mouth at bedtime, Disp: 30 capsule, Rfl: 2    Current Facility-Administered Medications:     Botulinum Toxin Type A SOLR 200 Units, 200 Units, Injection, Q3 Months, Kassie Andrade PA-C, 200 Units at 03/18/21 1154    Social History     Socioeconomic History    Marital status: Single     Spouse name: Not on file    Number of children: Not on file    Years of education: Not on file    Highest education level: Not on file   Occupational History    Not on file   Tobacco Use    Smoking status: Former     Current packs/day: 1.00     Average packs/day: 1 pack/day for 5.0 years (5.0 ttl pk-yrs)     Types: Cigarettes    Smokeless tobacco: Never    Tobacco comments:     quit at 17 years old   Vaping Use    Vaping status: Never Used   Substance and Sexual Activity    Alcohol use: No    Drug use: No    Sexual activity: Yes     Partners: Male     Birth control/protection: Female Sterilization   Other Topics Concern    Not on file   Social History Narrative    Not on file     Social Determinants of Health     Financial Resource Strain: Low Risk  (2/3/2023)    Overall Financial Resource Strain (CARDIA)     Difficulty of Paying Living Expenses: Not hard at all   Food Insecurity: No Food Insecurity (2/3/2023)    Hunger Vital Sign     Worried About Running Out of Food in the Last Year: Never true      Ran Out of Food in the Last Year: Never true   Transportation Needs: No Transportation Needs (2/3/2023)    PRAPARE - Transportation     Lack of Transportation (Medical): No     Lack of Transportation (Non-Medical): No   Physical Activity: Insufficiently Active (5/5/2022)    Exercise Vital Sign     Days of Exercise per Week: 2 days     Minutes of Exercise per Session: 20 min   Stress: Stress Concern Present (5/5/2022)    [employer] [place] [employer] - Occupational Stress Questionnaire     Feeling of Stress : To some extent   Social Connections: Socially Isolated (5/5/2022)    Social Connection and Isolation Panel [NHANES]     Frequency of Communication with Friends and Family: More than three times a week     Frequency of Social Gatherings with Friends and Family: More than three times a week     Attends Jehovah's witness Services: Never     Active Member of Clubs or Organizations: No     Attends Club or Organization Meetings: Never     Marital Status: Never    Intimate Partner Violence: Not At Risk (5/5/2022)    Humiliation, Afraid, Rape, and Kick questionnaire     Fear of Current or Ex-Partner: No     Emotionally Abused: No     Physically Abused: No     Sexually Abused: No   Housing Stability: Low Risk  (2/3/2023)    Housing Stability Vital Sign     Unable to Pay for Housing in the Last Year: No     Number of Places Lived in the Last Year: 1     Unstable Housing in the Last Year: No       Family History   Problem Relation Age of Onset    Lupus Mother     Osteoporosis Mother     Hypotension Mother     Hypertension Father     Diabetes Father     Heart disease Father     Migraines Father     Stroke Father     Diabetes Sister     Schizophrenia Sister     Hypertension Sister     Diabetes Maternal Grandmother     Rheum arthritis Maternal Grandmother     Hypertension Maternal Grandmother     Neuropathy Maternal Grandmother     Seizures Maternal Grandmother     Breast cancer Maternal Aunt 40    No Known  "Problems Maternal Aunt     No Known Problems Maternal Aunt     No Known Problems Maternal Aunt     No Known Problems Paternal Aunt        Physical Exam:  Vitals: /90 (BP Location: Left arm, Patient Position: Sitting, Cuff Size: Large)   Pulse 81   Ht 5' 5\" (1.651 m)   Wt 89.9 kg (198 lb 4.8 oz)   LMP  (LMP Unknown)   SpO2 98%   BMI 33.00 kg/m²      Wt Readings from Last 3 Encounters:   02/14/24 90.4 kg (199 lb 4.8 oz)   02/02/24 89.8 kg (198 lb)   01/15/24 90.7 kg (200 lb)         GEN: Adamaris Brian appears well, alert and oriented x 3, pleasant and cooperative   HEENT: pupils equal, round, and reactive to light; extraocular muscles intact  NECK: supple, no carotid bruits   HEART: regular rhythm, normal S1 and S2, no murmurs, clicks, gallops or rubs, JVP is  flat  LUNGS: clear to auscultation bilaterally; no wheezes, rales, or rhonchi   ABDOMEN: normal bowel sounds, soft, no tenderness, no distention  EXTREMITIES: peripheral pulses normal; no clubbing, cyanosis, or edema  NEURO: no focal findings   SKIN: normal without suspicious lesions on exposed skin    Labs & Results:  Lab Results   Component Value Date    SODIUM 137 02/05/2022    K 4.0 02/05/2022     02/05/2022    CO2 28 02/05/2022    BUN 13 03/30/2022    CREATININE 0.87 03/30/2022    GLUC 121 02/05/2022    CALCIUM 10.1 02/05/2022     No results found for: \"BNP\"   Lab Results   Component Value Date    WBC 11.29 (H) 02/05/2022    HGB 14.0 02/05/2022    HCT 42.4 02/05/2022    MCV 88 02/05/2022     02/05/2022     Lab Results   Component Value Date    LDLCALC 98 11/20/2019     Lab Results   Component Value Date    TBB5BCFPMJBB 1.039 11/20/2019     Lab Results   Component Value Date    HGBA1C 5.6 11/20/2019         EKG personally reviewed by MICHELLE Hernández.   No results found for this visit on 03/06/24.     Counseling / Coordination of Care  Total face to face time spent with patient 15 minutes.  An additional 10 minutes was spent " for chart/data review and visit preparation.       Thank you for the opportunity to participate in the care of this patient.    MICHELLE Hernández

## 2024-03-06 ENCOUNTER — OFFICE VISIT (OUTPATIENT)
Dept: CARDIOLOGY CLINIC | Facility: CLINIC | Age: 49
End: 2024-03-06
Payer: MEDICARE

## 2024-03-06 VITALS
BODY MASS INDEX: 33.04 KG/M2 | HEIGHT: 65 IN | OXYGEN SATURATION: 98 % | HEART RATE: 81 BPM | SYSTOLIC BLOOD PRESSURE: 132 MMHG | DIASTOLIC BLOOD PRESSURE: 90 MMHG | WEIGHT: 198.3 LBS

## 2024-03-06 DIAGNOSIS — R00.2 PALPITATIONS: Primary | ICD-10-CM

## 2024-03-06 DIAGNOSIS — E66.01 OBESITY, MORBID (HCC): ICD-10-CM

## 2024-03-06 DIAGNOSIS — I1A.0 RESISTANT HYPERTENSION: ICD-10-CM

## 2024-03-06 DIAGNOSIS — I82.4Z9 DEEP VEIN THROMBOSIS (DVT) OF DISTAL VEIN OF LOWER EXTREMITY, UNSPECIFIED CHRONICITY, UNSPECIFIED LATERALITY (HCC): ICD-10-CM

## 2024-03-06 PROCEDURE — 93000 ELECTROCARDIOGRAM COMPLETE: CPT | Performed by: NURSE PRACTITIONER

## 2024-03-06 PROCEDURE — 99214 OFFICE O/P EST MOD 30 MIN: CPT | Performed by: NURSE PRACTITIONER

## 2024-03-06 RX ORDER — TELMISARTAN AND HYDROCHLORTHIAZIDE 80; 12.5 MG/1; MG/1
1 TABLET ORAL DAILY
Qty: 30 TABLET | Refills: 3 | Status: SHIPPED | OUTPATIENT
Start: 2024-03-06

## 2024-03-06 NOTE — PATIENT INSTRUCTIONS
Stop Telmisartan   Restart Telmisartan with HCTZ 80/12.5  Schedule echocardiogram.   Start checking BP at home. Monitor readings on your new medication for a week, if still suboptimally controlled, please call the office.   Get blood work over the next week or two.

## 2024-03-11 ENCOUNTER — OFFICE VISIT (OUTPATIENT)
Dept: OBGYN CLINIC | Facility: CLINIC | Age: 49
End: 2024-03-11
Payer: MEDICARE

## 2024-03-11 VITALS
HEIGHT: 65 IN | HEART RATE: 96 BPM | WEIGHT: 195.5 LBS | BODY MASS INDEX: 32.57 KG/M2 | DIASTOLIC BLOOD PRESSURE: 68 MMHG | SYSTOLIC BLOOD PRESSURE: 99 MMHG

## 2024-03-11 DIAGNOSIS — G89.29 CHRONIC RIGHT SHOULDER PAIN: Primary | ICD-10-CM

## 2024-03-11 DIAGNOSIS — M25.511 CHRONIC RIGHT SHOULDER PAIN: Primary | ICD-10-CM

## 2024-03-11 PROCEDURE — 99213 OFFICE O/P EST LOW 20 MIN: CPT | Performed by: ORTHOPAEDIC SURGERY

## 2024-03-11 NOTE — PROGRESS NOTES
REASON FOR FOLLOW-UP  Adamaris Brian is a 48 y.o. adult who presents for follow-up of the Right AC joint sprain    HISTORY OF PRESENT ILLNESS  Following our last visit, we decided to initially treat her right AC joint sprain non-operatively. Our plan was to manage his symptoms conservatively with RICE, activity modification, and anti-inflammatories. She has declined PT at her last appointment. Today, patient comes in for further evaluation. Patient said her pain was improving but this past week she suddenly started experiencing difficulty with pain and ROM.    PHYSICAL EXAM  General:   Well-appearing  No acute distress  Appears stated age     Right Shoulder  Negative tenderness to palpation over the acromioclavicular joint  Negative tenderness to palpation over the long head of the biceps tendon  Shoulder effusion none present  Shoulder abduction 90/180  Shoulder forward flexion 90/180  Shoulder internal rotation T10 / T7  Negative Belly Press/SS  Positive Neer  Positive Galeano  Positive O'briens  Skin is intact with no erythema, warmth or drainage  Motor strength intact distally  Sensation to light touch is normal in the axillary, radial, ulnar, and median nerve distributions.  Fingers warm and perfused      DIAGNOSTIC IMAGING:  Procedure: XR shoulder 2+ vw right    Result Date: 1/15/2024  Narrative: RIGHT SHOULDER INDICATION:   Pain in right shoulder. COMPARISON:  There are no previous examinations available for comparison. VIEWS:  XR SHOULDER 2+ VW RIGHT FINDINGS: There is no acute fracture or dislocation. No significant degenerative changes. No lytic or blastic osseous lesion. Soft tissues are unremarkable.     Impression: No acute osseous abnormality. Electronically signed: 01/15/2024 04:58 PM Gallo Arroyo MD       IMPRESSION/REPORT/PLAN  Right shoulder pain; AC joint sprain versus frozen shoulder, r/o internal derangement    History and clinical exam consistent with concern for soft tissue injury. Plan to  obtain MRI of the patient shoulder to further evaluate soft tissue injury. Patient will follow up to discuss results of the study and treatment plan. Recommend RICE and anti-inflammatories at this time.  They are to call with any other questions or concerns.      Scribe Attestation      I,:  Davi Carver PA-C am acting as a scribe while in the presence of the attending physician.:       I,:  Luke Gamez MD personally performed the services described in this documentation    as scribed in my presence.:

## 2024-03-17 ENCOUNTER — HOSPITAL ENCOUNTER (OUTPATIENT)
Dept: RADIOLOGY | Facility: HOSPITAL | Age: 49
Discharge: HOME | End: 2024-03-17
Payer: MEDICARE

## 2024-03-17 DIAGNOSIS — G89.29 CHRONIC RIGHT SHOULDER PAIN: ICD-10-CM

## 2024-03-17 DIAGNOSIS — M25.511 CHRONIC RIGHT SHOULDER PAIN: ICD-10-CM

## 2024-03-17 PROCEDURE — 73221 MRI JOINT UPR EXTREM W/O DYE: CPT

## 2024-03-19 ENCOUNTER — OFFICE VISIT (OUTPATIENT)
Dept: OBGYN CLINIC | Facility: CLINIC | Age: 49
End: 2024-03-19
Payer: MEDICARE

## 2024-03-19 VITALS
WEIGHT: 196 LBS | HEART RATE: 89 BPM | SYSTOLIC BLOOD PRESSURE: 101 MMHG | DIASTOLIC BLOOD PRESSURE: 66 MMHG | HEIGHT: 65 IN | BODY MASS INDEX: 32.65 KG/M2

## 2024-03-19 DIAGNOSIS — S46.011A TRAUMATIC COMPLETE TEAR OF RIGHT ROTATOR CUFF, INITIAL ENCOUNTER: ICD-10-CM

## 2024-03-19 DIAGNOSIS — M75.01 ADHESIVE CAPSULITIS OF RIGHT SHOULDER: Primary | ICD-10-CM

## 2024-03-19 PROCEDURE — 99213 OFFICE O/P EST LOW 20 MIN: CPT | Performed by: ORTHOPAEDIC SURGERY

## 2024-03-19 NOTE — PROGRESS NOTES
CHIEF COMPLAINT / REASON FOR VISIT  Adamaris Brian is a 48 y.o. who is following up today to discuss the results of her recent imaging study.    HISTORY OF PRESENT ILLNESS  Patient reports they are doing about the same from when we last saw them. Patient feels the pain is disabling and affecting her activities of daily living.    OBJECTIVE  General:   Well-appearing  No acute distress  Appears stated age     Right Shoulder  Negative tenderness to palpation over the acromioclavicular joint  Negative tenderness to palpation over the long head of the biceps tendon  Shoulder effusion none present  Shoulder abduction 90/180  Shoulder forward flexion 90/180  Shoulder internal rotation T10 / T7  Negative Belly Press/SS  Positive Neer  Positive Galeano  Positive O'briens  Skin is intact with no erythema, warmth or drainage  Motor strength intact distally  Sensation to light touch is normal in the axillary, radial, ulnar, and median nerve distributions.  Fingers warm and perfused    DIAGNOSTIC IMAGING:  Right shoulder MRI shows bursal sided supraspinatus tear with tendon retraction    ASSESSMENT/PLAN:  Right partial rotator cuff tear  Adhesive capsulitis    Given patient history, exam findings, and diagnostic imaging, she appears to be suffering from a combination of both adhesive capsulitis and right partial thickness rotator cuff tear.  Recommended starting with conservative measures including glenohumeral corticosteroid injection as well as physical therapy to see if this could improve the patient's symptoms.  Patient is agreeable.  Orders placed.  Recommended RICE and anti-inflammatories as needed.  We did discuss with the patient that if she fails these conservative measures as well that she can be considered for surgery.  Will plan to see the patient back in 3 months to assess how she is doing.  Patient is understanding and agreeable above plan.  She is to call with any other questions or concerns.        Scribe  Attestation      I,:  Davi Carver PA-C am acting as a scribe while in the presence of the attending physician.:       I,:  Luke Gamez MD personally performed the services described in this documentation    as scribed in my presence.:              <<----- Click to add NO significant Past Surgical History

## 2024-03-20 ENCOUNTER — HOSPITAL ENCOUNTER (OUTPATIENT)
Dept: NON INVASIVE DIAGNOSTICS | Facility: CLINIC | Age: 49
Discharge: HOME | End: 2024-03-20
Payer: MEDICARE

## 2024-03-20 VITALS
BODY MASS INDEX: 32.65 KG/M2 | DIASTOLIC BLOOD PRESSURE: 66 MMHG | WEIGHT: 196 LBS | HEIGHT: 65 IN | HEART RATE: 85 BPM | SYSTOLIC BLOOD PRESSURE: 101 MMHG

## 2024-03-20 DIAGNOSIS — E66.01 OBESITY, MORBID (HCC): ICD-10-CM

## 2024-03-20 DIAGNOSIS — I82.4Z9 DEEP VEIN THROMBOSIS (DVT) OF DISTAL VEIN OF LOWER EXTREMITY, UNSPECIFIED CHRONICITY, UNSPECIFIED LATERALITY (HCC): ICD-10-CM

## 2024-03-20 DIAGNOSIS — I1A.0 RESISTANT HYPERTENSION: ICD-10-CM

## 2024-03-20 DIAGNOSIS — R00.2 PALPITATIONS: ICD-10-CM

## 2024-03-20 LAB
AORTIC ROOT: 2.6 CM
APICAL FOUR CHAMBER EJECTION FRACTION: 62 %
BSA FOR ECHO PROCEDURE: 1.96 M2
E WAVE DECELERATION TIME: 180 MS
E/A RATIO: 1.2
FRACTIONAL SHORTENING: 42 (ref 28–44)
INTERVENTRICULAR SEPTUM IN DIASTOLE (PARASTERNAL SHORT AXIS VIEW): 1.2 CM
INTERVENTRICULAR SEPTUM: 1.2 CM (ref 0.6–1.1)
LAAS-AP2: 17.3 CM2
LAAS-AP4: 19.2 CM2
LEFT ATRIUM SIZE: 3.5 CM
LEFT ATRIUM VOLUME (MOD BIPLANE): 57 ML
LEFT ATRIUM VOLUME INDEX (MOD BIPLANE): 29.1 ML/M2
LEFT INTERNAL DIMENSION IN SYSTOLE: 1.9 CM (ref 2.1–4)
LEFT VENTRICULAR INTERNAL DIMENSION IN DIASTOLE: 3.3 CM (ref 3.5–6)
LEFT VENTRICULAR POSTERIOR WALL IN END DIASTOLE: 1.2 CM
LEFT VENTRICULAR STROKE VOLUME: 34 ML
LVSV (TEICH): 34 ML
MV E'TISSUE VEL-SEP: 8 CM/S
MV PEAK A VEL: 0.56 M/S
MV PEAK E VEL: 67 CM/S
MV STENOSIS PRESSURE HALF TIME: 52 MS
MV VALVE AREA P 1/2 METHOD: 4.23
RIGHT ATRIUM AREA SYSTOLE A4C: 11.2 CM2
RIGHT VENTRICLE ID DIMENSION: 3 CM
SL CV LEFT ATRIUM LENGTH A2C: 4.5 CM
SL CV LV EF: 60
SL CV PED ECHO LEFT VENTRICLE DIASTOLIC VOLUME (MOD BIPLANE) 2D: 45 ML
SL CV PED ECHO LEFT VENTRICLE SYSTOLIC VOLUME (MOD BIPLANE) 2D: 11 ML
TR MAX PG: 23 MMHG
TR PEAK VELOCITY: 2.4 M/S
TRICUSPID ANNULAR PLANE SYSTOLIC EXCURSION: 2.5 CM
TRICUSPID VALVE PEAK REGURGITATION VELOCITY: 2.37 M/S

## 2024-03-20 PROCEDURE — 93306 TTE W/DOPPLER COMPLETE: CPT | Performed by: INTERNAL MEDICINE

## 2024-03-20 PROCEDURE — 93306 TTE W/DOPPLER COMPLETE: CPT

## 2024-03-21 NOTE — NURSING NOTE
Call placed to patient to discuss upcoming appointment at [facility] radiology department and complete consultation with patient. Patient is having a right shoulder CSI utilizing fluoroscopy  guidance. Reviewed patient's allergies, current anticoagulant medication of Eliquis  present per patient but not required to stop per periprocedural management of coagulation status and hemostasis risk in percutaneous image guided procedure guidelines, also discussed the pre and post procedure expectations. Reminded patient of location and time expected for procedure, Patient expressed understanding by verbalizing and repeating instructions.

## 2024-03-22 ENCOUNTER — TELEPHONE (OUTPATIENT)
Dept: CARDIOLOGY CLINIC | Facility: CLINIC | Age: 49
End: 2024-03-22

## 2024-03-22 NOTE — TELEPHONE ENCOUNTER
----- Message from Lashae Del Valle sent at 3/22/2024  1:28 PM EDT -----    ----- Message -----  From: MICHELLE Hernández  Sent: 3/22/2024   1:23 PM EDT  To: Cardiology [facility], please let patient know echo looks good. Wall thickness is slightly increased mostly likely from chronic hypertension. We will discuss further during OV on 4/9. Thanks

## 2024-03-26 ENCOUNTER — LAB (OUTPATIENT)
Dept: LAB | Age: 49
End: 2024-03-26
Payer: MEDICARE

## 2024-03-26 ENCOUNTER — OFFICE VISIT (OUTPATIENT)
Dept: OBGYN CLINIC | Facility: CLINIC | Age: 49
End: 2024-03-26
Payer: MEDICARE

## 2024-03-26 VITALS
WEIGHT: 196.2 LBS | SYSTOLIC BLOOD PRESSURE: 105 MMHG | BODY MASS INDEX: 32.69 KG/M2 | HEART RATE: 90 BPM | HEIGHT: 65 IN | DIASTOLIC BLOOD PRESSURE: 72 MMHG

## 2024-03-26 DIAGNOSIS — S46.011A TRAUMATIC TEAR OF RIGHT ROTATOR CUFF, UNSPECIFIED TEAR EXTENT, INITIAL ENCOUNTER: Primary | ICD-10-CM

## 2024-03-26 DIAGNOSIS — R93.7 ABNORMAL FINDINGS ON DIAGNOSTIC IMAGING OF OTHER PARTS OF MUSCULOSKELETAL SYSTEM: ICD-10-CM

## 2024-03-26 DIAGNOSIS — I1A.0 RESISTANT HYPERTENSION: ICD-10-CM

## 2024-03-26 DIAGNOSIS — S46.011A TRAUMATIC TEAR OF RIGHT ROTATOR CUFF, UNSPECIFIED TEAR EXTENT, INITIAL ENCOUNTER: ICD-10-CM

## 2024-03-26 DIAGNOSIS — R00.2 PALPITATIONS: ICD-10-CM

## 2024-03-26 LAB
ALBUMIN SERPL BCP-MCNC: 4.3 G/DL (ref 3.5–5)
ALP SERPL-CCNC: 66 U/L (ref 34–104)
ALT SERPL W P-5'-P-CCNC: 48 U/L (ref 7–52)
ANION GAP SERPL CALCULATED.3IONS-SCNC: 9 MMOL/L (ref 4–13)
AST SERPL W P-5'-P-CCNC: 35 U/L (ref 5–45)
BASOPHILS # BLD AUTO: 0.03 THOUSANDS/ÂΜL (ref 0–0.1)
BASOPHILS NFR BLD AUTO: 1 % (ref 0–1)
BILIRUB SERPL-MCNC: 0.34 MG/DL (ref 0.2–1)
BUN SERPL-MCNC: 17 MG/DL (ref 5–25)
CALCIUM SERPL-MCNC: 10 MG/DL (ref 8.4–10.2)
CHLORIDE SERPL-SCNC: 100 MMOL/L (ref 96–108)
CO2 SERPL-SCNC: 32 MMOL/L (ref 21–32)
CREAT SERPL-MCNC: 0.9 MG/DL (ref 0.6–1.3)
EOSINOPHIL # BLD AUTO: 0.12 THOUSAND/ÂΜL (ref 0–0.61)
EOSINOPHIL NFR BLD AUTO: 2 % (ref 0–6)
ERYTHROCYTE [DISTWIDTH] IN BLOOD BY AUTOMATED COUNT: 12.5 % (ref 11.6–15.1)
GLUCOSE SERPL-MCNC: 91 MG/DL (ref 65–140)
HCT VFR BLD AUTO: 38 % (ref 36.5–46.1)
HGB BLD-MCNC: 12.6 G/DL (ref 12–15.4)
IMM GRANULOCYTES # BLD AUTO: 0.02 THOUSAND/UL (ref 0–0.2)
IMM GRANULOCYTES NFR BLD AUTO: 0 % (ref 0–2)
LYMPHOCYTES # BLD AUTO: 1.72 THOUSANDS/ÂΜL (ref 0.6–4.47)
LYMPHOCYTES NFR BLD AUTO: 27 % (ref 14–44)
MCH RBC QN AUTO: 29.3 PG (ref 26.8–34.3)
MCHC RBC AUTO-ENTMCNC: 33.2 G/DL (ref 31.4–37.4)
MCV RBC AUTO: 88 FL (ref 82–98)
MONOCYTES # BLD AUTO: 0.5 THOUSAND/ÂΜL (ref 0.17–1.22)
MONOCYTES NFR BLD AUTO: 8 % (ref 4–12)
NEUTROPHILS # BLD AUTO: 4.03 THOUSANDS/ÂΜL (ref 1.85–7.62)
NEUTS SEG NFR BLD AUTO: 62 % (ref 43–75)
NRBC BLD AUTO-RTO: 0 /100 WBCS
PLATELET # BLD AUTO: 362 THOUSANDS/UL (ref 149–390)
PMV BLD AUTO: 10.8 FL (ref 8.9–12.7)
POTASSIUM SERPL-SCNC: 3.9 MMOL/L (ref 3.5–5.3)
PROT SERPL-MCNC: 7.6 G/DL (ref 6.4–8.4)
RBC # BLD AUTO: 4.3 MILLION/UL (ref 3.88–5.12)
SODIUM SERPL-SCNC: 141 MMOL/L (ref 135–147)
T4 FREE SERPL-MCNC: 1.05 NG/DL (ref 0.61–1.12)
TSH SERPL DL<=0.05 MIU/L-ACNC: 1.33 UIU/ML (ref 0.45–4.5)
WBC # BLD AUTO: 6.42 THOUSAND/UL (ref 4.31–10.16)

## 2024-03-26 PROCEDURE — 84439 ASSAY OF FREE THYROXINE: CPT

## 2024-03-26 PROCEDURE — 99214 OFFICE O/P EST MOD 30 MIN: CPT | Performed by: ORTHOPAEDIC SURGERY

## 2024-03-26 PROCEDURE — 85025 COMPLETE CBC W/AUTO DIFF WBC: CPT

## 2024-03-26 PROCEDURE — 84443 ASSAY THYROID STIM HORMONE: CPT

## 2024-03-26 PROCEDURE — 80053 COMPREHEN METABOLIC PANEL: CPT

## 2024-03-26 PROCEDURE — 36415 COLL VENOUS BLD VENIPUNCTURE: CPT

## 2024-03-26 RX ORDER — CHLORHEXIDINE GLUCONATE ORAL RINSE 1.2 MG/ML
15 SOLUTION DENTAL ONCE
OUTPATIENT
Start: 2024-04-10 | End: 2024-03-26

## 2024-03-26 RX ORDER — CEFAZOLIN SODIUM 2 G/50ML
2000 SOLUTION INTRAVENOUS ONCE
OUTPATIENT
Start: 2024-04-10 | End: 2024-03-26

## 2024-03-26 NOTE — H&P (VIEW-ONLY)
REASON FOR FOLLOW-UP  Adamaris Brian is a 48 y.o. adult who presents for follow-up of the right Shoulder    HISTORY OF PRESENT ILLNESS  Following our last visit, we decided to initially treat her Shoulder symptoms non-operatively. Our plan was to manage her symptoms conservatively with glenohumeral corticosteroid injection, physical therapy, RICE, anti-inflammatories.  Today, patient comes in for further evaluation of her right shoulder. Patient said she continues to experience pain the shoulder. She feels she has decreased ROM of when she was last seen. She has tried multiple conservative measures with no significant relief of her symptoms. She feels her symptoms are disabling and affecting her activities of daily living.    PHYSICAL EXAM  General:   Well-appearing  No acute distress  Appears stated age     Right Shoulder  Negative tenderness to palpation over the acromioclavicular joint  Negative tenderness to palpation over the long head of the biceps tendon  Shoulder effusion none present  Shoulder abduction 90/180  Shoulder forward flexion 90/180  Shoulder internal rotation T10 / T7  Negative Belly Press/SS  Positive Neer  Positive Galeano  Positive O'briens  Skin is intact with no erythema, warmth or drainage  Motor strength intact distally  Sensation to light touch is normal in the axillary, radial, ulnar, and median nerve distributions.  Fingers warm and perfused    DIAGNOSTIC IMAGING:  Procedure: MRI shoulder right wo contrast    Result Date: 3/22/2024  Narrative: MRI SHOULDER RIGHT WO CONTRAST INDICATION:   M25.511: Pain in right shoulder G89.29: Other chronic pain. COMPARISON: Right shoulder radiograph 1/15/2024 TECHNIQUE:  Multiplanar/multisequence MR of the right shoulder was performed. Imaging performed on 1.5T MRI FINDINGS: SUBCUTANEOUS TISSUES: Normal JOINT EFFUSION: None. ACROMION PROCESS: Normal. ROTATOR CUFF: Interstitial tear of the supraspinatus myotendinous junction measuring 0.8 cm wide and  0.7 cm long (series 6 images 8-12, series 8 image 14). Moderate supraspinatus insertional tendinosis. Mild infraspinatus insertional tendinosis without tear. Intact subscapularis and teres minor tendons. No fatty muscle atrophy. SUBACROMIAL/SUBDELTOID BURSA: Mild bursitis. LONG HEAD OF BICEPS TENDON: Intact. GLENOID LABRUM: Mild chronic degenerative tear of the superior labrum (series 8 image 14). GLENOHUMERAL JOINT: Intact. ACROMIOCLAVICULAR JOINT: There is mild to moderate acromioclavicular joint osteoarthrosis with a 1.7 x 0.9 cm ganglion cyst arising superiorly. BONES: Normal.     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 Moderate supraspinatus insertional tendinosis. Mild infraspinatus insertional tendinosis without tear. Mild to moderate acromioclavicular joint osteoarthrosis with an adjacent AC ganglion cyst. Superior labral degeneration. Mild subacromial/subdeltoid bursitis. Resident: Cortes Bell I, the attending radiologist, have reviewed the images and agree with the final report above. Workstation performed: [id number]     IMPRESSION/REPORT/PLAN  Right rotator cuff tear  Adhesive capsulitis    Adamaris Brian injured their shoulder acutely, resulting in a traumatic rotator cuff tear. We discussed the importance of the rotator cuff in the stability and strength of the shoulder and other overhead activities. We had an extensive discussion regarding the diagnosis and its natural history. We also discussed both non-operative and operative treatment strategies. In an active individual who is frequently using the upper extremity for work and other activities, I would recommend rotator cuff repair. she as continued to experience significant symptoms and dysfunction and is ready to proceed with surgery. I certainly understand and am in agreement.    Pending medical clearance, we will plan to proceed with shoulder arthroscopy with rotator cuff debridement versus  repair, possible biceps tenodesis, subacromial decompression,  and all other indicated procedures. We described the significant post-operative recovery, including a minimum of 6 to 9 months return to full strenuous upper extremity activity if everything goes well. We discussed the anticipated pre, intra, and postoperative course in great detail. Specifically, we discussed the recovery and rehabilitation protocol and time lines.There is certainly a risk of reinjury upon returning full strenuous activities.    We discussed risks of surgery, which include shoulder stiffness, infection, risk of reinjury and future arthritis.    Patient will schedule rotator cuff repair surgery. she will need a preoperative clearance/physical appointment and physical therapy.       Scribe Attestation      I,:  Davi Carver PA-C am acting as a scribe while in the presence of the attending physician.:       I,:  Luke Gamez MD personally performed the services described in this documentation    as scribed in my presence.:

## 2024-03-26 NOTE — PROGRESS NOTES
REASON FOR FOLLOW-UP  Adamaris Brian is a 48 y.o. adult who presents for follow-up of the right Shoulder    HISTORY OF PRESENT ILLNESS  Following our last visit, we decided to initially treat her Shoulder symptoms non-operatively. Our plan was to manage her symptoms conservatively with glenohumeral corticosteroid injection, physical therapy, RICE, anti-inflammatories.  Today, patient comes in for further evaluation of her right shoulder. Patient said she continues to experience pain the shoulder. She feels she has decreased ROM of when she was last seen. She has tried multiple conservative measures with no significant relief of her symptoms. She feels her symptoms are disabling and affecting her activities of daily living.    PHYSICAL EXAM  General:   Well-appearing  No acute distress  Appears stated age     Right Shoulder  Negative tenderness to palpation over the acromioclavicular joint  Negative tenderness to palpation over the long head of the biceps tendon  Shoulder effusion none present  Shoulder abduction 90/180  Shoulder forward flexion 90/180  Shoulder internal rotation T10 / T7  Negative Belly Press/SS  Positive Neer  Positive Galeano  Positive O'briens  Skin is intact with no erythema, warmth or drainage  Motor strength intact distally  Sensation to light touch is normal in the axillary, radial, ulnar, and median nerve distributions.  Fingers warm and perfused    DIAGNOSTIC IMAGING:  Procedure: MRI shoulder right wo contrast    Result Date: 3/22/2024  Narrative: MRI SHOULDER RIGHT WO CONTRAST INDICATION:   M25.511: Pain in right shoulder G89.29: Other chronic pain. COMPARISON: Right shoulder radiograph 1/15/2024 TECHNIQUE:  Multiplanar/multisequence MR of the right shoulder was performed. Imaging performed on 1.5T MRI FINDINGS: SUBCUTANEOUS TISSUES: Normal JOINT EFFUSION: None. ACROMION PROCESS: Normal. ROTATOR CUFF: Interstitial tear of the supraspinatus myotendinous junction measuring 0.8 cm wide and  0.7 cm long (series 6 images 8-12, series 8 image 14). Moderate supraspinatus insertional tendinosis. Mild infraspinatus insertional tendinosis without tear. Intact subscapularis and teres minor tendons. No fatty muscle atrophy. SUBACROMIAL/SUBDELTOID BURSA: Mild bursitis. LONG HEAD OF BICEPS TENDON: Intact. GLENOID LABRUM: Mild chronic degenerative tear of the superior labrum (series 8 image 14). GLENOHUMERAL JOINT: Intact. ACROMIOCLAVICULAR JOINT: There is mild to moderate acromioclavicular joint osteoarthrosis with a 1.7 x 0.9 cm ganglion cyst arising superiorly. BONES: Normal.     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 Moderate supraspinatus insertional tendinosis. Mild infraspinatus insertional tendinosis without tear. Mild to moderate acromioclavicular joint osteoarthrosis with an adjacent AC ganglion cyst. Superior labral degeneration. Mild subacromial/subdeltoid bursitis. Resident: Cortes Bell I, the attending radiologist, have reviewed the images and agree with the final report above. Workstation performed: [id number]     IMPRESSION/REPORT/PLAN  Right rotator cuff tear  Adhesive capsulitis    Adamaris Brian injured their shoulder acutely, resulting in a traumatic rotator cuff tear. We discussed the importance of the rotator cuff in the stability and strength of the shoulder and other overhead activities. We had an extensive discussion regarding the diagnosis and its natural history. We also discussed both non-operative and operative treatment strategies. In an active individual who is frequently using the upper extremity for work and other activities, I would recommend rotator cuff repair. she as continued to experience significant symptoms and dysfunction and is ready to proceed with surgery. I certainly understand and am in agreement.    Pending medical clearance, we will plan to proceed with shoulder arthroscopy with rotator cuff debridement versus  repair, possible biceps tenodesis, subacromial decompression,  and all other indicated procedures. We described the significant post-operative recovery, including a minimum of 6 to 9 months return to full strenuous upper extremity activity if everything goes well. We discussed the anticipated pre, intra, and postoperative course in great detail. Specifically, we discussed the recovery and rehabilitation protocol and time lines.There is certainly a risk of reinjury upon returning full strenuous activities.    We discussed risks of surgery, which include shoulder stiffness, infection, risk of reinjury and future arthritis.    Patient will schedule rotator cuff repair surgery. she will need a preoperative clearance/physical appointment and physical therapy.       Scribe Attestation      I,:  Davi Carver PA-C am acting as a scribe while in the presence of the attending physician.:       I,:  Luke Gamez MD personally performed the services described in this documentation    as scribed in my presence.:

## 2024-03-26 NOTE — LETTER
March 26, 2024     Patient: Adamaris Brian  YOB: 1975  Date of Visit: 3/26/2024      To Whom it May Concern:    Adamaris Brian is under my professional care. Adamaris was seen in my office on 3/26/2024. Adamaris should be excused from work for the next 3 days as she recovers from orthopedic injury.    If you have any questions or concerns, please don't hesitate to call.         Sincerely,          Luke Gamez MD        CC: No Recipients

## 2024-03-27 ENCOUNTER — ANESTHESIA EVENT (OUTPATIENT)
Age: 49
End: 2024-03-27
Payer: MEDICARE

## 2024-04-02 ENCOUNTER — HOSPITAL ENCOUNTER (OUTPATIENT)
Dept: RADIOLOGY | Facility: HOSPITAL | Age: 49
Discharge: HOME | End: 2024-04-02

## 2024-04-03 ENCOUNTER — TELEPHONE (OUTPATIENT)
Dept: CARDIOLOGY CLINIC | Facility: CLINIC | Age: 49
End: 2024-04-03

## 2024-04-03 RX ORDER — FAMOTIDINE 20 MG/1
20 TABLET, FILM COATED ORAL EVERY EVENING
COMMUNITY

## 2024-04-03 NOTE — TELEPHONE ENCOUNTER
REPAIR ROTATOR CUFF  ARTHROSCOPIC - Right with Luke Gamez MD on 4/10/2024     How long can she hold Eliquis?    Please advise

## 2024-04-03 NOTE — PRE-PROCEDURE INSTRUCTIONS
Pre-Surgery Instructions:   Medication Instructions    acetaminophen (TYLENOL) 325 mg tablet Uses PRN- OK to take day of surgery    apixaban (Eliquis) 5 mg Instructions provided by MD    aspirin-acetaminophen-caffeine (EXCEDRIN MIGRAINE) 250-250-65 MG per tablet Stop taking 7 days prior to surgery.    Cetirizine HCl 10 MG CAPS Take day of surgery.    ergocalciferol (VITAMIN D2) 50,000 units Stop taking 7 days prior to surgery.    famotidine (PEPCID) 20 mg tablet Take night before surgery    fluticasone (FLONASE) 50 mcg/act nasal spray Take day of surgery.    fluticasone (FLOVENT HFA) 110 MCG/ACT inhaler Uses PRN- OK to take day of surgery    hydroxychloroquine (PLAQUENIL) 200 mg tablet Uses PRN- OK to take day of surgery    ibuprofen (MOTRIN) 800 mg tablet Stop taking 3 days prior to surgery.    lasmiditan succinate (Reyvow) 100 mg tablet Uses PRN- OK to take day of surgery    levothyroxine 88 mcg tablet Take day of surgery.    meclizine (ANTIVERT) 12.5 MG tablet Uses PRN- OK to take day of surgery    Multiple Vitamins-Minerals (MULTIVITAMIN ADULT PO) Stop taking 7 days prior to surgery.    Nurtec 75 MG TBDP Take day of surgery.    oxyCODONE (ROXICODONE) 15 mg immediate release tablet Uses PRN- OK to take day of surgery    PROAIR  (90 Base) MCG/ACT inhaler Uses PRN- OK to take day of surgery    prochlorperazine (COMPAZINE) 10 mg tablet Hold day of surgery.    propranolol (INDERAL LA) 160 mg Take day of surgery.    telmisartan-hydrochlorothiazide (MICARDIS HCT) 80-12.5 MG per tablet Hold day of surgery.    valACYclovir (VALTREX) 1,000 mg tablet Uses PRN- OK to take day of surgery    Xiidra 5 % op solution Take day of surgery.   Medication instructions for day surgery reviewed. Please use only a sip of water to take your instructed medications. Avoid all over the counter vitamins, supplements and NSAIDS for one week prior to surgery per anesthesia guidelines. Tylenol is ok to take as needed.     You will  receive a call one business day prior to surgery with an arrival time and hospital directions. If your surgery is scheduled on a Monday, the hospital will be calling you on the Friday prior to your surgery. If you have not heard from anyone by 8pm, please call the hospital supervisor through the hospital  at [phone]. ([place] [phone] or [place] [phone]).    Do not eat or drink anything after midnight the night before your surgery, including candy, mints, lifesavers, or chewing gum. Do not drink alcohol 24hrs before your surgery. Try not to smoke at least 24hrs before your surgery.       Follow the pre surgery showering instructions as listed in the “My Surgical Experience Booklet” or otherwise provided by your surgeon's office. Do not use a blade to shave the surgical area 1 week before surgery. It is okay to use a clean electric clippers up to 24 hours before surgery. Do not apply any lotions, creams, including makeup, cologne, deodorant, or perfumes after showering on the day of your surgery. Do not use dry shampoo, hair spray, hair gel, or any type of hair products.     No contact lenses, eye make-up, or artificial eyelashes. Remove nail polish, including gel polish, and any artificial, gel, or acrylic nails if possible. Remove all jewelry including rings and body piercing jewelry.     Wear causal clothing that is easy to take on and off. Consider your type of surgery.    Keep any valuables, jewelry, piercings at home. Please bring any specially ordered equipment (sling, braces) if indicated.    Arrange for a responsible person to drive you to and from the hospital on the day of your surgery. Please confirm the visitor policy for the day of your procedure when you receive your phone call with an arrival time.     Call the surgeon's office with any new illnesses, exposures, or additional questions prior to surgery.    Please reference your “My Surgical Experience Booklet” for additional  information to prepare for your upcoming surgery.

## 2024-04-04 NOTE — TELEPHONE ENCOUNTER
Sorry, I did miss that. Will take care of it.   Trying to get the pt an appt with a doctor.  She has not been seen in office by a doctor since 2022.      Thank you

## 2024-04-04 NOTE — TELEPHONE ENCOUNTER
What is the surgeon recommending ?    Secondly, I have only seen this patient one time, she is a patient of Dr. Santana. Also, it looks like she is on Eliquis for DVT, not atrial fib, which is usually managed by a PCP or vascular.     Yi

## 2024-04-08 ENCOUNTER — TELEPHONE (OUTPATIENT)
Dept: OBGYN CLINIC | Facility: CLINIC | Age: 49
End: 2024-04-08

## 2024-04-08 ENCOUNTER — TELEPHONE (OUTPATIENT)
Age: 49
End: 2024-04-08

## 2024-04-08 NOTE — TELEPHONE ENCOUNTER
Caller: Patient    Doctor: Dr. Gamez/Timothy Ovalle    Reason for call: Patient calling asking if a note can be placed in her MyChart stating that she will be out of work approximately 4-6 months following Right RTC surgery.      Call back#: [phone]

## 2024-04-08 NOTE — TELEPHONE ENCOUNTER
Called and left detailed message for staff (office was closed). Stated that I had faxed over Medical Clearance form several time and have not received it faxed back. Provided my fax/phone.

## 2024-04-09 ENCOUNTER — TELEPHONE (OUTPATIENT)
Age: 49
End: 2024-04-09

## 2024-04-09 ENCOUNTER — OFFICE VISIT (OUTPATIENT)
Dept: CARDIOLOGY CLINIC | Facility: CLINIC | Age: 49
End: 2024-04-09
Payer: MEDICARE

## 2024-04-09 VITALS
WEIGHT: 200.6 LBS | BODY MASS INDEX: 33.42 KG/M2 | DIASTOLIC BLOOD PRESSURE: 80 MMHG | HEART RATE: 84 BPM | HEIGHT: 65 IN | OXYGEN SATURATION: 98 % | SYSTOLIC BLOOD PRESSURE: 130 MMHG

## 2024-04-09 DIAGNOSIS — R00.2 PALPITATIONS: Primary | ICD-10-CM

## 2024-04-09 DIAGNOSIS — I10 PRIMARY HYPERTENSION: ICD-10-CM

## 2024-04-09 DIAGNOSIS — E78.2 MIXED HYPERLIPIDEMIA: ICD-10-CM

## 2024-04-09 DIAGNOSIS — I47.19 AVNRT (AV NODAL RE-ENTRY TACHYCARDIA): Chronic | ICD-10-CM

## 2024-04-09 PROCEDURE — 99214 OFFICE O/P EST MOD 30 MIN: CPT | Performed by: INTERNAL MEDICINE

## 2024-04-09 PROCEDURE — 93000 ELECTROCARDIOGRAM COMPLETE: CPT | Performed by: INTERNAL MEDICINE

## 2024-04-09 NOTE — TELEPHONE ENCOUNTER
Called and left another message (office opens at 2pm today). Stated [facility] form is needed. Surgery is tomorrow. 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   8/16: Ammonia -35  Continue lactulose and rifaximin

## 2024-04-09 NOTE — PROGRESS NOTES
Cardiology Follow Up    Adamaris Brian  1975  [id number]  [facility]  [address]  [address]  [facility] [address] [phone]    1. Palpitations  POCT ECG      2. AVNRT (AV win re-entry tachycardia)        3. Mixed hyperlipidemia        4. Primary hypertension          Chief Complaint   Patient presents with    Pre-op Clearance     Dr. Santana pt - CC  rotator cuff repair     Shortness of Breath       Interval History: Patient is here for cardiac evaluation prior to rotator cuff repair.  Patient feels well, without complaints.  No reported chest pain, shortness of breath, palpitations, lightheadedness, syncope, LE edema, orthopnea, PND, or significant weight changes.  Patient remains active without any increased fatigue out of the ordinary.      Patient Active Problem List   Diagnosis    Palpitations    AVNRT (AV win re-entry tachycardia)    Chronic migraine without aura without status migrainosus, not intractable    Interstitial cystitis    Vaginal candidiasis    Hyperlipidemia    Disease of thyroid gland    Yeast infection of the vagina    Vaginal discharge    Other specified anxiety disorders    Vaginal dryness    Pelvic pain    Urine frequency    Full incontinence of feces    Cervicogenic headache    Cervicalgia    Vaginal yeast infection    IC (interstitial cystitis)    Obesity, morbid (HCC)    History of hysterectomy    CK (obstructive sleep apnea)    Deep vein thrombosis (DVT) of distal vein of lower extremity (HCC)    Primary hypertension     Past Medical History:   Diagnosis Date    Arthritis     Asthma     Bipolar 1 disorder (HCC)     Chronic narcotic dependence ([facility])     Cluster headache     COVID     2021    CTS (carpal tunnel syndrome)     left hand    Difficulty walking     Disease of thyroid gland     Fibromyalgia     Fibromyalgia, primary     Gout     Headache, tension-type     History of DVT in  adulthood     Hyperlipidemia     Hypertension     Interstitial cystitis     Lumbar herniated disc     Lupus (HCC)     Memory loss     Migraine     Mild sleep apnea     no CPAP    Peripheral neuropathy     PONV (postoperative nausea and vomiting)     Vision loss      Social History     Socioeconomic History    Marital status: Single     Spouse name: Not on file    Number of children: Not on file    Years of education: Not on file    Highest education level: Not on file   Occupational History    Not on file   Tobacco Use    Smoking status: Former     Current packs/day: 1.00     Average packs/day: 1 pack/day for 5.0 years (5.0 ttl pk-yrs)     Types: Cigarettes    Smokeless tobacco: Never    Tobacco comments:     quit at 17 years old   Vaping Use    Vaping status: Never Used   Substance and Sexual Activity    Alcohol use: No    Drug use: No    Sexual activity: Yes     Partners: Male     Birth control/protection: Female Sterilization   Other Topics Concern    Not on file   Social History Narrative    Not on file     Social Determinants of Health     Financial Resource Strain: Low Risk  (2/3/2023)    Overall Financial Resource Strain (CARDIA)     Difficulty of Paying Living Expenses: Not hard at all   Food Insecurity: No Food Insecurity (2/3/2023)    Hunger Vital Sign     Worried About Running Out of Food in the Last Year: Never true     Ran Out of Food in the Last Year: Never true   Transportation Needs: No Transportation Needs (2/3/2023)    PRAPARE - Transportation     Lack of Transportation (Medical): No     Lack of Transportation (Non-Medical): No   Physical Activity: Insufficiently Active (5/5/2022)    Exercise Vital Sign     Days of Exercise per Week: 2 days     Minutes of Exercise per Session: 20 min   Stress: Stress Concern Present (5/5/2022)    [employer] - Occupational Stress Questionnaire     Feeling of Stress : To some extent   Social Connections: Socially Isolated (5/5/2022)     Social Connection and Isolation Panel [NHANES]     Frequency of Communication with Friends and Family: More than three times a week     Frequency of Social Gatherings with Friends and Family: More than three times a week     Attends Yarsanism Services: Never     Active Member of Clubs or Organizations: No     Attends Club or Organization Meetings: Never     Marital Status: Never    Intimate Partner Violence: Not At Risk (5/5/2022)    Humiliation, Afraid, Rape, and Kick questionnaire     Fear of Current or Ex-Partner: No     Emotionally Abused: No     Physically Abused: No     Sexually Abused: No   Housing Stability: Low Risk  (2/3/2023)    Housing Stability Vital Sign     Unable to Pay for Housing in the Last Year: No     Number of Places Lived in the Last Year: 1     Unstable Housing in the Last Year: No      Family History   Problem Relation Age of Onset    Lupus Mother     Osteoporosis Mother     Hypotension Mother     Hypertension Father     Diabetes Father     Heart disease Father     Migraines Father     Stroke Father     Diabetes Sister     Schizophrenia Sister     Hypertension Sister     Diabetes Maternal Grandmother     Rheum arthritis Maternal Grandmother     Hypertension Maternal Grandmother     Neuropathy Maternal Grandmother     Seizures Maternal Grandmother     Breast cancer Maternal Aunt 40    No Known Problems Maternal Aunt     No Known Problems Maternal Aunt     No Known Problems Maternal Aunt     No Known Problems Paternal Aunt      Past Surgical History:   Procedure Laterality Date    COLONOSCOPY      GALLBLADDER SURGERY  2008    HYSTERECTOMY  2014    LAPAROSCOPIC COLON RESECTION  2001    NH NDSC WRST SURG W/RLS TRANSVRS CARPL LIGM Right 12/06/2023    Procedure: Right endoscopic carpal tunnel release;  Surgeon: Alek Richards MD;  Location: [facility];  Service: Orthopedics    NH TENDON SHEATH INCISION Right 12/06/2023    Procedure: Right ring trigger finger release;  Surgeon: Alek  MD Susie;  Location: [facility];  Service: Orthopedics       Current Outpatient Medications:     apixaban (Eliquis) 5 mg, Take 2 tablets (10 mg total) by mouth 2 (two) times a day for 7 days, THEN 1 tablet (5 mg total) 2 (two) times a day for 23 days., Disp: 74 tablet, Rfl: 0    famotidine (PEPCID) 20 mg tablet, Take 20 mg by mouth every evening, Disp: , Rfl:     fluticasone (FLONASE) 50 mcg/act nasal spray, 1 spray into each nostril daily (Patient taking differently: 1 spray into each nostril daily as needed), Disp: 16 g, Rfl: 0    fluticasone (FLOVENT HFA) 110 MCG/ACT inhaler, Inhale 1 puff as needed , Disp: , Rfl:     hydroxychloroquine (PLAQUENIL) 200 mg tablet, Take 200 mg by mouth as needed During Summer time, Disp: , Rfl:     lasmiditan succinate (Reyvow) 100 mg tablet, TAKE 1 TABLET (100MG) ONE TIME AS NEEDED FOR MIGRAINE.DO NOT USE MORE THAN ONE DOSEPER DAY OR MORE THAN 8 DOSES PER MONTH, Disp: 8 tablet, Rfl: 5    levothyroxine 88 mcg tablet, Take 88 mcg by mouth daily , Disp: , Rfl:     meclizine (ANTIVERT) 12.5 MG tablet, Take 12.5 mg by mouth as needed, Disp: , Rfl:     Nurtec 75 MG TBDP, DISSOLVE 1 TABLET IN MOUTH EVERY OTHER DAY, Disp: 16 tablet, Rfl: 11    oxyCODONE (ROXICODONE) 15 mg immediate release tablet, Take 15 mg by mouth as needed, Disp: , Rfl:     PROAIR  (90 Base) MCG/ACT inhaler, Inhale 1-2 puffs as needed , Disp: , Rfl:     prochlorperazine (COMPAZINE) 10 mg tablet, Take 1 tablet (10 mg total) by mouth every 6 (six) hours as needed (migraine), Disp: 20 tablet, Rfl: 3    propranolol (INDERAL LA) 160 mg, Take 160 mg by mouth 3 (three) times a day, Disp: , Rfl: 0    valACYclovir (VALTREX) 1,000 mg tablet, 1 tablet as needed , Disp: , Rfl:     Xiidra 5 % op solution, instill 1 drop into both eyes twice a day, Disp: , Rfl:     acetaminophen (TYLENOL) 325 mg tablet, Take 650 mg by mouth if needed for mild pain, Disp: , Rfl:     aspirin-acetaminophen-caffeine (EXCEDRIN MIGRAINE)  250-250-65 MG per tablet, Take 2 tablets by mouth if needed for headaches (Patient not taking: Reported on 4/9/2024), Disp: , Rfl:     Atogepant (Qulipta) 60 MG TABS, Take 60 mg by mouth in the morning (Patient not taking: Reported on 4/3/2024), Disp: 30 tablet, Rfl: 11    buPROPion (WELLBUTRIN SR) 150 mg 12 hr tablet, Take 1 tablet (150 mg total) by mouth 2 (two) times a day (Patient not taking: Reported on 4/3/2024), Disp: 180 tablet, Rfl: 3    Cetirizine HCl 10 MG CAPS, Take 10 mg by mouth daily as needed, Disp: , Rfl:     ergocalciferol (VITAMIN D2) 50,000 units, Take 50,000 Units by mouth once a week sundays (Patient not taking: Reported on 4/9/2024), Disp: , Rfl:     ibuprofen (MOTRIN) 800 mg tablet, Take 800 mg by mouth every 6 (six) hours (Patient not taking: Reported on 4/9/2024), Disp: , Rfl:     Multiple Vitamins-Minerals (MULTIVITAMIN ADULT PO), Take 1 tablet by mouth daily (Patient not taking: Reported on 4/9/2024), Disp: , Rfl:     telmisartan-hydrochlorothiazide (MICARDIS HCT) 80-12.5 MG per tablet, Take 1 tablet by mouth daily, Disp: 30 tablet, Rfl: 3    Current Facility-Administered Medications:     Botulinum Toxin Type A SOLR 200 Units, 200 Units, Injection, Q3 Months, aKssie Andrade PA-C, 200 Units at 03/18/21 1154  Allergies   Allergen Reactions    Pregabalin      Other reaction(s): blurred vision    Shellfish-Derived Products - Food Allergy Anaphylaxis and Hives    Sumatriptan      Other reaction(s): Altered Heart Rate    Triptans Shortness Of Breath and Palpitations    Latex Swelling and Rash     Other reaction(s): Hives/Skin Rash  Other reaction(s): Hives/Skin Rash    Sulfa Antibiotics Swelling and Rash     Other reaction(s): Hives/Skin Rash  Other reaction(s): Hives/Skin Rash    Monistat [Miconazole] Swelling       Labs:  Lab on 03/26/2024   Component Date Value    WBC 03/26/2024 6.42     RBC 03/26/2024 4.30     Hemoglobin 03/26/2024 12.6     Hematocrit 03/26/2024 38.0     MCV 03/26/2024 88      MCH 03/26/2024 29.3     MCHC 03/26/2024 33.2     RDW 03/26/2024 12.5     MPV 03/26/2024 10.8     Platelets 03/26/2024 362     nRBC 03/26/2024 0     Segmented % 03/26/2024 62     Immature Grans % 03/26/2024 0     Lymphocytes % 03/26/2024 27     Monocytes % 03/26/2024 8     Eosinophils Relative 03/26/2024 2     Basophils Relative 03/26/2024 1     Absolute Neutrophils 03/26/2024 4.03     Absolute Immature Grans 03/26/2024 0.02     Absolute Lymphocytes 03/26/2024 1.72     Absolute Monocytes 03/26/2024 0.50     Eosinophils Absolute 03/26/2024 0.12     Basophils Absolute 03/26/2024 0.03     Sodium 03/26/2024 141     Potassium 03/26/2024 3.9     Chloride 03/26/2024 100     CO2 03/26/2024 32     ANION GAP 03/26/2024 9     BUN 03/26/2024 17     Creatinine 03/26/2024 0.90     Glucose 03/26/2024 91     Calcium 03/26/2024 10.0     AST 03/26/2024 35     ALT 03/26/2024 48     Alkaline Phosphatase 03/26/2024 66     Total Protein 03/26/2024 7.6     Albumin 03/26/2024 4.3     Total Bilirubin 03/26/2024 0.34     Free T4 03/26/2024 1.05     TSH 3RD GENERATON 03/26/2024 1.326    Hospital Outpatient Visit on 03/20/2024   Component Date Value    BSA 03/20/2024 1.96     A4C EF 03/20/2024 62     LVIDd 03/20/2024 3.30     LVIDS 03/20/2024 1.90     IVSd 03/20/2024 1.20     LVPWd 03/20/2024 1.20     FS 03/20/2024 42     MV E' Tissue Velocity Se* 03/20/2024 8     LA Volume Index (BP) 03/20/2024 29.1     E/A ratio 03/20/2024 1.20     E wave deceleration time 03/20/2024 180     MV Peak E Dax 03/20/2024 67     MV Peak A Dax 03/20/2024 0.56     RVID d 03/20/2024 3.0     Tricuspid annular plane * 03/20/2024 2.50     LA size 03/20/2024 3.5     LA length (A2C) 03/20/2024 4.50     LA volume (BP) 03/20/2024 57     RAA A4C 03/20/2024 11.2     MV stenosis pressure 1/2* 03/20/2024 52     MV valve area p 1/2 meth* 03/20/2024 4.23     TR Peak Dax 03/20/2024 2.4     Triscuspid Valve Regurgi* 03/20/2024 23.0     Ao root 03/20/2024 2.60     Tricuspid valve  peak reg* 03/20/2024 2.37     Left ventricular stroke * 03/20/2024 34.00     IVS 03/20/2024 1.2     LEFT VENTRICLE SYSTOLIC * 03/20/2024 11     LV DIASTOLIC VOLUME (MOD* 03/20/2024 45     Left Atrium Area-systoli* 03/20/2024 19.2     Left Atrium Area-systoli* 03/20/2024 17.3     LVSV, 2D 03/20/2024 34     LV EF 03/20/2024 60      Lab Results   Component Value Date    CHOL 161 09/30/2014    TRIG 90 11/20/2019    TRIG 107 09/30/2014    HDL 46 11/20/2019    HDL 45 09/30/2014     Imaging: MRI shoulder right wo contrast    Result Date: 3/22/2024  Narrative: MRI SHOULDER RIGHT WO CONTRAST INDICATION:   M25.511: Pain in right shoulder G89.29: Other chronic pain. COMPARISON: Right shoulder radiograph 1/15/2024 TECHNIQUE:  Multiplanar/multisequence MR of the right shoulder was performed. Imaging performed on 1.5T MRI FINDINGS: SUBCUTANEOUS TISSUES: Normal JOINT EFFUSION: None. ACROMION PROCESS: Normal. ROTATOR CUFF: Interstitial tear of the supraspinatus myotendinous junction measuring 0.8 cm wide and 0.7 cm long (series 6 images 8-12, series 8 image 14). Moderate supraspinatus insertional tendinosis. Mild infraspinatus insertional tendinosis without tear. Intact subscapularis and teres minor tendons. No fatty muscle atrophy. SUBACROMIAL/SUBDELTOID BURSA: Mild bursitis. LONG HEAD OF BICEPS TENDON: Intact. GLENOID LABRUM: Mild chronic degenerative tear of the superior labrum (series 8 image 14). GLENOHUMERAL JOINT: Intact. ACROMIOCLAVICULAR JOINT: There is mild to moderate acromioclavicular joint osteoarthrosis with a 1.7 x 0.9 cm ganglion cyst arising superiorly. BONES: Normal.     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 Moderate supraspinatus insertional tendinosis. Mild infraspinatus insertional tendinosis without tear. Mild to moderate acromioclavicular joint osteoarthrosis with an adjacent AC ganglion cyst. Superior labral degeneration. Mild subacromial/subdeltoid bursitis.  Resident: Cortes Bell I, the attending radiologist, have reviewed the images and agree with the final report above. Workstation performed: [id number]     Echo complete w/ contrast if indicated    Result Date: 3/20/2024  Narrative:   Left Ventricle: Left ventricular cavity size is normal. Wall thickness is mildly increased. There is mild concentric hypertrophy. The left ventricular ejection fraction is 60%. Systolic function is normal. Wall motion is normal. Diastolic function is normal.   IVS: There is sigmoid appearance of the septum.   Right Ventricle: Right ventricular cavity size is normal. Systolic function is normal.   Tricuspid Valve: There is mild regurgitation.       Review of Systems:  Review of Systems   Constitutional:  Negative for activity change, appetite change, fatigue and fever.   HENT:  Negative for nosebleeds and sore throat.    Eyes:  Negative for photophobia and visual disturbance.   Respiratory:  Negative for cough, chest tightness, shortness of breath and wheezing.    Cardiovascular:  Negative for chest pain, palpitations and leg swelling.   Gastrointestinal:  Negative for abdominal pain, diarrhea, nausea and vomiting.   Endocrine: Negative for polyuria.   Genitourinary:  Negative for dysuria, frequency and hematuria.   Musculoskeletal:  Negative for arthralgias, back pain and gait problem.   Skin:  Negative for pallor and rash.   Neurological:  Negative for dizziness, syncope, speech difficulty and light-headedness.   Hematological:  Does not bruise/bleed easily.   Psychiatric/Behavioral:  Negative for agitation, behavioral problems and confusion.        Physical Exam:  Physical Exam  Vitals reviewed.   Constitutional:       General: She is not in acute distress.     Appearance: Normal appearance. She is well-developed. She is not diaphoretic.   HENT:      Head: Normocephalic and atraumatic.      Nose: Nose normal.   Eyes:      General: No scleral icterus.     Extraocular Movements:  "Extraocular movements intact.      Pupils: Pupils are equal, round, and reactive to light.   Neck:      Vascular: No JVD.   Cardiovascular:      Rate and Rhythm: Normal rate and regular rhythm.      Heart sounds: S1 normal and S2 normal. Heart sounds not distant. No murmur heard.     No systolic murmur is present.      No friction rub. No gallop. No S3 sounds.   Pulmonary:      Effort: Pulmonary effort is normal. No respiratory distress.      Breath sounds: Normal breath sounds. No wheezing or rales.   Abdominal:      General: Bowel sounds are normal. There is no distension.      Palpations: Abdomen is soft.   Musculoskeletal:         General: No deformity.      Cervical back: Normal range of motion and neck supple.      Right lower leg: No edema.      Left lower leg: No edema.   Skin:     General: Skin is warm and dry.      Findings: No erythema.   Neurological:      Mental Status: She is alert and oriented to person, place, and time.      Cranial Nerves: No cranial nerve deficit.   Psychiatric:         Mood and Affect: Mood normal.         Behavior: Behavior normal.       Blood pressure 130/80, pulse 84, height 5' 5\" (1.651 m), weight 91 kg (200 lb 9.6 oz), SpO2 98%, not currently breastfeeding.    EKG:  Normal sinus rhythm  Normal ECG    Discussion/Summary:  Preop cardiac risk evaluation: Prior to intermediate risk orthopedic surgery for rotator cuff.  Patient has no active symptoms or ECG changes to suggest ischemia, arrhythmias, or heart failure.  She is on apixaban for DVT, for which recommendations can be provided by her PCP who manages.  She continues on propranolol for SVT suppression, which seems to be doing a pretty good job without symptoms currently.  Continue blood pressure medications perioperatively.    Hyperlipidemia: Currently not on medical therapy.  LDL most recently well-controlled at 108 in May 2023.    Hypertension: Relatively well-controlled on Micardis with HCTZ.  Continue current regiment.  " Patient had an echocardiogram in March 2024 for symptoms of chest pain and increasing blood pressures.  This revealed no significant structural heart disease of concern with normal ejection fraction.

## 2024-04-09 NOTE — TELEPHONE ENCOUNTER
Called and spoke with patient. She needs to reschedule her surgery due to family emergency.   Case rescheduled for 4/24/24. Related appointments updated.

## 2024-04-09 NOTE — LETTER
April 9, 2024     Leila Santos MD  [id number] [place] [id number]    Patient: Adamaris Brian   YOB: 1975   Date of Visit: 4/9/2024       Dear Dr. Santos:    Thank you for referring Adamaris Brian to me for evaluation. Below are my notes for this consultation.    If you have questions, please do not hesitate to call me. I look forward to following your patient along with you.         Sincerely,        Leroy Kolb MD        CC: MD Leroy Stephen MD  4/9/2024  9:38 AM  Incomplete                                             Cardiology Follow Up    Adamaris Brian  1975  [id number]  [facility] [place]  [address] [place] [address]  [facility] [id number] [phone]    1. Palpitations  POCT ECG        Chief Complaint   Patient presents with   • Pre-op Clearance     Dr. Santana pt - CC  rotator cuff repair    • Shortness of Breath       Interval History: Patient is here for cardiac evaluation prior to rotator cuff repair.  Patient feels well, without complaints.  No reported chest pain, shortness of breath, palpitations, lightheadedness, syncope, LE edema, orthopnea, PND, or significant weight changes.  Patient remains active without any increased fatigue out of the ordinary.      Patient Active Problem List   Diagnosis   • Palpitations   • AVNRT (AV win re-entry tachycardia)   • Chronic migraine without aura without status migrainosus, not intractable   • Interstitial cystitis   • Vaginal candidiasis   • Hyperlipidemia   • Disease of thyroid gland   • Yeast infection of the vagina   • Vaginal discharge   • Other specified anxiety disorders   • Vaginal dryness   • Pelvic pain   • Urine frequency   • Full incontinence of feces   • Cervicogenic headache   • Cervicalgia   • Vaginal yeast infection   • IC (interstitial cystitis)   • Obesity, morbid (HCC)   • History of hysterectomy   • CK (obstructive sleep apnea)   • Deep  vein thrombosis (DVT) of distal vein of lower extremity (HCC)     Past Medical History:   Diagnosis Date   • Arthritis    • Asthma    • Bipolar 1 disorder (HCC)    • Chronic narcotic dependence (HCC)    • Cluster headache    • COVID     2021   • CTS (carpal tunnel syndrome)     left hand   • Difficulty walking    • Disease of thyroid gland    • Fibromyalgia    • Fibromyalgia, primary    • Gout    • Headache, tension-type    • History of DVT in adulthood    • Hyperlipidemia    • Hypertension    • Interstitial cystitis    • Lumbar herniated disc    • Lupus (HCC)    • Memory loss    • Migraine    • Mild sleep apnea     no CPAP   • Peripheral neuropathy    • PONV (postoperative nausea and vomiting)    • Vision loss      Social History     Socioeconomic History   • Marital status: Single     Spouse name: Not on file   • Number of children: Not on file   • Years of education: Not on file   • Highest education level: Not on file   Occupational History   • Not on file   Tobacco Use   • Smoking status: Former     Current packs/day: 1.00     Average packs/day: 1 pack/day for 5.0 years (5.0 ttl pk-yrs)     Types: Cigarettes   • Smokeless tobacco: Never   • Tobacco comments:     quit at 17 years old   Vaping Use   • Vaping status: Never Used   Substance and Sexual Activity   • Alcohol use: No   • Drug use: No   • Sexual activity: Yes     Partners: Male     Birth control/protection: Female Sterilization   Other Topics Concern   • Not on file   Social History Narrative   • Not on file     Social Determinants of Health     Financial Resource Strain: Low Risk  (2/3/2023)    Overall Financial Resource Strain (CARDIA)    • Difficulty of Paying Living Expenses: Not hard at all   Food Insecurity: No Food Insecurity (2/3/2023)    Hunger Vital Sign    • Worried About Running Out of Food in the Last Year: Never true    • Ran Out of Food in the Last Year: Never true   Transportation Needs: No Transportation Needs (2/3/2023)    PRAPARE -  Transportation    • Lack of Transportation (Medical): No    • Lack of Transportation (Non-Medical): No   Physical Activity: Insufficiently Active (5/5/2022)    Exercise Vital Sign    • Days of Exercise per Week: 2 days    • Minutes of Exercise per Session: 20 min   Stress: Stress Concern Present (5/5/2022)    [place] [employer] [place] [employer] - Occupational Stress Questionnaire    • Feeling of Stress : To some extent   Social Connections: Socially Isolated (5/5/2022)    Social Connection and Isolation Panel [NHANES]    • Frequency of Communication with Friends and Family: More than three times a week    • Frequency of Social Gatherings with Friends and Family: More than three times a week    • Attends Jehovah's witness Services: Never    • Active Member of Clubs or Organizations: No    • Attends Club or Organization Meetings: Never    • Marital Status: Never    Intimate Partner Violence: Not At Risk (5/5/2022)    Humiliation, Afraid, Rape, and Kick questionnaire    • Fear of Current or Ex-Partner: No    • Emotionally Abused: No    • Physically Abused: No    • Sexually Abused: No   Housing Stability: Low Risk  (2/3/2023)    Housing Stability Vital Sign    • Unable to Pay for Housing in the Last Year: No    • Number of Places Lived in the Last Year: 1    • Unstable Housing in the Last Year: No      Family History   Problem Relation Age of Onset   • Lupus Mother    • Osteoporosis Mother    • Hypotension Mother    • Hypertension Father    • Diabetes Father    • Heart disease Father    • Migraines Father    • Stroke Father    • Diabetes Sister    • Schizophrenia Sister    • Hypertension Sister    • Diabetes Maternal Grandmother    • Rheum arthritis Maternal Grandmother    • Hypertension Maternal Grandmother    • Neuropathy Maternal Grandmother    • Seizures Maternal Grandmother    • Breast cancer Maternal Aunt 40   • No Known Problems Maternal Aunt    • No Known Problems Maternal Aunt    • No Known Problems  Maternal Aunt    • No Known Problems Paternal Aunt      Past Surgical History:   Procedure Laterality Date   • COLONOSCOPY     • GALLBLADDER SURGERY  2008   • HYSTERECTOMY  2014   • LAPAROSCOPIC COLON RESECTION  2001   • NY NDSC WRST SURG W/RLS TRANSVRS CARPL LIGM Right 12/06/2023    Procedure: Right endoscopic carpal tunnel release;  Surgeon: Alek Richards MD;  Location: [facility];  Service: Orthopedics   • NY TENDON SHEATH INCISION Right 12/06/2023    Procedure: Right ring trigger finger release;  Surgeon: Alek Richards MD;  [facility];  Service: Orthopedics       Current Outpatient Medications:   •  apixaban (Eliquis) 5 mg, Take 2 tablets (10 mg total) by mouth 2 (two) times a day for 7 days, THEN 1 tablet (5 mg total) 2 (two) times a day for 23 days., Disp: 74 tablet, Rfl: 0  •  famotidine (PEPCID) 20 mg tablet, Take 20 mg by mouth every evening, Disp: , Rfl:   •  fluticasone (FLONASE) 50 mcg/act nasal spray, 1 spray into each nostril daily (Patient taking differently: 1 spray into each nostril daily as needed), Disp: 16 g, Rfl: 0  •  fluticasone (FLOVENT HFA) 110 MCG/ACT inhaler, Inhale 1 puff as needed , Disp: , Rfl:   •  hydroxychloroquine (PLAQUENIL) 200 mg tablet, Take 200 mg by mouth as needed During Summer time, Disp: , Rfl:   •  lasmiditan succinate (Reyvow) 100 mg tablet, TAKE 1 TABLET (100MG) ONE TIME AS NEEDED FOR MIGRAINE.DO NOT USE MORE THAN ONE DOSEPER DAY OR MORE THAN 8 DOSES PER MONTH, Disp: 8 tablet, Rfl: 5  •  levothyroxine 88 mcg tablet, Take 88 mcg by mouth daily , Disp: , Rfl:   •  meclizine (ANTIVERT) 12.5 MG tablet, Take 12.5 mg by mouth as needed, Disp: , Rfl:   •  Nurtec 75 MG TBDP, DISSOLVE 1 TABLET IN MOUTH EVERY OTHER DAY, Disp: 16 tablet, Rfl: 11  •  oxyCODONE (ROXICODONE) 15 mg immediate release tablet, Take 15 mg by mouth as needed, Disp: , Rfl:   •  PROAIR  (90 Base) MCG/ACT inhaler, Inhale 1-2 puffs as needed , Disp: , Rfl:   •  prochlorperazine  (COMPAZINE) 10 mg tablet, Take 1 tablet (10 mg total) by mouth every 6 (six) hours as needed (migraine), Disp: 20 tablet, Rfl: 3  •  propranolol (INDERAL LA) 160 mg, Take 160 mg by mouth 3 (three) times a day, Disp: , Rfl: 0  •  valACYclovir (VALTREX) 1,000 mg tablet, 1 tablet as needed , Disp: , Rfl:   •  Xiidra 5 % op solution, instill 1 drop into both eyes twice a day, Disp: , Rfl:   •  acetaminophen (TYLENOL) 325 mg tablet, Take 650 mg by mouth if needed for mild pain, Disp: , Rfl:   •  aspirin-acetaminophen-caffeine (EXCEDRIN MIGRAINE) 250-250-65 MG per tablet, Take 2 tablets by mouth if needed for headaches (Patient not taking: Reported on 4/9/2024), Disp: , Rfl:   •  Atogepant (Qulipta) 60 MG TABS, Take 60 mg by mouth in the morning (Patient not taking: Reported on 4/3/2024), Disp: 30 tablet, Rfl: 11  •  buPROPion (WELLBUTRIN SR) 150 mg 12 hr tablet, Take 1 tablet (150 mg total) by mouth 2 (two) times a day (Patient not taking: Reported on 4/3/2024), Disp: 180 tablet, Rfl: 3  •  Cetirizine HCl 10 MG CAPS, Take 10 mg by mouth daily as needed, Disp: , Rfl:   •  ergocalciferol (VITAMIN D2) 50,000 units, Take 50,000 Units by mouth once a week sundays (Patient not taking: Reported on 4/9/2024), Disp: , Rfl:   •  ibuprofen (MOTRIN) 800 mg tablet, Take 800 mg by mouth every 6 (six) hours (Patient not taking: Reported on 4/9/2024), Disp: , Rfl:   •  Multiple Vitamins-Minerals (MULTIVITAMIN ADULT PO), Take 1 tablet by mouth daily (Patient not taking: Reported on 4/9/2024), Disp: , Rfl:   •  telmisartan-hydrochlorothiazide (MICARDIS HCT) 80-12.5 MG per tablet, Take 1 tablet by mouth daily, Disp: 30 tablet, Rfl: 3    Current Facility-Administered Medications:   •  Botulinum Toxin Type A SOLR 200 Units, 200 Units, Injection, Q3 Months, Kassie Raymond, PA-C, 200 Units at 03/18/21 1154  Allergies   Allergen Reactions   • Pregabalin      Other reaction(s): blurred vision   • Shellfish-Derived Products - Food Allergy  Anaphylaxis and Hives   • Sumatriptan      Other reaction(s): Altered Heart Rate   • Triptans Shortness Of Breath and Palpitations   • Latex Swelling and Rash     Other reaction(s): Hives/Skin Rash  Other reaction(s): Hives/Skin Rash   • Sulfa Antibiotics Swelling and Rash     Other reaction(s): Hives/Skin Rash  Other reaction(s): Hives/Skin Rash   • Monistat [Miconazole] Swelling       Labs:  Lab on 03/26/2024   Component Date Value   • WBC 03/26/2024 6.42    • RBC 03/26/2024 4.30    • Hemoglobin 03/26/2024 12.6    • Hematocrit 03/26/2024 38.0    • MCV 03/26/2024 88    • MCH 03/26/2024 29.3    • MCHC 03/26/2024 33.2    • RDW 03/26/2024 12.5    • MPV 03/26/2024 10.8    • Platelets 03/26/2024 362    • nRBC 03/26/2024 0    • Segmented % 03/26/2024 62    • Immature Grans % 03/26/2024 0    • Lymphocytes % 03/26/2024 27    • Monocytes % 03/26/2024 8    • Eosinophils Relative 03/26/2024 2    • Basophils Relative 03/26/2024 1    • Absolute Neutrophils 03/26/2024 4.03    • Absolute Immature Grans 03/26/2024 0.02    • Absolute Lymphocytes 03/26/2024 1.72    • Absolute Monocytes 03/26/2024 0.50    • Eosinophils Absolute 03/26/2024 0.12    • Basophils Absolute 03/26/2024 0.03    • Sodium 03/26/2024 141    • Potassium 03/26/2024 3.9    • Chloride 03/26/2024 100    • CO2 03/26/2024 32    • ANION GAP 03/26/2024 9    • BUN 03/26/2024 17    • Creatinine 03/26/2024 0.90    • Glucose 03/26/2024 91    • Calcium 03/26/2024 10.0    • AST 03/26/2024 35    • ALT 03/26/2024 48    • Alkaline Phosphatase 03/26/2024 66    • Total Protein 03/26/2024 7.6    • Albumin 03/26/2024 4.3    • Total Bilirubin 03/26/2024 0.34    • Free T4 03/26/2024 1.05    • TSH 3RD GENERATON 03/26/2024 1.326    Hospital Outpatient Visit on 03/20/2024   Component Date Value   • BSA 03/20/2024 1.96    • A4C EF 03/20/2024 62    • LVIDd 03/20/2024 3.30    • LVIDS 03/20/2024 1.90    • IVSd 03/20/2024 1.20    • LVPWd 03/20/2024 1.20    • FS 03/20/2024 42    • MV E' Tissue  Velocity Se* 03/20/2024 8    • LA Volume Index (BP) 03/20/2024 29.1    • E/A ratio 03/20/2024 1.20    • E wave deceleration time 03/20/2024 180    • MV Peak E Dax 03/20/2024 67    • MV Peak A Dax 03/20/2024 0.56    • RVID d 03/20/2024 3.0    • Tricuspid annular plane * 03/20/2024 2.50    • LA size 03/20/2024 3.5    • LA length (A2C) 03/20/2024 4.50    • LA volume (BP) 03/20/2024 57    • RAA A4C 03/20/2024 11.2    • MV stenosis pressure 1/2* 03/20/2024 52    • MV valve area p 1/2 meth* 03/20/2024 4.23    • TR Peak Dax 03/20/2024 2.4    • Triscuspid Valve Regurgi* 03/20/2024 23.0    • Ao root 03/20/2024 2.60    • Tricuspid valve peak reg* 03/20/2024 2.37    • Left ventricular stroke * 03/20/2024 34.00    • IVS 03/20/2024 1.2    • LEFT VENTRICLE SYSTOLIC * 03/20/2024 11    • LV DIASTOLIC VOLUME (MOD* 03/20/2024 45    • Left Atrium Area-systoli* 03/20/2024 19.2    • Left Atrium Area-systoli* 03/20/2024 17.3    • LVSV, 2D 03/20/2024 34    • LV EF 03/20/2024 60      Lab Results   Component Value Date    CHOL 161 09/30/2014    TRIG 90 11/20/2019    TRIG 107 09/30/2014    HDL 46 11/20/2019    HDL 45 09/30/2014     Imaging: MRI shoulder right wo contrast    Result Date: 3/22/2024  Narrative: MRI SHOULDER RIGHT WO CONTRAST INDICATION:   M25.511: Pain in right shoulder G89.29: Other chronic pain. COMPARISON: Right shoulder radiograph 1/15/2024 TECHNIQUE:  Multiplanar/multisequence MR of the right shoulder was performed. Imaging performed on 1.5T MRI FINDINGS: SUBCUTANEOUS TISSUES: Normal JOINT EFFUSION: None. ACROMION PROCESS: Normal. ROTATOR CUFF: Interstitial tear of the supraspinatus myotendinous junction measuring 0.8 cm wide and 0.7 cm long (series 6 images 8-12, series 8 image 14). Moderate supraspinatus insertional tendinosis. Mild infraspinatus insertional tendinosis without tear. Intact subscapularis and teres minor tendons. No fatty muscle atrophy. SUBACROMIAL/SUBDELTOID BURSA: Mild bursitis. LONG HEAD OF BICEPS  TENDON: Intact. GLENOID LABRUM: Mild chronic degenerative tear of the superior labrum (series 8 image 14). GLENOHUMERAL JOINT: Intact. ACROMIOCLAVICULAR JOINT: There is mild to moderate acromioclavicular joint osteoarthrosis with a 1.7 x 0.9 cm ganglion cyst arising superiorly. BONES: Normal.     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 Moderate supraspinatus insertional tendinosis. Mild infraspinatus insertional tendinosis without tear. Mild to moderate acromioclavicular joint osteoarthrosis with an adjacent AC ganglion cyst. Superior labral degeneration. Mild subacromial/subdeltoid bursitis. Resident: Cortes Bell I, the attending radiologist, have reviewed the images and agree with the final report above. Workstation performed: [id number]     Echo complete w/ contrast if indicated    Result Date: 3/20/2024  Narrative: •  Left Ventricle: Left ventricular cavity size is normal. Wall thickness is mildly increased. There is mild concentric hypertrophy. The left ventricular ejection fraction is 60%. Systolic function is normal. Wall motion is normal. Diastolic function is normal. •  IVS: There is sigmoid appearance of the septum. •  Right Ventricle: Right ventricular cavity size is normal. Systolic function is normal. •  Tricuspid Valve: There is mild regurgitation.       Review of Systems:  Review of Systems   Constitutional:  Negative for activity change, appetite change, fatigue and fever.   HENT:  Negative for nosebleeds and sore throat.    Eyes:  Negative for photophobia and visual disturbance.   Respiratory:  Negative for cough, chest tightness, shortness of breath and wheezing.    Cardiovascular:  Negative for chest pain, palpitations and leg swelling.   Gastrointestinal:  Negative for abdominal pain, diarrhea, nausea and vomiting.   Endocrine: Negative for polyuria.   Genitourinary:  Negative for dysuria, frequency and hematuria.   Musculoskeletal:  Negative  "for arthralgias, back pain and gait problem.   Skin:  Negative for pallor and rash.   Neurological:  Negative for dizziness, syncope, speech difficulty and light-headedness.   Hematological:  Does not bruise/bleed easily.   Psychiatric/Behavioral:  Negative for agitation, behavioral problems and confusion.        Physical Exam:  Physical Exam  Vitals reviewed.   Constitutional:       General: She is not in acute distress.     Appearance: Normal appearance. She is well-developed. She is not diaphoretic.   HENT:      Head: Normocephalic and atraumatic.      Nose: Nose normal.   Eyes:      General: No scleral icterus.     Extraocular Movements: Extraocular movements intact.      Pupils: Pupils are equal, round, and reactive to light.   Neck:      Vascular: No JVD.   Cardiovascular:      Rate and Rhythm: Normal rate and regular rhythm.      Heart sounds: S1 normal and S2 normal. Heart sounds not distant. No murmur heard.     No systolic murmur is present.      No friction rub. No gallop. No S3 sounds.   Pulmonary:      Effort: Pulmonary effort is normal. No respiratory distress.      Breath sounds: Normal breath sounds. No wheezing or rales.   Abdominal:      General: Bowel sounds are normal. There is no distension.      Palpations: Abdomen is soft.   Musculoskeletal:         General: No deformity.      Cervical back: Normal range of motion and neck supple.      Right lower leg: No edema.      Left lower leg: No edema.   Skin:     General: Skin is warm and dry.      Findings: No erythema.   Neurological:      Mental Status: She is alert and oriented to person, place, and time.      Cranial Nerves: No cranial nerve deficit.   Psychiatric:         Mood and Affect: Mood normal.         Behavior: Behavior normal.       Blood pressure 130/80, pulse 84, height 5' 5\" (1.651 m), weight 91 kg (200 lb 9.6 oz), SpO2 98%, not currently breastfeeding.    EKG:  Normal sinus rhythm  Normal ECG    Discussion/Summary:  Preop cardiac " risk evaluation: Prior to intermediate risk orthopedic surgery for rotator cuff.  Patient has no active symptoms or ECG changes to suggest ischemia, arrhythmias, or heart failure.  She is on apixaban for DVT, for which recommendations can be provided by her PCP who manages.  She continues on propranolol for SVT suppression, which seems to be doing a pretty good job without symptoms currently.    Hyperlipidemia: Currently not on medical therapy.  LDL most recently well-controlled at 108 in May 2023.    Hypertension: Relatively well-controlled on Micardis with HCTZ.  Continue current regiment.  Patient had an echocardiogram in March 2024 for symptoms of chest pain and increasing blood pressures.  This revealed no significant structural heart disease of concern with normal ejection fraction.

## 2024-04-09 NOTE — TELEPHONE ENCOUNTER
Caller: Patient    Doctor: Dr. Gamez    Reason for call: Patient calling to r/s her surgery scheduled for tomorrow.  Asking to speak to surgery scheduler.    Call back#: [phone]

## 2024-04-10 ENCOUNTER — ANESTHESIA (OUTPATIENT)
Age: 49
End: 2024-04-10
Payer: MEDICARE

## 2024-04-24 ENCOUNTER — HOSPITAL ENCOUNTER (OUTPATIENT)
Age: 49
Setting detail: OUTPATIENT SURGERY
Discharge: HOME | End: 2024-04-24
Attending: ORTHOPAEDIC SURGERY | Admitting: ORTHOPAEDIC SURGERY
Payer: MEDICARE

## 2024-04-24 VITALS
TEMPERATURE: 97.5 F | WEIGHT: 202 LBS | OXYGEN SATURATION: 95 % | HEART RATE: 71 BPM | DIASTOLIC BLOOD PRESSURE: 83 MMHG | HEIGHT: 65 IN | RESPIRATION RATE: 20 BRPM | BODY MASS INDEX: 33.66 KG/M2 | SYSTOLIC BLOOD PRESSURE: 136 MMHG

## 2024-04-24 DIAGNOSIS — M75.101 TEAR OF RIGHT ROTATOR CUFF, UNSPECIFIED TEAR EXTENT, UNSPECIFIED WHETHER TRAUMATIC: Primary | ICD-10-CM

## 2024-04-24 PROCEDURE — C1713 ANCHOR/SCREW BN/BN,TIS/BN: HCPCS | Performed by: ORTHOPAEDIC SURGERY

## 2024-04-24 PROCEDURE — C9290 INJ, BUPIVACAINE LIPOSOME: HCPCS | Performed by: ANESTHESIOLOGY

## 2024-04-24 PROCEDURE — 29827 SHO ARTHRS SRG RT8TR CUF RPR: CPT | Performed by: ORTHOPAEDIC SURGERY

## 2024-04-24 PROCEDURE — 29824 SHO ARTHRS SRG DSTL CLAVICLC: CPT

## 2024-04-24 PROCEDURE — 29824 SHO ARTHRS SRG DSTL CLAVICLC: CPT | Performed by: ORTHOPAEDIC SURGERY

## 2024-04-24 PROCEDURE — 29826 SHO ARTHRS SRG DECOMPRESSION: CPT | Performed by: ORTHOPAEDIC SURGERY

## 2024-04-24 PROCEDURE — 29827 SHO ARTHRS SRG RT8TR CUF RPR: CPT

## 2024-04-24 PROCEDURE — 29826 SHO ARTHRS SRG DECOMPRESSION: CPT

## 2024-04-24 DEVICE — SP FBRTAK RC TGRTPE WH/BLK & STTPE WH/BL
Type: IMPLANTABLE DEVICE | Site: SHOULDER | Status: FUNCTIONAL
Brand: ARTHREX®

## 2024-04-24 DEVICE — 4.75MM BC KNOTLESS SWIVELOCK
Type: IMPLANTABLE DEVICE | Site: SHOULDER | Status: FUNCTIONAL
Brand: ARTHREX®

## 2024-04-24 RX ORDER — OXYCODONE HYDROCHLORIDE 5 MG/1
5 TABLET ORAL EVERY 4 HOURS PRN
Status: DISCONTINUED | OUTPATIENT
Start: 2024-04-24 | End: 2024-04-24 | Stop reason: HOSPADM

## 2024-04-24 RX ORDER — OXYCODONE HYDROCHLORIDE 5 MG/1
5 TABLET ORAL EVERY 4 HOURS PRN
Qty: 15 TABLET | Refills: 0 | Status: SHIPPED | OUTPATIENT
Start: 2024-04-24

## 2024-04-24 RX ORDER — CEFAZOLIN SODIUM 2 G/50ML
2000 SOLUTION INTRAVENOUS ONCE
Status: COMPLETED | OUTPATIENT
Start: 2024-04-24 | End: 2024-04-24

## 2024-04-24 RX ORDER — NEOSTIGMINE METHYLSULFATE 1 MG/ML
INJECTION INTRAVENOUS AS NEEDED
Status: DISCONTINUED | OUTPATIENT
Start: 2024-04-24 | End: 2024-04-24

## 2024-04-24 RX ORDER — SODIUM CHLORIDE, SODIUM LACTATE, POTASSIUM CHLORIDE, CALCIUM CHLORIDE 600; 310; 30; 20 MG/100ML; MG/100ML; MG/100ML; MG/100ML
125 INJECTION, SOLUTION INTRAVENOUS CONTINUOUS
Status: DISCONTINUED | OUTPATIENT
Start: 2024-04-24 | End: 2024-04-24 | Stop reason: HOSPADM

## 2024-04-24 RX ORDER — ROCURONIUM BROMIDE 10 MG/ML
INJECTION, SOLUTION INTRAVENOUS AS NEEDED
Status: DISCONTINUED | OUTPATIENT
Start: 2024-04-24 | End: 2024-04-24

## 2024-04-24 RX ORDER — MIDAZOLAM HYDROCHLORIDE 2 MG/2ML
INJECTION, SOLUTION INTRAMUSCULAR; INTRAVENOUS AS NEEDED
Status: DISCONTINUED | OUTPATIENT
Start: 2024-04-24 | End: 2024-04-24

## 2024-04-24 RX ORDER — PHENYLEPHRINE HCL IN 0.9% NACL 1 MG/10 ML
SYRINGE (ML) INTRAVENOUS AS NEEDED
Status: DISCONTINUED | OUTPATIENT
Start: 2024-04-24 | End: 2024-04-24

## 2024-04-24 RX ORDER — BUPIVACAINE HYDROCHLORIDE 5 MG/ML
INJECTION, SOLUTION EPIDURAL; INTRACAUDAL
Status: COMPLETED | OUTPATIENT
Start: 2024-04-24 | End: 2024-04-24

## 2024-04-24 RX ORDER — ACETAMINOPHEN 325 MG/1
325 TABLET ORAL EVERY 6 HOURS PRN
Qty: 30 TABLET | Refills: 0 | Status: SHIPPED | OUTPATIENT
Start: 2024-04-24

## 2024-04-24 RX ORDER — ATROPINE SULFATE 1 MG/ML
INJECTION, SOLUTION INTRAVENOUS AS NEEDED
Status: DISCONTINUED | OUTPATIENT
Start: 2024-04-24 | End: 2024-04-24

## 2024-04-24 RX ORDER — ONDANSETRON 2 MG/ML
4 INJECTION INTRAMUSCULAR; INTRAVENOUS ONCE AS NEEDED
Status: COMPLETED | OUTPATIENT
Start: 2024-04-24 | End: 2024-04-24

## 2024-04-24 RX ORDER — LIDOCAINE HYDROCHLORIDE 10 MG/ML
INJECTION, SOLUTION EPIDURAL; INFILTRATION; INTRACAUDAL; PERINEURAL AS NEEDED
Status: DISCONTINUED | OUTPATIENT
Start: 2024-04-24 | End: 2024-04-24

## 2024-04-24 RX ORDER — PROPOFOL 10 MG/ML
INJECTION, EMULSION INTRAVENOUS AS NEEDED
Status: DISCONTINUED | OUTPATIENT
Start: 2024-04-24 | End: 2024-04-24

## 2024-04-24 RX ORDER — ONDANSETRON 2 MG/ML
INJECTION INTRAMUSCULAR; INTRAVENOUS AS NEEDED
Status: DISCONTINUED | OUTPATIENT
Start: 2024-04-24 | End: 2024-04-24

## 2024-04-24 RX ORDER — GLYCOPYRROLATE 0.2 MG/ML
INJECTION INTRAMUSCULAR; INTRAVENOUS AS NEEDED
Status: DISCONTINUED | OUTPATIENT
Start: 2024-04-24 | End: 2024-04-24

## 2024-04-24 RX ORDER — FENTANYL CITRATE 50 UG/ML
INJECTION, SOLUTION INTRAMUSCULAR; INTRAVENOUS AS NEEDED
Status: DISCONTINUED | OUTPATIENT
Start: 2024-04-24 | End: 2024-04-24

## 2024-04-24 RX ORDER — HYDROMORPHONE HCL/PF 1 MG/ML
0.5 SYRINGE (ML) INJECTION
Status: DISCONTINUED | OUTPATIENT
Start: 2024-04-24 | End: 2024-04-24 | Stop reason: HOSPADM

## 2024-04-24 RX ORDER — CHLORHEXIDINE GLUCONATE ORAL RINSE 1.2 MG/ML
15 SOLUTION DENTAL ONCE
Status: COMPLETED | OUTPATIENT
Start: 2024-04-24 | End: 2024-04-24

## 2024-04-24 RX ORDER — FENTANYL CITRATE/PF 50 MCG/ML
25 SYRINGE (ML) INJECTION
Status: DISCONTINUED | OUTPATIENT
Start: 2024-04-24 | End: 2024-04-24 | Stop reason: HOSPADM

## 2024-04-24 RX ORDER — DEXAMETHASONE SODIUM PHOSPHATE 10 MG/ML
INJECTION, SOLUTION INTRAMUSCULAR; INTRAVENOUS AS NEEDED
Status: DISCONTINUED | OUTPATIENT
Start: 2024-04-24 | End: 2024-04-24

## 2024-04-24 RX ORDER — KETOROLAC TROMETHAMINE 30 MG/ML
INJECTION, SOLUTION INTRAMUSCULAR; INTRAVENOUS AS NEEDED
Status: DISCONTINUED | OUTPATIENT
Start: 2024-04-24 | End: 2024-04-24

## 2024-04-24 RX ADMIN — CEFAZOLIN SODIUM 2000 MG: 2 SOLUTION INTRAVENOUS at 09:10

## 2024-04-24 RX ADMIN — BUPIVACAINE 20 ML: 13.3 INJECTION, SUSPENSION, LIPOSOMAL INFILTRATION at 08:10

## 2024-04-24 RX ADMIN — CHLORHEXIDINE GLUCONATE 0.12% ORAL RINSE 15 ML: 1.2 LIQUID ORAL at 07:32

## 2024-04-24 RX ADMIN — Medication 100 MCG: at 10:01

## 2024-04-24 RX ADMIN — FENTANYL CITRATE 50 MCG: 50 INJECTION INTRAMUSCULAR; INTRAVENOUS at 08:05

## 2024-04-24 RX ADMIN — ONDANSETRON 4 MG: 2 INJECTION INTRAMUSCULAR; INTRAVENOUS at 11:17

## 2024-04-24 RX ADMIN — ROCURONIUM BROMIDE 50 MG: 10 INJECTION, SOLUTION INTRAVENOUS at 09:18

## 2024-04-24 RX ADMIN — FENTANYL CITRATE 25 MCG: 50 INJECTION INTRAMUSCULAR; INTRAVENOUS at 11:40

## 2024-04-24 RX ADMIN — GLYCOPYRROLATE 0.4 MG: 0.2 INJECTION INTRAMUSCULAR; INTRAVENOUS at 10:31

## 2024-04-24 RX ADMIN — DEXAMETHASONE SODIUM PHOSPHATE 10 MG: 10 INJECTION, SOLUTION INTRAMUSCULAR; INTRAVENOUS at 09:31

## 2024-04-24 RX ADMIN — Medication 100 MCG: at 10:17

## 2024-04-24 RX ADMIN — PROPOFOL 50 MG: 10 INJECTION, EMULSION INTRAVENOUS at 10:39

## 2024-04-24 RX ADMIN — BUPIVACAINE HYDROCHLORIDE 5 ML: 5 INJECTION, SOLUTION EPIDURAL; INTRACAUDAL; PERINEURAL at 08:10

## 2024-04-24 RX ADMIN — MIDAZOLAM 2 MG: 1 INJECTION INTRAMUSCULAR; INTRAVENOUS at 08:05

## 2024-04-24 RX ADMIN — Medication 100 MCG: at 09:49

## 2024-04-24 RX ADMIN — PROPOFOL 200 MG: 10 INJECTION, EMULSION INTRAVENOUS at 09:18

## 2024-04-24 RX ADMIN — SODIUM CHLORIDE, SODIUM LACTATE, POTASSIUM CHLORIDE, AND CALCIUM CHLORIDE 125 ML/HR: .6; .31; .03; .02 INJECTION, SOLUTION INTRAVENOUS at 07:32

## 2024-04-24 RX ADMIN — FENTANYL CITRATE 25 MCG: 50 INJECTION INTRAMUSCULAR; INTRAVENOUS at 11:30

## 2024-04-24 RX ADMIN — NEOSTIGMINE METHYLSULFATE 4 MG: 1 INJECTION INTRAVENOUS at 10:31

## 2024-04-24 RX ADMIN — LIDOCAINE HYDROCHLORIDE 50 MG: 10 INJECTION, SOLUTION EPIDURAL; INFILTRATION; INTRACAUDAL; PERINEURAL at 09:18

## 2024-04-24 RX ADMIN — FENTANYL CITRATE 50 MCG: 50 INJECTION INTRAMUSCULAR; INTRAVENOUS at 10:40

## 2024-04-24 RX ADMIN — KETOROLAC TROMETHAMINE 30 MG: 30 INJECTION, SOLUTION INTRAMUSCULAR at 10:39

## 2024-04-24 RX ADMIN — SODIUM CHLORIDE, SODIUM LACTATE, POTASSIUM CHLORIDE, AND CALCIUM CHLORIDE: .6; .31; .03; .02 INJECTION, SOLUTION INTRAVENOUS at 10:09

## 2024-04-24 RX ADMIN — ONDANSETRON 4 MG: 2 INJECTION INTRAMUSCULAR; INTRAVENOUS at 09:16

## 2024-04-24 RX ADMIN — ATROPINE SULFATE 0.5 MCG: 1 INJECTION, SOLUTION INTRAVENOUS at 10:48

## 2024-04-24 NOTE — ANESTHESIA POSTPROCEDURE EVALUATION
Post-Op Assessment Note    CV Status:  Stable  Pain Score: 0    Pain management: adequate       Mental Status:  Alert and awake   Hydration Status:  Euvolemic   PONV Controlled:  Controlled   Airway Patency:  Patent     Post Op Vitals Reviewed: Yes    No anethesia notable event occurred.    Staff: CRNA               BP   151/79   Temp      Pulse 64   Resp 16   SpO2   100

## 2024-04-24 NOTE — ANESTHESIA PREPROCEDURE EVALUATION
Procedure:  REPAIR ROTATOR CUFF  ARTHROSCOPIC (Right: Shoulder)  ARTHROSCOPIC BICEPS TENODESIS (Right: Shoulder)  DISTAL CLAVICLE EXCISION (Right: Shoulder)    Relevant Problems   CARDIO   (+) AVNRT (AV win re-entry tachycardia)   (+) Chronic migraine without aura without status migrainosus, not intractable   (+) Deep vein thrombosis (DVT) of distal vein of lower extremity (HCC)   (+) Hyperlipidemia   (+) Primary hypertension      GYN   (+) History of hysterectomy      NEURO/PSYCH   (+) Cervicogenic headache   (+) Chronic migraine without aura without status migrainosus, not intractable   (+) Other specified anxiety disorders      PULMONARY   (+) CK (obstructive sleep apnea)        Physical Exam    Airway    Mallampati score: II  TM Distance: >3 FB  Neck ROM: full     Dental   No notable dental hx     Cardiovascular      Pulmonary      Other Findings  post-pubertal.      Anesthesia Plan  ASA Score- 2     Anesthesia Type- general with ASA Monitors.         Additional Monitors:     Airway Plan: ETT.    Comment: Patient seen and examined.  History reviewed.  Patient to be done under general anesthesia with GETA and routine monitors.  IS block with Exparel to be performed preoperatively for post-op pain.  Risks discussed with the patient. Consent obtained..       Plan Factors-Exercise tolerance (METS): >4 METS.    Chart reviewed. EKG reviewed.  Existing labs reviewed. Patient summary reviewed.                  Induction-     Postoperative Plan- Plan for postoperative opioid use. Planned trial extubation    Informed Consent- Anesthetic plan and risks discussed with patient.  I personally reviewed this patient with the CRNA. Discussed and agreed on the Anesthesia Plan with the CRNA..

## 2024-04-24 NOTE — DISCHARGE INSTR - AVS FIRST PAGE
POSTOPERATIVE INSTRUCTIONS following ROTATOR CUFF REPAIR    MEDICATIONS:  Resume all home medications unless otherwise instructed by your surgeon.  Pain Medication:  Oxycodone 5 mg, 1 tablets every 4 hours as needed  If you were given a regional anesthetic (nerve block), please begin taking the pain medication as soon as you get home, even if you have minimal or no pain.  DO NOT WAIT FOR THE NERVE BLOCK TO WEAR OFF.  Possible side effects include nausea, constipation, and urinary retention.  If you experience these side effects, please call our office for assistance.  Pain med refills are authorized only during office hours (8am-4pm, Mon-Fri).  Anti-Inflammatory:  Tylenol 325 mg, 1-2 tablets every 6 hours for 4 weeks  TAKE WITH FOOD.  Stop if you experience nausea, reflux, or stomach pain.    WOUND CARE:  Keep the dressing clean and dry.  Light drainage may occur the first 2 days postop.  You may remove the dressings and get the incision wet in the shower 72 hours after surgery.  Do not remove steri-strips or sutures.  Do not immerse the incision under water.  Carefully pat the incision dry.  If there is wound drainage, re-apply a fresh dry gauze dressing.  Please call our office ([phone]) if you experience either of the following:  Sudden increase in swelling, redness, or warmth at the surgical site  Excessive incisional drainage that persists beyond the 3rd day after surgery  Oral temperature greater than 101 degrees, not relieved with Tylenol  Shortness of breath, chest pain, nausea, or any other concerning symptoms    SWELLING CONTROL:  Cold Therapy:  The cold therapy device may be used either continuously or only as needed, according to your preference.  Do not let the pad directly touch your skin.  Alternatively, apply ice (20 min on, 20 min off) as often as you feel is necessary.    SLING:  Wear your sling at all times (including sleep) until your first postoperative office visit.  You may remove the  sling for showering but must keep your arm at your side.    ACTIVITY:   Do not lift, carry, push, or pull anything with your operative arm.  You are not allowed range of motion of your shoulder until you are given permission from your surgeon. You may do gentle range of motion of the elbow, wrist, and fingers.  Place a pillow behind the elbow while lying down.  Sleeping in a more upright position (recliner) may be more comfortable initially.  Wrist / Finger Motion:  With the sling on, move your wrist and fingers through a full range of motion 20 times per hour while awake.    PHYSICAL THERAPY:  You may begin physical therapy after you are seen in the office for your postoperative appointment. We will place a referral to physical therapy after the procedure so you can call in advance to get an appointment set up after the first postoperative appointment.    FOLLOW-UP APPOINTMENT:  10-14 days after surgery with:    Dr. Luke Gamez MD  [facility]  [address] [place], [address] ([facility] Location)  [address] [place], [address] ([facility])  [phone]

## 2024-04-24 NOTE — ANESTHESIA PROCEDURE NOTES
Peripheral Block    Patient location during procedure: holding area  Start time: 4/24/2024 8:05 AM  Reason for block: at surgeon's request and post-op pain management  Staffing  Performed by: Edward Torres MD  Authorized by: Edward Torres MD    Preanesthetic Checklist  Completed: patient identified, IV checked, site marked, risks and benefits discussed, surgical consent, monitors and equipment checked, pre-op evaluation and timeout performed  Peripheral Block  Patient position: sitting  Prep: ChloraPrep  Patient monitoring: frequent blood pressure checks, continuous pulse oximetry and heart rate  Block type: Interscalene  Laterality: right  Injection technique: single-shot  Procedures: ultrasound guided, Ultrasound guidance required for the procedure to increase accuracy and safety of medication placement and decrease risk of complications.  Ultrasound permanent image savedbupivacaine (PF) (MARCAINE) 0.5 % injection 20 mL - Perineural   5 mL - 4/24/2024 8:10:00 AM  bupivacaine liposomal (EXPAREL) 1.3 % injection 20 mL - Perineural   20 mL - 4/24/2024 8:10:00 AM  Needle  Needle type: Stimuplex   Needle gauge: 20 G  Needle length: 4 in  Needle localization: anatomical landmarks and ultrasound guidance  Needle insertion depth: 3 cm  Assessment  Injection assessment: incremental injection, frequent aspiration, injected with ease, negative aspiration, negative for heart rate change, no paresthesia on injection, no symptoms of intraneural/intravenous injection and needle tip visualized at all times  Paresthesia pain: none  Post-procedure:  site cleaned  patient tolerated the procedure well with no immediate complications

## 2024-04-25 ENCOUNTER — TELEPHONE (OUTPATIENT)
Age: 49
End: 2024-04-25

## 2024-04-25 NOTE — TELEPHONE ENCOUNTER
Caller: Patient    Doctor: Franco    Reason for call: Patient called to request that the forms are faxed,mailed and uploaded to [employer]    Call back#: [phone]

## 2024-04-25 NOTE — TELEPHONE ENCOUNTER
Caller: patient    Doctor: Franco      Reason for call: patient asked for a copy of the FMLA form to please be mailed to her when completed.     Call back#: [phone]

## 2024-04-25 NOTE — OP NOTE
OPERATIVE REPORT  PATIENT NAME: Adamaris Brian    :  1975  MRN: [id number]  Pt Location: [facility] OR ROOM 03    SURGERY DATE: 2024    Surgeons and Role:     * Luke Gamez MD - Primary     * RAFAEL Segura-C - Assisting    Preop Diagnosis:  Traumatic tear of right rotator cuff, unspecified tear extent, initial encounter [S46.011A]    Post-Op Diagnosis Codes:     * Traumatic tear of right rotator cuff, unspecified tear extent, initial encounter [S46.011A]    Procedure(s):  Right - REPAIR ROTATOR CUFF  ARTHROSCOPIC  Right - DISTAL CLAVICLE EXCISION.  Right  - ACROMIOPLASTY, SUBACROMIAL DECOMPRESSION    Specimen(s):  * No specimens in log *    Estimated Blood Loss:   Minimal    Drains:  * No LDAs found *    Anesthesia Type:   General w/ Regional    Operative Indications:  Traumatic tear of right rotator cuff, unspecified tear extent, initial encounter [S46.011A]      Operative Findings:  As expected    Complications:   None    Procedure and Technique:  NAME OF OPERATION:    Right Shoulder arthroscopic rotator cuff repair of the supraspinatus and infraspinatus.   Right Shoulder arthroscopic subacromial decompression, acromioplasty.   Right distal clavicle excision    ASSISTANT: I requested RAFAEL Gamble to assist with this procedure. Assistance was medically necessary in order to safely perform the procedure without increased blood loss or morbidity. Assistance was provided through positioning, instrumentation, wound closure, and instillation of anesthetic, application of sterile dressing, and safe transport from the operative suite.    There was no qualified resident available to assist    INDICATIONS:  Adamaris Brian is a 48 y.o. who had been suffering from refractory shoulder pain for several months despite trialing conservative measures. . MRI was consistent with full thickness rotator cuff tear with advanced AC joint arthrosis. she had failed conservative measures and was therefore indicated  for surgical repair.  she understood the risks, benefits and alternatives to the procedure, and elected to proceed.    The patient was identified in the preoperative holding area and the correct operative site was signed. The patient was then brought into the Operating Room and Anesthesia then successfully introduced a regional block without complications. The patient was then positioned in a beach chair position. The operative shoulder and upper extremity were prepped and draped in the usual sterile fashion. Prior to making incision, a time-out occurred at which time all members of the surgical team confirmed the patient identity, laterality and correct operation against the informed written consent. It was also confirmed that the patient received preoperative antibiotics.    A posterior portal was established. The arthroscope was inserted into the glenohumeral joint. An anterior portal was established in the rotator interval. Diagnostic arthroscopy then took place. The articular surfaces of the glenoid and humeral head were within normal limits. The labrum circumferentially had some mild fraying that was gently debrided with an arthroscopic shaver. The labral attachment to the bone was solid, therefore, no labral repair was warranted. The long head of the biceps tendon was normal. The supraspinatus and infraspinatus tendon had a full thickness tear with minimal retraction. The subscapularis and teres minor tendons were within normal limits.    The arthroscope was inserted into the subacromial space. There was an impingement lesion of the CA ligament. The CA ligament was removed, revealing a Type II acromial spur. A 5.5 shaver was used to michael this down such that it was flush with the remainder of the acromion. An extensive bursectomy then took place; the bursa was hyperemic as was the rotator cuff. Dissection was carried to the AC joint and there was significant arthrosis present.  Next an an anterior accessory  portal was created and using a bur, the distal clavicle and proximal AC joint were burred down, resecting less than 0.8cm of the joint. The superior ligaments were left intact.  Next the rotator cuff was was examined and there was a  crescent shaped 1 cm x 2 cm tear in the supraspinatus tendon with minimal retraction and good quality tendon. The greater tuberosity footprint was then abraded down to bleeding bone. A single knotless anchor was placed at the articular margin. A scorpion was then used to pass the sutures through the tendon.  Special care was taken to avoid the biceps tendon.    The medial row was then placed through two knotless anchors; these were placed in the posterolateral and anterolateral aspect of the proximal humerus, bringing the leading edges of the cuff down to the footprint. This provided an excellent transosseous-equivalent repair of the rotator cuff, and reduced the rotator cuff back to its anatomic position. The shoulder was internally and externally rotated and abducted, and the rotator cuff repair moved as a single unit demonstrating adequate rotator cuff repair.    At this point, all arthroscopic instruments were removed. The portal sites were closed with 3-0 nylon. Dry sterile dressing was applied. Patient was placed in a shoulder immobilizer and brought to the recovery room in stable condition.    At the conclusion of the case, the patient tolerated the procedure well. No complications. All sponge and instrument counts were correct.        Post-Operative Rehabilitation Guidelines for Standard Rotator Cuff Tears    1-4 Weeks:   Sling Immobilization  Active ROM Elbow, Wrist and Hand  True Passive (ONLY) ROM Shoulder. NO ACTIVE MOTION.   Pendulums, Supine Elevation in Scapular plane, External Rotation to tolerance with arm at side. (emphasize ER, minimum goal 40°)  Scapular Stabilization exercises (sidelying)  Deltoid isometrics in neutral (submaximal) as ROM improves  No Pulley/Canes  until 6 weeks post-op (these are active motions)    4-6 Weeks:   Discontinue sling use.  Begin Active Assist ROM and advance to Active as Tolerated  Elevation in scapular plane and external rotation as tolerated  No Internal rotation or behind back until 6wks.  Begin Cuff Isometrics at 6wks with arm at the side    6-12 Weeks:   Active Assist to Active ROM Shoulder As Tolerated  Elevation in scapular plane and external rotation to tolerance  Begin internal rotation as tolerated  Light stretching at end ranges  Cuff Isometrics with the arm at the side    3-12 Months:  Advance to full ROM as tolerated with passive stretching at end ranges  Advance strengthening as tolerated: isometrics ? bands ? light weights (1-5   lbs); 8-12 reps/2-3 sets per rotator cuff, deltoid, and scapular stabilizers  Limit strengthening 3x/week to avoid rotator cuff tendonitis  Begin eccentrically resisted motions, pylometrics (ex. Weighted ball toss),  proprioception (es. body blade)  Begin sports related rehab at 4 ½ months, including advanced conditioning  Return to throwing at 6 months  Throw from pitcher's mound at 9 months  Collision sports at 9 months  MMI is usually at 12 months post-op      I was present for the entire procedure.    Patient Disposition:  PACU         SIGNATURE: Luke Gamez MD  DATE: April 25, 2024  TIME: 9:27 AM

## 2024-05-10 ENCOUNTER — OFFICE VISIT (OUTPATIENT)
Dept: OBGYN CLINIC | Facility: CLINIC | Age: 49
End: 2024-05-10

## 2024-05-10 VITALS
HEIGHT: 65 IN | SYSTOLIC BLOOD PRESSURE: 121 MMHG | DIASTOLIC BLOOD PRESSURE: 82 MMHG | BODY MASS INDEX: 34.16 KG/M2 | HEART RATE: 65 BPM | WEIGHT: 205 LBS

## 2024-05-10 DIAGNOSIS — Z98.890 STATUS POST RIGHT ROTATOR CUFF REPAIR: Primary | ICD-10-CM

## 2024-05-10 PROCEDURE — 99024 POSTOP FOLLOW-UP VISIT: CPT | Performed by: ORTHOPAEDIC SURGERY

## 2024-05-10 RX ORDER — TELMISARTAN 80 MG/1
80 TABLET ORAL DAILY
COMMUNITY
Start: 2024-03-21

## 2024-05-10 RX ORDER — ROSUVASTATIN CALCIUM 5 MG/1
5 TABLET, COATED ORAL DAILY
COMMUNITY
Start: 2024-03-25

## 2024-05-10 RX ORDER — POTASSIUM CHLORIDE 750 MG/1
10 TABLET, FILM COATED, EXTENDED RELEASE ORAL DAILY
COMMUNITY
Start: 2024-04-21

## 2024-05-10 NOTE — PROGRESS NOTES
Subjective   CHIEF COMPLAINT/REASON FOR VISIT  Adamarsi Brian is a 48 y.o. adult who presents for 2 week post op follow-up after Repair Rotator Cuff  Arthroscopic - Right and DISTAL CLAVICLE EXCISION, acromioplasty - Right on 4/24/2024     HISTORY OF PRESENT ILLNESS  Overall, she feels things are going well and progressing appropriately. Pain has been well controlled. She has been following the post-operative rehabilitation regimen without difficultly. Currently, she is doing well without any specific concerns.    Objective   PHYSICAL EXAMINATION  Right Shoulder  Surgical incisions well healed. Sutures removed.  ROM of shoulder not tested  ROM of elbow, wrist, and hand intact  Fingers warm and perfused  NVID     Assessment/Plan   1. Status Post Repair Rotator Cuff  Arthroscopic - Right and DISTAL CLAVICLE EXCISION, acromioplasty - Right    She is doing well after surgery and making appropriate progress. Encouraged to work with PT on rotator cuff rehab protocol. Advised she could start working out of the sling over the next couple weeks.    We will plan to see her back at the 3 month post-operative addy. If any issues, questions, or concerns arise between now and the next appointment, we have encouraged the patient contact our team.    Scribe Attestation      I,:  Davi Carver PA-C am acting as a scribe while in the presence of the attending physician.:       I,:  Luke Gamez MD personally performed the services described in this documentation    as scribed in my presence.:

## 2024-05-15 ENCOUNTER — APPOINTMENT (OUTPATIENT)
Dept: LAB | Facility: CLINIC | Age: 49
End: 2024-05-15
Payer: MEDICARE

## 2024-05-15 DIAGNOSIS — E55.9 AVITAMINOSIS D: ICD-10-CM

## 2024-05-15 DIAGNOSIS — I10 ESSENTIAL HYPERTENSION, MALIGNANT: ICD-10-CM

## 2024-05-15 DIAGNOSIS — R53.1 ASTHENIA: ICD-10-CM

## 2024-05-15 DIAGNOSIS — M19.90 SENILE ARTHRITIS: ICD-10-CM

## 2024-05-15 DIAGNOSIS — E78.5 HYPERLIPIDEMIA, UNSPECIFIED HYPERLIPIDEMIA TYPE: ICD-10-CM

## 2024-05-15 DIAGNOSIS — R51.0 ORTHOSTATIC HEADACHE: ICD-10-CM

## 2024-05-15 DIAGNOSIS — D51.9 ANEMIA DUE TO VITAMIN B12 DEFICIENCY, UNSPECIFIED B12 DEFICIENCY TYPE: ICD-10-CM

## 2024-05-15 DIAGNOSIS — E05.90 PRETIBIAL MYXEDEMA: ICD-10-CM

## 2024-05-15 LAB
25(OH)D3 SERPL-MCNC: 32.1 NG/ML (ref 30–100)
ALBUMIN SERPL BCP-MCNC: 4 G/DL (ref 3.5–5)
ALP SERPL-CCNC: 63 U/L (ref 34–104)
ALT SERPL W P-5'-P-CCNC: 60 U/L (ref 7–52)
ANION GAP SERPL CALCULATED.3IONS-SCNC: 7 MMOL/L (ref 4–13)
AST SERPL W P-5'-P-CCNC: 32 U/L (ref 5–45)
BASOPHILS # BLD AUTO: 0.02 THOUSANDS/ÂΜL (ref 0–0.1)
BASOPHILS NFR BLD AUTO: 0 % (ref 0–1)
BILIRUB SERPL-MCNC: 0.35 MG/DL (ref 0.2–1)
BUN SERPL-MCNC: 13 MG/DL (ref 5–25)
CALCIUM SERPL-MCNC: 9.7 MG/DL (ref 8.4–10.2)
CHLORIDE SERPL-SCNC: 104 MMOL/L (ref 96–108)
CHOLEST SERPL-MCNC: 176 MG/DL
CO2 SERPL-SCNC: 28 MMOL/L (ref 21–32)
CREAT SERPL-MCNC: 0.87 MG/DL (ref 0.6–1.3)
CRP SERPL HS-MCNC: 7.1 MG/L
EOSINOPHIL # BLD AUTO: 0.15 THOUSAND/ÂΜL (ref 0–0.61)
EOSINOPHIL NFR BLD AUTO: 2 % (ref 0–6)
ERYTHROCYTE [DISTWIDTH] IN BLOOD BY AUTOMATED COUNT: 13.2 % (ref 11.6–15.1)
FOLATE SERPL-MCNC: 19.9 NG/ML
GGT SERPL-CCNC: 116 U/L (ref 9–64)
GLUCOSE P FAST SERPL-MCNC: 110 MG/DL (ref 65–99)
HCT VFR BLD AUTO: 37.2 % (ref 36.5–46.1)
HDLC SERPL-MCNC: 45 MG/DL
HGB BLD-MCNC: 12 G/DL (ref 12–15.4)
IMM GRANULOCYTES # BLD AUTO: 0.05 THOUSAND/UL (ref 0–0.2)
IMM GRANULOCYTES NFR BLD AUTO: 1 % (ref 0–2)
LDLC SERPL CALC-MCNC: 101 MG/DL (ref 0–100)
LYMPHOCYTES # BLD AUTO: 2.09 THOUSANDS/ÂΜL (ref 0.6–4.47)
LYMPHOCYTES NFR BLD AUTO: 34 % (ref 14–44)
MAGNESIUM SERPL-MCNC: 1.9 MG/DL (ref 1.9–2.7)
MCH RBC QN AUTO: 28.6 PG (ref 26.8–34.3)
MCHC RBC AUTO-ENTMCNC: 32.3 G/DL (ref 31.4–37.4)
MCV RBC AUTO: 89 FL (ref 82–98)
MONOCYTES # BLD AUTO: 0.4 THOUSAND/ÂΜL (ref 0.17–1.22)
MONOCYTES NFR BLD AUTO: 7 % (ref 4–12)
NEUTROPHILS # BLD AUTO: 3.47 THOUSANDS/ÂΜL (ref 1.85–7.62)
NEUTS SEG NFR BLD AUTO: 56 % (ref 43–75)
NONHDLC SERPL-MCNC: 131 MG/DL
NRBC BLD AUTO-RTO: 0 /100 WBCS
PLATELET # BLD AUTO: 365 THOUSANDS/UL (ref 149–390)
PMV BLD AUTO: 9.6 FL (ref 8.9–12.7)
POTASSIUM SERPL-SCNC: 4.1 MMOL/L (ref 3.5–5.3)
PROT SERPL-MCNC: 7.5 G/DL (ref 6.4–8.4)
RBC # BLD AUTO: 4.19 MILLION/UL (ref 3.88–5.12)
SODIUM SERPL-SCNC: 139 MMOL/L (ref 135–147)
T3FREE SERPL-MCNC: 3.04 PG/ML (ref 2.5–3.9)
T4 FREE SERPL-MCNC: 0.75 NG/DL (ref 0.61–1.12)
TRIGL SERPL-MCNC: 150 MG/DL
TSH SERPL DL<=0.05 MIU/L-ACNC: 2.21 UIU/ML (ref 0.45–4.5)
URATE SERPL-MCNC: 8.1 MG/DL (ref 3.5–7.5)
VIT B12 SERPL-MCNC: 408 PG/ML (ref 180–914)
WBC # BLD AUTO: 6.18 THOUSAND/UL (ref 4.31–10.16)

## 2024-05-15 PROCEDURE — 84481 FREE ASSAY (FT-3): CPT

## 2024-05-15 PROCEDURE — 84550 ASSAY OF BLOOD/URIC ACID: CPT

## 2024-05-15 PROCEDURE — 84443 ASSAY THYROID STIM HORMONE: CPT

## 2024-05-15 PROCEDURE — 82746 ASSAY OF FOLIC ACID SERUM: CPT

## 2024-05-15 PROCEDURE — 36415 COLL VENOUS BLD VENIPUNCTURE: CPT

## 2024-05-15 PROCEDURE — 80053 COMPREHEN METABOLIC PANEL: CPT

## 2024-05-15 PROCEDURE — 80061 LIPID PANEL: CPT

## 2024-05-15 PROCEDURE — 84439 ASSAY OF FREE THYROXINE: CPT

## 2024-05-15 PROCEDURE — 86141 C-REACTIVE PROTEIN HS: CPT

## 2024-05-15 PROCEDURE — 82977 ASSAY OF GGT: CPT

## 2024-05-15 PROCEDURE — 85025 COMPLETE CBC W/AUTO DIFF WBC: CPT

## 2024-05-15 PROCEDURE — 82306 VITAMIN D 25 HYDROXY: CPT

## 2024-05-15 PROCEDURE — 82607 VITAMIN B-12: CPT

## 2024-05-15 PROCEDURE — 83735 ASSAY OF MAGNESIUM: CPT

## 2024-05-19 DIAGNOSIS — G43.009 MIGRAINE WITHOUT AURA AND WITHOUT STATUS MIGRAINOSUS, NOT INTRACTABLE: ICD-10-CM

## 2024-05-20 RX ORDER — RIMEGEPANT SULFATE 75 MG/75MG
TABLET, ORALLY DISINTEGRATING ORAL
Qty: 16 TABLET | Refills: 11 | Status: SHIPPED | OUTPATIENT
Start: 2024-05-20

## 2024-06-05 ENCOUNTER — EVALUATION (OUTPATIENT)
Dept: PHYSICAL THERAPY | Facility: REHABILITATION | Age: 49
End: 2024-06-05
Payer: MEDICARE

## 2024-06-05 DIAGNOSIS — Z98.890 STATUS POST RIGHT ROTATOR CUFF REPAIR: Primary | ICD-10-CM

## 2024-06-05 PROCEDURE — 97162 PT EVAL MOD COMPLEX 30 MIN: CPT

## 2024-06-05 NOTE — PROGRESS NOTES
PT Evaluation     Today's date: 2024  Patient name: Adamaris Brian  : 1975  MRN: [id number]  Referring provider: Davi Carver PA*  Dx:   Encounter Diagnosis     ICD-10-CM    1. Status post right rotator cuff repair  Z98.890           Start Time: 1230  Stop Time: 1300  Total time in clinic (min): 30 minutes    Assessment  Impairments: abnormal coordination, abnormal muscle firing, abnormal or restricted ROM, abnormal movement, activity intolerance, impaired physical strength, lacks appropriate home exercise program, pain with function, poor body mechanics, participation limitations and activity limitations    Assessment details: Problem List:  1) Active shoulder motion  2) Shoulder strength    Adamaris Brian is a pleasant 49 y.o. adult who presents with signs and symptoms consistent with her surgical and medical history. She has limitations in active shoulder motion with most limitations in flexion and abduction. Most deficits when moving against gravity are present, indicative of strength deficits. Deficits result in the symptoms she is experiencing and functional limitations of reaching, lifting, and carrying.  Reviewed postoperative timeline and protocol with patient and discussed lifting precautions. No further referral appears necessary at this time based upon examination results.  I expect she will have good improvement with skilled physical therapy.     Comparable signs:  1) inability to flex beyond 90 degrees  2) inablity to abduct beyond 90 degrees  Understanding of Dx/Px/POC: good     Prognosis: good    Goals  Short Term Goals:   1. Patient will demonstrate independence with HEP by providing return demonstration of exercises  2. Patient will report decreased symptom intensity during activity by 50%  3. Patient will improve active shoulder flexion to 140 degrees  4. Patient will improve active shoulder abduction to 130 degrees    Long Term Goals:   1. Patient will improve FOTO to greater  then goal  2. Patient will improve pain with activity to 2/10 or less  3. Patient will continue with HEP to allow for continued progress and function  4. Patient will improve active shoulder flexion to 170 degrees  5. Patient will improve active shoulder abduction to 160 degrees        Plan  Referral necessary: No  Planned modality interventions: biofeedback, cryotherapy and thermotherapy: hydrocollator packs    Planned therapy interventions: joint mobilization, manual therapy, muscle pump exercises, neuromuscular re-education, patient education, strengthening, stretching, therapeutic activities, therapeutic exercise, home exercise program, graded exercise, graded activity, gait training, functional ROM exercises, flexibility, body mechanics training and balance    Frequency: 2x week  Plan of Care beginning date: 6/5/2024  Plan of Care expiration date: 7/31/2024  Treatment plan discussed with: patient        Subjective Evaluation    History of Present Illness  Mechanism of injury: Patient had a fall in December of 2023 while walking her dog landing on the top of the shoulder. She was diagnosed with an AC joint sprain in [facility]. She progressively lost motion and was diagnosed with frozen shoulder and a rotator cuff tear. She had an MRI on 3/17/24 with below results. She is status Post Repair Rotator Cuff  Arthroscopic - Right and DISTAL CLAVICLE EXCISION, acromioplasty on 4/24 with Dr. Gamez. She reports discontinuing sling use for around 2-3 weeks after surgery. She reports continued pain and sensations of stiffness through her right shoulder, with most difficulty reaching overhead, lifting heavy objects, and repetitive motions like washing dishes and mopping. She reports symptoms will radiate to her right elbow with heavier use. Symptoms have been disturbing her sleep, and she has been having to sleep in a recliner. She is out of work until August for her right shoulder. Reports symptoms have been building in  her left as well from relying on her left arm more.           Recurrent probem    Quality of life: fair    Patient Goals  Patient goals for therapy: decreased pain, increased motion, increased strength, independence with ADLs/IADLs and return to work    Pain  Current pain ratin  At best pain ratin  At worst pain rating: 10  Quality: tight and radiating  Relieving factors: ice  Exacerbated by: quick movements. reaching overhead, washing dishes.    Treatments  Previous treatment: medication and immobilization        Objective      Dermatome:  C3: Intact to light touch bilaterally  C4: Intact to light touch bilaterally  C5:Intact to light touch bilaterally  C6: Diminished on the right at right first digit, intact to light touch on the left  C7:Intact to light touch bilaterally   C8: Intact to light touch bilaterally  T1: Intact to light touch bilaterally    Myotomes     C8: Grossly equal bilaterally  T1: Grossly equal bilaterally    AROM        PROM       MMT    Shoulder       L       R        L           R      L     R   Flex. 175 90  140 ERP 4    Abd.(C5) 170 85  125 ERP 4-    IR. 80 70  75 ERP 4+    ER. 90 70  75 ERP 4-                      Elbow         Flexion (C6) 4+ 4       Extension (C7) 4 + 4                         Wrist         Flex (C7) 4+ 4       Ext (C6) 4+ 4       Comments:   Right shoulder strength not tested      FIR L: T7, R: not tested  [place]  L:T3, R: not tested    Shoulder tests  ER Lag: negative   Drop Arm:negative   Painful Arc: positive       Precautions:  R.RTC repair - see protocol.     1-4 Weeks: -   Sling Immobilization  Active ROM Elbow, Wrist and Hand  True Passive (ONLY) ROM Shoulder. NO ACTIVE MOTION.   Pendulums, Supine Elevation in Scapular plane, External Rotation to tolerance with arm at side. (emphasize ER, minimum goal 40°)  Scapular Stabilization exercises (sidelying)  Deltoid isometrics in neutral (submaximal) as ROM improves  No Pulley/Canes until 6 weeks  post-op (these are active motions)     4-6 Weeks: 5/22- 6/5  Discontinue sling use.  Begin Active Assist ROM and advance to Active as Tolerated  Elevation in scapular plane and external rotation as tolerated  No Internal rotation or behind back until 6wks.  Begin Cuff Isometrics at 6wks with arm at the side     6-12 Weeks: 6/5- 7/16  Active Assist to Active ROM Shoulder As Tolerated  Elevation in scapular plane and external rotation to tolerance  Begin internal rotation as tolerated  Light stretching at end ranges  Cuff Isometrics with the arm at the side     3-12 Months:  Advance to full ROM as tolerated with passive stretching at end ranges  Advance strengthening as tolerated: isometrics ? bands ? light weights (1-5   lbs); 8-12 reps/2-3 sets per rotator cuff, deltoid, and scapular stabilizers  Limit strengthening 3x/week to avoid rotator cuff tendonitis  Begin eccentrically resisted motions, pylometrics (ex. Weighted ball toss),  proprioception (es. body blade)       Manuals 6/5                                                                Neuro Re-Ed                                                                                                        Ther Ex             Flex AAROM Flex HEP supine & recumbent            PROM             ABD AAROM             Wall slides             ER Iso                                                    Ther Activity                                       Gait Training                                       Modalities

## 2024-06-07 ENCOUNTER — OFFICE VISIT (OUTPATIENT)
Dept: URGENT CARE | Age: 49
End: 2024-06-07
Payer: MEDICARE

## 2024-06-07 VITALS
HEART RATE: 66 BPM | TEMPERATURE: 97 F | DIASTOLIC BLOOD PRESSURE: 80 MMHG | OXYGEN SATURATION: 100 % | WEIGHT: 204.6 LBS | BODY MASS INDEX: 34.05 KG/M2 | RESPIRATION RATE: 20 BRPM | SYSTOLIC BLOOD PRESSURE: 134 MMHG

## 2024-06-07 DIAGNOSIS — J01.10 ACUTE FRONTAL SINUSITIS, RECURRENCE NOT SPECIFIED: Primary | ICD-10-CM

## 2024-06-07 PROCEDURE — G0463 HOSPITAL OUTPT CLINIC VISIT: HCPCS

## 2024-06-07 PROCEDURE — 99214 OFFICE O/P EST MOD 30 MIN: CPT

## 2024-06-07 RX ORDER — AZITHROMYCIN 250 MG/1
TABLET, FILM COATED ORAL
Qty: 6 TABLET | Refills: 0 | Status: SHIPPED | OUTPATIENT
Start: 2024-06-07 | End: 2024-06-11

## 2024-06-07 RX ORDER — FLUTICASONE PROPIONATE 50 MCG
1 SPRAY, SUSPENSION (ML) NASAL DAILY
Qty: 9.9 ML | Refills: 0 | Status: SHIPPED | OUTPATIENT
Start: 2024-06-07

## 2024-06-07 NOTE — PROGRESS NOTES
[facility]        NAME: Adamaris Brian is a 49 y.o. adult  : 1975    MRN: [id number]  DATE: 2024  TIME: 10:09 AM    Assessment and Plan   Acute frontal sinusitis, recurrence not specified [J01.10]  1. Acute frontal sinusitis, recurrence not specified  azithromycin (ZITHROMAX) 250 mg tablet    fluticasone (FLONASE) 50 mcg/act nasal spray        Take antibiotic- Zithromax as directed.  Recommend daily probiotic while on antibiotic or eat yogurt with live cultures daily while on antibiotic.       You may take Tylenol or Motrin for pain and fever.    Flonase- one spray each nostril daily for nasal congestions.    Rest, increase fluids, good hand washing.  Please follow up with your family doctor if no improvement in 7-10 days.     Patient Instructions       Follow up with PCP in 3-5 days.  Proceed to  ER if symptoms worsen.    If tests have been performed at [facility], our office will contact you with results if changes need to be made to the care plan discussed with you at the visit.  [facility]    Chief Complaint     Chief Complaint   Patient presents with   • Nasal Congestion     Reports symptoms over the last week. Increased of the last 2-3 days. Nasal congestion is now yellow to green. Taking otc allergy medicine , Excedrin last dose @8am this morning.    • Headache         History of Present Illness       Pt is a 49 year old female presenting with 1 week of congestion, facial pressure, and runny nose, with cough at night.  She reports subjective fevers. She denies headaches, no SOB or CP.  She has been taking Allegra D and Asa without relief. Multiple family members sick with strep throat, pink eye; home covid test negative.     Headache      Review of Systems   Review of Systems   Constitutional:  Positive for chills. Negative for fatigue and fever.   HENT:  Positive for postnasal drip, sinus pressure, sinus pain and sore throat. Negative for  ear pain.    Neurological:  Positive for headaches.         Current Medications       Current Outpatient Medications:   •  acetaminophen (TYLENOL) 325 mg tablet, Take 1 tablet (325 mg total) by mouth every 6 (six) hours as needed for mild pain, Disp: 30 tablet, Rfl: 0  •  aspirin-acetaminophen-caffeine (EXCEDRIN MIGRAINE) 250-250-65 MG per tablet, Take 2 tablets by mouth if needed for headaches, Disp: , Rfl:   •  Atogepant (Qulipta) 60 MG TABS, Take 60 mg by mouth in the morning, Disp: 30 tablet, Rfl: 11  •  azithromycin (ZITHROMAX) 250 mg tablet, Take 2 tablets today then 1 tablet daily x 4 days, Disp: 6 tablet, Rfl: 0  •  buPROPion (WELLBUTRIN SR) 150 mg 12 hr tablet, Take 1 tablet (150 mg total) by mouth 2 (two) times a day, Disp: 180 tablet, Rfl: 3  •  Cetirizine HCl 10 MG CAPS, Take 10 mg by mouth daily as needed, Disp: , Rfl:   •  ergocalciferol (VITAMIN D2) 50,000 units, Take 50,000 Units by mouth once a week sundays, Disp: , Rfl:   •  famotidine (PEPCID) 20 mg tablet, Take 20 mg by mouth every evening, Disp: , Rfl:   •  fluticasone (FLONASE) 50 mcg/act nasal spray, 1 spray into each nostril daily, Disp: 16 g, Rfl: 0  •  fluticasone (FLONASE) 50 mcg/act nasal spray, 1 spray into each nostril daily, Disp: 9.9 mL, Rfl: 0  •  fluticasone (FLOVENT HFA) 110 MCG/ACT inhaler, Inhale 1 puff as needed , Disp: , Rfl:   •  hydroxychloroquine (PLAQUENIL) 200 mg tablet, Take 200 mg by mouth as needed During Summer time, Disp: , Rfl:   •  lasmiditan succinate (Reyvow) 100 mg tablet, TAKE 1 TABLET (100MG) ONE TIME AS NEEDED FOR MIGRAINE.DO NOT USE MORE THAN ONE DOSEPER DAY OR MORE THAN 8 DOSES PER MONTH, Disp: 8 tablet, Rfl: 5  •  levothyroxine 88 mcg tablet, Take 88 mcg by mouth daily , Disp: , Rfl:   •  meclizine (ANTIVERT) 12.5 MG tablet, Take 12.5 mg by mouth as needed, Disp: , Rfl:   •  Multiple Vitamins-Minerals (MULTIVITAMIN ADULT PO), Take 1 tablet by mouth daily, Disp: , Rfl:   •  Nurtec 75 MG TBDP, DISSOLVE 1  TABLET IN MOUTH EVERY OTHER DAY, Disp: 16 tablet, Rfl: 11  •  oxyCODONE (Roxicodone) 5 immediate release tablet, Take 1 tablet (5 mg total) by mouth every 4 (four) hours as needed for moderate pain for up to 15 doses Max Daily Amount: 30 mg, Disp: 15 tablet, Rfl: 0  •  potassium chloride (Klor-Con) 10 mEq tablet, Take 10 mEq by mouth daily, Disp: , Rfl:   •  PROAIR  (90 Base) MCG/ACT inhaler, Inhale 1-2 puffs as needed , Disp: , Rfl:   •  prochlorperazine (COMPAZINE) 10 mg tablet, Take 1 tablet (10 mg total) by mouth every 6 (six) hours as needed (migraine), Disp: 20 tablet, Rfl: 3  •  propranolol (INDERAL LA) 160 mg, Take 160 mg by mouth 3 (three) times a day, Disp: , Rfl: 0  •  rosuvastatin (CRESTOR) 5 mg tablet, Take 5 mg by mouth daily, Disp: , Rfl:   •  telmisartan (MICARDIS) 80 MG tablet, Take 80 mg by mouth daily, Disp: , Rfl:   •  telmisartan-hydrochlorothiazide (MICARDIS HCT) 80-12.5 MG per tablet, Take 1 tablet by mouth daily, Disp: 30 tablet, Rfl: 3  •  valACYclovir (VALTREX) 1,000 mg tablet, 1 tablet as needed , Disp: , Rfl:   •  Xiidra 5 % op solution, instill 1 drop into both eyes twice a day, Disp: , Rfl:   •  apixaban (Eliquis) 5 mg, Take 2 tablets (10 mg total) by mouth 2 (two) times a day for 7 days, THEN 1 tablet (5 mg total) 2 (two) times a day for 23 days., Disp: 74 tablet, Rfl: 0    Current Facility-Administered Medications:   •  Botulinum Toxin Type A SOLR 200 Units, 200 Units, Injection, Q3 Months, Kassie Andrade PA-C, 200 Units at 03/18/21 1154    Current Allergies     Allergies as of 06/07/2024 - Reviewed 06/07/2024   Allergen Reaction Noted   • Pregabalin  05/13/2015   • Shellfish-derived products - food allergy Anaphylaxis and Hives 02/05/2018   • Sumatriptan  05/13/2015   • Triptans Shortness Of Breath and Palpitations 10/17/2012   • Latex Swelling and Rash 07/26/2012   • Sulfa antibiotics Swelling and Rash 05/13/2015   • Monistat [miconazole] Swelling 04/06/2018            The  following portions of the patient's history were reviewed and updated as appropriate: allergies, current medications, past family history, past medical history, past social history, past surgical history and problem list.     Past Medical History:   Diagnosis Date   • Arthritis    • Asthma    • Bipolar 1 disorder (HCC)    • Chronic narcotic dependence (HCC)    • Cluster headache    • COVID     2021   • CTS (carpal tunnel syndrome)     left hand   • Difficulty walking    • Disease of thyroid gland    • Fibromyalgia    • Fibromyalgia, primary    • Gout    • Headache, tension-type    • History of DVT in adulthood    • Hyperlipidemia    • Hypertension    • Interstitial cystitis    • Lumbar herniated disc    • Lupus (HCC)    • Memory loss    • Migraine    • Mild sleep apnea     no CPAP   • Peripheral neuropathy    • PONV (postoperative nausea and vomiting)    • Vision loss        Past Surgical History:   Procedure Laterality Date   • COLONOSCOPY     • DISTAL CLAVICLE EXCISION Right 4/24/2024    Procedure: DISTAL CLAVICLE EXCISION, acromioplasty;  Surgeon: Luke Gamez MD;  Location:  MAIN OR;  Service: Orthopedics   • GALLBLADDER SURGERY  2008   • HYSTERECTOMY  2014   • LAPAROSCOPIC COLON RESECTION  2001   • FL NDSC WRST SURG W/RLS TRANSVRS CARPL LIGM Right 12/06/2023    Procedure: Right endoscopic carpal tunnel release;  Surgeon: Alek Richards MD;  Location: [facility];  Service: Orthopedics   • FL SURGICAL ARTHROSCOPY SHOULDER W/ROTATOR CUFF RPR Right 4/24/2024    Procedure: REPAIR ROTATOR CUFF  ARTHROSCOPIC;  Surgeon: Luke Gamez MD;  Location: [facility];  Service: Orthopedics   • FL TENDON SHEATH INCISION Right 12/06/2023    Procedure: Right ring trigger finger release;  Surgeon: Alek Richards MD;  Location: [facility];  Service: Orthopedics       Family History   Problem Relation Age of Onset   • Lupus Mother    • Osteoporosis Mother    • Hypotension Mother    • Hypertension Father    • Diabetes  Father    • Heart disease Father    • Migraines Father    • Stroke Father    • Diabetes Sister    • Schizophrenia Sister    • Hypertension Sister    • Diabetes Maternal Grandmother    • Rheum arthritis Maternal Grandmother    • Hypertension Maternal Grandmother    • Neuropathy Maternal Grandmother    • Seizures Maternal Grandmother    • Breast cancer Maternal Aunt 40   • No Known Problems Maternal Aunt    • No Known Problems Maternal Aunt    • No Known Problems Maternal Aunt    • No Known Problems Paternal Aunt          Medications have been verified.        Objective   /80   Pulse 66   Temp (!) 97 °F (36.1 °C) (Temporal)   Resp 20   Wt 92.8 kg (204 lb 9.6 oz)   LMP  (LMP Unknown)   SpO2 100%   BMI 34.05 kg/m²   No LMP recorded (lmp unknown). Patient has had a hysterectomy.       Physical Exam     Physical Exam  Vitals and nursing note reviewed.   Constitutional:       General: She is not in acute distress.     Appearance: Normal appearance. She is normal weight. She is not ill-appearing.   HENT:      Right Ear: Tympanic membrane normal.      Left Ear: Tympanic membrane normal.      Nose: Congestion and rhinorrhea present.      Right Turbinates: Enlarged and swollen.      Left Turbinates: Enlarged and swollen.      Mouth/Throat:      Pharynx: Posterior oropharyngeal erythema present.   Eyes:      Extraocular Movements: Extraocular movements intact.      Conjunctiva/sclera: Conjunctivae normal.   Cardiovascular:      Rate and Rhythm: Normal rate and regular rhythm.      Pulses: Normal pulses.   Pulmonary:      Effort: Pulmonary effort is normal. No respiratory distress.      Breath sounds: Normal breath sounds. No stridor. No wheezing, rhonchi or rales.   Chest:      Chest wall: No tenderness.   Abdominal:      General: Abdomen is flat.   Skin:     General: Skin is warm and dry.   Neurological:      Mental Status: She is alert.

## 2024-06-07 NOTE — PATIENT INSTRUCTIONS
Take antibiotic- Zithromax as directed.  Recommend daily probiotic while on antibiotic or eat yogurt with live cultures daily while on antibiotic.       You may take Tylenol or Motrin for pain and fever.    Flonase- one spray each nostril daily for nasal congestions.    Rest, increase fluids, good hand washing.  Please follow up with your family doctor if no improvement in 7-10 days.

## 2024-06-10 ENCOUNTER — OFFICE VISIT (OUTPATIENT)
Dept: OBGYN CLINIC | Facility: CLINIC | Age: 49
End: 2024-06-10

## 2024-06-10 VITALS
WEIGHT: 204 LBS | BODY MASS INDEX: 33.99 KG/M2 | SYSTOLIC BLOOD PRESSURE: 134 MMHG | HEART RATE: 66 BPM | DIASTOLIC BLOOD PRESSURE: 80 MMHG | HEIGHT: 65 IN

## 2024-06-10 DIAGNOSIS — M25.512 LEFT SHOULDER PAIN, UNSPECIFIED CHRONICITY: Primary | ICD-10-CM

## 2024-06-10 PROCEDURE — 99024 POSTOP FOLLOW-UP VISIT: CPT | Performed by: ORTHOPAEDIC SURGERY

## 2024-06-10 NOTE — PROGRESS NOTES
Subjective   CHIEF COMPLAINT/REASON FOR VISIT  Adamaris Brian is a 49 y.o. adult who presents for  6 week post op follow-up after Repair Rotator Cuff  Arthroscopic - Right and DISTAL CLAVICLE EXCISION, acromioplasty - Right on 4/24/2024     HISTORY OF PRESENT ILLNESS  Overall, she feels things are going well and progressing appropriately. She has been following the post-operative rehabilitation regimen without difficultly. Currently, she is doing well. She does mention some left shoulder pain recently but this has gone away. She feels she is pushing herself hard with her rehab.    Objective   PHYSICAL EXAMINATION  General:   Well-appearing  No acute distress  Appears stated age    right Shoulder  Shoulder effusion none present  Shoulder abduction 120 / 160  Shoulder forward flexion 120 / 160  Supraspinatus/ABD 4/5   Infraspinatus/ER 4/5   Skin is intact with no erythema, warmth or drainage  Motor strength intact distally  Sensation to light touch is normal in the axillary, radial, ulnar, and median nerve distributions.  Fingers warm and perfused    Assessment & Plan   1. Status Post Repair Rotator Cuff  Arthroscopic - Right and DISTAL CLAVICLE EXCISION, acromioplasty - Right    She appears ahead of schedule and doing well with her recovery. We did advise the left shoulder pain is likely a result of her compensating with this side. Encouraged her to continue working on rehab protocol for the right shoulder.    We will plan to see her back as scheduled. If any issues, questions, or concerns arise between now and the next appointment, we have encouraged the patient contact our team.    Scribe Attestation      I,:  Davi Carver PA-C am acting as a scribe while in the presence of the attending physician.:       I,:  Luke Gamez MD personally performed the services described in this documentation    as scribed in my presence.:

## 2024-06-11 ENCOUNTER — TELEPHONE (OUTPATIENT)
Dept: CARDIOLOGY CLINIC | Facility: CLINIC | Age: 49
End: 2024-06-11

## 2024-06-11 ENCOUNTER — OFFICE VISIT (OUTPATIENT)
Dept: PHYSICAL THERAPY | Facility: REHABILITATION | Age: 49
End: 2024-06-11
Payer: MEDICARE

## 2024-06-11 DIAGNOSIS — Z98.890 STATUS POST RIGHT ROTATOR CUFF REPAIR: Primary | ICD-10-CM

## 2024-06-11 PROCEDURE — 97140 MANUAL THERAPY 1/> REGIONS: CPT

## 2024-06-11 PROCEDURE — 97110 THERAPEUTIC EXERCISES: CPT

## 2024-06-11 NOTE — PROGRESS NOTES
Daily Note     Today's date: 2024  Patient name: Adamaris Brian  : 1975  MRN: [id number]  Referring provider: Davi Carver PA*  Dx:   Encounter Diagnosis     ICD-10-CM    1. Status post right rotator cuff repair  Z98.890                      Subjective: Patient states she's doing alright. She reports that she has pain when she sleeps on it.       Objective: See treatment diary below      Assessment: Tolerated treatment fair. Initiated POC on pulleys for warmup. She did complain of pain into shoulder abduction, diminished with completion. Good response to PROM she presents with good PROM in all planes of motion. Patient demonstrated fatigue post treatment and would benefit from continued PT      Plan: Continue per plan of care.      Precautions:  R.RTC repair - see protocol.     1-4 Weeks: -   Sling Immobilization  Active ROM Elbow, Wrist and Hand  True Passive (ONLY) ROM Shoulder. NO ACTIVE MOTION.   Pendulums, Supine Elevation in Scapular plane, External Rotation to tolerance with arm at side. (emphasize ER, minimum goal 40°)  Scapular Stabilization exercises (sidelying)  Deltoid isometrics in neutral (submaximal) as ROM improves  No Pulley/Canes until 6 weeks post-op (these are active motions)     4-6 Weeks: -   Discontinue sling use.  Begin Active Assist ROM and advance to Active as Tolerated  Elevation in scapular plane and external rotation as tolerated  No Internal rotation or behind back until 6wks.  Begin Cuff Isometrics at 6wks with arm at the side     6-12 Weeks: -   Active Assist to Active ROM Shoulder As Tolerated  Elevation in scapular plane and external rotation to tolerance  Begin internal rotation as tolerated  Light stretching at end ranges  Cuff Isometrics with the arm at the side     3-12 Months:  Advance to full ROM as tolerated with passive stretching at end ranges  Advance strengthening as tolerated: isometrics ? bands ? light weights (1-5   lbs);  "8-12 reps/2-3 sets per rotator cuff, deltoid, and scapular stabilizers  Limit strengthening 3x/week to avoid rotator cuff tendonitis  Begin eccentrically resisted motions, pylometrics (ex. Weighted ball toss),  proprioception (es. body blade)       Manuals 6/5 6/11                                                               Neuro Re-Ed                                                                                                        Ther Ex             Flex AAROM Flex HEP supine & recumbent /c cane 20x           PROM  RA           ABD AAROM  /c cane 20x            Wall slides  5\" x 20           ER Iso  5\" x 10           Pulleys   3'                                      Ther Activity                                       Gait Training                                       Modalities                                            "

## 2024-06-11 NOTE — TELEPHONE ENCOUNTER
Potential duplicate appointment noted during precharting. Patient had appointment scheduled with Dr. Garnett for 6/14/24 made on 3/28/24. Patient has already seen Dr. Kolb, on 4/9/24, and follow-up advised for 6 months thereafter. Attempted to call patient to clarify - patient advised to call office back.

## 2024-06-12 ENCOUNTER — TELEPHONE (OUTPATIENT)
Dept: CARDIOLOGY CLINIC | Facility: CLINIC | Age: 49
End: 2024-06-12

## 2024-06-12 NOTE — TELEPHONE ENCOUNTER
ARMIDA (10:10am) to advise if planning to keep scheduled appointment on 6/14/2024 with Dr Garnett.  Esdras was already seen by Dr Kolb on 4/9/2024 with instructions to return in 6 months

## 2024-06-13 ENCOUNTER — OFFICE VISIT (OUTPATIENT)
Dept: PHYSICAL THERAPY | Facility: REHABILITATION | Age: 49
End: 2024-06-13
Payer: MEDICARE

## 2024-06-13 DIAGNOSIS — Z98.890 STATUS POST RIGHT ROTATOR CUFF REPAIR: Primary | ICD-10-CM

## 2024-06-13 PROCEDURE — 97110 THERAPEUTIC EXERCISES: CPT

## 2024-06-13 NOTE — PROGRESS NOTES
Daily Note     Today's date: 2024  Patient name: Adamaris Brian  : 1975  MRN: [id number]  Referring provider: Davi Carver PA*  Dx:   Encounter Diagnosis     ICD-10-CM    1. Status post right rotator cuff repair  Z98.890                      Subjective: Adamaris reports generalized joint stiffness this AM. Sleep quality is fair.       Objective: See treatment diary below      Assessment: Tolerated treatment well. Emphasis on completing AA tasks to tolerance avoiding pain. Pain with elevation in the scapular plane, less with progression form small arc into shoulder level. EDU on pendulums for HEP to help with joint stiffness. Continued PT would be beneficial to improve function.          Plan: Continue per plan of care.       Precautions:  R.RTC repair - see protocol.     1-4 Weeks: -   Sling Immobilization  Active ROM Elbow, Wrist and Hand  True Passive (ONLY) ROM Shoulder. NO ACTIVE MOTION.   Pendulums, Supine Elevation in Scapular plane, External Rotation to tolerance with arm at side. (emphasize ER, minimum goal 40°)  Scapular Stabilization exercises (sidelying)  Deltoid isometrics in neutral (submaximal) as ROM improves  No Pulley/Canes until 6 weeks post-op (these are active motions)     4-6 Weeks: -   Discontinue sling use.  Begin Active Assist ROM and advance to Active as Tolerated  Elevation in scapular plane and external rotation as tolerated  No Internal rotation or behind back until 6wks.  Begin Cuff Isometrics at 6wks with arm at the side     6-12 Weeks: -   Active Assist to Active ROM Shoulder As Tolerated  Elevation in scapular plane and external rotation to tolerance  Begin internal rotation as tolerated  Light stretching at end ranges  Cuff Isometrics with the arm at the side     3-12 Months:  Advance to full ROM as tolerated with passive stretching at end ranges  Advance strengthening as tolerated: isometrics ? bands ? light weights (1-5   lbs); 8-12  "reps/2-3 sets per rotator cuff, deltoid, and scapular stabilizers  Limit strengthening 3x/week to avoid rotator cuff tendonitis  Begin eccentrically resisted motions, pylometrics (ex. Weighted ball toss),  proprioception (es. body blade)       Manuals 6/5 6/11 6/13                                                              Neuro Re-Ed                                                                                                        Ther Ex             Flex AAROM Flex HEP supine & recumbent /c cane 20x /c cane 20x          PROM  RA MSB          ABD AAROM  /c cane 20x  /c cane 20x           Wall slides  5\" x 20 5\" x 20          ER Iso  5\" x 10 5\" x 10          Pulleys   3'  3'          Supine ER AA   20x                       Ther Activity                                       Gait Training                                       Modalities                                              "

## 2024-06-13 NOTE — PROGRESS NOTES
Cardiology Follow Up    Adamaris Brian  1975  [id number]  [facility]  [address]  [address]  [facility] [address] [phone]    1. ORELLANA (dyspnea on exertion)  CT coronary calcium score    NM myocardial perfusion spect (rx stress and/or rest)      2. AVNRT (AV win re-entry tachycardia)        3. History of radiofrequency ablation procedure for cardiac arrhythmia        4. Benign essential hypertension        5. Dyslipidemia  CT coronary calcium score      6. CK (obstructive sleep apnea)            Discussion/Summary: Ms. Brian is a very pleasant 49-year-old female who presents to the office today for follow-up.    She continues with exertional shortness of breath and chest pain with typical and atypical features.  She had an exercise stress test a few years ago which was unremarkable although she was unable to achieve target heart rate as the test had to be terminated due to fatigue and shortness of breath.  Hence I have requested a pharmacologic nuclear stress test for further evaluation.  She had an echocardiogram performed in the recent past revealing preserved biventricular systolic function, mild left ventricular hypertrophy and no significant valvular heart disease.    Her blood pressure is suboptimal in the office today.  She does have the capability to check it at home.  I have asked her to do so and report readings to the office for my review.  A low salt diet was reinforced.  She is intolerant of CPAP.    Regarding her palpitations, she did undergo an event monitor a few years ago revealing isolated and rare ectopic beats which may account for her symptoms.  She is maintained on propranolol.    Her most recent lipids were reviewed.  Her LDL is slightly suboptimal though she reports having been started on rosuvastatin by her primary care provider.  Her 10-year cardiac risk is low at 2.1%.  However she does  have a family history of coronary artery disease in her father.  We discussed further risk stratification with a calcium score to which she is agreeable.    I will see her back in the office in six months for re-evaluation.    Interval History: Ms. Brian is a pleasant 49-year-old female who presents to the office today for follow-up.  I had met her in 2016 and had referred her to electrophysiology regarding AVNRT.  She underwent an ablation.  She was re-evaluated in 2022 by one of my associates due to atypical chest pain and palpitations.  At that time she underwent an exercise stress test which was unremarkable.  Ambulatory rhythm monitoring revealed no recurrence of AVNRT with isolated, rare ectopic beats.    She returned earlier this year reporting chest pain and shortness of breath.  Her blood pressure was uncontrolled.  Hydrochlorothiazide was re-introduced into her medication regimen.  She was sent for an updated echocardiogram revealing preserved biventricular systolic function, normal diastolic parameters and no significant valvular heart disease.    She continues with shortness of breath with exertion.  She has chest pain that can occur with exertion or can occur at rest.  It occurs every few weeks and lasts a few minutes.  She denies any signs or symptoms of congestive heart failure including lower extremity edema, paroxysmal nocturnal dyspnea, orthopnea, acute weight gain or increasing abdominal girth.  She does report palpitations described as occasional fluttering.  She also notes her heart rate accelerate with activity when she becomes short of breath.  She denies syncope or presyncope.  She denies symptoms of claudication.    She has been non-compliant with CPAP due to inability to tolerate the mask.    Medical Problems       Problem List       Palpitations (Chronic)    AVNRT (AV win re-entry tachycardia) (Chronic)    Chronic migraine without aura without status migrainosus, not intractable     Interstitial cystitis    Vaginal candidiasis    Hyperlipidemia    Disease of thyroid gland    Yeast infection of the vagina    Vaginal discharge    Other specified anxiety disorders    Vaginal dryness    Overview Signed 8/15/2019  9:34 PM by Sebastián Wilson DO     advised patient to buy replens vaginal mosturizer OTC         Pelvic pain    Urine frequency    Full incontinence of feces    Cervicogenic headache    Cervicalgia    Vaginal yeast infection    IC (interstitial cystitis)    Obesity, morbid ([facility])    History of hysterectomy    CK (obstructive sleep apnea)    Deep vein thrombosis (DVT) of distal vein of lower extremity ([facility])    Primary hypertension        Past Medical History:   Diagnosis Date    Arthritis     Asthma     Bipolar 1 disorder ([facility])     Chronic narcotic dependence ([facility])     Cluster headache     COVID     2021    CTS (carpal tunnel syndrome)     left hand    Difficulty walking     Disease of thyroid gland     Fibromyalgia     Fibromyalgia, primary     Gout     Headache, tension-type     History of DVT in adulthood     Hyperlipidemia     Hypertension     Interstitial cystitis     Lumbar herniated disc     Lupus ([facility])     Memory loss     Migraine     Mild sleep apnea     no CPAP    Peripheral neuropathy     PONV (postoperative nausea and vomiting)     Vision loss      Social History     Socioeconomic History    Marital status: Single     Spouse name: Not on file    Number of children: Not on file    Years of education: Not on file    Highest education level: Not on file   Occupational History    Not on file   Tobacco Use    Smoking status: Former     Current packs/day: 1.00     Average packs/day: 1 pack/day for 5.0 years (5.0 total pack years)     Types: Cigarettes    Smokeless tobacco: Never    Tobacco comments:     quit at 17 years old   Vaping Use    Vaping status: Never Used   Substance and Sexual Activity    Alcohol use: No    Drug use: No    Sexual activity: Yes     Partners: Male     Birth  control/protection: Female Sterilization   Other Topics Concern    Not on file   Social History Narrative    Not on file     Social Determinants of Health     Financial Resource Strain: Low Risk  (2/3/2023)    Overall Financial Resource Strain (CARDIA)     Difficulty of Paying Living Expenses: Not hard at all   Food Insecurity: No Food Insecurity (2/3/2023)    Hunger Vital Sign     Worried About Running Out of Food in the Last Year: Never true     Ran Out of Food in the Last Year: Never true   Transportation Needs: No Transportation Needs (2/3/2023)    PRAPARE - Transportation     Lack of Transportation (Medical): No     Lack of Transportation (Non-Medical): No   Physical Activity: Insufficiently Active (5/5/2022)    Exercise Vital Sign     Days of Exercise per Week: 2 days     Minutes of Exercise per Session: 20 min   Stress: Stress Concern Present (5/5/2022)    [employer] [place] [employer] - Occupational Stress Questionnaire     Feeling of Stress : To some extent   Social Connections: Socially Isolated (5/5/2022)    Social Connection and Isolation Panel [NHANES]     Frequency of Communication with Friends and Family: More than three times a week     Frequency of Social Gatherings with Friends and Family: More than three times a week     Attends Yazidi Services: Never     Active Member of Clubs or Organizations: No     Attends Club or Organization Meetings: Never     Marital Status: Never    Intimate Partner Violence: Not At Risk (5/5/2022)    Humiliation, Afraid, Rape, and Kick questionnaire     Fear of Current or Ex-Partner: No     Emotionally Abused: No     Physically Abused: No     Sexually Abused: No   Housing Stability: Low Risk  (2/3/2023)    Housing Stability Vital Sign     Unable to Pay for Housing in the Last Year: No     Number of Places Lived in the Last Year: 1     Unstable Housing in the Last Year: No      Family History   Problem Relation Age of Onset    Lupus Mother      Osteoporosis Mother     Hypotension Mother     Hypertension Father     Diabetes Father     Heart disease Father     Migraines Father     Stroke Father     Diabetes Sister     Schizophrenia Sister     Hypertension Sister     Breast cancer Maternal Aunt 40    No Known Problems Maternal Aunt     No Known Problems Maternal Aunt     No Known Problems Maternal Aunt     No Known Problems Paternal Aunt     Diabetes Maternal Grandmother     Rheum arthritis Maternal Grandmother     Hypertension Maternal Grandmother     Neuropathy Maternal Grandmother     Seizures Maternal Grandmother      Past Surgical History:   Procedure Laterality Date    COLONOSCOPY      DISTAL CLAVICLE EXCISION Right 4/24/2024    Procedure: DISTAL CLAVICLE EXCISION, acromioplasty;  Surgeon: Luke Gamez MD;  Location: [facility];  Service: Orthopedics    GALLBLADDER SURGERY  2008    HYSTERECTOMY  2014    LAPAROSCOPIC COLON RESECTION  2001    MA NDSC WRST SURG W/RLS TRANSVRS CARPL LIGM Right 12/06/2023    Procedure: Right endoscopic carpal tunnel release;  Surgeon: Alek Richards MD;  Location: [facility];  Service: Orthopedics    MA SURGICAL ARTHROSCOPY SHOULDER W/ROTATOR CUFF RPR Right 4/24/2024    Procedure: REPAIR ROTATOR CUFF  ARTHROSCOPIC;  Surgeon: Luke Gamez MD;  Location: [facility];  Service: Orthopedics    MA TENDON SHEATH INCISION Right 12/06/2023    Procedure: Right ring trigger finger release;  Surgeon: Alek Richards MD;  [facility];  Service: Orthopedics       Current Outpatient Medications:     acetaminophen (TYLENOL) 325 mg tablet, Take 1 tablet (325 mg total) by mouth every 6 (six) hours as needed for mild pain, Disp: 30 tablet, Rfl: 0    apixaban (Eliquis) 5 mg, Take 2 tablets (10 mg total) by mouth 2 (two) times a day for 7 days, THEN 1 tablet (5 mg total) 2 (two) times a day for 23 days., Disp: 74 tablet, Rfl: 0    aspirin-acetaminophen-caffeine (EXCEDRIN MIGRAINE) 250-250-65 MG per tablet, Take 2 tablets by  mouth if needed for headaches, Disp: , Rfl:     Atogepant (Qulipta) 60 MG TABS, Take 60 mg by mouth in the morning, Disp: 30 tablet, Rfl: 11    buPROPion (WELLBUTRIN SR) 150 mg 12 hr tablet, Take 1 tablet (150 mg total) by mouth 2 (two) times a day, Disp: 180 tablet, Rfl: 3    Cetirizine HCl 10 MG CAPS, Take 10 mg by mouth daily as needed, Disp: , Rfl:     ergocalciferol (VITAMIN D2) 50,000 units, Take 50,000 Units by mouth once a week sundays, Disp: , Rfl:     famotidine (PEPCID) 20 mg tablet, Take 20 mg by mouth every evening, Disp: , Rfl:     fluticasone (FLONASE) 50 mcg/act nasal spray, 1 spray into each nostril daily, Disp: 16 g, Rfl: 0    fluticasone (FLOVENT HFA) 110 MCG/ACT inhaler, Inhale 1 puff as needed , Disp: , Rfl:     hydroxychloroquine (PLAQUENIL) 200 mg tablet, Take 200 mg by mouth as needed During Summer time, Disp: , Rfl:     lasmiditan succinate (Reyvow) 100 mg tablet, TAKE 1 TABLET (100MG) ONE TIME AS NEEDED FOR MIGRAINE.DO NOT USE MORE THAN ONE DOSEPER DAY OR MORE THAN 8 DOSES PER MONTH, Disp: 8 tablet, Rfl: 5    levothyroxine 88 mcg tablet, Take 88 mcg by mouth daily , Disp: , Rfl:     meclizine (ANTIVERT) 12.5 MG tablet, Take 12.5 mg by mouth as needed, Disp: , Rfl:     Multiple Vitamins-Minerals (MULTIVITAMIN ADULT PO), Take 1 tablet by mouth daily, Disp: , Rfl:     Nurtec 75 MG TBDP, DISSOLVE 1 TABLET IN MOUTH EVERY OTHER DAY, Disp: 16 tablet, Rfl: 11    oxyCODONE (ROXICODONE) 15 mg immediate release tablet, Take 15 mg by mouth every 4 (four) hours as needed, Disp: , Rfl:     potassium chloride (Klor-Con) 10 mEq tablet, Take 10 mEq by mouth daily, Disp: , Rfl:     PROAIR  (90 Base) MCG/ACT inhaler, Inhale 1-2 puffs as needed , Disp: , Rfl:     prochlorperazine (COMPAZINE) 10 mg tablet, Take 1 tablet (10 mg total) by mouth every 6 (six) hours as needed (migraine), Disp: 20 tablet, Rfl: 3    propranolol (INDERAL LA) 160 mg, Take 160 mg by mouth 3 (three) times a day, Disp: , Rfl: 0     rosuvastatin (CRESTOR) 10 MG tablet, Take 10 mg by mouth daily, Disp: , Rfl:     telmisartan-hydrochlorothiazide (MICARDIS HCT) 80-12.5 MG per tablet, Take 1 tablet by mouth daily, Disp: 30 tablet, Rfl: 3    valACYclovir (VALTREX) 1,000 mg tablet, 1 tablet as needed , Disp: , Rfl:     Xiidra 5 % op solution, instill 1 drop into both eyes twice a day, Disp: , Rfl:     fluticasone (FLONASE) 50 mcg/act nasal spray, 1 spray into each nostril daily (Patient not taking: Reported on 6/14/2024), Disp: 9.9 mL, Rfl: 0    oxyCODONE (Roxicodone) 5 immediate release tablet, Take 1 tablet (5 mg total) by mouth every 4 (four) hours as needed for moderate pain for up to 15 doses Max Daily Amount: 30 mg (Patient not taking: Reported on 6/14/2024), Disp: 15 tablet, Rfl: 0    rosuvastatin (CRESTOR) 5 mg tablet, Take 5 mg by mouth daily (Patient not taking: Reported on 6/14/2024), Disp: , Rfl:     telmisartan (MICARDIS) 80 MG tablet, Take 80 mg by mouth daily (Patient not taking: Reported on 6/14/2024), Disp: , Rfl:     Current Facility-Administered Medications:     Botulinum Toxin Type A SOLR 200 Units, 200 Units, Injection, Q3 Months, Kassie Andrade PA-C, 200 Units at 03/18/21 1154  Allergies   Allergen Reactions    Pregabalin      Other reaction(s): blurred vision    Shellfish-Derived Products - Food Allergy Anaphylaxis and Hives    Sumatriptan      Other reaction(s): Altered Heart Rate    Triptans Shortness Of Breath and Palpitations    Latex Swelling and Rash     Other reaction(s): Hives/Skin Rash  Other reaction(s): Hives/Skin Rash    Sulfa Antibiotics Swelling and Rash     Other reaction(s): Hives/Skin Rash  Other reaction(s): Hives/Skin Rash    Monistat [Miconazole] Swelling       Labs:     Chemistry        Component Value Date/Time     11/11/2015 2113    K 4.1 05/15/2024 1119    K 3.6 11/11/2015 2113     05/15/2024 1119     11/11/2015 2113    CO2 28 05/15/2024 1119    CO2 26.5 11/11/2015 2113    BUN 13  "05/15/2024 1119    BUN 10 11/11/2015 2113    CREATININE 0.87 05/15/2024 1119    CREATININE 0.75 11/11/2015 2113        Component Value Date/Time    CALCIUM 9.7 05/15/2024 1119    CALCIUM 9.0 11/11/2015 2113    ALKPHOS 63 05/15/2024 1119    ALKPHOS 54 02/28/2015 0401    AST 32 05/15/2024 1119    AST 20 02/28/2015 0401    ALT 60 (H) 05/15/2024 1119    ALT 34 02/28/2015 0401    BILITOT 0.2 02/28/2015 0401            Lab Results   Component Value Date    CHOL 161 09/30/2014    CHOL 175 09/27/2013     Lab Results   Component Value Date    HDL 45 05/15/2024    HDL 46 11/20/2019    HDL 42 01/24/2019     Lab Results   Component Value Date    LDLCALC 101 (H) 05/15/2024    LDLCALC 98 11/20/2019    LDLCALC 87 01/24/2019     Lab Results   Component Value Date    TRIG 150 (H) 05/15/2024    TRIG 90 11/20/2019    TRIG 61 01/24/2019     No results found for: \"CHOLHDL\"        Review of Systems   Cardiovascular:  Positive for chest pain, dyspnea on exertion, irregular heartbeat and palpitations.   All other systems reviewed and are negative.      Vitals:    06/14/24 0902   BP: 138/82   Pulse: 90   SpO2: 95%     Vitals:    06/14/24 0902   Weight: 92.9 kg (204 lb 12.8 oz)     Height: 5' 5\" (165.1 cm)   Body mass index is 34.08 kg/m².    Physical Exam:   General appearance:  Appears stated age, alert, well appearing and in no distress  HEENT:  PERRLA, EOMI, no scleral icterus, no conjunctival pallor  NECK:  Supple, No elevated JVP, no thyromegaly, no carotid bruits  HEART:  Regular rate and rhythm, normal S1/S2, no S3/S4, no murmur  LUNGS:  Clear to auscultation bilaterally  ABDOMEN:  Soft, non-tender, positive bowel sounds, no rebound or guarding, no organomegaly   EXTREMITIES:  No edema  VASCULAR:  Normal pedal pulses   SKIN: No lesions or rashes on exposed skin  NEURO:  CN II-XII intact, no focal deficits    "

## 2024-06-14 ENCOUNTER — OFFICE VISIT (OUTPATIENT)
Dept: CARDIOLOGY CLINIC | Facility: CLINIC | Age: 49
End: 2024-06-14
Payer: MEDICARE

## 2024-06-14 VITALS
DIASTOLIC BLOOD PRESSURE: 82 MMHG | HEART RATE: 90 BPM | SYSTOLIC BLOOD PRESSURE: 138 MMHG | WEIGHT: 204.8 LBS | BODY MASS INDEX: 34.12 KG/M2 | OXYGEN SATURATION: 95 % | HEIGHT: 65 IN

## 2024-06-14 DIAGNOSIS — G47.33 OSA (OBSTRUCTIVE SLEEP APNEA): ICD-10-CM

## 2024-06-14 DIAGNOSIS — E78.5 DYSLIPIDEMIA: ICD-10-CM

## 2024-06-14 DIAGNOSIS — I10 BENIGN ESSENTIAL HYPERTENSION: ICD-10-CM

## 2024-06-14 DIAGNOSIS — R06.09 DOE (DYSPNEA ON EXERTION): ICD-10-CM

## 2024-06-14 DIAGNOSIS — Z98.890 HISTORY OF RADIOFREQUENCY ABLATION PROCEDURE FOR CARDIAC ARRHYTHMIA: ICD-10-CM

## 2024-06-14 DIAGNOSIS — I47.19 AVNRT (AV NODAL RE-ENTRY TACHYCARDIA): Chronic | ICD-10-CM

## 2024-06-14 PROCEDURE — 99214 OFFICE O/P EST MOD 30 MIN: CPT | Performed by: INTERNAL MEDICINE

## 2024-06-14 RX ORDER — ROSUVASTATIN CALCIUM 10 MG/1
10 TABLET, COATED ORAL DAILY
COMMUNITY
Start: 2024-05-20

## 2024-06-14 RX ORDER — OXYCODONE HYDROCHLORIDE 15 MG/1
15 TABLET ORAL EVERY 4 HOURS PRN
COMMUNITY
Start: 2024-05-20

## 2024-06-18 ENCOUNTER — OFFICE VISIT (OUTPATIENT)
Dept: PHYSICAL THERAPY | Facility: REHABILITATION | Age: 49
End: 2024-06-18
Payer: MEDICARE

## 2024-06-18 DIAGNOSIS — Z98.890 STATUS POST RIGHT ROTATOR CUFF REPAIR: Primary | ICD-10-CM

## 2024-06-18 PROCEDURE — 97110 THERAPEUTIC EXERCISES: CPT

## 2024-06-18 NOTE — PROGRESS NOTES
Daily Note     Today's date: 2024  Patient name: Adamaris Brian  : 1975  MRN: [id number]  Referring provider: Davi Carver PA*  Dx:   Encounter Diagnosis     ICD-10-CM    1. Status post right rotator cuff repair  Z98.890           Start Time: 0900  Stop Time: 0940  Total time in clinic (min): 40 minutes    Subjective: Pt reports she has good days and bad days with pain at R shldr.        Objective: See treatment diary below.  HEP updated with 3-way glenohumeral isometrics.        Assessment: Tolerated treatment well. Is making good progress with available PROM in all planes.  Most limited with AA/PROM ABD.  Progressed with flex and ext glenohumeral isometrics;  most discomfort present during flex and ER isometrics.  Patient would benefit from continued PT to further improve strength, increase ROM, and maximize function, per MD protocol.        Plan: Continue per plan of care.      Precautions:  R.RTC repair - see protocol.     1-4 Weeks: -   Sling Immobilization  Active ROM Elbow, Wrist and Hand  True Passive (ONLY) ROM Shoulder. NO ACTIVE MOTION.   Pendulums, Supine Elevation in Scapular plane, External Rotation to tolerance with arm at side. (emphasize ER, minimum goal 40°)  Scapular Stabilization exercises (sidelying)  Deltoid isometrics in neutral (submaximal) as ROM improves  No Pulley/Canes until 6 weeks post-op (these are active motions)     4-6 Weeks: -   Discontinue sling use.  Begin Active Assist ROM and advance to Active as Tolerated  Elevation in scapular plane and external rotation as tolerated  No Internal rotation or behind back until 6wks.  Begin Cuff Isometrics at 6wks with arm at the side     6-12 Weeks: -   Active Assist to Active ROM Shoulder As Tolerated  Elevation in scapular plane and external rotation to tolerance  Begin internal rotation as tolerated  Light stretching at end ranges  Cuff Isometrics with the arm at the side     3-12  "Months:  Advance to full ROM as tolerated with passive stretching at end ranges  Advance strengthening as tolerated: isometrics ? bands ? light weights (1-5   lbs); 8-12 reps/2-3 sets per rotator cuff, deltoid, and scapular stabilizers  Limit strengthening 3x/week to avoid rotator cuff tendonitis  Begin eccentrically resisted motions, pylometrics (ex. Weighted ball toss),  proprioception (es. body blade)       Manuals 6/5 6/11 6/13 6/18                                                             Neuro Re-Ed                                                                                                        Ther Ex             Flex AAROM Flex HEP supine & recumbent /c cane 20x /c cane 20x Supine  5\" x20         PROM  RA MSB [employer]         ABD AAROM  /c cane 20x  /c cane 20x  /c cane 20x          Wall slides  5\" x 20 5\" x 20          [place] AAROM    Flex/scap  X10 ea         ER Iso  5\" x 10 5\" x 10 Flex, ER, Ext,   5\"x10 ea  HEP         Pulleys   3'  3' 4'         Supine ER AA   20x 20x                      Ther Activity                                       Gait Training                                       Modalities                                                "

## 2024-06-20 ENCOUNTER — APPOINTMENT (OUTPATIENT)
Dept: PHYSICAL THERAPY | Facility: REHABILITATION | Age: 49
End: 2024-06-20
Payer: MEDICARE

## 2024-06-21 ENCOUNTER — OFFICE VISIT (OUTPATIENT)
Dept: OBGYN CLINIC | Facility: CLINIC | Age: 49
End: 2024-06-21

## 2024-06-21 VITALS
WEIGHT: 198.2 LBS | SYSTOLIC BLOOD PRESSURE: 112 MMHG | HEIGHT: 65 IN | BODY MASS INDEX: 33.02 KG/M2 | HEART RATE: 70 BPM | DIASTOLIC BLOOD PRESSURE: 72 MMHG

## 2024-06-21 DIAGNOSIS — B37.31 VULVOVAGINAL CANDIDIASIS: Primary | ICD-10-CM

## 2024-06-21 LAB
BV WHIFF TEST VAG QL: NEGATIVE
CLUE CELLS SPEC QL WET PREP: NEGATIVE
PH SMN: 4 [PH]
SL AMB POCT WET MOUNT: ABNORMAL
T VAGINALIS VAG QL WET PREP: NEGATIVE
YEAST VAG QL WET PREP: POSITIVE

## 2024-06-21 PROCEDURE — 87210 SMEAR WET MOUNT SALINE/INK: CPT | Performed by: NURSE PRACTITIONER

## 2024-06-21 PROCEDURE — 99213 OFFICE O/P EST LOW 20 MIN: CPT | Performed by: NURSE PRACTITIONER

## 2024-06-21 RX ORDER — FLUCONAZOLE 150 MG/1
150 TABLET ORAL
Qty: 2 TABLET | Refills: 0 | Status: SHIPPED | OUTPATIENT
Start: 2024-06-21 | End: 2024-06-25

## 2024-06-21 NOTE — PROGRESS NOTES
PROBLEM GYNECOLOGICAL VISIT    Adamaris Brian is a 49 y.o. female who presents today with complaint of a suspected yeast infection.  Her general medical history has been reviewed and she reports it as follows:    Past Medical History:   Diagnosis Date    Arthritis     Asthma     Bipolar 1 disorder (HCC)     Chronic narcotic dependence (HCC)     Cluster headache     COVID         CTS (carpal tunnel syndrome)     left hand    Difficulty walking     Disease of thyroid gland     Fibromyalgia     Fibromyalgia, primary     Gout     Headache, tension-type     History of DVT in adulthood     Hyperlipidemia     Hypertension     Interstitial cystitis     Lumbar herniated disc     Lupus (HCC)     Memory loss     Migraine     Mild sleep apnea     no CPAP    Peripheral neuropathy     PONV (postoperative nausea and vomiting)     Vision loss      Past Surgical History:   Procedure Laterality Date    COLONOSCOPY      DISTAL CLAVICLE EXCISION Right 2024    Procedure: DISTAL CLAVICLE EXCISION, acromioplasty;  Surgeon: Luke Gamez MD;  [facility];  Service: Orthopedics    GALLBLADDER SURGERY      HYSTERECTOMY      LAPAROSCOPIC COLON RESECTION      MI NDSC WRST SURG W/RLS TRANSVRS CARPL LIGM Right 2023    Procedure: Right endoscopic carpal tunnel release;  Surgeon: Alek Richards MD;  [facility];  Service: Orthopedics    MI SURGICAL ARTHROSCOPY SHOULDER W/ROTATOR CUFF RPR Right 2024    Procedure: REPAIR ROTATOR CUFF  ARTHROSCOPIC;  Surgeon: Luke Gamez MD;  [facility];  Service: Orthopedics    MI TENDON SHEATH INCISION Right 2023    Procedure: Right ring trigger finger release;  Surgeon: Alek Richards MD;  [facility];  Service: Orthopedics     OB History          4    Para   4    Term   4            AB        Living   4         SAB        IAB        Ectopic        Multiple        Live Births   4               Social History      Tobacco Use    Smoking status: Former     Current packs/day: 1.00     Average packs/day: 1 pack/day for 5.0 years (5.0 ttl pk-yrs)     Types: Cigarettes    Smokeless tobacco: Never    Tobacco comments:     quit at 17 years old   Vaping Use    Vaping status: Never Used   Substance Use Topics    Alcohol use: No    Drug use: No     Social History     Substance and Sexual Activity   Sexual Activity Yes    Partners: Male    Birth control/protection: Female Sterilization       Current Outpatient Medications   Medication Instructions    acetaminophen (TYLENOL) 325 mg, Oral, Every 6 hours PRN    apixaban (Eliquis) 5 mg Take 2 tablets (10 mg total) by mouth 2 (two) times a day for 7 days, THEN 1 tablet (5 mg total) 2 (two) times a day for 23 days.    aspirin-acetaminophen-caffeine (EXCEDRIN MIGRAINE) 250-250-65 MG per tablet 2 tablets, Oral, As needed    buPROPion (WELLBUTRIN SR) 150 mg, Oral, 2 times daily    Cetirizine HCl 10 mg, Oral, Daily PRN    ergocalciferol (VITAMIN D2) 50,000 Units, Oral, Weekly, sundays    famotidine (PEPCID) 20 mg, Oral, Every evening    fluticasone (FLONASE) 50 mcg/act nasal spray 1 spray, Nasal, Daily    fluticasone (FLONASE) 50 mcg/act nasal spray 1 spray, Nasal, Daily    fluticasone (FLOVENT HFA) 110 MCG/ACT inhaler 1 puff, Inhalation, As needed    hydroxychloroquine (PLAQUENIL) 200 mg, Oral, As needed, During Summer time    lasmiditan succinate (Reyvow) 100 mg tablet TAKE 1 TABLET (100MG) ONE TIME AS NEEDED FOR MIGRAINE.DO NOT USE MORE THAN ONE DOSEPER DAY OR MORE THAN 8 DOSES PER MONTH    levothyroxine 88 mcg, Oral, Daily    meclizine (ANTIVERT) 12.5 mg, Oral, As needed    Multiple Vitamins-Minerals (MULTIVITAMIN ADULT PO) 1 tablet, Oral, Daily    Nurtec 75 MG TBDP DISSOLVE 1 TABLET IN MOUTH EVERY OTHER DAY    oxyCODONE (ROXICODONE) 5 mg, Oral, Every 4 hours PRN    oxyCODONE (ROXICODONE) 15 mg, Oral, Every 4 hours PRN    potassium chloride (Klor-Con) 10 mEq tablet 10 mEq, Oral, Daily     "PROAIR  (90 Base) MCG/ACT inhaler 1-2 puffs, Inhalation, As needed    prochlorperazine (COMPAZINE) 10 mg, Oral, Every 6 hours PRN    propranolol (INDERAL LA) 160 mg, Oral, 3 times daily    Qulipta 60 mg, Oral, Daily    rosuvastatin (CRESTOR) 5 mg, Daily    rosuvastatin (CRESTOR) 10 mg, Oral, Daily    telmisartan (MICARDIS) 80 mg, Daily    telmisartan-hydrochlorothiazide (MICARDIS HCT) 80-12.5 MG per tablet 1 tablet, Oral, Daily    valACYclovir (VALTREX) 1,000 mg tablet 1 tablet, As needed    Xiidra 5 % op solution instill 1 drop into both eyes twice a day       History of Present Illness:   Adamaris presents today reporting a suspected yeast infection. She has a history of yeast infections in the past. She reports vulvar itching and mild irritation as well as vaginal tenderness. She denies any vaginal discharge or odor.     Review of Systems:  Review of Systems   Constitutional: Negative.    Genitourinary:         Vaginal itching/tenderness       Physical Exam:  /72 (BP Location: Right arm, Patient Position: Sitting)   Pulse 70   Ht 5' 5\" (1.651 m)   Wt 89.9 kg (198 lb 3.2 oz)   LMP  (LMP Unknown)   BMI 32.98 kg/m²   Physical Exam  Constitutional:       General: She is not in acute distress.     Appearance: Normal appearance.   Genitourinary:      Vulva normal.      Vaginal discharge and tenderness present.      Moderate vaginal atrophy present.     Vaginal exam comments: Small mount of thick white discharge .   Neurological:      Mental Status: She is alert.   Skin:     General: Skin is warm and dry.   Psychiatric:         Mood and Affect: Mood normal.         Behavior: Behavior normal.   Vitals reviewed.         Point of Care Testing:   -Wet mount: +yeast, -clue cells, -trich   -KOH mount: +yeast   -Whiff: negative    -pH 4    Assessment:   1. Vulvovaginal candidiasis     Plan:   1. Rx for Diflucan.    2. Reviewed vulvar skin care measures.    3. Return for annual exam or sooner if needed. "     Reviewed with patient that test results are available in MyChart immediately, but that they will not necessarily be reviewed by me immediately.  Explained that I will review results at my earliest opportunity and contact patient appropriately.

## 2024-06-25 ENCOUNTER — OFFICE VISIT (OUTPATIENT)
Dept: PHYSICAL THERAPY | Facility: REHABILITATION | Age: 49
End: 2024-06-25
Payer: MEDICARE

## 2024-06-25 DIAGNOSIS — Z98.890 STATUS POST RIGHT ROTATOR CUFF REPAIR: Primary | ICD-10-CM

## 2024-06-25 PROCEDURE — 97110 THERAPEUTIC EXERCISES: CPT | Performed by: PHYSICAL THERAPIST

## 2024-06-25 NOTE — PROGRESS NOTES
Daily Note     Today's date: 2024  Patient name: Adamaris Brian  : 1975  MRN: [id number]  Referring provider: Davi Carver PA*  Dx:   Encounter Diagnosis     ICD-10-CM    1. Status post right rotator cuff repair  Z98.890                      Subjective: Her shoulder is sore. She didn't come last time due to too much soreness. She thinks it may have to do with how she is sleeping. She wakes up on her stomach with her hand near her head, shoulder rotated out.     Objective: See treatment diary below.      Assessment: Increased soreness w/ listed interventions. Most w/ AAROM interventions. Encouraged increased rest breaks due to increased soreness with repetition, specifically w/ HEP. She would benefit from continued skilled PT services to reduce pain and increase level of function.     Plan: Continue per plan of care.      Precautions:  R.RTC repair - see protocol.     1-4 Weeks: -   Sling Immobilization  Active ROM Elbow, Wrist and Hand  True Passive (ONLY) ROM Shoulder. NO ACTIVE MOTION.   Pendulums, Supine Elevation in Scapular plane, External Rotation to tolerance with arm at side. (emphasize ER, minimum goal 40°)  Scapular Stabilization exercises (sidelying)  Deltoid isometrics in neutral (submaximal) as ROM improves  No Pulley/Canes until 6 weeks post-op (these are active motions)     4-6 Weeks: -   Discontinue sling use.  Begin Active Assist ROM and advance to Active as Tolerated  Elevation in scapular plane and external rotation as tolerated  No Internal rotation or behind back until 6wks.  Begin Cuff Isometrics at 6wks with arm at the side     6-12 Weeks: -   Active Assist to Active ROM Shoulder As Tolerated  Elevation in scapular plane and external rotation to tolerance  Begin internal rotation as tolerated  Light stretching at end ranges  Cuff Isometrics with the arm at the side     3-12 Months:  Advance to full ROM as tolerated with passive stretching at end  "ranges  Advance strengthening as tolerated: isometrics ? bands ? light weights (1-5   lbs); 8-12 reps/2-3 sets per rotator cuff, deltoid, and scapular stabilizers  Limit strengthening 3x/week to avoid rotator cuff tendonitis  Begin eccentrically resisted motions, pylometrics (ex. Weighted ball toss),  proprioception (es. body blade)       Manuals 6/5 6/11 6/13 6/18 6/25                                                            Neuro Re-Ed                                                                                                        Ther Ex             Flex AAROM Flex HEP supine & recumbent /c cane 20x /c cane 20x Supine  5\" x20         PROM  RA MSB AFB MM        ABD AAROM  /c cane 20x  /c cane 20x  /c cane 20x          Wall slides  5\" x 20 5\" x 20           [place] AAROM    Flex/scap  X10 ea Flex 10x         ER Iso  5\" x 10 5\" x 10 Flex, ER, Ext,   5\"x10 ea  HEP         Pulleys   3'  3' 4' 5'        Supine ER AA   20x 20x                      Ther Activity                                       Gait Training                                       Modalities                                                "

## 2024-06-27 ENCOUNTER — OFFICE VISIT (OUTPATIENT)
Dept: PHYSICAL THERAPY | Facility: REHABILITATION | Age: 49
End: 2024-06-27
Payer: MEDICARE

## 2024-06-27 DIAGNOSIS — Z98.890 STATUS POST RIGHT ROTATOR CUFF REPAIR: Primary | ICD-10-CM

## 2024-06-27 PROCEDURE — 97110 THERAPEUTIC EXERCISES: CPT | Performed by: PHYSICAL THERAPIST

## 2024-06-27 NOTE — PROGRESS NOTES
Daily Note     Today's date: 2024  Patient name: Adamaris Brian  : 1975  MRN: [id number]  Referring provider: Davi Carver PA*  Dx:   Encounter Diagnosis     ICD-10-CM    1. Status post right rotator cuff repair  Z98.890                      Subjective: Shoulder soreness w/ repetitive movement.     Objective: See treatment diary below.      Assessment: Increased soreness w/ exercise today but demonstrates near full flexion AROM w/ end range tightness. Encouraged continued flexion AROM at home to tolerance, resting as needed. She would benefit from continued PT     Plan: Continue per plan of care.      Precautions:  R.RTC repair - see protocol.     1-4 Weeks: -   Sling Immobilization  Active ROM Elbow, Wrist and Hand  True Passive (ONLY) ROM Shoulder. NO ACTIVE MOTION.   Pendulums, Supine Elevation in Scapular plane, External Rotation to tolerance with arm at side. (emphasize ER, minimum goal 40°)  Scapular Stabilization exercises (sidelying)  Deltoid isometrics in neutral (submaximal) as ROM improves  No Pulley/Canes until 6 weeks post-op (these are active motions)     4-6 Weeks: -   Discontinue sling use.  Begin Active Assist ROM and advance to Active as Tolerated  Elevation in scapular plane and external rotation as tolerated  No Internal rotation or behind back until 6wks.  Begin Cuff Isometrics at 6wks with arm at the side     6-12 Weeks: -   Active Assist to Active ROM Shoulder As Tolerated  Elevation in scapular plane and external rotation to tolerance  Begin internal rotation as tolerated  Light stretching at end ranges  Cuff Isometrics with the arm at the side     3-12 Months:  Advance to full ROM as tolerated with passive stretching at end ranges  Advance strengthening as tolerated: isometrics ? bands ? light weights (1-5   lbs); 8-12 reps/2-3 sets per rotator cuff, deltoid, and scapular stabilizers  Limit strengthening 3x/week to avoid rotator cuff  "tendonitis  Begin eccentrically resisted motions, pylometrics (ex. Weighted ball toss),  proprioception (es. body blade)       Manuals 6/5 6/11 6/13 6/18 6/25 6/27                                                           Neuro Re-Ed                                                                                                        Ther Ex             Flex AAROM Flex HEP supine & recumbent /c cane 20x /c cane 20x Supine  5\" x20         PROM  RA MSB AFB MM MM       ABD AAROM  /c cane 20x  /c cane 20x  /c cane 20x          Wall slides  5\" x 20 5\" x 20           [place] AAROM    Flex/scap  X10 ea Flex 10x  Flex 4x5       ER Iso  5\" x 10 5\" x 10 Flex, ER, Ext,   5\"x10 ea  HEP         Pulleys   3'  3' 4' 5' 5'       Supine ER AA   20x 20x                      Ther Activity                                       Gait Training                                       Modalities                                                "

## 2024-06-28 ENCOUNTER — HOSPITAL ENCOUNTER (OUTPATIENT)
Dept: CT IMAGING | Facility: HOSPITAL | Age: 49
Discharge: HOME | End: 2024-06-28
Attending: INTERNAL MEDICINE
Payer: COMMERCIAL

## 2024-06-28 DIAGNOSIS — E78.5 DYSLIPIDEMIA: ICD-10-CM

## 2024-06-28 DIAGNOSIS — R06.09 DOE (DYSPNEA ON EXERTION): ICD-10-CM

## 2024-06-28 PROCEDURE — 75571 CT HRT W/O DYE W/CA TEST: CPT

## 2024-07-01 ENCOUNTER — OFFICE VISIT (OUTPATIENT)
Dept: URGENT CARE | Age: 49
End: 2024-07-01
Payer: MEDICARE

## 2024-07-01 VITALS
SYSTOLIC BLOOD PRESSURE: 104 MMHG | RESPIRATION RATE: 18 BRPM | HEART RATE: 88 BPM | OXYGEN SATURATION: 98 % | DIASTOLIC BLOOD PRESSURE: 80 MMHG | TEMPERATURE: 97.4 F

## 2024-07-01 DIAGNOSIS — R43.2 LOSS OF TASTE: ICD-10-CM

## 2024-07-01 DIAGNOSIS — U07.1 COVID: Primary | ICD-10-CM

## 2024-07-01 DIAGNOSIS — R05.1 ACUTE COUGH: ICD-10-CM

## 2024-07-01 LAB
SARS-COV-2 AG UPPER RESP QL IA: POSITIVE
VALID CONTROL: ABNORMAL

## 2024-07-01 PROCEDURE — 99213 OFFICE O/P EST LOW 20 MIN: CPT

## 2024-07-01 PROCEDURE — G0463 HOSPITAL OUTPT CLINIC VISIT: HCPCS

## 2024-07-01 PROCEDURE — 87811 SARS-COV-2 COVID19 W/OPTIC: CPT

## 2024-07-01 NOTE — PATIENT INSTRUCTIONS
Please follow up with your family doctor to further discuss Paxlovid and your current medication management.    You need to quarantine at home for a minimum of 5 days; the day your symptoms started is DAY 0! You may end your quarantine when you are fever free without medications to reduce fever (e.g. acetaminophen/Tylenol) for 24 hours or after 5 full days of quarantine, whichever comes later. Anyone whom you have been in close regular contact (unmasked > 15 minute intervals) since you began having symptoms should be tested within 5-6 days of your positive test regardless of vaccination status or symptoms. Masks should be worn at all times whenever indoors until 10 days after your exposure or positive result. Anyone with whom you have been in close contact should mask for 10 days, if they develop symptoms at any point then their clock for masking and quarantine goes back to 0, meaning they need to quarantine 5 days and mask 10 days.     Take over the counter decongestants (Sudafed in adults), antihistamines, or use Flonase nasal spray as needed for sinus congestion.   Take Mucinex DM as needed over the counter for cough in adults. Recommend Children's mucinex for children > 4 years and Zarbee's cough and cold or Elvira drops for children 2-4 years old.   Recommend over the counter lozenges for any sore throat symptoms.  Motrin and/or Tylenol as needed for fever.   Recommend Vitamin C, Vitamin D3, multivitamin and Zinc for immune support. Discuss dosing recommendations with pharmacist especially in children and infants.   If your symptoms worsen or you develop shortness of breath report to the nearest emergency room.

## 2024-07-01 NOTE — PROGRESS NOTES
[facility]        NAME: Adamaris Brian is a 49 y.o. adult  : 1975    MRN: [id number]  DATE: 2024  TIME: 11:28 AM    Assessment and Plan   COVID [U07.1]  1. COVID        2. Acute cough  Poct Covid 19 Rapid Antigen Test      3. Loss of taste  Poct Covid 19 Rapid Antigen Test        In office covid positive.  Discussed Paxlovid with pt, risks/benefits.  Pt on statin and eliquis. Discussed with pt need to follow up with PCP for Paxlovid rx and management of blood thinners.  Pt verbalized understanding, requesting abx for sinuses.   Patient is afebrile and exam was benign except for some bilateral erythematous and edematous nasal turbinates. No sinus tenderness, tonsillar exudate or cervical lymphadenopathy noted. No abx therapy indicated at this time. Counseled patient on supportive therapy with plenty of fluids, tea with honey, throat lozenges. Can use saline nasal irrigation.  . Tylenol as needed for pain.  Patient Instructions       Follow up with PCP in 3-5 days.  Proceed to  ER if symptoms worsen.    If tests have been performed at [facility], our office will contact you with results if changes need to be made to the care plan discussed with you at the visit.  You can review your full results on [facility].    Chief Complaint     Chief Complaint   Patient presents with   • COVID-19     Patient started with symptoms on Saturday that is getting progressively worse. Patient having cough, sneezing, congestion, lack of taste, and dizziness. Patient tested positive this morning for covid.          History of Present Illness       Pt is a 49 year old female presenting with 3 days of cough, headache, congestion, and runny nose.  She tested covid positive today.  Pt requesting Paxolvid. She denies taking Paxlovid previously.  She denies CP. Has SOB when coughing.  She has been using the albuterol inhaler, taking tylenol and sudafed with minimal relief.  She denies fever or  chills.        Review of Systems   Review of Systems   Constitutional:  Negative for chills and fever.   HENT:  Positive for congestion, postnasal drip, rhinorrhea and sore throat.    Respiratory:  Negative for cough, choking, chest tightness, shortness of breath, wheezing and stridor.    Cardiovascular:  Negative for chest pain.   Gastrointestinal:  Negative for abdominal pain, nausea and vomiting.         Current Medications       Current Outpatient Medications:   •  acetaminophen (TYLENOL) 325 mg tablet, Take 1 tablet (325 mg total) by mouth every 6 (six) hours as needed for mild pain, Disp: 30 tablet, Rfl: 0  •  aspirin-acetaminophen-caffeine (EXCEDRIN MIGRAINE) 250-250-65 MG per tablet, Take 2 tablets by mouth if needed for headaches, Disp: , Rfl:   •  Atogepant (Qulipta) 60 MG TABS, Take 60 mg by mouth in the morning, Disp: 30 tablet, Rfl: 11  •  buPROPion (WELLBUTRIN SR) 150 mg 12 hr tablet, Take 1 tablet (150 mg total) by mouth 2 (two) times a day, Disp: 180 tablet, Rfl: 3  •  Cetirizine HCl 10 MG CAPS, Take 10 mg by mouth daily as needed, Disp: , Rfl:   •  ergocalciferol (VITAMIN D2) 50,000 units, Take 50,000 Units by mouth once a week sundays, Disp: , Rfl:   •  famotidine (PEPCID) 20 mg tablet, Take 20 mg by mouth every evening, Disp: , Rfl:   •  fluticasone (FLONASE) 50 mcg/act nasal spray, 1 spray into each nostril daily, Disp: 16 g, Rfl: 0  •  fluticasone (FLOVENT HFA) 110 MCG/ACT inhaler, Inhale 1 puff as needed , Disp: , Rfl:   •  hydroxychloroquine (PLAQUENIL) 200 mg tablet, Take 200 mg by mouth as needed During Summer time, Disp: , Rfl:   •  lasmiditan succinate (Reyvow) 100 mg tablet, TAKE 1 TABLET (100MG) ONE TIME AS NEEDED FOR MIGRAINE.DO NOT USE MORE THAN ONE DOSEPER DAY OR MORE THAN 8 DOSES PER MONTH, Disp: 8 tablet, Rfl: 5  •  levothyroxine 88 mcg tablet, Take 88 mcg by mouth daily , Disp: , Rfl:   •  meclizine (ANTIVERT) 12.5 MG tablet, Take 12.5 mg by mouth as needed, Disp: , Rfl:   •   Multiple Vitamins-Minerals (MULTIVITAMIN ADULT PO), Take 1 tablet by mouth daily, Disp: , Rfl:   •  Nurtec 75 MG TBDP, DISSOLVE 1 TABLET IN MOUTH EVERY OTHER DAY, Disp: 16 tablet, Rfl: 11  •  oxyCODONE (ROXICODONE) 15 mg immediate release tablet, Take 15 mg by mouth every 4 (four) hours as needed, Disp: , Rfl:   •  potassium chloride (Klor-Con) 10 mEq tablet, Take 10 mEq by mouth daily, Disp: , Rfl:   •  PROAIR  (90 Base) MCG/ACT inhaler, Inhale 1-2 puffs as needed , Disp: , Rfl:   •  prochlorperazine (COMPAZINE) 10 mg tablet, Take 1 tablet (10 mg total) by mouth every 6 (six) hours as needed (migraine), Disp: 20 tablet, Rfl: 3  •  propranolol (INDERAL LA) 160 mg, Take 160 mg by mouth 3 (three) times a day, Disp: , Rfl: 0  •  rosuvastatin (CRESTOR) 10 MG tablet, Take 10 mg by mouth daily, Disp: , Rfl:   •  telmisartan-hydrochlorothiazide (MICARDIS HCT) 80-12.5 MG per tablet, Take 1 tablet by mouth daily, Disp: 30 tablet, Rfl: 3  •  valACYclovir (VALTREX) 1,000 mg tablet, 1 tablet as needed , Disp: , Rfl:   •  Xiidra 5 % op solution, instill 1 drop into both eyes twice a day, Disp: , Rfl:   •  apixaban (Eliquis) 5 mg, Take 2 tablets (10 mg total) by mouth 2 (two) times a day for 7 days, THEN 1 tablet (5 mg total) 2 (two) times a day for 23 days., Disp: 74 tablet, Rfl: 0  •  fluticasone (FLONASE) 50 mcg/act nasal spray, 1 spray into each nostril daily (Patient not taking: Reported on 6/14/2024), Disp: 9.9 mL, Rfl: 0  •  oxyCODONE (Roxicodone) 5 immediate release tablet, Take 1 tablet (5 mg total) by mouth every 4 (four) hours as needed for moderate pain for up to 15 doses Max Daily Amount: 30 mg (Patient not taking: Reported on 6/14/2024), Disp: 15 tablet, Rfl: 0  •  rosuvastatin (CRESTOR) 5 mg tablet, Take 5 mg by mouth daily (Patient not taking: Reported on 6/14/2024), Disp: , Rfl:   •  telmisartan (MICARDIS) 80 MG tablet, Take 80 mg by mouth daily (Patient not taking: Reported on 6/14/2024), Disp: , Rfl:      Current Facility-Administered Medications:   •  Botulinum Toxin Type A SOLR 200 Units, 200 Units, Injection, Q3 Months, Kassie Andrade PA-C, 200 Units at 03/18/21 1154    Current Allergies     Allergies as of 07/01/2024 - Reviewed 07/01/2024   Allergen Reaction Noted   • Pregabalin  05/13/2015   • Shellfish-derived products - food allergy Anaphylaxis and Hives 02/05/2018   • Sumatriptan  05/13/2015   • Triptans Shortness Of Breath and Palpitations 10/17/2012   • Latex Swelling and Rash 07/26/2012   • Sulfa antibiotics Swelling and Rash 05/13/2015   • Monistat [miconazole] Swelling 04/06/2018            The following portions of the patient's history were reviewed and updated as appropriate: allergies, current medications, past family history, past medical history, past social history, past surgical history and problem list.     Past Medical History:   Diagnosis Date   • Arthritis    • Asthma    • Bipolar 1 disorder (HCC)    • Chronic narcotic dependence (HCC)    • Cluster headache    • COVID     2021   • CTS (carpal tunnel syndrome)     left hand   • Difficulty walking    • Disease of thyroid gland    • Fibromyalgia    • Fibromyalgia, primary    • Gout    • Headache, tension-type    • History of DVT in adulthood    • Hyperlipidemia    • Hypertension    • Interstitial cystitis    • Lumbar herniated disc    • Lupus (HCC)    • Memory loss    • Migraine    • Mild sleep apnea     no CPAP   • Peripheral neuropathy    • PONV (postoperative nausea and vomiting)    • Vision loss        Past Surgical History:   Procedure Laterality Date   • COLONOSCOPY     • DISTAL CLAVICLE EXCISION Right 4/24/2024    Procedure: DISTAL CLAVICLE EXCISION, acromioplasty;  Surgeon: Luke Gamez MD;  Location:  MAIN OR;  Service: Orthopedics   • GALLBLADDER SURGERY  2008   • HYSTERECTOMY  2014   • LAPAROSCOPIC COLON RESECTION  2001   • [place] NDSC WRST SURG W/RLS TRANSVRS CARPL LIGM Right 12/06/2023    Procedure: Right endoscopic carpal  tunnel release;  Surgeon: Alek Richards MD;  [facility];  Service: Orthopedics   • IN SURGICAL ARTHROSCOPY SHOULDER W/ROTATOR CUFF RPR Right 4/24/2024    Procedure: REPAIR ROTATOR CUFF  ARTHROSCOPIC;  Surgeon: Luke Gamez MD;  [facility];  Service: Orthopedics   • IN TENDON SHEATH INCISION Right 12/06/2023    Procedure: Right ring trigger finger release;  Surgeon: Alek Richards MD;  [facility];  Service: Orthopedics       Family History   Problem Relation Age of Onset   • Lupus Mother    • Osteoporosis Mother    • Hypotension Mother    • Hypertension Father    • Diabetes Father    • Heart disease Father    • Migraines Father    • Stroke Father    • Diabetes Sister    • Schizophrenia Sister    • Hypertension Sister    • Breast cancer Maternal Aunt 40   • No Known Problems Maternal Aunt    • No Known Problems Maternal Aunt    • No Known Problems Maternal Aunt    • No Known Problems Paternal Aunt    • Diabetes Maternal Grandmother    • Rheum arthritis Maternal Grandmother    • Hypertension Maternal Grandmother    • Neuropathy Maternal Grandmother    • Seizures Maternal Grandmother          Medications have been verified.        Objective   /80   Pulse 88   Temp (!) 97.4 °F (36.3 °C)   Resp 18   LMP  (LMP Unknown)   SpO2 98%   No LMP recorded (lmp unknown). Patient has had a hysterectomy.       Physical Exam     Physical Exam  Vitals and nursing note reviewed.   Constitutional:       General: She is not in acute distress.     Appearance: Normal appearance. She is normal weight. She is not ill-appearing.   HENT:      Right Ear: Tympanic membrane normal.      Left Ear: Tympanic membrane normal.      Nose: Congestion and rhinorrhea present.      Mouth/Throat:      Mouth: Mucous membranes are moist.      Pharynx: Posterior oropharyngeal erythema present.   Eyes:      Extraocular Movements: Extraocular movements intact.      Conjunctiva/sclera: Conjunctivae normal.    Cardiovascular:      Rate and Rhythm: Normal rate.      Pulses: Normal pulses.   Pulmonary:      Effort: Pulmonary effort is normal. No respiratory distress.      Breath sounds: Normal breath sounds. No stridor. No wheezing, rhonchi or rales.   Chest:      Chest wall: No tenderness.   Abdominal:      General: Abdomen is flat.   Skin:     General: Skin is warm and dry.   Neurological:      Mental Status: She is alert.

## 2024-07-02 ENCOUNTER — APPOINTMENT (OUTPATIENT)
Dept: PHYSICAL THERAPY | Facility: REHABILITATION | Age: 49
End: 2024-07-02
Payer: MEDICARE

## 2024-07-08 ENCOUNTER — HOSPITAL ENCOUNTER (OUTPATIENT)
Dept: RADIOLOGY | Age: 49
Discharge: HOME | End: 2024-07-08
Payer: MEDICARE

## 2024-07-08 ENCOUNTER — TELEPHONE (OUTPATIENT)
Dept: OBGYN CLINIC | Facility: CLINIC | Age: 49
End: 2024-07-08

## 2024-07-08 VITALS — BODY MASS INDEX: 33.32 KG/M2 | HEIGHT: 65 IN | WEIGHT: 200 LBS

## 2024-07-08 DIAGNOSIS — Z12.31 VISIT FOR SCREENING MAMMOGRAM: ICD-10-CM

## 2024-07-08 PROCEDURE — 77063 BREAST TOMOSYNTHESIS BI: CPT

## 2024-07-08 PROCEDURE — 77067 SCR MAMMO BI INCL CAD: CPT

## 2024-07-08 NOTE — TELEPHONE ENCOUNTER
Called and spoke to patient, informed her of the test results and recommendations. Patient verbalized understanding, no questions or concerns.

## 2024-07-08 NOTE — TELEPHONE ENCOUNTER
----- Message from MICHELLE Girard sent at 7/8/2024  4:33 PM EDT -----  Please inform pt that her mammogram was negative, recommend next screening in 1 year.  Thank You!

## 2024-07-09 ENCOUNTER — OFFICE VISIT (OUTPATIENT)
Dept: PHYSICAL THERAPY | Facility: REHABILITATION | Age: 49
End: 2024-07-09
Payer: MEDICARE

## 2024-07-09 DIAGNOSIS — Z98.890 STATUS POST RIGHT ROTATOR CUFF REPAIR: Primary | ICD-10-CM

## 2024-07-09 PROCEDURE — 97110 THERAPEUTIC EXERCISES: CPT | Performed by: PHYSICAL THERAPIST

## 2024-07-09 NOTE — PROGRESS NOTES
Daily Note     Today's date: 2024  Patient name: Adamaris Brian  : 1975  MRN: [id number]  Referring provider: Davi Carver PA*  Dx:   Encounter Diagnosis     ICD-10-CM    1. Status post right rotator cuff repair  Z98.890                      Subjective: Shoulder is sore, she was sick the last week and has not doing exercise.     Objective: See treatment diary below.      Assessment: Full PROM and near full AROM today. End range pain and during lowering. Would benefit from continued PT.     Plan: Continue per plan of care.      Precautions:  R.RTC repair - see protocol.     1-4 Weeks: -   Sling Immobilization  Active ROM Elbow, Wrist and Hand  True Passive (ONLY) ROM Shoulder. NO ACTIVE MOTION.   Pendulums, Supine Elevation in Scapular plane, External Rotation to tolerance with arm at side. (emphasize ER, minimum goal 40°)  Scapular Stabilization exercises (sidelying)  Deltoid isometrics in neutral (submaximal) as ROM improves  No Pulley/Canes until 6 weeks post-op (these are active motions)     4-6 Weeks: -   Discontinue sling use.  Begin Active Assist ROM and advance to Active as Tolerated  Elevation in scapular plane and external rotation as tolerated  No Internal rotation or behind back until 6wks.  Begin Cuff Isometrics at 6wks with arm at the side     6-12 Weeks: -   Active Assist to Active ROM Shoulder As Tolerated  Elevation in scapular plane and external rotation to tolerance  Begin internal rotation as tolerated  Light stretching at end ranges  Cuff Isometrics with the arm at the side     3-12 Months:  Advance to full ROM as tolerated with passive stretching at end ranges  Advance strengthening as tolerated: isometrics ? bands ? light weights (1-5   lbs); 8-12 reps/2-3 sets per rotator cuff, deltoid, and scapular stabilizers  Limit strengthening 3x/week to avoid rotator cuff tendonitis  Begin eccentrically resisted motions, pylometrics (ex. Weighted ball  "toss),  proprioception (es. body blade)       Manuals 6/5 6/11 6/13 6/18 6/25 6/27 7/9                                                          Neuro Re-Ed                                                                                                        Ther Ex             Flex AAROM Flex HEP supine & recumbent /c cane 20x /c cane 20x Supine  5\" x20         PROM  RA [employer] MM MM MM      ABD AAROM  /c cane 20x  /c cane 20x  /c cane 20x          Wall slides  5\" x 20 5\" x 20           [place] AAROM    Flex/scap  X10 ea Flex 10x  Flex 4x5       ER Iso  5\" x 10 5\" x 10 Flex, ER, Ext,   5\"x10 ea  HEP         Pulleys   3'  3' 4' 5' 5' 5'      Supine ER AA   20x 20x                      Ther Activity                                       Gait Training                                       Modalities                                                "

## 2024-07-10 ENCOUNTER — HOSPITAL ENCOUNTER (OUTPATIENT)
Dept: NON INVASIVE DIAGNOSTICS | Facility: HOSPITAL | Age: 49
Discharge: HOME | End: 2024-07-10
Attending: INTERNAL MEDICINE
Payer: MEDICARE

## 2024-07-10 ENCOUNTER — HOSPITAL ENCOUNTER (OUTPATIENT)
Dept: NUCLEAR MEDICINE | Facility: HOSPITAL | Age: 49
Discharge: HOME | End: 2024-07-10
Attending: INTERNAL MEDICINE
Payer: MEDICARE

## 2024-07-10 VITALS — BODY MASS INDEX: 33.32 KG/M2 | HEIGHT: 65 IN | WEIGHT: 200 LBS

## 2024-07-10 DIAGNOSIS — R06.09 DOE (DYSPNEA ON EXERTION): ICD-10-CM

## 2024-07-10 LAB
ARRHY DURING EX: NORMAL
ARRHY DURING EX: NORMAL
CHEST PAIN STATEMENT: NORMAL
CHEST PAIN STATEMENT: NORMAL
MAX DIASTOLIC BP: 84 MMHG
MAX DIASTOLIC BP: 84 MMHG
MAX PREDICTED HEART RATE: 170 BPM
MAX PREDICTED HEART RATE: 170 BPM
NUC STRESS EJECTION FRACTION: 60 %
PROTOCOL NAME: NORMAL
PROTOCOL NAME: NORMAL
RATE PRESSURE PRODUCT: NORMAL
REASON FOR TERMINATION: NORMAL
REASON FOR TERMINATION: NORMAL
SL CV REST NUCLEAR ISOTOPE DOSE: 10.7 MCI
SL CV STRESS NUCLEAR ISOTOPE DOSE: 33 MCI
SL CV STRESS RECOVERY BP: NORMAL MMHG
SL CV STRESS RECOVERY HR: 76 BPM
SL CV STRESS RECOVERY O2 SAT: 98 %
STRESS ANGINA INDEX: 0
STRESS BASELINE BP: NORMAL MMHG
STRESS BASELINE HR: 73 BPM
STRESS O2 SAT REST: 98 %
STRESS PEAK HR: 105 BPM
STRESS POST EXERCISE DUR MIN: 3 MIN
STRESS POST EXERCISE DUR MIN: 3 MIN
STRESS POST EXERCISE DUR SEC: 0 SEC
STRESS POST EXERCISE DUR SEC: 0 SEC
STRESS POST O2 SAT PEAK: 99 %
STRESS POST PEAK BP: 148 MMHG
STRESS POST PEAK HR: 105 BPM
STRESS POST PEAK HR: 105 BPM
STRESS POST PEAK SYSTOLIC BP: 148 MMHG
STRESS POST PEAK SYSTOLIC BP: 148 MMHG
STRESS/REST PERFUSION RATIO: 1.05
TARGET HR FORMULA: NORMAL
TARGET HR FORMULA: NORMAL
TEST INDICATION: NORMAL
TEST INDICATION: NORMAL

## 2024-07-10 PROCEDURE — 93017 CV STRESS TEST TRACING ONLY: CPT

## 2024-07-10 PROCEDURE — 78452 HT MUSCLE IMAGE SPECT MULT: CPT

## 2024-07-10 PROCEDURE — A9502 TC99M TETROFOSMIN: HCPCS

## 2024-07-10 PROCEDURE — 93016 CV STRESS TEST SUPVJ ONLY: CPT | Performed by: INTERNAL MEDICINE

## 2024-07-10 PROCEDURE — 93018 CV STRESS TEST I&R ONLY: CPT | Performed by: INTERNAL MEDICINE

## 2024-07-10 PROCEDURE — 78452 HT MUSCLE IMAGE SPECT MULT: CPT | Performed by: INTERNAL MEDICINE

## 2024-07-10 RX ORDER — REGADENOSON 0.08 MG/ML
0.4 INJECTION, SOLUTION INTRAVENOUS ONCE
Status: COMPLETED | OUTPATIENT
Start: 2024-07-10 | End: 2024-07-10

## 2024-07-10 RX ADMIN — REGADENOSON 0.4 MG: 0.08 INJECTION, SOLUTION INTRAVENOUS at 13:49

## 2024-07-11 ENCOUNTER — APPOINTMENT (OUTPATIENT)
Dept: PHYSICAL THERAPY | Facility: REHABILITATION | Age: 49
End: 2024-07-11
Payer: MEDICARE

## 2024-07-12 DIAGNOSIS — E78.5 DYSLIPIDEMIA: Primary | ICD-10-CM

## 2024-07-16 ENCOUNTER — OFFICE VISIT (OUTPATIENT)
Dept: PHYSICAL THERAPY | Facility: REHABILITATION | Age: 49
End: 2024-07-16
Payer: MEDICARE

## 2024-07-16 DIAGNOSIS — Z98.890 STATUS POST RIGHT ROTATOR CUFF REPAIR: Primary | ICD-10-CM

## 2024-07-16 PROCEDURE — 97110 THERAPEUTIC EXERCISES: CPT | Performed by: PHYSICAL THERAPIST

## 2024-07-16 NOTE — PROGRESS NOTES
Daily Note     Today's date: 2024  Patient name: Adamaris Brian  : 1975  MRN: [id number]  Referring provider: Davi Carver PA*  Dx:   Encounter Diagnosis     ICD-10-CM    1. Status post right rotator cuff repair  Z98.890                      Subjective: She has continued to work on ROM, sore all over today including the shoulder.     Objective: See treatment diary below.      Assessment: PROM full and maintained w/ end range stiffness. Pain present but did not limit session. She would benefit from continued skilled PT services to reduce pain and increase level of function.     Plan: Continue per plan of care.      Precautions:  R.RTC repair - see protocol.     1-4 Weeks: -   Sling Immobilization  Active ROM Elbow, Wrist and Hand  True Passive (ONLY) ROM Shoulder. NO ACTIVE MOTION.   Pendulums, Supine Elevation in Scapular plane, External Rotation to tolerance with arm at side. (emphasize ER, minimum goal 40°)  Scapular Stabilization exercises (sidelying)  Deltoid isometrics in neutral (submaximal) as ROM improves  No Pulley/Canes until 6 weeks post-op (these are active motions)     4-6 Weeks: -   Discontinue sling use.  Begin Active Assist ROM and advance to Active as Tolerated  Elevation in scapular plane and external rotation as tolerated  No Internal rotation or behind back until 6wks.  Begin Cuff Isometrics at 6wks with arm at the side     6-12 Weeks: -   Active Assist to Active ROM Shoulder As Tolerated  Elevation in scapular plane and external rotation to tolerance  Begin internal rotation as tolerated  Light stretching at end ranges  Cuff Isometrics with the arm at the side     3-12 Months:  Advance to full ROM as tolerated with passive stretching at end ranges  Advance strengthening as tolerated: isometrics ? bands ? light weights (1-5   lbs); 8-12 reps/2-3 sets per rotator cuff, deltoid, and scapular stabilizers  Limit strengthening 3x/week to avoid rotator cuff  "tendonitis  Begin eccentrically resisted motions, pylometrics (ex. Weighted ball toss),  proprioception (es. body blade)       Manuals 6/5 6/11 6/13 6/18 6/25 6/27 7/9 7/16                                                         Neuro Re-Ed                                                                                                        Ther Ex             Flex AAROM Flex HEP supine & recumbent /c cane 20x /c cane 20x Supine  5\" x20         PROM  RA [employer] MM MM MM MM     ABD AAROM  /c cane 20x  /c cane 20x  /c cane 20x          Wall slides  5\" x 20 5\" x 20           [place] AAROM    Flex/scap  X10 ea Flex 10x  Flex 4x5  Flex 4x6     ER Iso  5\" x 10 5\" x 10 Flex, ER, Ext,   5\"x10 ea  HEP    S/L ER 3x8     Pulleys   3'  3' 4' 5' 5' 5' 5'     Supine ER AA   20x 20x                      Ther Activity                                       Gait Training                                       Modalities                                                "

## 2024-07-18 ENCOUNTER — OFFICE VISIT (OUTPATIENT)
Dept: PHYSICAL THERAPY | Facility: REHABILITATION | Age: 49
End: 2024-07-18
Payer: MEDICARE

## 2024-07-18 DIAGNOSIS — Z98.890 STATUS POST RIGHT ROTATOR CUFF REPAIR: Primary | ICD-10-CM

## 2024-07-18 PROCEDURE — 97110 THERAPEUTIC EXERCISES: CPT | Performed by: PHYSICAL THERAPIST

## 2024-07-18 NOTE — PROGRESS NOTES
Daily Note     Today's date: 2024  Patient name: Adamaris Brian  : 1975  MRN: [id number]  Referring provider: Davi Carver PA*  Dx:   Encounter Diagnosis     ICD-10-CM    1. Status post right rotator cuff repair  Z98.890                      Subjective: States that shoulder hurts, although she has been doing more.     Objective: See treatment diary below.      Assessment: Tolerates abd/scaption AROM today w/ minor increase in pain. ROM full. Would benefit from continued PT.     Plan: Continue per plan of care.      Precautions:  R.RTC repair - see protocol.     1-4 Weeks: -   Sling Immobilization  Active ROM Elbow, Wrist and Hand  True Passive (ONLY) ROM Shoulder. NO ACTIVE MOTION.   Pendulums, Supine Elevation in Scapular plane, External Rotation to tolerance with arm at side. (emphasize ER, minimum goal 40°)  Scapular Stabilization exercises (sidelying)  Deltoid isometrics in neutral (submaximal) as ROM improves  No Pulley/Canes until 6 weeks post-op (these are active motions)     4-6 Weeks: -   Discontinue sling use.  Begin Active Assist ROM and advance to Active as Tolerated  Elevation in scapular plane and external rotation as tolerated  No Internal rotation or behind back until 6wks.  Begin Cuff Isometrics at 6wks with arm at the side     6-12 Weeks: -   Active Assist to Active ROM Shoulder As Tolerated  Elevation in scapular plane and external rotation to tolerance  Begin internal rotation as tolerated  Light stretching at end ranges  Cuff Isometrics with the arm at the side     3-12 Months:  Advance to full ROM as tolerated with passive stretching at end ranges  Advance strengthening as tolerated: isometrics ? bands ? light weights (1-5   lbs); 8-12 reps/2-3 sets per rotator cuff, deltoid, and scapular stabilizers  Limit strengthening 3x/week to avoid rotator cuff tendonitis  Begin eccentrically resisted motions, pylometrics (ex. Weighted ball  "toss),  proprioception (es. body blade)       Manuals 6/5 6/11 6/13 6/18 6/25 6/27 7/9 7/16 7/18                                                        Neuro Re-Ed                                                                                                        Ther Ex             Flex AAROM Flex HEP supine & recumbent /c cane 20x /c cane 20x Supine  5\" x20         PROM  RA [employer] MM MM MM MM MM    ABD AAROM  /c cane 20x  /c cane 20x  /c cane 20x          Wall slides  5\" x 20 5\" x 20           [place] AAROM    Flex/scap  X10 ea Flex 10x  Flex 4x5  Flex 4x6 Flex 4x6     Abd AROM         3x6     ER Iso  5\" x 10 5\" x 10 Flex, ER, Ext,   5\"x10 ea  HEP    S/L ER 3x8 S/L ER 3x8    Pulleys   3'  3' 4' 5' 5' 5' 5' 5'    Supine ER AA   20x 20x                      Ther Activity                                       Gait Training                                       Modalities                                                "

## 2024-07-23 ENCOUNTER — OFFICE VISIT (OUTPATIENT)
Dept: PHYSICAL THERAPY | Facility: REHABILITATION | Age: 49
End: 2024-07-23
Payer: MEDICARE

## 2024-07-23 DIAGNOSIS — Z98.890 STATUS POST RIGHT ROTATOR CUFF REPAIR: Primary | ICD-10-CM

## 2024-07-23 PROCEDURE — 97110 THERAPEUTIC EXERCISES: CPT | Performed by: PHYSICAL THERAPIST

## 2024-07-23 NOTE — PROGRESS NOTES
Daily Note     Today's date: 2024  Patient name: Adamaris Brian  : 1975  MRN: [id number]  Referring provider: Davi Carver PA*  Dx:   Encounter Diagnosis     ICD-10-CM    1. Status post right rotator cuff repair  Z98.890                      Subjective: States that shoulder hurts, although she has been doing more.     Objective: See treatment diary below.      Assessment: Due to increased pain today focus on manual and AAROM exercise today. Improved ROM and decreased pain after listed interventions. Would benefit from continued skilled PT services to reduce pain and increase level of function.     Plan: Continue per plan of care.      Precautions:  R.RTC repair - see protocol.     1-4 Weeks: -   Sling Immobilization  Active ROM Elbow, Wrist and Hand  True Passive (ONLY) ROM Shoulder. NO ACTIVE MOTION.   Pendulums, Supine Elevation in Scapular plane, External Rotation to tolerance with arm at side. (emphasize ER, minimum goal 40°)  Scapular Stabilization exercises (sidelying)  Deltoid isometrics in neutral (submaximal) as ROM improves  No Pulley/Canes until 6 weeks post-op (these are active motions)     4-6 Weeks: -   Discontinue sling use.  Begin Active Assist ROM and advance to Active as Tolerated  Elevation in scapular plane and external rotation as tolerated  No Internal rotation or behind back until 6wks.  Begin Cuff Isometrics at 6wks with arm at the side     6-12 Weeks: -   Active Assist to Active ROM Shoulder As Tolerated  Elevation in scapular plane and external rotation to tolerance  Begin internal rotation as tolerated  Light stretching at end ranges  Cuff Isometrics with the arm at the side     3-12 Months:  Advance to full ROM as tolerated with passive stretching at end ranges  Advance strengthening as tolerated: isometrics ? bands ? light weights (1-5   lbs); 8-12 reps/2-3 sets per rotator cuff, deltoid, and scapular stabilizers  Limit strengthening 3x/week  "to avoid rotator cuff tendonitis  Begin eccentrically resisted motions, pylometrics (ex. Weighted ball toss),  proprioception (es. body blade)       Manuals 6/5 6/11 6/13 6/18 6/25 6/27 7/9 7/16 7/18 7/23   Shoulder mobs          MM                                          Neuro Re-Ed                                                                                                        Ther Ex             Flex AAROM Flex HEP supine & recumbent /c cane 20x /c cane 20x Supine  5\" x20         PROM  RA [employer] MM MM MM MM MM MM   ABD AAROM  /c cane 20x  /c cane 20x  /c cane 20x          Wall slides  5\" x 20 5\" x 20           [place] AAROM    Flex/scap  X10 ea Flex 10x  Flex 4x5  Flex 4x6 Flex 4x6     Abd AROM         3x6     ER Iso  5\" x 10 5\" x 10 Flex, ER, Ext,   5\"x10 ea  HEP    S/L ER 3x8 S/L ER 3x8    Pulleys   3'  3' 4' 5' 5' 5' 5' 5' 5'   Supine ER AA   20x 20x                      Ther Activity                                       Gait Training                                       Modalities                                                "

## 2024-07-24 ENCOUNTER — APPOINTMENT (OUTPATIENT)
Dept: LAB | Facility: CLINIC | Age: 49
End: 2024-07-24
Payer: MEDICARE

## 2024-07-24 DIAGNOSIS — R63.5 ABNORMAL WEIGHT GAIN: ICD-10-CM

## 2024-07-24 DIAGNOSIS — E11.9 DIABETES MELLITUS WITHOUT COMPLICATION (HCC): ICD-10-CM

## 2024-07-24 DIAGNOSIS — E78.5 DYSLIPIDEMIA: ICD-10-CM

## 2024-07-24 DIAGNOSIS — E78.5 HYPERLIPIDEMIA, UNSPECIFIED HYPERLIPIDEMIA TYPE: ICD-10-CM

## 2024-07-24 LAB
ALBUMIN SERPL BCG-MCNC: 4.1 G/DL (ref 3.5–5)
ALP SERPL-CCNC: 52 U/L (ref 34–104)
ALT SERPL W P-5'-P-CCNC: 35 U/L (ref 7–52)
ANION GAP SERPL CALCULATED.3IONS-SCNC: 5 MMOL/L (ref 4–13)
AST SERPL W P-5'-P-CCNC: 19 U/L (ref 5–45)
BACTERIA UR QL AUTO: ABNORMAL /HPF
BASOPHILS # BLD AUTO: 0.03 THOUSANDS/ÂΜL (ref 0–0.1)
BASOPHILS NFR BLD AUTO: 1 % (ref 0–1)
BILIRUB SERPL-MCNC: 0.41 MG/DL (ref 0.2–1)
BILIRUB UR QL STRIP: NEGATIVE
BUN SERPL-MCNC: 14 MG/DL (ref 5–25)
CALCIUM SERPL-MCNC: 9.4 MG/DL (ref 8.4–10.2)
CHLORIDE SERPL-SCNC: 105 MMOL/L (ref 96–108)
CHOLEST SERPL-MCNC: 145 MG/DL
CK SERPL-CCNC: 127 U/L (ref 39–192)
CLARITY UR: CLEAR
CO2 SERPL-SCNC: 28 MMOL/L (ref 21–32)
COLOR UR: ABNORMAL
CREAT SERPL-MCNC: 0.75 MG/DL (ref 0.6–1.3)
CRP SERPL QL: 4.9 MG/L
EOSINOPHIL # BLD AUTO: 0.08 THOUSAND/ÂΜL (ref 0–0.61)
EOSINOPHIL NFR BLD AUTO: 2 % (ref 0–6)
ERYTHROCYTE [DISTWIDTH] IN BLOOD BY AUTOMATED COUNT: 13.2 % (ref 11.6–15.1)
EST. AVERAGE GLUCOSE BLD GHB EST-MCNC: 134 MG/DL
GGT SERPL-CCNC: 56 U/L (ref 9–64)
GLUCOSE P FAST SERPL-MCNC: 108 MG/DL (ref 65–99)
GLUCOSE UR STRIP-MCNC: NEGATIVE MG/DL
HBA1C MFR BLD: 6.3 %
HCT VFR BLD AUTO: 35.9 % (ref 36.5–46.1)
HDLC SERPL-MCNC: 51 MG/DL
HGB BLD-MCNC: 11.6 G/DL (ref 12–15.4)
HGB UR QL STRIP.AUTO: ABNORMAL
IMM GRANULOCYTES # BLD AUTO: 0.01 THOUSAND/UL (ref 0–0.2)
IMM GRANULOCYTES NFR BLD AUTO: 0 % (ref 0–2)
KETONES UR STRIP-MCNC: NEGATIVE MG/DL
LDLC SERPL CALC-MCNC: 73 MG/DL (ref 0–100)
LEUKOCYTE ESTERASE UR QL STRIP: NEGATIVE
LYMPHOCYTES # BLD AUTO: 1.61 THOUSANDS/ÂΜL (ref 0.6–4.47)
LYMPHOCYTES NFR BLD AUTO: 36 % (ref 14–44)
MCH RBC QN AUTO: 29 PG (ref 26.8–34.3)
MCHC RBC AUTO-ENTMCNC: 32.3 G/DL (ref 31.4–37.4)
MCV RBC AUTO: 90 FL (ref 82–98)
MONOCYTES # BLD AUTO: 0.3 THOUSAND/ÂΜL (ref 0.17–1.22)
MONOCYTES NFR BLD AUTO: 7 % (ref 4–12)
NEUTROPHILS # BLD AUTO: 2.4 THOUSANDS/ÂΜL (ref 1.85–7.62)
NEUTS SEG NFR BLD AUTO: 54 % (ref 43–75)
NITRITE UR QL STRIP: NEGATIVE
NON-SQ EPI CELLS URNS QL MICRO: ABNORMAL /HPF
NRBC BLD AUTO-RTO: 0 /100 WBCS
PH UR STRIP.AUTO: 5.5 [PH]
PLATELET # BLD AUTO: 263 THOUSANDS/UL (ref 149–390)
PMV BLD AUTO: 10 FL (ref 8.9–12.7)
POTASSIUM SERPL-SCNC: 4.2 MMOL/L (ref 3.5–5.3)
PROT SERPL-MCNC: 7.5 G/DL (ref 6.4–8.4)
PROT UR STRIP-MCNC: NEGATIVE MG/DL
RBC # BLD AUTO: 4 MILLION/UL (ref 3.88–5.12)
RBC #/AREA URNS AUTO: ABNORMAL /HPF
SODIUM SERPL-SCNC: 138 MMOL/L (ref 135–147)
SP GR UR STRIP.AUTO: 1.03 (ref 1–1.03)
T3FREE SERPL-MCNC: 3.14 PG/ML (ref 2.5–3.9)
T4 FREE SERPL-MCNC: 0.82 NG/DL (ref 0.61–1.12)
TRIGL SERPL-MCNC: 105 MG/DL
TSH SERPL DL<=0.05 MIU/L-ACNC: 1.83 UIU/ML (ref 0.45–4.5)
UROBILINOGEN UR STRIP-ACNC: <2 MG/DL
WBC # BLD AUTO: 4.43 THOUSAND/UL (ref 4.31–10.16)
WBC #/AREA URNS AUTO: ABNORMAL /HPF

## 2024-07-24 PROCEDURE — 80061 LIPID PANEL: CPT

## 2024-07-24 PROCEDURE — 81001 URINALYSIS AUTO W/SCOPE: CPT

## 2024-07-24 PROCEDURE — 84443 ASSAY THYROID STIM HORMONE: CPT

## 2024-07-24 PROCEDURE — 84439 ASSAY OF FREE THYROXINE: CPT

## 2024-07-24 PROCEDURE — 86140 C-REACTIVE PROTEIN: CPT

## 2024-07-24 PROCEDURE — 36415 COLL VENOUS BLD VENIPUNCTURE: CPT

## 2024-07-24 PROCEDURE — 82550 ASSAY OF CK (CPK): CPT

## 2024-07-24 PROCEDURE — 83036 HEMOGLOBIN GLYCOSYLATED A1C: CPT

## 2024-07-24 PROCEDURE — 82977 ASSAY OF GGT: CPT

## 2024-07-24 PROCEDURE — 85025 COMPLETE CBC W/AUTO DIFF WBC: CPT

## 2024-07-24 PROCEDURE — 87086 URINE CULTURE/COLONY COUNT: CPT

## 2024-07-24 PROCEDURE — 80053 COMPREHEN METABOLIC PANEL: CPT

## 2024-07-24 PROCEDURE — 84481 FREE ASSAY (FT-3): CPT

## 2024-07-25 ENCOUNTER — PROCEDURE VISIT (OUTPATIENT)
Dept: NEUROLOGY | Facility: CLINIC | Age: 49
End: 2024-07-25
Payer: MEDICARE

## 2024-07-25 VITALS — HEART RATE: 76 BPM | TEMPERATURE: 98.3 F | DIASTOLIC BLOOD PRESSURE: 78 MMHG | SYSTOLIC BLOOD PRESSURE: 120 MMHG

## 2024-07-25 DIAGNOSIS — G43.709 CHRONIC MIGRAINE WITHOUT AURA WITHOUT STATUS MIGRAINOSUS, NOT INTRACTABLE: Primary | ICD-10-CM

## 2024-07-25 LAB — BACTERIA UR CULT: NORMAL

## 2024-07-25 PROCEDURE — 64615 CHEMODENERV MUSC MIGRAINE: CPT | Performed by: PHYSICIAN ASSISTANT

## 2024-07-25 NOTE — PROGRESS NOTES
"Universal Protocol   Consent: Verbal consent obtained. Written consent obtained.  Risks and benefits: risks, benefits and alternatives were discussed  Consent given by: patient  Time out: Immediately prior to procedure a \"time out\" was called to verify the correct patient, procedure, equipment, support staff and site/side marked as required.  Patient understanding: patient states understanding of the procedure being performed  Patient consent: the patient's understanding of the procedure matches consent given  Procedure consent: procedure consent matches procedure scheduled  Relevant documents: relevant documents present and verified  Patient identity confirmed: verbally with patient      Chemodenervation     Date/Time  7/25/2024 9:00 AM     Performed by  Kassie Andrade PA-C   Authorized by  Kassie Andrade PA-C     Pre-procedure details      Prepped With: Alcohol     Anesthesia  (see MAR for exact dosages):     Anesthesia method:  None   Procedure details      Position:  Upright   Botox      Botox Type:  Type A    Brand:  Botox    mL's of Botulinum Toxin:  200    Final Concentration per CC:  50 units    Needle Gauge:  30 G 2.5 inch   Procedures      Botox Procedures: chronic headache      Indications: migraines     Injection Location      Head / Face:  L superior cervical paraspinal, R superior cervical paraspinal, L , R , L frontalis, R frontalis, L medial occipitalis, R medial occipitalis, procerus, R temporalis, L temporalis, R superior trapezius and L superior trapezius    L  injection amount:  5 unit(s)    R  injection amount:  5 unit(s)    L lateral frontalis:  5 unit(s)    R lateral frontalis:  5 unit(s)    L medial frontalis:  5 unit(s)    R medial frontalis:  5 unit(s)    L temporalis injection amount:  20 unit(s)    R temporalis injection amount:  20 unit(s)    Procerus injection amount:  5 unit(s)    L medial occipitalis injection amount:  15 unit(s)    R medial " occipitalis injection amount:  15 unit(s)    L superior cervical paraspinal injection amount:  10 unit(s)    R superior cervical paraspinal injection amount:  10 unit(s)    L superior trapezius injection amount:  15 unit(s)    R superior trapezius injection amount:  15 unit(s)   Total Units      Total units used:  200    Total units discarded:  0   Post-procedure details      Chemodenervation:  Chronic migraine    Facial Nerve Location::  Bilateral facial nerve    Patient tolerance of procedure:  Tolerated well, no immediate complications   Comments        5 units naris bilaterally  20 units frontalis   15 units occipitalis  All medically necessary

## 2024-07-30 ENCOUNTER — OFFICE VISIT (OUTPATIENT)
Dept: PHYSICAL THERAPY | Facility: REHABILITATION | Age: 49
End: 2024-07-30
Payer: MEDICARE

## 2024-07-30 DIAGNOSIS — Z98.890 STATUS POST RIGHT ROTATOR CUFF REPAIR: Primary | ICD-10-CM

## 2024-07-30 PROCEDURE — 97110 THERAPEUTIC EXERCISES: CPT | Performed by: PHYSICAL THERAPIST

## 2024-07-30 NOTE — PROGRESS NOTES
Daily Note     Today's date: 2024  Patient name: Adamaris Brian  : 1975  MRN: [id number]  Referring provider: Davi Carver PA*  Dx:   Encounter Diagnosis     ICD-10-CM    1. Status post right rotator cuff repair  Z98.890                      Subjective: She has continued pain in the right shoulder.     Objective: See treatment diary below.      Assessment: Able to complete increased interventions today compared to previous session. Initiated mild shoulder strengthening per protocol w/o subjective report of increased pain. Would benefit from continued skilled PT services to reduce pain and increase level of function.     Plan: Continue per plan of care.      Precautions:  R.RTC repair - see protocol.     1-4 Weeks: -   Sling Immobilization  Active ROM Elbow, Wrist and Hand  True Passive (ONLY) ROM Shoulder. NO ACTIVE MOTION.   Pendulums, Supine Elevation in Scapular plane, External Rotation to tolerance with arm at side. (emphasize ER, minimum goal 40°)  Scapular Stabilization exercises (sidelying)  Deltoid isometrics in neutral (submaximal) as ROM improves  No Pulley/Canes until 6 weeks post-op (these are active motions)     4-6 Weeks: -   Discontinue sling use.  Begin Active Assist ROM and advance to Active as Tolerated  Elevation in scapular plane and external rotation as tolerated  No Internal rotation or behind back until 6wks.  Begin Cuff Isometrics at 6wks with arm at the side     6-12 Weeks: -   Active Assist to Active ROM Shoulder As Tolerated  Elevation in scapular plane and external rotation to tolerance  Begin internal rotation as tolerated  Light stretching at end ranges  Cuff Isometrics with the arm at the side     3-12 Months:  Advance to full ROM as tolerated with passive stretching at end ranges  Advance strengthening as tolerated: isometrics ? bands ? light weights (1-5   lbs); 8-12 reps/2-3 sets per rotator cuff, deltoid, and scapular stabilizers  Limit  "strengthening 3x/week to avoid rotator cuff tendonitis  Begin eccentrically resisted motions, pylometrics (ex. Weighted ball toss),  proprioception (es. body blade)       Manuals 7/29 6/11 6/13 6/18 6/25 6/27 7/9 7/16 7/18 7/23   Shoulder mobs MM         MM                                          Neuro Re-Ed                                                                                                        Ther Ex 7/29            Flex AAROM  /c cane 20x /c cane 20x Supine  5\" x20         PROM MM RA [employer] MM MM MM MM MM MM   ABD AAROM  /c cane 20x  /c cane 20x  /c cane 20x          Wall slides  5\" x 20 5\" x 20           [place] AAROM Flex 23x    Flex/scap  X10 ea Flex 10x  Flex 4x5  Flex 4x6 Flex 4x6     Abd AROM         3x6     ER Iso S/L ER 3x10 5\" x 10 5\" x 10 Flex, ER, Ext,   5\"x10 ea  HEP    S/L ER 3x8 S/L ER 3x8    Pulleys  5' 3'  3' 4' 5' 5' 5' 5' 5' 5'   Supine ER AA   20x 20x         ER step out 10x10\"            Ther Activity                                       Gait Training                                       Modalities                                                "

## 2024-08-01 ENCOUNTER — OFFICE VISIT (OUTPATIENT)
Dept: PHYSICAL THERAPY | Facility: REHABILITATION | Age: 49
End: 2024-08-01
Payer: MEDICARE

## 2024-08-01 DIAGNOSIS — Z98.890 STATUS POST RIGHT ROTATOR CUFF REPAIR: Primary | ICD-10-CM

## 2024-08-01 PROCEDURE — 97110 THERAPEUTIC EXERCISES: CPT | Performed by: PHYSICAL THERAPIST

## 2024-08-01 PROCEDURE — 97140 MANUAL THERAPY 1/> REGIONS: CPT | Performed by: PHYSICAL THERAPIST

## 2024-08-01 NOTE — PROGRESS NOTES
Daily Note     Today's date: 2024  Patient name: Adamaris Brian  : 1975  MRN: [id number]  Referring provider: Davi Carver PA*  Dx:   Encounter Diagnosis     ICD-10-CM    1. Status post right rotator cuff repair  Z98.890                      Subjective: She has the same amount of pain on the outside of the shoulder.     Objective: See treatment diary below.      Assessment: Completed exercise with correct technique and with reports of pain at end range shoulder flexion. Demonstrated moderate fatigue after exercise. Pt would benefit from continued PT services to reduce pain and increase level of function.     Plan: Continue per plan of care.      Precautions:  R.RTC repair - see protocol.     1-4 Weeks: -   Sling Immobilization  Active ROM Elbow, Wrist and Hand  True Passive (ONLY) ROM Shoulder. NO ACTIVE MOTION.   Pendulums, Supine Elevation in Scapular plane, External Rotation to tolerance with arm at side. (emphasize ER, minimum goal 40°)  Scapular Stabilization exercises (sidelying)  Deltoid isometrics in neutral (submaximal) as ROM improves  No Pulley/Canes until 6 weeks post-op (these are active motions)     4-6 Weeks: -   Discontinue sling use.  Begin Active Assist ROM and advance to Active as Tolerated  Elevation in scapular plane and external rotation as tolerated  No Internal rotation or behind back until 6wks.  Begin Cuff Isometrics at 6wks with arm at the side     6-12 Weeks: -   Active Assist to Active ROM Shoulder As Tolerated  Elevation in scapular plane and external rotation to tolerance  Begin internal rotation as tolerated  Light stretching at end ranges  Cuff Isometrics with the arm at the side     3-12 Months:  Advance to full ROM as tolerated with passive stretching at end ranges  Advance strengthening as tolerated: isometrics ? bands ? light weights (1-5   lbs); 8-12 reps/2-3 sets per rotator cuff, deltoid, and scapular stabilizers  Limit  "strengthening 3x/week to avoid rotator cuff tendonitis  Begin eccentrically resisted motions, pylometrics (ex. Weighted ball toss),  proprioception (es. body blade)       Manuals 7/29 8/1 6/13 6/18 6/25 6/27 7/9 7/16 7/18 7/23   Shoulder mobs MM MM        MM                                          Neuro Re-Ed                                                                                                        Ther Ex 7/29 8/1           Flex AAROM   /c cane 20x Supine  5\" x20         PROM MM MM [employer] MM MM MM MM MM MM   ABD AAROM   /c cane 20x  /c cane 20x          Wall slides   5\" x 20           [place] AAROM Flex 23x    Flex/scap  X10 ea Flex 10x  Flex 4x5  Flex 4x6 Flex 4x6     Abd AROM         3x6     ER Iso S/L ER 3x10 S/L ER 3x8 1#   5\" x 10 Flex, ER, Ext,   5\"x10 ea  HEP    S/L ER 3x8 S/L ER 3x8    Pulleys  5' 5' 3' 4' 5' 5' 5' 5' 5' 5'   Supine ER AA   20x 20x         ER step out 10x10\"            Ther Activity                                       Gait Training                                       Modalities                                                "

## 2024-08-06 ENCOUNTER — OFFICE VISIT (OUTPATIENT)
Dept: PHYSICAL THERAPY | Facility: REHABILITATION | Age: 49
End: 2024-08-06
Payer: MEDICARE

## 2024-08-06 DIAGNOSIS — Z98.890 STATUS POST RIGHT ROTATOR CUFF REPAIR: Primary | ICD-10-CM

## 2024-08-06 PROCEDURE — 97110 THERAPEUTIC EXERCISES: CPT | Performed by: PHYSICAL THERAPIST

## 2024-08-06 PROCEDURE — 97140 MANUAL THERAPY 1/> REGIONS: CPT | Performed by: PHYSICAL THERAPIST

## 2024-08-06 NOTE — PROGRESS NOTES
Daily Note     Today's date: 2024  Patient name: Adamaris Brian  : 1975  MRN: [id number]  Referring provider: Davi Carver PA*  Dx:   Encounter Diagnosis     ICD-10-CM    1. Status post right rotator cuff repair  Z98.890                      Subjective: She feels she has increased pain today. She now has trouble reaching behind her back.     Objective: See treatment diary below.      Assessment: Minor increase in symptoms w/ exercise today but able to complete. Fatigued w/ listed interventions. Min VC for ER step out to maintain proper technique. Would benefit from continued skilled PT services to reduce pain and increase level of function.     Plan: Continue per plan of care.      Precautions:  R.RTC repair - see protocol.     1-4 Weeks: -   Sling Immobilization  Active ROM Elbow, Wrist and Hand  True Passive (ONLY) ROM Shoulder. NO ACTIVE MOTION.   Pendulums, Supine Elevation in Scapular plane, External Rotation to tolerance with arm at side. (emphasize ER, minimum goal 40°)  Scapular Stabilization exercises (sidelying)  Deltoid isometrics in neutral (submaximal) as ROM improves  No Pulley/Canes until 6 weeks post-op (these are active motions)     4-6 Weeks: -   Discontinue sling use.  Begin Active Assist ROM and advance to Active as Tolerated  Elevation in scapular plane and external rotation as tolerated  No Internal rotation or behind back until 6wks.  Begin Cuff Isometrics at 6wks with arm at the side     6-12 Weeks: -   Active Assist to Active ROM Shoulder As Tolerated  Elevation in scapular plane and external rotation to tolerance  Begin internal rotation as tolerated  Light stretching at end ranges  Cuff Isometrics with the arm at the side     3-12 Months:  Advance to full ROM as tolerated with passive stretching at end ranges  Advance strengthening as tolerated: isometrics ? bands ? light weights (1-5   lbs); 8-12 reps/2-3 sets per rotator cuff, deltoid, and  "scapular stabilizers  Limit strengthening 3x/week to avoid rotator cuff tendonitis  Begin eccentrically resisted motions, pylometrics (ex. Weighted ball toss),  proprioception (es. body blade)       Manuals 7/29 8/1 8/6 6/18 6/25 6/27 7/9 7/16 7/18 7/23   Shoulder mobs MM MM MM       MM                                          Neuro Re-Ed                                                                                                        Ther Ex 7/29 8/1 8/6          Flex AAROM    Supine  5\" x20         PROM MM MM MM AFB MM MM MM MM MM MM   ABD AAROM    /c cane 20x          Wall slides              [place] AAROM Flex 23x    Flex/scap  X10 ea Flex 10x  Flex 4x5  Flex 4x6 Flex 4x6     IR BTB   10x          Abd AROM         3x6     ER Iso S/L ER 3x10 S/L ER 3x8 1#   S/L ER 3x8 1# Flex, ER, Ext,   5\"x10 ea  HEP    S/L ER 3x8 S/L ER 3x8    Pulleys  5' 5' 5' 4' 5' 5' 5' 5' 5' 5'   Supine ER AA    20x         ER step out 10x10\"  10x5\"           Ther Activity                                       Gait Training                                       Modalities                                                "

## 2024-08-08 ENCOUNTER — APPOINTMENT (OUTPATIENT)
Dept: PHYSICAL THERAPY | Facility: REHABILITATION | Age: 49
End: 2024-08-08
Payer: MEDICARE

## 2024-08-13 ENCOUNTER — APPOINTMENT (OUTPATIENT)
Dept: PHYSICAL THERAPY | Facility: REHABILITATION | Age: 49
End: 2024-08-13
Payer: MEDICARE

## 2024-08-15 ENCOUNTER — OFFICE VISIT (OUTPATIENT)
Dept: PHYSICAL THERAPY | Facility: REHABILITATION | Age: 49
End: 2024-08-15
Payer: MEDICARE

## 2024-08-15 DIAGNOSIS — Z98.890 STATUS POST RIGHT ROTATOR CUFF REPAIR: Primary | ICD-10-CM

## 2024-08-15 PROCEDURE — 97140 MANUAL THERAPY 1/> REGIONS: CPT | Performed by: PHYSICAL THERAPIST

## 2024-08-15 PROCEDURE — 97110 THERAPEUTIC EXERCISES: CPT | Performed by: PHYSICAL THERAPIST

## 2024-08-15 NOTE — PROGRESS NOTES
Daily Note     Today's date: 8/15/2024  Patient name: Adamaris Brian  : 1975  MRN: [id number]  Referring provider: Davi Carver PA*  Dx:   Encounter Diagnosis     ICD-10-CM    1. Status post right rotator cuff repair  Z98.890                      Subjective: Her shoulder has been bothering her. Sometimes a sharp pain suddenly when reaching.     Objective: See treatment diary below.      Assessment: Minor end range ER pain present. Able to complete resisted active ER w/ TB today. She would benefit from continued skilled PT services to reduce pain and increase level of function.     Plan: Continue per plan of care.      Precautions:  R.RTC repair - see protocol.     1-4 Weeks: -   Sling Immobilization  Active ROM Elbow, Wrist and Hand  True Passive (ONLY) ROM Shoulder. NO ACTIVE MOTION.   Pendulums, Supine Elevation in Scapular plane, External Rotation to tolerance with arm at side. (emphasize ER, minimum goal 40°)  Scapular Stabilization exercises (sidelying)  Deltoid isometrics in neutral (submaximal) as ROM improves  No Pulley/Canes until 6 weeks post-op (these are active motions)     4-6 Weeks: -   Discontinue sling use.  Begin Active Assist ROM and advance to Active as Tolerated  Elevation in scapular plane and external rotation as tolerated  No Internal rotation or behind back until 6wks.  Begin Cuff Isometrics at 6wks with arm at the side     6-12 Weeks: -   Active Assist to Active ROM Shoulder As Tolerated  Elevation in scapular plane and external rotation to tolerance  Begin internal rotation as tolerated  Light stretching at end ranges  Cuff Isometrics with the arm at the side     3-12 Months:  Advance to full ROM as tolerated with passive stretching at end ranges  Advance strengthening as tolerated: isometrics ? bands ? light weights (1-5   lbs); 8-12 reps/2-3 sets per rotator cuff, deltoid, and scapular stabilizers  Limit strengthening 3x/week to avoid rotator cuff  "tendonitis  Begin eccentrically resisted motions, pylometrics (ex. Weighted ball toss),  proprioception (es. body blade)       Manuals 7/29 8/1 8/6 8/15 6/25 6/27 7/9 7/16 7/18 7/23   Shoulder mobs MM MM MM MM      MM                                          Neuro Re-Ed                                                                                                        Ther Ex 7/29 8/1 8/6 8/15         Flex AAROM             PROM MM MM MM MM MM MM MM MM MM MM   ABD AAROM             Wall slides              [place] AAROM Flex 23x     Flex 10x  Flex 4x5  Flex 4x6 Flex 4x6     IR BTB   10x          Abd AROM         3x6     ER Iso S/L ER 3x10 S/L ER 3x8 1#   S/L ER 3x8 1# S/L ER 3x8 1#    S/L ER 3x8 S/L ER 3x8    Pulleys  5' 5' 5' 5' 5' 5' 5' 5' 5' 5'   Supine ER AA             ER step out 10x10\"  10x5\"  AROM OTB 3x5         Ther Activity                                       Gait Training                                       Modalities                                                "

## 2024-08-20 ENCOUNTER — OFFICE VISIT (OUTPATIENT)
Dept: PHYSICAL THERAPY | Facility: REHABILITATION | Age: 49
End: 2024-08-20
Payer: MEDICARE

## 2024-08-20 DIAGNOSIS — Z98.890 STATUS POST RIGHT ROTATOR CUFF REPAIR: Primary | ICD-10-CM

## 2024-08-20 PROCEDURE — 97110 THERAPEUTIC EXERCISES: CPT | Performed by: PHYSICAL THERAPIST

## 2024-08-20 NOTE — PROGRESS NOTES
Daily Note     Today's date: 2024  Patient name: Adamaris Brian  : 1975  MRN: [id number]  Referring provider: Davi Carver PA*  Dx:   Encounter Diagnosis     ICD-10-CM    1. Status post right rotator cuff repair  Z98.890                      Subjective: Nothing new with the shoulder.     Objective: See treatment diary below    Assessment: Increase in shoulder pain w/ KB press and fatigued .Would benefit from continued PT.     Plan: Continue per plan of care.      Precautions:  R.RTC repair - see protocol.     1-4 Weeks: -   Sling Immobilization  Active ROM Elbow, Wrist and Hand  True Passive (ONLY) ROM Shoulder. NO ACTIVE MOTION.   Pendulums, Supine Elevation in Scapular plane, External Rotation to tolerance with arm at side. (emphasize ER, minimum goal 40°)  Scapular Stabilization exercises (sidelying)  Deltoid isometrics in neutral (submaximal) as ROM improves  No Pulley/Canes until 6 weeks post-op (these are active motions)     4-6 Weeks: -   Discontinue sling use.  Begin Active Assist ROM and advance to Active as Tolerated  Elevation in scapular plane and external rotation as tolerated  No Internal rotation or behind back until 6wks.  Begin Cuff Isometrics at 6wks with arm at the side     6-12 Weeks: -   Active Assist to Active ROM Shoulder As Tolerated  Elevation in scapular plane and external rotation to tolerance  Begin internal rotation as tolerated  Light stretching at end ranges  Cuff Isometrics with the arm at the side     3-12 Months:  Advance to full ROM as tolerated with passive stretching at end ranges  Advance strengthening as tolerated: isometrics ? bands ? light weights (1-5   lbs); 8-12 reps/2-3 sets per rotator cuff, deltoid, and scapular stabilizers  Limit strengthening 3x/week to avoid rotator cuff tendonitis  Begin eccentrically resisted motions, pylometrics (ex. Weighted ball toss),  proprioception (es. body blade)       Manuals 7/29 8/1 8/6 8/15  "8/20 6/27 7/9 7/16 7/18 7/23   Shoulder mobs MM MM MM MM      MM                                          Neuro Re-Ed             Inverse KB press     3# KB 3x4                                                                                      Ther Ex 7/29 8/1 8/6 8/15 8/20        Flex AAROM             PROM MM MM MM MM  MM MM MM MM MM   ABD AAROM             Wall slides              [place] AAROM Flex 23x      Flex 4x5  Flex 4x6 Flex 4x6     IR BTB   10x          Abd AROM         3x6     ER Iso S/L ER 3x10 S/L ER 3x8 1#   S/L ER 3x8 1# S/L ER 3x8 1# S/L ER 3x8 1#   S/L ER 3x8 S/L ER 3x8    Pulleys  5' 5' 5' 5'  5' 5' 5' 5' 5'   Supine ER AA             ER step out 10x10\"  10x5\"  AROM OTB 3x5 AROM 3x8 OTB        UBE     3'/3'        Ther Activity                                       Gait Training                                       Modalities                                                  "

## 2024-08-22 ENCOUNTER — APPOINTMENT (OUTPATIENT)
Dept: PHYSICAL THERAPY | Facility: REHABILITATION | Age: 49
End: 2024-08-22
Payer: MEDICARE

## 2024-08-27 ENCOUNTER — APPOINTMENT (OUTPATIENT)
Dept: PHYSICAL THERAPY | Facility: REHABILITATION | Age: 49
End: 2024-08-27
Payer: MEDICARE

## 2024-08-29 ENCOUNTER — OFFICE VISIT (OUTPATIENT)
Dept: PHYSICAL THERAPY | Facility: REHABILITATION | Age: 49
End: 2024-08-29
Payer: MEDICARE

## 2024-08-29 DIAGNOSIS — Z98.890 STATUS POST RIGHT ROTATOR CUFF REPAIR: Primary | ICD-10-CM

## 2024-08-29 PROCEDURE — 97112 NEUROMUSCULAR REEDUCATION: CPT | Performed by: PHYSICAL THERAPIST

## 2024-08-29 PROCEDURE — 97110 THERAPEUTIC EXERCISES: CPT | Performed by: PHYSICAL THERAPIST

## 2024-08-29 NOTE — PROGRESS NOTES
Daily Note     Today's date: 2024  Patient name: Adamaris Brian  : 1975  MRN: [id number]  Referring provider: Davi Carver PA*  Dx:   Encounter Diagnosis     ICD-10-CM    1. Status post right rotator cuff repair  Z98.890                      Subjective: She is back to work and using the shoulder a lot.     Objective: See treatment diary below    Assessment: Soreness and minor pain w/ listed interventions but is able to tolerate increased resistance and amount of exercise. She would benefit from continued PT to reduce pain and increase level of function.     Plan: Continue per plan of care.      Precautions:  R.RTC repair - see protocol.     1-4 Weeks: -   Sling Immobilization  Active ROM Elbow, Wrist and Hand  True Passive (ONLY) ROM Shoulder. NO ACTIVE MOTION.   Pendulums, Supine Elevation in Scapular plane, External Rotation to tolerance with arm at side. (emphasize ER, minimum goal 40°)  Scapular Stabilization exercises (sidelying)  Deltoid isometrics in neutral (submaximal) as ROM improves  No Pulley/Canes until 6 weeks post-op (these are active motions)     4-6 Weeks: -   Discontinue sling use.  Begin Active Assist ROM and advance to Active as Tolerated  Elevation in scapular plane and external rotation as tolerated  No Internal rotation or behind back until 6wks.  Begin Cuff Isometrics at 6wks with arm at the side     6-12 Weeks: -   Active Assist to Active ROM Shoulder As Tolerated  Elevation in scapular plane and external rotation to tolerance  Begin internal rotation as tolerated  Light stretching at end ranges  Cuff Isometrics with the arm at the side     3-12 Months:  Advance to full ROM as tolerated with passive stretching at end ranges  Advance strengthening as tolerated: isometrics ? bands ? light weights (1-5   lbs); 8-12 reps/2-3 sets per rotator cuff, deltoid, and scapular stabilizers  Limit strengthening 3x/week to avoid rotator cuff tendonitis  Begin  "eccentrically resisted motions, pylometrics (ex. Weighted ball toss),  proprioception (es. body blade)       Manuals 7/29 8/1 8/6 8/15 8/20 8/29 7/9 7/16 7/18 7/23   Shoulder mobs MM MM MM MM      MM                                          Neuro Re-Ed             Inverse KB press     3# KB 3x4 4# KB 3x5                                                                                     Ther Ex 7/29 8/1 8/6 8/15 8/20 8/29       Flex AAROM             PROM MM MM MM MM  MM MM MM MM MM   ABD AAROM             Wall slides              [place] AAROM Flex 23x      Flex 4x5  Flex 4x6 Flex 4x6     IR BTB   10x          Abd AROM         3x6     ER Iso S/L ER 3x10 S/L ER 3x8 1#   S/L ER 3x8 1# S/L ER 3x8 1# S/L ER 3x8 1# S/L ER 3x8 1#  S/L ER 3x8 S/L ER 3x8    Pulleys  5' 5' 5' 5'   5' 5' 5' 5'   Supine ER AA             ER step out 10x10\"  10x5\"  AROM OTB 3x5 AROM 3x8 OTB OTB 3x8       Triceps pushdown      OTB 3x8       UBE     3'/3' 3'/3'       Ther Activity                                       Gait Training                                       Modalities                                                  "

## 2024-09-03 ENCOUNTER — OFFICE VISIT (OUTPATIENT)
Dept: PHYSICAL THERAPY | Facility: REHABILITATION | Age: 49
End: 2024-09-03
Payer: MEDICARE

## 2024-09-03 DIAGNOSIS — Z98.890 STATUS POST RIGHT ROTATOR CUFF REPAIR: Primary | ICD-10-CM

## 2024-09-03 PROCEDURE — 97110 THERAPEUTIC EXERCISES: CPT | Performed by: PHYSICAL THERAPIST

## 2024-09-03 PROCEDURE — 97112 NEUROMUSCULAR REEDUCATION: CPT | Performed by: PHYSICAL THERAPIST

## 2024-09-03 NOTE — PROGRESS NOTES
Daily Note     Today's date: 9/3/2024  Patient name: Adamaris Brian  : 1975  MRN: [id number]  Referring provider: Davi Carver PA*  Dx:   Encounter Diagnosis     ICD-10-CM    1. Status post right rotator cuff repair  Z98.890                      Subjective: Shoulder feeling okay.     Objective: See treatment diary below    Assessment: Increase in pain w/ listed interventions but no worse after session. Similar tolerance to exercise as previous sessions. Would benefit from continued PT to reduce pain and increase level of function.     Plan: Continue per plan of care.      Precautions:  R.RTC repair - see protocol.     1-4 Weeks: -   Sling Immobilization  Active ROM Elbow, Wrist and Hand  True Passive (ONLY) ROM Shoulder. NO ACTIVE MOTION.   Pendulums, Supine Elevation in Scapular plane, External Rotation to tolerance with arm at side. (emphasize ER, minimum goal 40°)  Scapular Stabilization exercises (sidelying)  Deltoid isometrics in neutral (submaximal) as ROM improves  No Pulley/Canes until 6 weeks post-op (these are active motions)     4-6 Weeks: -   Discontinue sling use.  Begin Active Assist ROM and advance to Active as Tolerated  Elevation in scapular plane and external rotation as tolerated  No Internal rotation or behind back until 6wks.  Begin Cuff Isometrics at 6wks with arm at the side     6-12 Weeks: -   Active Assist to Active ROM Shoulder As Tolerated  Elevation in scapular plane and external rotation to tolerance  Begin internal rotation as tolerated  Light stretching at end ranges  Cuff Isometrics with the arm at the side     3-12 Months:  Advance to full ROM as tolerated with passive stretching at end ranges  Advance strengthening as tolerated: isometrics ? bands ? light weights (1-5   lbs); 8-12 reps/2-3 sets per rotator cuff, deltoid, and scapular stabilizers  Limit strengthening 3x/week to avoid rotator cuff tendonitis  Begin eccentrically resisted  "motions, pylometrics (ex. Weighted ball toss),  proprioception (es. body blade)       Manuals 7/29 8/1 8/6 8/15 8/20 8/29 7/9 7/16 7/18 7/23   Shoulder mobs MM MM MM MM      MM                                          Neuro Re-Ed             Inverse KB press     3# KB 3x4 4# KB 3x5                                                                                     Ther Ex 7/29 8/1 8/6 8/15 8/20 8/29       Flex AAROM             PROM MM MM MM MM  MM MM MM MM MM   ABD AAROM             Wall slides              [place] AAROM Flex 23x      Flex 4x5  Flex 4x6 Flex 4x6     IR BTB   10x          Abd AROM         3x6     ER Iso S/L ER 3x10 S/L ER 3x8 1#   S/L ER 3x8 1# S/L ER 3x8 1# S/L ER 3x8 1# S/L ER 3x8 1#  S/L ER 3x8 S/L ER 3x8    Pulleys  5' 5' 5' 5'   5' 5' 5' 5'   Supine ER AA             ER step out 10x10\"  10x5\"  AROM OTB 3x5 AROM 3x8 OTB OTB 3x8       Triceps pushdown      OTB 3x8       UBE     3'/3' 3'/3'       Ther Activity                                       Gait Training                                       Modalities                                                  "

## 2024-09-05 ENCOUNTER — OFFICE VISIT (OUTPATIENT)
Dept: PHYSICAL THERAPY | Facility: REHABILITATION | Age: 49
End: 2024-09-05
Payer: MEDICARE

## 2024-09-05 DIAGNOSIS — Z98.890 STATUS POST RIGHT ROTATOR CUFF REPAIR: Primary | ICD-10-CM

## 2024-09-05 PROCEDURE — 97110 THERAPEUTIC EXERCISES: CPT | Performed by: PHYSICAL THERAPIST

## 2024-09-05 PROCEDURE — 97112 NEUROMUSCULAR REEDUCATION: CPT | Performed by: PHYSICAL THERAPIST

## 2024-09-05 NOTE — PROGRESS NOTES
Daily Note     Today's date: 2024  Patient name: Adamaris Brian  : 1975  MRN: [id number]  Referring provider: Davi Carver PA*  Dx:   Encounter Diagnosis     ICD-10-CM    1. Status post right rotator cuff repair  Z98.890                      Subjective: Nothing new regarding the shoulder.     Objective: See treatment diary below    Assessment: Completed exercise with correct technique and without reports of pain. Demonstrated moderate fatigue after exercise. Pt would benefit from continued PT services to reduce pain and increase level of function.     Plan: Continue per plan of care.      Precautions:  R.RTC repair - see protocol.     1-4 Weeks: -   Sling Immobilization  Active ROM Elbow, Wrist and Hand  True Passive (ONLY) ROM Shoulder. NO ACTIVE MOTION.   Pendulums, Supine Elevation in Scapular plane, External Rotation to tolerance with arm at side. (emphasize ER, minimum goal 40°)  Scapular Stabilization exercises (sidelying)  Deltoid isometrics in neutral (submaximal) as ROM improves  No Pulley/Canes until 6 weeks post-op (these are active motions)     4-6 Weeks: -   Discontinue sling use.  Begin Active Assist ROM and advance to Active as Tolerated  Elevation in scapular plane and external rotation as tolerated  No Internal rotation or behind back until 6wks.  Begin Cuff Isometrics at 6wks with arm at the side     6-12 Weeks: -   Active Assist to Active ROM Shoulder As Tolerated  Elevation in scapular plane and external rotation to tolerance  Begin internal rotation as tolerated  Light stretching at end ranges  Cuff Isometrics with the arm at the side     3-12 Months:  Advance to full ROM as tolerated with passive stretching at end ranges  Advance strengthening as tolerated: isometrics ? bands ? light weights (1-5   lbs); 8-12 reps/2-3 sets per rotator cuff, deltoid, and scapular stabilizers  Limit strengthening 3x/week to avoid rotator cuff tendonitis  Begin  "eccentrically resisted motions, pylometrics (ex. Weighted ball toss),  proprioception (es. body blade)       Manuals 7/29 8/1 8/6 8/15 8/20 8/29 9/5 7/16 7/18 7/23   Shoulder mobs MM MM MM MM      MM                                          Neuro Re-Ed             Inverse KB press     3# KB 3x4 4# KB 3x5       [place]       2# 3x8 - HEP                                                                       Ther Ex 7/29 8/1 8/6 8/15 8/20 8/29 9/5      Flex AAROM             PROM MM MM MM MM  MM  MM MM MM   ABD AAROM             Wall slides              [place] AAROM Flex 23x      Flex 4x5  Flex 4x6 Flex 4x6     IR BTB   10x          OHP       2# 3x8 - HEP  3x6     ER Iso S/L ER 3x10 S/L ER 3x8 1#   S/L ER 3x8 1# S/L ER 3x8 1# S/L ER 3x8 1# S/L ER 3x8 1# S/L ER  3x8 2# S/L ER 3x8 S/L ER 3x8    Pulleys  5' 5' 5' 5'    5' 5' 5'   Supine ER AA             ER step out 10x10\"  10x5\"  AROM OTB 3x5 AROM 3x8 OTB OTB 3x8 OTB 3x8 - HEP      Triceps pushdown      OTB 3x8       UBE     3'/3' 3'/3' 3'/3'      Ther Activity                                       Gait Training                                       Modalities                                                  "

## 2024-10-09 ENCOUNTER — OFFICE VISIT (OUTPATIENT)
Dept: OBGYN CLINIC | Facility: CLINIC | Age: 49
End: 2024-10-09

## 2024-10-09 VITALS
HEART RATE: 87 BPM | SYSTOLIC BLOOD PRESSURE: 107 MMHG | DIASTOLIC BLOOD PRESSURE: 73 MMHG | BODY MASS INDEX: 34.26 KG/M2 | WEIGHT: 205.6 LBS | HEIGHT: 65 IN

## 2024-10-09 DIAGNOSIS — B37.31 VAGINAL CANDIDIASIS: Primary | ICD-10-CM

## 2024-10-09 DIAGNOSIS — N90.7 SEBACEOUS CYST OF LABIA: ICD-10-CM

## 2024-10-09 PROCEDURE — 99213 OFFICE O/P EST LOW 20 MIN: CPT | Performed by: OBSTETRICS & GYNECOLOGY

## 2024-10-09 RX ORDER — FLUCONAZOLE 150 MG/1
150 TABLET ORAL ONCE
Qty: 2 TABLET | Refills: 0 | Status: SHIPPED | OUTPATIENT
Start: 2024-10-09 | End: 2024-10-09

## 2024-10-09 RX ORDER — SEMAGLUTIDE 1.34 MG/ML
INJECTION, SOLUTION SUBCUTANEOUS
COMMUNITY
Start: 2024-09-11

## 2024-10-09 NOTE — ASSESSMENT & PLAN NOTE
Plan for one dose of fluconazole today and another dose on Wednesday 10/16 due to hx of recurrent infections

## 2024-10-09 NOTE — PROGRESS NOTES
OB/GYN VISIT  Adamaris Brian  10/9/2024  1:45 PM    ASSESSMENT / PLAN:    Adamaris Brian is a 49 y.o.  female presenting for vulvar soreness/tenderness with a history of frequent yeast infections. Exam today remarkable for pseudohyphae on microscopy.     Assessment & Plan  Vaginal candidiasis  Plan for one dose of fluconazole today and another dose on Wednesday 10/16 due to hx of recurrent infections   Sebaceous cyst of labia  Encouraged warm compresses with gentle pressure       SUBJECTIVE:    Adamaris Brian is a 49 y.o.  female presenting for vulvar soreness/tenderness that she has had for the past 2 weeks. She has a history of frequent yeast infections. She has not had increased vaginal discharge or foul smelling discharge. In addition, she has noted a red bump on her labia that started approximately 2 weeks ago and is painful to touch. She tried to pop it and only blood came out. She reports it is getting smaller.     Past Medical History:   Diagnosis Date    Arthritis     Asthma     Bipolar 1 disorder (HCC)     Chronic narcotic dependence (HCC)     Cluster headache     COVID         CTS (carpal tunnel syndrome)     left hand    Difficulty walking     Disease of thyroid gland     Fibromyalgia     Fibromyalgia, primary     Gout     Headache, tension-type     History of DVT in adulthood     Hyperlipidemia     Hypertension     Interstitial cystitis     Lumbar herniated disc     Lupus     Memory loss     Migraine     Mild sleep apnea     no CPAP    Peripheral neuropathy     PONV (postoperative nausea and vomiting)     Vision loss        Past Surgical History:   Procedure Laterality Date    COLONOSCOPY      DISTAL CLAVICLE EXCISION Right 2024    Procedure: DISTAL CLAVICLE EXCISION, acromioplasty;  Surgeon: Luke Gamez MD;  Location: [facility];  Service: Orthopedics    GALLBLADDER SURGERY  2008    HYSTERECTOMY      LAPAROSCOPIC COLON RESECTION      [facility] NDSC WRST SURG W/RLS  "TRANSVRS CARPL LIGM Right 12/06/2023    Procedure: Right endoscopic carpal tunnel release;  Surgeon: Alek Richards MD;  Location: [facility];  Service: Orthopedics    OK SURGICAL ARTHROSCOPY SHOULDER W/ROTATOR CUFF RPR Right 4/24/2024    Procedure: REPAIR ROTATOR CUFF  ARTHROSCOPIC;  Surgeon: Luke Gamez MD;  Location: [facility];  Service: Orthopedics    OK TENDON SHEATH INCISION Right 12/06/2023    Procedure: Right ring trigger finger release;  Surgeon: Alek Richards MD;  Location: [facility];  Service: Orthopedics       OBJECTIVE:    Vitals:  Blood pressure 107/73, pulse 87, height 5' 5\" (1.651 m), weight 93.3 kg (205 lb 9.6 oz), not currently breastfeeding.Body mass index is 34.21 kg/m².    Physical Exam:    Physical Exam  Constitutional:       General: She is not in acute distress.  Genitourinary:      Rectum normal.      Genitourinary Comments: Vaginal cuff intact  Thick white discharge in vagina         HENT:      Head: Normocephalic.      Nose: Nose normal.      Mouth/Throat:      Pharynx: Oropharynx is clear.   Eyes:      Conjunctiva/sclera: Conjunctivae normal.   Cardiovascular:      Rate and Rhythm: Normal rate.   Pulmonary:      Effort: Pulmonary effort is normal.   Abdominal:      General: There is no distension.      Palpations: Abdomen is soft.      Tenderness: There is no abdominal tenderness.   Musculoskeletal:         General: No tenderness.      Right lower leg: No edema.      Left lower leg: No edema.   Neurological:      Mental Status: She is alert.   Skin:     General: Skin is warm and dry.      Coloration: Skin is not jaundiced.   Psychiatric:         Mood and Affect: Mood normal.         Behavior: Behavior normal.   Vitals reviewed. Exam conducted with a chaperone present.         Fannie Oscar MD  10/9/2024  1:45 PM       "

## 2024-10-24 ENCOUNTER — ANNUAL EXAM (OUTPATIENT)
Dept: OBGYN CLINIC | Facility: CLINIC | Age: 49
End: 2024-10-24

## 2024-10-24 ENCOUNTER — PROCEDURE VISIT (OUTPATIENT)
Dept: NEUROLOGY | Facility: CLINIC | Age: 49
End: 2024-10-24
Payer: MEDICARE

## 2024-10-24 VITALS
HEART RATE: 87 BPM | DIASTOLIC BLOOD PRESSURE: 78 MMHG | BODY MASS INDEX: 34.16 KG/M2 | HEIGHT: 65 IN | SYSTOLIC BLOOD PRESSURE: 120 MMHG | WEIGHT: 205 LBS

## 2024-10-24 VITALS — TEMPERATURE: 98.3 F | HEART RATE: 88 BPM | SYSTOLIC BLOOD PRESSURE: 117 MMHG | DIASTOLIC BLOOD PRESSURE: 66 MMHG

## 2024-10-24 DIAGNOSIS — G43.709 CHRONIC MIGRAINE WITHOUT AURA WITHOUT STATUS MIGRAINOSUS, NOT INTRACTABLE: Primary | ICD-10-CM

## 2024-10-24 DIAGNOSIS — N89.8 VAGINAL DRYNESS: ICD-10-CM

## 2024-10-24 DIAGNOSIS — Z12.39 ENCOUNTER FOR BREAST CANCER SCREENING USING NON-MAMMOGRAM MODALITY: ICD-10-CM

## 2024-10-24 DIAGNOSIS — R82.998 DARK URINE: Primary | ICD-10-CM

## 2024-10-24 DIAGNOSIS — Z80.9 FAMILY HISTORY OF CANCER: ICD-10-CM

## 2024-10-24 DIAGNOSIS — Z12.11 COLON CANCER SCREENING: ICD-10-CM

## 2024-10-24 DIAGNOSIS — N94.10 DYSPAREUNIA IN FEMALE: ICD-10-CM

## 2024-10-24 DIAGNOSIS — Z01.419 WOMEN'S ANNUAL ROUTINE GYNECOLOGICAL EXAMINATION: ICD-10-CM

## 2024-10-24 DIAGNOSIS — B37.31 VULVOVAGINAL CANDIDIASIS: ICD-10-CM

## 2024-10-24 DIAGNOSIS — Z80.41 FAMILY HISTORY OF OVARIAN CANCER: ICD-10-CM

## 2024-10-24 PROCEDURE — G0101 CA SCREEN;PELVIC/BREAST EXAM: HCPCS | Performed by: NURSE PRACTITIONER

## 2024-10-24 PROCEDURE — 87086 URINE CULTURE/COLONY COUNT: CPT | Performed by: NURSE PRACTITIONER

## 2024-10-24 PROCEDURE — 64615 CHEMODENERV MUSC MIGRAINE: CPT | Performed by: PHYSICIAN ASSISTANT

## 2024-10-24 RX ORDER — FLUCONAZOLE 150 MG/1
150 TABLET ORAL
Qty: 2 TABLET | Refills: 0 | Status: SHIPPED | OUTPATIENT
Start: 2024-10-24 | End: 2024-10-28

## 2024-10-24 RX ORDER — ESTRADIOL 0.1 MG/G
CREAM VAGINAL
Qty: 42.5 G | Refills: 0 | Status: SHIPPED | OUTPATIENT
Start: 2024-10-24 | End: 2025-01-29

## 2024-10-24 NOTE — PROGRESS NOTES
ANNUAL GYNECOLOGICAL EXAMINATION    Adamaris Brian is a 49 y.o. female who presents today for annual GYN exam.  Her last pap smear was performed years ago and result was normal.  She reports no history of abnormal pap smears in her past. She had a hysterectomy in 2015 for AUB and fibroids. Her last mammogram was performed 7/2024 and result was BIRADS1.  She has not had colon cancer screening performed.  She has not had HIV screening performed.  No LMP recorded (lmp unknown). Patient has had a hysterectomy.  She reports concern due to multiple family members on both sides with cancer. Her aunt was recently dx with ovarian cancer. She also reports continued issue with vaginal dryness, discomfort with intercourse and recurrent yeast infection. Her general medical history has been reviewed and she reports it as follows:    Past Medical History:   Diagnosis Date    Arthritis     Asthma     Bipolar 1 disorder (HCC)     Chronic narcotic dependence (HCC)     Cluster headache     COVID     2021    CTS (carpal tunnel syndrome)     left hand    Difficulty walking     Disease of thyroid gland     Fibromyalgia     Fibromyalgia, primary     Gout     Headache, tension-type     History of DVT in adulthood     Hyperlipidemia     Hypertension     Interstitial cystitis     Lumbar herniated disc     Lupus     Memory loss     Migraine     Mild sleep apnea     no CPAP    Peripheral neuropathy     PONV (postoperative nausea and vomiting)     Vision loss      Past Surgical History:   Procedure Laterality Date    COLONOSCOPY      DISTAL CLAVICLE EXCISION Right 4/24/2024    Procedure: DISTAL CLAVICLE EXCISION, acromioplasty;  Surgeon: Luke Gamez MD;  [facility];  Service: Orthopedics    GALLBLADDER SURGERY  2008    HYSTERECTOMY  2014    LAPAROSCOPIC COLON RESECTION  2001    OR NDSC WRST SURG W/RLS TRANSVRS CARPL LIGM Right 12/06/2023    Procedure: Right endoscopic carpal tunnel release;  Surgeon: Alek Richards MD;   [facility];  Service: Orthopedics    IA SURGICAL ARTHROSCOPY SHOULDER W/ROTATOR CUFF RPR Right 2024    Procedure: REPAIR ROTATOR CUFF  ARTHROSCOPIC;  Surgeon: Luke Gamez MD;  [facility];  Service: Orthopedics    IA TENDON SHEATH INCISION Right 2023    Procedure: Right ring trigger finger release;  Surgeon: Alek Richards MD;  [facility];  Service: Orthopedics     OB History          4    Para   4    Term   4            AB        Living   4         SAB        IAB        Ectopic        Multiple        Live Births   4               Social History     Tobacco Use    Smoking status: Former     Current packs/day: 1.00     Average packs/day: 1 pack/day for 5.0 years (5.0 ttl pk-yrs)     Types: Cigarettes    Smokeless tobacco: Never    Tobacco comments:     quit at 17 years old   Vaping Use    Vaping status: Never Used   Substance Use Topics    Alcohol use: No    Drug use: No     Social History     Substance and Sexual Activity   Sexual Activity Yes    Partners: Male    Birth control/protection: Female Sterilization     Cancer-related family history includes Breast cancer (age of onset: 40) in her maternal aunt; Endometrial cancer (age of onset: 60) in her paternal aunt.    Current Outpatient Medications   Medication Instructions    acetaminophen (TYLENOL) 325 mg, Oral, Every 6 hours PRN    apixaban (Eliquis) 5 mg Take 2 tablets (10 mg total) by mouth 2 (two) times a day for 7 days, THEN 1 tablet (5 mg total) 2 (two) times a day for 23 days.    aspirin-acetaminophen-caffeine (EXCEDRIN MIGRAINE) 250-250-65 MG per tablet 2 tablets, Oral, As needed    buPROPion (WELLBUTRIN SR) 150 mg, Oral, 2 times daily    Cetirizine HCl 10 mg, Oral, Daily PRN    ergocalciferol (VITAMIN D2) 50,000 Units, Oral, Weekly, sundays    estradiol (ESTRACE VAGINAL) 0.1 mg/g vaginal cream Insert 1 g into the vagina daily at bedtime for 7 days, THEN 1 g 2 (two) times a week.    famotidine  "(PEPCID) 20 mg, Oral, Every evening    fluconazole (DIFLUCAN) 150 mg, Oral, Every 3 days    fluticasone (FLONASE) 50 mcg/act nasal spray 1 spray, Nasal, Daily    fluticasone (FLONASE) 50 mcg/act nasal spray 1 spray, Nasal, Daily    fluticasone (FLOVENT HFA) 110 MCG/ACT inhaler 1 puff, Inhalation, As needed    hydroxychloroquine (PLAQUENIL) 200 mg, Oral, As needed, During Summer time    lasmiditan succinate (Reyvow) 100 mg tablet TAKE 1 TABLET (100MG) ONE TIME AS NEEDED FOR MIGRAINE.DO NOT USE MORE THAN ONE DOSEPER DAY OR MORE THAN 8 DOSES PER MONTH    levothyroxine 88 mcg, Oral, Daily    meclizine (ANTIVERT) 12.5 mg, As needed    Multiple Vitamins-Minerals (MULTIVITAMIN ADULT PO) 1 tablet, Oral, Daily    Nurtec 75 MG TBDP DISSOLVE 1 TABLET IN MOUTH EVERY OTHER DAY    oxyCODONE (ROXICODONE) 5 mg, Oral, Every 4 hours PRN    oxyCODONE (ROXICODONE) 15 mg, Oral, Every 4 hours PRN    Ozempic, 1 MG/DOSE, 4 MG/3ML injection pen inject 1 milligram subcutaneously every week    potassium chloride (Klor-Con) 10 mEq tablet 10 mEq, Oral, Daily    PROAIR  (90 Base) MCG/ACT inhaler 1-2 puffs, Inhalation, As needed    prochlorperazine (COMPAZINE) 10 mg, Oral, Every 6 hours PRN    propranolol (INDERAL LA) 160 mg, Oral, 3 times daily    Qulipta 60 mg, Oral, Daily    rosuvastatin (CRESTOR) 5 mg, Daily    rosuvastatin (CRESTOR) 10 mg, Oral, Daily    telmisartan (MICARDIS) 80 mg, Daily    telmisartan-hydrochlorothiazide (MICARDIS HCT) 80-12.5 MG per tablet 1 tablet, Oral, Daily    valACYclovir (VALTREX) 1,000 mg tablet 1 tablet, As needed    Xiidra 5 % op solution instill 1 drop into both eyes twice a day       Review of Systems:  Review of Systems   Constitutional: Negative.    Gastrointestinal: Negative.    Genitourinary:  Positive for dyspareunia.        Vaginal dryness, recurrent yeast        Physical Exam:  Ht 5' 5\" (1.651 m)   Wt 93 kg (205 lb)   LMP  (LMP Unknown)   BMI 34.11 kg/m²   Physical Exam  Constitutional:       " General: She is not in acute distress.     Appearance: Normal appearance.   Genitourinary:      Vulva and bladder normal.      No lesions in the vagina.      Vaginal discharge present.      No vaginal erythema or ulceration.      Moderate vaginal atrophy present.       Right Adnexa: not tender and no mass present.     Left Adnexa: not tender and no mass present.     Cervix is absent.      Uterus is absent.   Breasts:     Right: No mass, nipple discharge or skin change.      Left: No mass, nipple discharge or skin change.   Cardiovascular:      Rate and Rhythm: Normal rate and regular rhythm.   Pulmonary:      Effort: Pulmonary effort is normal.      Breath sounds: Normal breath sounds.   Abdominal:      General: Abdomen is flat.      Palpations: Abdomen is soft.   Musculoskeletal:      Cervical back: Neck supple.   Neurological:      Mental Status: She is alert.   Skin:     General: Skin is warm and dry.   Psychiatric:         Mood and Affect: Mood normal.         Behavior: Behavior normal.   Vitals reviewed.       Assessment/Plan:   1. Normal well-woman GYN exam.  2. Cervical cancer screening:  Normal vaginal cuff.  Pap smears discontinued post hysterectomy.     3. STD screening:  Patient declines.   4. Breast cancer screening:  Normal breast exam.  Scheduled for bilateral screening mammogram.  Reviewed breast self-awareness.   4. Colon cancer screening:   Order placed for consultation with Gastroenterology for consultation to discuss screening.   5. Depression Screening: Patient's depression screening was assessed with a PHQ-2 score of 0.  Clinically patient does not have depression. No treatment is required.     6. BMI Counseling: Body mass index is 34.11 kg/m². Discussed the patient's BMI with her. The BMI is above normal. Exercise recommendations include exercising 3-5 times per week.   7. Referral placed for Onc Genetics team for screening due to family history.    8. She desires to complete a pelvis US to  assess ovaries due to family history of ovarian cancer.    9. Orders placed for vaginal estrogen cream for vaginal dryness, dyspareunia and recurrent yeast.    10. Return to office in 3 months for follow up.    Reviewed with patient that test results are available in [facility] immediately, but that they will not necessarily be reviewed by me immediately.  Explained that I will review results at my earliest opportunity and contact patient appropriately.

## 2024-10-24 NOTE — PROGRESS NOTES
"Universal Protocol   procedure performed by consultantConsent: Verbal consent obtained. Written consent obtained.  Risks and benefits: risks, benefits and alternatives were discussed  Consent given by: patient  Time out: Immediately prior to procedure a \"time out\" was called to verify the correct patient, procedure, equipment, support staff and site/side marked as required.  Patient understanding: patient states understanding of the procedure being performed  Patient consent: the patient's understanding of the procedure matches consent given  Procedure consent: procedure consent matches procedure scheduled  Relevant documents: relevant documents present and verified  Patient identity confirmed: verbally with patient      Chemodenervation     Date/Time  10/24/2024 3:00 PM     Performed by  Kassie Andrade PA-C   Authorized by  Kassie Andrade PA-C     Pre-procedure details      Prepped With: Alcohol     Anesthesia  (see MAR for exact dosages):     Anesthesia method:  None   Procedure details      Position:  Upright   Botox      Botox Type:  Type A    Brand:  Botox    mL's of Botulinum Toxin:  200    Final Concentration per CC:  50 units    Needle Gauge:  30 G 2.5 inch   Procedures      Botox Procedures: chronic headache      Indications: migraines     Injection Location      Head / Face:  L superior cervical paraspinal, R superior cervical paraspinal, L , R , L frontalis, R frontalis, L medial occipitalis, R medial occipitalis, procerus, R temporalis, L temporalis, R superior trapezius and L superior trapezius    L  injection amount:  5 unit(s)    R  injection amount:  5 unit(s)    L lateral frontalis:  5 unit(s)    R lateral frontalis:  5 unit(s)    L medial frontalis:  5 unit(s)    R medial frontalis:  5 unit(s)    L temporalis injection amount:  20 unit(s)    R temporalis injection amount:  20 unit(s)    Procerus injection amount:  5 unit(s)    L medial occipitalis injection " amount:  15 unit(s)    R medial occipitalis injection amount:  15 unit(s)    L superior cervical paraspinal injection amount:  10 unit(s)    R superior cervical paraspinal injection amount:  10 unit(s)    L superior trapezius injection amount:  15 unit(s)    R superior trapezius injection amount:  15 unit(s)   Total Units      Total units used:  200    Total units discarded:  0   Post-procedure details      Chemodenervation:  Chronic migraine    Facial Nerve Location::  Bilateral facial nerve    Patient tolerance of procedure:  Tolerated well, no immediate complications   Comments        5 units naris bilaterally  20 units frontalis   15 units occipitalis  All medically necessary

## 2024-10-26 LAB — BACTERIA UR CULT: NORMAL

## 2024-10-28 ENCOUNTER — TELEPHONE (OUTPATIENT)
Dept: OBGYN CLINIC | Facility: CLINIC | Age: 49
End: 2024-10-28

## 2024-10-28 NOTE — TELEPHONE ENCOUNTER
Jacob with office number for callback for results.      ----- Message from MICHELLE Dc sent at 10/28/2024  7:58 AM EDT -----  Please let her know the urine culture is negative for UTI. Thank you!

## 2024-11-24 ENCOUNTER — HOSPITAL ENCOUNTER (OUTPATIENT)
Dept: RADIOLOGY | Facility: HOSPITAL | Age: 49
Discharge: HOME | End: 2024-11-24
Payer: MEDICARE

## 2024-11-24 DIAGNOSIS — Z80.41 FAMILY HISTORY OF OVARIAN CANCER: ICD-10-CM

## 2024-11-24 PROCEDURE — 76830 TRANSVAGINAL US NON-OB: CPT

## 2024-11-24 PROCEDURE — 76856 US EXAM PELVIC COMPLETE: CPT

## 2024-12-03 ENCOUNTER — RESULTS FOLLOW-UP (OUTPATIENT)
Dept: OBGYN CLINIC | Facility: CLINIC | Age: 49
End: 2024-12-03

## 2024-12-05 ENCOUNTER — OFFICE VISIT (OUTPATIENT)
Dept: GASTROENTEROLOGY | Facility: CLINIC | Age: 49
End: 2024-12-05
Payer: MEDICARE

## 2024-12-05 VITALS
DIASTOLIC BLOOD PRESSURE: 60 MMHG | WEIGHT: 197 LBS | TEMPERATURE: 98.4 F | BODY MASS INDEX: 32.82 KG/M2 | HEIGHT: 65 IN | SYSTOLIC BLOOD PRESSURE: 116 MMHG

## 2024-12-05 DIAGNOSIS — K21.9 GASTROESOPHAGEAL REFLUX DISEASE, UNSPECIFIED WHETHER ESOPHAGITIS PRESENT: ICD-10-CM

## 2024-12-05 DIAGNOSIS — R10.13 EPIGASTRIC PAIN: ICD-10-CM

## 2024-12-05 DIAGNOSIS — Z12.11 COLON CANCER SCREENING: Primary | ICD-10-CM

## 2024-12-05 PROCEDURE — 99204 OFFICE O/P NEW MOD 45 MIN: CPT | Performed by: PHYSICIAN ASSISTANT

## 2024-12-05 RX ORDER — SODIUM CHLORIDE, SODIUM LACTATE, POTASSIUM CHLORIDE, CALCIUM CHLORIDE 600; 310; 30; 20 MG/100ML; MG/100ML; MG/100ML; MG/100ML
125 INJECTION, SOLUTION INTRAVENOUS CONTINUOUS
OUTPATIENT
Start: 2024-12-05

## 2024-12-05 RX ORDER — PANTOPRAZOLE SODIUM 40 MG/1
40 TABLET, DELAYED RELEASE ORAL DAILY
Qty: 30 TABLET | Refills: 3 | Status: SHIPPED | OUTPATIENT
Start: 2024-12-05

## 2024-12-05 NOTE — PROGRESS NOTES
Name: Adamaris Brian      : 1975      MRN: [id number]  Encounter Provider: Carol Arrington PA-C  Encounter Date: 2024   Encounter department: [facility] [place]  :  Assessment & Plan  Colon cancer screening  She is due for CRC screening, last colonoscopy 10 years ago was normal. We will request to hold Eliquis for 2 days before her colonoscopy. Discussed low risk of recurrent DVT/PE while off Eliquis.    I discussed informed consent with the patient. The risks/benefits/alternatives of the procedure were discussed with the patient. Risks included, but not limited to, infection, bleeding, perforation, injury to organs in the abdomen, missed lesion and incomplete procedure were discussed. Patient was agreeable.      Orders:    Ambulatory Referral to Gastroenterology    Colonoscopy; Future    polyethylene glycol (GOLYTELY) 4000 mL solution; Take as directed by the office for colonoscopy.    Gastroesophageal reflux disease, unspecified whether esophagitis present  She describes increased heartburn over the past 6 to 12 months, despite high-dose Pepcid and Tums as needed. No difficulty swallowing. Last EGD was 10 years ago. We will repeat EGD to assess for erosive esophagitis, hiatal hernia, Tenorio's esophagus. Stop Pepcid and start Protonix 40 mg daily before breakfast. Continue to limit reflux triggers like coffee, tomatoes, soda, etc.    Orders:    pantoprazole (PROTONIX) 40 mg tablet; Take 1 tablet (40 mg total) by mouth daily 30 minutes before breakfast    EGD; Future    Epigastric pain  May be related to GERD, gastritis, PUD, H. Pylori. She is status post cholecystectomy. Reviewed recent labs showing normal liver tests. Schedule ultrasound and EGD with gastric biopsies to rule out H. pylori infection.    Orders:    EGD; Future    US abdomen complete; Future    Follow-up in a few months    History of Present Illness     Adamaris Brian is a 49 y.o. adult with history  of AVNRT, radiofrequency ablation, hypertension, dyslipidemia, obstructive sleep apnea, chronic migraines, DVT on Eliquis, diverticulitis s/p colon resection who presents as a referral for colon cancer screening.    History obtained from: patient    She reports significant heartburn and abdominal pain over the past 6 to 12 months.  She is taking high doses of Pepcid daily as well as Tums for relief.  She denies difficulty swallowing.  She has eliminated several foods from her diet including lactose, coffee, limiting tomatoes and soda.  She denies nausea and vomiting.  Her abdominal pain is mostly in the upper abdomen although can be throughout her abdomen.  The pain feels like a spasm type pain.  She also reports history of alternating bowel habits although this has significantly improved with Metamucil daily.  She denies any blood in the stool or abnormal weight loss.    She reports history of cholecystectomy, colon resection for diverticulitis, and hysterectomy.    She denies family history of colon cancer, esophagus cancer, stomach cancer.    She had EGD and colonoscopy in 2014 with Dr. Blanc. EGD biopsies negative for H. pylori and celiac disease. Colonoscopy showed diverticulosis. No evidence of colitis. Random biopsy negative for microscopic colitis.      Review of Systems   Constitutional:  Negative for chills and fever.   HENT:  Negative for ear pain and sore throat.    Eyes:  Negative for pain and visual disturbance.   Respiratory:  Negative for cough and shortness of breath.    Cardiovascular:  Negative for chest pain and palpitations.   Gastrointestinal:  Positive for abdominal pain, constipation and diarrhea. Negative for vomiting.   Genitourinary:  Negative for dysuria and hematuria.   Musculoskeletal:  Negative for arthralgias and back pain.   Skin:  Negative for color change and rash.   Neurological:  Negative for seizures and syncope.   All other systems reviewed and are negative.    Past Medical  History   Past Medical History:   Diagnosis Date    Arthritis     Asthma     Bipolar 1 disorder (HCC)     Chronic narcotic dependence (HCC)     Cluster headache     COVID     2021    CTS (carpal tunnel syndrome)     left hand    Difficulty walking     Disease of thyroid gland     Fibromyalgia     Fibromyalgia, primary     Gout     Headache, tension-type     History of DVT in adulthood     Hyperlipidemia     Hypertension     Interstitial cystitis     Lumbar herniated disc     Lupus     Memory loss     Migraine     Mild sleep apnea     no CPAP    Peripheral neuropathy     PONV (postoperative nausea and vomiting)     Vision loss      Past Surgical History:   Procedure Laterality Date    COLONOSCOPY      DISTAL CLAVICLE EXCISION Right 4/24/2024    Procedure: DISTAL CLAVICLE EXCISION, acromioplasty;  Surgeon: Luke Gamez MD;  [facility];  Service: Orthopedics    GALLBLADDER SURGERY  2008    HYSTERECTOMY  2014    LAPAROSCOPIC COLON RESECTION  2001    IN NDSC WRST SURG W/RLS TRANSVRS CARPL LIGM Right 12/06/2023    Procedure: Right endoscopic carpal tunnel release;  Surgeon: Alek Richards MD;  [facility];  Service: Orthopedics    IN SURGICAL ARTHROSCOPY SHOULDER W/ROTATOR CUFF RPR Right 4/24/2024    Procedure: REPAIR ROTATOR CUFF  ARTHROSCOPIC;  Surgeon: Luke Gamez MD;  [facility];  Service: Orthopedics    IN TENDON SHEATH INCISION Right 12/06/2023    Procedure: Right ring trigger finger release;  Surgeon: Alek Richards MD;  [facility];  Service: Orthopedics     Family History   Problem Relation Age of Onset    Lupus Mother     Osteoporosis Mother     Hypotension Mother     Hypertension Father     Diabetes Father     Heart disease Father     Migraines Father     Stroke Father     Diabetes Sister     Schizophrenia Sister     Hypertension Sister     No Known Problems Daughter     No Known Problems Daughter     Diabetes Maternal Grandmother     Rheum arthritis Maternal  Grandmother     Hypertension Maternal Grandmother     Neuropathy Maternal Grandmother     Seizures Maternal Grandmother     No Known Problems Maternal Grandfather     No Known Problems Paternal Grandmother     No Known Problems Paternal Grandfather     Breast cancer Maternal Aunt 40    No Known Problems Maternal Aunt     No Known Problems Maternal Aunt     No Known Problems Maternal Aunt     Endometrial cancer Paternal Aunt 60      reports that she has quit smoking. Her smoking use included cigarettes. She has a 5 pack-year smoking history. She has never used smokeless tobacco. She reports that she does not drink alcohol and does not use drugs.  Current Outpatient Medications on File Prior to Visit   Medication Sig Dispense Refill    acetaminophen (TYLENOL) 325 mg tablet Take 1 tablet (325 mg total) by mouth every 6 (six) hours as needed for mild pain 30 tablet 0    apixaban (Eliquis) 5 mg Take 2 tablets (10 mg total) by mouth 2 (two) times a day for 7 days, THEN 1 tablet (5 mg total) 2 (two) times a day for 23 days. 74 tablet 0    aspirin-acetaminophen-caffeine (EXCEDRIN MIGRAINE) 250-250-65 MG per tablet Take 2 tablets by mouth if needed for headaches      ergocalciferol (VITAMIN D2) 50,000 units Take 50,000 Units by mouth once a week sundays      famotidine (PEPCID) 20 mg tablet Take 20 mg by mouth every evening      fluticasone (FLONASE) 50 mcg/act nasal spray 1 spray into each nostril daily 16 g 0    fluticasone (FLONASE) 50 mcg/act nasal spray 1 spray into each nostril daily 9.9 mL 0    fluticasone (FLOVENT HFA) 110 MCG/ACT inhaler Inhale 1 puff as needed       hydroxychloroquine (PLAQUENIL) 200 mg tablet Take 200 mg by mouth as needed During Summer time      levothyroxine 88 mcg tablet Take 88 mcg by mouth daily       meclizine (ANTIVERT) 12.5 MG tablet Take 12.5 mg by mouth as needed      Multiple Vitamins-Minerals (MULTIVITAMIN ADULT PO) Take 1 tablet by mouth daily      Nurtec 75 MG TBDP DISSOLVE 1 TABLET  IN MOUTH EVERY OTHER DAY 16 tablet 11    oxyCODONE (ROXICODONE) 15 mg immediate release tablet Take 15 mg by mouth every 4 (four) hours as needed      oxyCODONE (Roxicodone) 5 immediate release tablet Take 1 tablet (5 mg total) by mouth every 4 (four) hours as needed for moderate pain for up to 15 doses Max Daily Amount: 30 mg 15 tablet 0    potassium chloride (Klor-Con) 10 mEq tablet Take 10 mEq by mouth daily      PROAIR  (90 Base) MCG/ACT inhaler Inhale 1-2 puffs as needed       propranolol (INDERAL LA) 160 mg Take 160 mg by mouth 3 (three) times a day  0    rosuvastatin (CRESTOR) 10 MG tablet Take 10 mg by mouth daily      telmisartan-hydrochlorothiazide (MICARDIS HCT) 80-12.5 MG per tablet Take 1 tablet by mouth daily 30 tablet 3    valACYclovir (VALTREX) 1,000 mg tablet 1 tablet as needed       Xiidra 5 % op solution instill 1 drop into both eyes twice a day      Atogepant (Qulipta) 60 MG TABS Take 60 mg by mouth in the morning (Patient not taking: Reported on 10/24/2024) 30 tablet 11    buPROPion (WELLBUTRIN SR) 150 mg 12 hr tablet Take 1 tablet (150 mg total) by mouth 2 (two) times a day (Patient not taking: Reported on 10/24/2024) 180 tablet 3    Cetirizine HCl 10 MG CAPS Take 10 mg by mouth daily as needed (Patient not taking: Reported on 12/5/2024)      estradiol (ESTRACE VAGINAL) 0.1 mg/g vaginal cream Insert 1 g into the vagina daily at bedtime for 7 days, THEN 1 g 2 (two) times a week. (Patient not taking: Reported on 12/5/2024) 42.5 g 0    lasmiditan succinate (Reyvow) 100 mg tablet TAKE 1 TABLET (100MG) ONE TIME AS NEEDED FOR MIGRAINE.DO NOT USE MORE THAN ONE DOSEPER DAY OR MORE THAN 8 DOSES PER MONTH (Patient not taking: Reported on 12/5/2024) 8 tablet 5    Ozempic, 1 MG/DOSE, 4 MG/3ML injection pen inject 1 milligram subcutaneously every week (Patient not taking: Reported on 12/5/2024)      prochlorperazine (COMPAZINE) 10 mg tablet Take 1 tablet (10 mg total) by mouth every 6 (six) hours as  "needed (migraine) (Patient not taking: Reported on 10/24/2024) 20 tablet 3    rosuvastatin (CRESTOR) 5 mg tablet Take 5 mg by mouth daily (Patient not taking: Reported on 6/14/2024)      telmisartan (MICARDIS) 80 MG tablet Take 80 mg by mouth daily (Patient not taking: Reported on 6/14/2024)       Current Facility-Administered Medications on File Prior to Visit   Medication Dose Route Frequency Provider Last Rate Last Admin    Botulinum Toxin Type A SOLR 200 Units  200 Units Injection Q3 Months Kassie Andrade PA-C   200 Units at 03/18/21 1154     Allergies   Allergen Reactions    Pregabalin      Other reaction(s): blurred vision    Shellfish-Derived Products - Food Allergy Anaphylaxis and Hives    Sumatriptan      Other reaction(s): Altered Heart Rate    Triptans Shortness Of Breath and Palpitations    Latex Swelling and Rash     Other reaction(s): Hives/Skin Rash  Other reaction(s): Hives/Skin Rash    Sulfa Antibiotics Swelling and Rash     Other reaction(s): Hives/Skin Rash  Other reaction(s): Hives/Skin Rash    Monistat [Miconazole] Swelling           Objective   /60 (BP Location: Right arm, Patient Position: Sitting, Cuff Size: Standard)   Temp 98.4 °F (36.9 °C) (Tympanic Core)   Ht 5' 5\" (1.651 m)   Wt 89.4 kg (197 lb)   LMP  (LMP Unknown)   BMI 32.78 kg/m²      Physical Exam  Vitals and nursing note reviewed.   Constitutional:       General: She is not in acute distress.     Appearance: She is well-developed.   HENT:      Head: Normocephalic and atraumatic.   Eyes:      Conjunctiva/sclera: Conjunctivae normal.   Cardiovascular:      Rate and Rhythm: Normal rate and regular rhythm.      Heart sounds: No murmur heard.  Pulmonary:      Effort: Pulmonary effort is normal. No respiratory distress.      Breath sounds: Normal breath sounds.   Abdominal:      Palpations: Abdomen is soft.      Tenderness: There is no abdominal tenderness.   Musculoskeletal:         General: No swelling.      Cervical back: " Neck supple.   Skin:     General: Skin is warm and dry.   Neurological:      Mental Status: She is alert.   Psychiatric:         Mood and Affect: Mood normal.

## 2024-12-05 NOTE — H&P (VIEW-ONLY)
Name: Adamaris Brian      : 1975      MRN: [id number]  Encounter Provider: Carol Arrington PA-C  Encounter Date: 2024   Encounter department: [facility] [place]  :  Assessment & Plan  Colon cancer screening  She is due for CRC screening, last colonoscopy 10 years ago was normal. We will request to hold Eliquis for 2 days before her colonoscopy. Discussed low risk of recurrent DVT/PE while off Eliquis.    I discussed informed consent with the patient. The risks/benefits/alternatives of the procedure were discussed with the patient. Risks included, but not limited to, infection, bleeding, perforation, injury to organs in the abdomen, missed lesion and incomplete procedure were discussed. Patient was agreeable.      Orders:    Ambulatory Referral to Gastroenterology    Colonoscopy; Future    polyethylene glycol (GOLYTELY) 4000 mL solution; Take as directed by the office for colonoscopy.    Gastroesophageal reflux disease, unspecified whether esophagitis present  She describes increased heartburn over the past 6 to 12 months, despite high-dose Pepcid and Tums as needed. No difficulty swallowing. Last EGD was 10 years ago. We will repeat EGD to assess for erosive esophagitis, hiatal hernia, Tenorio's esophagus. Stop Pepcid and start Protonix 40 mg daily before breakfast. Continue to limit reflux triggers like coffee, tomatoes, soda, etc.    Orders:    pantoprazole (PROTONIX) 40 mg tablet; Take 1 tablet (40 mg total) by mouth daily 30 minutes before breakfast    EGD; Future    Epigastric pain  May be related to GERD, gastritis, PUD, H. Pylori. She is status post cholecystectomy. Reviewed recent labs showing normal liver tests. Schedule ultrasound and EGD with gastric biopsies to rule out H. pylori infection.    Orders:    EGD; Future    US abdomen complete; Future    Follow-up in a few months    History of Present Illness     Adamaris Brian is a 49 y.o. adult with history  of AVNRT, radiofrequency ablation, hypertension, dyslipidemia, obstructive sleep apnea, chronic migraines, DVT on Eliquis, diverticulitis s/p colon resection who presents as a referral for colon cancer screening.    History obtained from: patient    She reports significant heartburn and abdominal pain over the past 6 to 12 months.  She is taking high doses of Pepcid daily as well as Tums for relief.  She denies difficulty swallowing.  She has eliminated several foods from her diet including lactose, coffee, limiting tomatoes and soda.  She denies nausea and vomiting.  Her abdominal pain is mostly in the upper abdomen although can be throughout her abdomen.  The pain feels like a spasm type pain.  She also reports history of alternating bowel habits although this has significantly improved with Metamucil daily.  She denies any blood in the stool or abnormal weight loss.    She reports history of cholecystectomy, colon resection for diverticulitis, and hysterectomy.    She denies family history of colon cancer, esophagus cancer, stomach cancer.    She had EGD and colonoscopy in 2014 with Dr. Blanc. EGD biopsies negative for H. pylori and celiac disease. Colonoscopy showed diverticulosis. No evidence of colitis. Random biopsy negative for microscopic colitis.      Review of Systems   Constitutional:  Negative for chills and fever.   HENT:  Negative for ear pain and sore throat.    Eyes:  Negative for pain and visual disturbance.   Respiratory:  Negative for cough and shortness of breath.    Cardiovascular:  Negative for chest pain and palpitations.   Gastrointestinal:  Positive for abdominal pain, constipation and diarrhea. Negative for vomiting.   Genitourinary:  Negative for dysuria and hematuria.   Musculoskeletal:  Negative for arthralgias and back pain.   Skin:  Negative for color change and rash.   Neurological:  Negative for seizures and syncope.   All other systems reviewed and are negative.    Past Medical  History   Past Medical History:   Diagnosis Date    Arthritis     Asthma     Bipolar 1 disorder (HCC)     Chronic narcotic dependence (HCC)     Cluster headache     COVID     2021    CTS (carpal tunnel syndrome)     left hand    Difficulty walking     Disease of thyroid gland     Fibromyalgia     Fibromyalgia, primary     Gout     Headache, tension-type     History of DVT in adulthood     Hyperlipidemia     Hypertension     Interstitial cystitis     Lumbar herniated disc     Lupus     Memory loss     Migraine     Mild sleep apnea     no CPAP    Peripheral neuropathy     PONV (postoperative nausea and vomiting)     Vision loss      Past Surgical History:   Procedure Laterality Date    COLONOSCOPY      DISTAL CLAVICLE EXCISION Right 4/24/2024    Procedure: DISTAL CLAVICLE EXCISION, acromioplasty;  Surgeon: Luke Gamez MD;  [facility];  Service: Orthopedics    GALLBLADDER SURGERY  2008    HYSTERECTOMY  2014    LAPAROSCOPIC COLON RESECTION  2001    [place] NDSC WRST SURG W/RLS TRANSVRS CARPL LIGM Right 12/06/2023    Procedure: Right endoscopic carpal tunnel release;  Surgeon: Alek Richards MD;  [facility];  Service: Orthopedics    [place] SURGICAL ARTHROSCOPY SHOULDER W/ROTATOR CUFF RPR Right 4/24/2024    Procedure: REPAIR ROTATOR CUFF  ARTHROSCOPIC;  Surgeon: Luke Gamez MD;  [facility];  Service: Orthopedics    [place] TENDON SHEATH INCISION Right 12/06/2023    Procedure: Right ring trigger finger release;  Surgeon: Alek Richards MD;  [facility];  Service: Orthopedics     Family History   Problem Relation Age of Onset    Lupus Mother     Osteoporosis Mother     Hypotension Mother     Hypertension Father     Diabetes Father     Heart disease Father     Migraines Father     Stroke Father     Diabetes Sister     Schizophrenia Sister     Hypertension Sister     No Known Problems Daughter     No Known Problems Daughter     Diabetes Maternal Grandmother     Rheum arthritis Maternal  Grandmother     Hypertension Maternal Grandmother     Neuropathy Maternal Grandmother     Seizures Maternal Grandmother     No Known Problems Maternal Grandfather     No Known Problems Paternal Grandmother     No Known Problems Paternal Grandfather     Breast cancer Maternal Aunt 40    No Known Problems Maternal Aunt     No Known Problems Maternal Aunt     No Known Problems Maternal Aunt     Endometrial cancer Paternal Aunt 60      reports that she has quit smoking. Her smoking use included cigarettes. She has a 5 pack-year smoking history. She has never used smokeless tobacco. She reports that she does not drink alcohol and does not use drugs.  Current Outpatient Medications on File Prior to Visit   Medication Sig Dispense Refill    acetaminophen (TYLENOL) 325 mg tablet Take 1 tablet (325 mg total) by mouth every 6 (six) hours as needed for mild pain 30 tablet 0    apixaban (Eliquis) 5 mg Take 2 tablets (10 mg total) by mouth 2 (two) times a day for 7 days, THEN 1 tablet (5 mg total) 2 (two) times a day for 23 days. 74 tablet 0    aspirin-acetaminophen-caffeine (EXCEDRIN MIGRAINE) 250-250-65 MG per tablet Take 2 tablets by mouth if needed for headaches      ergocalciferol (VITAMIN D2) 50,000 units Take 50,000 Units by mouth once a week sundays      famotidine (PEPCID) 20 mg tablet Take 20 mg by mouth every evening      fluticasone (FLONASE) 50 mcg/act nasal spray 1 spray into each nostril daily 16 g 0    fluticasone (FLONASE) 50 mcg/act nasal spray 1 spray into each nostril daily 9.9 mL 0    fluticasone (FLOVENT HFA) 110 MCG/ACT inhaler Inhale 1 puff as needed       hydroxychloroquine (PLAQUENIL) 200 mg tablet Take 200 mg by mouth as needed During Summer time      levothyroxine 88 mcg tablet Take 88 mcg by mouth daily       meclizine (ANTIVERT) 12.5 MG tablet Take 12.5 mg by mouth as needed      Multiple Vitamins-Minerals (MULTIVITAMIN ADULT PO) Take 1 tablet by mouth daily      Nurtec 75 MG TBDP DISSOLVE 1 TABLET  IN MOUTH EVERY OTHER DAY 16 tablet 11    oxyCODONE (ROXICODONE) 15 mg immediate release tablet Take 15 mg by mouth every 4 (four) hours as needed      oxyCODONE (Roxicodone) 5 immediate release tablet Take 1 tablet (5 mg total) by mouth every 4 (four) hours as needed for moderate pain for up to 15 doses Max Daily Amount: 30 mg 15 tablet 0    potassium chloride (Klor-Con) 10 mEq tablet Take 10 mEq by mouth daily      PROAIR  (90 Base) MCG/ACT inhaler Inhale 1-2 puffs as needed       propranolol (INDERAL LA) 160 mg Take 160 mg by mouth 3 (three) times a day  0    rosuvastatin (CRESTOR) 10 MG tablet Take 10 mg by mouth daily      telmisartan-hydrochlorothiazide (MICARDIS HCT) 80-12.5 MG per tablet Take 1 tablet by mouth daily 30 tablet 3    valACYclovir (VALTREX) 1,000 mg tablet 1 tablet as needed       Xiidra 5 % op solution instill 1 drop into both eyes twice a day      Atogepant (Qulipta) 60 MG TABS Take 60 mg by mouth in the morning (Patient not taking: Reported on 10/24/2024) 30 tablet 11    buPROPion (WELLBUTRIN SR) 150 mg 12 hr tablet Take 1 tablet (150 mg total) by mouth 2 (two) times a day (Patient not taking: Reported on 10/24/2024) 180 tablet 3    Cetirizine HCl 10 MG CAPS Take 10 mg by mouth daily as needed (Patient not taking: Reported on 12/5/2024)      estradiol (ESTRACE VAGINAL) 0.1 mg/g vaginal cream Insert 1 g into the vagina daily at bedtime for 7 days, THEN 1 g 2 (two) times a week. (Patient not taking: Reported on 12/5/2024) 42.5 g 0    lasmiditan succinate (Reyvow) 100 mg tablet TAKE 1 TABLET (100MG) ONE TIME AS NEEDED FOR MIGRAINE.DO NOT USE MORE THAN ONE DOSEPER DAY OR MORE THAN 8 DOSES PER MONTH (Patient not taking: Reported on 12/5/2024) 8 tablet 5    Ozempic, 1 MG/DOSE, 4 MG/3ML injection pen inject 1 milligram subcutaneously every week (Patient not taking: Reported on 12/5/2024)      prochlorperazine (COMPAZINE) 10 mg tablet Take 1 tablet (10 mg total) by mouth every 6 (six) hours as  "needed (migraine) (Patient not taking: Reported on 10/24/2024) 20 tablet 3    rosuvastatin (CRESTOR) 5 mg tablet Take 5 mg by mouth daily (Patient not taking: Reported on 6/14/2024)      telmisartan (MICARDIS) 80 MG tablet Take 80 mg by mouth daily (Patient not taking: Reported on 6/14/2024)       Current Facility-Administered Medications on File Prior to Visit   Medication Dose Route Frequency Provider Last Rate Last Admin    Botulinum Toxin Type A SOLR 200 Units  200 Units Injection Q3 Months Kassie Andrade PA-C   200 Units at 03/18/21 1154     Allergies   Allergen Reactions    Pregabalin      Other reaction(s): blurred vision    Shellfish-Derived Products - Food Allergy Anaphylaxis and Hives    Sumatriptan      Other reaction(s): Altered Heart Rate    Triptans Shortness Of Breath and Palpitations    Latex Swelling and Rash     Other reaction(s): Hives/Skin Rash  Other reaction(s): Hives/Skin Rash    Sulfa Antibiotics Swelling and Rash     Other reaction(s): Hives/Skin Rash  Other reaction(s): Hives/Skin Rash    Monistat [Miconazole] Swelling           Objective   /60 (BP Location: Right arm, Patient Position: Sitting, Cuff Size: Standard)   Temp 98.4 °F (36.9 °C) (Tympanic Core)   Ht 5' 5\" (1.651 m)   Wt 89.4 kg (197 lb)   LMP  (LMP Unknown)   BMI 32.78 kg/m²      Physical Exam  Vitals and nursing note reviewed.   Constitutional:       General: She is not in acute distress.     Appearance: She is well-developed.   HENT:      Head: Normocephalic and atraumatic.   Eyes:      Conjunctiva/sclera: Conjunctivae normal.   Cardiovascular:      Rate and Rhythm: Normal rate and regular rhythm.      Heart sounds: No murmur heard.  Pulmonary:      Effort: Pulmonary effort is normal. No respiratory distress.      Breath sounds: Normal breath sounds.   Abdominal:      Palpations: Abdomen is soft.      Tenderness: There is no abdominal tenderness.   Musculoskeletal:         General: No swelling.      Cervical back: " Neck supple.   Skin:     General: Skin is warm and dry.   Neurological:      Mental Status: She is alert.   Psychiatric:         Mood and Affect: Mood normal.

## 2024-12-05 NOTE — PATIENT INSTRUCTIONS
Scheduled date of EGD/colonoscopy (as of today): 12/18/24  Physician performing EGD/colonoscopy: Dr. Rodriguez  Location of EGD/colonoscopy:   Desired bowel prep reviewed with patient: Golytely  Instructions reviewed with patient by: Mariana  Clearances:  Eliquis-sent to pcp    Pt will call to schedule U/S

## 2024-12-07 ENCOUNTER — APPOINTMENT (OUTPATIENT)
Dept: RADIOLOGY | Age: 49
End: 2024-12-07
Payer: MEDICARE

## 2024-12-07 ENCOUNTER — OFFICE VISIT (OUTPATIENT)
Dept: OBGYN CLINIC | Facility: CLINIC | Age: 49
End: 2024-12-07
Payer: MEDICARE

## 2024-12-07 VITALS
WEIGHT: 197 LBS | HEART RATE: 75 BPM | SYSTOLIC BLOOD PRESSURE: 118 MMHG | BODY MASS INDEX: 32.82 KG/M2 | HEIGHT: 65 IN | DIASTOLIC BLOOD PRESSURE: 74 MMHG

## 2024-12-07 DIAGNOSIS — M25.571 CHRONIC PAIN OF RIGHT ANKLE: Primary | ICD-10-CM

## 2024-12-07 DIAGNOSIS — M25.571 ACUTE RIGHT ANKLE PAIN: ICD-10-CM

## 2024-12-07 DIAGNOSIS — G89.29 CHRONIC PAIN OF RIGHT ANKLE: Primary | ICD-10-CM

## 2024-12-07 DIAGNOSIS — R29.898 ANKLE WEAKNESS: ICD-10-CM

## 2024-12-07 DIAGNOSIS — M19.071 PRIMARY OSTEOARTHRITIS OF RIGHT ANKLE: ICD-10-CM

## 2024-12-07 PROCEDURE — 73610 X-RAY EXAM OF ANKLE: CPT

## 2024-12-07 PROCEDURE — 99213 OFFICE O/P EST LOW 20 MIN: CPT | Performed by: PHYSICIAN ASSISTANT

## 2024-12-07 NOTE — PROGRESS NOTES
"Orthopaedic Surgery - Office Note  Adamaris Brian (49 y.o. adult)   : 1975   MRN: [id number]  Encounter Date: 2024    Chief Complaint   Patient presents with    Right Ankle - Pain         Assessment/Plan  Diagnoses and all orders for this visit:    Chronic pain of right ankle  -     XR ankle 3+ vw right; Future    Ankle weakness    Primary osteoarthritis of right ankle-mild    The diagnosis as well as treatment options were reviewed with the patient in the office today.  X-ray interpretations were reviewed with the patient in the office today.  I would recommend initial conservative treatment of icing the ankle 20 minutes on 1 hour off 3 times a day.  She may use Tylenol as needed for pain.  I would not recommend oral NSAIDs secondary to concurrent Eliquis usage  She may start formal physical therapy to optimize range of motion and strength and decrease symptoms.    If her symptoms do not respond to initial conservative care I would recommend considering advanced imaging to rule out loose body causing some of her symptoms.  All question concerns were answered in the office today     Return for Recheck in 3-4 weeks with myself.        History of Present Illness  This is a previous orthopedic patient with new right ankle pain.  Patient reports she has had ankle pain for over a year.  She denies any known injury.  She has not had any treatment for the ankle.  The ankle pain is located in the anterior aspect of the ankle and does not radiate.  She reports the pain is intermittent and somewhat related to her activity level.  She reports there is times she has no pain.  She reports at times it feels like the ankle is clicking and there is something caught in the ankle.  She has not had any falls or instability sensation.  She does not notice any swelling.  She does report \"I have arthritis all over my body\".      Patient has a contributing medical history of gout, fibromyalgia, chronic narcotic dependency, and " "DVT.  Patient takes Eliquis daily.    Review of Systems  Pertinent items are noted in HPI.  All other systems were reviewed and are negative.    Physical Exam  /74 (BP Location: Left arm, Patient Position: Sitting, Cuff Size: Large)   Pulse 75   Ht 5' 5\" (1.651 m)   Wt 89.4 kg (197 lb)   LMP  (LMP Unknown)   BMI 32.78 kg/m²   Cons: Appears well.  No apparent distress.  Psych: Alert. Oriented x3.  Mood and affect normal.    Patient's right ankle is without skin breakdown lesion or signs of infection.  There is no intra-articular effusion.  She is diffusely tender through the anterior ankle mildly.  She is nontender on the medial and lateral malleolus.  She is nontender over the ATFL or CFL.  She has no instability to anterior drawer.  She has mild loss of motion to dorsiflexion and plantarflexion with associated weakness to 4 out of 5 and mild pain.  She is nontender in the midfoot and fifth metatarsal.  Dorsalis pedis and posterior tibial pulses are +2.  She is nontender at the posterior tibial, peroneal, and Achilles tendon.  She has no Achilles tendinitis.  There is no evidence of DVT or infection.  Gait is within normal limits in sneakers.                Studies Reviewed  Independent review of x-rays performed in the office today show no acute fractures or dislocations.  Mortise is intact.  Early degenerative changes are seen in the anterior ankle.  X-rays were reviewed from orthopedic standpoint will await official radiologist interpretation.      Procedures  No procedures today.    Medical, Surgical, Family, and Social History  The patient's medical history, family history, and social history, were reviewed and updated as appropriate.    Past Medical History:   Diagnosis Date    Arthritis     Asthma     Bipolar 1 disorder (HCC)     Chronic narcotic dependence (HCC)     Cluster headache     COVID     2021    CTS (carpal tunnel syndrome)     left hand    Difficulty walking     Disease of thyroid gland  "    Fibromyalgia     Fibromyalgia, primary     Gout     Headache, tension-type     History of DVT in adulthood     Hyperlipidemia     Hypertension     Interstitial cystitis     Lumbar herniated disc     Lupus     Memory loss     Migraine     Mild sleep apnea     no CPAP    Peripheral neuropathy     PONV (postoperative nausea and vomiting)     Vision loss        Past Surgical History:   Procedure Laterality Date    COLONOSCOPY      DISTAL CLAVICLE EXCISION Right 4/24/2024    Procedure: DISTAL CLAVICLE EXCISION, acromioplasty;  Surgeon: Luke Gamez MD;  [facility];  Service: Orthopedics    GALLBLADDER SURGERY  2008    HYSTERECTOMY  2014    LAPAROSCOPIC COLON RESECTION  2001    [place] NDSC WRST SURG W/RLS TRANSVRS CARPL LIGM Right 12/06/2023    Procedure: Right endoscopic carpal tunnel release;  Surgeon: Alek Richards MD;  [facility];  Service: Orthopedics    [place] SURGICAL ARTHROSCOPY SHOULDER W/ROTATOR CUFF RPR Right 4/24/2024    Procedure: REPAIR ROTATOR CUFF  ARTHROSCOPIC;  Surgeon: Luke Gamez MD;  [facility];  Service: Orthopedics    [place] TENDON SHEATH INCISION Right 12/06/2023    Procedure: Right ring trigger finger release;  Surgeon: Alek Richards MD;  [facility];  Service: Orthopedics       Family History   Problem Relation Age of Onset    Lupus Mother     Osteoporosis Mother     Hypotension Mother     Hypertension Father     Diabetes Father     Heart disease Father     Migraines Father     Stroke Father     Diabetes Sister     Schizophrenia Sister     Hypertension Sister     No Known Problems Daughter     No Known Problems Daughter     Diabetes Maternal Grandmother     Rheum arthritis Maternal Grandmother     Hypertension Maternal Grandmother     Neuropathy Maternal Grandmother     Seizures Maternal Grandmother     No Known Problems Maternal Grandfather     No Known Problems Paternal Grandmother     No Known Problems Paternal Grandfather     Breast cancer  Maternal Aunt 40    No Known Problems Maternal Aunt     No Known Problems Maternal Aunt     No Known Problems Maternal Aunt     Endometrial cancer Paternal Aunt 60       Social History     Occupational History    Not on file   Tobacco Use    Smoking status: Former     Current packs/day: 1.00     Average packs/day: 1 pack/day for 5.0 years (5.0 ttl pk-yrs)     Types: Cigarettes    Smokeless tobacco: Never    Tobacco comments:     quit at 17 years old   Vaping Use    Vaping status: Never Used   Substance and Sexual Activity    Alcohol use: No    Drug use: No    Sexual activity: Yes     Partners: Male     Birth control/protection: Female Sterilization       Allergies   Allergen Reactions    Pregabalin      Other reaction(s): blurred vision    Shellfish-Derived Products - Food Allergy Anaphylaxis and Hives    Sumatriptan      Other reaction(s): Altered Heart Rate    Triptans Shortness Of Breath and Palpitations    Latex Swelling and Rash     Other reaction(s): Hives/Skin Rash  Other reaction(s): Hives/Skin Rash    Sulfa Antibiotics Swelling and Rash     Other reaction(s): Hives/Skin Rash  Other reaction(s): Hives/Skin Rash    Monistat [Miconazole] Swelling         Current Outpatient Medications:     acetaminophen (TYLENOL) 325 mg tablet, Take 1 tablet (325 mg total) by mouth every 6 (six) hours as needed for mild pain, Disp: 30 tablet, Rfl: 0    apixaban (Eliquis) 5 mg, Take 2 tablets (10 mg total) by mouth 2 (two) times a day for 7 days, THEN 1 tablet (5 mg total) 2 (two) times a day for 23 days., Disp: 74 tablet, Rfl: 0    aspirin-acetaminophen-caffeine (EXCEDRIN MIGRAINE) 250-250-65 MG per tablet, Take 2 tablets by mouth if needed for headaches, Disp: , Rfl:     Atogepant (Qulipta) 60 MG TABS, Take 60 mg by mouth in the morning (Patient not taking: Reported on 10/24/2024), Disp: 30 tablet, Rfl: 11    buPROPion (WELLBUTRIN SR) 150 mg 12 hr tablet, Take 1 tablet (150 mg total) by mouth 2 (two) times a day (Patient not  taking: Reported on 10/24/2024), Disp: 180 tablet, Rfl: 3    Cetirizine HCl 10 MG CAPS, Take 10 mg by mouth daily as needed (Patient not taking: Reported on 12/5/2024), Disp: , Rfl:     ergocalciferol (VITAMIN D2) 50,000 units, Take 50,000 Units by mouth once a week sundays, Disp: , Rfl:     estradiol (ESTRACE VAGINAL) 0.1 mg/g vaginal cream, Insert 1 g into the vagina daily at bedtime for 7 days, THEN 1 g 2 (two) times a week. (Patient not taking: Reported on 12/5/2024), Disp: 42.5 g, Rfl: 0    famotidine (PEPCID) 20 mg tablet, Take 20 mg by mouth every evening, Disp: , Rfl:     fluticasone (FLONASE) 50 mcg/act nasal spray, 1 spray into each nostril daily, Disp: 16 g, Rfl: 0    fluticasone (FLONASE) 50 mcg/act nasal spray, 1 spray into each nostril daily, Disp: 9.9 mL, Rfl: 0    fluticasone (FLOVENT HFA) 110 MCG/ACT inhaler, Inhale 1 puff as needed , Disp: , Rfl:     hydroxychloroquine (PLAQUENIL) 200 mg tablet, Take 200 mg by mouth as needed During Summer time, Disp: , Rfl:     lasmiditan succinate (Reyvow) 100 mg tablet, TAKE 1 TABLET (100MG) ONE TIME AS NEEDED FOR MIGRAINE.DO NOT USE MORE THAN ONE DOSEPER DAY OR MORE THAN 8 DOSES PER MONTH (Patient not taking: Reported on 12/5/2024), Disp: 8 tablet, Rfl: 5    levothyroxine 88 mcg tablet, Take 88 mcg by mouth daily , Disp: , Rfl:     meclizine (ANTIVERT) 12.5 MG tablet, Take 12.5 mg by mouth as needed, Disp: , Rfl:     Multiple Vitamins-Minerals (MULTIVITAMIN ADULT PO), Take 1 tablet by mouth daily, Disp: , Rfl:     Nurtec 75 MG TBDP, DISSOLVE 1 TABLET IN MOUTH EVERY OTHER DAY, Disp: 16 tablet, Rfl: 11    oxyCODONE (ROXICODONE) 15 mg immediate release tablet, Take 15 mg by mouth every 4 (four) hours as needed, Disp: , Rfl:     oxyCODONE (Roxicodone) 5 immediate release tablet, Take 1 tablet (5 mg total) by mouth every 4 (four) hours as needed for moderate pain for up to 15 doses Max Daily Amount: 30 mg, Disp: 15 tablet, Rfl: 0    Ozempic, 1 MG/DOSE, 4 MG/3ML  injection pen, inject 1 milligram subcutaneously every week (Patient not taking: Reported on 12/5/2024), Disp: , Rfl:     pantoprazole (PROTONIX) 40 mg tablet, Take 1 tablet (40 mg total) by mouth daily 30 minutes before breakfast, Disp: 30 tablet, Rfl: 3    polyethylene glycol (GOLYTELY) 4000 mL solution, Take as directed by the office for colonoscopy., Disp: 4000 mL, Rfl: 0    potassium chloride (Klor-Con) 10 mEq tablet, Take 10 mEq by mouth daily, Disp: , Rfl:     PROAIR  (90 Base) MCG/ACT inhaler, Inhale 1-2 puffs as needed , Disp: , Rfl:     prochlorperazine (COMPAZINE) 10 mg tablet, Take 1 tablet (10 mg total) by mouth every 6 (six) hours as needed (migraine) (Patient not taking: Reported on 10/24/2024), Disp: 20 tablet, Rfl: 3    propranolol (INDERAL LA) 160 mg, Take 160 mg by mouth 3 (three) times a day, Disp: , Rfl: 0    rosuvastatin (CRESTOR) 10 MG tablet, Take 10 mg by mouth daily, Disp: , Rfl:     rosuvastatin (CRESTOR) 5 mg tablet, Take 5 mg by mouth daily (Patient not taking: Reported on 6/14/2024), Disp: , Rfl:     telmisartan (MICARDIS) 80 MG tablet, Take 80 mg by mouth daily (Patient not taking: Reported on 6/14/2024), Disp: , Rfl:     telmisartan-hydrochlorothiazide (MICARDIS HCT) 80-12.5 MG per tablet, Take 1 tablet by mouth daily, Disp: 30 tablet, Rfl: 3    valACYclovir (VALTREX) 1,000 mg tablet, 1 tablet as needed , Disp: , Rfl:     Xiidra 5 % op solution, instill 1 drop into both eyes twice a day, Disp: , Rfl:     Current Facility-Administered Medications:     Botulinum Toxin Type A SOLR 200 Units, 200 Units, Injection, Q3 Months, Kassie Andrade PA-C, 200 Units at 03/18/21 1154      Brett Daugherty PA-C

## 2024-12-19 ENCOUNTER — HOSPITAL ENCOUNTER (OUTPATIENT)
Dept: GASTROENTEROLOGY | Facility: HOSPITAL | Age: 49
Setting detail: OUTPATIENT SURGERY
End: 2024-12-19
Payer: MEDICARE

## 2024-12-19 ENCOUNTER — ANESTHESIA EVENT (OUTPATIENT)
Dept: GASTROENTEROLOGY | Facility: HOSPITAL | Age: 49
End: 2024-12-19
Payer: MEDICARE

## 2024-12-19 ENCOUNTER — ANESTHESIA (OUTPATIENT)
Dept: GASTROENTEROLOGY | Facility: HOSPITAL | Age: 49
End: 2024-12-19
Payer: MEDICARE

## 2024-12-19 ENCOUNTER — NURSE TRIAGE (OUTPATIENT)
Dept: OTHER | Facility: OTHER | Age: 49
End: 2024-12-19

## 2024-12-19 VITALS
TEMPERATURE: 97.2 F | SYSTOLIC BLOOD PRESSURE: 115 MMHG | DIASTOLIC BLOOD PRESSURE: 67 MMHG | OXYGEN SATURATION: 98 % | HEART RATE: 78 BPM | RESPIRATION RATE: 18 BRPM

## 2024-12-19 DIAGNOSIS — K21.9 GASTROESOPHAGEAL REFLUX DISEASE, UNSPECIFIED WHETHER ESOPHAGITIS PRESENT: ICD-10-CM

## 2024-12-19 DIAGNOSIS — Z12.11 COLON CANCER SCREENING: ICD-10-CM

## 2024-12-19 DIAGNOSIS — R10.13 EPIGASTRIC PAIN: ICD-10-CM

## 2024-12-19 LAB — GLUCOSE SERPL-MCNC: 110 MG/DL (ref 65–140)

## 2024-12-19 PROCEDURE — 88305 TISSUE EXAM BY PATHOLOGIST: CPT | Performed by: PATHOLOGY

## 2024-12-19 PROCEDURE — G0121 COLON CA SCRN NOT HI RSK IND: HCPCS | Performed by: INTERNAL MEDICINE

## 2024-12-19 PROCEDURE — 82948 REAGENT STRIP/BLOOD GLUCOSE: CPT

## 2024-12-19 PROCEDURE — 43239 EGD BIOPSY SINGLE/MULTIPLE: CPT | Performed by: INTERNAL MEDICINE

## 2024-12-19 RX ORDER — LIDOCAINE HYDROCHLORIDE 10 MG/ML
INJECTION, SOLUTION EPIDURAL; INFILTRATION; INTRACAUDAL; PERINEURAL AS NEEDED
Status: DISCONTINUED | OUTPATIENT
Start: 2024-12-19 | End: 2024-12-19

## 2024-12-19 RX ORDER — PROPOFOL 10 MG/ML
INJECTION, EMULSION INTRAVENOUS AS NEEDED
Status: DISCONTINUED | OUTPATIENT
Start: 2024-12-19 | End: 2024-12-19

## 2024-12-19 RX ORDER — SODIUM CHLORIDE, SODIUM LACTATE, POTASSIUM CHLORIDE, CALCIUM CHLORIDE 600; 310; 30; 20 MG/100ML; MG/100ML; MG/100ML; MG/100ML
100 INJECTION, SOLUTION INTRAVENOUS CONTINUOUS
Status: DISCONTINUED | OUTPATIENT
Start: 2024-12-19 | End: 2024-12-23 | Stop reason: HOSPADM

## 2024-12-19 RX ORDER — SODIUM CHLORIDE, SODIUM LACTATE, POTASSIUM CHLORIDE, CALCIUM CHLORIDE 600; 310; 30; 20 MG/100ML; MG/100ML; MG/100ML; MG/100ML
100 INJECTION, SOLUTION INTRAVENOUS CONTINUOUS
Status: CANCELLED | OUTPATIENT
Start: 2024-12-19

## 2024-12-19 RX ORDER — SODIUM CHLORIDE, SODIUM LACTATE, POTASSIUM CHLORIDE, CALCIUM CHLORIDE 600; 310; 30; 20 MG/100ML; MG/100ML; MG/100ML; MG/100ML
125 INJECTION, SOLUTION INTRAVENOUS CONTINUOUS
Status: DISCONTINUED | OUTPATIENT
Start: 2024-12-19 | End: 2024-12-23 | Stop reason: HOSPADM

## 2024-12-19 RX ADMIN — SODIUM CHLORIDE, SODIUM LACTATE, POTASSIUM CHLORIDE, AND CALCIUM CHLORIDE 125 ML/HR: .6; .31; .03; .02 INJECTION, SOLUTION INTRAVENOUS at 10:16

## 2024-12-19 RX ADMIN — PROPOFOL 50 MG: 10 INJECTION, EMULSION INTRAVENOUS at 11:40

## 2024-12-19 RX ADMIN — PROPOFOL 50 MG: 10 INJECTION, EMULSION INTRAVENOUS at 11:32

## 2024-12-19 RX ADMIN — PROPOFOL 50 MG: 10 INJECTION, EMULSION INTRAVENOUS at 11:28

## 2024-12-19 RX ADMIN — PROPOFOL 30 MG: 10 INJECTION, EMULSION INTRAVENOUS at 11:43

## 2024-12-19 RX ADMIN — LIDOCAINE HYDROCHLORIDE 50 MG: 10 SOLUTION INTRAVENOUS at 11:19

## 2024-12-19 RX ADMIN — SODIUM CHLORIDE, SODIUM LACTATE, POTASSIUM CHLORIDE, AND CALCIUM CHLORIDE: .6; .31; .03; .02 INJECTION, SOLUTION INTRAVENOUS at 11:14

## 2024-12-19 RX ADMIN — PROPOFOL 50 MG: 10 INJECTION, EMULSION INTRAVENOUS at 11:34

## 2024-12-19 RX ADMIN — PROPOFOL 100 MG: 10 INJECTION, EMULSION INTRAVENOUS at 11:19

## 2024-12-19 RX ADMIN — PROPOFOL 50 MG: 10 INJECTION, EMULSION INTRAVENOUS at 11:25

## 2024-12-19 RX ADMIN — PROPOFOL 50 MG: 10 INJECTION, EMULSION INTRAVENOUS at 11:37

## 2024-12-19 RX ADMIN — PROPOFOL 50 MG: 10 INJECTION, EMULSION INTRAVENOUS at 11:22

## 2024-12-19 NOTE — TELEPHONE ENCOUNTER
"Reason for Disposition  • [1] Caller has URGENT question or concern AND [2] triager unable to answer question    Answer Assessment - Initial Assessment Questions  1. DATE/TIME: \"When did you have your colonoscopy?\"       Today at 10 am     2. MAIN CONCERN: \"What is your main concern right now?\" \"What questions do you have?\"      Accidentally ate a little bit of rice and chicken at 5 pm yesterday when she took her pills  Did complete the bowel prep and stool is running clear.        ESC placed to on call provider to inquire if test should be rescheduled.  Ok to proceed with test per provider, patient notified, agreeable to plan.    Protocols used: Colonoscopy Symptoms and Questions-Adult-    "

## 2024-12-19 NOTE — ANESTHESIA PREPROCEDURE EVALUATION
Medical History    History Comments   PONV (postoperative nausea and vomiting)    Chronic narcotic dependence (HCC)    Hypertension    Bipolar 1 disorder (HCC)    Hyperlipidemia    Migraine    Gout    Disease of thyroid gland    Lupus    Fibromyalgia    Asthma    Peripheral neuropathy    Cluster headache    Headache, tension-type    CTS (carpal tunnel syndrome) left hand   Memory loss    Vision loss    Fibromyalgia, primary    Difficulty walking    Mild sleep apnea no CPAP   Lumbar herniated disc    Arthritis    History of DVT in adulthood    COVID 2021   Interstitial cystitis    Procedure:  COLONOSCOPY  EGD  ponv  Relevant Problems   CARDIO   (+) AVNRT (AV win re-entry tachycardia) (HCC)   (+) Chronic migraine without aura without status migrainosus, not intractable   (+) Deep vein thrombosis (DVT) of distal vein of lower extremity (HCC)   (+) Primary hypertension      GYN   (+) History of hysterectomy      NEURO/PSYCH   (+) Cervicogenic headache   (+) Chronic migraine without aura without status migrainosus, not intractable   (+) Other specified anxiety disorders      PULMONARY   (+) CK (obstructive sleep apnea)        Physical Exam    Airway    Mallampati score: III  TM Distance: <3 FB  Neck ROM: full     Dental       Cardiovascular  Rate: normal    Pulmonary  Pulmonary exam normal     Other Findings  Per pt denies anything remaining that is loose or removeablepost-pubertal.      Anesthesia Plan  ASA Score- 3     Anesthesia Type- IV sedation with anesthesia with ASA Monitors.         Additional Monitors:     Airway Plan:            Plan Factors-Exercise tolerance (METS): >4 METS.    Chart reviewed.    Patient summary reviewed.    Patient is not a current smoker.              Induction- intravenous.    Postoperative Plan-         Informed Consent- Anesthetic plan and risks discussed with patient.  I personally reviewed this patient with the CRNA. Discussed and agreed on the Anesthesia Plan with the  CRNA..

## 2024-12-19 NOTE — INTERVAL H&P NOTE
H&P reviewed. After examining the patient I find no changes in the patients condition since the H&P had been written.    Vitals:    12/19/24 1006   BP: 133/77   Pulse: 73   Resp: 15   Temp: 97.6 °F (36.4 °C)   SpO2: 99%     
[Negative] : Breast

## 2024-12-19 NOTE — TELEPHONE ENCOUNTER
"Regarding: Colonoscopy prep  ----- Message from Mikayla CHARLES sent at 12/19/2024  6:41 AM EST -----  \"I'm scheduled for a colonoscopy, but I misread the instructions and I ate around 5pm yesterday. My stool is running clear.\"    "

## 2024-12-19 NOTE — ANESTHESIA POSTPROCEDURE EVALUATION
Post-Op Assessment Note    CV Status:  Stable    Pain management: adequate       Mental Status:  Alert and awake   Hydration Status:  Euvolemic   PONV Controlled:  Controlled   Airway Patency:  Patent     Post Op Vitals Reviewed: Yes    No anethesia notable event occurred.    Staff: CRNA           Last Filed PACU Vitals:  Vitals Value Taken Time   Temp 97.2 °F (36.2 °C) 12/19/24 1159   Pulse 78 12/19/24 1159   /67 12/19/24 1159   Resp 18 12/19/24 1159   SpO2 98 % 12/19/24 1159       Modified Key:  Activity: 2 (12/19/2024 11:51 AM)  Respiration: 2 (12/19/2024 11:51 AM)  Circulation: 2 (12/19/2024 11:51 AM)  Consciousness: 1 (12/19/2024 11:51 AM)  Oxygen Saturation: 2 (12/19/2024 11:51 AM)  Modified Key Score: 9 (12/19/2024 11:51 AM)

## 2024-12-24 ENCOUNTER — RESULTS FOLLOW-UP (OUTPATIENT)
Dept: GASTROENTEROLOGY | Facility: CLINIC | Age: 49
End: 2024-12-24

## 2024-12-24 PROCEDURE — 88305 TISSUE EXAM BY PATHOLOGIST: CPT | Performed by: PATHOLOGY

## 2024-12-27 DIAGNOSIS — K21.9 GASTROESOPHAGEAL REFLUX DISEASE, UNSPECIFIED WHETHER ESOPHAGITIS PRESENT: ICD-10-CM

## 2024-12-27 RX ORDER — PANTOPRAZOLE SODIUM 40 MG/1
TABLET, DELAYED RELEASE ORAL
Qty: 90 TABLET | Refills: 1 | Status: SHIPPED | OUTPATIENT
Start: 2024-12-27

## 2025-01-14 DIAGNOSIS — N94.10 DYSPAREUNIA IN FEMALE: ICD-10-CM

## 2025-01-14 DIAGNOSIS — N89.8 VAGINAL DRYNESS: ICD-10-CM

## 2025-01-17 RX ORDER — ESTRADIOL 0.1 MG/G
1 CREAM VAGINAL 2 TIMES WEEKLY
Qty: 42.5 G | Refills: 1 | Status: SHIPPED | OUTPATIENT
Start: 2025-01-20

## 2025-01-30 ENCOUNTER — PROCEDURE VISIT (OUTPATIENT)
Dept: NEUROLOGY | Facility: CLINIC | Age: 50
End: 2025-01-30
Payer: MEDICARE

## 2025-01-30 VITALS — DIASTOLIC BLOOD PRESSURE: 77 MMHG | TEMPERATURE: 98.1 F | SYSTOLIC BLOOD PRESSURE: 135 MMHG | HEART RATE: 87 BPM

## 2025-01-30 DIAGNOSIS — G43.709 CHRONIC MIGRAINE WITHOUT AURA WITHOUT STATUS MIGRAINOSUS, NOT INTRACTABLE: Primary | ICD-10-CM

## 2025-01-30 PROCEDURE — 64615 CHEMODENERV MUSC MIGRAINE: CPT | Performed by: PHYSICIAN ASSISTANT

## 2025-01-30 NOTE — PROGRESS NOTES
"Universal Protocol   procedure performed by consultantConsent: Verbal consent obtained. Written consent obtained.  Risks and benefits: risks, benefits and alternatives were discussed  Consent given by: patient  Time out: Immediately prior to procedure a \"time out\" was called to verify the correct patient, procedure, equipment, support staff and site/side marked as required.  Patient understanding: patient states understanding of the procedure being performed  Patient consent: the patient's understanding of the procedure matches consent given  Procedure consent: procedure consent matches procedure scheduled  Relevant documents: relevant documents present and verified  Patient identity confirmed: verbally with patient      Chemodenervation     Date/Time  1/30/2025 3:30 PM     Performed by  Kassie Andrade PA-C   Authorized by  Kassie Andrade PA-C     Pre-procedure details      Prepped With: Alcohol     Anesthesia  (see MAR for exact dosages):     Anesthesia method:  None   Procedure details      Position:  Upright   Botox      Botox Type:  Type A    Brand:  Botox    mL's of Botulinum Toxin:  200    Final Concentration per CC:  50 units    Needle Gauge:  30 G 2.5 inch   Procedures      Botox Procedures: chronic headache      Indications: migraines     Injection Location      Head / Face:  L superior cervical paraspinal, R superior cervical paraspinal, L , R , L frontalis, R frontalis, L medial occipitalis, R medial occipitalis, procerus, R temporalis, L temporalis, R superior trapezius and L superior trapezius    L  injection amount:  5 unit(s)    R  injection amount:  5 unit(s)    L lateral frontalis:  5 unit(s)    R lateral frontalis:  5 unit(s)    L medial frontalis:  5 unit(s)    R medial frontalis:  5 unit(s)    L temporalis injection amount:  20 unit(s)    R temporalis injection amount:  20 unit(s)    Procerus injection amount:  5 unit(s)    L medial occipitalis injection " amount:  15 unit(s)    R medial occipitalis injection amount:  15 unit(s)    L superior cervical paraspinal injection amount:  10 unit(s)    R superior cervical paraspinal injection amount:  10 unit(s)    L superior trapezius injection amount:  15 unit(s)    R superior trapezius injection amount:  15 unit(s)   Total Units      Total units used:  200    Total units discarded:  0   Post-procedure details      Chemodenervation:  Chronic migraine    Facial Nerve Location::  Bilateral facial nerve    Patient tolerance of procedure:  Tolerated well, no immediate complications   Comments        5 units naris bilaterally  20 units frontalis   15 units occipitalis  All medically necessary

## 2025-02-13 NOTE — PROGRESS NOTES
Name: Adamaris Brian      : 1975      MRN: [id number]  Encounter Provider: Abbi Echavarria MD  Encounter Date: 2025   Encounter department: [facility]  :  Assessment & Plan  Vaginal dryness  Discussed that vaginal atrophy can be left untreated without any serious risks but that the dryness is unlikely to resolve without some form of hormone therapy  Discussed switching from cream to pill suppository which patient would like to try  Follow up PRN or for annual exam     Orders:    estradiol (VAGIFEM, YUVAFEM) 10 MCG TABS vaginal tablet; Insert 1 tablet (10 mcg total) into the vagina daily        History of Present Illness   HPI  Adamaris Brian is a 49 y.o. adult who presents for follow up of her vaginal dryness. She was started on vaginal estrogen in Oct 2024 and used it for 1-2 months 3x/week but stopped due to vaginal irritation. She continues to report dryness and has been using coconut oil regularly without issue. Denies any vaginal bleeding or other gyn concerns.      Review of Systems   Constitutional: Negative.  Negative for chills and fever.   HENT: Negative.     Eyes: Negative.    Respiratory: Negative.  Negative for shortness of breath.    Cardiovascular: Negative.  Negative for chest pain.   Gastrointestinal: Negative.  Negative for abdominal pain.   Genitourinary: Negative.  Negative for pelvic pain, vaginal bleeding and vaginal discharge.   Musculoskeletal: Negative.    Neurological: Negative.    Psychiatric/Behavioral: Negative.     All other systems reviewed and are negative.      Current Outpatient Medications on File Prior to Visit   Medication Sig Dispense Refill    acetaminophen (TYLENOL) 325 mg tablet Take 1 tablet (325 mg total) by mouth every 6 (six) hours as needed for mild pain 30 tablet 0    aspirin-acetaminophen-caffeine (EXCEDRIN MIGRAINE) 250-250-65 MG per tablet Take 2 tablets by mouth if needed for headaches      ergocalciferol (VITAMIN  D2) 50,000 units Take 50,000 Units by mouth once a week sundays      famotidine (PEPCID) 20 mg tablet Take 20 mg by mouth every evening      fluticasone (FLONASE) 50 mcg/act nasal spray 1 spray into each nostril daily 16 g 0    fluticasone (FLONASE) 50 mcg/act nasal spray 1 spray into each nostril daily 9.9 mL 0    fluticasone (FLOVENT HFA) 110 MCG/ACT inhaler Inhale 1 puff as needed       hydroxychloroquine (PLAQUENIL) 200 mg tablet Take 200 mg by mouth as needed During Summer time      levothyroxine 88 mcg tablet Take 88 mcg by mouth daily       meclizine (ANTIVERT) 12.5 MG tablet Take 12.5 mg by mouth as needed      Multiple Vitamins-Minerals (MULTIVITAMIN ADULT PO) Take 1 tablet by mouth daily      Nurtec 75 MG TBDP DISSOLVE 1 TABLET IN MOUTH EVERY OTHER DAY 16 tablet 11    oxyCODONE (ROXICODONE) 15 mg immediate release tablet Take 15 mg by mouth every 4 (four) hours as needed      oxyCODONE (Roxicodone) 5 immediate release tablet Take 1 tablet (5 mg total) by mouth every 4 (four) hours as needed for moderate pain for up to 15 doses Max Daily Amount: 30 mg 15 tablet 0    pantoprazole (PROTONIX) 40 mg tablet TAKE 1 TABLET BY MOUTH DAILY FOR 30 MINUTES BEFORE BREAKFAST 90 tablet 1    polyethylene glycol (GOLYTELY) 4000 mL solution Take as directed by the office for colonoscopy. 4000 mL 0    potassium chloride (Klor-Con) 10 mEq tablet Take 10 mEq by mouth daily      PROAIR  (90 Base) MCG/ACT inhaler Inhale 1-2 puffs as needed       propranolol (INDERAL LA) 160 mg Take 160 mg by mouth 3 (three) times a day  0    telmisartan-hydrochlorothiazide (MICARDIS HCT) 80-12.5 MG per tablet Take 1 tablet by mouth daily 30 tablet 3    valACYclovir (VALTREX) 1,000 mg tablet 1 tablet as needed       Xiidra 5 % op solution instill 1 drop into both eyes twice a day      [DISCONTINUED] estradiol (ESTRACE) 0.1 mg/g vaginal cream Insert 1 g into the vagina 2 (two) times a week 42.5 g 1    apixaban (Eliquis) 5 mg Take 2 tablets  (10 mg total) by mouth 2 (two) times a day for 7 days, THEN 1 tablet (5 mg total) 2 (two) times a day for 23 days. 74 tablet 0    Atogepant (Qulipta) 60 MG TABS Take 60 mg by mouth in the morning (Patient not taking: Reported on 10/24/2024) 30 tablet 11    buPROPion (WELLBUTRIN SR) 150 mg 12 hr tablet Take 1 tablet (150 mg total) by mouth 2 (two) times a day (Patient not taking: Reported on 10/24/2024) 180 tablet 3    Cetirizine HCl 10 MG CAPS Take 10 mg by mouth daily as needed (Patient not taking: Reported on 12/5/2024)      lasmiditan succinate (Reyvow) 100 mg tablet TAKE 1 TABLET (100MG) ONE TIME AS NEEDED FOR MIGRAINE.DO NOT USE MORE THAN ONE DOSEPER DAY OR MORE THAN 8 DOSES PER MONTH (Patient not taking: No sig reported) 8 tablet 5    Ozempic, 1 MG/DOSE, 4 MG/3ML injection pen inject 1 milligram subcutaneously every week (Patient not taking: Reported on 2/14/2025)      prochlorperazine (COMPAZINE) 10 mg tablet Take 1 tablet (10 mg total) by mouth every 6 (six) hours as needed (migraine) (Patient not taking: Reported on 10/24/2024) 20 tablet 3    rosuvastatin (CRESTOR) 10 MG tablet Take 10 mg by mouth daily      rosuvastatin (CRESTOR) 5 mg tablet Take 5 mg by mouth daily (Patient not taking: Reported on 6/14/2024)      telmisartan (MICARDIS) 80 MG tablet Take 80 mg by mouth daily (Patient not taking: Reported on 6/14/2024)       Current Facility-Administered Medications on File Prior to Visit   Medication Dose Route Frequency Provider Last Rate Last Admin    Botulinum Toxin Type A SOLR 200 Units  200 Units Injection Q3 Months Kassie Andrade PA-C   200 Units at 03/18/21 1154      Social History     Tobacco Use    Smoking status: Former     Current packs/day: 1.00     Average packs/day: 1 pack/day for 5.0 years (5.0 ttl pk-yrs)     Types: Cigarettes    Smokeless tobacco: Never    Tobacco comments:     quit at 17 years old   Vaping Use    Vaping status: Never Used   Substance and Sexual Activity    Alcohol use: No  "   Drug use: No    Sexual activity: Yes     Partners: Male     Birth control/protection: Surgical        Objective   /88 (BP Location: Left arm, Patient Position: Sitting, Cuff Size: Large)   Pulse 83   Resp 18   Ht 5' 5\" (1.651 m)   Wt 93.8 kg (206 lb 12.8 oz)   LMP  (LMP Unknown)   BMI 34.41 kg/m²      Physical Exam  Vitals reviewed.   Constitutional:       General: She is not in acute distress.     Appearance: She is normal weight.   HENT:      Mouth/Throat:      Mouth: Mucous membranes are moist.      Pharynx: Oropharynx is clear.   Cardiovascular:      Rate and Rhythm: Normal rate and regular rhythm.   Pulmonary:      Effort: Pulmonary effort is normal. No respiratory distress.   Abdominal:      General: Abdomen is flat. There is no distension.      Palpations: Abdomen is soft.      Tenderness: There is no abdominal tenderness.   Musculoskeletal:         General: Normal range of motion.   Skin:     General: Skin is warm and dry.   Neurological:      General: No focal deficit present.      Mental Status: She is alert and oriented to person, place, and time.   Psychiatric:         Mood and Affect: Mood normal.           Abbi Echavarria MD   OB/Gyn PGY-4  8:56 AM  02/14/25       "

## 2025-02-13 NOTE — ASSESSMENT & PLAN NOTE
Discussed that vaginal atrophy can be left untreated without any serious risks but that the dryness is unlikely to resolve without some form of hormone therapy  Discussed switching from cream to pill suppository which patient would like to try  Follow up PRN or for annual exam     Orders:    estradiol (VAGIFEM, YUVAFEM) 10 MCG TABS vaginal tablet; Insert 1 tablet (10 mcg total) into the vagina daily

## 2025-02-14 ENCOUNTER — OFFICE VISIT (OUTPATIENT)
Dept: OBGYN CLINIC | Facility: CLINIC | Age: 50
End: 2025-02-14

## 2025-02-14 VITALS
HEIGHT: 65 IN | RESPIRATION RATE: 18 BRPM | BODY MASS INDEX: 34.45 KG/M2 | SYSTOLIC BLOOD PRESSURE: 137 MMHG | DIASTOLIC BLOOD PRESSURE: 88 MMHG | HEART RATE: 83 BPM | WEIGHT: 206.8 LBS

## 2025-02-14 DIAGNOSIS — N89.8 VAGINAL DRYNESS: Primary | ICD-10-CM

## 2025-02-14 PROCEDURE — 99213 OFFICE O/P EST LOW 20 MIN: CPT | Performed by: OBSTETRICS & GYNECOLOGY

## 2025-02-14 RX ORDER — ESTRADIOL 10 UG/1
1 INSERT VAGINAL DAILY
Qty: 30 TABLET | Refills: 0 | Status: SHIPPED | OUTPATIENT
Start: 2025-02-14

## 2025-02-19 DIAGNOSIS — Z00.6 ENCOUNTER FOR EXAMINATION FOR NORMAL COMPARISON OR CONTROL IN CLINICAL RESEARCH PROGRAM: ICD-10-CM

## 2025-02-22 ENCOUNTER — APPOINTMENT (EMERGENCY)
Dept: VASCULAR ULTRASOUND | Facility: HOSPITAL | Age: 50
End: 2025-02-22
Payer: MEDICARE

## 2025-02-22 ENCOUNTER — HOSPITAL ENCOUNTER (EMERGENCY)
Facility: HOSPITAL | Age: 50
Discharge: HOME | End: 2025-02-22
Attending: EMERGENCY MEDICINE | Admitting: EMERGENCY MEDICINE
Payer: MEDICARE

## 2025-02-22 VITALS
WEIGHT: 213.41 LBS | SYSTOLIC BLOOD PRESSURE: 148 MMHG | TEMPERATURE: 98.1 F | RESPIRATION RATE: 16 BRPM | OXYGEN SATURATION: 99 % | BODY MASS INDEX: 35.51 KG/M2 | DIASTOLIC BLOOD PRESSURE: 80 MMHG | HEART RATE: 89 BPM

## 2025-02-22 DIAGNOSIS — M19.071 ARTHRITIS OF RIGHT ANKLE: Primary | ICD-10-CM

## 2025-02-22 PROCEDURE — 99283 EMERGENCY DEPT VISIT LOW MDM: CPT

## 2025-02-22 PROCEDURE — 93971 EXTREMITY STUDY: CPT | Performed by: SURGERY

## 2025-02-22 PROCEDURE — 93971 EXTREMITY STUDY: CPT

## 2025-02-22 PROCEDURE — 99284 EMERGENCY DEPT VISIT MOD MDM: CPT

## 2025-02-22 NOTE — ED PROVIDER NOTES
Time reflects when diagnosis was documented in both MDM as applicable and the Disposition within this note       Time User Action Codes Description Comment    2/22/2025 11:17 AM Elmira Mcnamara [M19.071] Arthritis of right ankle           ED Disposition       ED Disposition   Discharge    Condition   Stable    Date/Time   Sat Feb 22, 2025 11:17 AM    Comment   Adamaris Brian discharge to home/self care.                   Assessment & Plan       Medical Decision Making  Differential diagnosis to include but not limited to: arthritis, DVT. No signs concerning or suggestive of plantar fascitis. Neurovascularly intact.  Will obtain venous duplex study. This returned negative. Reviewed ankle x rays from December which showed some arthritis without acute osseous abnormality. No indication for further x ray imaging. Pt reports she had been evaluated by orthopedics and they recommended physical therapy. I reiterated this recommendation and will prescribe Voltaren gel for further relief. Discussed supportive care and strengthening exercises at home. Patient agreeable to this plan. Pt ambulated out of the emergency department. Pt stable at time of discharge, vital signs reviewed, questions answered. Strict ER return precautions provided/discussed and were well understood by patient. Patient's vitals, labs and/or imaging results, diagnosis, and treatment plan were discussed with the patient. All new and/or changed medications were discussed - specifically to include route of administration, how often to take, when to take, and the pharmacy they were sent to. Strict return precautions as well as close follow up with PCP was discussed with the patient and the patient was agreeable to my recommendations.  Patient verbally acknowledged understanding. All labs, imaging were reviewed and used in the medical decision making process (if ordered).     Problems Addressed:  Arthritis of right ankle: acute illness or  injury    Amount and/or Complexity of Data Reviewed  External Data Reviewed: radiology.     Details: Pt with ankle x rays for this complaint on 12/7/2024. Some arthritis noted but no acute osseous abnormality.  Radiology: ordered. Decision-making details documented in ED Course.        ED Course as of 02/22/25 1123   Sat Feb 22, 2025   1109 VAS VENOUS DUPLEX -LOWER LIMB UNILATERAL  Negative for DVT.       Medications - No data to display    ED Risk Strat Scores                                  Brando' Criteria for DVT      Flowsheet Row Most Recent Value   Brando' Criteria for DVT    Active cancer Treatment or palliation within 6 months 0 Filed at: 02/22/2025 1010   Bedridden recently >3 days or major surgery within 12 weeks 0 Filed at: 02/22/2025 1010   Calf swelling >3 cm compared to the other leg 0 Filed at: 02/22/2025 1010   Entire leg swollen 0 Filed at: 02/22/2025 1010   Collateral (nonvaricose) superficial veins present 0 Filed at: 02/22/2025 1010   Localized tenderness along the deep venous system 0 Filed at: 02/22/2025 1010   Pitting edema, confined to symptomatic leg 0 Filed at: 02/22/2025 1010   Paralysis, paresis, or recent plaster immobilization of the lower extremity 0 Filed at: 02/22/2025 1010   Previously documented DVT 1 Filed at: 02/22/2025 1010   Alternative diagnosis to DVT as likely or more likely 2 Filed at: 02/22/2025 1010   Brando DVT Critera Score -1 Filed at: 02/22/2025 1010                      History of Present Illness       Chief Complaint   Patient presents with    Foot Pain     Right foot pain for 2 weeks worse since yesterday. Pt reports already had an xray a month ago then clarifies has had the pain over a month but was worse 2 weeks ago and then even worse yesterday        Past Medical History:   Diagnosis Date    Arthritis     Asthma     Bipolar 1 disorder (HCC)     Chronic narcotic dependence (HCC)     Cluster headache     COVID     2021    CTS (carpal tunnel syndrome)     left hand     Difficulty walking     Disease of thyroid gland     Fibromyalgia     Fibromyalgia, primary     Gout     Headache, tension-type     History of DVT in adulthood     Hyperlipidemia     Hypertension     Interstitial cystitis     Lumbar herniated disc     Lupus     Memory loss     Migraine     Mild sleep apnea     no CPAP    Peripheral neuropathy     PONV (postoperative nausea and vomiting)     Vision loss       Past Surgical History:   Procedure Laterality Date    COLONOSCOPY      DISTAL CLAVICLE EXCISION Right 4/24/2024    Procedure: DISTAL CLAVICLE EXCISION, acromioplasty;  Surgeon: Luke Gamez MD;  Location: [facility];  Service: Orthopedics    GALLBLADDER SURGERY  2008    HYSTERECTOMY  2014    LAPAROSCOPIC COLON RESECTION  2001    KS NDSC WRST SURG W/RLS TRANSVRS CARPL LIGM Right 12/06/2023    Procedure: Right endoscopic carpal tunnel release;  Surgeon: Alek Richards MD;  Location: [facility];  Service: Orthopedics    KS SURGICAL ARTHROSCOPY SHOULDER W/ROTATOR CUFF RPR Right 4/24/2024    Procedure: REPAIR ROTATOR CUFF  ARTHROSCOPIC;  Surgeon: Luke Gamez MD;  Location: [facility];  Service: Orthopedics    KS TENDON SHEATH INCISION Right 12/06/2023    Procedure: Right ring trigger finger release;  Surgeon: Alek Richards MD;  Location: [facility];  Service: Orthopedics      Family History   Problem Relation Age of Onset    Lupus Mother     Osteoporosis Mother     Hypotension Mother     Hypertension Father     Diabetes Father     Heart disease Father     Migraines Father     Stroke Father     Diabetes Sister     Schizophrenia Sister     Hypertension Sister     No Known Problems Daughter     No Known Problems Daughter     Diabetes Maternal Grandmother     Rheum arthritis Maternal Grandmother     Hypertension Maternal Grandmother     Neuropathy Maternal Grandmother     Seizures Maternal Grandmother     No Known Problems Maternal Grandfather     No Known Problems Paternal Grandmother     No Known  Problems Paternal Grandfather     Breast cancer Maternal Aunt 40    No Known Problems Maternal Aunt     No Known Problems Maternal Aunt     No Known Problems Maternal Aunt     Endometrial cancer Paternal Aunt 60      Social History     Tobacco Use    Smoking status: Former     Current packs/day: 1.00     Average packs/day: 1 pack/day for 5.0 years (5.0 ttl pk-yrs)     Types: Cigarettes    Smokeless tobacco: Never    Tobacco comments:     quit at 17 years old   Vaping Use    Vaping status: Never Used   Substance Use Topics    Alcohol use: No    Drug use: No      E-Cigarette/Vaping    E-Cigarette Use Never User       E-Cigarette/Vaping Substances    Nicotine No     THC No     CBD No     Flavoring No     Other No     Unknown No       I have reviewed and agree with the history as documented.     Adamaris is a 49 year old female presenting to the ED for right ankle pain x one month but worsening over the previous two weeks, and severely worsening x yesterday. The pain is located to the lateral malleolus and anterior portion of the ankle joint without overlying color changes. Some swelling to the lateral malleolus. ROM intact though unable to bear weight due to pain. No pain with elevation onto the ball of the foot. Reports when someone touches her foot she feels tingling. No back pain. No leg pain. No previous injury or recent injury/trauma, notes it just began hurting one day. 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 Notes this feels similar to her previous blood clots where she was mostly asymptomatic aside from difficulty bearing weight on the extremity.         Review of Systems   Constitutional:  Negative for chills and fever.   Respiratory:  Negative for cough and shortness of breath.    Cardiovascular:  Negative for chest pain, palpitations and leg swelling.   Musculoskeletal:  Positive for arthralgias and gait problem. Negative for  back pain and joint swelling.   Skin:  Negative for color change and wound.   Neurological:  Negative for light-headedness and headaches.           Objective       ED Triage Vitals   Temperature Pulse Blood Pressure Respirations SpO2 Patient Position - Orthostatic VS   02/22/25 0944 02/22/25 0945 02/22/25 0945 02/22/25 0945 02/22/25 0945 --   98.1 °F (36.7 °C) 89 148/80 16 99 %       Temp Source Heart Rate Source BP Location FiO2 (%) Pain Score    02/22/25 0944 -- -- -- --    Oral          Vitals      Date and Time Temp Pulse SpO2 Resp BP Pain Score FACES Pain Rating User   02/22/25 0945 -- 89 99 % 16 148/80 -- -- RLN   02/22/25 0944 98.1 °F (36.7 °C) -- -- -- -- -- -- RLN            Physical Exam  Vitals reviewed.   Constitutional:       General: She is not in acute distress.     Appearance: Normal appearance. She is not ill-appearing or toxic-appearing.   HENT:      Head: Normocephalic and atraumatic.      Right Ear: External ear normal.      Left Ear: External ear normal.      Nose: Nose normal.   Eyes:      General: No scleral icterus.        Right eye: No discharge.         Left eye: No discharge.      Extraocular Movements: Extraocular movements intact.      Conjunctiva/sclera: Conjunctivae normal.   Cardiovascular:      Rate and Rhythm: Normal rate.      Pulses: Normal pulses.           Dorsalis pedis pulses are 2+ on the right side and 2+ on the left side.        Posterior tibial pulses are 2+ on the right side and 2+ on the left side.   Pulmonary:      Effort: Pulmonary effort is normal. No respiratory distress.   Musculoskeletal:         General: Normal range of motion.      Cervical back: Normal range of motion.      Right lower leg: No edema.      Left lower leg: No edema.   Feet:      Comments:   With palpation to the right lateral malleolus and anterior ankle joint patient reports discomfort with tingling sensation. No pain to the palpation of the plantar fascia to suggest plantar fascitis.     ROM  intact to bilateral feet.  Skin:     General: Skin is warm and dry.      Capillary Refill: Capillary refill takes less than 2 seconds.   Neurological:      General: No focal deficit present.      Mental Status: She is alert.   Psychiatric:         Mood and Affect: Mood normal.         Behavior: Behavior normal.         Results Reviewed       None            VAS VENOUS DUPLEX -LOWER LIMB UNILATERAL    (Results Pending)       Procedures    ED Medication and Procedure Management   Prior to Admission Medications   Prescriptions Last Dose Informant Patient Reported? Taking?   Atogepant (Qulipta) 60 MG TABS  Self No No   Sig: Take 60 mg by mouth in the morning   Patient not taking: Reported on 10/24/2024   Cetirizine HCl 10 MG CAPS  Self Yes No   Sig: Take 10 mg by mouth daily as needed   Patient not taking: Reported on 12/5/2024   Multiple Vitamins-Minerals (MULTIVITAMIN ADULT PO)  Self Yes No   Sig: Take 1 tablet by mouth daily   Nurtec 75 MG TBDP  Self No No   Sig: DISSOLVE 1 TABLET IN MOUTH EVERY OTHER DAY   Ozempic, 1 MG/DOSE, 4 MG/3ML injection pen   Yes No   Sig: inject 1 milligram subcutaneously every week   Patient not taking: Reported on 2/14/2025   PROAIR  (90 Base) MCG/ACT inhaler  Self Yes No   Sig: Inhale 1-2 puffs as needed    Xiidra 5 % op solution  Self Yes No   Sig: instill 1 drop into both eyes twice a day   acetaminophen (TYLENOL) 325 mg tablet  Self No No   Sig: Take 1 tablet (325 mg total) by mouth every 6 (six) hours as needed for mild pain   apixaban (Eliquis) 5 mg  Self No No   Sig: Take 2 tablets (10 mg total) by mouth 2 (two) times a day for 7 days, THEN 1 tablet (5 mg total) 2 (two) times a day for 23 days.   aspirin-acetaminophen-caffeine (EXCEDRIN MIGRAINE) 250-250-65 MG per tablet  Self Yes No   Sig: Take 2 tablets by mouth if needed for headaches   buPROPion (WELLBUTRIN SR) 150 mg 12 hr tablet  Self No No   Sig: Take 1 tablet (150 mg total) by mouth 2 (two) times a day   Patient not  taking: Reported on 10/24/2024   ergocalciferol (VITAMIN D2) 50,000 units  Self Yes No   Sig: Take 50,000 Units by mouth once a week sundays   estradiol (VAGIFEM, YUVAFEM) 10 MCG TABS vaginal tablet   No No   Sig: Insert 1 tablet (10 mcg total) into the vagina daily   famotidine (PEPCID) 20 mg tablet  Self Yes No   Sig: Take 20 mg by mouth every evening   fluticasone (FLONASE) 50 mcg/act nasal spray  Self No No   Si spray into each nostril daily   fluticasone (FLONASE) 50 mcg/act nasal spray  Self No No   Si spray into each nostril daily   fluticasone (FLOVENT HFA) 110 MCG/ACT inhaler  Self Yes No   Sig: Inhale 1 puff as needed    hydroxychloroquine (PLAQUENIL) 200 mg tablet  Self Yes No   Sig: Take 200 mg by mouth as needed During Summer time   lasmiditan succinate (Reyvow) 100 mg tablet  Self No No   Sig: TAKE 1 TABLET (100MG) ONE TIME AS NEEDED FOR MIGRAINE.DO NOT USE MORE THAN ONE DOSEPER DAY OR MORE THAN 8 DOSES PER MONTH   Patient not taking: No sig reported   levothyroxine 88 mcg tablet  Self Yes No   Sig: Take 88 mcg by mouth daily    meclizine (ANTIVERT) 12.5 MG tablet  Self Yes No   Sig: Take 12.5 mg by mouth as needed   oxyCODONE (ROXICODONE) 15 mg immediate release tablet  Self Yes No   Sig: Take 15 mg by mouth every 4 (four) hours as needed   oxyCODONE (Roxicodone) 5 immediate release tablet  Self No No   Sig: Take 1 tablet (5 mg total) by mouth every 4 (four) hours as needed for moderate pain for up to 15 doses Max Daily Amount: 30 mg   pantoprazole (PROTONIX) 40 mg tablet   No No   Sig: TAKE 1 TABLET BY MOUTH DAILY FOR 30 MINUTES BEFORE BREAKFAST   polyethylene glycol (GOLYTELY) 4000 mL solution   No No   Sig: Take as directed by the office for colonoscopy.   potassium chloride (Klor-Con) 10 mEq tablet  Self Yes No   Sig: Take 10 mEq by mouth daily   prochlorperazine (COMPAZINE) 10 mg tablet  Self No No   Sig: Take 1 tablet (10 mg total) by mouth every 6 (six) hours as needed (migraine)    Patient not taking: Reported on 10/24/2024   propranolol (INDERAL LA) 160 mg  Self Yes No   Sig: Take 160 mg by mouth 3 (three) times a day   rosuvastatin (CRESTOR) 10 MG tablet  Self Yes No   Sig: Take 10 mg by mouth daily   rosuvastatin (CRESTOR) 5 mg tablet  Self Yes No   Sig: Take 5 mg by mouth daily   Patient not taking: Reported on 2024   telmisartan (MICARDIS) 80 MG tablet  Self Yes No   Sig: Take 80 mg by mouth daily   Patient not taking: Reported on 2024   telmisartan-hydrochlorothiazide (MICARDIS HCT) 80-12.5 MG per tablet  Self No No   Sig: Take 1 tablet by mouth daily   valACYclovir (VALTREX) 1,000 mg tablet  Self Yes No   Si tablet as needed       Facility-Administered Medications Last Administration Doses Remaining   Botulinum Toxin Type A SOLR 200 Units 3/18/2021 11:54 AM         Patient's Medications   Discharge Prescriptions    DICLOFENAC SODIUM (VOLTAREN) 1 %    Apply 2 g topically 4 (four) times a day       Start Date: 2025 End Date: --       Order Dose: 2 g       Quantity: 50 g    Refills: 0     No discharge procedures on file.  ED SEPSIS DOCUMENTATION   Time reflects when diagnosis was documented in both MDM as applicable and the Disposition within this note       Time User Action Codes Description Comment    2025 11:17 AM Elmira Mcnamara [M19.071] Arthritis of right ankle                  Elmira Mcnamara PA-C  25 [id number]

## 2025-02-22 NOTE — Clinical Note
Adamaris Brian was seen and treated in our emergency department on 2/22/2025.                Diagnosis:     Adamaris  may return to work on return date.    She may return on this date: 02/26/2025    May return sooner if symptoms improve.     If you have any questions or concerns, please don't hesitate to call.      Elmira Mcnamara PA-C    ______________________________           _______________          _______________  Hospital Representative                              Date                                Time

## 2025-02-22 NOTE — DISCHARGE INSTRUCTIONS
Perform light stretches of the ankle and low resistance work outs. Apply the Voltaren gel over the bothersome area. Please follow up with physical therapy for further recommendations and treatment.

## 2025-02-23 DIAGNOSIS — G43.009 MIGRAINE WITHOUT AURA AND WITHOUT STATUS MIGRAINOSUS, NOT INTRACTABLE: ICD-10-CM

## 2025-02-24 ENCOUNTER — TELEPHONE (OUTPATIENT)
Age: 50
End: 2025-02-24

## 2025-02-24 RX ORDER — BUPROPION HYDROCHLORIDE 150 MG/1
150 TABLET, EXTENDED RELEASE ORAL 2 TIMES DAILY
Qty: 180 TABLET | Refills: 3 | Status: SHIPPED | OUTPATIENT
Start: 2025-02-24

## 2025-02-24 NOTE — TELEPHONE ENCOUNTER
Patient called to schedule consultation, I advised patient she would need a referral to schedule and provided central fax number. Thank you.

## 2025-02-26 ENCOUNTER — OFFICE VISIT (OUTPATIENT)
Dept: NEUROLOGY | Facility: CLINIC | Age: 50
End: 2025-02-26
Payer: MEDICARE

## 2025-02-26 ENCOUNTER — TELEPHONE (OUTPATIENT)
Dept: NEUROLOGY | Facility: CLINIC | Age: 50
End: 2025-02-26

## 2025-02-26 VITALS
TEMPERATURE: 97.9 F | WEIGHT: 205 LBS | HEART RATE: 85 BPM | DIASTOLIC BLOOD PRESSURE: 74 MMHG | SYSTOLIC BLOOD PRESSURE: 112 MMHG | OXYGEN SATURATION: 94 % | HEIGHT: 65 IN | RESPIRATION RATE: 18 BRPM | BODY MASS INDEX: 34.16 KG/M2

## 2025-02-26 DIAGNOSIS — G43.009 MIGRAINE WITHOUT AURA AND WITHOUT STATUS MIGRAINOSUS, NOT INTRACTABLE: ICD-10-CM

## 2025-02-26 DIAGNOSIS — G43.709 CHRONIC MIGRAINE WITHOUT AURA WITHOUT STATUS MIGRAINOSUS, NOT INTRACTABLE: ICD-10-CM

## 2025-02-26 PROCEDURE — 96372 THER/PROPH/DIAG INJ SC/IM: CPT | Performed by: PHYSICIAN ASSISTANT

## 2025-02-26 PROCEDURE — 99215 OFFICE O/P EST HI 40 MIN: CPT | Performed by: PHYSICIAN ASSISTANT

## 2025-02-26 RX ORDER — BUPROPION HYDROCHLORIDE 150 MG/1
150 TABLET ORAL DAILY
Qty: 90 TABLET | Refills: 3 | Status: SHIPPED | OUTPATIENT
Start: 2025-02-26

## 2025-02-26 RX ORDER — PROCHLORPERAZINE MALEATE 10 MG
10 TABLET ORAL EVERY 6 HOURS PRN
Qty: 20 TABLET | Refills: 3 | Status: SHIPPED | OUTPATIENT
Start: 2025-02-26

## 2025-02-26 RX ORDER — ATOGEPANT 60 MG/1
60 TABLET ORAL
Qty: 30 TABLET | Refills: 11 | Status: SHIPPED | OUTPATIENT
Start: 2025-02-26

## 2025-02-26 RX ORDER — PROCHLORPERAZINE EDISYLATE 5 MG/ML
10 INJECTION INTRAMUSCULAR; INTRAVENOUS ONCE
Status: COMPLETED | OUTPATIENT
Start: 2025-02-26 | End: 2025-02-26

## 2025-02-26 RX ADMIN — PROCHLORPERAZINE EDISYLATE 10 MG: 5 INJECTION INTRAMUSCULAR; INTRAVENOUS at 15:50

## 2025-02-26 NOTE — PROGRESS NOTES
Name: Adamaris Brian      : 1975      MRN: [id number]  Encounter Provider: Kassie Andrade PA-C  Encounter Date: 2025   Encounter [facility]: [facility]  :  Assessment & Plan  Chronic migraine without aura without status migrainosus, not intractable  Preventative:  Botox 200 units every 12 weeks  Start Qulipta 60 mg daily approved.    Change bupropion to 150 mg in the am    At onset of migraine, take Nurtec 75 mg.  Limit of 1 in 24 hours along with prochlorperazine  Once ubrelvy approved take ubrelvy at onset of migraine.  May repeat in 2 hours if needed  May use prochlorperazine 10 mg every 8 hours to help break the migraine  May use Reyvow 100 mg at onset of migraine.  Limit of 1 in 24 hours.  Do not drive within 8 hours of taking  If needed may use prochlorperazine 10 mg.  May repeat in 8 hours.  Limit of 20 a month  Received prochlorperazine 10 mg IM today in office  Orders:  •  buPROPion (Wellbutrin XL) 150 mg 24 hr tablet; Take 1 tablet (150 mg total) by mouth daily  •  prochlorperazine (COMPAZINE) injection 10 mg  •  prochlorperazine (COMPAZINE) 10 mg tablet; Take 1 tablet (10 mg total) by mouth every 6 (six) hours as needed (migraine)  •  Ubrogepant (UBRELVY) 100 MG tablet; Take 1 tablet (100 mg) one time as needed for migraine. May repeat one additional tablet (100 mg) at least two hours after the first dose. Do not use more than 200 mg a day    Migraine without aura and without status migrainosus, not intractable    Orders:  •  Atogepant (Qulipta) 60 MG TABS; Take 60 mg by mouth daily at bedtime          History of Present Illness   HPI   Adamaris is a 49-year-old female with a history of palpitations, AV win reentry tachycardia, interstitial cystitis, hyperlipidemia, cervicalgia, morbid obesity, status post hysterectomy, DVT, hypertension and obstructive sleep apnea.  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 She has a PMH of palpitations and has had an ablation.     Patient states that she fell down a flight of stairs in early 2000s.  Went to hospital, unsure if went via ambulance.  Patient states she was out of work for about a month.  States had significant pain, bruising and was limping.  Went to PT, chiropractor after this.  She states went back to work as .  Thinks her issues with bowel incontinence began a few months after the fall.  First instance happened at work where she thought that she was going to pass gas and soiled herself.  Also began to have incontinence during intercourse.  For the past 2-3 years has had very little control.  States she doesn't go out unless she hasn't eaten for 24 hours.  She can drink fluids but if she eats she needs a toilet.  Patient varies from constipation to \"explosion.\"  Patient has seen Dr Blanc for gastroenterology but was over 6 years ago.  Patient states she is unable to stop it once starts and has a full bowel movement amount.  Patient had a colon resection for diverticulitis in 2001.  Was not diverted.       Patient states her headaches worsened around December 2019 or January 2020.  Her symptoms have worsened and having more nausea and vomiting.  Her psychiatrist changed her medications in November 2019 and she stopped taking her medications in January 2020.      Interval update as of 3/7/2023  Patient is under a great deal of stress currently with her kids     Interval update as of 3/7/2022  Doing same as before.  Botox is helpful for her.       Interval updates as of 4/22/2021  Patient states her migraines Are markedly improved since restarting Botox.  Patient states the 1st month after Botox was a little challenging however, they are now down to 2 more mild migraines a week with 2 severe month.  Patient states the Nurtec does provide significant relief for her.      Unable to take triptans to do chest pain, SOB and palpitations.  QTc " 12/23/2016 456 ms     What medications do you take or have you taken for your headaches?   Has been out of her medications from psychiatry due to insurance change as of 2/14/2024     Current Preventive:   MVI  Propranolol, telmisartan  Eliquis  Qulipta  Botox     Current Abortive:   Fioricet   Reyvow  Prochlorperazine  Nurtec     Prior Preventive:   Lisinopril, labetalol, amlodipine  Topamax, carbamazepine, Lyrica, Depakote (weigh gain), Gabapentin (no help)  cyclobenzaprine, Tizanidine,  Seroquel, citalopram, duloxetine, venlafaxine, olanzapine- ineffective, Abilify, Wellbutrin, doxepin  Cyproheptadine (too much dryness),   Aimovig 12/4/2019  Nurtec     Prior Abortive:   Dexamethasone,  Naproxen   Promethazine,   Kenalog,  Percocet, tramadol, Toradol, narcotics   triptans--tachycardia, SOB, palpitations     Headache trigger: Fatigue, Stress/Tension, Weather change, lack of sleep  Alternative therapies used in the past for headaches? none   Headache are worse if the patient: cough, sneeze, bending over  Aura - none     Any family history of migraines? Yes, mother and father  Any family history of aneurysms? No     Current pain:  8/10  How often do the headaches occur -   mild are 1-2 a week,   severe for past 2 weeks has been 2-3 days a week but prior was 1-2 a week; 2 a the entire month prior to restart of Botox was almost daily      What time of the day do the headaches start - wakes up with them  How long do the headaches last - all day,if takes Nurtec lasts 3-4 hours  Where are they located - frontalis, retro orbital, temporalis and then neck pain   What is the intensity of pain - 8-10/10; gets to a 10/10 1-2 times a month (if doesn't take her rescue medications early enough)  Describe your usual headache - Throbbing, Pounding, Pulsing, pressure     Associated symptoms:   -       Decrease of appetite, nausea, vomiting   -       Photophobia, phonophobia, sensitivity to smell   -       Problem with  "concentration  -       Stiff or sore neck  -       prefer to be alone and in a dark room, unable to work     Number of days missed per month because of headaches:  Work (or school) days: 2-3 times this year  Social or Family activities: occasionally     What time of the year do headaches occur more frequently?   Change of weather  Have you seen someone else for headaches or pain? Yes  Have you had trigger point injection performed and how often? No  Have you had Botox injection performed and how often? Yes   Have you had epidural injections or transforaminal injections performed? No     Have you used CBD or THC for your headaches and how often? No  Are you current pregnant or planning on getting pregnant? No, done with family planning  Have you ever had any Brain imaging? yes      7/23/2018 CT head  No acute abnormalities      I personally reviewed these images.     Reviewed old notes from physician seen in the past- see above HPI for summary of previous encounters.      With botox has had a reduction of at least 7 migraine days with less abortive medication, less ER visits which correlates to headache diary     Review of Systems I have personally reviewed the MA's review of systems and made changes as necessary.         Objective   /74 (BP Location: Right arm, Patient Position: Sitting, Cuff Size: Large)   Pulse 85   Temp 97.9 °F (36.6 °C) (Temporal)   Resp 18   Ht 5' 5\" (1.651 m)   Wt 93 kg (205 lb)   LMP  (LMP Unknown)   SpO2 94%   BMI 34.11 kg/m²     Physical Exam  Neurological Exam  CONSTITUTIONAL: Well developed, well nourished, well groomed. No dysmorphic features. Appears uncomfortable but no acute distress     HEENT:  Normocephalic atraumatic.    Chest:  Respirations regular and unlabored.    Psychiatric:  Normal behavior and appropriate affect      MENTAL STATUS  Orientation: Alert and oriented x 3  Fund of knowledge: Intact.        Administrative Statements   I have spent a total time of 41 " minutes in caring for this patient on the day of the visit/encounter including Diagnostic results, Prognosis, Risks and benefits of tx options, Instructions for management, Patient and family education, Importance of tx compliance, Risk factor reductions, Impressions, Counseling / Coordination of care, Documenting in the medical record, Reviewing/placing orders in the medical record (including tests, medications, and/or procedures), and Obtaining or reviewing history  .

## 2025-02-26 NOTE — PATIENT INSTRUCTIONS
1. Chronic migraine without aura without status migrainosus, not intractable  -     buPROPion (Wellbutrin XL) 150 mg 24 hr tablet; Take 1 tablet (150 mg total) by mouth daily  -     prochlorperazine (COMPAZINE) injection 10 mg  -     prochlorperazine (COMPAZINE) 10 mg tablet; Take 1 tablet (10 mg total) by mouth every 6 (six) hours as needed (migraine)  -     Ubrogepant (UBRELVY) 100 MG tablet; Take 1 tablet (100 mg) one time as needed for migraine. May repeat one additional tablet (100 mg) at least two hours after the first dose. Do not use more than 200 mg a day  2. Migraine without aura and without status migrainosus, not intractable  -     Atogepant (Qulipta) 60 MG TABS; Take 60 mg by mouth daily at bedtime

## 2025-02-26 NOTE — ASSESSMENT & PLAN NOTE
Preventative:  Botox 200 units every 12 weeks  Start Qulipta 60 mg daily approved.    Change bupropion to 150 mg in the am    At onset of migraine, take Nurtec 75 mg.  Limit of 1 in 24 hours along with prochlorperazine  Once ubrelvy approved take ubrelvy at onset of migraine.  May repeat in 2 hours if needed  May use prochlorperazine 10 mg every 8 hours to help break the migraine  May use Reyvow 100 mg at onset of migraine.  Limit of 1 in 24 hours.  Do not drive within 8 hours of taking  If needed may use prochlorperazine 10 mg.  May repeat in 8 hours.  Limit of 20 a month  Received prochlorperazine 10 mg IM today in office  Orders:  •  buPROPion (Wellbutrin XL) 150 mg 24 hr tablet; Take 1 tablet (150 mg total) by mouth daily  •  prochlorperazine (COMPAZINE) injection 10 mg  •  prochlorperazine (COMPAZINE) 10 mg tablet; Take 1 tablet (10 mg total) by mouth every 6 (six) hours as needed (migraine)  •  Ubrogepant (UBRELVY) 100 MG tablet; Take 1 tablet (100 mg) one time as needed for migraine. May repeat one additional tablet (100 mg) at least two hours after the first dose. Do not use more than 200 mg a day

## 2025-02-27 ENCOUNTER — TELEPHONE (OUTPATIENT)
Age: 50
End: 2025-02-27

## 2025-02-27 NOTE — TELEPHONE ENCOUNTER
Will initiate Ubrevly on separate enc    Qulipta PA initiated on CMM. (Key: [id number])     Awaiting determination

## 2025-03-03 NOTE — TELEPHONE ENCOUNTER
Per MARYANN, Qulipta-   [id number]. QULIPTA TAB 60MG is approved through 12/31/2025     Called [facility] and left a message making them aware of the approval and for a cb if any questions

## 2025-03-03 NOTE — TELEPHONE ENCOUNTER
Per MARYANN, Ubrelvy-  [id number]. UBRELVY TAB 100MG is approved through 12/31/2025.     Called [facility] pharmacy and left a message making them aware of the approval and for a cb if any questions.    Pt made aware via [employer]

## 2025-03-17 NOTE — PROGRESS NOTES
Name: Adamaris Brian      : 1975      MRN: [id number]  Encounter Provider: Amara Coulter DO  Encounter Date: 3/18/2025   Encounter department: [facility] [address]  :  Assessment & Plan  Arthralgia, unspecified joint  Patient is a 49-year-old female with a history of discoid lupus presenting for further evaluation of arthralgias located in the hands, feet, back and neck which is constant.  Reports 10 to 15 minutes of morning stiffness, however worse at the end of the day as well.  Denies worsening pain with physical activity.  Patient reports getting episodes of ankle swelling which lasts a few days and she has trouble bearing weight on that.  Patient has tried multiple medications including NSAIDs, Tylenol, oxycodone with no significant relief in her joint pain.  On exam, patient has no active synovitis or joint tenderness.  OA changes noted on exam and previous imaging.  RF and CCP are negative.  Overall, patient's arthralgias sound more osteoarthritic in nature, however given that patient is having episodes of swelling of the ankles, will rule out inflammatory arthritis.  -Obtain x-rays of bilateral hands to evaluate for any inflammatory changes versus OA  -Obtain ultrasound of bilateral feet to evaluate for inflammatory changes  -Further management based on above workup  Orders:    XR hand 3+ vw right; Future    XR hand 3+ vw left; Future    US RA FEET/ANKLES; Future    Discoid lupus  Patient has a longstanding history of discoid lupus.  No other signs of systemic SLE at this point such as mucocutaneous manifestations, Raynaud's, cytopenias.  Patient does endorse sicca symptoms.  AJAY with subsequent EUGENIA testing all has been negative as well.  Continue to follow with dermatology  -Hydroxychloroquine per dermatology       Encounter for osteoporosis screening in asymptomatic postmenopausal patient  Patient is a 49-year-old female who recently fell and had a fracture of her shoulder.   Patient has a family history of osteoporosis in her mother and raises concerns due to being postmenopausal.  Discussed that screening is typically done after age 60, however given patient's concern, will obtain screening DEXA scan.  Orders:    DXA bone density spine hip and pelvis; Future    Sjogren's syndrome with keratoconjunctivitis sicca (HCC)  Patient endorses sicca symptoms that have been ongoing for many years.  Reports trouble eating dry foods.  SSA and SSB are negative.  Can consider getting a salivary lip gland biopsy if symptoms are persistent to evaluate for Sjogren syndrome based on above workup.       RTC in 1 month    Pertinent Medical History   Reviewed         History of Present Illness   Adamaris Brian is a 49 y.o. adult with a history of migraines, DVT, hypertension, CK, dyslipidemia who presents for further evaluation of polyarthralgias.      HPI   Patient states that she has been experiencing pain in her hands, feet, back, neck for many years.  Reports the pain is worse first thing in the morning and then at night.  Reports 10 to 15 minutes of morning stiffness.  Patient has tried multiple medications including Motrin, ibuprofen, naproxen, Excedrin which does not help.  Patient also takes Tylenol which provides no relief.  Has tried topical agents such as Voltaren gel.  Patient also has oxycodone at home which does not help significantly.  Patient states she occasionally notices swelling in her ankles that have been every couple of weeks.  Reports that in January she had swelling of her ankle and could not put her feet down.  Reports it resolved after a few days.    Patient has a history of discoid lupus.  Patient states that she was 19 years old when that was diagnosed.  Patient follows with dermatology.  Reports it is located on her face, back, legs however has not had a recent flare.  Patient states she is prescribed hydroxychloroquine that she takes only in the summertime which helps.   Patient has not noticed any improvement while on hydroxychloroquine.    Patient endorses dry eyes, dry mouth and vaginal dryness for the past 12 years.  Reports that she is always drinking water and has trouble eating dry foods.  Has had a lot of dental issues over the years.  Patient was started on estrogen for vaginal dryness, however has trouble tolerating it.    Patient has a history of 2 DVTs and follows with hematology.  Patient is on Eliquis.  Testing for lupus anticoagulant and APS was negative in the past.    Patient reports a itchy scaly area on the scalp.  Possible psoriasis versus seborrheic dermatitis.  Patient states she uses head and shoulders shampoo which helps.    Patient states that she fell and had a fracture of her shoulder requiring surgery.  No fragility fractures.  Patient is postmenopausal.  Patient states her mother has osteoporosis and she is concerned about developing it herself.    Family hx: daughter with MYRNA and grandmother with RA    Review of Systems  Complete ROS conducted as per HPI. In addition, denies:  Fever  Recurrent oral ulcers  Muscle weakness  Uveitis  Dactylitis  Chest pain  SOB  Pleurisy  Gross hematuria  Foamy urine  Raynaud's  Joint issues other than noted above    Current Outpatient Medications on File Prior to Visit   Medication Sig Dispense Refill    acetaminophen (TYLENOL) 325 mg tablet Take 1 tablet (325 mg total) by mouth every 6 (six) hours as needed for mild pain 30 tablet 0    amoxicillin (AMOXIL) 875 mg tablet Take 1 tablet by mouth 2 (two) times a day      aspirin-acetaminophen-caffeine (EXCEDRIN MIGRAINE) 250-250-65 MG per tablet Take 2 tablets by mouth if needed for headaches      Atogepant (Qulipta) 60 MG TABS Take 60 mg by mouth daily at bedtime 30 tablet 11    buPROPion (Wellbutrin XL) 150 mg 24 hr tablet Take 1 tablet (150 mg total) by mouth daily 90 tablet 3    Cetirizine HCl 10 MG CAPS Take 10 mg by mouth daily as needed      Diclofenac Sodium  (VOLTAREN) 1 % Apply 2 g topically 4 (four) times a day 50 g 0    ergocalciferol (VITAMIN D2) 50,000 units Take 50,000 Units by mouth once a week sundays      famotidine (PEPCID) 20 mg tablet Take 20 mg by mouth every evening      fluticasone (FLONASE) 50 mcg/act nasal spray 1 spray into each nostril daily 16 g 0    fluticasone (FLONASE) 50 mcg/act nasal spray 1 spray into each nostril daily 9.9 mL 0    fluticasone (FLOVENT HFA) 110 MCG/ACT inhaler Inhale 1 puff as needed       hydroxychloroquine (PLAQUENIL) 200 mg tablet Take 200 mg by mouth as needed During Summer time      levothyroxine 88 mcg tablet Take 88 mcg by mouth daily       Multiple Vitamins-Minerals (MULTIVITAMIN ADULT PO) Take 1 tablet by mouth daily      Nurtec 75 MG TBDP DISSOLVE 1 TABLET IN MOUTH EVERY OTHER DAY 16 tablet 11    oxyCODONE (ROXICODONE) 15 mg immediate release tablet Take 15 mg by mouth every 4 (four) hours as needed      pantoprazole (PROTONIX) 40 mg tablet TAKE 1 TABLET BY MOUTH DAILY FOR 30 MINUTES BEFORE BREAKFAST 90 tablet 1    potassium chloride (Klor-Con) 10 mEq tablet Take 10 mEq by mouth daily      PROAIR  (90 Base) MCG/ACT inhaler Inhale 1-2 puffs as needed       prochlorperazine (COMPAZINE) 10 mg tablet Take 1 tablet (10 mg total) by mouth every 6 (six) hours as needed (migraine) 20 tablet 3    propranolol (INDERAL LA) 160 mg Take 160 mg by mouth 3 (three) times a day  0    rosuvastatin (CRESTOR) 10 MG tablet Take 10 mg by mouth daily      telmisartan-hydrochlorothiazide (MICARDIS HCT) 80-12.5 MG per tablet Take 1 tablet by mouth daily 30 tablet 3    Ubrogepant (UBRELVY) 100 MG tablet Take 1 tablet (100 mg) one time as needed for migraine. May repeat one additional tablet (100 mg) at least two hours after the first dose. Do not use more than 200 mg a day 16 tablet 11    valACYclovir (VALTREX) 1,000 mg tablet 1 tablet as needed       apixaban (Eliquis) 5 mg Take 2 tablets (10 mg total) by mouth 2 (two) times a day for  7 days, THEN 1 tablet (5 mg total) 2 (two) times a day for 23 days. 74 tablet 0    buPROPion (WELLBUTRIN SR) 150 mg 12 hr tablet take 1 tablet by mouth twice a day (Patient not taking: Reported on 3/18/2025) 180 tablet 3    estradiol (VAGIFEM, YUVAFEM) 10 MCG TABS vaginal tablet Insert 1 tablet (10 mcg total) into the vagina daily (Patient not taking: Reported on 3/18/2025) 30 tablet 0    lasmiditan succinate (Reyvow) 100 mg tablet TAKE 1 TABLET (100MG) ONE TIME AS NEEDED FOR MIGRAINE.DO NOT USE MORE THAN ONE DOSEPER DAY OR MORE THAN 8 DOSES PER MONTH (Patient not taking: No sig reported) 8 tablet 5    meclizine (ANTIVERT) 12.5 MG tablet Take 12.5 mg by mouth as needed (Patient not taking: Reported on 2/26/2025)      oxyCODONE (Roxicodone) 5 immediate release tablet Take 1 tablet (5 mg total) by mouth every 4 (four) hours as needed for moderate pain for up to 15 doses Max Daily Amount: 30 mg (Patient not taking: Reported on 2/26/2025) 15 tablet 0    Ozempic, 1 MG/DOSE, 4 MG/3ML injection pen inject 1 milligram subcutaneously every week (Patient not taking: Reported on 3/18/2025)      polyethylene glycol (GOLYTELY) 4000 mL solution Take as directed by the office for colonoscopy. (Patient not taking: Reported on 3/18/2025) 4000 mL 0    rosuvastatin (CRESTOR) 5 mg tablet Take 5 mg by mouth daily (Patient not taking: Reported on 6/14/2024)      telmisartan (MICARDIS) 80 MG tablet Take 80 mg by mouth daily (Patient not taking: Reported on 6/14/2024)      Xiidra 5 % op solution instill 1 drop into both eyes twice a day (Patient not taking: Reported on 2/26/2025)       Current Facility-Administered Medications on File Prior to Visit   Medication Dose Route Frequency Provider Last Rate Last Admin    Botulinum Toxin Type A SOLR 200 Units  200 Units Injection Q3 Months Kassie Andrade PA-C   200 Units at 03/18/21 1154      Social History     Tobacco Use    Smoking status: Former     Current packs/day: 1.00     Average  "packs/day: 1 pack/day for 5.0 years (5.0 ttl pk-yrs)     Types: Cigarettes    Smokeless tobacco: Never    Tobacco comments:     quit at 17 years old   Vaping Use    Vaping status: Never Used   Substance and Sexual Activity    Alcohol use: No    Drug use: No    Sexual activity: Yes     Partners: Male     Birth control/protection: Surgical         Objective   /68 (BP Location: Right arm, Patient Position: Sitting, Cuff Size: Adult)   Pulse 80   Temp 97.6 °F (36.4 °C) (Tympanic)   Ht 5' 5\" (1.651 m)   Wt 93 kg (205 lb)   LMP  (LMP Unknown)   SpO2 97%   BMI 34.11 kg/m²     Physical Exam  General appearance: normal appearing, no acute distress  Skin: normal, no rashes  HEENT: normal, moist oropharynx, no nasal or oral ulcers  Lymph nodes: no palpable adenopathy  Lungs: normal respiratory effort, comfortable on room air, lungs clear to auscultation b/l   Heart: normal heart sounds, normal rate, normal rhythm,  Abdomen: soft, normal bowel sounds, no tenderness  Neurologic: no obvious neurological deficits   Extremities: no edema, warm and well perfused     Musculoskeletal Exam:   - Observation: Heberden's and Shirin nodes present  - Palpation: no joint tenderness, crepitus of bilateral knees  - Synovitis: absent  - Joint effusions: absent  - ROM: intact throughout  - Muscle Strength: 5/5 throughout     Recent labs:  Lab Results   Component Value Date/Time    SODIUM 138 07/24/2024 12:09 PM    K 4.2 07/24/2024 12:09 PM    K 3.6 11/11/2015 09:13 PM    BUN 14 07/24/2024 12:09 PM    BUN 10 11/11/2015 09:13 PM    CREATININE 0.75 07/24/2024 12:09 PM    CREATININE 0.75 11/11/2015 09:13 PM    GLUC 91 03/26/2024 03:18 PM    CALCIUM 9.4 07/24/2024 12:09 PM    CALCIUM 9.0 11/11/2015 09:13 PM    AST 19 07/24/2024 12:09 PM    AST 20 02/28/2015 04:01 AM    ALT 35 07/24/2024 12:09 PM    ALT 34 02/28/2015 04:01 AM    ALB 4.1 07/24/2024 12:09 PM    ALB 3.2 (L) 02/28/2015 04:01 AM    TP 7.5 07/24/2024 12:09 PM    EGFR 75 " 05/27/2021 11:26 AM     Lab Results   Component Value Date/Time    HGB 11.6 (L) 07/24/2024 12:09 PM    HGB 13.0 11/11/2015 09:13 PM    WBC 4.43 07/24/2024 12:09 PM    WBC 7.96 11/11/2015 09:13 PM     07/24/2024 12:09 PM     11/11/2015 09:13 PM     Lab Results   Component Value Date/Time    BVY6VTQKZXYO 1.832 07/24/2024 12:09 PM    EGO3VUHIWERX 2.157 01/17/2015 10:11 AM     AJAY negative  RNP negative  Orourke negative  SCL 70 negative  SSA negative  SSB negative  dsDNA negative  RF negative  CCP negative  Anticardiolipin's negative  HLA-B27 negative    I have personally reviewed notes, labs, and imaging available in the chart.     Amara Coulter DO, CCD, [facility]

## 2025-03-18 ENCOUNTER — CONSULT (OUTPATIENT)
Age: 50
End: 2025-03-18
Payer: MEDICARE

## 2025-03-18 VITALS
SYSTOLIC BLOOD PRESSURE: 120 MMHG | HEIGHT: 65 IN | DIASTOLIC BLOOD PRESSURE: 68 MMHG | OXYGEN SATURATION: 97 % | TEMPERATURE: 97.6 F | HEART RATE: 80 BPM | BODY MASS INDEX: 34.16 KG/M2 | WEIGHT: 205 LBS

## 2025-03-18 DIAGNOSIS — L93.0 DISCOID LUPUS: ICD-10-CM

## 2025-03-18 DIAGNOSIS — Z13.820 ENCOUNTER FOR OSTEOPOROSIS SCREENING IN ASYMPTOMATIC POSTMENOPAUSAL PATIENT: ICD-10-CM

## 2025-03-18 DIAGNOSIS — Z78.0 ENCOUNTER FOR OSTEOPOROSIS SCREENING IN ASYMPTOMATIC POSTMENOPAUSAL PATIENT: ICD-10-CM

## 2025-03-18 DIAGNOSIS — M25.50 ARTHRALGIA, UNSPECIFIED JOINT: Primary | ICD-10-CM

## 2025-03-18 DIAGNOSIS — M35.01 SJOGREN'S SYNDROME WITH KERATOCONJUNCTIVITIS SICCA (HCC): ICD-10-CM

## 2025-03-18 PROCEDURE — G2211 COMPLEX E/M VISIT ADD ON: HCPCS | Performed by: STUDENT IN AN ORGANIZED HEALTH CARE EDUCATION/TRAINING PROGRAM

## 2025-03-18 PROCEDURE — 99204 OFFICE O/P NEW MOD 45 MIN: CPT | Performed by: STUDENT IN AN ORGANIZED HEALTH CARE EDUCATION/TRAINING PROGRAM

## 2025-03-18 RX ORDER — AMOXICILLIN 875 MG/1
1 TABLET, COATED ORAL 2 TIMES DAILY
COMMUNITY
Start: 2025-03-03

## 2025-04-06 ENCOUNTER — OFFICE VISIT (OUTPATIENT)
Dept: URGENT CARE | Age: 50
End: 2025-04-06
Payer: MEDICARE

## 2025-04-06 ENCOUNTER — APPOINTMENT (OUTPATIENT)
Dept: LAB | Age: 50
End: 2025-04-06
Payer: MEDICARE

## 2025-04-06 ENCOUNTER — APPOINTMENT (OUTPATIENT)
Dept: RADIOLOGY | Age: 50
End: 2025-04-06
Payer: MEDICARE

## 2025-04-06 ENCOUNTER — TRANSCRIBE ORDERS (OUTPATIENT)
Dept: ADMINISTRATIVE | Age: 50
End: 2025-04-06

## 2025-04-06 VITALS
SYSTOLIC BLOOD PRESSURE: 138 MMHG | HEART RATE: 64 BPM | DIASTOLIC BLOOD PRESSURE: 84 MMHG | BODY MASS INDEX: 35.78 KG/M2 | RESPIRATION RATE: 18 BRPM | OXYGEN SATURATION: 98 % | TEMPERATURE: 98.2 F | WEIGHT: 215 LBS

## 2025-04-06 DIAGNOSIS — E05.90 HYPERTHYROIDISM: ICD-10-CM

## 2025-04-06 DIAGNOSIS — G89.29 CHRONIC LEFT-SIDED LOW BACK PAIN WITHOUT SCIATICA: Primary | ICD-10-CM

## 2025-04-06 DIAGNOSIS — M19.90 ARTHRITIS: ICD-10-CM

## 2025-04-06 DIAGNOSIS — I10 ESSENTIAL HYPERTENSION: ICD-10-CM

## 2025-04-06 DIAGNOSIS — E55.9 VITAMIN D DEFICIENCY: ICD-10-CM

## 2025-04-06 DIAGNOSIS — M25.50 ARTHRALGIA, UNSPECIFIED JOINT: ICD-10-CM

## 2025-04-06 DIAGNOSIS — D51.9 ANEMIA DUE TO VITAMIN B12 DEFICIENCY, UNSPECIFIED B12 DEFICIENCY TYPE: ICD-10-CM

## 2025-04-06 DIAGNOSIS — I10 ESSENTIAL HYPERTENSION: Primary | ICD-10-CM

## 2025-04-06 DIAGNOSIS — E78.5 HYPERLIPIDEMIA, UNSPECIFIED HYPERLIPIDEMIA TYPE: ICD-10-CM

## 2025-04-06 DIAGNOSIS — M54.50 CHRONIC LEFT-SIDED LOW BACK PAIN WITHOUT SCIATICA: Primary | ICD-10-CM

## 2025-04-06 LAB
25(OH)D3 SERPL-MCNC: 42.7 NG/ML (ref 30–100)
ALBUMIN SERPL BCG-MCNC: 3.9 G/DL (ref 3.5–5)
ALP SERPL-CCNC: 76 U/L (ref 34–104)
ALT SERPL W P-5'-P-CCNC: 25 U/L (ref 7–52)
ANION GAP SERPL CALCULATED.3IONS-SCNC: 6 MMOL/L (ref 4–13)
AST SERPL W P-5'-P-CCNC: 19 U/L (ref 5–45)
BASOPHILS # BLD AUTO: 0.04 THOUSANDS/ÂΜL (ref 0–0.1)
BASOPHILS NFR BLD AUTO: 1 % (ref 0–1)
BILIRUB SERPL-MCNC: 0.33 MG/DL (ref 0.2–1)
BUN SERPL-MCNC: 9 MG/DL (ref 5–25)
CALCIUM SERPL-MCNC: 9.2 MG/DL (ref 8.4–10.2)
CHLORIDE SERPL-SCNC: 106 MMOL/L (ref 96–108)
CHOLEST SERPL-MCNC: 122 MG/DL (ref ?–200)
CO2 SERPL-SCNC: 29 MMOL/L (ref 21–32)
CREAT SERPL-MCNC: 0.67 MG/DL (ref 0.6–1.3)
CRP SERPL QL: 3.8 MG/L
EOSINOPHIL # BLD AUTO: 0.14 THOUSAND/ÂΜL (ref 0–0.61)
EOSINOPHIL NFR BLD AUTO: 2 % (ref 0–6)
ERYTHROCYTE [DISTWIDTH] IN BLOOD BY AUTOMATED COUNT: 13.1 % (ref 11.6–15.1)
EST. AVERAGE GLUCOSE BLD GHB EST-MCNC: 134 MG/DL
FOLATE SERPL-MCNC: >22.3 NG/ML
GGT SERPL-CCNC: 30 U/L (ref 9–64)
GLUCOSE P FAST SERPL-MCNC: 110 MG/DL (ref 65–99)
HBA1C MFR BLD: 6.3 %
HCT VFR BLD AUTO: 38.5 % (ref 36.5–46.1)
HDLC SERPL-MCNC: 45 MG/DL
HGB BLD-MCNC: 12.6 G/DL (ref 12–15.4)
IMM GRANULOCYTES # BLD AUTO: 0.03 THOUSAND/UL (ref 0–0.2)
IMM GRANULOCYTES NFR BLD AUTO: 1 % (ref 0–2)
LDLC SERPL CALC-MCNC: 59 MG/DL (ref 0–100)
LYMPHOCYTES # BLD AUTO: 1.99 THOUSANDS/ÂΜL (ref 0.6–4.47)
LYMPHOCYTES NFR BLD AUTO: 33 % (ref 14–44)
MCH RBC QN AUTO: 29.4 PG (ref 26.8–34.3)
MCHC RBC AUTO-ENTMCNC: 32.7 G/DL (ref 31.4–37.4)
MCV RBC AUTO: 90 FL (ref 82–98)
MONOCYTES # BLD AUTO: 0.37 THOUSAND/ÂΜL (ref 0.17–1.22)
MONOCYTES NFR BLD AUTO: 6 % (ref 4–12)
NEUTROPHILS # BLD AUTO: 3.44 THOUSANDS/ÂΜL (ref 1.85–7.62)
NEUTS SEG NFR BLD AUTO: 57 % (ref 43–75)
NONHDLC SERPL-MCNC: 77 MG/DL
NRBC BLD AUTO-RTO: 0 /100 WBCS
PLATELET # BLD AUTO: 280 THOUSANDS/UL (ref 149–390)
PMV BLD AUTO: 9.9 FL (ref 8.9–12.7)
POTASSIUM SERPL-SCNC: 4.3 MMOL/L (ref 3.5–5.3)
PROT SERPL-MCNC: 7 G/DL (ref 6.4–8.4)
RBC # BLD AUTO: 4.29 MILLION/UL (ref 3.88–5.12)
SODIUM SERPL-SCNC: 141 MMOL/L (ref 135–147)
T3FREE SERPL-MCNC: 3.38 PG/ML (ref 2.5–3.9)
T4 FREE SERPL-MCNC: 1.1 NG/DL (ref 0.61–1.12)
TRIGL SERPL-MCNC: 89 MG/DL (ref ?–150)
TSH SERPL DL<=0.05 MIU/L-ACNC: 1.85 UIU/ML (ref 0.45–4.5)
VIT B12 SERPL-MCNC: 860 PG/ML (ref 180–914)
WBC # BLD AUTO: 6.01 THOUSAND/UL (ref 4.31–10.16)

## 2025-04-06 PROCEDURE — 86140 C-REACTIVE PROTEIN: CPT

## 2025-04-06 PROCEDURE — 83036 HEMOGLOBIN GLYCOSYLATED A1C: CPT

## 2025-04-06 PROCEDURE — 84443 ASSAY THYROID STIM HORMONE: CPT

## 2025-04-06 PROCEDURE — 82607 VITAMIN B-12: CPT

## 2025-04-06 PROCEDURE — 80053 COMPREHEN METABOLIC PANEL: CPT

## 2025-04-06 PROCEDURE — 73130 X-RAY EXAM OF HAND: CPT

## 2025-04-06 PROCEDURE — 84439 ASSAY OF FREE THYROXINE: CPT

## 2025-04-06 PROCEDURE — 80061 LIPID PANEL: CPT

## 2025-04-06 PROCEDURE — 82977 ASSAY OF GGT: CPT

## 2025-04-06 PROCEDURE — 82306 VITAMIN D 25 HYDROXY: CPT

## 2025-04-06 PROCEDURE — 36415 COLL VENOUS BLD VENIPUNCTURE: CPT

## 2025-04-06 PROCEDURE — 85025 COMPLETE CBC W/AUTO DIFF WBC: CPT

## 2025-04-06 PROCEDURE — 84481 FREE ASSAY (FT-3): CPT

## 2025-04-06 PROCEDURE — 82746 ASSAY OF FOLIC ACID SERUM: CPT

## 2025-04-06 PROCEDURE — 99213 OFFICE O/P EST LOW 20 MIN: CPT | Performed by: PHYSICIAN ASSISTANT

## 2025-04-06 PROCEDURE — G0463 HOSPITAL OUTPT CLINIC VISIT: HCPCS | Performed by: PHYSICIAN ASSISTANT

## 2025-04-06 NOTE — LETTER
April 6, 2025     Patient: Adamaris Brian   YOB: 1975   Date of Visit: 4/6/2025       To Whom It May Concern:    It is my medical opinion that Adamaris Brian may return to work on 4/10/25 .    If you have any questions or concerns, please don't hesitate to call.         Sincerely,        Lauren Thompson PA-C    CC: No Recipients

## 2025-04-06 NOTE — PROGRESS NOTES
[facility]        NAME: Adamaris Brian is a 49 y.o. adult  : 1975    MRN: [id number]  DATE: 2025  TIME: 1:12 PM      Assessment and Plan     Chronic left-sided low back pain without sciatica [M54.50, G89.29]  1. Chronic left-sided low back pain without sciatica  Ambulatory Referral to Comprehensive [facility]          POC Testing Results        Note:       Patient Instructions     Patient Instructions   Warm compresses for 20 minutes at least four times daily     Please perform gentle range of motion four times daily    Referral to comprehensive spine program     Follow up with primary care provider.   Go to ER if symptoms worsen.    Chief Complaint     Chief Complaint   Patient presents with    Back Pain     Pt c/o severe lower back pain for past two weeks; denies any injury. Has taken motrin and oxy last dose was two days ago.          History of Present Illness     Pt presents with left lo back pain x 2 weeks. She reports she has been having intermittent episodes of back pain for years. She reports history of arthritis and reports her pain is being managed by her PCP. She reports she has Percocet and NSAIDs for her pain. She is here not for pain medication but for further evaluation of her pain. She denies any injury, numbness/tingling or changes in bowel movements.     Back Pain  Pertinent negatives include no fever.       Review of Systems     Review of Systems   Constitutional:  Negative for fever.   Gastrointestinal:  Negative for constipation and diarrhea.   Musculoskeletal:  Positive for back pain and myalgias.   Skin:  Negative for rash and wound.   Neurological:  Negative for syncope.         Current Medications       Current Outpatient Medications:     acetaminophen (TYLENOL) 325 mg tablet, Take 1 tablet (325 mg total) by mouth every 6 (six) hours as needed for mild pain, Disp: 30 tablet, Rfl: 0    aspirin-acetaminophen-caffeine (EXCEDRIN MIGRAINE) 250-250-65 MG per tablet,  Take 2 tablets by mouth if needed for headaches, Disp: , Rfl:     Atogepant (Qulipta) 60 MG TABS, Take 60 mg by mouth daily at bedtime, Disp: 30 tablet, Rfl: 11    buPROPion (Wellbutrin XL) 150 mg 24 hr tablet, Take 1 tablet (150 mg total) by mouth daily, Disp: 90 tablet, Rfl: 3    Cetirizine HCl 10 MG CAPS, Take 10 mg by mouth daily as needed, Disp: , Rfl:     Diclofenac Sodium (VOLTAREN) 1 %, Apply 2 g topically 4 (four) times a day, Disp: 50 g, Rfl: 0    ergocalciferol (VITAMIN D2) 50,000 units, Take 50,000 Units by mouth once a week sundays, Disp: , Rfl:     famotidine (PEPCID) 20 mg tablet, Take 20 mg by mouth every evening, Disp: , Rfl:     fluticasone (FLONASE) 50 mcg/act nasal spray, 1 spray into each nostril daily, Disp: 16 g, Rfl: 0    fluticasone (FLONASE) 50 mcg/act nasal spray, 1 spray into each nostril daily, Disp: 9.9 mL, Rfl: 0    fluticasone (FLOVENT HFA) 110 MCG/ACT inhaler, Inhale 1 puff as needed , Disp: , Rfl:     hydroxychloroquine (PLAQUENIL) 200 mg tablet, Take 200 mg by mouth as needed During Summer time, Disp: , Rfl:     levothyroxine 88 mcg tablet, Take 88 mcg by mouth daily , Disp: , Rfl:     Multiple Vitamins-Minerals (MULTIVITAMIN ADULT PO), Take 1 tablet by mouth daily, Disp: , Rfl:     Nurtec 75 MG TBDP, DISSOLVE 1 TABLET IN MOUTH EVERY OTHER DAY, Disp: 16 tablet, Rfl: 11    oxyCODONE (ROXICODONE) 15 mg immediate release tablet, Take 15 mg by mouth every 4 (four) hours as needed, Disp: , Rfl:     pantoprazole (PROTONIX) 40 mg tablet, TAKE 1 TABLET BY MOUTH DAILY FOR 30 MINUTES BEFORE BREAKFAST, Disp: 90 tablet, Rfl: 1    potassium chloride (Klor-Con) 10 mEq tablet, Take 10 mEq by mouth daily, Disp: , Rfl:     PROAIR  (90 Base) MCG/ACT inhaler, Inhale 1-2 puffs as needed , Disp: , Rfl:     prochlorperazine (COMPAZINE) 10 mg tablet, Take 1 tablet (10 mg total) by mouth every 6 (six) hours as needed (migraine), Disp: 20 tablet, Rfl: 3    propranolol (INDERAL LA) 160 mg, Take 160  mg by mouth 3 (three) times a day, Disp: , Rfl: 0    rosuvastatin (CRESTOR) 10 MG tablet, Take 10 mg by mouth daily, Disp: , Rfl:     telmisartan-hydrochlorothiazide (MICARDIS HCT) 80-12.5 MG per tablet, Take 1 tablet by mouth daily, Disp: 30 tablet, Rfl: 3    Ubrogepant (UBRELVY) 100 MG tablet, Take 1 tablet (100 mg) one time as needed for migraine. May repeat one additional tablet (100 mg) at least two hours after the first dose. Do not use more than 200 mg a day, Disp: 16 tablet, Rfl: 11    valACYclovir (VALTREX) 1,000 mg tablet, 1 tablet as needed , Disp: , Rfl:     Xiidra 5 % op solution, , Disp: , Rfl:     amoxicillin (AMOXIL) 875 mg tablet, Take 1 tablet by mouth 2 (two) times a day (Patient not taking: Reported on 4/6/2025), Disp: , Rfl:     apixaban (Eliquis) 5 mg, Take 2 tablets (10 mg total) by mouth 2 (two) times a day for 7 days, THEN 1 tablet (5 mg total) 2 (two) times a day for 23 days., Disp: 74 tablet, Rfl: 0    buPROPion (WELLBUTRIN SR) 150 mg 12 hr tablet, take 1 tablet by mouth twice a day (Patient not taking: Reported on 3/18/2025), Disp: 180 tablet, Rfl: 3    estradiol (VAGIFEM, YUVAFEM) 10 MCG TABS vaginal tablet, Insert 1 tablet (10 mcg total) into the vagina daily (Patient not taking: Reported on 3/18/2025), Disp: 30 tablet, Rfl: 0    lasmiditan succinate (Reyvow) 100 mg tablet, TAKE 1 TABLET (100MG) ONE TIME AS NEEDED FOR MIGRAINE.DO NOT USE MORE THAN ONE DOSEPER DAY OR MORE THAN 8 DOSES PER MONTH (Patient not taking: No sig reported), Disp: 8 tablet, Rfl: 5    meclizine (ANTIVERT) 12.5 MG tablet, Take 12.5 mg by mouth as needed (Patient not taking: Reported on 2/26/2025), Disp: , Rfl:     oxyCODONE (Roxicodone) 5 immediate release tablet, Take 1 tablet (5 mg total) by mouth every 4 (four) hours as needed for moderate pain for up to 15 doses Max Daily Amount: 30 mg (Patient not taking: Reported on 2/26/2025), Disp: 15 tablet, Rfl: 0    Ozempic, 1 MG/DOSE, 4 MG/3ML injection pen, inject 1  milligram subcutaneously every week (Patient not taking: Reported on 3/18/2025), Disp: , Rfl:     polyethylene glycol (GOLYTELY) 4000 mL solution, Take as directed by the office for colonoscopy. (Patient not taking: Reported on 3/18/2025), Disp: 4000 mL, Rfl: 0    rosuvastatin (CRESTOR) 5 mg tablet, Take 5 mg by mouth daily (Patient not taking: Reported on 6/14/2024), Disp: , Rfl:     telmisartan (MICARDIS) 80 MG tablet, Take 80 mg by mouth daily (Patient not taking: Reported on 6/14/2024), Disp: , Rfl:     Current Facility-Administered Medications:     Botulinum Toxin Type A SOLR 200 Units, 200 Units, Injection, Q3 Months, Kassie Andrade PA-C, 200 Units at 03/18/21 1154    Current Allergies     Allergies as of 04/06/2025 - Reviewed 04/06/2025   Allergen Reaction Noted    Pregabalin  05/13/2015    Shellfish-derived products - food allergy Anaphylaxis and Hives 02/05/2018    Sumatriptan  05/13/2015    Triptans Shortness Of Breath and Palpitations 10/17/2012    Latex Swelling and Rash 07/26/2012    Sulfa antibiotics Swelling and Rash 05/13/2015    Monistat [miconazole] Swelling 04/06/2018              The following portions of the patient's history were reviewed and updated as appropriate: allergies, current medications, past family history, past medical history, past social history, past surgical history, and problem list.     Past Medical History:   Diagnosis Date    Arthritis     Asthma     Bipolar 1 disorder (HCC)     Chronic narcotic dependence (HCC)     Cluster headache     COVID     2021    CTS (carpal tunnel syndrome)     left hand    Difficulty walking     Disease of thyroid gland     Fibromyalgia     Fibromyalgia, primary     Gout     Headache, tension-type     History of DVT in adulthood     Hyperlipidemia     Hypertension     Interstitial cystitis     Lumbar herniated disc     Lupus     Memory loss     Migraine     Mild sleep apnea     no CPAP    Peripheral neuropathy     PONV (postoperative nausea and  vomiting)     Vision loss        Past Surgical History:   Procedure Laterality Date    COLONOSCOPY      DISTAL CLAVICLE EXCISION Right 4/24/2024    Procedure: DISTAL CLAVICLE EXCISION, acromioplasty;  Surgeon: Luke Gamez MD;  Location: [facility];  Service: Orthopedics    GALLBLADDER SURGERY  2008    HYSTERECTOMY  2014    LAPAROSCOPIC COLON RESECTION  2001    MT NDSC WRST SURG W/RLS TRANSVRS CARPL LIGM Right 12/06/2023    Procedure: Right endoscopic carpal tunnel release;  Surgeon: Alek Richards MD;  Location: [facility];  Service: Orthopedics    MT SURGICAL ARTHROSCOPY SHOULDER W/ROTATOR CUFF RPR Right 4/24/2024    Procedure: REPAIR ROTATOR CUFF  ARTHROSCOPIC;  Surgeon: Luke Gamez MD;  Location: [facility];  Service: Orthopedics    MT TENDON SHEATH INCISION Right 12/06/2023    Procedure: Right ring trigger finger release;  Surgeon: Alek Richards MD;  Location: [facility];  Service: Orthopedics       Family History   Problem Relation Age of Onset    Lupus Mother     Osteoporosis Mother     Hypotension Mother     Hypertension Father     Diabetes Father     Heart disease Father     Migraines Father     Stroke Father     Diabetes Sister     Schizophrenia Sister     Hypertension Sister     No Known Problems Daughter     No Known Problems Daughter     Diabetes Maternal Grandmother     Rheum arthritis Maternal Grandmother     Hypertension Maternal Grandmother     Neuropathy Maternal Grandmother     Seizures Maternal Grandmother     No Known Problems Maternal Grandfather     No Known Problems Paternal Grandmother     No Known Problems Paternal Grandfather     Breast cancer Maternal Aunt 40    No Known Problems Maternal Aunt     No Known Problems Maternal Aunt     No Known Problems Maternal Aunt     Endometrial cancer Paternal Aunt 60         Medications have been verified.        Objective     /84   Pulse 64   Temp 98.2 °F (36.8 °C)   Resp 18   Wt 97.5 kg (215 lb)   LMP  (LMP Unknown)   SpO2  98%   BMI 35.78 kg/m²   No LMP recorded (lmp unknown). Patient has had a hysterectomy.         Physical Exam     Physical Exam  Vitals and nursing note reviewed.   Constitutional:       General: She is not in acute distress.     Appearance: Normal appearance. She is not ill-appearing, toxic-appearing or diaphoretic.   Eyes:      Conjunctiva/sclera: Conjunctivae normal.   Cardiovascular:      Rate and Rhythm: Normal rate and regular rhythm.      Pulses: Normal pulses.   Pulmonary:      Effort: Pulmonary effort is normal.      Breath sounds: Normal breath sounds.   Musculoskeletal:      Cervical back: Normal.      Thoracic back: Normal.      Lumbar back: No swelling, deformity, lacerations, spasms, tenderness or bony tenderness. Decreased range of motion. Negative right straight leg raise test and negative left straight leg raise test.      Comments: Pt has reduced extension, left lateral rotation and flexion-all refused due to pain with ROM. Pt is nontender to palpation but reports pain with ROM.    Skin:     General: Skin is warm and dry.   Neurological:      General: No focal deficit present.      Mental Status: She is alert and oriented to person, place, and time. Mental status is at baseline.   Psychiatric:         Mood and Affect: Mood normal.         Behavior: Behavior normal.         Thought Content: Thought content normal.         Judgment: Judgment normal.

## 2025-04-06 NOTE — PATIENT INSTRUCTIONS
Warm compresses for 20 minutes at least four times daily     Please perform gentle range of motion four times daily    Referral to comprehensive spine program

## 2025-04-08 ENCOUNTER — NURSE TRIAGE (OUTPATIENT)
Dept: PHYSICAL THERAPY | Facility: OTHER | Age: 50
End: 2025-04-08

## 2025-04-08 ENCOUNTER — RESULTS FOLLOW-UP (OUTPATIENT)
Age: 50
End: 2025-04-08

## 2025-04-08 DIAGNOSIS — M25.50 ARTHRALGIA, UNSPECIFIED JOINT: ICD-10-CM

## 2025-04-08 DIAGNOSIS — M47.816 SPONDYLOSIS OF LUMBAR REGION WITHOUT MYELOPATHY OR RADICULOPATHY: Primary | ICD-10-CM

## 2025-04-08 DIAGNOSIS — M54.50 ACUTE EXACERBATION OF CHRONIC LOW BACK PAIN: Primary | ICD-10-CM

## 2025-04-08 DIAGNOSIS — G89.29 ACUTE EXACERBATION OF CHRONIC LOW BACK PAIN: Primary | ICD-10-CM

## 2025-04-08 NOTE — TELEPHONE ENCOUNTER
"Additional Information   Negative: Is this related to a work injury?   Negative: Is this related to an MVA?   Negative: Are you currently recieving homecare services?    Background - Initial Assessment  Clinical complaint:  visit on 04/06 due to Left Sided lower Back Pain. Hx of back pain for \"many years\", worsened 2 weeks ago. Pain radiates into her buttocks \"sometimes\" and down into her hips and pelvis area \"can't lift her left leg when getting in the car\". NKI. No numbness or tingling sensation. Not seeing a spine specialist for this pain. Saw a Dr years ago. Pain is worse with movement. Patient states pain has been constant lately. Patient described it as stabbing, shooting, aching and throbbing. No previous back sx.  Date of onset: many years ago, worsened 2 weeks ago.  Frequency of pain: constant  Quality of pain: aching, shooting, stabbing, and throbbing.    Protocols used: [facility] Protocol    "

## 2025-04-08 NOTE — TELEPHONE ENCOUNTER
Additional Information   Negative: Has the patient had unexplained weight loss?   Negative: Does the patient have a fever?   Negative: Is the patient experiencing blood in sputum?   Affirmative: Is this a chronic condition?   Negative: Is the patient experiencing urine retention?   Negative: Is the patient experiencing acute drop foot or paralysis?   Negative: Has the patient experienced major trauma? (fall from height, high speed collision, direct blow to spine) and is also experiencing nausea, light-headedness, or loss of consciousness?    Protocols used: [facility] Protocol  .  [facility] was reviewed in detail and what we can provide for their back pain.  Patient is agreeable to being triaged and would like to proceed with Physical Therapy.    Referral was placed for Physical Therapy at the [facility] site. Patients information was sent to the  to make evaluation appointment. Patient made aware that the PT office  will be calling to schedule the appointment.  Patient was provided with the phone number to the PT office.    No further questions and/or concerns were voiced by the patient at this time. Patient states understanding of the referral that was placed.    Referral Closed.

## 2025-04-10 ENCOUNTER — OFFICE VISIT (OUTPATIENT)
Dept: GASTROENTEROLOGY | Facility: CLINIC | Age: 50
End: 2025-04-10
Payer: MEDICARE

## 2025-04-10 VITALS
DIASTOLIC BLOOD PRESSURE: 60 MMHG | BODY MASS INDEX: 35.08 KG/M2 | TEMPERATURE: 98.2 F | WEIGHT: 210.8 LBS | SYSTOLIC BLOOD PRESSURE: 110 MMHG

## 2025-04-10 DIAGNOSIS — K57.90 DIVERTICULOSIS: ICD-10-CM

## 2025-04-10 DIAGNOSIS — Z12.11 SCREENING FOR COLON CANCER: ICD-10-CM

## 2025-04-10 DIAGNOSIS — K21.9 GASTROESOPHAGEAL REFLUX DISEASE, UNSPECIFIED WHETHER ESOPHAGITIS PRESENT: Primary | ICD-10-CM

## 2025-04-10 DIAGNOSIS — E66.01 OBESITY, MORBID (HCC): ICD-10-CM

## 2025-04-10 PROCEDURE — 99214 OFFICE O/P EST MOD 30 MIN: CPT | Performed by: PHYSICIAN ASSISTANT

## 2025-04-10 RX ORDER — PANTOPRAZOLE SODIUM 40 MG/1
40 TABLET, DELAYED RELEASE ORAL
Qty: 90 TABLET | Refills: 3 | Status: SHIPPED | OUTPATIENT
Start: 2025-04-10

## 2025-04-10 NOTE — PROGRESS NOTES
Name: Adamaris Brian      : 1975      MRN: [id number]  Encounter Provider: Carol Arrington PA-C  Encounter Date: 4/10/2025   Encounter department: [facility] [place]  :  Assessment & Plan  Gastroesophageal reflux disease, unspecified whether esophagitis present  Well-controlled. Recent EGD showed no evidence of esophagitis.  Continue Protonix 40 mg daily before breakfast. She may try weaning off in the future if she would like. Continue GERD diet and lifestyle modifications.    Orders:    pantoprazole (PROTONIX) 40 mg tablet; Take 1 tablet (40 mg total) by mouth daily before breakfast    Obesity, morbid (HCC)  Patient plans to change her diet and eat healthier.       Diverticulosis  Discussed importance of high-fiber diet. Continue Metamucil daily.       Screening for colon cancer  Due for next colonoscopy in 10 years       Follow-up in 1 year    History of Present Illness   HPI  Adamaris Brian is a 49 y.o. adult who presents for follow-up of GERD, epigastric pain, colon cancer screening.    History obtained from: patient    She is currently doing very well.  Her reflux and epigastric pain has resolved with Protonix.  She does try to avoid reflux trigger foods.  She denies difficulty swallowing.  No nausea or vomiting.  She reports regular bowel movements and uses Metamucil.  She denies blood in the stool.  No abnormal weight loss.    She had normal EGD and colonoscopy in 2024.  EGD biopsies were negative for H. pylori.  Colonoscopy only showed diverticulosis.     Objective   /60 (BP Location: Left arm, Patient Position: Sitting, Cuff Size: Standard)   Temp 98.2 °F (36.8 °C) (Tympanic)   Wt 95.6 kg (210 lb 12.8 oz)   LMP  (LMP Unknown)   BMI 35.08 kg/m²      Physical Exam  Vitals and nursing note reviewed.   Constitutional:       General: She is not in acute distress.     Appearance: She is well-developed.   HENT:      Head: Normocephalic and  atraumatic.   Eyes:      Conjunctiva/sclera: Conjunctivae normal.   Cardiovascular:      Rate and Rhythm: Normal rate and regular rhythm.      Heart sounds: No murmur heard.  Pulmonary:      Effort: Pulmonary effort is normal. No respiratory distress.      Breath sounds: Normal breath sounds.   Abdominal:      Palpations: Abdomen is soft.      Tenderness: There is no abdominal tenderness.   Musculoskeletal:         General: No swelling.      Cervical back: Neck supple.   Skin:     General: Skin is warm and dry.   Neurological:      Mental Status: She is alert.   Psychiatric:         Mood and Affect: Mood normal.

## 2025-04-17 ENCOUNTER — EVALUATION (OUTPATIENT)
Dept: PHYSICAL THERAPY | Facility: REHABILITATION | Age: 50
End: 2025-04-17
Payer: MEDICARE

## 2025-04-17 VITALS — SYSTOLIC BLOOD PRESSURE: 140 MMHG | TEMPERATURE: 98.5 F | DIASTOLIC BLOOD PRESSURE: 78 MMHG

## 2025-04-17 DIAGNOSIS — M54.50 ACUTE EXACERBATION OF CHRONIC LOW BACK PAIN: ICD-10-CM

## 2025-04-17 DIAGNOSIS — G89.29 ACUTE EXACERBATION OF CHRONIC LOW BACK PAIN: ICD-10-CM

## 2025-04-17 PROCEDURE — 97110 THERAPEUTIC EXERCISES: CPT | Performed by: PHYSICAL THERAPIST

## 2025-04-17 PROCEDURE — 97161 PT EVAL LOW COMPLEX 20 MIN: CPT | Performed by: PHYSICAL THERAPIST

## 2025-04-17 NOTE — PROGRESS NOTES
PT Evaluation     Today's date: 2025  Patient name: Adamaris Brian  : 1975  MRN: [id number]  Referring provider: Ramses Harris PT  Dx:   Encounter Diagnosis     ICD-10-CM    1. Acute exacerbation of chronic low back pain  M54.50 Ambulatory referral to PT spine    G89.29           Start Time: 1615  Stop Time: 1650  Total time in clinic (min): 35 minutes    Assessment  Impairments: abnormal or restricted ROM, activity intolerance, impaired physical strength, lacks appropriate home exercise program, pain with function, weight-bearing intolerance and poor posture     Assessment details: Problem List:  1) lumbar spine hypomobility  2) decreased core stability    Adamaris Brian is a pleasant 49 y.o. adult who presents with low back pain that began about 1-2 weeks ago.  she has decreased lumbar spine mobility, pain with twisting, and difficulty working resulting in the pain she is experiencing.  No further referral appears necessary at this time based upon examination results.  Patient will improve in 8 weeks with consistent HEP performance and session adherence. Patient would benefit from skilled physical therapy to decrease pain, improve function and help them achieve their goals.   Understanding of Dx/Px/POC: good     Prognosis: good    Goals  ST-4 weeks  Patient will be independent with home exercise program.   Patient will be able to manage symptoms independently.  Patient will decrease pain by 25-50%    LTG: by discharge  Patient will improve FOTO to goal  Patient will report minimal (1-2/10) pain with aggravating activities to display improvements in overall functional status        Plan  Patient would benefit from: skilled physical therapy  Planned modality interventions: cryotherapy, thermotherapy: hydrocollator packs and unattended electrical stimulation    Planned therapy interventions: IADL retraining, joint mobilization, manual therapy, massage, ADL training, activity modification,  abdominal trunk stabilization, ADL retraining, balance, balance/weight bearing training, neuromuscular re-education, body mechanics training, behavior modification, strengthening, stretching, therapeutic activities, therapeutic exercise, therapeutic training, transfer training, graded exercise, graded motor, home exercise program, graded activity, gait training, functional ROM exercises, patient education, postural training, IASTM, kinesiology taping and flexibility    Frequency: 2x week  Duration in weeks: 8  Plan of Care beginning date: 4/17/2025  Plan of Care expiration date: 5/29/2025  Treatment plan discussed with: patient        Subjective Evaluation    History of Present Illness  Mechanism of injury: Adamaris is a 49 y.o. adult presenting to physical therapy on 04/17/25 with referral from comprehensive spine for low back pain that began about 1-2 weeks ago. Has been slowly improving, but had to take time off from work.            Quality of life: good    Patient Goals  Patient goals for therapy: increased strength, independence with ADLs/IADLs, return to sport/leisure activities, decreased pain and increased motion  Patient goal: no pain at work  Pain  Quality: sharp          Objective    Myotomes:  all intact b/l  Dermatome: all intact b/l    Lumbar  % of normal   Flex. 75% P   Extn. 50%   SB Left 100%   SB Right 100%   ROT Left 75% P   ROT Right 75% P         MMT         AROM          PROM    Hip       L       R        L           R      L     R   Flex.     L WFL   Extn.         Abd.         Add.         IR.     WFL WFL   ER.     WFL WFL            G. Max         G. Med.         Iliop.             Pelvic Motor Control:  Bent knee fall out= Fair supine march=Fair  Transverse abdominis = Poor  Multifidus Activation = Poor     Segmental mobility:   LS= hypomobile @ L4 and L5 segment               Precautions: standard    Manuals 4/17            Lumbar mobs                                       Assessment              Neuro Re-Ed             sherif Holder carry                                                                              Ther Ex 4/17            MAGDA HEP                                                                                          HEP/education 10'            Ther Activity                                       Gait Training                                       Modalities

## 2025-04-23 ENCOUNTER — OFFICE VISIT (OUTPATIENT)
Dept: PHYSICAL THERAPY | Facility: REHABILITATION | Age: 50
End: 2025-04-23
Payer: MEDICARE

## 2025-04-23 DIAGNOSIS — M54.50 ACUTE EXACERBATION OF CHRONIC LOW BACK PAIN: Primary | ICD-10-CM

## 2025-04-23 DIAGNOSIS — G89.29 ACUTE EXACERBATION OF CHRONIC LOW BACK PAIN: Primary | ICD-10-CM

## 2025-04-23 PROCEDURE — 97140 MANUAL THERAPY 1/> REGIONS: CPT | Performed by: PHYSICAL THERAPIST

## 2025-04-23 PROCEDURE — 97112 NEUROMUSCULAR REEDUCATION: CPT | Performed by: PHYSICAL THERAPIST

## 2025-04-23 NOTE — PROGRESS NOTES
Daily Note     Today's date: 2025  Patient name: Adamaris Brian  : 1975  MRN: [id number]  Referring provider: Ramses Harris, PT  Dx:   Encounter Diagnosis     ICD-10-CM    1. Acute exacerbation of chronic low back pain  M54.50     G89.29           Start Time: 1540  Stop Time: 1610  Total time in clinic (min): 30 minutes    Subjective: Reports she is sore from work.       Objective: See treatment diary below      Assessment: Tolerated treatment well. Good tolerance to exercises, but reported soreness during pallof press. Patient exhibited good technique with therapeutic exercises and would benefit from continued PT      Plan: Continue per plan of care.      Precautions: standard    Manuals            Lumbar mobs  QD PA 3x30 GrIII                                     Assessment  QD           Neuro Re-Ed             pallof  2x10 OJB           Suitcase carry  15lb KB 3 laps           Pball   20x           Seated hip flexion  12x ea                                                  Ther Ex            MAGDA HEP                                                                                          HEP/education 10'            Ther Activity                                       Gait Training                                       Modalities

## 2025-04-24 NOTE — PROGRESS NOTES
Name: Adamaris Brian      : 1975      MRN: [id number]  Encounter Provider: Amara Coulter DO  Encounter Date: 2025   Encounter department: [facility] [address]  :  Assessment & Plan  Arthralgia, unspecified joint  Patient is a 49-year-old female with a history of discoid lupus presenting for further evaluation of arthralgias located in the hands, feet, back and neck which is constant. Reports 10 to 15 minutes of morning stiffness, however worse at the end of the day as well. Denies worsening pain with physical activity. Patient reports getting episodes of ankle swelling which lasts a few days and she has trouble bearing weight on that.  Reports compression stockings and elevation does help with the ankle swelling.  Patient has tried multiple medications including NSAIDs, Tylenol, oxycodone with no significant relief in her joint pain. On exam, patient has no active synovitis or joint tenderness. OA changes noted on exam and previous imaging. RF and CCP are negative. Overall, patient's arthralgias sound more osteoarthritic in nature, however given that patient is having episodes of swelling of the ankles, will rule out inflammatory arthritis.   -[facility] ultrasound of the feet pending to evaluate for inflammatory changes  -Discussed if findings consistent with inflammatory arthritis, will plan to start hydroxychloroquine 400 mg daily.  Patient takes hydroxychloroquine 200 mg daily for discoid lupus during the summer months.       Discoid lupus  Patient has a longstanding history of discoid lupus.  No other signs of systemic SLE at this point such as mucocutaneous manifestations, Raynaud's, cytopenias.  Patient does endorse sicca symptoms.  AJAY with subsequent EUGENIA testing all has been negative as well.  Continue to follow with dermatology  -Hydroxychloroquine per dermatology       Encounter for osteoporosis screening in asymptomatic postmenopausal patient  Patient is a 49-year-old female who  recently fell and had a fracture of her shoulder. Patient has a family history of osteoporosis in her mother and raises concerns due to being postmenopausal. Discussed that screening is typically done after age 65, however given patient's concern, DEXA scan was ordered and pending.       Sjogren's syndrome with keratoconjunctivitis sicca (HCC)  Patient endorses sicca symptoms that have been ongoing for many years. Reports trouble eating dry foods. SSA and SSB are negative. Can consider getting a salivary lip gland biopsy if symptoms are persistent to evaluate for Sjogren syndrome based on above workup.        RTC in 7 months, sooner if needed based on above workup    Pertinent Medical History   Reviewed           History of Present Illness   Adamaris Brian is a 49 y.o. adult with a history of migraines, DVT, hypertension, CK, dyslipidemia who presents for further evaluation of polyarthralgias.      HPI   Patient reports that currently her back is the most bothersome.  Reports the pain was so severe she could not move.  Has started physical therapy.  States that it is slowly getting manageable.  Tried oxycodone which also did not help and her back flared up.  Reports her muscles felt very tight.    Reports she continues to have triggering of the right third finger.  Is planned to see orthopedics.    Continues to endorse swelling of the ankles.  Reports it is better with elevation.  Compression stockings do help.  Patient states that her ankles are stiff in the morning that gets better throughout the day.  Reports stiffness in back, hips, ankles that gets better as the day goes on.  Morning stiffness lasts about 15 minutes.    Patient states that since summer, she will soon start hydroxychloroquine for the discoid lupus.    Permanent history:  49-year-old female with a history of discoid lupus presenting for further evaluation of arthralgias located in the hands, feet, back and neck which is constant. Reports 10 to  15 minutes of morning stiffness, however worse at the end of the day as well. Denies worsening pain with physical activity. Patient reports getting episodes of ankle swelling which lasts a few days and she has trouble bearing weight on that. Patient has tried multiple medications including NSAIDs, Tylenol, oxycodone with no significant relief in her joint pain. On exam, patient has no active synovitis or joint tenderness. OA changes noted on exam and previous imaging. RF and CCP are negative. Overall, patient's arthralgias sound more osteoarthritic in nature, however given that patient is having episodes of swelling of the ankles, will rule out inflammatory arthritis.   X-rays without any inflammatory changes  [facility] ultrasound of the feet pending    Family hx: daughter with MYRNA and grandmother with RA    Review of Systems  Complete ROS conducted as per HPI. In addition, denies:  Fever  Recurrent oral ulcers  Muscle weakness  Uveitis  Dactylitis  Chest pain  SOB  Pleurisy  Gross hematuria  Foamy urine  Raynaud's  Joint issues other than noted above    Current Outpatient Medications on File Prior to Visit   Medication Sig Dispense Refill    acetaminophen (TYLENOL) 325 mg tablet Take 1 tablet (325 mg total) by mouth every 6 (six) hours as needed for mild pain 30 tablet 0    aspirin-acetaminophen-caffeine (EXCEDRIN MIGRAINE) 250-250-65 MG per tablet Take 2 tablets by mouth if needed for headaches      Atogepant (Qulipta) 60 MG TABS Take 60 mg by mouth daily at bedtime 30 tablet 11    buPROPion (Wellbutrin XL) 150 mg 24 hr tablet Take 1 tablet (150 mg total) by mouth daily 90 tablet 3    Cetirizine HCl 10 MG CAPS Take 10 mg by mouth daily as needed      Diclofenac Sodium (VOLTAREN) 1 % Apply 2 g topically 4 (four) times a day 50 g 0    ergocalciferol (VITAMIN D2) 50,000 units Take 50,000 Units by mouth once a week sundays      famotidine (PEPCID) 20 mg tablet Take 20 mg by mouth every evening      fluticasone (FLONASE) 50  mcg/act nasal spray 1 spray into each nostril daily 16 g 0    fluticasone (FLONASE) 50 mcg/act nasal spray 1 spray into each nostril daily 9.9 mL 0    fluticasone (FLOVENT HFA) 110 MCG/ACT inhaler Inhale 1 puff as needed       hydroxychloroquine (PLAQUENIL) 200 mg tablet Take 200 mg by mouth as needed During Summer time      levothyroxine 88 mcg tablet Take 88 mcg by mouth daily       Multiple Vitamins-Minerals (MULTIVITAMIN ADULT PO) Take 1 tablet by mouth daily      Nurtec 75 MG TBDP DISSOLVE 1 TABLET IN MOUTH EVERY OTHER DAY 16 tablet 11    oxyCODONE (ROXICODONE) 15 mg immediate release tablet Take 15 mg by mouth every 4 (four) hours as needed      pantoprazole (PROTONIX) 40 mg tablet Take 1 tablet (40 mg total) by mouth daily before breakfast 90 tablet 3    potassium chloride (Klor-Con) 10 mEq tablet Take 10 mEq by mouth daily      PROAIR  (90 Base) MCG/ACT inhaler Inhale 1-2 puffs as needed       prochlorperazine (COMPAZINE) 10 mg tablet Take 1 tablet (10 mg total) by mouth every 6 (six) hours as needed (migraine) 20 tablet 3    propranolol (INDERAL LA) 160 mg Take 160 mg by mouth 3 (three) times a day  0    rosuvastatin (CRESTOR) 10 MG tablet Take 10 mg by mouth daily      telmisartan-hydrochlorothiazide (MICARDIS HCT) 80-12.5 MG per tablet Take 1 tablet by mouth daily 30 tablet 3    Ubrogepant (UBRELVY) 100 MG tablet Take 1 tablet (100 mg) one time as needed for migraine. May repeat one additional tablet (100 mg) at least two hours after the first dose. Do not use more than 200 mg a day 16 tablet 11    valACYclovir (VALTREX) 1,000 mg tablet 1 tablet as needed       Xiidra 5 % op solution       amoxicillin (AMOXIL) 875 mg tablet Take 1 tablet by mouth 2 (two) times a day (Patient not taking: Reported on 4/6/2025)      apixaban (Eliquis) 5 mg Take 2 tablets (10 mg total) by mouth 2 (two) times a day for 7 days, THEN 1 tablet (5 mg total) 2 (two) times a day for 23 days. 74 tablet 0    buPROPion  (WELLBUTRIN SR) 150 mg 12 hr tablet take 1 tablet by mouth twice a day (Patient not taking: Reported on 4/25/2025) 180 tablet 3    estradiol (VAGIFEM, YUVAFEM) 10 MCG TABS vaginal tablet Insert 1 tablet (10 mcg total) into the vagina daily (Patient not taking: Reported on 4/25/2025) 30 tablet 0    lasmiditan succinate (Reyvow) 100 mg tablet TAKE 1 TABLET (100MG) ONE TIME AS NEEDED FOR MIGRAINE.DO NOT USE MORE THAN ONE DOSEPER DAY OR MORE THAN 8 DOSES PER MONTH (Patient not taking: No sig reported) 8 tablet 5    meclizine (ANTIVERT) 12.5 MG tablet Take 12.5 mg by mouth as needed (Patient not taking: Reported on 2/26/2025)      oxyCODONE (Roxicodone) 5 immediate release tablet Take 1 tablet (5 mg total) by mouth every 4 (four) hours as needed for moderate pain for up to 15 doses Max Daily Amount: 30 mg (Patient not taking: Reported on 2/26/2025) 15 tablet 0    Ozempic, 1 MG/DOSE, 4 MG/3ML injection pen inject 1 milligram subcutaneously every week (Patient not taking: Reported on 4/25/2025)      polyethylene glycol (GOLYTELY) 4000 mL solution Take as directed by the office for colonoscopy. (Patient not taking: Reported on 4/25/2025) 4000 mL 0    rosuvastatin (CRESTOR) 5 mg tablet Take 5 mg by mouth daily (Patient not taking: Reported on 6/14/2024)      telmisartan (MICARDIS) 80 MG tablet Take 80 mg by mouth daily (Patient not taking: Reported on 6/14/2024)       Current Facility-Administered Medications on File Prior to Visit   Medication Dose Route Frequency Provider Last Rate Last Admin    Botulinum Toxin Type A SOLR 200 Units  200 Units Injection Q3 Months Kassie Andrade PA-C   200 Units at 03/18/21 1154      Social History     Tobacco Use    Smoking status: Former     Current packs/day: 1.00     Average packs/day: 1 pack/day for 5.0 years (5.0 ttl pk-yrs)     Types: Cigarettes    Smokeless tobacco: Never    Tobacco comments:     quit at 17 years old   Vaping Use    Vaping status: Never Used   Substance and Sexual  "Activity    Alcohol use: No    Drug use: No    Sexual activity: Yes     Partners: Male     Birth control/protection: Surgical         Objective   /76 (BP Location: Left arm, Patient Position: Sitting, Cuff Size: Standard)   Pulse 85   Temp 97.7 °F (36.5 °C) (Tympanic)   Ht 5' 5\" (1.651 m)   Wt 94.5 kg (208 lb 6.4 oz)   LMP  (LMP Unknown)   SpO2 97%   BMI 34.68 kg/m²     Physical Exam  General appearance: normal appearing, no acute distress  Skin: normal, no rashes  HEENT: normal, moist oropharynx, no nasal or oral ulcers  Lymph nodes: no palpable adenopathy  Lungs: normal respiratory effort, comfortable on room air, lungs clear to auscultation b/l   Heart: normal heart sounds, normal rate, normal rhythm,  Abdomen: soft, normal bowel sounds, no tenderness  Neurologic: no obvious neurological deficits   Extremities: no edema, warm and well perfused     Musculoskeletal Exam:   - Observation: Heberden's and Shirin nodes present  - Palpation: no joint tenderness, crepitus of bilateral knees  - Synovitis: absent  - Joint effusions: absent  - ROM: intact throughout  - Muscle Strength: 5/5 throughout     Recent labs:  Lab Results   Component Value Date/Time    SODIUM 141 04/06/2025 11:54 AM    K 4.3 04/06/2025 11:54 AM    K 3.6 11/11/2015 09:13 PM    BUN 9 04/06/2025 11:54 AM    BUN 10 11/11/2015 09:13 PM    CREATININE 0.67 04/06/2025 11:54 AM    CREATININE 0.75 11/11/2015 09:13 PM    GLUC 91 03/26/2024 03:18 PM    CALCIUM 9.2 04/06/2025 11:54 AM    CALCIUM 9.0 11/11/2015 09:13 PM    AST 19 04/06/2025 11:54 AM    AST 20 02/28/2015 04:01 AM    ALT 25 04/06/2025 11:54 AM    ALT 34 02/28/2015 04:01 AM    ALB 3.9 04/06/2025 11:54 AM    ALB 3.2 (L) 02/28/2015 04:01 AM    TP 7.0 04/06/2025 11:54 AM    EGFR 75 05/27/2021 11:26 AM     Lab Results   Component Value Date/Time    HGB 12.6 04/06/2025 11:54 AM    HGB 13.0 11/11/2015 09:13 PM    WBC 6.01 04/06/2025 11:54 AM    WBC 7.96 11/11/2015 09:13 PM     " 04/06/2025 11:54 AM     11/11/2015 09:13 PM     Lab Results   Component Value Date/Time    RKI9WKOUDDCC 1.849 04/06/2025 11:54 AM    IET7YOCHTQXH 2.157 01/17/2015 10:11 AM     AJAY negative  RNP negative  Orourke negative  SCL 70 negative  SSA negative  SSB negative  dsDNA negative  RF negative  CCP negative  Anticardiolipin's negative  HLA-B27 negative    I have personally reviewed notes, labs, and imaging available in the chart.     Amara Coulter DO, CCD, [facility]

## 2025-04-25 ENCOUNTER — OFFICE VISIT (OUTPATIENT)
Age: 50
End: 2025-04-25
Payer: MEDICARE

## 2025-04-25 VITALS
DIASTOLIC BLOOD PRESSURE: 76 MMHG | WEIGHT: 208.4 LBS | SYSTOLIC BLOOD PRESSURE: 124 MMHG | OXYGEN SATURATION: 97 % | TEMPERATURE: 97.7 F | BODY MASS INDEX: 34.72 KG/M2 | HEART RATE: 85 BPM | HEIGHT: 65 IN

## 2025-04-25 DIAGNOSIS — M25.50 ARTHRALGIA, UNSPECIFIED JOINT: Primary | ICD-10-CM

## 2025-04-25 DIAGNOSIS — M35.01 SJOGREN'S SYNDROME WITH KERATOCONJUNCTIVITIS SICCA (HCC): ICD-10-CM

## 2025-04-25 DIAGNOSIS — Z13.820 ENCOUNTER FOR OSTEOPOROSIS SCREENING IN ASYMPTOMATIC POSTMENOPAUSAL PATIENT: ICD-10-CM

## 2025-04-25 DIAGNOSIS — Z78.0 ENCOUNTER FOR OSTEOPOROSIS SCREENING IN ASYMPTOMATIC POSTMENOPAUSAL PATIENT: ICD-10-CM

## 2025-04-25 DIAGNOSIS — L93.0 DISCOID LUPUS: ICD-10-CM

## 2025-04-25 PROCEDURE — 99213 OFFICE O/P EST LOW 20 MIN: CPT | Performed by: STUDENT IN AN ORGANIZED HEALTH CARE EDUCATION/TRAINING PROGRAM

## 2025-04-25 PROCEDURE — G2211 COMPLEX E/M VISIT ADD ON: HCPCS | Performed by: STUDENT IN AN ORGANIZED HEALTH CARE EDUCATION/TRAINING PROGRAM

## 2025-04-29 ENCOUNTER — HOSPITAL ENCOUNTER (OUTPATIENT)
Dept: RADIOLOGY | Facility: HOSPITAL | Age: 50
Discharge: HOME | End: 2025-04-29
Attending: STUDENT IN AN ORGANIZED HEALTH CARE EDUCATION/TRAINING PROGRAM
Payer: MEDICARE

## 2025-04-29 DIAGNOSIS — M25.50 ARTHRALGIA, UNSPECIFIED JOINT: ICD-10-CM

## 2025-04-29 PROCEDURE — 76882 US LMTD JT/FCL EVL NVASC XTR: CPT

## 2025-04-30 ENCOUNTER — OFFICE VISIT (OUTPATIENT)
Dept: PHYSICAL THERAPY | Facility: REHABILITATION | Age: 50
End: 2025-04-30
Payer: MEDICARE

## 2025-04-30 DIAGNOSIS — G89.29 ACUTE EXACERBATION OF CHRONIC LOW BACK PAIN: Primary | ICD-10-CM

## 2025-04-30 DIAGNOSIS — M54.50 ACUTE EXACERBATION OF CHRONIC LOW BACK PAIN: Primary | ICD-10-CM

## 2025-04-30 PROCEDURE — 97112 NEUROMUSCULAR REEDUCATION: CPT | Performed by: PHYSICAL THERAPIST

## 2025-04-30 PROCEDURE — 97140 MANUAL THERAPY 1/> REGIONS: CPT | Performed by: PHYSICAL THERAPIST

## 2025-04-30 NOTE — PROGRESS NOTES
Daily Note     Today's date: 2025  Patient name: Adamaris Brian  : 1975  MRN: [id number]  Referring provider: Ramses Harris, PT  Dx:   Encounter Diagnosis     ICD-10-CM    1. Acute exacerbation of chronic low back pain  M54.50     G89.29           Start Time: 1540  Stop Time: 1615  Total time in clinic (min): 35 minutes    Subjective: Reports she is doing alright. Better than initial evaluation.       Objective: See treatment diary below      Assessment: Tolerated treatment well. Patient exhibited good technique with therapeutic exercises and would benefit from continued PT      Plan: Continue per plan of care.      Precautions: standard    Manuals           Lumbar mobs  QD PA 3x30 GrIII QD PA 3x30 GrIII                                    Assessment  QD QD          Neuro Re-Ed            pallof  2x10 OJB 2x10 OJB          Suitcase carry  15lb KB 3 laps 125lb KB 4 laps          Pball   20x 20x          Seated hip flexion  12x ea Supine RMB 12x ea          TrA march   20x ea                                    Ther Ex            MAGDA HEP                                                                                          HEP/education 10'            Ther Activity                                       Gait Training                                       Modalities

## 2025-05-01 ENCOUNTER — PROCEDURE VISIT (OUTPATIENT)
Dept: NEUROLOGY | Facility: CLINIC | Age: 50
End: 2025-05-01
Payer: MEDICARE

## 2025-05-01 VITALS — SYSTOLIC BLOOD PRESSURE: 133 MMHG | HEART RATE: 82 BPM | DIASTOLIC BLOOD PRESSURE: 73 MMHG | TEMPERATURE: 98.4 F

## 2025-05-01 DIAGNOSIS — G43.709 CHRONIC MIGRAINE WITHOUT AURA WITHOUT STATUS MIGRAINOSUS, NOT INTRACTABLE: Primary | ICD-10-CM

## 2025-05-01 PROCEDURE — 64615 CHEMODENERV MUSC MIGRAINE: CPT | Performed by: PHYSICIAN ASSISTANT

## 2025-05-01 NOTE — PROGRESS NOTES
"Universal Protocol   procedure performed by consultantConsent: Verbal consent obtained. Written consent obtained.  Risks and benefits: risks, benefits and alternatives were discussed  Consent given by: patient  Time out: Immediately prior to procedure a \"time out\" was called to verify the correct patient, procedure, equipment, support staff and site/side marked as required.  Patient understanding: patient states understanding of the procedure being performed  Patient consent: the patient's understanding of the procedure matches consent given  Procedure consent: procedure consent matches procedure scheduled  Relevant documents: relevant documents present and verified  Patient identity confirmed: verbally with patient      Chemodenervation     Date/Time  5/1/2025 3:00 PM     Performed by  Kassie Andrade PA-C   Authorized by  Kassie Andrade PA-C     Pre-procedure details      Prepped With: Alcohol     Anesthesia  (see MAR for exact dosages):     Anesthesia method:  None   Procedure details      Position:  Upright   Botox      Botox Type:  Type A    Brand:  Botox    mL's of Botulinum Toxin:  200    Final Concentration per CC:  50 units    Needle Gauge:  30 G 2.5 inch   Procedures      Botox Procedures: chronic headache      Indications: migraines     Injection Location      Head / Face:  L superior cervical paraspinal, R superior cervical paraspinal, L , R , L frontalis, R frontalis, L medial occipitalis, R medial occipitalis, procerus, R temporalis, L temporalis, R superior trapezius and L superior trapezius    L  injection amount:  5 unit(s)    R  injection amount:  5 unit(s)    L lateral frontalis:  5 unit(s)    R lateral frontalis:  5 unit(s)    L medial frontalis:  5 unit(s)    R medial frontalis:  5 unit(s)    L temporalis injection amount:  20 unit(s)    R temporalis injection amount:  20 unit(s)    Procerus injection amount:  5 unit(s)    L medial occipitalis injection " amount:  15 unit(s)    R medial occipitalis injection amount:  15 unit(s)    L superior cervical paraspinal injection amount:  10 unit(s)    R superior cervical paraspinal injection amount:  10 unit(s)    L superior trapezius injection amount:  15 unit(s)    R superior trapezius injection amount:  15 unit(s)   Total Units      Total units used:  200    Total units discarded:  0   Post-procedure details      Chemodenervation:  Chronic migraine    Facial Nerve Location::  Bilateral facial nerve    Patient tolerance of procedure:  Tolerated well, no immediate complications   Comments        5 units naris bilaterally  20 units frontalis   15 units occipitalis  All medically necessary

## 2025-05-07 ENCOUNTER — OFFICE VISIT (OUTPATIENT)
Dept: OBGYN CLINIC | Facility: HOSPITAL | Age: 50
End: 2025-05-07
Payer: MEDICARE

## 2025-05-07 VITALS — WEIGHT: 208 LBS | HEIGHT: 65 IN | BODY MASS INDEX: 34.66 KG/M2

## 2025-05-07 DIAGNOSIS — M25.50 ARTHRALGIA, UNSPECIFIED JOINT: ICD-10-CM

## 2025-05-07 DIAGNOSIS — M47.816 SPONDYLOSIS OF LUMBAR REGION WITHOUT MYELOPATHY OR RADICULOPATHY: ICD-10-CM

## 2025-05-07 DIAGNOSIS — M65.331 TRIGGER FINGER, RIGHT MIDDLE FINGER: Primary | ICD-10-CM

## 2025-05-07 PROCEDURE — 99214 OFFICE O/P EST MOD 30 MIN: CPT | Performed by: ORTHOPAEDIC SURGERY

## 2025-05-07 RX ORDER — CEFAZOLIN SODIUM 2 G/50ML
2000 SOLUTION INTRAVENOUS ONCE
OUTPATIENT
Start: 2025-05-07 | End: 2025-05-07

## 2025-05-07 NOTE — PATIENT INSTRUCTIONS
[facility]  Alek Richards MD  [place] [address]  [phone]  You have chosen to undergo surgery for your condition. Any surgery is associated with risks of potential complications. Certain medical problems such as smoking, diabetes, thyroid disease, neuropathy, malnutrition or bleeding problems may increase your risk of complications.  Complications after surgery are rare but include the following:  Bleeding Infection  Scar Pain  Tenderness Problems with healing  Damage to nerves Damage to blood vessels  Lack of desired results Need for further surgery  Numbness Stiffness  Problems with anesthesia Blood clots  Need for hardware removal (if inserted)    If bony work is done, other risks include:  Delayed bone healing  Lack of bone healing  Bones healing in wrong position    While these risks and complications are rare and infrequent they are still important to know and discuss. If you have any other questions, please let me know.  _____________________________________________________________________________________  It can be difficult, but it is possible to get on with your life with only one hand for the first few weeks after your operation. The following are a few suggestions that may be helpful when you're recovering from your hand problem or from your hand surgery. While most of these suggestions are really only applicable if your dominant or your writing hand is affected, some apply to problems involving either hand. Most daily activities can be accomplished with some modifications, rather than the need for store bought devices.    Before surgery, if you can:  Ask for help: Have others help you with: childcare, housework, and meals.  Practice: Dressing, undressing, using the toilet, brushing your teeth, showering.  Prepare: for the first few days after surgery.  Open and re-sealed cans and bottles that  you may need.  Open medication containers and leave them easy-to-read open. Make sure you put these medication containers out of reach of children, even if you don't expect children to visit you.  Prepare and think about “no-cut”meals-sandwiches, ground meats, etc.    It helps to have…  In the shower  Plastic bags and rubber bands to cover bandages-the cervantes that newspapers come in are good. Also, small trashcan liners will work. Use 2 of these at a time. The other option is an oversized rubber glove that may be purchased at a food store to aid in dishwashing.  A bottle sponge (soft sponge on a long stick)-for the armpit of your“good hand”  Shower brush  At hairbrush in the shower will help you to wash your hair  A cotton terrycloth bathrobe to aid you in drying your back    In the bathroom  Toothpaste, shampoo, etc. in a flip top or pump dispenser.  Flossers (dental floss on a“Y”shaped handle)  Consider an electric razor    In the bedroom  Back scratcher  Large sleeve shirts and tops  Put away clothing which buttons, fastens or snaps in the back, or which uses drawstrings    In the kitchen  A rubber jar opener Issa-to help open jars, but also to keep things from sliding around while you are working on them.  Double suction cup pads (“the little octopus”)-to hold items while you use or wash them  An electric can opener with a lid magnet strong enough to hold the can in the air-for one-handed use.  Consider a“wash and wear” haircut  Good [place]!    Incision & Scar Care  You should keep your bandages dry and clean; change your dressings if you see drainage coming through your dressings.  Signs of infection include increased pain, swelling, redness, warmth, and excessive or foul-smelling drainage. Please contact our office if you experience signs of infection.  You may wash with soap and water after given permission.  Sutures will be removed between 7-14 days after surgery if the wound is healed. Patients who smoke, have  diabetes, or have nutritional problems may need longer to heal.  Steri-strips may be placed across your incision at this time, which will fall off or peel away.  To help prevent infection, do not submerse your incision area for 2-3 weeks (i.e. no hot tubs, pools, ocean, dish water, fish ponds, fish tanks, bathtubs).  Swelling, bruising, and numbness are common after surgery. To help reduce these symptoms, keep the area elevated.    Scar Care: To improve the appearance of your scar, you can massage the healed area (using circular motions with your fingertip) for 5 minutes 3x a day after your steri-strips peel away. You can use regular hand lotion or Scar zone from the store. You may also use Silicone pads after the stitches are removed (available at the store). Redness and bumpiness of the scar are expected. These generally improve as healing progresses, but redness can be expected for up to 6-8 months.  ** If you have any questions or concerns, please call our office. [phone]  _____________________________________________________________________________________  Pain Management  Pain after an injury or surgery is common and should often be expected. There are many ways to manage and reduce this pain. This often does not include medications or may not exclusively include medication. Each patient, surgery and surgeon are unique, and the approach to management of pain is individual.  It is important to try to discuss your concerns and expectations regarding pain with your surgical team before and after surgery. They want to help you get better and have a good patient experience. Your patient experience includes understanding and treating your pain.  Here's what you may expect before surgery and how to manage your pain and medications after surgery. Again, the approach to pain management after injury or surgery is individualized, and this is general information. Your surgical team will have more specific  recommendations for you. Before using any of the methods explored here, please discuss with your medical team if these pain management methods are appropriate for you. We advise good communication with your team to let them help you achieve the best outcome.    Surgery Day  As your surgery begins, your surgeon and anesthesia team may give you medications by mouth, IV, and/or injection. Giving you medication as the surgery progresses not only helps you to decrease pain during the surgery, but it also reduces your pain post-surgery. You may also receive medication in the recovery room after surgery, if needed. In some cases, you will receive prescription pain medication and instructions for its use to use at home in the days following your surgery. You may also receive instructions on using over-the-counter pain medications. It is important to follow these directions carefully, as many over-the counter medications contain some of the same ingredients as prescription pain medications and using them together can result in a dangerous accidental overdose.    Post-Surgery Pain Management  While always important to follow your specific postoperative instructions, here are some different methods, outside of medication, that your team may recommend to reduce your pain:   Elevate: Elevating the injured area so it is higher than your heart can reduce swelling and pain. Swelling can increase quickly by putting your hand at your side, and this can make your dressing feel tight. Often, the pain associated with swelling is difficult to control, so it is best to avoid this problem.   Take care of your dressing: If your dressing/splint feels tight, and elevation for 10 minutes does not improve the tight sensation, contact your surgical team. It may be recommended that you unravel any tape or elastic wrap and loosen the outer bandage. If this does not help, you may be advised to tear, unravel, or cut the inner layers with blunt  tipped scissors. Make sure you are cutting on the opposite side of where your incision is located. When done, you will need to try to rebuild your dressing to keep your wound clean and covered. Before doing any of this, check with your medical team. They may want to be aware of the tight dressing and could have different instructions for loosening the dressing.   Keep moving: If allowed by your surgeon, try to frequently move the fingers, wrist, elbow, and/or shoulder that are outside of the splint or cast. You can do this gently and slowly. This improves blood flow, which limits swelling and prevents bandages from feeling tight. It may be uncomfortable to move at first, but the discomfort will often improve with time and frequently improves with motion. Your surgeon will be more specific about what to move and what to rest.   Ice the area: Icing the painful area will typically reduce swelling and inflammation and reduce pain. However, there may be certain procedures (such as surgery on arteries, skin grafts or flaps) where ice could be harmful, so consult your surgeon before using ice.   Heat the area: If you are in the phase of care where you can remove your dressing or splint, you may be able to try heat. Heat increases blood flow to an area and can help with muscle spasms, muscle soreness and joint pain.   Avoid smoking: Chemicals present in cigarettes can increase pain. Reducing or quitting smoking can improve your pain.   Consume vitamin C: Consuming 500mg of vitamin C daily for 6 weeks may reduce pain after some injuries. However, it is ascorbic acid, which can upset your stomach if you have heartburn or gastritis.    Post-Surgery Medication Management  The pain-management methods listed above are often effective when used in combination with taking medications post-surgery. There are many different classes of medication that can help pain. Some can be purchased over the counter, and some require a  prescription. All medicines can have some benefits and some adverse reactions/side effects. Your surgical team will balance these issues to provide a plan for you. Some commonly prescribed medications can include:   Tylenol (Acetaminophen)   Aleve (Naprosyn/naproxen)   Motrin/Advil (Ibuprofen)   Celebrex (Celecoxib)   Toradol (Ketorolac)    When taking medication, keep the following in mind:   It may take 30-60 minutes for your body to absorb the medication after you take it by mouth, so be patient.   Longer-acting medications used before bedtime may help you sleep better the first few nights after surgery.   The first few nights post-surgery will generally be the toughest.   Do not exceed the dose recommended by your physician or combine medications without consulting with your physician.    If you are unfamiliar with these medications, your surgeon can specify how much medication you should take, for how long, and how often.      Opioids  Opioids are a type of pain medication made from the poppy plant that is used to make opium and heroin. They can be effective in treating pain, but opioids should be used at a last resort, in limited amounts, and for a limited number of days. Use of these medications should only be done under the guidance of your doctor. When taking opioids, you are at risk of becoming dependent on the medicine, and they may become less effective over time.  Oxycodone and hydrocodone are two of the most commonly used and effective opioid “pain” pills. These pills are frequently combined with Tylenol (acetaminophen), but it must be done carefully. Be sure to consult your surgeon before doing so. Your surgeon will give you a customized plan for managing your pain based on your type of surgery, number of procedures, duration of surgery, etc. Keep in mind that many opioids are combined with Tylenol (acetaminophen) already in the pill, so take care to follow your prescribed directions and not to take  more opioids or acetaminophen than prescribed. Overdoses of each of these medications can be dangerous and life threatening. Learn more about opioids, including the side effects, how to safely use them, and how to properly dispose of any extras.    Following the program below will greatly decrease your post-operative pain.  1. Aleve (naproxen) 220 mg and Tylenol Arthritis 650 mg on the afternoon/evening of surgery. Do NOT take Aleve if you have a history of gastric ulcers, uncontrolled reflux or have been told previously by a physician that you should not take anti-inflammatory medications such as Advil/Aleve/Motrin.  2. Aleve (naproxen) 220 mg in the morning and afternoon, for about 2-3 days after the surgery; even if you have no pain. You can stop two days after surgery if your hand does not hurt.  3. Tylenol Arthritis (or any brand of acetaminophen 8-hour), 650 mg every eight hours, with a maximum dose of 3000 mg per day, for about 2-3 days after the surgery, even if you have no pain. Tylenol Arthritis plus Aleve is a case of 1+1=3, not 2. That is, they work together as a team to make each other stronger a. The maximum amount of Tylenol is 3000 mg per day, which is the same as 4 of the 650 mg pills. Remember; don't substitute any other medication for the Tylenol: don't take Motrin, aspirin, or any other over the counter medication. It must be Tylenol (or any brand of acetaminophen) for it to work as a team. Remember as well that Tylenol Arthritis is taken every 8 hours, the Aleve is twice a day.  4. Norco (hydrocodone/acetaminophen) 5/325 mg or a similar medication to assist with sleeping at night, and possibly every 6 hours during the day, ONLY IF NEEDED, for the first few days. You will be given a written prescription, but many patients find they do not need to take any or all of the Norco. Do not take the medication just because it was given to you; only take it if you need it. Remember that Norco is an opioid  pain medication and can lead to addiction, respiratory sedation, and death. In 2015 over 17,500 Americans  from opioid overdoses and I don't want this to happen to you. Opioid medications can also cause constipation, so please plan for that, as well as possible mental confusion and drowsiness. Do not drive while you are taking this medication.  With this protocol, you can expect your post-operative pain to be very manageable. The worst pain only lasts for the first 48 hours and improves significantly after that. By the time you see your surgeon for your post-operative visit you probably will no longer require any pain medication on a daily basis.    Important Information about Painkillers  You are being prescribed an opioid pain medication to help with severe pain after surgery. Use the medication sparingly as needed to reduce your pain. The goal is not to be pain-free, but to make the pain more tolerable. If you are able to take non-steroidal anti-inflammatory drugs (NSAIDs), alternate the prescription pain medication with over-the-counter Ibuprofen or Naproxen (if you do not have a history of reflux or stomach ulcers). This will allow you to take less opioid medication. Also, elevate the hand to reduce swelling and consider applying ice to the affected area for 15 minutes at a time, several times per day. Opioid medication is powerful and has the risk of overdose, abuse, and addiction. Only use the medication as directed by your physician and keep the pills in a safe place. When you no longer need the medication, please dispose of the pills properly as directed below. Allowing someone else to use your opioid prescription is illegal.  Also, possible side effects from opioid medications are over-sedation, itching, nausea/vomiting, and constipation. Do not drive a vehicle or operate machinery while taking the pain medications. Drink plenty of fluids and consider a stool softener to prevent constipation.  If the  pain you are experiencing is not severe, stop taking the opioid pills and only take over-the-counter medications such as Tylenol and Ibuprofen.  Disposing of unused pain medications:  (1) Follow pharmacist instructions on the bottle if available, or  (2) Call 1-760.835.3614 for a [facility] authorized collection site in your area, or  (3) If no collection site is available in your area, mix the pills with an undesirable substance such as used coffee grounds, emmanuelle litter, or dirt. Place this mixture in a sealed plastic bag. Place the bag in the household garbage.  Adapted from the opioid awareness section of the [employer], thanks to Tabatha Hall and Kenny Appiah

## 2025-05-07 NOTE — PROGRESS NOTES
ORTHOPAEDIC HAND, WRIST, AND ELBOW OFFICE  VISIT     Name: Adamaris Brian      : 1975      MRN: [id number]  Encounter Provider: Alek Richards MD  Encounter Date: 2025   Encounter department: [facility] [place]  :  Assessment & Plan  Trigger finger, right middle finger  The patient has an examination consistent with right long trigger finger.  I have discussed with the patient the pathophysiology of this diagnosis and reviewed how the examination correlates with this diagnosis.  Treatment options were discussed at length and after discussing these treatment options, the patient elected to proceed with surgical intervention.    Right long trigger finger release under sedation   Orders:  •  Case request operating room: Right long finger trigger finger release; Standing    Spondylosis of lumbar region without myelopathy or radiculopathy    Orders:  •  Ambulatory Referral to Orthopedic Surgery    Arthralgia, unspecified joint    Orders:  •  Ambulatory Referral to Orthopedic Surgery      Operative Discussions:  Trigger Finger Release: The anatomy and physiology of trigger finger was discussed with the patient today in the office.  Edema and increased contact pressure within the flexor tendons at the A1 pulley can cause pain, crepitation, and limitation of function.  Treatment options include resting MP or PIP blocking splints to decrease edema, oral anti-inflammatory medications, home or formal therapy exercises, up to 2 steroid injections or surgical release.  While a majority of patients do respond to conservative treatment, up to 20% may require surgical release.  The patient has elected release of the trigger finger.  The patient has elected to undergo a release of the A1 pulley (trigger finger).  A small incision will be made over the palmar aspect of the hand, the tendon sheath holding the flexor tendons will be released.  In the postoperative period, light  activities are allowed immediately, driving is allowed when narcotic medication has stopped, and the incision may get wet after 5 days.  Heavy activities (lifting more than approximately 5 pounds) will be allowed after the follow up appointment in 1-2 weeks.  While the pain and discomfort within the wrist typically improves rapidly, some residual discomfort may be present for up to 6 weeks.  The nodule that is typically palpable in the palmar aspect of the hand will not be removed, as this would necessitate removal of a portion of the flexor tendon, however the catching, clicking, and locking should resolve.  Approximate success rate is 95-98%.The risks and benefits of the procedure were explained to the patient, which include, but are not limited to: Bleeding, infection, recurrence, pain, scar, damage to tendons, damage to nerves, and damage to blood vessels, need for future surgery and complications related to anesthesia.  If bony work is done, risks also include malunion and nonunion.  These risks, along with alternative conservative treatment options, and postoperative protocols were voiced back and understood by the patient.  All questions were answered to the patient's satisfaction.  The patient agrees to comply with a standard postoperative protocol, and is willing to proceed.  Education was provided via written and auditory forms.  There were no barriers to learning. Written handouts regarding wound care, incision and scar care, and general preoperative information, as well as risks and benefits were provided to the patient.    History of Present Illness   HPI  Chief Complaint   Patient presents with   • Right Middle Finger - Triggering       Adamaris Brian is a 49 y.o. adult who presents for evaluation of right long trigger finger.  She was last seen in 2021 and provide with 2 injections, which she reports has failed to provide relief.  Patient notes increased pain and locking.         REVIEW OF  "SYSTEMS:  General: no fever, no chills  HEENT:  No loss of hearing or eyesight problems  Eyes:  No red eyes  Respiratory:  No coughing, shortness of breath or wheezing  Cardiovascular:  No chest pain, no palpitations  GI:  Abdomen soft nontender, denies nausea  Endocrine:  No muscle weakness, no frequent urination, no excessive thirst  Urinary:  No dysuria, no incontinence  Musculoskeletal: see HPI and PE  SKIN:  No skin rash, no dry skin  Neurological:  No headaches, no confusion  Psychiatric:  No suicide thoughts, no anxiety, no depression  Review of all other systems is negative    Objective   Ht 5' 5\" (1.651 m)   Wt 94.3 kg (208 lb)   LMP  (LMP Unknown)   BMI 34.61 kg/m²      General: well developed and well nourished, alert, oriented times 3, and appears comfortable  Psychiatric: Normal  HEENT: Trachea Midline, No torticollis  Cardiovascular: No discernable arrhythmia  Pulmonary: No wheezing or stridor  Abdomen: No rebound or guarding  Extremities: No peripheral edema  Skin: No masses, erythema, lacerations, fluctation, ulcerations  Neurovascular: Sensation Intact to the Median, Ulnar, Radial Nerve, Motor Intact to the Median, Ulnar, Radial Nerve, and Pulses Intact    Musculoskeletal exam:  right long finger:  Positive palpable nodule over the A1 pulley.  Positive tenderness to palpation over A1 pulley. Positive catching. Positive clicking.         STUDIES REVIEWED:  No Studies to review      PROCEDURES PERFORMED:  Procedures  No Procedures performed today  Scribe Attestation    I,:  Nori Monsivais MA am acting as a scribe while in the presence of the attending physician.:       I,:  Alke Richards MD personally performed the services described in this documentation    as scribed in my presence.:           "

## 2025-05-08 ENCOUNTER — OFFICE VISIT (OUTPATIENT)
Dept: PHYSICAL THERAPY | Facility: REHABILITATION | Age: 50
End: 2025-05-08
Payer: MEDICARE

## 2025-05-08 DIAGNOSIS — G89.29 ACUTE EXACERBATION OF CHRONIC LOW BACK PAIN: Primary | ICD-10-CM

## 2025-05-08 DIAGNOSIS — M54.50 ACUTE EXACERBATION OF CHRONIC LOW BACK PAIN: Primary | ICD-10-CM

## 2025-05-08 PROCEDURE — 97140 MANUAL THERAPY 1/> REGIONS: CPT | Performed by: PHYSICAL THERAPIST

## 2025-05-08 PROCEDURE — 97112 NEUROMUSCULAR REEDUCATION: CPT | Performed by: PHYSICAL THERAPIST

## 2025-05-08 NOTE — PROGRESS NOTES
"Daily Note     Today's date: 2025  Patient name: Adamaris Brian  : 1975  MRN: [id number]  Referring provider: Ramses Harris, PT  Dx:   Encounter Diagnosis     ICD-10-CM    1. Acute exacerbation of chronic low back pain  M54.50     G89.29           Start Time: 1525  Stop Time: 1558  Total time in clinic (min): 33 minutes    Subjective: Reports she is doing well. Back is getting there.       Objective: See treatment diary below      Assessment: Tolerated treatment well. Still challenged with R hip flexion. Patient exhibited good technique with therapeutic exercises and would benefit from continued PT      Plan: Continue per plan of care.      Precautions: standard    Manuals  5         Lumbar mobs  QD PA 3x30 GrIII QD PA 3x30 GrIII QD L5 TP PA 3x30 GrIV                                   Assessment  QD QD QD         Neuro Re-Ed   5/8         pallof  2x10 OJB 2x10 OJB 2x10 YJB         Suitcase carry  15lb KB 3 laps 125lb KB 4 laps 25lb KB 4 laps         Pball   20x 20x 20x          Seated hip flexion  12x ea Supine RMB 12x ea Supine RMB 12x ea         TrA march   20x ea 20x ea         Bridge with abd    YHB 10x10\"                      Ther Ex            MAGDA HEP                                                                                          HEP/education 10'            Ther Activity                                       Gait Training                                       Modalities                                            "

## 2025-05-14 ENCOUNTER — OFFICE VISIT (OUTPATIENT)
Dept: PHYSICAL THERAPY | Facility: REHABILITATION | Age: 50
End: 2025-05-14
Payer: MEDICARE

## 2025-05-14 DIAGNOSIS — G89.29 ACUTE EXACERBATION OF CHRONIC LOW BACK PAIN: Primary | ICD-10-CM

## 2025-05-14 DIAGNOSIS — M54.50 ACUTE EXACERBATION OF CHRONIC LOW BACK PAIN: Primary | ICD-10-CM

## 2025-05-14 PROCEDURE — 97140 MANUAL THERAPY 1/> REGIONS: CPT | Performed by: PHYSICAL THERAPIST

## 2025-05-14 PROCEDURE — 97112 NEUROMUSCULAR REEDUCATION: CPT | Performed by: PHYSICAL THERAPIST

## 2025-05-14 NOTE — PROGRESS NOTES
"Daily Note     Today's date: 2025  Patient name: Adamaris Brian  : 1975  MRN: [id number]  Referring provider: Ramses Harris, PT  Dx:   Encounter Diagnosis     ICD-10-CM    1. Acute exacerbation of chronic low back pain  M54.50     G89.29           Start Time: 1530  Stop Time: 1620  Total time in clinic (min): 50 minutes    Subjective: Reports she is okay. Her ankle has been bothering her.       Objective: See treatment diary below      Assessment: Tolerated treatment well. Patient exhibited good technique with therapeutic exercises and would benefit from continued PT      Plan: Continue per plan of care.      Precautions: standard    Manuals         Lumbar mobs  QD PA 3x30 GrIII QD PA 3x30 GrIII QD L5 TP PA 3x30 GrIV QD L5 TP PA 3x30 GrIV        STM     7' MFD                     Assessment  QD QD QD QD        Neuro Re-Ed          pallof  2x10 OJB 2x10 OJB 2x10 YJB 2x10 YJB        Suitcase carry  15lb KB 3 laps 125lb KB 4 laps 25lb KB 4 laps         Pball   20x 20x 20x  20x 3\" hold        Seated hip flexion  12x ea Supine RMB 12x ea Supine RMB 12x ea         TrA march   20x ea 20x ea 20x ea        Bridge with abd    YHB 10x10\" YHB 10x10\"                     Ther Ex            MAGDA HEP                                                                                          HEP/education 10'            Ther Activity                                       Gait Training                                       Modalities                                            "

## 2025-05-19 DIAGNOSIS — G43.009 MIGRAINE WITHOUT AURA AND WITHOUT STATUS MIGRAINOSUS, NOT INTRACTABLE: ICD-10-CM

## 2025-05-19 RX ORDER — RIMEGEPANT SULFATE 75 MG/75MG
TABLET, ORALLY DISINTEGRATING ORAL
Qty: 16 TABLET | Refills: 11 | Status: SHIPPED | OUTPATIENT
Start: 2025-05-19

## 2025-05-19 NOTE — TELEPHONE ENCOUNTER
Reason for call:   [x] Refill   [] Prior Auth  [] Other:     Office:   [] PCP/Provider -   [x] Specialty/Provider -     Medication: Nurtec 75 MG TBDP     Dose/Frequency: DISSOLVE 1 TABLET IN MOUTH EVERY OTHER DAY     Quantity: 16    Pharmacy: [facility] - [place], [place] - [address]        Mail Away Pharmacy   Does the patient have enough for 10 days?   [] Yes   [x] No - Send as HP to POD

## 2025-05-22 ENCOUNTER — APPOINTMENT (OUTPATIENT)
Dept: PHYSICAL THERAPY | Facility: REHABILITATION | Age: 50
End: 2025-05-22
Payer: MEDICARE

## 2025-05-27 ENCOUNTER — OFFICE VISIT (OUTPATIENT)
Dept: OBGYN CLINIC | Facility: CLINIC | Age: 50
End: 2025-05-27
Payer: MEDICARE

## 2025-05-27 VITALS — WEIGHT: 215 LBS | HEIGHT: 65 IN | BODY MASS INDEX: 35.82 KG/M2

## 2025-05-27 DIAGNOSIS — M19.071 ARTHRITIS OF RIGHT MIDFOOT: Primary | ICD-10-CM

## 2025-05-27 DIAGNOSIS — M19.071 ARTHRITIS OF RIGHT ANKLE: ICD-10-CM

## 2025-05-27 PROCEDURE — 99213 OFFICE O/P EST LOW 20 MIN: CPT | Performed by: STUDENT IN AN ORGANIZED HEALTH CARE EDUCATION/TRAINING PROGRAM

## 2025-05-28 ENCOUNTER — OFFICE VISIT (OUTPATIENT)
Dept: PHYSICAL THERAPY | Facility: REHABILITATION | Age: 50
End: 2025-05-28
Payer: MEDICARE

## 2025-05-28 DIAGNOSIS — G89.29 ACUTE EXACERBATION OF CHRONIC LOW BACK PAIN: Primary | ICD-10-CM

## 2025-05-28 DIAGNOSIS — M54.50 ACUTE EXACERBATION OF CHRONIC LOW BACK PAIN: Primary | ICD-10-CM

## 2025-05-28 PROCEDURE — 97112 NEUROMUSCULAR REEDUCATION: CPT

## 2025-05-28 PROCEDURE — 97140 MANUAL THERAPY 1/> REGIONS: CPT

## 2025-05-28 NOTE — PROGRESS NOTES
"Daily Note     Today's date: 2025  Patient name: Adamaris Brian  : 1975  MRN: [id number]  Referring provider: Ramses Harris, PT  Dx:   Encounter Diagnosis     ICD-10-CM    1. Acute exacerbation of chronic low back pain  M54.50     G89.29                      Subjective: pt stated that her low still feels the same, it hurts to bend down mostly on the R hip into the glute.       Objective: See treatment diary below      Assessment: Tolerated treatment well. Adamaris was able to bend forward with less pain after the combination of MFD to low back, glute and piriformis TpR and stretching. She continues to present with core stability deficits and difficulty with hip flexion. Continued PT would be beneficial to improve function.          Plan: Continue per plan of care.       Precautions: standard    Manuals        Lumbar mobs  QD PA 3x30 GrIII QD PA 3x30 GrIII QD L5 TP PA 3x30 GrIV QD L5 TP PA 3x30 GrIV        STM     7' MFD 5' MFD             TPR piriformis in s/l, stretching in supine       Assessment  QD QD QD QD        Neuro Re-Ed          pallof  2x10 OJB 2x10 OJB 2x10 YJB 2x10 YJB        Suitcase carry  15lb KB 3 laps 125lb KB 4 laps 25lb KB 4 laps         Pball   20x 20x 20x  20x 3\" hold 20x 3\" hold       Seated hip flexion  12x ea Supine RMB 12x ea Supine RMB 12x ea         TrA march   20x ea 20x ea 20x ea 20x ea       Bridge with abd    YHB 10x10\" YHB 10x10\" YHB 10x10\"       Piriformis stretch      4x20       Ther Ex            MAGDA HEP                                                                                          HEP/education 10'            Ther Activity                                       Gait Training                                       Modalities                                              "

## 2025-06-09 ENCOUNTER — APPOINTMENT (OUTPATIENT)
Dept: PHYSICAL THERAPY | Facility: REHABILITATION | Age: 50
End: 2025-06-09
Payer: MEDICARE

## 2025-06-16 ENCOUNTER — OFFICE VISIT (OUTPATIENT)
Dept: PHYSICAL THERAPY | Facility: REHABILITATION | Age: 50
End: 2025-06-16
Payer: MEDICARE

## 2025-06-16 DIAGNOSIS — M54.50 ACUTE EXACERBATION OF CHRONIC LOW BACK PAIN: Primary | ICD-10-CM

## 2025-06-16 DIAGNOSIS — G89.29 ACUTE EXACERBATION OF CHRONIC LOW BACK PAIN: Primary | ICD-10-CM

## 2025-06-16 PROCEDURE — 97140 MANUAL THERAPY 1/> REGIONS: CPT | Performed by: PHYSICAL THERAPIST

## 2025-06-16 PROCEDURE — 97112 NEUROMUSCULAR REEDUCATION: CPT | Performed by: PHYSICAL THERAPIST

## 2025-06-16 NOTE — PROGRESS NOTES
"Daily Note     Today's date: 2025  Patient name: Adamaris Brian  : 1975  MRN: [id number]  Referring provider: Ramses Harris, CLARE  Dx:   Encounter Diagnosis     ICD-10-CM    1. Acute exacerbation of chronic low back pain  M54.50     G89.29             Start Time: 0900  Stop Time: 0930  Total time in clinic (min): 30 minutes    Subjective: pt stated that her low back has been doing alright.    Objective: See treatment diary below    Assessment: Tolerated treatment well. Assessed pt R ankle as per script. Continued PT would be beneficial to improve function.      Plan: Continue per plan of care.     Precautions: standard    Manuals    Lumbar mobs   QD PA 3x30 GrIII QD L5 TP PA 3x30 GrIV QD L5 TP PA 3x30 GrIV    STM     7' MFD 5' MFD         TPR piriformis in s/l, stretching in supine   Assessment QD 10'  QD QD QD    Neuro Re-Ed     pallof   2x10 OJB 2x10 YJB 2x10 YJB    Suitcase carry   125lb KB 4 laps 25lb KB 4 laps     Pball  20x  20x 20x  20x 3\" hold 20x 3\" hold   Seated hip flexion   Supine RMB 12x ea Supine RMB 12x ea     TrA march 20x ea   20x ea 20x ea 20x ea 20x ea   Bridge with abd YHB 2x10    Clam 2x10   YHB 10x10\" YHB 10x10\" YHB 10x10\"   Piriformis stretch      4x20   Ther Ex         MAGDA                                                               HEP/education         Ther Activity                           Gait Training                           Modalities                                  "

## 2025-06-20 ENCOUNTER — OFFICE VISIT (OUTPATIENT)
Dept: PHYSICAL THERAPY | Facility: REHABILITATION | Age: 50
End: 2025-06-20
Payer: MEDICARE

## 2025-06-20 DIAGNOSIS — M54.50 ACUTE EXACERBATION OF CHRONIC LOW BACK PAIN: Primary | ICD-10-CM

## 2025-06-20 DIAGNOSIS — G89.29 ACUTE EXACERBATION OF CHRONIC LOW BACK PAIN: Primary | ICD-10-CM

## 2025-06-20 PROCEDURE — 97140 MANUAL THERAPY 1/> REGIONS: CPT | Performed by: PHYSICAL THERAPIST

## 2025-06-20 PROCEDURE — 97112 NEUROMUSCULAR REEDUCATION: CPT | Performed by: PHYSICAL THERAPIST

## 2025-06-20 NOTE — PROGRESS NOTES
"Daily Note     Today's date: 2025  Patient name: Adamaris Brian  : 1975  MRN: [id number]  Referring provider: Ramses Harris, CLARE  Dx:   Encounter Diagnosis     ICD-10-CM    1. Acute exacerbation of chronic low back pain  M54.50     G89.29             Start Time: 0900  Stop Time: 0930  Total time in clinic (min): 30 minutes    Subjective: pt states that her back is alright. Having some neck and R shoulder discomfort.     Objective: See treatment diary below    Assessment: Tolerated treatment well. Good tolerance to lumbar exercises. Continued PT would be beneficial to improve function.      Plan: Continue per plan of care.     Precautions: standard    Manuals    Lumbar mobs   QD PA 3x30 GrIII QD L5 TP PA 3x30 GrIV QD L5 TP PA 3x30 GrIV    STM     7' MFD 5' MFD         TPR piriformis in s/l, stretching in supine   Assessment QD 10' QD 10' QD QD QD    Neuro Re-Ed     pallof   2x10 OJB 2x10 YJB 2x10 YJB    Suitcase carry   125lb KB 4 laps 25lb KB 4 laps     Pball  20x 20x 20x 20x  20x 3\" hold 20x 3\" hold   Seated hip flexion   Supine RMB 12x ea Supine RMB 12x ea     TrA march 20x ea  20x ea 20x ea 20x ea 20x ea 20x ea   Bridge with abd YHB 2x10    Clam 2x10 YHB 2x10     Clam 2x10  YHB 10x10\" YHB 10x10\" YHB 10x10\"   Piriformis stretch      4x20   Ther Ex         MAGDA                                                               HEP/education         Ther Activity                           Gait Training                           Modalities                                  "

## 2025-06-24 ENCOUNTER — HOSPITAL ENCOUNTER (OUTPATIENT)
Dept: RADIOLOGY | Age: 50
Discharge: HOME | End: 2025-06-24
Payer: MEDICARE

## 2025-06-24 VITALS — HEIGHT: 65 IN | WEIGHT: 207 LBS | BODY MASS INDEX: 34.49 KG/M2

## 2025-06-24 DIAGNOSIS — Z13.820 ENCOUNTER FOR OSTEOPOROSIS SCREENING IN ASYMPTOMATIC POSTMENOPAUSAL PATIENT: ICD-10-CM

## 2025-06-24 DIAGNOSIS — Z78.0 ENCOUNTER FOR OSTEOPOROSIS SCREENING IN ASYMPTOMATIC POSTMENOPAUSAL PATIENT: ICD-10-CM

## 2025-06-24 PROCEDURE — 77080 DXA BONE DENSITY AXIAL: CPT

## 2025-06-26 ENCOUNTER — APPOINTMENT (OUTPATIENT)
Dept: PHYSICAL THERAPY | Facility: REHABILITATION | Age: 50
End: 2025-06-26
Payer: MEDICARE

## 2025-07-01 ENCOUNTER — OFFICE VISIT (OUTPATIENT)
Dept: OBGYN CLINIC | Facility: CLINIC | Age: 50
End: 2025-07-01
Payer: MEDICARE

## 2025-07-01 VITALS — BODY MASS INDEX: 34.49 KG/M2 | WEIGHT: 207 LBS | HEIGHT: 65 IN

## 2025-07-01 DIAGNOSIS — Z98.890 STATUS POST RIGHT ROTATOR CUFF REPAIR: Primary | ICD-10-CM

## 2025-07-01 PROCEDURE — 99214 OFFICE O/P EST MOD 30 MIN: CPT | Performed by: ORTHOPAEDIC SURGERY

## 2025-07-01 PROCEDURE — 20610 DRAIN/INJ JOINT/BURSA W/O US: CPT | Performed by: ORTHOPAEDIC SURGERY

## 2025-07-01 RX ORDER — LIDOCAINE HYDROCHLORIDE 10 MG/ML
4 INJECTION, SOLUTION INFILTRATION; PERINEURAL
Status: COMPLETED | OUTPATIENT
Start: 2025-07-01 | End: 2025-07-01

## 2025-07-01 RX ORDER — TRIAMCINOLONE ACETONIDE 40 MG/ML
80 INJECTION, SUSPENSION INTRA-ARTICULAR; INTRAMUSCULAR
Status: COMPLETED | OUTPATIENT
Start: 2025-07-01 | End: 2025-07-01

## 2025-07-01 RX ADMIN — LIDOCAINE HYDROCHLORIDE 4 ML: 10 INJECTION, SOLUTION INFILTRATION; PERINEURAL at 09:00

## 2025-07-01 RX ADMIN — TRIAMCINOLONE ACETONIDE 80 MG: 40 INJECTION, SUSPENSION INTRA-ARTICULAR; INTRAMUSCULAR at 09:00

## 2025-07-01 NOTE — PROGRESS NOTES
Name: Adamaris Brian      : 1975      MRN: [id number]  Encounter Provider: Luke Gamez MD  Encounter Date: 2025   Encounter department: [facility]      REASON FOR FOLLOW-UP  Adamaris Brian is a 50 y.o. adult who presents for follow-up of the right Shoulder    HISTORY OF PRESENT ILLNESS  Adamaris notes a history of right rotator cuff repair with distal clavicle excision 24. She has new onset of anterior and lateral shoulder pain aggravated by overhead activity, lifting, and Physical Therapy activity. Pain at times shoots down her arm and up into her neck. She uses a blood thinner and is unable to take NSAIDs, and is finding minimal relief with Tylenol. She denies new trauma or falls. Denies paraesthesias.       General: The patient is alert, oriented, and pleasant to interact with.   Neuro: Sensation intact to light touch in bilateral upper extremities.   Skin: No significant abrasions or lesions over the area examined.  Vessels: Palpable radial pulse.    General: The patient is alert, oriented, and pleasant to interact with.   Neuro: Sensation intact to light touch in bilateral upper extremities.   Skin: No significant abrasions or lesions over the area examined.  Vessels: Palpable radial pulse.    Right Shoulder Exam  Alignment / Posture:  Normal cervical alignment. Normal shoulder posture. Normal scapular position.  Inspection:  No swelling. No edema. No erythema. No ecchymosis. No muscle atrophy. No deformity.  Palpation:  Diffuse tenderness. No effusion. No warmth. No crepitus. No clicking, catching, or snapping.  ROM:  Abduction 90   Strength:  4+/5 supraspinatus, infraspinatus, and subscapularis.  Stability:  No objective shoulder instability.  Tests: (+) [place]. (+) Hornblower sign. (+) [place]. (-) Drop arm. (-) Cross-body adduction.  Neurovascular:  Sensation intact in Ax/R/M/U nerve distributions. 2+ radial pulse. Brisk capillary refill in  "all fingertips.        DIAGNOSTIC IMAGING:  No new imaging to review today    Large joint arthrocentesis: R subacromial bursa    Performed by: Luke Gamez MD  Authorized by: Luke Gamez MD    Universal Protocol:  Consent: Verbal consent obtained  Risks and benefits: risks, benefits and alternatives were discussed  Consent given by: patient  Time out: Immediately prior to procedure a \"time out\" was called to verify the correct patient, procedure, equipment, support staff and site/side marked as required.  Patient understanding: patient states understanding of the procedure being performed  Site marked: the operative site was marked  Patient identity confirmed: verbally with patient  Supporting Documentation  Indications: pain and diagnostic evaluation     Is this a Visco injection? NoProcedure Details  Location: shoulder - R subacromial bursa  Preparation: Patient was prepped and draped in the usual sterile fashion  Needle size: 22 G  Ultrasound guidance: no  Medications administered: 4 mL lidocaine 1 %; 80 mg triamcinolone acetonide 40 mg/mL    Patient tolerance: patient tolerated the procedure well with no immediate complications  Dressing:  Sterile dressing applied            Assessment & Plan  Status post right rotator cuff repair  Physical exam, diagnostic imaging, and subjective history are all most consistent with the above diagnosis. The pathoanatomy and natural history of this diagnosis was explained to the patient in detail.   Patient was offered, accepted, and received a cortisone injection of the right subacromial space today. Patient tolerated procedure well with no immediate complications. Post-injection protocols and expectations were discussed with the patient.   Continue Physical Therapy until discharged from their care, transition to home exercise program  Recommend regular performance of home exercises to maintain strength and range of motion.   May use ice or heat as needed for pain " relief  May take NSAIDs/Tylenol as needed for pain control  If she fails to find relief with today's injection, consider MRI for further evaluation of rotator cuff  Follow up on an as needed basis           Scribe Attestation      I,:  Nataly Hayes am acting as a scribe while in the presence of the attending physician.:       I,:  Luke Gamez MD personally performed the services described in this documentation    as scribed in my presence.:

## 2025-07-22 ENCOUNTER — ANESTHESIA EVENT (OUTPATIENT)
Age: 50
End: 2025-07-22

## 2025-07-22 ENCOUNTER — ANESTHESIA (OUTPATIENT)
Age: 50
End: 2025-07-22

## 2025-07-31 ENCOUNTER — PROCEDURE VISIT (OUTPATIENT)
Dept: NEUROLOGY | Facility: CLINIC | Age: 50
End: 2025-07-31
Payer: MEDICARE

## 2025-07-31 VITALS — SYSTOLIC BLOOD PRESSURE: 120 MMHG | DIASTOLIC BLOOD PRESSURE: 76 MMHG | TEMPERATURE: 97.9 F

## 2025-07-31 DIAGNOSIS — G43.709 CHRONIC MIGRAINE WITHOUT AURA WITHOUT STATUS MIGRAINOSUS, NOT INTRACTABLE: Primary | ICD-10-CM

## 2025-07-31 DIAGNOSIS — G43.009 MIGRAINE WITHOUT AURA AND WITHOUT STATUS MIGRAINOSUS, NOT INTRACTABLE: ICD-10-CM

## 2025-07-31 PROCEDURE — 64615 CHEMODENERV MUSC MIGRAINE: CPT | Performed by: PHYSICIAN ASSISTANT

## 2025-07-31 RX ORDER — ATOGEPANT 60 MG/1
60 TABLET ORAL
Qty: 90 TABLET | Refills: 3 | Status: SHIPPED | OUTPATIENT
Start: 2025-07-31

## 2025-07-31 RX ORDER — CYCLOBENZAPRINE HCL 10 MG
10 TABLET ORAL
Qty: 90 TABLET | Refills: 3 | Status: SHIPPED | OUTPATIENT
Start: 2025-07-31

## 2025-07-31 RX ORDER — PROCHLORPERAZINE MALEATE 10 MG
10 TABLET ORAL EVERY 6 HOURS PRN
Qty: 20 TABLET | Refills: 3 | Status: SHIPPED | OUTPATIENT
Start: 2025-07-31

## 2025-08-01 DIAGNOSIS — K21.9 GASTROESOPHAGEAL REFLUX DISEASE, UNSPECIFIED WHETHER ESOPHAGITIS PRESENT: ICD-10-CM

## 2025-08-01 RX ORDER — PANTOPRAZOLE SODIUM 40 MG/1
40 TABLET, DELAYED RELEASE ORAL
Qty: 90 TABLET | Refills: 0 | Status: SHIPPED | OUTPATIENT
Start: 2025-08-01

## 2025-08-04 ENCOUNTER — OFFICE VISIT (OUTPATIENT)
Dept: OBGYN CLINIC | Facility: CLINIC | Age: 50
End: 2025-08-04
Payer: MEDICARE

## 2025-08-04 ENCOUNTER — TELEPHONE (OUTPATIENT)
Age: 50
End: 2025-08-04

## 2025-08-04 VITALS — WEIGHT: 207 LBS | HEIGHT: 65 IN | BODY MASS INDEX: 34.49 KG/M2

## 2025-08-04 DIAGNOSIS — Z98.890 STATUS POST RIGHT ROTATOR CUFF REPAIR: Primary | ICD-10-CM

## 2025-08-04 PROCEDURE — 99213 OFFICE O/P EST LOW 20 MIN: CPT | Performed by: ORTHOPAEDIC SURGERY

## 2025-08-05 ENCOUNTER — HOSPITAL ENCOUNTER (OUTPATIENT)
Age: 50
Setting detail: OUTPATIENT SURGERY
Discharge: HOME | End: 2025-08-05
Attending: ORTHOPAEDIC SURGERY | Admitting: ORTHOPAEDIC SURGERY
Payer: MEDICARE

## 2025-08-05 ENCOUNTER — ANESTHESIA (OUTPATIENT)
Age: 50
End: 2025-08-05
Payer: MEDICARE

## 2025-08-05 ENCOUNTER — ANESTHESIA EVENT (OUTPATIENT)
Age: 50
End: 2025-08-05
Payer: MEDICARE

## 2025-08-05 VITALS
DIASTOLIC BLOOD PRESSURE: 67 MMHG | BODY MASS INDEX: 34.32 KG/M2 | TEMPERATURE: 96 F | HEIGHT: 65 IN | SYSTOLIC BLOOD PRESSURE: 118 MMHG | RESPIRATION RATE: 20 BRPM | OXYGEN SATURATION: 98 % | HEART RATE: 73 BPM | WEIGHT: 206 LBS

## 2025-08-05 DIAGNOSIS — M65.331 TRIGGER FINGER, RIGHT MIDDLE FINGER: Primary | ICD-10-CM

## 2025-08-05 PROCEDURE — NC001 PR NO CHARGE: Performed by: ORTHOPAEDIC SURGERY

## 2025-08-05 PROCEDURE — 26055 INCISE FINGER TENDON SHEATH: CPT | Performed by: PHYSICIAN ASSISTANT

## 2025-08-05 PROCEDURE — 26055 INCISE FINGER TENDON SHEATH: CPT | Performed by: ORTHOPAEDIC SURGERY

## 2025-08-05 RX ORDER — ONDANSETRON 2 MG/ML
4 INJECTION INTRAMUSCULAR; INTRAVENOUS ONCE AS NEEDED
Status: DISCONTINUED | OUTPATIENT
Start: 2025-08-05 | End: 2025-08-05 | Stop reason: HOSPADM

## 2025-08-05 RX ORDER — SENNOSIDES 8.6 MG
650 CAPSULE ORAL EVERY 8 HOURS PRN
Qty: 30 TABLET | Refills: 0 | Status: SHIPPED | OUTPATIENT
Start: 2025-08-05

## 2025-08-05 RX ORDER — PROPOFOL 10 MG/ML
INJECTION, EMULSION INTRAVENOUS CONTINUOUS PRN
Status: DISCONTINUED | OUTPATIENT
Start: 2025-08-05 | End: 2025-08-05

## 2025-08-05 RX ORDER — HYDROMORPHONE HCL IN WATER/PF 6 MG/30 ML
0.2 PATIENT CONTROLLED ANALGESIA SYRINGE INTRAVENOUS
Status: DISCONTINUED | OUTPATIENT
Start: 2025-08-05 | End: 2025-08-05 | Stop reason: HOSPADM

## 2025-08-05 RX ORDER — CEFAZOLIN SODIUM 2 G/50ML
2000 SOLUTION INTRAVENOUS ONCE
Status: COMPLETED | OUTPATIENT
Start: 2025-08-05 | End: 2025-08-05

## 2025-08-05 RX ORDER — ACETAMINOPHEN 325 MG/1
650 TABLET ORAL EVERY 6 HOURS PRN
Status: DISCONTINUED | OUTPATIENT
Start: 2025-08-05 | End: 2025-08-05 | Stop reason: HOSPADM

## 2025-08-05 RX ORDER — LIDOCAINE HYDROCHLORIDE AND EPINEPHRINE 10; 10 MG/ML; UG/ML
INJECTION, SOLUTION INFILTRATION; PERINEURAL AS NEEDED
Status: DISCONTINUED | OUTPATIENT
Start: 2025-08-05 | End: 2025-08-05 | Stop reason: HOSPADM

## 2025-08-05 RX ORDER — SODIUM CHLORIDE, SODIUM LACTATE, POTASSIUM CHLORIDE, CALCIUM CHLORIDE 600; 310; 30; 20 MG/100ML; MG/100ML; MG/100ML; MG/100ML
125 INJECTION, SOLUTION INTRAVENOUS CONTINUOUS
Status: DISCONTINUED | OUTPATIENT
Start: 2025-08-05 | End: 2025-08-05 | Stop reason: HOSPADM

## 2025-08-05 RX ORDER — PROPOFOL 10 MG/ML
INJECTION, EMULSION INTRAVENOUS AS NEEDED
Status: DISCONTINUED | OUTPATIENT
Start: 2025-08-05 | End: 2025-08-05

## 2025-08-05 RX ORDER — MIDAZOLAM HYDROCHLORIDE 2 MG/2ML
INJECTION, SOLUTION INTRAMUSCULAR; INTRAVENOUS AS NEEDED
Status: DISCONTINUED | OUTPATIENT
Start: 2025-08-05 | End: 2025-08-05

## 2025-08-05 RX ORDER — FENTANYL CITRATE/PF 50 MCG/ML
25 SYRINGE (ML) INJECTION
Status: DISCONTINUED | OUTPATIENT
Start: 2025-08-05 | End: 2025-08-05 | Stop reason: HOSPADM

## 2025-08-05 RX ORDER — ONDANSETRON 2 MG/ML
4 INJECTION INTRAMUSCULAR; INTRAVENOUS EVERY 6 HOURS PRN
Status: DISCONTINUED | OUTPATIENT
Start: 2025-08-05 | End: 2025-08-05 | Stop reason: HOSPADM

## 2025-08-05 RX ORDER — TRAMADOL HYDROCHLORIDE 50 MG/1
50 TABLET ORAL EVERY 6 HOURS PRN
Status: DISCONTINUED | OUTPATIENT
Start: 2025-08-05 | End: 2025-08-05 | Stop reason: HOSPADM

## 2025-08-05 RX ORDER — TRAMADOL HYDROCHLORIDE 50 MG/1
50 TABLET ORAL EVERY 6 HOURS PRN
Qty: 5 TABLET | Refills: 0 | Status: SHIPPED | OUTPATIENT
Start: 2025-08-05

## 2025-08-05 RX ADMIN — CEFAZOLIN SODIUM 2000 MG: 2 SOLUTION INTRAVENOUS at 08:30

## 2025-08-05 RX ADMIN — PROPOFOL 100 MCG/KG/MIN: 10 INJECTION, EMULSION INTRAVENOUS at 08:37

## 2025-08-05 RX ADMIN — MIDAZOLAM 2 MG: 1 INJECTION INTRAMUSCULAR; INTRAVENOUS at 08:32

## 2025-08-05 RX ADMIN — PROPOFOL 80 MG: 10 INJECTION, EMULSION INTRAVENOUS at 08:37

## 2025-08-05 RX ADMIN — SODIUM CHLORIDE, SODIUM LACTATE, POTASSIUM CHLORIDE, AND CALCIUM CHLORIDE 125 ML/HR: .6; .31; .03; .02 INJECTION, SOLUTION INTRAVENOUS at 07:32

## 2025-08-13 ENCOUNTER — OFFICE VISIT (OUTPATIENT)
Dept: OBGYN CLINIC | Facility: HOSPITAL | Age: 50
End: 2025-08-13

## 2025-08-21 ENCOUNTER — EVALUATION (OUTPATIENT)
Dept: OCCUPATIONAL THERAPY | Age: 50
End: 2025-08-21
Payer: MEDICARE

## 2025-08-21 DIAGNOSIS — M65.331 TRIGGER FINGER, RIGHT MIDDLE FINGER: ICD-10-CM

## 2025-08-21 PROCEDURE — 97110 THERAPEUTIC EXERCISES: CPT

## 2025-08-21 PROCEDURE — 97610 LOW FREQUENCY NON-THERMAL US: CPT

## 2025-08-21 PROCEDURE — 97165 OT EVAL LOW COMPLEX 30 MIN: CPT

## (undated) DEVICE — 3M™ IOBAN™ 2 ANTIMICROBIAL INCISE DRAPE 6650EZ: Brand: IOBAN™ 2

## (undated) DEVICE — ARTHROSCOPY FLOOR MAT

## (undated) DEVICE — STRL COTTON TIP APPLCTR 6IN PK: Brand: CARDINAL HEALTH

## (undated) DEVICE — PROBE ABLATION  APOLLO RF 90 DEG MULTI PORT

## (undated) DEVICE — GLOVE INDICATOR PI UNDERGLOVE SZ 8 BLUE

## (undated) DEVICE — SYRINGE 3ML LL

## (undated) DEVICE — Device

## (undated) DEVICE — BURR  OVAL 4MM 13CM 8 FLUTE COOLCUT

## (undated) DEVICE — IMPERVIOUS STOCKINETTE: Brand: DEROYAL

## (undated) DEVICE — IMMOBILIZER SHOULDER QUICK FIT SLING W/PILLOW

## (undated) DEVICE — PREMIUM DRY TRAY LF: Brand: MEDLINE INDUSTRIES, INC.

## (undated) DEVICE — SUT ETHILON 3-0 PS-1 18 IN 1663G

## (undated) DEVICE — PACK PBDS SHOULDER ARTHROSCOPY RF

## (undated) DEVICE — SUT PROLENE 4-0 PS-2 18 IN 8682G

## (undated) DEVICE — POSITIONER TRIMANO LIMB BEACH CHAIR

## (undated) DEVICE — GLOVE SRG BIOGEL 7.5

## (undated) DEVICE — GAUZE SPONGES,16 PLY: Brand: CURITY

## (undated) DEVICE — 3M™ STERI-DRAPE™ U-DRAPE 1015: Brand: STERI-DRAPE™

## (undated) DEVICE — DISPOSABLE EQUIPMENT COVER: Brand: SMALL TOWEL DRAPE

## (undated) DEVICE — PACK PBDS PLASTIC HAND RF

## (undated) DEVICE — T-MAX DISPOSABLE FACE MASK 8 PER BOX

## (undated) DEVICE — BLADE SHAVER DISSECTOR 5MM 13CM COOLCUT

## (undated) DEVICE — ABDOMINAL PAD: Brand: DERMACEA

## (undated) DEVICE — TUBING ARTHROSCOPIC WAVE  MAIN PUMP

## (undated) DEVICE — CHLORAPREP HI-LITE 26ML ORANGE

## (undated) DEVICE — SPONGE SCRUB 4 PCT CHLORHEXIDINE

## (undated) DEVICE — NEEDLE SUT SCORPION MEGALOADER

## (undated) DEVICE — TUBING SUCTION 5MM X 12 FT

## (undated) DEVICE — INTENDED FOR TISSUE SEPARATION, AND OTHER PROCEDURES THAT REQUIRE A SHARP SURGICAL BLADE TO PUNCTURE OR CUT.: Brand: BARD-PARKER ® CARBON RIB-BACK BLADES

## (undated) DEVICE — CANNULA 7 X70MM THRD SEAL SIDE PORT

## (undated) DEVICE — STERILE BETHLEHEM PLASTIC HAND: Brand: CARDINAL HEALTH

## (undated) DEVICE — OCCLUSIVE GAUZE STRIP,3% BISMUTH TRIBROMOPHENATE IN PETROLATUM BLEND: Brand: XEROFORM

## (undated) DEVICE — KERLIX BANDAGE ROLL: Brand: KERLIX

## (undated) DEVICE — RETROGRADE KNIFE BOX OF 6: Brand: ECTRA